# Patient Record
Sex: FEMALE | Race: WHITE | NOT HISPANIC OR LATINO | Employment: OTHER | URBAN - METROPOLITAN AREA
[De-identification: names, ages, dates, MRNs, and addresses within clinical notes are randomized per-mention and may not be internally consistent; named-entity substitution may affect disease eponyms.]

---

## 2017-01-31 DIAGNOSIS — R52 PAIN: ICD-10-CM

## 2017-01-31 RX ORDER — ACETAMINOPHEN AND CODEINE PHOSPHATE 300; 30 MG/1; MG/1
TABLET ORAL
Qty: 90 TABLET | Refills: 0 | OUTPATIENT
Start: 2017-01-31 | End: 2017-03-02 | Stop reason: SDUPTHER

## 2017-02-28 DIAGNOSIS — R52 PAIN: ICD-10-CM

## 2017-02-28 RX ORDER — ACETAMINOPHEN AND CODEINE PHOSPHATE 300; 30 MG/1; MG/1
TABLET ORAL
Qty: 90 TABLET | Refills: 0 | Status: CANCELLED | OUTPATIENT
Start: 2017-02-28

## 2017-03-02 DIAGNOSIS — R52 PAIN: ICD-10-CM

## 2017-03-02 RX ORDER — ACETAMINOPHEN AND CODEINE PHOSPHATE 300; 30 MG/1; MG/1
1 TABLET ORAL EVERY 6 HOURS PRN
Qty: 90 TABLET | Refills: 0 | OUTPATIENT
Start: 2017-03-02 | End: 2017-03-07 | Stop reason: SDUPTHER

## 2017-03-07 ENCOUNTER — OFFICE VISIT (OUTPATIENT)
Dept: INTERNAL MEDICINE | Facility: CLINIC | Age: 77
End: 2017-03-07

## 2017-03-07 VITALS
DIASTOLIC BLOOD PRESSURE: 94 MMHG | HEART RATE: 69 BPM | SYSTOLIC BLOOD PRESSURE: 160 MMHG | BODY MASS INDEX: 37.9 KG/M2 | HEIGHT: 59 IN | OXYGEN SATURATION: 93 % | WEIGHT: 188 LBS

## 2017-03-07 DIAGNOSIS — I10 ESSENTIAL HYPERTENSION: Primary | ICD-10-CM

## 2017-03-07 DIAGNOSIS — R52 PAIN: ICD-10-CM

## 2017-03-07 DIAGNOSIS — E78.2 MIXED HYPERLIPIDEMIA: ICD-10-CM

## 2017-03-07 DIAGNOSIS — K52.9 CHRONIC NONSPECIFIC COLITIS: ICD-10-CM

## 2017-03-07 DIAGNOSIS — E03.9 ACQUIRED HYPOTHYROIDISM: ICD-10-CM

## 2017-03-07 LAB
ALBUMIN SERPL-MCNC: 3.57 G/DL (ref 3.4–4.6)
ALBUMIN/GLOB SERPL: 1 G/DL
ALP SERPL-CCNC: 77 U/L (ref 46–116)
ALT SERPL W P-5'-P-CCNC: 16 U/L (ref 14–59)
ANION GAP SERPL CALCULATED.3IONS-SCNC: 9 MMOL/L
AST SERPL-CCNC: 18 U/L (ref 7–37)
BASOPHILS # BLD AUTO: 0.05 10*3/MM3 (ref 0–0.2)
BASOPHILS NFR BLD AUTO: 0.7 % (ref 0–2)
BILIRUB SERPL-MCNC: 0.6 MG/DL (ref 0.2–1)
BUN BLD-MCNC: 7 MG/DL (ref 6–22)
BUN/CREAT SERPL: 9.1 (ref 7–25)
CALCIUM SPEC-SCNC: 9.2 MG/DL (ref 8.6–10.5)
CHLORIDE SERPL-SCNC: 98 MMOL/L (ref 95–107)
CHOLEST SERPL-MCNC: 181 MG/DL (ref 0–200)
CO2 SERPL-SCNC: 32 MMOL/L (ref 23–32)
CREAT BLD-MCNC: 0.77 MG/DL (ref 0.55–1.02)
DEPRECATED RDW RBC AUTO: 47.4 FL (ref 37–54)
EOSINOPHIL # BLD AUTO: 0.18 10*3/MM3 (ref 0–0.7)
EOSINOPHIL NFR BLD AUTO: 2.3 % (ref 0–5)
ERYTHROCYTE [DISTWIDTH] IN BLOOD BY AUTOMATED COUNT: 15 % (ref 11.5–15)
GFR SERPL CREATININE-BSD FRML MDRD: 72 ML/MIN/1.73
GLOBULIN UR ELPH-MCNC: 3.6 GM/DL
GLUCOSE BLD-MCNC: 87 MG/DL (ref 70–100)
HCT VFR BLD AUTO: 35.2 % (ref 34.1–44.9)
HDLC SERPL-MCNC: 96 MG/DL (ref 40–81)
HGB BLD-MCNC: 11.6 G/DL (ref 11.2–15.7)
LDLC SERPL CALC-MCNC: 65 MG/DL (ref 0–100)
LDLC/HDLC SERPL: 0.68 {RATIO}
LYMPHOCYTES # BLD AUTO: 1.47 10*3/MM3 (ref 0.8–7)
LYMPHOCYTES NFR BLD AUTO: 19.2 % (ref 10–60)
MCH RBC QN AUTO: 28.8 PG (ref 26–34)
MCHC RBC AUTO-ENTMCNC: 33 G/DL (ref 31–37)
MCV RBC AUTO: 87.3 FL (ref 80–100)
MONOCYTES # BLD AUTO: 0.7 10*3/MM3 (ref 0–1)
MONOCYTES NFR BLD AUTO: 9.1 % (ref 0–13)
NEUTROPHILS # BLD AUTO: 5.27 10*3/MM3 (ref 1–11)
NEUTROPHILS NFR BLD AUTO: 68.7 % (ref 30–85)
PLATELET # BLD AUTO: 305 10*3/MM3 (ref 150–450)
PMV BLD AUTO: 10.2 FL (ref 6–12)
POTASSIUM BLD-SCNC: 3.4 MMOL/L (ref 3.3–5.3)
PROT SERPL-MCNC: 7.2 G/DL (ref 6.3–8.4)
RBC # BLD AUTO: 4.03 10*6/MM3 (ref 3.93–5.22)
SODIUM BLD-SCNC: 139 MMOL/L (ref 136–145)
TRIGL SERPL-MCNC: 98 MG/DL (ref 0–150)
TSH SERPL DL<=0.05 MIU/L-ACNC: 2.33 MIU/ML (ref 0.4–4.2)
VLDLC SERPL-MCNC: 19.6 MG/DL
WBC NRBC COR # BLD: 7.67 10*3/MM3 (ref 5–10)

## 2017-03-07 PROCEDURE — 99214 OFFICE O/P EST MOD 30 MIN: CPT | Performed by: INTERNAL MEDICINE

## 2017-03-07 PROCEDURE — 80053 COMPREHEN METABOLIC PANEL: CPT | Performed by: INTERNAL MEDICINE

## 2017-03-07 PROCEDURE — 84443 ASSAY THYROID STIM HORMONE: CPT | Performed by: INTERNAL MEDICINE

## 2017-03-07 PROCEDURE — 80061 LIPID PANEL: CPT | Performed by: INTERNAL MEDICINE

## 2017-03-07 PROCEDURE — 85025 COMPLETE CBC W/AUTO DIFF WBC: CPT | Performed by: INTERNAL MEDICINE

## 2017-03-07 RX ORDER — DIPHENOXYLATE HYDROCHLORIDE AND ATROPINE SULFATE 2.5; .025 MG/1; MG/1
1 TABLET ORAL 4 TIMES DAILY PRN
Qty: 100 TABLET | Refills: 0 | Status: SHIPPED | OUTPATIENT
Start: 2017-03-07 | End: 2019-04-24 | Stop reason: SDUPTHER

## 2017-03-07 RX ORDER — ACETAMINOPHEN AND CODEINE PHOSPHATE 300; 30 MG/1; MG/1
1 TABLET ORAL EVERY 6 HOURS PRN
Qty: 90 TABLET | Refills: 0 | OUTPATIENT
Start: 2017-03-07 | End: 2017-03-30 | Stop reason: SDUPTHER

## 2017-03-07 NOTE — PROGRESS NOTES
Subjective   Kendy Worrell is a 77 y.o. female.     Hypertension   This is a chronic problem. The current episode started more than 1 year ago. Pertinent negatives include no chest pain or palpitations.   Arthritis   Presents for follow-up visit. Associated symptoms include diarrhea (Neds refill on codeine & Lomotil).        The following portions of the patient's history were reviewed and updated as appropriate: allergies, current medications, past family history, past medical history, past social history, past surgical history and problem list.    Review of Systems   Constitutional:        Generally doing about the same   HENT: Positive for sinus pressure (Has been having more pain in Rt maxillary sinu region Using Flonase which helps Some drianing  Doesn't need anything @ this time).    Eyes: Negative.    Respiratory: Negative.    Cardiovascular: Negative for chest pain and palpitations.   Gastrointestinal: Positive for diarrhea (Neds refill on codeine & Lomotil).   Genitourinary: Negative.    Musculoskeletal: Positive for arthralgias (Usual aches & pains) and arthritis.   Neurological: Negative.        Objective   Physical Exam   Constitutional: She is oriented to person, place, and time. She appears well-developed.   HENT:   Head: Normocephalic.   Eyes: EOM are normal.   Cardiovascular: Normal rate, regular rhythm and normal heart sounds.    Repeat 150/100   Pulmonary/Chest: Effort normal and breath sounds normal.   Musculoskeletal: Normal range of motion.   Neurological: She is alert and oriented to person, place, and time.   Vitals reviewed.      Assessment/Plan   Kendy was seen today for hypertension and arthritis.    Diagnoses and all orders for this visit:    Essential hypertension  -     CBC Auto Differential; Future  -     Comprehensive Metabolic Panel; Future    Mixed hyperlipidemia  -     Lipid Panel; Future    Acquired hypothyroidism  -     TSH; Future  -     T4, Free; Future    Chronic nonspecific  colitis  -     diphenoxylate-atropine (LOMOTIL) 2.5-0.025 MG per tablet; Take 1 tablet by mouth 4 (Four) Times a Day As Needed for diarrhea.    Pain  -     acetaminophen-codeine (TYLENOL #3) 300-30 MG per tablet; Take 1 tablet by mouth Every 6 (Six) Hours As Needed for moderate pain (4-6).

## 2017-03-08 LAB — T4 FREE SERPL-MCNC: 1.71 NG/DL (ref 0.82–1.77)

## 2017-03-30 DIAGNOSIS — R52 PAIN: ICD-10-CM

## 2017-03-31 RX ORDER — ACETAMINOPHEN AND CODEINE PHOSPHATE 300; 30 MG/1; MG/1
TABLET ORAL
Qty: 90 TABLET | Refills: 0 | Status: SHIPPED | OUTPATIENT
Start: 2017-03-31 | End: 2017-04-28 | Stop reason: SDUPTHER

## 2017-04-03 ENCOUNTER — OFFICE VISIT (OUTPATIENT)
Dept: INTERNAL MEDICINE | Facility: CLINIC | Age: 77
End: 2017-04-03

## 2017-04-03 VITALS
SYSTOLIC BLOOD PRESSURE: 152 MMHG | HEIGHT: 59 IN | OXYGEN SATURATION: 94 % | WEIGHT: 183 LBS | BODY MASS INDEX: 36.89 KG/M2 | HEART RATE: 77 BPM | DIASTOLIC BLOOD PRESSURE: 90 MMHG

## 2017-04-03 DIAGNOSIS — J20.9 ACUTE BRONCHITIS, UNSPECIFIED ORGANISM: Primary | ICD-10-CM

## 2017-04-03 PROCEDURE — 99214 OFFICE O/P EST MOD 30 MIN: CPT | Performed by: INTERNAL MEDICINE

## 2017-04-03 RX ORDER — AZITHROMYCIN 250 MG/1
TABLET, FILM COATED ORAL
Qty: 6 TABLET | Refills: 0 | Status: SHIPPED | OUTPATIENT
Start: 2017-04-03 | End: 2017-06-09

## 2017-04-03 RX ORDER — METHYLPREDNISOLONE 4 MG/1
TABLET ORAL
Qty: 21 TABLET | Refills: 0 | Status: SHIPPED | OUTPATIENT
Start: 2017-04-03 | End: 2017-06-09

## 2017-04-03 NOTE — PROGRESS NOTES
Subjective   Kendy Worrell is a 77 y.o. female.     Cough   This is a new problem. Associated symptoms include postnasal drip. Pertinent negatives include no chest pain.        The following portions of the patient's history were reviewed and updated as appropriate: allergies, current medications, past family history, past medical history, past social history, past surgical history and problem list.    Review of Systems   HENT: Positive for nosebleeds, postnasal drip and sinus pressure.    Respiratory: Positive for cough ( Not very dark Slight yellow tinge).    Cardiovascular: Negative for chest pain and palpitations.   Gastrointestinal: Positive for diarrhea ( Chronic).   Genitourinary: Negative.    Musculoskeletal: Negative.        Objective   Physical Exam   Constitutional: She appears well-developed.   HENT:   Head: Normocephalic.   Eyes: EOM are normal.   Neck: Neck supple.   Cardiovascular: Normal rate, regular rhythm and normal heart sounds.    Pulmonary/Chest: She has wheezes (Scattered).   Bilat rhonchi   Musculoskeletal: Normal range of motion.   Neurological: She is alert.       Assessment/Plan    Kendy was seen today for cough.    Diagnoses and all orders for this visit:    Acute bronchitis, unspecified organism  -     azithromycin (ZITHROMAX) 250 MG tablet; Take 2 tablets the first day, then 1 tablet daily for 4 days.  -     MethylPREDNISolone (MEDROL) 4 MG tablet; follow package directions

## 2017-04-28 DIAGNOSIS — R52 PAIN: ICD-10-CM

## 2017-04-28 RX ORDER — ACETAMINOPHEN AND CODEINE PHOSPHATE 300; 30 MG/1; MG/1
TABLET ORAL
Qty: 90 TABLET | Refills: 1 | OUTPATIENT
Start: 2017-04-28 | End: 2017-06-26 | Stop reason: SDUPTHER

## 2017-05-01 RX ORDER — LEVOTHYROXINE SODIUM 0.07 MG/1
TABLET ORAL
Qty: 90 TABLET | Refills: 3 | Status: SHIPPED | OUTPATIENT
Start: 2017-05-01 | End: 2018-01-29 | Stop reason: SDUPTHER

## 2017-06-09 ENCOUNTER — OFFICE VISIT (OUTPATIENT)
Dept: INTERNAL MEDICINE | Facility: CLINIC | Age: 77
End: 2017-06-09

## 2017-06-09 VITALS
WEIGHT: 189 LBS | BODY MASS INDEX: 38.1 KG/M2 | DIASTOLIC BLOOD PRESSURE: 80 MMHG | OXYGEN SATURATION: 93 % | SYSTOLIC BLOOD PRESSURE: 150 MMHG | HEART RATE: 64 BPM | HEIGHT: 59 IN

## 2017-06-09 DIAGNOSIS — N64.9 LESION OF BREAST: ICD-10-CM

## 2017-06-09 DIAGNOSIS — I10 ESSENTIAL HYPERTENSION: Primary | ICD-10-CM

## 2017-06-09 DIAGNOSIS — M15.9 PRIMARY OSTEOARTHRITIS INVOLVING MULTIPLE JOINTS: ICD-10-CM

## 2017-06-09 DIAGNOSIS — I25.10 CORONARY ARTERY DISEASE INVOLVING NATIVE CORONARY ARTERY OF NATIVE HEART WITHOUT ANGINA PECTORIS: ICD-10-CM

## 2017-06-09 PROCEDURE — 99214 OFFICE O/P EST MOD 30 MIN: CPT | Performed by: INTERNAL MEDICINE

## 2017-06-26 DIAGNOSIS — R52 PAIN: ICD-10-CM

## 2017-06-29 RX ORDER — ACETAMINOPHEN AND CODEINE PHOSPHATE 300; 30 MG/1; MG/1
TABLET ORAL
Qty: 90 TABLET | Refills: 1 | OUTPATIENT
Start: 2017-06-29 | End: 2017-08-18 | Stop reason: SDUPTHER

## 2017-07-12 ENCOUNTER — APPOINTMENT (OUTPATIENT)
Dept: WOMENS IMAGING | Facility: HOSPITAL | Age: 77
End: 2017-07-12

## 2017-07-12 PROCEDURE — 19083 BX BREAST 1ST LESION US IMAG: CPT | Performed by: RADIOLOGY

## 2017-08-18 DIAGNOSIS — R52 PAIN: ICD-10-CM

## 2017-08-21 RX ORDER — ACETAMINOPHEN AND CODEINE PHOSPHATE 300; 30 MG/1; MG/1
TABLET ORAL
Qty: 90 TABLET | Refills: 1 | OUTPATIENT
Start: 2017-08-21 | End: 2017-10-18 | Stop reason: SDUPTHER

## 2017-09-14 ENCOUNTER — OFFICE VISIT (OUTPATIENT)
Dept: INTERNAL MEDICINE | Facility: CLINIC | Age: 77
End: 2017-09-14

## 2017-09-14 VITALS
DIASTOLIC BLOOD PRESSURE: 98 MMHG | OXYGEN SATURATION: 96 % | WEIGHT: 185 LBS | BODY MASS INDEX: 37.29 KG/M2 | HEIGHT: 59 IN | SYSTOLIC BLOOD PRESSURE: 148 MMHG | HEART RATE: 60 BPM

## 2017-09-14 DIAGNOSIS — I10 ESSENTIAL HYPERTENSION: Primary | ICD-10-CM

## 2017-09-14 DIAGNOSIS — E78.2 MIXED HYPERLIPIDEMIA: ICD-10-CM

## 2017-09-14 DIAGNOSIS — E03.9 ACQUIRED HYPOTHYROIDISM: ICD-10-CM

## 2017-09-14 DIAGNOSIS — I25.10 CORONARY ARTERY DISEASE INVOLVING NATIVE CORONARY ARTERY OF NATIVE HEART WITHOUT ANGINA PECTORIS: ICD-10-CM

## 2017-09-14 DIAGNOSIS — N39.3 URINARY, INCONTINENCE, STRESS FEMALE: ICD-10-CM

## 2017-09-14 LAB
ALBUMIN SERPL-MCNC: 4.3 G/DL (ref 3.5–5.2)
ALBUMIN/GLOB SERPL: 1.4 G/DL
ALP SERPL-CCNC: 65 U/L (ref 39–117)
ALT SERPL W P-5'-P-CCNC: 11 U/L (ref 1–33)
ANION GAP SERPL CALCULATED.3IONS-SCNC: 13.8 MMOL/L
AST SERPL-CCNC: 17 U/L (ref 1–32)
BASOPHILS # BLD AUTO: 0.04 10*3/MM3 (ref 0–0.2)
BASOPHILS NFR BLD AUTO: 0.6 % (ref 0–2)
BILIRUB SERPL-MCNC: 1.1 MG/DL (ref 0.1–1.2)
BUN BLD-MCNC: 8 MG/DL (ref 8–23)
BUN/CREAT SERPL: 11 (ref 7–25)
CALCIUM SPEC-SCNC: 10.1 MG/DL (ref 8.6–10.5)
CHLORIDE SERPL-SCNC: 94 MMOL/L (ref 98–107)
CHOLEST SERPL-MCNC: 193 MG/DL (ref 0–200)
CO2 SERPL-SCNC: 29.2 MMOL/L (ref 22–29)
CREAT BLD-MCNC: 0.73 MG/DL (ref 0.57–1)
DEPRECATED RDW RBC AUTO: 47.1 FL (ref 37–54)
EOSINOPHIL # BLD AUTO: 0.19 10*3/MM3 (ref 0–0.7)
EOSINOPHIL NFR BLD AUTO: 2.9 % (ref 0–5)
ERYTHROCYTE [DISTWIDTH] IN BLOOD BY AUTOMATED COUNT: 14.7 % (ref 11.5–15)
GFR SERPL CREATININE-BSD FRML MDRD: 77 ML/MIN/1.73
GLOBULIN UR ELPH-MCNC: 3.1 GM/DL
GLUCOSE BLD-MCNC: 85 MG/DL (ref 65–99)
HCT VFR BLD AUTO: 34.4 % (ref 34.1–44.9)
HDLC SERPL-MCNC: 111 MG/DL (ref 40–60)
HGB BLD-MCNC: 11.5 G/DL (ref 11.2–15.7)
LDLC SERPL CALC-MCNC: 61 MG/DL (ref 0–100)
LDLC/HDLC SERPL: 0.55 {RATIO}
LYMPHOCYTES # BLD AUTO: 1.65 10*3/MM3 (ref 0.8–7)
LYMPHOCYTES NFR BLD AUTO: 25.3 % (ref 10–60)
MCH RBC QN AUTO: 30.7 PG (ref 26–34)
MCHC RBC AUTO-ENTMCNC: 33.4 G/DL (ref 31–37)
MCV RBC AUTO: 91.7 FL (ref 80–100)
MONOCYTES # BLD AUTO: 0.72 10*3/MM3 (ref 0–1)
MONOCYTES NFR BLD AUTO: 11.1 % (ref 0–13)
NEUTROPHILS # BLD AUTO: 3.91 10*3/MM3 (ref 1–11)
NEUTROPHILS NFR BLD AUTO: 60.1 % (ref 30–85)
PLATELET # BLD AUTO: 265 10*3/MM3 (ref 150–450)
PMV BLD AUTO: 10.7 FL (ref 6–12)
POTASSIUM BLD-SCNC: 3.8 MMOL/L (ref 3.5–5.2)
PROT SERPL-MCNC: 7.4 G/DL (ref 6–8.5)
RBC # BLD AUTO: 3.75 10*6/MM3 (ref 3.93–5.22)
SODIUM BLD-SCNC: 137 MMOL/L (ref 136–145)
T4 FREE SERPL-MCNC: 1.61 NG/DL (ref 0.93–1.7)
TRIGL SERPL-MCNC: 107 MG/DL (ref 0–150)
TSH SERPL DL<=0.05 MIU/L-ACNC: 5.3 MIU/ML (ref 0.27–4.2)
VLDLC SERPL-MCNC: 21.4 MG/DL (ref 5–40)
WBC NRBC COR # BLD: 6.51 10*3/MM3 (ref 5–10)

## 2017-09-14 PROCEDURE — 84443 ASSAY THYROID STIM HORMONE: CPT | Performed by: INTERNAL MEDICINE

## 2017-09-14 PROCEDURE — 84439 ASSAY OF FREE THYROXINE: CPT | Performed by: INTERNAL MEDICINE

## 2017-09-14 PROCEDURE — 85025 COMPLETE CBC W/AUTO DIFF WBC: CPT | Performed by: INTERNAL MEDICINE

## 2017-09-14 PROCEDURE — 80053 COMPREHEN METABOLIC PANEL: CPT | Performed by: INTERNAL MEDICINE

## 2017-09-14 PROCEDURE — 99214 OFFICE O/P EST MOD 30 MIN: CPT | Performed by: INTERNAL MEDICINE

## 2017-09-14 PROCEDURE — 36415 COLL VENOUS BLD VENIPUNCTURE: CPT | Performed by: INTERNAL MEDICINE

## 2017-09-14 PROCEDURE — 80061 LIPID PANEL: CPT | Performed by: INTERNAL MEDICINE

## 2017-09-14 NOTE — PROGRESS NOTES
Subjective   Kendy Worrell is a 77 y.o. female.     Hypertension   This is a chronic problem. The current episode started more than 1 year ago. Pertinent negatives include no chest pain, palpitations or shortness of breath.   Coronary Artery Disease   Presents for follow-up visit. Pertinent negatives include no chest pain, palpitations or shortness of breath.        The following portions of the patient's history were reviewed and updated as appropriate: allergies, current medications, past family history, past medical history, past social history, past surgical history and problem list.    Review of Systems   Constitutional:        Has been doing about the same   HENT: Negative.    Eyes: Negative.    Respiratory: Negative for cough and shortness of breath.         Had L breast biopsy which was NEG Did have hemnatoma post procedure which took a long time to resolve Still has some tenderness   Cardiovascular: Negative for chest pain and palpitations.        BP staying up when she checks it   Gastrointestinal: Negative.    Genitourinary: Positive for frequency.   Musculoskeletal: Positive for arthralgias (Usual aches & pains).       Objective   Physical Exam   Constitutional: She is oriented to person, place, and time. She appears well-developed.   HENT:   Head: Normocephalic.   Eyes: EOM are normal.   Neck: Neck supple.   Cardiovascular: Normal rate, regular rhythm and normal heart sounds.    Repeat 140/90   Pulmonary/Chest: Effort normal and breath sounds normal.   Musculoskeletal: Normal range of motion.   Neurological: She is alert and oriented to person, place, and time.   Vitals reviewed.      Assessment/Plan   Kendy was seen today for hypertension and coronary artery disease.    Diagnoses and all orders for this visit:    Essential hypertension  -     CBC Auto Differential; Future  -     Comprehensive Metabolic Panel; Future    Mixed hyperlipidemia  -     Lipid Panel; Future    Coronary artery disease involving  native coronary artery of native heart without angina pectoris    Acquired hypothyroidism  -     TSH; Future  -     T4, Free; Future    Urinary, incontinence, stress female  -     Ambulatory Referral to Urology

## 2017-10-18 DIAGNOSIS — R52 PAIN: ICD-10-CM

## 2017-10-19 RX ORDER — ACETAMINOPHEN AND CODEINE PHOSPHATE 300; 30 MG/1; MG/1
1 TABLET ORAL EVERY 6 HOURS PRN
Qty: 90 TABLET | Refills: 1 | OUTPATIENT
Start: 2017-10-19 | End: 2017-12-19 | Stop reason: SDUPTHER

## 2017-12-19 ENCOUNTER — OFFICE VISIT (OUTPATIENT)
Dept: INTERNAL MEDICINE | Facility: CLINIC | Age: 77
End: 2017-12-19

## 2017-12-19 VITALS
DIASTOLIC BLOOD PRESSURE: 90 MMHG | OXYGEN SATURATION: 96 % | HEIGHT: 59 IN | BODY MASS INDEX: 37.5 KG/M2 | HEART RATE: 67 BPM | WEIGHT: 186 LBS | SYSTOLIC BLOOD PRESSURE: 144 MMHG

## 2017-12-19 DIAGNOSIS — E78.2 MIXED HYPERLIPIDEMIA: ICD-10-CM

## 2017-12-19 DIAGNOSIS — R52 PAIN: ICD-10-CM

## 2017-12-19 DIAGNOSIS — I25.10 CORONARY ARTERY DISEASE INVOLVING NATIVE CORONARY ARTERY OF NATIVE HEART WITHOUT ANGINA PECTORIS: Primary | ICD-10-CM

## 2017-12-19 DIAGNOSIS — E03.9 ACQUIRED HYPOTHYROIDISM: ICD-10-CM

## 2017-12-19 DIAGNOSIS — I10 ESSENTIAL HYPERTENSION: ICD-10-CM

## 2017-12-19 LAB
ALBUMIN SERPL-MCNC: 4.4 G/DL (ref 3.5–5.2)
ALBUMIN/GLOB SERPL: 1.4 G/DL
ALP SERPL-CCNC: 67 U/L (ref 39–117)
ALT SERPL W P-5'-P-CCNC: 12 U/L (ref 1–33)
ANION GAP SERPL CALCULATED.3IONS-SCNC: 13.2 MMOL/L
AST SERPL-CCNC: 20 U/L (ref 1–32)
BASOPHILS # BLD AUTO: 0.07 10*3/MM3 (ref 0–0.2)
BASOPHILS NFR BLD AUTO: 1.1 % (ref 0–2)
BILIRUB SERPL-MCNC: 0.9 MG/DL (ref 0.1–1.2)
BUN BLD-MCNC: 12 MG/DL (ref 8–23)
BUN/CREAT SERPL: 16 (ref 7–25)
CALCIUM SPEC-SCNC: 9.9 MG/DL (ref 8.6–10.5)
CHLORIDE SERPL-SCNC: 95 MMOL/L (ref 98–107)
CHOLEST SERPL-MCNC: 200 MG/DL (ref 0–200)
CO2 SERPL-SCNC: 28.8 MMOL/L (ref 22–29)
CREAT BLD-MCNC: 0.75 MG/DL (ref 0.57–1)
DEPRECATED RDW RBC AUTO: 47.4 FL (ref 37–54)
EOSINOPHIL # BLD AUTO: 0.26 10*3/MM3 (ref 0–0.7)
EOSINOPHIL NFR BLD AUTO: 4.1 % (ref 0–5)
ERYTHROCYTE [DISTWIDTH] IN BLOOD BY AUTOMATED COUNT: 14.8 % (ref 11.5–15)
GFR SERPL CREATININE-BSD FRML MDRD: 75 ML/MIN/1.73
GLOBULIN UR ELPH-MCNC: 3.2 GM/DL
GLUCOSE BLD-MCNC: 92 MG/DL (ref 65–99)
HCT VFR BLD AUTO: 34.6 % (ref 34.1–44.9)
HDLC SERPL-MCNC: 118 MG/DL (ref 40–60)
HGB BLD-MCNC: 11.8 G/DL (ref 11.2–15.7)
LDLC SERPL CALC-MCNC: 63 MG/DL (ref 0–100)
LDLC/HDLC SERPL: 0.53 {RATIO}
LYMPHOCYTES # BLD AUTO: 1.51 10*3/MM3 (ref 0.8–7)
LYMPHOCYTES NFR BLD AUTO: 23.6 % (ref 10–60)
MCH RBC QN AUTO: 30.6 PG (ref 26–34)
MCHC RBC AUTO-ENTMCNC: 34.1 G/DL (ref 31–37)
MCV RBC AUTO: 89.6 FL (ref 80–100)
MONOCYTES # BLD AUTO: 0.76 10*3/MM3 (ref 0–1)
MONOCYTES NFR BLD AUTO: 11.9 % (ref 0–13)
NEUTROPHILS # BLD AUTO: 3.79 10*3/MM3 (ref 1–11)
NEUTROPHILS NFR BLD AUTO: 59.3 % (ref 30–85)
PLATELET # BLD AUTO: 311 10*3/MM3 (ref 150–450)
PMV BLD AUTO: 10.2 FL (ref 6–12)
POTASSIUM BLD-SCNC: 3.6 MMOL/L (ref 3.5–5.2)
PROT SERPL-MCNC: 7.6 G/DL (ref 6–8.5)
RBC # BLD AUTO: 3.86 10*6/MM3 (ref 3.93–5.22)
SODIUM BLD-SCNC: 137 MMOL/L (ref 136–145)
T4 FREE SERPL-MCNC: 1.69 NG/DL (ref 0.93–1.7)
TRIGL SERPL-MCNC: 97 MG/DL (ref 0–150)
TSH SERPL DL<=0.05 MIU/L-ACNC: 5.91 MIU/ML (ref 0.27–4.2)
VLDLC SERPL-MCNC: 19.4 MG/DL (ref 5–40)
WBC NRBC COR # BLD: 6.39 10*3/MM3 (ref 5–10)

## 2017-12-19 PROCEDURE — 99214 OFFICE O/P EST MOD 30 MIN: CPT | Performed by: INTERNAL MEDICINE

## 2017-12-19 PROCEDURE — 85025 COMPLETE CBC W/AUTO DIFF WBC: CPT | Performed by: INTERNAL MEDICINE

## 2017-12-19 PROCEDURE — 80053 COMPREHEN METABOLIC PANEL: CPT | Performed by: INTERNAL MEDICINE

## 2017-12-19 PROCEDURE — 84443 ASSAY THYROID STIM HORMONE: CPT | Performed by: INTERNAL MEDICINE

## 2017-12-19 PROCEDURE — 80061 LIPID PANEL: CPT | Performed by: INTERNAL MEDICINE

## 2017-12-19 PROCEDURE — 36415 COLL VENOUS BLD VENIPUNCTURE: CPT | Performed by: INTERNAL MEDICINE

## 2017-12-19 PROCEDURE — 84439 ASSAY OF FREE THYROXINE: CPT | Performed by: INTERNAL MEDICINE

## 2017-12-19 RX ORDER — ACETAMINOPHEN AND CODEINE PHOSPHATE 300; 30 MG/1; MG/1
1 TABLET ORAL EVERY 6 HOURS PRN
Qty: 90 TABLET | Refills: 0 | OUTPATIENT
Start: 2017-12-19 | End: 2017-12-19 | Stop reason: SDUPTHER

## 2017-12-19 RX ORDER — ACETAMINOPHEN AND CODEINE PHOSPHATE 300; 30 MG/1; MG/1
1 TABLET ORAL EVERY 6 HOURS PRN
Qty: 90 TABLET | Refills: 0 | Status: SHIPPED | OUTPATIENT
Start: 2017-12-19 | End: 2018-01-21 | Stop reason: SDUPTHER

## 2017-12-19 NOTE — PROGRESS NOTES
Subjective   Kendy Worrell is a 77 y.o. female.     Hypothyroidism   This is a chronic problem. The current episode started more than 1 year ago. Associated symptoms include arthralgias (usual aches & pains). Pertinent negatives include no chest pain.   Hypertension   This is a chronic problem. The current episode started more than 1 year ago. Pertinent negatives include no chest pain or palpitations.        The following portions of the patient's history were reviewed and updated as appropriate: allergies, current medications, past family history, past medical history, past social history, past surgical history and problem list.    Review of Systems   Constitutional:        Here for F/U Doing about the same   HENT: Negative.    Eyes: Positive for visual disturbance (Weras glasses ).   Respiratory: Negative.    Cardiovascular: Negative for chest pain and palpitations.   Gastrointestinal: Positive for diarrhea (Needs Tylenokl #3 for diarrhea).   Genitourinary:        Saw Dr Tsai & will have urodynamic study 1/5/18   Musculoskeletal: Positive for arthralgias (usual aches & pains).   Neurological: Negative.        Objective   Physical Exam   Constitutional: She is oriented to person, place, and time. She appears well-developed and well-nourished.   HENT:   Head: Normocephalic.   Eyes: EOM are normal.   Neck: Neck supple.   Cardiovascular: Normal rate, regular rhythm and normal heart sounds.    Repeat 130/80   Pulmonary/Chest: Effort normal and breath sounds normal.   Musculoskeletal: Normal range of motion.   Neurological: She is alert and oriented to person, place, and time.   Skin: Skin is warm and dry.   Psychiatric: She has a normal mood and affect. Her behavior is normal.   Vitals reviewed.      Assessment/Plan   Kendy was seen today for hypothyroidism and hypertension.    Diagnoses and all orders for this visit:    Coronary artery disease involving native coronary artery of native heart without angina  pectoris    Essential hypertension  -     CBC Auto Differential; Future  -     Comprehensive Metabolic Panel; Future    Acquired hypothyroidism  -     TSH; Future  -     T4, Free; Future    Mixed hyperlipidemia  -     Lipid Panel; Future    Pain  -     acetaminophen-codeine (TYLENOL #3) 300-30 MG per tablet; Take 1 tablet by mouth Every 6 (Six) Hours As Needed for Moderate Pain .

## 2017-12-26 RX ORDER — POTASSIUM CHLORIDE 20 MEQ/1
TABLET, EXTENDED RELEASE ORAL
Qty: 90 TABLET | Refills: 3 | Status: SHIPPED | OUTPATIENT
Start: 2017-12-26 | End: 2019-07-09 | Stop reason: SDUPTHER

## 2017-12-28 DIAGNOSIS — I10 ESSENTIAL HYPERTENSION: ICD-10-CM

## 2017-12-28 RX ORDER — METOPROLOL SUCCINATE 50 MG/1
50 TABLET, EXTENDED RELEASE ORAL DAILY
Qty: 90 TABLET | Refills: 3 | Status: SHIPPED | OUTPATIENT
Start: 2017-12-28 | End: 2019-01-04 | Stop reason: SDUPTHER

## 2018-01-21 DIAGNOSIS — R52 PAIN: ICD-10-CM

## 2018-01-22 RX ORDER — ACETAMINOPHEN AND CODEINE PHOSPHATE 300; 30 MG/1; MG/1
TABLET ORAL
Qty: 90 TABLET | Refills: 0 | OUTPATIENT
Start: 2018-01-22 | End: 2018-02-19 | Stop reason: SDUPTHER

## 2018-01-23 ENCOUNTER — TELEPHONE (OUTPATIENT)
Dept: INTERNAL MEDICINE | Facility: CLINIC | Age: 78
End: 2018-01-23

## 2018-01-23 NOTE — TELEPHONE ENCOUNTER
----- Message from Lurdes Duron sent at 1/23/2018  2:20 PM EST -----  Contact: Patient   Patient called regarding her medication dose that Dr. Bond said needed to be increased.      TSH is still elevated which means we need to increase the dose of your levothyroxin.  Please call to discuss.    levothyroxine (SYNTHROID, LEVOTHROID) 75 MCG tablet    Patient:  [phone]

## 2018-01-29 RX ORDER — LEVOTHYROXINE SODIUM 0.1 MG/1
100 TABLET ORAL DAILY
Qty: 30 TABLET | Refills: 3 | Status: SHIPPED | OUTPATIENT
Start: 2018-01-29 | End: 2018-05-09 | Stop reason: SDUPTHER

## 2018-02-19 DIAGNOSIS — R52 PAIN: ICD-10-CM

## 2018-02-20 RX ORDER — TRIAMTERENE AND HYDROCHLOROTHIAZIDE 37.5; 25 MG/1; MG/1
TABLET ORAL
Qty: 90 TABLET | Refills: 3 | Status: SHIPPED | OUTPATIENT
Start: 2018-02-20 | End: 2019-06-21 | Stop reason: SDUPTHER

## 2018-02-20 RX ORDER — ACETAMINOPHEN AND CODEINE PHOSPHATE 300; 30 MG/1; MG/1
TABLET ORAL
Qty: 90 TABLET | Refills: 0 | OUTPATIENT
Start: 2018-02-20 | End: 2018-03-20 | Stop reason: SDUPTHER

## 2018-03-20 ENCOUNTER — OFFICE VISIT (OUTPATIENT)
Dept: INTERNAL MEDICINE | Facility: CLINIC | Age: 78
End: 2018-03-20

## 2018-03-20 VITALS
HEIGHT: 59 IN | BODY MASS INDEX: 37.7 KG/M2 | DIASTOLIC BLOOD PRESSURE: 98 MMHG | SYSTOLIC BLOOD PRESSURE: 166 MMHG | WEIGHT: 187 LBS

## 2018-03-20 DIAGNOSIS — E78.2 MIXED HYPERLIPIDEMIA: ICD-10-CM

## 2018-03-20 DIAGNOSIS — E05.90 HYPERTHYROIDISM: ICD-10-CM

## 2018-03-20 DIAGNOSIS — N32.81 OAB (OVERACTIVE BLADDER): ICD-10-CM

## 2018-03-20 DIAGNOSIS — R52 PAIN: ICD-10-CM

## 2018-03-20 DIAGNOSIS — I10 ESSENTIAL HYPERTENSION: Primary | ICD-10-CM

## 2018-03-20 DIAGNOSIS — M19.90 ARTHRITIS: ICD-10-CM

## 2018-03-20 LAB
ALBUMIN SERPL-MCNC: 4.3 G/DL (ref 3.5–5.2)
ALBUMIN/GLOB SERPL: 1.3 G/DL
ALP SERPL-CCNC: 71 U/L (ref 39–117)
ALT SERPL W P-5'-P-CCNC: 10 U/L (ref 1–33)
ANION GAP SERPL CALCULATED.3IONS-SCNC: 10.7 MMOL/L
AST SERPL-CCNC: 14 U/L (ref 1–32)
BASOPHILS # BLD AUTO: 0.05 10*3/MM3 (ref 0–0.2)
BASOPHILS NFR BLD AUTO: 0.7 % (ref 0–2)
BILIRUB SERPL-MCNC: 0.7 MG/DL (ref 0.1–1.2)
BUN BLD-MCNC: 10 MG/DL (ref 8–23)
BUN/CREAT SERPL: 12.7 (ref 7–25)
CALCIUM SPEC-SCNC: 10.5 MG/DL (ref 8.6–10.5)
CHLORIDE SERPL-SCNC: 93 MMOL/L (ref 98–107)
CHOLEST SERPL-MCNC: 199 MG/DL (ref 0–200)
CO2 SERPL-SCNC: 32.3 MMOL/L (ref 22–29)
CREAT BLD-MCNC: 0.79 MG/DL (ref 0.57–1)
DEPRECATED RDW RBC AUTO: 48.2 FL (ref 37–54)
EOSINOPHIL # BLD AUTO: 0.44 10*3/MM3 (ref 0–0.7)
EOSINOPHIL NFR BLD AUTO: 6 % (ref 0–5)
ERYTHROCYTE [DISTWIDTH] IN BLOOD BY AUTOMATED COUNT: 14.8 % (ref 11.5–15)
GFR SERPL CREATININE-BSD FRML MDRD: 70 ML/MIN/1.73
GLOBULIN UR ELPH-MCNC: 3.3 GM/DL
GLUCOSE BLD-MCNC: 82 MG/DL (ref 65–99)
HCT VFR BLD AUTO: 34.8 % (ref 34.1–44.9)
HDLC SERPL-MCNC: 111 MG/DL (ref 40–60)
HGB BLD-MCNC: 11.9 G/DL (ref 11.2–15.7)
LDLC SERPL CALC-MCNC: 66 MG/DL (ref 0–100)
LDLC/HDLC SERPL: 0.59 {RATIO}
LYMPHOCYTES # BLD AUTO: 1.24 10*3/MM3 (ref 0.8–7)
LYMPHOCYTES NFR BLD AUTO: 16.9 % (ref 10–60)
MCH RBC QN AUTO: 30.7 PG (ref 26–34)
MCHC RBC AUTO-ENTMCNC: 34.2 G/DL (ref 31–37)
MCV RBC AUTO: 89.9 FL (ref 80–100)
MONOCYTES # BLD AUTO: 0.76 10*3/MM3 (ref 0–1)
MONOCYTES NFR BLD AUTO: 10.3 % (ref 0–13)
NEUTROPHILS # BLD AUTO: 4.86 10*3/MM3 (ref 1–11)
NEUTROPHILS NFR BLD AUTO: 66.1 % (ref 30–85)
PLATELET # BLD AUTO: 277 10*3/MM3 (ref 150–450)
PMV BLD AUTO: 9.5 FL (ref 6–12)
POTASSIUM BLD-SCNC: 3.4 MMOL/L (ref 3.5–5.2)
PROT SERPL-MCNC: 7.6 G/DL (ref 6–8.5)
RBC # BLD AUTO: 3.87 10*6/MM3 (ref 3.93–5.22)
SODIUM BLD-SCNC: 136 MMOL/L (ref 136–145)
T4 FREE SERPL-MCNC: 1.8 NG/DL (ref 0.93–1.7)
TRIGL SERPL-MCNC: 110 MG/DL (ref 0–150)
TSH SERPL-ACNC: 2.05 MIU/ML (ref 0.27–4.2)
URATE SERPL-MCNC: 6.4 MG/DL (ref 2.4–5.7)
VLDLC SERPL-MCNC: 22 MG/DL (ref 5–40)
WBC NRBC COR # BLD: 7.35 10*3/MM3 (ref 5–10)

## 2018-03-20 PROCEDURE — 84550 ASSAY OF BLOOD/URIC ACID: CPT | Performed by: INTERNAL MEDICINE

## 2018-03-20 PROCEDURE — 85025 COMPLETE CBC W/AUTO DIFF WBC: CPT | Performed by: INTERNAL MEDICINE

## 2018-03-20 PROCEDURE — 80061 LIPID PANEL: CPT | Performed by: INTERNAL MEDICINE

## 2018-03-20 PROCEDURE — 99214 OFFICE O/P EST MOD 30 MIN: CPT | Performed by: INTERNAL MEDICINE

## 2018-03-20 PROCEDURE — 80053 COMPREHEN METABOLIC PANEL: CPT | Performed by: INTERNAL MEDICINE

## 2018-03-20 RX ORDER — FESOTERODINE FUMARATE 4 MG/1
4 TABLET, EXTENDED RELEASE ORAL
COMMUNITY
End: 2018-06-20

## 2018-03-20 RX ORDER — ACETAMINOPHEN AND CODEINE PHOSPHATE 300; 30 MG/1; MG/1
1 TABLET ORAL EVERY 6 HOURS PRN
Qty: 90 TABLET | Refills: 0 | OUTPATIENT
Start: 2018-03-20 | End: 2018-03-22 | Stop reason: SDUPTHER

## 2018-03-20 NOTE — PROGRESS NOTES
Subjective   Kendy Worrell is a 78 y.o. female.     Hypertension   This is a chronic problem. The current episode started more than 1 year ago. Pertinent negatives include no chest pain or palpitations.   Hyperlipidemia   Pertinent negatives include no chest pain.   Foot Pain   Pertinent negatives include no chest pain.        The following portions of the patient's history were reviewed and updated as appropriate: allergies, current medications, past family history, past medical history, past social history, past surgical history and problem list.    Review of Systems   Constitutional:        Here for F/U BP hasb een consistently high when she checks @ home   HENT: Negative.    Eyes: Positive for visual disturbance ( Waiting on new RX Dr Summers Has macular degeneration OD).   Respiratory: Negative.    Cardiovascular: Negative for chest pain and palpitations.        BP remains elevated   Gastrointestinal: Positive for diarrhea (Has chronic diarrhea).   Genitourinary:        Seeing Dr Tsai for OAB Has her ontoviaz & myrbetrig Has helped some Not as much nocturia   Musculoskeletal:        Fell injuring Rt foot 3/1/2018 @ home injuring Rt foot Is still red & swollen   Neurological: Negative.        Objective   Physical Exam   Constitutional: She is oriented to person, place, and time. She appears well-developed.   HENT:   Head: Normocephalic and atraumatic.   Eyes: EOM are normal.   Neck: Neck supple.   Cardiovascular: Normal rate, regular rhythm and normal heart sounds.    Repeat 140/90   Pulmonary/Chest: Effort normal and breath sounds normal.   Musculoskeletal:   Rt foot slightly swollen & warm to touch Pulses 2+   Neurological: She is alert and oriented to person, place, and time.   Vitals reviewed.        Assessment/Plan   Kendy was seen today for hypertension, hyperlipidemia and foot pain.    Diagnoses and all orders for this visit:    Essential hypertension  -     CBC Auto Differential; Future  -      Comprehensive Metabolic Panel; Future  -     CBC Auto Differential  -     Comprehensive Metabolic Panel    Mixed hyperlipidemia  -     Lipid Panel; Future  -     Lipid Panel    OAB (overactive bladder)    Hyperthyroidism  -     TSH; Future  -     T4, Free; Future  -     TSH  -     T4, Free    Arthritis  -     Uric acid; Future  -     Uric acid    Pain  -     acetaminophen-codeine (TYLENOL #3) 300-30 MG per tablet; Take 1 tablet by mouth Every 6 (Six) Hours As Needed for Moderate Pain .

## 2018-03-21 DIAGNOSIS — M10.00 IDIOPATHIC GOUT, UNSPECIFIED CHRONICITY, UNSPECIFIED SITE: Primary | ICD-10-CM

## 2018-03-21 RX ORDER — METHYLPREDNISOLONE 4 MG/1
TABLET ORAL
Qty: 21 TABLET | Refills: 0 | Status: SHIPPED | OUTPATIENT
Start: 2018-03-21 | End: 2018-06-20

## 2018-03-22 DIAGNOSIS — R52 PAIN: ICD-10-CM

## 2018-03-23 RX ORDER — ACETAMINOPHEN AND CODEINE PHOSPHATE 300; 30 MG/1; MG/1
1 TABLET ORAL EVERY 6 HOURS PRN
Qty: 90 TABLET | Refills: 0 | OUTPATIENT
Start: 2018-03-23 | End: 2018-04-19 | Stop reason: SDUPTHER

## 2018-04-19 DIAGNOSIS — R52 PAIN: ICD-10-CM

## 2018-04-20 RX ORDER — ACETAMINOPHEN AND CODEINE PHOSPHATE 300; 30 MG/1; MG/1
TABLET ORAL
Qty: 90 TABLET | Refills: 1 | OUTPATIENT
Start: 2018-04-20 | End: 2018-06-20 | Stop reason: SDUPTHER

## 2018-05-09 ENCOUNTER — TELEPHONE (OUTPATIENT)
Dept: INTERNAL MEDICINE | Facility: CLINIC | Age: 78
End: 2018-05-09

## 2018-05-09 DIAGNOSIS — E03.9 HYPOTHYROIDISM, UNSPECIFIED TYPE: Primary | ICD-10-CM

## 2018-05-09 RX ORDER — LEVOTHYROXINE SODIUM 0.1 MG/1
100 TABLET ORAL DAILY
Qty: 90 TABLET | Refills: 3 | Status: SHIPPED | OUTPATIENT
Start: 2018-05-09

## 2018-05-09 NOTE — TELEPHONE ENCOUNTER
----- Message from Monica Kendall sent at 5/9/2018  2:52 PM EDT -----  Contact: Pt  Pt is calling for a refill     FOR  levothyroxine (SYNTHROID, LEVOTHROID) 100 MCG tablet   90 days      Patient requests RX SENT TO   [place] [id number] [facility] [phone] [facility] [phone] FX      Caller# [phone]     Please and thank you.

## 2018-06-20 ENCOUNTER — OFFICE VISIT (OUTPATIENT)
Dept: INTERNAL MEDICINE | Facility: CLINIC | Age: 78
End: 2018-06-20

## 2018-06-20 VITALS
OXYGEN SATURATION: 98 % | DIASTOLIC BLOOD PRESSURE: 80 MMHG | HEART RATE: 61 BPM | BODY MASS INDEX: 38.3 KG/M2 | HEIGHT: 59 IN | WEIGHT: 190 LBS | SYSTOLIC BLOOD PRESSURE: 120 MMHG

## 2018-06-20 DIAGNOSIS — E03.9 ACQUIRED HYPOTHYROIDISM: ICD-10-CM

## 2018-06-20 DIAGNOSIS — M81.8 OTHER OSTEOPOROSIS WITHOUT CURRENT PATHOLOGICAL FRACTURE: ICD-10-CM

## 2018-06-20 DIAGNOSIS — E78.2 MIXED HYPERLIPIDEMIA: ICD-10-CM

## 2018-06-20 DIAGNOSIS — I25.10 CORONARY ARTERY DISEASE INVOLVING NATIVE CORONARY ARTERY OF NATIVE HEART WITHOUT ANGINA PECTORIS: ICD-10-CM

## 2018-06-20 DIAGNOSIS — R52 PAIN: ICD-10-CM

## 2018-06-20 DIAGNOSIS — M19.90 ARTHRITIS: ICD-10-CM

## 2018-06-20 DIAGNOSIS — I10 ESSENTIAL HYPERTENSION: Primary | ICD-10-CM

## 2018-06-20 LAB
25(OH)D3 SERPL-MCNC: 32.7 NG/ML (ref 30–100)
ALBUMIN SERPL-MCNC: 4.4 G/DL (ref 3.5–5.2)
ALBUMIN/GLOB SERPL: 1.4 G/DL
ALP SERPL-CCNC: 70 U/L (ref 39–117)
ALT SERPL W P-5'-P-CCNC: 11 U/L (ref 1–33)
ANION GAP SERPL CALCULATED.3IONS-SCNC: 13 MMOL/L
AST SERPL-CCNC: 18 U/L (ref 1–32)
BASOPHILS # BLD AUTO: 0.08 10*3/MM3 (ref 0–0.2)
BASOPHILS NFR BLD AUTO: 1 % (ref 0–2)
BILIRUB SERPL-MCNC: 0.8 MG/DL (ref 0.1–1.2)
BUN BLD-MCNC: 7 MG/DL (ref 8–23)
BUN/CREAT SERPL: 8.5 (ref 7–25)
CALCIUM SPEC-SCNC: 9.9 MG/DL (ref 8.6–10.5)
CHLORIDE SERPL-SCNC: 87 MMOL/L (ref 98–107)
CHOLEST SERPL-MCNC: 199 MG/DL (ref 0–200)
CO2 SERPL-SCNC: 29 MMOL/L (ref 22–29)
CREAT BLD-MCNC: 0.82 MG/DL (ref 0.57–1)
DEPRECATED RDW RBC AUTO: 42.5 FL (ref 37–54)
EOSINOPHIL # BLD AUTO: 0.22 10*3/MM3 (ref 0–0.7)
EOSINOPHIL NFR BLD AUTO: 2.6 % (ref 0–5)
ERYTHROCYTE [DISTWIDTH] IN BLOOD BY AUTOMATED COUNT: 13.6 % (ref 11.5–15)
GFR SERPL CREATININE-BSD FRML MDRD: 67 ML/MIN/1.73
GLOBULIN UR ELPH-MCNC: 3.2 GM/DL
GLUCOSE BLD-MCNC: 101 MG/DL (ref 65–99)
HCT VFR BLD AUTO: 35.7 % (ref 34.1–44.9)
HDLC SERPL-MCNC: 126 MG/DL (ref 40–60)
HGB BLD-MCNC: 12.6 G/DL (ref 11.2–15.7)
LDLC SERPL CALC-MCNC: 54 MG/DL (ref 0–100)
LDLC/HDLC SERPL: 0.43 {RATIO}
LYMPHOCYTES # BLD AUTO: 1.56 10*3/MM3 (ref 0.8–7)
LYMPHOCYTES NFR BLD AUTO: 18.6 % (ref 10–60)
MCH RBC QN AUTO: 31 PG (ref 26–34)
MCHC RBC AUTO-ENTMCNC: 35.3 G/DL (ref 31–37)
MCV RBC AUTO: 87.9 FL (ref 80–100)
MONOCYTES # BLD AUTO: 0.84 10*3/MM3 (ref 0–1)
MONOCYTES NFR BLD AUTO: 10 % (ref 0–13)
NEUTROPHILS # BLD AUTO: 5.68 10*3/MM3 (ref 1–11)
NEUTROPHILS NFR BLD AUTO: 67.8 % (ref 30–85)
PLATELET # BLD AUTO: 277 10*3/MM3 (ref 150–450)
PMV BLD AUTO: 9.7 FL (ref 6–12)
POTASSIUM BLD-SCNC: 3.5 MMOL/L (ref 3.5–5.2)
PROT SERPL-MCNC: 7.6 G/DL (ref 6–8.5)
RBC # BLD AUTO: 4.06 10*6/MM3 (ref 3.93–5.22)
SODIUM BLD-SCNC: 129 MMOL/L (ref 136–145)
T3FREE SERPL-MCNC: 2.42 PG/ML (ref 2–4.4)
T4 FREE SERPL-MCNC: 1.72 NG/DL (ref 0.93–1.7)
TRIGL SERPL-MCNC: 97 MG/DL (ref 0–150)
TSH SERPL DL<=0.05 MIU/L-ACNC: 5.21 MIU/ML (ref 0.27–4.2)
VLDLC SERPL-MCNC: 19.4 MG/DL (ref 5–40)
WBC NRBC COR # BLD: 8.38 10*3/MM3 (ref 5–10)

## 2018-06-20 PROCEDURE — 77080 DXA BONE DENSITY AXIAL: CPT | Performed by: INTERNAL MEDICINE

## 2018-06-20 PROCEDURE — 84443 ASSAY THYROID STIM HORMONE: CPT | Performed by: INTERNAL MEDICINE

## 2018-06-20 PROCEDURE — 80061 LIPID PANEL: CPT | Performed by: INTERNAL MEDICINE

## 2018-06-20 PROCEDURE — 80053 COMPREHEN METABOLIC PANEL: CPT | Performed by: INTERNAL MEDICINE

## 2018-06-20 PROCEDURE — 99214 OFFICE O/P EST MOD 30 MIN: CPT | Performed by: INTERNAL MEDICINE

## 2018-06-20 PROCEDURE — 36415 COLL VENOUS BLD VENIPUNCTURE: CPT | Performed by: INTERNAL MEDICINE

## 2018-06-20 PROCEDURE — 82306 VITAMIN D 25 HYDROXY: CPT | Performed by: INTERNAL MEDICINE

## 2018-06-20 PROCEDURE — 84481 FREE ASSAY (FT-3): CPT | Performed by: INTERNAL MEDICINE

## 2018-06-20 PROCEDURE — 85025 COMPLETE CBC W/AUTO DIFF WBC: CPT | Performed by: INTERNAL MEDICINE

## 2018-06-20 PROCEDURE — 84439 ASSAY OF FREE THYROXINE: CPT | Performed by: INTERNAL MEDICINE

## 2018-06-20 RX ORDER — ACETAMINOPHEN AND CODEINE PHOSPHATE 300; 30 MG/1; MG/1
1 TABLET ORAL EVERY 6 HOURS PRN
Qty: 90 TABLET | Refills: 1 | OUTPATIENT
Start: 2018-06-20 | End: 2018-08-18 | Stop reason: SDUPTHER

## 2018-06-20 RX ORDER — ACETAMINOPHEN AND CODEINE PHOSPHATE 300; 30 MG/1; MG/1
1 TABLET ORAL EVERY 6 HOURS PRN
Qty: 90 TABLET | Refills: 1 | Status: CANCELLED | OUTPATIENT
Start: 2018-06-20

## 2018-06-20 NOTE — PROGRESS NOTES
Subjective   Kendy Worrell is a 78 y.o. female.     Hypertension   This is a chronic problem. The current episode started more than 1 year ago. Pertinent negatives include no chest pain or palpitations.        The following portions of the patient's history were reviewed and updated as appropriate: allergies, current medications, past family history, past medical history, past social history, past surgical history and problem list.    Review of Systems   Constitutional:        Has been doing well No new problems   HENT: Negative.    Eyes: Negative.    Respiratory: Negative.    Cardiovascular: Negative.  Negative for chest pain and palpitations.   Gastrointestinal: Positive for diarrhea (About the same).   Genitourinary: Positive for frequency (Sees Dr Tsai).   Musculoskeletal: Positive for arthralgias (Usual aches & pains).   Neurological: Negative.        Objective   Physical Exam   Constitutional: She is oriented to person, place, and time. She appears well-developed and well-nourished.   HENT:   Head: Normocephalic.   Eyes: EOM are normal.   Neck: Neck supple.   Cardiovascular: Normal rate, regular rhythm and normal heart sounds.    Repeat 126/80 Occasional prematures   Pulmonary/Chest: Effort normal.   Musculoskeletal: Normal range of motion.   Neurological: She is alert and oriented to person, place, and time.   Skin: Skin is warm and dry.       Assessment/Plan   Kendy was seen today for hypertension and hypothyroidism.    Diagnoses and all orders for this visit:    Essential hypertension  -     CBC Auto Differential; Future  -     Comprehensive Metabolic Panel; Future    Mixed hyperlipidemia  -     Lipid Panel; Future    Coronary artery disease involving native coronary artery of native heart without angina pectoris    Acquired hypothyroidism  -     T3, Free; Future  -     T4, Free; Future  -     TSH; Future    Arthritis    Other osteoporosis without current pathological fracture  -     Vitamin D 25 Hydroxy;  Future  -     DEXA Bone Density Axial

## 2018-07-12 DIAGNOSIS — R92.8 ABNORMAL MAMMOGRAM: Primary | ICD-10-CM

## 2018-07-16 ENCOUNTER — APPOINTMENT (OUTPATIENT)
Dept: WOMENS IMAGING | Facility: HOSPITAL | Age: 78
End: 2018-07-16

## 2018-07-16 PROCEDURE — 76641 ULTRASOUND BREAST COMPLETE: CPT | Performed by: RADIOLOGY

## 2018-07-24 DIAGNOSIS — R92.8 ABNORMAL MAMMOGRAM: ICD-10-CM

## 2018-08-18 DIAGNOSIS — R52 PAIN: ICD-10-CM

## 2018-08-20 RX ORDER — ACETAMINOPHEN AND CODEINE PHOSPHATE 300; 30 MG/1; MG/1
TABLET ORAL
Qty: 90 TABLET | Refills: 0 | OUTPATIENT
Start: 2018-08-20 | End: 2018-09-12 | Stop reason: SDUPTHER

## 2018-09-12 DIAGNOSIS — R52 PAIN: ICD-10-CM

## 2018-09-12 RX ORDER — ACETAMINOPHEN AND CODEINE PHOSPHATE 300; 30 MG/1; MG/1
1 TABLET ORAL EVERY 6 HOURS PRN
Qty: 90 TABLET | Refills: 0 | OUTPATIENT
Start: 2018-09-12 | End: 2018-10-10 | Stop reason: SDUPTHER

## 2018-09-12 NOTE — TELEPHONE ENCOUNTER
----- Message from Araseli Johnson sent at 9/12/2018 10:24 AM EDT -----  Contact: Patient  Patient requesting refill on    acetaminophen-codeine (TYLENOL #3) 300-30 MG per tablet    Patient:[phone]    Pharmacy:[facility] [id number] [address] AT [facility] - [phone] [facility] [phone]

## 2018-09-26 ENCOUNTER — OFFICE VISIT (OUTPATIENT)
Dept: INTERNAL MEDICINE | Facility: CLINIC | Age: 78
End: 2018-09-26

## 2018-09-26 VITALS
BODY MASS INDEX: 38.3 KG/M2 | DIASTOLIC BLOOD PRESSURE: 92 MMHG | SYSTOLIC BLOOD PRESSURE: 144 MMHG | WEIGHT: 190 LBS | OXYGEN SATURATION: 96 % | HEART RATE: 62 BPM | HEIGHT: 59 IN

## 2018-09-26 DIAGNOSIS — E78.2 MIXED HYPERLIPIDEMIA: ICD-10-CM

## 2018-09-26 DIAGNOSIS — I10 ESSENTIAL HYPERTENSION: Primary | ICD-10-CM

## 2018-09-26 DIAGNOSIS — E03.9 ACQUIRED HYPOTHYROIDISM: ICD-10-CM

## 2018-09-26 DIAGNOSIS — Z23 NEED FOR INFLUENZA VACCINATION: ICD-10-CM

## 2018-09-26 PROBLEM — I20.8 ANGINA EFFORT: Status: ACTIVE | Noted: 2018-09-26

## 2018-09-26 PROBLEM — I47.10 SUPRAVENTRICULAR TACHYCARDIA: Status: ACTIVE | Noted: 2018-09-26

## 2018-09-26 PROBLEM — I47.1 SUPRAVENTRICULAR TACHYCARDIA (HCC): Status: ACTIVE | Noted: 2018-09-26

## 2018-09-26 PROBLEM — I20.89 ANGINA EFFORT: Status: ACTIVE | Noted: 2018-09-26

## 2018-09-26 PROBLEM — I21.4 NSTEMI (NON-ST ELEVATED MYOCARDIAL INFARCTION) (HCC): Status: ACTIVE | Noted: 2018-09-26

## 2018-09-26 PROBLEM — IMO0001 NORMAL CARDIAC STRESS TEST: Status: ACTIVE | Noted: 2018-09-26

## 2018-09-26 PROBLEM — K92.2 GASTROINTESTINAL BLEED: Status: ACTIVE | Noted: 2018-09-26

## 2018-09-26 LAB
ALBUMIN SERPL-MCNC: 4.2 G/DL (ref 3.5–5.2)
ALBUMIN/GLOB SERPL: 1.4 G/DL
ALP SERPL-CCNC: 71 U/L (ref 39–117)
ALT SERPL W P-5'-P-CCNC: 12 U/L (ref 1–33)
ANION GAP SERPL CALCULATED.3IONS-SCNC: 10.6 MMOL/L
AST SERPL-CCNC: 18 U/L (ref 1–32)
BILIRUB SERPL-MCNC: 0.6 MG/DL (ref 0.1–1.2)
BUN BLD-MCNC: 7 MG/DL (ref 8–23)
BUN/CREAT SERPL: 10.9 (ref 7–25)
CALCIUM SPEC-SCNC: 9.7 MG/DL (ref 8.6–10.5)
CHLORIDE SERPL-SCNC: 93 MMOL/L (ref 98–107)
CHOLEST SERPL-MCNC: 162 MG/DL (ref 0–200)
CO2 SERPL-SCNC: 30.4 MMOL/L (ref 22–29)
CREAT BLD-MCNC: 0.64 MG/DL (ref 0.57–1)
DEPRECATED RDW RBC AUTO: 43.2 FL (ref 37–54)
ERYTHROCYTE [DISTWIDTH] IN BLOOD BY AUTOMATED COUNT: 13.7 % (ref 11.5–15)
GFR SERPL CREATININE-BSD FRML MDRD: 90 ML/MIN/1.73
GLOBULIN UR ELPH-MCNC: 2.9 GM/DL
GLUCOSE BLD-MCNC: 92 MG/DL (ref 65–99)
HCT VFR BLD AUTO: 32.1 % (ref 34.1–44.9)
HDLC SERPL-MCNC: 94 MG/DL (ref 40–60)
HGB BLD-MCNC: 11 G/DL (ref 11.2–15.7)
LDLC SERPL CALC-MCNC: 51 MG/DL (ref 0–100)
LDLC/HDLC SERPL: 0.54 {RATIO}
MCH RBC QN AUTO: 30.6 PG (ref 26–34)
MCHC RBC AUTO-ENTMCNC: 34.3 G/DL (ref 31–37)
MCV RBC AUTO: 89.2 FL (ref 80–100)
PLATELET # BLD AUTO: 285 10*3/MM3 (ref 150–450)
PMV BLD AUTO: 10.1 FL (ref 6–12)
POTASSIUM BLD-SCNC: 3.8 MMOL/L (ref 3.5–5.2)
PROT SERPL-MCNC: 7.1 G/DL (ref 6–8.5)
RBC # BLD AUTO: 3.6 10*6/MM3 (ref 3.93–5.22)
SODIUM BLD-SCNC: 134 MMOL/L (ref 136–145)
TRIGL SERPL-MCNC: 87 MG/DL (ref 0–150)
TSH SERPL-ACNC: 2.65 MIU/ML (ref 0.27–4.2)
VLDLC SERPL-MCNC: 17.4 MG/DL (ref 5–40)
WBC NRBC COR # BLD: 6.16 10*3/MM3 (ref 5–10)

## 2018-09-26 PROCEDURE — 90662 IIV NO PRSV INCREASED AG IM: CPT | Performed by: NURSE PRACTITIONER

## 2018-09-26 PROCEDURE — 99214 OFFICE O/P EST MOD 30 MIN: CPT | Performed by: NURSE PRACTITIONER

## 2018-09-26 PROCEDURE — G0008 ADMIN INFLUENZA VIRUS VAC: HCPCS | Performed by: NURSE PRACTITIONER

## 2018-09-26 PROCEDURE — 80061 LIPID PANEL: CPT | Performed by: NURSE PRACTITIONER

## 2018-09-26 PROCEDURE — 80053 COMPREHEN METABOLIC PANEL: CPT | Performed by: NURSE PRACTITIONER

## 2018-09-26 PROCEDURE — 85027 COMPLETE CBC AUTOMATED: CPT | Performed by: NURSE PRACTITIONER

## 2018-09-26 NOTE — PROGRESS NOTES
Subjective   Kendy Worrell is a 78 y.o. female.     She checks her blood pressure sporadically. She is up to date with vision check.       Hypertension   This is a chronic problem. The current episode started more than 1 year ago. The problem is controlled. Associated symptoms include palpitations (chronic, no changes ) and peripheral edema (chronic, sometimes). Pertinent negatives include no anxiety, blurred vision, chest pain, headaches, neck pain, orthopnea, PND or shortness of breath. Risk factors for coronary artery disease include post-menopausal state and dyslipidemia. Current antihypertension treatment includes beta blockers and diuretics. The current treatment provides significant improvement.   Hyperlipidemia   This is a chronic problem. The current episode started more than 1 year ago. The problem is controlled. Recent lipid tests were reviewed and are normal. Exacerbating diseases include hypothyroidism. Pertinent negatives include no chest pain or shortness of breath. Current antihyperlipidemic treatment includes diet change.   Hypothyroidism   This is a chronic problem. The current episode started more than 1 year ago. Pertinent negatives include no chest pain, coughing, fatigue, fever, headaches or neck pain. Treatments tried: levothyroxine         The following portions of the patient's history were reviewed and updated as appropriate: allergies, current medications, past family history, past medical history, past social history, past surgical history and problem list.    Review of Systems   Constitutional: Negative for activity change, appetite change, fatigue and fever.   Eyes: Negative for blurred vision.   Respiratory: Negative for cough, shortness of breath and wheezing.    Cardiovascular: Positive for palpitations (chronic, no changes ). Negative for chest pain, orthopnea, leg swelling and PND.   Musculoskeletal: Negative for neck pain.   Allergic/Immunologic: Positive for environmental  allergies.   Neurological: Negative for headaches.       Objective   Physical Exam   Constitutional: She is oriented to person, place, and time. She appears well-developed and well-nourished.   HENT:   Head: Normocephalic.   Nose: Nose normal.   Cardiovascular: Regular rhythm and normal heart sounds.  Exam reveals no S3 and no S4.    No murmur heard.  Repeat bp left arm 132/86  No pedal edema    Pulmonary/Chest: Effort normal and breath sounds normal. She has no decreased breath sounds. She has no wheezes. She has no rhonchi. She has no rales.   Musculoskeletal: She exhibits no edema.   Neurological: She is alert and oriented to person, place, and time. Gait normal.   Skin: Skin is warm and dry.   Psychiatric: She has a normal mood and affect.       Assessment/Plan   Kendy was seen today for hypertension, hyperlipidemia and hypothyroidism.    Diagnoses and all orders for this visit:    Essential hypertension  Comments:  goal of bp less than 140/90; low salt diet   Orders:  -     Comprehensive Metabolic Panel  -     CBC (No Diff)    Mixed hyperlipidemia  -     Lipid Panel    Acquired hypothyroidism  -     TSH Rfx On Abnormal To Free T4    Need for influenza vaccination  -     Flu Vaccine High Dose PF 65YR+ ([id number])      She was given information regarding shingrix.

## 2018-10-10 DIAGNOSIS — R52 PAIN: ICD-10-CM

## 2018-10-11 RX ORDER — ACETAMINOPHEN AND CODEINE PHOSPHATE 300; 30 MG/1; MG/1
TABLET ORAL
Qty: 90 TABLET | Refills: 0 | Status: SHIPPED | OUTPATIENT
Start: 2018-10-11 | End: 2018-11-08 | Stop reason: SDUPTHER

## 2018-10-11 NOTE — TELEPHONE ENCOUNTER
OV 9/26/18-saw Scarlett in Dr. Bond's absence.  Next is 12/28/18 with Dr. Bond.  Harmeet done 9/12/18.

## 2018-11-01 ENCOUNTER — TELEPHONE (OUTPATIENT)
Dept: INTERNAL MEDICINE | Facility: CLINIC | Age: 78
End: 2018-11-01

## 2018-11-01 DIAGNOSIS — H90.3 SENSORY HEARING LOSS, BILATERAL: Primary | ICD-10-CM

## 2018-11-01 NOTE — TELEPHONE ENCOUNTER
----- Message from Libia Lacey sent at 11/1/2018  9:55 AM EDT -----  Pt is scheduled to be seen today at [facility] for hearing loss/dizziness. They faxed our office requesting a written order for the appointment because of the patient's insurance. They used the diagnosis code H90.3 on the fax. Would you write the order? Please advise. Thank you.

## 2018-11-08 DIAGNOSIS — R52 PAIN: ICD-10-CM

## 2018-11-09 RX ORDER — ACETAMINOPHEN AND CODEINE PHOSPHATE 300; 30 MG/1; MG/1
TABLET ORAL
Qty: 90 TABLET | Refills: 0 | OUTPATIENT
Start: 2018-11-09 | End: 2018-12-10 | Stop reason: SDUPTHER

## 2018-12-10 DIAGNOSIS — R52 PAIN: ICD-10-CM

## 2018-12-10 RX ORDER — ACETAMINOPHEN AND CODEINE PHOSPHATE 300; 30 MG/1; MG/1
TABLET ORAL
Qty: 90 TABLET | Refills: 0 | OUTPATIENT
Start: 2018-12-10 | End: 2019-01-10 | Stop reason: SDUPTHER

## 2019-01-04 ENCOUNTER — OFFICE VISIT (OUTPATIENT)
Dept: INTERNAL MEDICINE | Facility: CLINIC | Age: 79
End: 2019-01-04

## 2019-01-04 VITALS
BODY MASS INDEX: 38.3 KG/M2 | HEIGHT: 59 IN | DIASTOLIC BLOOD PRESSURE: 82 MMHG | SYSTOLIC BLOOD PRESSURE: 154 MMHG | WEIGHT: 190 LBS | HEART RATE: 68 BPM | OXYGEN SATURATION: 97 %

## 2019-01-04 DIAGNOSIS — I25.10 CORONARY ARTERY DISEASE INVOLVING NATIVE CORONARY ARTERY OF NATIVE HEART WITHOUT ANGINA PECTORIS: ICD-10-CM

## 2019-01-04 DIAGNOSIS — J06.9 ACUTE URI: ICD-10-CM

## 2019-01-04 DIAGNOSIS — I20.8 ANGINA EFFORT: ICD-10-CM

## 2019-01-04 DIAGNOSIS — M19.90 ARTHRITIS: ICD-10-CM

## 2019-01-04 DIAGNOSIS — I10 ESSENTIAL HYPERTENSION: Primary | ICD-10-CM

## 2019-01-04 DIAGNOSIS — D64.9 ANEMIA, UNSPECIFIED TYPE: ICD-10-CM

## 2019-01-04 DIAGNOSIS — E03.9 ACQUIRED HYPOTHYROIDISM: ICD-10-CM

## 2019-01-04 DIAGNOSIS — H90.3 SENSORY HEARING LOSS, BILATERAL: ICD-10-CM

## 2019-01-04 DIAGNOSIS — E66.01 MORBIDLY OBESE (HCC): ICD-10-CM

## 2019-01-04 DIAGNOSIS — E78.2 MIXED HYPERLIPIDEMIA: ICD-10-CM

## 2019-01-04 LAB
BASOPHILS # BLD AUTO: 0.03 10*3/MM3 (ref 0–0.2)
BASOPHILS NFR BLD AUTO: 0.4 % (ref 0–1.5)
BUN SERPL-MCNC: 7 MG/DL (ref 8–23)
BUN/CREAT SERPL: 10.9 (ref 7–25)
CALCIUM SERPL-MCNC: 9.6 MG/DL (ref 8.6–10.5)
CHLORIDE SERPL-SCNC: 91 MMOL/L (ref 98–107)
CO2 SERPL-SCNC: 28.9 MMOL/L (ref 22–29)
CREAT SERPL-MCNC: 0.64 MG/DL (ref 0.57–1)
EOSINOPHIL # BLD AUTO: 0.25 10*3/MM3 (ref 0–0.7)
EOSINOPHIL # BLD AUTO: 3.3 % (ref 0.3–6.2)
ERYTHROCYTE [DISTWIDTH] IN BLOOD BY AUTOMATED COUNT: 14.8 % (ref 11.7–13)
FERRITIN SERPL-MCNC: 39.54 NG/ML (ref 13–150)
FOLATE SERPL-MCNC: 10.49 NG/ML (ref 4.78–24.2)
GLUCOSE SERPL-MCNC: 93 MG/DL (ref 65–99)
HCT VFR BLD AUTO: 34.4 % (ref 35.6–45.5)
HGB BLD-MCNC: 11.9 G/DL (ref 11.9–15.5)
IMM GRANULOCYTES # BLD: 0.02 10*3/MM3 (ref 0–0.03)
IMM GRANULOCYTES NFR BLD: 0.3 % (ref 0–0.5)
IRON SATN MFR SERPL: 8 % (ref 20–50)
IRON SERPL-MCNC: 44 MCG/DL (ref 37–145)
LOPINAVIR ISLT PHENOTYP: 490 MCG/DL
LYMPHOCYTES # BLD AUTO: 1.51 10*3/MM3 (ref 0.9–4.8)
LYMPHOCYTES NFR BLD AUTO: 20 % (ref 19.6–45.3)
MCH RBC QN AUTO: 30.7 PG (ref 26.9–32)
MCHC RBC AUTO-ENTMCNC: 34.6 G/DL (ref 32.4–36.3)
MCV RBC AUTO: 88.7 FL (ref 80.5–98.2)
MONOCYTES # BLD AUTO: 0.78 10*3/MM3 (ref 0.2–1.2)
MONOCYTES NFR BLD AUTO: 10.3 % (ref 5–12)
NEUTROPHILS # BLD AUTO: 4.97 10*3/MM3 (ref 1.9–8.1)
NEUTROPHILS NFR BLD AUTO: 66 % (ref 42.7–76)
PLATELET # BLD AUTO: 326 10*3/MM3 (ref 140–500)
POTASSIUM SERPL-SCNC: 3.6 MMOL/L (ref 3.5–5.2)
RBC # BLD AUTO: 3.88 10*6/MM3 (ref 3.9–5.2)
SODIUM SERPL-SCNC: 135 MMOL/L (ref 136–145)
TIBC SERPL-MCNC: 534 MCG/DL
VIT B12 SERPL-MCNC: 757 PG/ML (ref 211–946)
WBC # BLD AUTO: 7.54 10*3/MM3 (ref 4.5–10.7)

## 2019-01-04 PROCEDURE — G0439 PPPS, SUBSEQ VISIT: HCPCS | Performed by: NURSE PRACTITIONER

## 2019-01-04 PROCEDURE — 99213 OFFICE O/P EST LOW 20 MIN: CPT | Performed by: NURSE PRACTITIONER

## 2019-01-04 RX ORDER — GUAIFENESIN 600 MG/1
600 TABLET, EXTENDED RELEASE ORAL EVERY 12 HOURS SCHEDULED
Qty: 14 TABLET
Start: 2019-01-04 | End: 2019-01-10

## 2019-01-04 RX ORDER — AZITHROMYCIN 250 MG/1
TABLET, FILM COATED ORAL
Qty: 6 TABLET | Refills: 0 | Status: SHIPPED | OUTPATIENT
Start: 2019-01-04 | End: 2019-01-10

## 2019-01-04 RX ORDER — METOPROLOL SUCCINATE 50 MG/1
50 TABLET, EXTENDED RELEASE ORAL DAILY
Qty: 90 TABLET | Refills: 3 | Status: SHIPPED | OUTPATIENT
Start: 2019-01-04 | End: 2019-09-09 | Stop reason: HOSPADM

## 2019-01-04 NOTE — PATIENT INSTRUCTIONS
Medicare Wellness  Personal Prevention Plan of Service     Date of Office Visit:  2019  Encounter Provider:  MENDOZA Figueroa  Place of Service:  [facility]  Patient Name: Kendy Worrell  :  1940    As part of the Medicare Wellness portion of your visit today, we are providing you with this personalized preventive plan of services (PPPS). This plan is based upon recommendations of the [place] Preventive Services Task Force (USPSTF) and the Advisory Committee on Immunization Practices (ACIP).    This lists the preventive care services that should be considered, and provides dates of when you are due. Items listed as completed are up-to-date and do not require any further intervention.    Health Maintenance   Topic Date Due   • MEDICARE ANNUAL WELLNESS  2016   • HEPATITIS A VACCINE ADULT (2 of 2) 2019 (Originally 2018)   • ZOSTER VACCINE (1 of 2) 2020 (Originally 1989)   • LIPID PANEL  2019   • MAMMOGRAM  2020   • DXA SCAN  2020   • TDAP/TD VACCINES (2 - Td) 2025   • INFLUENZA VACCINE  Completed   • PNEUMOCOCCAL VACCINES (65+ LOW/MEDIUM RISK)  Completed       Orders Placed This Encounter   Procedures   • CBC Auto Differential     Standing Status:   Future     Number of Occurrences:   1     Standing Expiration Date:   2020   • Ferritin     Standing Status:   Future     Number of Occurrences:   1     Standing Expiration Date:   2020   • Iron Profile     Standing Status:   Future     Number of Occurrences:   1     Standing Expiration Date:   2020   • Vitamin B12 & Folate     Standing Status:   Future     Number of Occurrences:   1     Standing Expiration Date:   2020   • Basic Metabolic Panel     Standing Status:   Future     Number of Occurrences:   1     Standing Expiration Date:   2020       Return in about 3 months (around 2019).

## 2019-01-04 NOTE — PROGRESS NOTES
QUICK REFERENCE INFORMATION:  The ABCs of the Annual Wellness Visit    Subsequent Medicare Wellness Visit    HEALTH RISK ASSESSMENT    1940    Recent Hospitalizations:  No hospitalization(s) within the last year..      Current Medical Providers:  Patient Care Team:  Mac Bond MD as PCP - General  Mac Bond MD as PCP - Family Medicine  Tone Tubbs MD as Consulting Physician (Cardiology)        Smoking Status:  Social History     Tobacco Use   Smoking Status Former Smoker   Smokeless Tobacco Never Used       Alcohol Consumption:  Social History     Substance and Sexual Activity   Alcohol Use Defer    Comment: OCC       Depression Screen:   PHQ-2/PHQ-9 Depression Screening 1/4/2019   Little interest or pleasure in doing things 0   Feeling down, depressed, or hopeless 1   Trouble falling or staying asleep, or sleeping too much 1   Feeling tired or having little energy 1   Poor appetite or overeating 0   Feeling bad about yourself - or that you are a failure or have let yourself or your family down 0   Trouble concentrating on things, such as reading the newspaper or watching television 0   Moving or speaking so slowly that other people could have noticed. Or the opposite - being so fidgety or restless that you have been moving around a lot more than usual 1   Thoughts that you would be better off dead, or of hurting yourself in some way 0   Total Score 4   If you checked off any problems, how difficult have these problems made it for you to do your work, take care of things at home, or get along with other people? Not difficult at all       Health Habits and Functional and Cognitive Screening:  Functional & Cognitive Status 1/4/2019   Do you have difficulty preparing food and eating? Yes   Do you have difficulty bathing yourself, getting dressed or grooming yourself? No   Do you have difficulty using the toilet? No   Do you have difficulty moving around from place to place? No   Do you have  trouble with steps or getting out of a bed or a chair? No   In the past year have you fallen or experienced a near fall? Yes   Current Diet Well Balanced Diet   Dental Exam Up to date   Eye Exam Up to date   Exercise (times per week) 0 times per week   Current Exercise Activities Include None   Do you need help using the phone?  No   Are you deaf or do you have serious difficulty hearing?  No   Do you need help with transportation? No   Do you need help shopping? No   Do you need help preparing meals?  No   Do you need help with housework?  No   Do you need help with laundry? No   Do you need help taking your medications? No   Do you need help managing money? No   Do you ever drive or ride in a car without wearing a seat belt? No   Have you felt unusual stress, anger or loneliness in the last month? No   Who do you live with? (No Data)   If you need help, do you have trouble finding someone available to you? No   Have you been bothered in the last four weeks by sexual problems? No   Do you have difficulty concentrating, remembering or making decisions? No           Does the patient have evidence of cognitive impairment? No    Aspirin use counseling: Does not need ASA (and currently is not on it)      Recent Lab Results:  CMP:  Lab Results   Component Value Date    GLU 95 10/04/2016    BUN 7 (L) 09/26/2018    CREATININE 0.64 09/26/2018    EGFRIFNONA 90 09/26/2018    EGFRIFAFRI 77 10/04/2016    BCR 10.9 09/26/2018     (L) 09/26/2018    K 3.8 09/26/2018    CO2 30.4 (H) 09/26/2018    CALCIUM 9.7 09/26/2018    PROTENTOTREF 7.3 10/04/2016    ALBUMIN 4.20 09/26/2018    LABGLOBREF 2.7 10/04/2016    LABIL2 1.7 10/04/2016    BILITOT 0.6 09/26/2018    ALKPHOS 71 09/26/2018    AST 18 09/26/2018    ALT 12 09/26/2018     Lipid Panel:  Lab Results   Component Value Date    CHOL 162 09/26/2018    TRIG 87 09/26/2018    HDL 94 (H) 09/26/2018    VLDL 17.4 09/26/2018    LDLHDL 0.54 09/26/2018     HbA1c:       Visual Acuity:  No  exam data present    Age-appropriate Screening Schedule:  Refer to the list below for future screening recommendations based on patient's age, sex and/or medical conditions. Orders for these recommended tests are listed in the plan section. The patient has been provided with a written plan.    Health Maintenance   Topic Date Due   • ZOSTER VACCINE (1 of 2) 02/21/1989   • MAMMOGRAM  11/23/2018   • LIPID PANEL  09/26/2019   • DXA SCAN  07/23/2020   • TDAP/TD VACCINES (2 - Td) 12/17/2025   • INFLUENZA VACCINE  Completed   • PNEUMOCOCCAL VACCINES (65+ LOW/MEDIUM RISK)  Completed        Subjective   History of Present Illness    Kendy Worrell is a 78 y.o. female who presents for an Subsequent Wellness Visit.    The following portions of the patient's history were reviewed and updated as appropriate: allergies, current medications, past family history, past medical history, past social history, past surgical history and problem list.    Outpatient Medications Prior to Visit   Medication Sig Dispense Refill   • acetaminophen-codeine (TYLENOL #3) 300-30 MG per tablet TAKE 1 TABLET BY MOUTH EVERY 6 HOURS AS NEEDED FOR PAIN 90 tablet 0   • Alpha-Lipoic Acid 200 MG capsule Take  by mouth Daily.     • B Complex Vitamins (VITAMIN B COMPLEX) capsule capsule Take  by mouth Daily.     • Biotin (BIOTIN MAXIMUM STRENGTH) 10 MG tablet Take  by mouth Daily.     • Cholecalciferol (VITAMIN D3) 5000 UNITS capsule capsule Take 5,000 Units by mouth 2 (Two) Times a Day.     • coenzyme Q10 100 MG capsule Take 100 mg by mouth Daily.     • Cyanocobalamin (B-12) 5000 MCG capsule Take  by mouth.     • diphenoxylate-atropine (LOMOTIL) 2.5-0.025 MG per tablet Take 1 tablet by mouth 4 (Four) Times a Day As Needed for diarrhea. 100 tablet 0   • isosorbide dinitrate (ISORDIL) 30 MG tablet Take 30 mg by mouth As Needed.     • levothyroxine (SYNTHROID, LEVOTHROID) 100 MCG tablet Take 1 tablet by mouth Daily. 90 tablet 3   • Magnesium 250 MG tablet Take 1  tablet by mouth Daily.     • metoprolol succinate XL (TOPROL-XL) 50 MG 24 hr tablet Take 1 tablet by mouth Daily. 90 tablet 3   • Multiple Vitamin (MULTI VITAMIN DAILY PO) Take  by mouth.     • nystatin-triamcinolone (MYCOLOG II) 742306-2.1 UNIT/GM-% cream apply to affected area twice a day if needed 45 g 3   • potassium chloride (K-DUR,KLOR-CON) 20 MEQ CR tablet take 1 tablet by mouth once daily 90 tablet 3   • triamterene-hydrochlorothiazide (MAXZIDE-25) 37.5-25 MG per tablet take 1 tablet by mouth once daily 90 tablet 3     No facility-administered medications prior to visit.        Patient Active Problem List   Diagnosis   • Essential hypertension   • Acquired hypothyroidism   • Coronary artery disease involving native coronary artery of native heart without angina pectoris   • Hyperlipidemia   • Hypertension   • Arthritis   • Skin lesion of chest wall   • NSTEMI (non-ST elevated myocardial infarction) (CMS/HCC)   • Supraventricular tachycardia (CMS/HCC)   • Normal cardiac stress test   • Gastrointestinal bleed   • Angina effort (CMS/HCC)       Advance Care Planning:  has an advance directive - a copy HAS NOT been provided    Identification of Risk Factors:  Risk factors include: weight , unhealthy diet, cardiovascular risk, increased fall risk, chronic pain and hearing limitations.    Review of Systems    Compared to one year ago, the patient feels her physical health is the same.  Compared to one year ago, the patient feels her mental health is the same.    Objective     Physical Exam   Constitutional: She appears well-developed and well-nourished. She is cooperative. She does not have a sickly appearance. She does not appear ill.   HENT:   Head: Normocephalic.   Right Ear: Tympanic membrane and external ear normal.   Left Ear: Tympanic membrane and external ear normal.   Nose: Nose normal. No mucosal edema, rhinorrhea, sinus tenderness or nasal deformity. Right sinus exhibits no maxillary sinus tenderness and  "no frontal sinus tenderness. Left sinus exhibits no maxillary sinus tenderness and no frontal sinus tenderness.   Mouth/Throat: Oropharynx is clear and moist and mucous membranes are normal. Normal dentition.   Bilateral hearing aides   Eyes: Conjunctivae and lids are normal. Pupils are equal, round, and reactive to light. Right eye exhibits no discharge and no exudate. Left eye exhibits no discharge and no exudate.   Neck: Trachea normal and normal range of motion. Carotid bruit is not present. No edema present. No thyroid mass and no thyromegaly present.   Cardiovascular: Regular rhythm, normal heart sounds and normal pulses.   No murmur heard.  Pulmonary/Chest: Breath sounds normal. No respiratory distress. She has no decreased breath sounds. She has no wheezes. She has no rhonchi. She has no rales.   Lymphadenopathy:        Head (right side): No submental, no submandibular, no tonsillar, no preauricular, no posterior auricular and no occipital adenopathy present.        Head (left side): No submental, no submandibular, no tonsillar, no preauricular, no posterior auricular and no occipital adenopathy present.   Neurological: She is alert.   Skin: Skin is warm, dry and intact. No cyanosis. Nails show no clubbing.       Vitals:    01/04/19 1326   BP: 154/82   BP Location: Left arm   Patient Position: Sitting   Cuff Size: Adult   Pulse: 68   SpO2: 97%   Weight: 86.2 kg (190 lb)   Height: 149.9 cm (59\")   PainSc:   8   PainLoc: Leg       Patient's Body mass index is 38.38 kg/m². BMI is above normal parameters. Recommendations include: nutrition counseling.      Assessment/Plan   Patient Self-Management and Personalized Health Advice  The patient has been provided with information about: diet, weight management, prevention of cardiac or vascular disease, the relationship between weight and GERD and fall prevention and preventive services including:   · Advance directive, Fall Risk assessment done, Hepatitis A vaccine " (2nd vaccine).    Visit Diagnoses:  No diagnosis found.    No orders of the defined types were placed in this encounter.      Outpatient Encounter Medications as of 1/4/2019   Medication Sig Dispense Refill   • acetaminophen-codeine (TYLENOL #3) 300-30 MG per tablet TAKE 1 TABLET BY MOUTH EVERY 6 HOURS AS NEEDED FOR PAIN 90 tablet 0   • Alpha-Lipoic Acid 200 MG capsule Take  by mouth Daily.     • B Complex Vitamins (VITAMIN B COMPLEX) capsule capsule Take  by mouth Daily.     • Biotin (BIOTIN MAXIMUM STRENGTH) 10 MG tablet Take  by mouth Daily.     • Cholecalciferol (VITAMIN D3) 5000 UNITS capsule capsule Take 5,000 Units by mouth 2 (Two) Times a Day.     • coenzyme Q10 100 MG capsule Take 100 mg by mouth Daily.     • Cyanocobalamin (B-12) 5000 MCG capsule Take  by mouth.     • diphenoxylate-atropine (LOMOTIL) 2.5-0.025 MG per tablet Take 1 tablet by mouth 4 (Four) Times a Day As Needed for diarrhea. 100 tablet 0   • isosorbide dinitrate (ISORDIL) 30 MG tablet Take 30 mg by mouth As Needed.     • levothyroxine (SYNTHROID, LEVOTHROID) 100 MCG tablet Take 1 tablet by mouth Daily. 90 tablet 3   • Magnesium 250 MG tablet Take 1 tablet by mouth Daily.     • metoprolol succinate XL (TOPROL-XL) 50 MG 24 hr tablet Take 1 tablet by mouth Daily. 90 tablet 3   • Multiple Vitamin (MULTI VITAMIN DAILY PO) Take  by mouth.     • nystatin-triamcinolone (MYCOLOG II) 856798-8.1 UNIT/GM-% cream apply to affected area twice a day if needed 45 g 3   • potassium chloride (K-DUR,KLOR-CON) 20 MEQ CR tablet take 1 tablet by mouth once daily 90 tablet 3   • triamterene-hydrochlorothiazide (MAXZIDE-25) 37.5-25 MG per tablet take 1 tablet by mouth once daily 90 tablet 3     No facility-administered encounter medications on file as of 1/4/2019.        Reviewed use of high risk medication in the elderly: yes  Reviewed for potential of harmful drug interactions in the elderly: yes    Follow Up:  No Follow-up on file.     An After Visit Summary  and PPPS with all of these plans were given to the patient.

## 2019-01-06 PROBLEM — E66.01 MORBIDLY OBESE (HCC): Status: ACTIVE | Noted: 2019-01-06

## 2019-01-06 NOTE — PROGRESS NOTES
QUICK REFERENCE INFORMATION:  The ABCs of the Annual Wellness Visit    Initial Medicare Wellness Visit    HEALTH RISK ASSESSMENT    1940    Recent Hospitalizations:  No hospitalization(s) within the last year..        Current Medical Providers:  Patient Care Team:  Mac Bond MD as PCP - General  Mac Bond MD as PCP - Family Medicine  Tone Tubbs MD as Consulting Physician (Cardiology)        Smoking Status:  Social History     Tobacco Use   Smoking Status Former Smoker   Smokeless Tobacco Never Used       Alcohol Consumption:  Social History     Substance and Sexual Activity   Alcohol Use Defer    Comment: OCC       Depression Screen:   PHQ-2/PHQ-9 Depression Screening 1/4/2019   Little interest or pleasure in doing things 0   Feeling down, depressed, or hopeless 1   Trouble falling or staying asleep, or sleeping too much 1   Feeling tired or having little energy 1   Poor appetite or overeating 0   Feeling bad about yourself - or that you are a failure or have let yourself or your family down 0   Trouble concentrating on things, such as reading the newspaper or watching television 0   Moving or speaking so slowly that other people could have noticed. Or the opposite - being so fidgety or restless that you have been moving around a lot more than usual 1   Thoughts that you would be better off dead, or of hurting yourself in some way 0   Total Score 4   If you checked off any problems, how difficult have these problems made it for you to do your work, take care of things at home, or get along with other people? Not difficult at all       Health Habits and Functional and Cognitive Screening:  Functional & Cognitive Status 1/4/2019   Do you have difficulty preparing food and eating? Yes   Do you have difficulty bathing yourself, getting dressed or grooming yourself? No   Do you have difficulty using the toilet? No   Do you have difficulty moving around from place to place? No   Do you have  trouble with steps or getting out of a bed or a chair? No   In the past year have you fallen or experienced a near fall? Yes   Current Diet Well Balanced Diet   Dental Exam Up to date   Eye Exam Up to date   Exercise (times per week) 0 times per week   Current Exercise Activities Include None   Do you need help using the phone?  No   Are you deaf or do you have serious difficulty hearing?  No   Do you need help with transportation? No   Do you need help shopping? No   Do you need help preparing meals?  No   Do you need help with housework?  No   Do you need help with laundry? No   Do you need help taking your medications? No   Do you need help managing money? No   Do you ever drive or ride in a car without wearing a seat belt? No   Have you felt unusual stress, anger or loneliness in the last month? No   Who do you live with? (No Data)   If you need help, do you have trouble finding someone available to you? No   Have you been bothered in the last four weeks by sexual problems? No   Do you have difficulty concentrating, remembering or making decisions? No           Does the patient have evidence of cognitive impairment? No    Asiprin use counseling: Does not need ASA (and currently is not on it)      Recent Lab Results:    Visual Acuity:  No exam data present    Age-appropriate Screening Schedule:  Refer to the list below for future screening recommendations based on patient's age, sex and/or medical conditions. Orders for these recommended tests are listed in the plan section. The patient has been provided with a written plan.    Health Maintenance   Topic Date Due   • ZOSTER VACCINE (1 of 2) 01/13/2020 (Originally 2/21/1990)   • LIPID PANEL  09/26/2019   • MAMMOGRAM  07/16/2020   • DXA SCAN  07/23/2020   • TDAP/TD VACCINES (2 - Td) 12/17/2025   • INFLUENZA VACCINE  Completed   • PNEUMOCOCCAL VACCINES (65+ LOW/MEDIUM RISK)  Completed        Subjective   History of Present Illness    Kendy Worrell is a 78 y.o. female  who presents for an Annual Wellness Visit.    The following portions of the patient's history were reviewed and updated as appropriate: allergies, current medications, past family history, past medical history, past social history, past surgical history and problem list.    Outpatient Medications Prior to Visit   Medication Sig Dispense Refill   • acetaminophen-codeine (TYLENOL #3) 300-30 MG per tablet TAKE 1 TABLET BY MOUTH EVERY 6 HOURS AS NEEDED FOR PAIN 90 tablet 0   • Alpha-Lipoic Acid 200 MG capsule Take  by mouth Daily.     • B Complex Vitamins (VITAMIN B COMPLEX) capsule capsule Take  by mouth Daily.     • Biotin (BIOTIN MAXIMUM STRENGTH) 10 MG tablet Take  by mouth Daily.     • Cholecalciferol (VITAMIN D3) 5000 UNITS capsule capsule Take 5,000 Units by mouth 2 (Two) Times a Day.     • coenzyme Q10 100 MG capsule Take 100 mg by mouth Daily.     • Cyanocobalamin (B-12) 5000 MCG capsule Take  by mouth.     • diphenoxylate-atropine (LOMOTIL) 2.5-0.025 MG per tablet Take 1 tablet by mouth 4 (Four) Times a Day As Needed for diarrhea. 100 tablet 0   • isosorbide dinitrate (ISORDIL) 30 MG tablet Take 30 mg by mouth As Needed.     • levothyroxine (SYNTHROID, LEVOTHROID) 100 MCG tablet Take 1 tablet by mouth Daily. 90 tablet 3   • Magnesium 250 MG tablet Take 1 tablet by mouth Daily.     • Multiple Vitamin (MULTI VITAMIN DAILY PO) Take  by mouth.     • nystatin-triamcinolone (MYCOLOG II) 950472-0.1 UNIT/GM-% cream apply to affected area twice a day if needed 45 g 3   • potassium chloride (K-DUR,KLOR-CON) 20 MEQ CR tablet take 1 tablet by mouth once daily 90 tablet 3   • triamterene-hydrochlorothiazide (MAXZIDE-25) 37.5-25 MG per tablet take 1 tablet by mouth once daily 90 tablet 3   • metoprolol succinate XL (TOPROL-XL) 50 MG 24 hr tablet Take 1 tablet by mouth Daily. 90 tablet 3     No facility-administered medications prior to visit.        Patient Active Problem List   Diagnosis   • Essential hypertension   •  "Acquired hypothyroidism   • Coronary artery disease involving native coronary artery of native heart without angina pectoris   • Hyperlipidemia   • Hypertension   • Arthritis   • Skin lesion of chest wall   • NSTEMI (non-ST elevated myocardial infarction) (CMS/HCC)   • Supraventricular tachycardia (CMS/[facility])   • Normal cardiac stress test   • Gastrointestinal bleed   • Angina effort (CMS/[facility])   • Morbidly obese (CMS/[facility])       Advance Care Planning:  has an advance directive - a copy HAS NOT been provided    Identification of Risk Factors:  Risk factors include: weight , unhealthy diet, cardiovascular risk, inactivity, increased fall risk, chronic pain and hearing limitations.    Review of Systems    Compared to one year ago, the patient feels her physical health is the same.  Compared to one year ago, the patient feels her mental health is the same.    Objective     Physical Exam    Vitals:    01/04/19 1326   BP: 154/82   BP Location: Left arm   Patient Position: Sitting   Cuff Size: Adult   Pulse: 68   SpO2: 97%   Weight: 86.2 kg (190 lb)   Height: 149.9 cm (59\")   PainSc:   8   PainLoc: Leg       Patient's Body mass index is 38.38 kg/m². BMI is above normal parameters. Recommendations include: nutrition counseling.      Assessment/Plan   Patient Self-Management and Personalized Health Advice  The patient has been provided with information about: diet, weight management, prevention of cardiac or vascular disease, the relationship between weight and GERD and fall prevention and preventive services including:   · Nutrition counseling provided, Zostavax vaccine (Herpes Zoster), 2nd Hepatitis A vaccine.    Visit Diagnoses:    ICD-10-CM ICD-9-CM   1. Essential hypertension I10 401.9   2. Acquired hypothyroidism E03.9 244.9   3. Mixed hyperlipidemia E78.2 272.2   4. Coronary artery disease involving native coronary artery of native heart without angina pectoris I25.10 414.01   5. Anemia, unspecified type D64.9 285.9   6. " Acute URI J06.9 465.9   7. Angina effort (CMS/[facility]) I20.8 413.9   8. Sensory hearing loss, bilateral H90.3 389.11   9. Morbidly obese (CMS/[facility]) E66.01 278.01   10. Arthritis M19.90 716.90       Orders Placed This Encounter   Procedures   • CBC Auto Differential     Standing Status:   Future     Number of Occurrences:   1     Standing Expiration Date:   1/4/2020   • Ferritin     Standing Status:   Future     Number of Occurrences:   1     Standing Expiration Date:   1/4/2020   • Iron Profile     Standing Status:   Future     Number of Occurrences:   1     Standing Expiration Date:   1/4/2020   • Vitamin B12 & Folate     Standing Status:   Future     Number of Occurrences:   1     Standing Expiration Date:   1/4/2020   • Basic Metabolic Panel     Standing Status:   Future     Number of Occurrences:   1     Standing Expiration Date:   1/4/2020       Outpatient Encounter Medications as of 1/4/2019   Medication Sig Dispense Refill   • acetaminophen-codeine (TYLENOL #3) 300-30 MG per tablet TAKE 1 TABLET BY MOUTH EVERY 6 HOURS AS NEEDED FOR PAIN 90 tablet 0   • Alpha-Lipoic Acid 200 MG capsule Take  by mouth Daily.     • B Complex Vitamins (VITAMIN B COMPLEX) capsule capsule Take  by mouth Daily.     • Biotin (BIOTIN MAXIMUM STRENGTH) 10 MG tablet Take  by mouth Daily.     • Cholecalciferol (VITAMIN D3) 5000 UNITS capsule capsule Take 5,000 Units by mouth 2 (Two) Times a Day.     • coenzyme Q10 100 MG capsule Take 100 mg by mouth Daily.     • Cyanocobalamin (B-12) 5000 MCG capsule Take  by mouth.     • diphenoxylate-atropine (LOMOTIL) 2.5-0.025 MG per tablet Take 1 tablet by mouth 4 (Four) Times a Day As Needed for diarrhea. 100 tablet 0   • isosorbide dinitrate (ISORDIL) 30 MG tablet Take 30 mg by mouth As Needed.     • levothyroxine (SYNTHROID, LEVOTHROID) 100 MCG tablet Take 1 tablet by mouth Daily. 90 tablet 3   • Magnesium 250 MG tablet Take 1 tablet by mouth Daily.     • metoprolol succinate XL (TOPROL-XL) 50 MG 24  hr tablet Take 1 tablet by mouth Daily. 90 tablet 3   • Multiple Vitamin (MULTI VITAMIN DAILY PO) Take  by mouth.     • nystatin-triamcinolone (MYCOLOG II) 929290-0.1 UNIT/GM-% cream apply to affected area twice a day if needed 45 g 3   • potassium chloride (K-DUR,KLOR-CON) 20 MEQ CR tablet take 1 tablet by mouth once daily 90 tablet 3   • triamterene-hydrochlorothiazide (MAXZIDE-25) 37.5-25 MG per tablet take 1 tablet by mouth once daily 90 tablet 3   • [DISCONTINUED] metoprolol succinate XL (TOPROL-XL) 50 MG 24 hr tablet Take 1 tablet by mouth Daily. 90 tablet 3   • azithromycin (ZITHROMAX Z-FERNANDO) 250 MG tablet Take 2 tablets the first day, then 1 tablet daily for 4 days. 6 tablet 0   • guaiFENesin (MUCINEX) 600 MG 12 hr tablet Take 1 tablet by mouth Every 12 (Twelve) Hours for 7 days. 14 tablet      No facility-administered encounter medications on file as of 1/4/2019.        Reviewed use of high risk medication in the elderly: yes  Reviewed for potential of harmful drug interactions in the elderly: yes    Follow Up:  Return in about 3 months (around 4/4/2019).     An After Visit Summary and PPPS with all of these plans were given to the patient.

## 2019-01-06 NOTE — PROGRESS NOTES
Subjective   Kendy Worrell is a 78 y.o. female who presents for f/u regarding HTN, hypothyroidism and due to respiratory symptoms.     She was seen in Urgent Care 12/18 after a fall, dx with a left wrist contusion (nl abnl on xray) which is slowly improving.  She continues to c/o left knee pain which has been chronic, followed by ortho ([facility]) where she has received Cortisone inj with some improvement.  She has a hx of CAD, followed by Dr. Tubbs. Denies dyspnea, no palpitations. She has a hx of swelling of lower extremities (echo 6/2018) which is controlled with elevation, Triam/HCTZ.        URI    This is a new problem. The current episode started in the past 7 days. The problem has been gradually worsening. There has been no fever. Associated symptoms include congestion, coughing, ear pain, rhinorrhea and a sore throat. Pertinent negatives include no abdominal pain, chest pain, diarrhea, headaches, nausea, neck pain, sneezing, vomiting or wheezing. She has tried antihistamine for the symptoms. The treatment provided mild relief.        The following portions of the patient's history were reviewed and updated as appropriate: allergies, current medications, past social history and problem list.    Past Medical History:   Diagnosis Date   • Arthritis    • Breast cancer (CMS/HCC)    • Hyperlipidemia    • Hypertension    • Hyperthyroidism     patient has hypothyroidism   • Hypothyroidism          Current Outpatient Medications:   •  acetaminophen-codeine (TYLENOL #3) 300-30 MG per tablet, TAKE 1 TABLET BY MOUTH EVERY 6 HOURS AS NEEDED FOR PAIN, Disp: 90 tablet, Rfl: 0  •  Alpha-Lipoic Acid 200 MG capsule, Take  by mouth Daily., Disp: , Rfl:   •  B Complex Vitamins (VITAMIN B COMPLEX) capsule capsule, Take  by mouth Daily., Disp: , Rfl:   •  Biotin (BIOTIN MAXIMUM STRENGTH) 10 MG tablet, Take  by mouth Daily., Disp: , Rfl:   •  Cholecalciferol (VITAMIN D3) 5000 UNITS capsule capsule, Take 5,000 Units by mouth 2  (Two) Times a Day., Disp: , Rfl:   •  coenzyme Q10 100 MG capsule, Take 100 mg by mouth Daily., Disp: , Rfl:   •  Cyanocobalamin (B-12) 5000 MCG capsule, Take  by mouth., Disp: , Rfl:   •  diphenoxylate-atropine (LOMOTIL) 2.5-0.025 MG per tablet, Take 1 tablet by mouth 4 (Four) Times a Day As Needed for diarrhea., Disp: 100 tablet, Rfl: 0  •  isosorbide dinitrate (ISORDIL) 30 MG tablet, Take 30 mg by mouth As Needed., Disp: , Rfl:   •  levothyroxine (SYNTHROID, LEVOTHROID) 100 MCG tablet, Take 1 tablet by mouth Daily., Disp: 90 tablet, Rfl: 3  •  Magnesium 250 MG tablet, Take 1 tablet by mouth Daily., Disp: , Rfl:   •  metoprolol succinate XL (TOPROL-XL) 50 MG 24 hr tablet, Take 1 tablet by mouth Daily., Disp: 90 tablet, Rfl: 3  •  Multiple Vitamin (MULTI VITAMIN DAILY PO), Take  by mouth., Disp: , Rfl:   •  nystatin-triamcinolone (MYCOLOG II) 995754-1.1 UNIT/GM-% cream, apply to affected area twice a day if needed, Disp: 45 g, Rfl: 3  •  potassium chloride (K-DUR,KLOR-CON) 20 MEQ CR tablet, take 1 tablet by mouth once daily, Disp: 90 tablet, Rfl: 3  •  triamterene-hydrochlorothiazide (MAXZIDE-25) 37.5-25 MG per tablet, take 1 tablet by mouth once daily, Disp: 90 tablet, Rfl: 3  •  azithromycin (ZITHROMAX Z-FERNANDO) 250 MG tablet, Take 2 tablets the first day, then 1 tablet daily for 4 days., Disp: 6 tablet, Rfl: 0  •  guaiFENesin (MUCINEX) 600 MG 12 hr tablet, Take 1 tablet by mouth Every 12 (Twelve) Hours for 7 days., Disp: 14 tablet, Rfl:     Allergies   Allergen Reactions   • Aspirin      bleeding   • Nsaids      bleeding       Review of Systems   Constitutional: Negative for appetite change, chills, fatigue and fever.   HENT: Positive for congestion, ear pain, postnasal drip, rhinorrhea, sinus pressure and sore throat. Negative for ear discharge, facial swelling, sneezing and tinnitus.    Respiratory: Positive for cough. Negative for chest tightness, shortness of breath and wheezing.    Cardiovascular: Negative for  "chest pain, palpitations and leg swelling.   Gastrointestinal: Negative for abdominal pain, diarrhea, nausea and vomiting.   Musculoskeletal: Positive for arthralgias. Negative for neck pain and neck stiffness.   Neurological: Negative for headaches.   Hematological: Negative for adenopathy.       Objective   Vitals:    01/04/19 1326   BP: 154/82   BP Location: Left arm   Patient Position: Sitting   Cuff Size: Adult   Pulse: 68   SpO2: 97%   Weight: 86.2 kg (190 lb)   Height: 149.9 cm (59\")     Physical Exam   Constitutional: She appears well-developed and well-nourished. She is cooperative. She does not have a sickly appearance. She does not appear ill.   HENT:   Head: Normocephalic.   Right Ear: External ear normal. No drainage, swelling or tenderness. Tympanic membrane is bulging. Tympanic membrane is not erythematous. No middle ear effusion. No decreased hearing is noted.   Left Ear: External ear normal. No drainage, swelling or tenderness. Tympanic membrane is bulging. Tympanic membrane is not erythematous.  No middle ear effusion. No decreased hearing is noted.   Nose: Nose normal. No mucosal edema, rhinorrhea, sinus tenderness or nasal deformity. Right sinus exhibits no maxillary sinus tenderness and no frontal sinus tenderness. Left sinus exhibits no maxillary sinus tenderness and no frontal sinus tenderness.   Mouth/Throat: Mucous membranes are normal. Posterior oropharyngeal erythema present.   Bilateral hearing aides   Eyes: Conjunctivae and lids are normal.   Neck: Trachea normal.   Cardiovascular: Regular rhythm, normal heart sounds and normal pulses.   No murmur heard.  Pulmonary/Chest: Breath sounds normal. No respiratory distress. She has no decreased breath sounds. She has no wheezes. She has no rhonchi. She has no rales.   Musculoskeletal:   Trace edema (non-pitting) bilateral lower extremities   Lymphadenopathy:     She has no cervical adenopathy.   Neurological: She is alert.   Skin: Skin is " warm, dry and intact.   Nursing note and vitals reviewed.      Assessment/Plan   Kendy was seen today for medicare wellness-subsequent.    Diagnoses and all orders for this visit:    Essential hypertension  Comments:  stable on current meds  Orders:  -     CBC Auto Differential; Future  -     Basic Metabolic Panel; Future  -     metoprolol succinate XL (TOPROL-XL) 50 MG 24 hr tablet; Take 1 tablet by mouth Daily.  -     CBC Auto Differential  -     Basic Metabolic Panel    Acquired hypothyroidism  Comments:  recheck TSH    Mixed hyperlipidemia    Coronary artery disease involving native coronary artery of native heart without angina pectoris  Comments:  followed by Dr. Tubbs    Anemia, unspecified type  Comments:  noted on recent labs (hx GI bleed), recheck levels today  Orders:  -     Ferritin; Future  -     Iron Profile; Future  -     Vitamin B12 & Folate; Future  -     Ferritin  -     Iron Profile  -     Vitamin B12 & Folate    Acute URI  Comments:  add Mucinex for increased congestion and drainage  Orders:  -     azithromycin (ZITHROMAX Z-FERNANDO) 250 MG tablet; Take 2 tablets the first day, then 1 tablet daily for 4 days.  -     guaiFENesin (MUCINEX) 600 MG 12 hr tablet; Take 1 tablet by mouth Every 12 (Twelve) Hours for 7 days.    Angina effort (CMS/HCC)  Comments:  denies symptoms, takes Isosorbide daily    Sensory hearing loss, bilateral  Comments:  bilateral hearing aides    Morbidly obese (CMS/HCC)    Arthritis  Comments:  left knee, takes Tylenol #3 as needed (followed by ortho)    Discussed Shingrix vaccine, she will check with pharmacy regarding coverage and availability.  She is due for her 2nd Hep A vaccine which she will receive at the pharmacy.

## 2019-01-10 ENCOUNTER — OFFICE VISIT (OUTPATIENT)
Dept: INTERNAL MEDICINE | Facility: CLINIC | Age: 79
End: 2019-01-10

## 2019-01-10 ENCOUNTER — APPOINTMENT (OUTPATIENT)
Dept: WOMENS IMAGING | Facility: HOSPITAL | Age: 79
End: 2019-01-10

## 2019-01-10 VITALS
BODY MASS INDEX: 37.9 KG/M2 | HEIGHT: 59 IN | RESPIRATION RATE: 18 BRPM | OXYGEN SATURATION: 98 % | SYSTOLIC BLOOD PRESSURE: 104 MMHG | DIASTOLIC BLOOD PRESSURE: 82 MMHG | WEIGHT: 188 LBS | HEART RATE: 72 BPM | TEMPERATURE: 97.7 F

## 2019-01-10 DIAGNOSIS — R52 PAIN: ICD-10-CM

## 2019-01-10 DIAGNOSIS — J40 BRONCHITIS: ICD-10-CM

## 2019-01-10 DIAGNOSIS — R05.9 COUGH: Primary | ICD-10-CM

## 2019-01-10 DIAGNOSIS — J06.9 ACUTE URI: ICD-10-CM

## 2019-01-10 PROCEDURE — 71046 X-RAY EXAM CHEST 2 VIEWS: CPT | Performed by: NURSE PRACTITIONER

## 2019-01-10 PROCEDURE — 99213 OFFICE O/P EST LOW 20 MIN: CPT | Performed by: NURSE PRACTITIONER

## 2019-01-10 PROCEDURE — 71046 X-RAY EXAM CHEST 2 VIEWS: CPT | Performed by: RADIOLOGY

## 2019-01-10 RX ORDER — DOXYCYCLINE HYCLATE 100 MG/1
100 CAPSULE ORAL 2 TIMES DAILY
Qty: 14 CAPSULE | Refills: 0 | Status: SHIPPED | OUTPATIENT
Start: 2019-01-10 | End: 2019-01-17

## 2019-01-10 RX ORDER — METHYLPREDNISOLONE 4 MG/1
TABLET ORAL
Qty: 21 TABLET | Refills: 0 | Status: SHIPPED | OUTPATIENT
Start: 2019-01-10 | End: 2019-04-08

## 2019-01-10 RX ORDER — BENZONATATE 200 MG/1
200 CAPSULE ORAL 3 TIMES DAILY PRN
Qty: 30 CAPSULE | Refills: 0 | Status: SHIPPED | OUTPATIENT
Start: 2019-01-10 | End: 2019-07-10

## 2019-01-10 RX ORDER — ACETAMINOPHEN AND CODEINE PHOSPHATE 300; 30 MG/1; MG/1
1 TABLET ORAL EVERY 6 HOURS PRN
Qty: 90 TABLET | Refills: 0 | Status: SHIPPED | OUTPATIENT
Start: 2019-01-10 | End: 2019-02-09 | Stop reason: SDUPTHER

## 2019-01-10 NOTE — PROGRESS NOTES
Subjective   Kendy Worrell is a 78 y.o. female.     No recent air travel.  Her granddaughter was sick. She did receive her flu shot this year.       Cough   This is a new problem. The current episode started 1 to 4 weeks ago. The problem has been unchanged. The cough is productive of sputum. Associated symptoms include chills, postnasal drip, rhinorrhea (normal ) and wheezing. Pertinent negatives include no chest pain, ear congestion, ear pain, fever, headaches, hemoptysis, sore throat or shortness of breath. Nothing aggravates the symptoms. Treatments tried: z-kailash, coricidion hbp. The treatment provided moderate relief. Her past medical history is significant for environmental allergies. There is no history of asthma or COPD.        The following portions of the patient's history were reviewed and updated as appropriate: allergies, current medications, past social history and problem list.    Review of Systems   Constitutional: Positive for chills. Negative for appetite change, fatigue and fever.   HENT: Positive for postnasal drip and rhinorrhea (normal ). Negative for congestion, ear discharge, ear pain, facial swelling, hearing loss, sinus pressure, sneezing, sore throat and tinnitus.    Respiratory: Positive for cough and wheezing. Negative for hemoptysis, chest tightness and shortness of breath.    Cardiovascular: Negative for chest pain, palpitations and leg swelling.   Gastrointestinal: Negative for abdominal pain, diarrhea, nausea and vomiting.   Musculoskeletal: Negative for neck pain and neck stiffness.   Allergic/Immunologic: Positive for environmental allergies.   Neurological: Negative for headaches.   Hematological: Negative for adenopathy.       Objective   Physical Exam   Constitutional: She appears well-developed and well-nourished. She is cooperative. She does not have a sickly appearance. She does not appear ill.   HENT:   Head: Normocephalic.   Right Ear: Tympanic membrane and external ear  normal. No drainage, swelling or tenderness. No mastoid tenderness. Tympanic membrane is not injected, not scarred, not erythematous and not bulging. Tympanic membrane mobility is normal. No middle ear effusion. Decreased hearing (bilateral hearing aids ) is noted.   Left Ear: Tympanic membrane and external ear normal. No drainage, swelling or tenderness. No mastoid tenderness. Tympanic membrane is not injected, not scarred, not erythematous and not bulging. Tympanic membrane mobility is normal.  No middle ear effusion. Decreased hearing is noted.   Nose: No mucosal edema, rhinorrhea, sinus tenderness or nasal deformity. Right sinus exhibits maxillary sinus tenderness. Right sinus exhibits no frontal sinus tenderness. Left sinus exhibits maxillary sinus tenderness. Left sinus exhibits no frontal sinus tenderness.   Mouth/Throat: Mucous membranes are normal. Normal dentition. Posterior oropharyngeal erythema present. Posterior oropharyngeal edema: mild with drainage noted    Eyes: Conjunctivae and lids are normal. Pupils are equal, round, and reactive to light. Right eye exhibits no discharge and no exudate. Left eye exhibits no discharge and no exudate.   Neck: Trachea normal and normal range of motion. No edema present. No thyroid mass and no thyromegaly present.   Cardiovascular: Regular rhythm, normal heart sounds and normal pulses.   No murmur heard.  Pulmonary/Chest: Breath sounds normal. No respiratory distress. She has no decreased breath sounds. She has no wheezes. She has no rhonchi. She has no rales.   Moist cough    Lymphadenopathy:        Head (right side): No submental, no submandibular, no tonsillar, no preauricular, no posterior auricular and no occipital adenopathy present.        Head (left side): No submental, no submandibular, no tonsillar, no preauricular, no posterior auricular and no occipital adenopathy present.     She has no cervical adenopathy.   Neurological: She is alert.   Skin: Skin is  warm, dry and intact. No cyanosis. Nails show no clubbing.       Assessment/Plan   Kendy was seen today for cough.    Diagnoses and all orders for this visit:    Cough  -     XR Chest 2 View    Acute URI    Bronchitis  Comments:  add mucinex, push fluids  Orders:  -     MethylPREDNISolone (MEDROL) 4 MG tablet; follow package directions  -     doxycycline (VIBRAMYCIN) 100 MG capsule; Take 1 capsule by mouth 2 (Two) Times a Day for 7 days.  -     benzonatate (TESSALON) 200 MG capsule; Take 1 capsule by mouth 3 (Three) Times a Day As Needed for Cough.      CXR with no acute findings. Sent for final review. She has been advised to return if any worsening of symptoms.

## 2019-01-10 NOTE — TELEPHONE ENCOUNTER
Last OV   01/04 with NP    Next OV 04/08    MIRA done 12/10    please review med refill and advise

## 2019-01-10 NOTE — PATIENT INSTRUCTIONS
Acute Bronchitis, Adult  Acute bronchitis is when air tubes (bronchi) in the lungs suddenly get swollen. The condition can make it hard to breathe. It can also cause these symptoms:  · A cough.  · Coughing up clear, yellow, or green mucus.  · Wheezing.  · Chest congestion.  · Shortness of breath.  · A fever.  · Body aches.  · Chills.  · A sore throat.    Follow these instructions at home:  Medicines  · Take over-the-counter and prescription medicines only as told by your doctor.  · If you were prescribed an antibiotic medicine, take it as told by your doctor. Do not stop taking the antibiotic even if you start to feel better.  General instructions  · Rest.  · Drink enough fluids to keep your pee (urine) clear or pale yellow.  · Avoid smoking and secondhand smoke. If you smoke and you need help quitting, ask your doctor. Quitting will help your lungs heal faster.  · Use an inhaler, cool mist vaporizer, or humidifier as told by your doctor.  · Keep all follow-up visits as told by your doctor. This is important.  How is this prevented?  To lower your risk of getting this condition again:  · Wash your hands often with soap and water. If you cannot use soap and water, use hand .  · Avoid contact with people who have cold symptoms.  · Try not to touch your hands to your mouth, nose, or eyes.  · Make sure to get the flu shot every year.    Contact a doctor if:  · Your symptoms do not get better in 2 weeks.  Get help right away if:  · You cough up blood.  · You have chest pain.  · You have very bad shortness of breath.  · You become dehydrated.  · You faint (pass out) or keep feeling like you are going to pass out.  · You keep throwing up (vomiting).  · You have a very bad headache.  · Your fever or chills gets worse.  This information is not intended to replace advice given to you by your health care provider. Make sure you discuss any questions you have with your health care provider.  Document Released:  "06/05/2009 Document Revised: 07/26/2017 Document Reviewed: 06/07/2017  [employer] Interactive Patient Education © 2018 [employer]  Upper Respiratory Infection, Adult  Most upper respiratory infections (URIs) are caused by a virus. A URI affects the nose, throat, and upper air passages. The most common type of URI is often called \"the common cold.\"  Follow these instructions at home:  · Take medicines only as told by your doctor.  · Gargle warm saltwater or take cough drops to comfort your throat as told by your doctor.  · Use a warm mist humidifier or inhale steam from a shower to increase air moisture. This may make it easier to breathe.  · Drink enough fluid to keep your pee (urine) clear or pale yellow.  · Eat soups and other clear broths.  · Have a healthy diet.  · Rest as needed.  · Go back to work when your fever is gone or your doctor says it is okay.  ? You may need to stay home longer to avoid giving your URI to others.  ? You can also wear a face mask and wash your hands often to prevent spread of the virus.  · Use your inhaler more if you have asthma.  · Do not use any tobacco products, including cigarettes, chewing tobacco, or electronic cigarettes. If you need help quitting, ask your doctor.  Contact a doctor if:  · You are getting worse, not better.  · Your symptoms are not helped by medicine.  · You have chills.  · You are getting more short of breath.  · You have brown or red mucus.  · You have yellow or brown discharge from your nose.  · You have pain in your face, especially when you bend forward.  · You have a fever.  · You have puffy (swollen) neck glands.  · You have pain while swallowing.  · You have white areas in the back of your throat.  Get help right away if:  · You have very bad or constant:  ? Headache.  ? Ear pain.  ? Pain in your forehead, behind your eyes, and over your cheekbones (sinus pain).  ? Chest pain.  · You have long-lasting (chronic) lung disease and any of the " following:  ? Wheezing.  ? Long-lasting cough.  ? Coughing up blood.  ? A change in your usual mucus.  · You have a stiff neck.  · You have changes in your:  ? Vision.  ? Hearing.  ? Thinking.  ? Mood.  This information is not intended to replace advice given to you by your health care provider. Make sure you discuss any questions you have with your health care provider.  Document Released: 06/05/2009 Document Revised: 08/20/2017 Document Reviewed: 03/25/2015  [employer] Interactive Patient Education © 2018 [employer]

## 2019-02-09 DIAGNOSIS — R52 PAIN: ICD-10-CM

## 2019-02-12 RX ORDER — ACETAMINOPHEN AND CODEINE PHOSPHATE 300; 30 MG/1; MG/1
TABLET ORAL
Qty: 90 TABLET | Refills: 0 | Status: SHIPPED | OUTPATIENT
Start: 2019-02-12 | End: 2019-03-12 | Stop reason: SDUPTHER

## 2019-03-12 DIAGNOSIS — R52 PAIN: ICD-10-CM

## 2019-03-12 RX ORDER — ACETAMINOPHEN AND CODEINE PHOSPHATE 300; 30 MG/1; MG/1
TABLET ORAL
Qty: 90 TABLET | Refills: 0 | Status: SHIPPED | OUTPATIENT
Start: 2019-03-12 | End: 2019-04-08 | Stop reason: SDUPTHER

## 2019-04-08 ENCOUNTER — OFFICE VISIT (OUTPATIENT)
Dept: INTERNAL MEDICINE | Facility: CLINIC | Age: 79
End: 2019-04-08

## 2019-04-08 VITALS
WEIGHT: 193 LBS | HEIGHT: 59 IN | BODY MASS INDEX: 38.91 KG/M2 | HEART RATE: 82 BPM | SYSTOLIC BLOOD PRESSURE: 140 MMHG | DIASTOLIC BLOOD PRESSURE: 90 MMHG | OXYGEN SATURATION: 98 %

## 2019-04-08 DIAGNOSIS — R52 PAIN: ICD-10-CM

## 2019-04-08 DIAGNOSIS — R51.9 TEMPORAL PAIN: ICD-10-CM

## 2019-04-08 DIAGNOSIS — E03.9 ACQUIRED HYPOTHYROIDISM: ICD-10-CM

## 2019-04-08 DIAGNOSIS — I10 ESSENTIAL HYPERTENSION: Primary | ICD-10-CM

## 2019-04-08 DIAGNOSIS — M15.9 PRIMARY OSTEOARTHRITIS INVOLVING MULTIPLE JOINTS: ICD-10-CM

## 2019-04-08 DIAGNOSIS — E78.2 MIXED HYPERLIPIDEMIA: ICD-10-CM

## 2019-04-08 LAB
ANION GAP SERPL CALCULATED.3IONS-SCNC: 18.2 MMOL/L
BASOPHILS # BLD AUTO: 0.04 10*3/MM3 (ref 0–0.2)
BASOPHILS NFR BLD AUTO: 0.5 % (ref 0–1.5)
BUN BLD-MCNC: 5 MG/DL (ref 8–23)
BUN/CREAT SERPL: 6.3 (ref 7–25)
CALCIUM SPEC-SCNC: 9.6 MG/DL (ref 8.6–10.5)
CHLORIDE SERPL-SCNC: 90 MMOL/L (ref 98–107)
CHOLEST SERPL-MCNC: 170 MG/DL (ref 0–200)
CO2 SERPL-SCNC: 24.8 MMOL/L (ref 22–29)
CREAT BLD-MCNC: 0.8 MG/DL (ref 0.57–1)
DEPRECATED RDW RBC AUTO: 45.1 FL (ref 37–54)
EOSINOPHIL # BLD AUTO: 0.29 10*3/MM3 (ref 0–0.4)
EOSINOPHIL NFR BLD AUTO: 3.6 % (ref 0.3–6.2)
ERYTHROCYTE [DISTWIDTH] IN BLOOD BY AUTOMATED COUNT: 14.4 % (ref 12.3–15.4)
ERYTHROCYTE [SEDIMENTATION RATE] IN BLOOD: 10 MM/HR (ref 0–20)
GFR SERPL CREATININE-BSD FRML MDRD: 69 ML/MIN/1.73
GLUCOSE BLD-MCNC: 98 MG/DL (ref 65–99)
HCT VFR BLD AUTO: 34.2 % (ref 34–46.6)
HDLC SERPL-MCNC: 108 MG/DL (ref 40–60)
HGB BLD-MCNC: 11.7 G/DL (ref 12–15.9)
LDLC SERPL CALC-MCNC: 38 MG/DL (ref 0–100)
LDLC/HDLC SERPL: 0.35 {RATIO}
LYMPHOCYTES # BLD AUTO: 1.6 10*3/MM3 (ref 0.7–3.1)
LYMPHOCYTES NFR BLD AUTO: 20 % (ref 19.6–45.3)
MCH RBC QN AUTO: 30.2 PG (ref 26.6–33)
MCHC RBC AUTO-ENTMCNC: 34.2 G/DL (ref 31.5–35.7)
MCV RBC AUTO: 88.1 FL (ref 79–97)
MONOCYTES # BLD AUTO: 0.74 10*3/MM3 (ref 0.1–0.9)
MONOCYTES NFR BLD AUTO: 9.2 % (ref 5–12)
NEUTROPHILS # BLD AUTO: 5.35 10*3/MM3 (ref 1.4–7)
NEUTROPHILS NFR BLD AUTO: 66.7 % (ref 42.7–76)
PLATELET # BLD AUTO: 290 10*3/MM3 (ref 140–450)
PMV BLD AUTO: 9.6 FL (ref 6–12)
POTASSIUM BLD-SCNC: 3.3 MMOL/L (ref 3.5–5.2)
RBC # BLD AUTO: 3.88 10*6/MM3 (ref 3.77–5.28)
SODIUM BLD-SCNC: 133 MMOL/L (ref 136–145)
TRIGL SERPL-MCNC: 122 MG/DL (ref 0–150)
VLDLC SERPL-MCNC: 24.4 MG/DL (ref 5–40)
WBC NRBC COR # BLD: 8.02 10*3/MM3 (ref 3.4–10.8)

## 2019-04-08 PROCEDURE — 85025 COMPLETE CBC W/AUTO DIFF WBC: CPT | Performed by: NURSE PRACTITIONER

## 2019-04-08 PROCEDURE — 99214 OFFICE O/P EST MOD 30 MIN: CPT | Performed by: NURSE PRACTITIONER

## 2019-04-08 PROCEDURE — 85651 RBC SED RATE NONAUTOMATED: CPT | Performed by: NURSE PRACTITIONER

## 2019-04-08 PROCEDURE — 80048 BASIC METABOLIC PNL TOTAL CA: CPT | Performed by: NURSE PRACTITIONER

## 2019-04-08 PROCEDURE — 80061 LIPID PANEL: CPT | Performed by: NURSE PRACTITIONER

## 2019-04-08 NOTE — PROGRESS NOTES
Subjective   Kendy Worrell is a 79 y.o. female who presents for f/u regarding HTN, hypothyroidism and chronic pain.    She c/o intermittent burning sensation in right her temporal with a dull headache, states sx began the last few days       Hypertension   This is a chronic problem. The current episode started more than 1 year ago. The problem is unchanged. The problem is controlled. Pertinent negatives include no chest pain, headaches or palpitations. Current antihypertension treatment includes diuretics and beta blockers. The current treatment provides significant improvement. There are no compliance problems.    Hypothyroidism   This is a chronic problem. The current episode started more than 1 year ago. The problem occurs daily. The problem has been unchanged. Associated symptoms include arthralgias (chronic joint stiffness, bilateral knee pain). Pertinent negatives include no abdominal pain, chest pain, chills, congestion, coughing, fatigue, fever, headaches, joint swelling, nausea, sore throat, vomiting or weakness.        The following portions of the patient's history were reviewed and updated as appropriate: allergies, current medications, past social history and problem list.    Past Medical History:   Diagnosis Date   • Arthritis    • Breast cancer (CMS/HCC)    • Hyperlipidemia    • Hypertension    • Hyperthyroidism     patient has hypothyroidism   • Hypothyroidism          Current Outpatient Medications:   •  acetaminophen-codeine (TYLENOL #3) 300-30 MG per tablet, TAKE 1 TABLET BY MOUTH EVERY 6 HOURS AS NEEDED FOR MILD PAIN OR PAIN, Disp: 90 tablet, Rfl: 0  •  Alpha-Lipoic Acid 200 MG capsule, Take  by mouth Daily., Disp: , Rfl:   •  B Complex Vitamins (VITAMIN B COMPLEX) capsule capsule, Take  by mouth Daily., Disp: , Rfl:   •  benzonatate (TESSALON) 200 MG capsule, Take 1 capsule by mouth 3 (Three) Times a Day As Needed for Cough., Disp: 30 capsule, Rfl: 0  •  Biotin (BIOTIN MAXIMUM STRENGTH) 10 MG  tablet, Take  by mouth Daily., Disp: , Rfl:   •  Chlorphen-Pseudoephed-APAP (CORICIDIN D PO), Take  by mouth., Disp: , Rfl:   •  Cholecalciferol (VITAMIN D3) 5000 UNITS capsule capsule, Take 5,000 Units by mouth 2 (Two) Times a Day., Disp: , Rfl:   •  coenzyme Q10 100 MG capsule, Take 100 mg by mouth Daily., Disp: , Rfl:   •  Cyanocobalamin (B-12) 5000 MCG capsule, Take  by mouth., Disp: , Rfl:   •  diphenoxylate-atropine (LOMOTIL) 2.5-0.025 MG per tablet, Take 1 tablet by mouth 4 (Four) Times a Day As Needed for diarrhea., Disp: 100 tablet, Rfl: 0  •  isosorbide dinitrate (ISORDIL) 30 MG tablet, Take 30 mg by mouth As Needed., Disp: , Rfl:   •  levothyroxine (SYNTHROID, LEVOTHROID) 100 MCG tablet, Take 1 tablet by mouth Daily., Disp: 90 tablet, Rfl: 3  •  Magnesium 250 MG tablet, Take 1 tablet by mouth Daily., Disp: , Rfl:   •  metoprolol succinate XL (TOPROL-XL) 50 MG 24 hr tablet, Take 1 tablet by mouth Daily., Disp: 90 tablet, Rfl: 3  •  Multiple Vitamin (MULTI VITAMIN DAILY PO), Take  by mouth., Disp: , Rfl:   •  nystatin-triamcinolone (MYCOLOG II) 016200-1.1 UNIT/GM-% cream, apply to affected area twice a day if needed, Disp: 45 g, Rfl: 3  •  potassium chloride (K-DUR,KLOR-CON) 20 MEQ CR tablet, take 1 tablet by mouth once daily, Disp: 90 tablet, Rfl: 3  •  triamterene-hydrochlorothiazide (MAXZIDE-25) 37.5-25 MG per tablet, take 1 tablet by mouth once daily, Disp: 90 tablet, Rfl: 3    Allergies   Allergen Reactions   • Aspirin      bleeding   • Nsaids      bleeding       Review of Systems   Constitutional: Negative for chills, fatigue, fever and unexpected weight change.   HENT: Negative for congestion, ear pain, postnasal drip, sinus pressure, sore throat and trouble swallowing.    Eyes: Negative for visual disturbance.   Respiratory: Negative for cough, chest tightness and wheezing.    Cardiovascular: Negative for chest pain, palpitations and leg swelling.   Gastrointestinal: Negative for abdominal pain,  "blood in stool, nausea and vomiting.   Genitourinary: Negative for dysuria, frequency and urgency.   Musculoskeletal: Positive for arthralgias (chronic joint stiffness, bilateral knee pain) and back pain. Negative for joint swelling.   Skin: Negative for color change.   Neurological: Negative for syncope, weakness and headaches.   Hematological: Does not bruise/bleed easily.       Objective   Vitals:    04/08/19 1056   BP: 140/90   BP Location: Left arm   Patient Position: Sitting   Cuff Size: Adult   Pulse: 82   SpO2: 98%   Weight: 87.5 kg (193 lb)   Height: 149.9 cm (59\")     Physical Exam   Constitutional: She appears well-developed and well-nourished. She is cooperative. She does not have a sickly appearance. She does not appear ill.   HENT:   Head: Normocephalic.   Right Ear: Hearing, tympanic membrane and external ear normal.   Left Ear: Hearing, tympanic membrane and external ear normal.   Nose: Nose normal. No mucosal edema, rhinorrhea, sinus tenderness or nasal deformity. Right sinus exhibits no maxillary sinus tenderness and no frontal sinus tenderness. Left sinus exhibits no maxillary sinus tenderness and no frontal sinus tenderness.   Mouth/Throat: Oropharynx is clear and moist and mucous membranes are normal. Normal dentition.   No temporal tenderness   Eyes: Conjunctivae and lids are normal. Right eye exhibits no discharge and no exudate. Left eye exhibits no discharge and no exudate.   Neck: Trachea normal. Carotid bruit is not present. No edema present. No thyroid mass present.   Cardiovascular: Regular rhythm, normal heart sounds and normal pulses.   No murmur heard.  Pulmonary/Chest: Breath sounds normal. No respiratory distress. She has no decreased breath sounds. She has no wheezes. She has no rhonchi. She has no rales.   Lymphadenopathy:        Head (right side): No submental, no submandibular, no tonsillar, no preauricular, no posterior auricular and no occipital adenopathy present.        Head " (left side): No submental, no submandibular, no tonsillar, no preauricular, no posterior auricular and no occipital adenopathy present.   Neurological: She is alert.   Skin: Skin is warm, dry and intact. No cyanosis. Nails show no clubbing.       Assessment/Plan   Kendy was seen today for hyperlipidemia, hypertension and hypothyroidism.    Diagnoses and all orders for this visit:    Essential hypertension  Comments:  stable on current meds    Acquired hypothyroidism  Comments:  managed on Synthroid    Temporal pain  Comments:  no palpable tenderness, check ESR to further evaluate  Orders:  -     Sedimentation Rate; Future  -     Basic Metabolic Panel; Future  -     CBC Auto Differential; Future  -     Sedimentation Rate  -     Basic Metabolic Panel  -     CBC Auto Differential    Mixed hyperlipidemia  Comments:  check fasting lipid panel today  Orders:  -     Lipid Panel; Future  -     Lipid Panel    Primary osteoarthritis involving multiple joints  Comments:  takes Tylenol #3 as needed for pain

## 2019-04-12 RX ORDER — ACETAMINOPHEN AND CODEINE PHOSPHATE 300; 30 MG/1; MG/1
TABLET ORAL
Qty: 90 TABLET | Refills: 0 | OUTPATIENT
Start: 2019-04-12 | End: 2019-07-09 | Stop reason: SDUPTHER

## 2019-04-15 DIAGNOSIS — E87.6 HYPOKALEMIA: Primary | ICD-10-CM

## 2019-04-24 DIAGNOSIS — K52.9 CHRONIC NONSPECIFIC COLITIS: ICD-10-CM

## 2019-04-24 RX ORDER — DIPHENOXYLATE HYDROCHLORIDE AND ATROPINE SULFATE 2.5; .025 MG/1; MG/1
1 TABLET ORAL 4 TIMES DAILY PRN
Qty: 100 TABLET | Refills: 0 | OUTPATIENT
Start: 2019-04-24 | End: 2019-06-30 | Stop reason: SDUPTHER

## 2019-04-24 NOTE — TELEPHONE ENCOUNTER
Contact: pt  Pt is calling for a refill     FOR  diphenoxylate-atropine (LOMOTIL) 2.5-0.025 MG per tablet    Patient requests RX SENT TO   [facility] [address] [place] [address] RD AT [facility] [phone] [facility] [phone] FX    Caller# [phone]

## 2019-05-01 ENCOUNTER — LAB (OUTPATIENT)
Dept: INTERNAL MEDICINE | Facility: CLINIC | Age: 79
End: 2019-05-01

## 2019-05-01 DIAGNOSIS — E87.6 HYPOKALEMIA: ICD-10-CM

## 2019-05-01 LAB
BUN SERPL-MCNC: 17 MG/DL (ref 8–23)
BUN/CREAT SERPL: 16 (ref 7–25)
CALCIUM SERPL-MCNC: 10 MG/DL (ref 8.6–10.5)
CHLORIDE SERPL-SCNC: 95 MMOL/L (ref 98–107)
CO2 SERPL-SCNC: 26.2 MMOL/L (ref 22–29)
CREAT SERPL-MCNC: 1.06 MG/DL (ref 0.57–1)
GLUCOSE SERPL-MCNC: 83 MG/DL (ref 65–99)
POTASSIUM SERPL-SCNC: 4 MMOL/L (ref 3.5–5.2)
SODIUM SERPL-SCNC: 135 MMOL/L (ref 136–145)

## 2019-06-21 RX ORDER — TRIAMTERENE AND HYDROCHLOROTHIAZIDE 37.5; 25 MG/1; MG/1
1 TABLET ORAL DAILY
Qty: 90 TABLET | Refills: 3 | Status: SHIPPED | OUTPATIENT
Start: 2019-06-21 | End: 2019-09-09 | Stop reason: HOSPADM

## 2019-06-30 DIAGNOSIS — K52.9 CHRONIC NONSPECIFIC COLITIS: ICD-10-CM

## 2019-07-01 RX ORDER — DIPHENOXYLATE HYDROCHLORIDE AND ATROPINE SULFATE 2.5; .025 MG/1; MG/1
TABLET ORAL
Qty: 100 TABLET | Refills: 0 | OUTPATIENT
Start: 2019-07-01 | End: 2019-09-09 | Stop reason: HOSPADM

## 2019-07-09 ENCOUNTER — APPOINTMENT (OUTPATIENT)
Dept: WOMENS IMAGING | Facility: HOSPITAL | Age: 79
End: 2019-07-09

## 2019-07-09 ENCOUNTER — OFFICE VISIT (OUTPATIENT)
Dept: INTERNAL MEDICINE | Facility: CLINIC | Age: 79
End: 2019-07-09

## 2019-07-09 VITALS
SYSTOLIC BLOOD PRESSURE: 138 MMHG | WEIGHT: 189 LBS | HEIGHT: 59 IN | HEART RATE: 75 BPM | OXYGEN SATURATION: 96 % | DIASTOLIC BLOOD PRESSURE: 82 MMHG | BODY MASS INDEX: 38.1 KG/M2

## 2019-07-09 DIAGNOSIS — R05.9 COUGH: ICD-10-CM

## 2019-07-09 DIAGNOSIS — I10 ESSENTIAL HYPERTENSION: Primary | ICD-10-CM

## 2019-07-09 DIAGNOSIS — M17.0 PRIMARY OSTEOARTHRITIS OF BOTH KNEES: ICD-10-CM

## 2019-07-09 DIAGNOSIS — E03.9 ACQUIRED HYPOTHYROIDISM: ICD-10-CM

## 2019-07-09 DIAGNOSIS — E87.6 HYPOKALEMIA: ICD-10-CM

## 2019-07-09 DIAGNOSIS — K52.9 CHRONIC NONSPECIFIC COLITIS: ICD-10-CM

## 2019-07-09 PROCEDURE — 71046 X-RAY EXAM CHEST 2 VIEWS: CPT | Performed by: NURSE PRACTITIONER

## 2019-07-09 PROCEDURE — 71046 X-RAY EXAM CHEST 2 VIEWS: CPT | Performed by: RADIOLOGY

## 2019-07-09 PROCEDURE — 99214 OFFICE O/P EST MOD 30 MIN: CPT | Performed by: NURSE PRACTITIONER

## 2019-07-09 RX ORDER — ACETAMINOPHEN AND CODEINE PHOSPHATE 300; 30 MG/1; MG/1
1 TABLET ORAL EVERY 6 HOURS PRN
Qty: 90 TABLET | Refills: 0 | Status: SHIPPED | OUTPATIENT
Start: 2019-07-09 | End: 2019-09-09 | Stop reason: HOSPADM

## 2019-07-09 RX ORDER — POTASSIUM CHLORIDE 20 MEQ/1
20 TABLET, EXTENDED RELEASE ORAL DAILY
Qty: 90 TABLET | Refills: 3 | Status: ON HOLD | OUTPATIENT
Start: 2019-07-09 | End: 2019-09-09 | Stop reason: SDUPTHER

## 2019-07-10 NOTE — PROGRESS NOTES
Subjective   Kendy Worrell is a 79 y.o. female who presents for f/u regarding CAD, HTN and hyperlipidemia.    She has a history of prior non-ST elevation myocardial infarction in 2006 which has been attributed to  demand ischemia phenomenon. She does have nonobstructive coronary disease diagnosed on catheterization at that time and has been on medical therapy since then, followed annually by Dr. Tubbs.  She has a hx of GI bleed and is therefore not managed on anticoagulants. She has a hx of recurrent loose, watery stools for which she takes Lomotil as needed (denies rectal bleeding).  She also has a hx of bilateral knee pain and stiffness with personal hx of OA, takes Tylenol #3 intermittent for breakthrough pain.  She is the process of moving to an assisted living in [place], has had difficulty with the maintenance of her house which she is selling.      Hypertension   Pertinent negatives include no chest pain, headaches or palpitations.   Hyperlipidemia   Exacerbating diseases include hypothyroidism. Pertinent negatives include no chest pain.   Hypothyroidism   Associated symptoms include arthralgias, congestion and coughing. Pertinent negatives include no abdominal pain, chest pain, chills, fatigue, fever, headaches, joint swelling, nausea, sore throat, vomiting or weakness.        The following portions of the patient's history were reviewed and updated as appropriate: allergies, current medications, past social history and problem list.    Past Medical History:   Diagnosis Date   • Arthritis    • Breast cancer (CMS/HCC)    • Hyperlipidemia    • Hypertension    • Hyperthyroidism     patient has hypothyroidism   • Hypothyroidism          Current Outpatient Medications:   •  acetaminophen-codeine (TYLENOL #3) 300-30 MG per tablet, Take 1 tablet by mouth Every 6 (Six) Hours As Needed for Moderate Pain ., Disp: 90 tablet, Rfl: 0  •  Alpha-Lipoic Acid 200 MG capsule, Take  by mouth Daily., Disp: , Rfl:   •  B Complex  Vitamins (VITAMIN B COMPLEX) capsule capsule, Take  by mouth Daily., Disp: , Rfl:   •  Biotin (BIOTIN MAXIMUM STRENGTH) 10 MG tablet, Take  by mouth Daily., Disp: , Rfl:   •  Chlorphen-Pseudoephed-APAP (CORICIDIN D PO), Take  by mouth., Disp: , Rfl:   •  Cholecalciferol (VITAMIN D3) 5000 UNITS capsule capsule, Take 5,000 Units by mouth 2 (Two) Times a Day., Disp: , Rfl:   •  coenzyme Q10 100 MG capsule, Take 100 mg by mouth Daily., Disp: , Rfl:   •  Cyanocobalamin (B-12) 5000 MCG capsule, Take  by mouth., Disp: , Rfl:   •  diphenoxylate-atropine (LOMOTIL) 2.5-0.025 MG per tablet, TAKE 1 TABLET BY MOUTH FOUR TIMES DAILY AS NEEDED, Disp: 100 tablet, Rfl: 0  •  isosorbide dinitrate (ISORDIL) 30 MG tablet, Take 30 mg by mouth As Needed., Disp: , Rfl:   •  levothyroxine (SYNTHROID, LEVOTHROID) 100 MCG tablet, Take 1 tablet by mouth Daily., Disp: 90 tablet, Rfl: 3  •  Magnesium 250 MG tablet, Take 1 tablet by mouth Daily., Disp: , Rfl:   •  metoprolol succinate XL (TOPROL-XL) 50 MG 24 hr tablet, Take 1 tablet by mouth Daily., Disp: 90 tablet, Rfl: 3  •  Multiple Vitamin (MULTI VITAMIN DAILY PO), Take  by mouth., Disp: , Rfl:   •  nystatin-triamcinolone (MYCOLOG II) 600122-4.1 UNIT/GM-% cream, apply to affected area twice a day if needed, Disp: 45 g, Rfl: 3  •  potassium chloride (K-DUR,KLOR-CON) 20 MEQ CR tablet, Take 1 tablet by mouth Daily., Disp: 90 tablet, Rfl: 3  •  triamterene-hydrochlorothiazide (MAXZIDE-25) 37.5-25 MG per tablet, Take 1 tablet by mouth Daily., Disp: 90 tablet, Rfl: 3    Allergies   Allergen Reactions   • Aspirin      bleeding   • Nsaids      bleeding       Review of Systems   Constitutional: Negative for chills, fatigue, fever and unexpected weight change.   HENT: Positive for congestion and postnasal drip. Negative for ear pain, sinus pressure, sore throat and trouble swallowing.    Eyes: Negative for visual disturbance.   Respiratory: Positive for cough. Negative for chest tightness and  "wheezing.    Cardiovascular: Negative for chest pain, palpitations and leg swelling.   Gastrointestinal: Positive for diarrhea. Negative for abdominal pain, blood in stool, nausea and vomiting.   Genitourinary: Negative for dysuria, frequency and urgency.   Musculoskeletal: Positive for arthralgias. Negative for joint swelling.   Skin: Negative for color change.   Neurological: Negative for syncope, weakness and headaches.   Hematological: Does not bruise/bleed easily.       Objective   Vitals:    07/09/19 1146   BP: 138/82   BP Location: Left arm   Patient Position: Sitting   Cuff Size: Adult   Pulse: 75   SpO2: 96%   Weight: 85.7 kg (189 lb)   Height: 149.9 cm (59\")     Physical Exam   Constitutional: She appears well-developed and well-nourished. She is cooperative. She does not have a sickly appearance. She does not appear ill.   HENT:   Head: Normocephalic.   Right Ear: Hearing, tympanic membrane and external ear normal.   Left Ear: Hearing, tympanic membrane and external ear normal.   Nose: Nose normal. No mucosal edema, rhinorrhea, sinus tenderness or nasal deformity. Right sinus exhibits no maxillary sinus tenderness and no frontal sinus tenderness. Left sinus exhibits no maxillary sinus tenderness and no frontal sinus tenderness.   Mouth/Throat: Oropharynx is clear and moist and mucous membranes are normal. Normal dentition.   Eyes: Conjunctivae and lids are normal. Pupils are equal, round, and reactive to light. Right eye exhibits no discharge and no exudate. Left eye exhibits no discharge and no exudate.   Neck: Trachea normal and normal range of motion. Carotid bruit is not present. No edema present. No thyroid mass and no thyromegaly present.   Cardiovascular: Regular rhythm, normal heart sounds and normal pulses.   No murmur heard.  Pulmonary/Chest: Breath sounds normal. No respiratory distress. She has no decreased breath sounds. She has no wheezes. She has no rhonchi. She has no rales.   Abdominal: " Soft. There is no tenderness.   Musculoskeletal:   Ambulating with a cane   Lymphadenopathy:        Head (right side): No submental, no submandibular, no tonsillar, no preauricular, no posterior auricular and no occipital adenopathy present.        Head (left side): No submental, no submandibular, no tonsillar, no preauricular, no posterior auricular and no occipital adenopathy present.   Neurological: She is alert.   Skin: Skin is warm, dry and intact. No cyanosis. Nails show no clubbing.       Assessment/Plan   Kendy was seen today for hypertension, hyperlipidemia and hypothyroidism.    Diagnoses and all orders for this visit:    Essential hypertension  Comments:  stable on current meds    Hypokalemia  Comments:  resolved, continue daily supplement  Orders:  -     potassium chloride (K-DUR,KLOR-CON) 20 MEQ CR tablet; Take 1 tablet by mouth Daily.    Acquired hypothyroidism  Comments:  TSH within therapeutic range with 9/2018 labs    Chronic nonspecific colitis  Comments:  +hx GI evaluation, takes Lomotil as needed    Cough  Comments:  may take Claritin as needed and continue to monitor  Orders:  -     XR Chest 2 View    Primary osteoarthritis of both knees  -     acetaminophen-codeine (TYLENOL #3) 300-30 MG per tablet; Take 1 tablet by mouth Every 6 (Six) Hours As Needed for Moderate Pain .    Her K+ was decreased with previous labs so she was started on daily K+ suppl, repeat labs were normal. She c/o intermittent swelling of legs and does well with Dyazide.   No acute abnl noted on CXR-radiology for review.      [facility] query complete. Treatment plan to include limited course of prescribed  controlled substance. Risks including addiction, benefits, and alternatives presented to patient.

## 2019-07-11 ENCOUNTER — TRANSCRIBE ORDERS (OUTPATIENT)
Dept: ADMINISTRATIVE | Facility: HOSPITAL | Age: 79
End: 2019-07-11

## 2019-07-11 DIAGNOSIS — Z12.31 SCREENING MAMMOGRAM, ENCOUNTER FOR: Primary | ICD-10-CM

## 2019-07-30 ENCOUNTER — HOSPITAL ENCOUNTER (OUTPATIENT)
Dept: MAMMOGRAPHY | Facility: HOSPITAL | Age: 79
Discharge: HOME | End: 2019-07-30
Admitting: NURSE PRACTITIONER

## 2019-07-30 DIAGNOSIS — Z12.31 SCREENING MAMMOGRAM, ENCOUNTER FOR: ICD-10-CM

## 2019-07-30 PROCEDURE — 77067 SCR MAMMO BI INCL CAD: CPT

## 2019-08-01 DIAGNOSIS — M15.9 PRIMARY OSTEOARTHRITIS INVOLVING MULTIPLE JOINTS: ICD-10-CM

## 2019-08-01 DIAGNOSIS — R29.898 WEAKNESS OF BOTH LOWER EXTREMITIES: ICD-10-CM

## 2019-08-01 DIAGNOSIS — M15.9 PRIMARY OSTEOARTHRITIS INVOLVING MULTIPLE JOINTS: Primary | ICD-10-CM

## 2019-08-09 ENCOUNTER — TRANSCRIBE ORDERS (OUTPATIENT)
Dept: ADMINISTRATIVE | Facility: HOSPITAL | Age: 79
End: 2019-08-09

## 2019-08-09 DIAGNOSIS — R06.02 SOB (SHORTNESS OF BREATH): Primary | ICD-10-CM

## 2019-08-15 ENCOUNTER — APPOINTMENT (OUTPATIENT)
Dept: CARDIOLOGY | Facility: HOSPITAL | Age: 79
End: 2019-08-15

## 2019-09-04 ENCOUNTER — APPOINTMENT (OUTPATIENT)
Dept: CT IMAGING | Facility: HOSPITAL | Age: 79
End: 2019-09-04

## 2019-09-04 ENCOUNTER — HOSPITAL ENCOUNTER (INPATIENT)
Facility: HOSPITAL | Age: 79
LOS: 5 days | Discharge: NURSING, REHAB OR LONG-TERM CARE | End: 2019-09-09
Attending: EMERGENCY MEDICINE | Admitting: HOSPITALIST

## 2019-09-04 ENCOUNTER — APPOINTMENT (OUTPATIENT)
Dept: GENERAL RADIOLOGY | Facility: HOSPITAL | Age: 79
End: 2019-09-04

## 2019-09-04 DIAGNOSIS — S22.000A CLOSED COMPRESSION FRACTURE OF THORACIC VERTEBRA, INITIAL ENCOUNTER (HCC): ICD-10-CM

## 2019-09-04 DIAGNOSIS — E83.42 HYPOMAGNESEMIA: ICD-10-CM

## 2019-09-04 DIAGNOSIS — I48.91 NEW ONSET ATRIAL FIBRILLATION (HCC): ICD-10-CM

## 2019-09-04 DIAGNOSIS — R94.31 ABNORMAL EKG: ICD-10-CM

## 2019-09-04 DIAGNOSIS — N39.0 ACUTE UTI: ICD-10-CM

## 2019-09-04 DIAGNOSIS — I10 UNCONTROLLED HYPERTENSION: ICD-10-CM

## 2019-09-04 DIAGNOSIS — I26.99 BILATERAL PULMONARY EMBOLISM (HCC): Primary | ICD-10-CM

## 2019-09-04 DIAGNOSIS — E87.6 HYPOKALEMIA: ICD-10-CM

## 2019-09-04 PROBLEM — I50.32 CHRONIC DIASTOLIC CHF (CONGESTIVE HEART FAILURE) (HCC): Status: ACTIVE | Noted: 2019-09-04

## 2019-09-04 PROBLEM — R47.89 WORD FINDING DIFFICULTY: Status: ACTIVE | Noted: 2019-09-04

## 2019-09-04 PROBLEM — G93.41 METABOLIC ENCEPHALOPATHY: Status: ACTIVE | Noted: 2019-09-04

## 2019-09-04 PROBLEM — R47.81 SLURRED SPEECH: Status: ACTIVE | Noted: 2019-09-04

## 2019-09-04 LAB
ALBUMIN SERPL-MCNC: 4.7 G/DL (ref 3.5–5.2)
ALBUMIN/GLOB SERPL: 1.4 G/DL
ALP SERPL-CCNC: 80 U/L (ref 39–117)
ALT SERPL W P-5'-P-CCNC: 19 U/L (ref 1–33)
ANION GAP SERPL CALCULATED.3IONS-SCNC: 15.3 MMOL/L (ref 5–15)
APTT PPP: 33.5 SECONDS (ref 22.7–35.4)
AST SERPL-CCNC: 27 U/L (ref 1–32)
BACTERIA UR QL AUTO: ABNORMAL /HPF
BASOPHILS # BLD AUTO: 0.05 10*3/MM3 (ref 0–0.2)
BASOPHILS # BLD AUTO: 0.06 10*3/MM3 (ref 0–0.2)
BASOPHILS NFR BLD AUTO: 0.5 % (ref 0–1.5)
BASOPHILS NFR BLD AUTO: 0.6 % (ref 0–1.5)
BILIRUB SERPL-MCNC: 1.3 MG/DL (ref 0.2–1.2)
BILIRUB UR QL STRIP: NEGATIVE
BUN BLD-MCNC: 11 MG/DL (ref 8–23)
BUN/CREAT SERPL: 10.4 (ref 7–25)
CALCIUM SPEC-SCNC: 10.2 MG/DL (ref 8.6–10.5)
CHLORIDE SERPL-SCNC: 89 MMOL/L (ref 98–107)
CLARITY UR: ABNORMAL
CO2 SERPL-SCNC: 30.7 MMOL/L (ref 22–29)
COD CRY URNS QL: ABNORMAL /HPF
COLOR UR: ABNORMAL
CREAT BLD-MCNC: 1.06 MG/DL (ref 0.57–1)
D DIMER PPP FEU-MCNC: 2.82 MCGFEU/ML (ref 0–0.49)
D-LACTATE SERPL-SCNC: 2 MMOL/L (ref 0.5–2)
DEPRECATED RDW RBC AUTO: 49.1 FL (ref 37–54)
DEPRECATED RDW RBC AUTO: 49.5 FL (ref 37–54)
EOSINOPHIL # BLD AUTO: 0.02 10*3/MM3 (ref 0–0.4)
EOSINOPHIL # BLD AUTO: 0.07 10*3/MM3 (ref 0–0.4)
EOSINOPHIL NFR BLD AUTO: 0.2 % (ref 0.3–6.2)
EOSINOPHIL NFR BLD AUTO: 0.8 % (ref 0.3–6.2)
ERYTHROCYTE [DISTWIDTH] IN BLOOD BY AUTOMATED COUNT: 14.6 % (ref 12.3–15.4)
ERYTHROCYTE [DISTWIDTH] IN BLOOD BY AUTOMATED COUNT: 14.6 % (ref 12.3–15.4)
GFR SERPL CREATININE-BSD FRML MDRD: 50 ML/MIN/1.73
GLOBULIN UR ELPH-MCNC: 3.3 GM/DL
GLUCOSE BLD-MCNC: 112 MG/DL (ref 65–99)
GLUCOSE UR STRIP-MCNC: NEGATIVE MG/DL
HCT VFR BLD AUTO: 39.1 % (ref 34–46.6)
HCT VFR BLD AUTO: 43.1 % (ref 34–46.6)
HGB BLD-MCNC: 12.7 G/DL (ref 12–15.9)
HGB BLD-MCNC: 14.1 G/DL (ref 12–15.9)
HGB UR QL STRIP.AUTO: ABNORMAL
HYALINE CASTS UR QL AUTO: ABNORMAL /LPF
IMM GRANULOCYTES # BLD AUTO: 0.03 10*3/MM3 (ref 0–0.05)
IMM GRANULOCYTES # BLD AUTO: 0.05 10*3/MM3 (ref 0–0.05)
IMM GRANULOCYTES NFR BLD AUTO: 0.3 % (ref 0–0.5)
IMM GRANULOCYTES NFR BLD AUTO: 0.5 % (ref 0–0.5)
INR PPP: 1.1 (ref 0.9–1.1)
INR PPP: 1.22 (ref 0.9–1.1)
KETONES UR QL STRIP: ABNORMAL
LEUKOCYTE ESTERASE UR QL STRIP.AUTO: ABNORMAL
LYMPHOCYTES # BLD AUTO: 1.04 10*3/MM3 (ref 0.7–3.1)
LYMPHOCYTES # BLD AUTO: 1.65 10*3/MM3 (ref 0.7–3.1)
LYMPHOCYTES NFR BLD AUTO: 18.8 % (ref 19.6–45.3)
LYMPHOCYTES NFR BLD AUTO: 9.4 % (ref 19.6–45.3)
MAGNESIUM SERPL-MCNC: 1.5 MG/DL (ref 1.6–2.4)
MCH RBC QN AUTO: 29.7 PG (ref 26.6–33)
MCH RBC QN AUTO: 30.2 PG (ref 26.6–33)
MCHC RBC AUTO-ENTMCNC: 32.5 G/DL (ref 31.5–35.7)
MCHC RBC AUTO-ENTMCNC: 32.7 G/DL (ref 31.5–35.7)
MCV RBC AUTO: 91.4 FL (ref 79–97)
MCV RBC AUTO: 92.3 FL (ref 79–97)
MONOCYTES # BLD AUTO: 0.98 10*3/MM3 (ref 0.1–0.9)
MONOCYTES # BLD AUTO: 1.09 10*3/MM3 (ref 0.1–0.9)
MONOCYTES NFR BLD AUTO: 12.4 % (ref 5–12)
MONOCYTES NFR BLD AUTO: 8.9 % (ref 5–12)
NEUTROPHILS # BLD AUTO: 5.88 10*3/MM3 (ref 1.7–7)
NEUTROPHILS # BLD AUTO: 8.9 10*3/MM3 (ref 1.7–7)
NEUTROPHILS NFR BLD AUTO: 67.1 % (ref 42.7–76)
NEUTROPHILS NFR BLD AUTO: 80.5 % (ref 42.7–76)
NITRITE UR QL STRIP: POSITIVE
NRBC BLD AUTO-RTO: 0 /100 WBC (ref 0–0.2)
NRBC BLD AUTO-RTO: 0 /100 WBC (ref 0–0.2)
NT-PROBNP SERPL-MCNC: 3664 PG/ML (ref 5–1800)
PH UR STRIP.AUTO: <=5 [PH] (ref 5–8)
PLATELET # BLD AUTO: 247 10*3/MM3 (ref 140–450)
PLATELET # BLD AUTO: 305 10*3/MM3 (ref 140–450)
PMV BLD AUTO: 10 FL (ref 6–12)
PMV BLD AUTO: 10.2 FL (ref 6–12)
POTASSIUM BLD-SCNC: 2.8 MMOL/L (ref 3.5–5.2)
PROCALCITONIN SERPL-MCNC: 0.1 NG/ML (ref 0.1–0.25)
PROT SERPL-MCNC: 8 G/DL (ref 6–8.5)
PROT UR QL STRIP: ABNORMAL
PROTHROMBIN TIME: 13.9 SECONDS (ref 11.7–14.2)
PROTHROMBIN TIME: 15.1 SECONDS (ref 11.7–14.2)
RBC # BLD AUTO: 4.28 10*6/MM3 (ref 3.77–5.28)
RBC # BLD AUTO: 4.67 10*6/MM3 (ref 3.77–5.28)
RBC # UR: ABNORMAL /HPF
REF LAB TEST METHOD: ABNORMAL
SODIUM BLD-SCNC: 135 MMOL/L (ref 136–145)
SP GR UR STRIP: 1.02 (ref 1–1.03)
SQUAMOUS #/AREA URNS HPF: ABNORMAL /HPF
TROPONIN T SERPL-MCNC: <0.01 NG/ML (ref 0–0.03)
UROBILINOGEN UR QL STRIP: ABNORMAL
WBC NRBC COR # BLD: 11.05 10*3/MM3 (ref 3.4–10.8)
WBC NRBC COR # BLD: 8.77 10*3/MM3 (ref 3.4–10.8)
WBC UR QL AUTO: ABNORMAL /HPF

## 2019-09-04 PROCEDURE — 87088 URINE BACTERIA CULTURE: CPT | Performed by: EMERGENCY MEDICINE

## 2019-09-04 PROCEDURE — 93010 ELECTROCARDIOGRAM REPORT: CPT | Performed by: INTERNAL MEDICINE

## 2019-09-04 PROCEDURE — 87186 SC STD MICRODIL/AGAR DIL: CPT | Performed by: EMERGENCY MEDICINE

## 2019-09-04 PROCEDURE — 25010000002 CEFTRIAXONE PER 250 MG: Performed by: EMERGENCY MEDICINE

## 2019-09-04 PROCEDURE — 71275 CT ANGIOGRAPHY CHEST: CPT

## 2019-09-04 PROCEDURE — 70450 CT HEAD/BRAIN W/O DYE: CPT

## 2019-09-04 PROCEDURE — 85610 PROTHROMBIN TIME: CPT | Performed by: EMERGENCY MEDICINE

## 2019-09-04 PROCEDURE — 85025 COMPLETE CBC W/AUTO DIFF WBC: CPT | Performed by: EMERGENCY MEDICINE

## 2019-09-04 PROCEDURE — 71046 X-RAY EXAM CHEST 2 VIEWS: CPT

## 2019-09-04 PROCEDURE — 93005 ELECTROCARDIOGRAM TRACING: CPT | Performed by: EMERGENCY MEDICINE

## 2019-09-04 PROCEDURE — 87086 URINE CULTURE/COLONY COUNT: CPT | Performed by: EMERGENCY MEDICINE

## 2019-09-04 PROCEDURE — 84484 ASSAY OF TROPONIN QUANT: CPT | Performed by: EMERGENCY MEDICINE

## 2019-09-04 PROCEDURE — 83880 ASSAY OF NATRIURETIC PEPTIDE: CPT | Performed by: EMERGENCY MEDICINE

## 2019-09-04 PROCEDURE — 83605 ASSAY OF LACTIC ACID: CPT | Performed by: EMERGENCY MEDICINE

## 2019-09-04 PROCEDURE — 85730 THROMBOPLASTIN TIME PARTIAL: CPT | Performed by: INTERNAL MEDICINE

## 2019-09-04 PROCEDURE — 36415 COLL VENOUS BLD VENIPUNCTURE: CPT | Performed by: INTERNAL MEDICINE

## 2019-09-04 PROCEDURE — 85379 FIBRIN DEGRADATION QUANT: CPT | Performed by: EMERGENCY MEDICINE

## 2019-09-04 PROCEDURE — 84145 PROCALCITONIN (PCT): CPT | Performed by: EMERGENCY MEDICINE

## 2019-09-04 PROCEDURE — 80053 COMPREHEN METABOLIC PANEL: CPT | Performed by: EMERGENCY MEDICINE

## 2019-09-04 PROCEDURE — 99285 EMERGENCY DEPT VISIT HI MDM: CPT

## 2019-09-04 PROCEDURE — 72072 X-RAY EXAM THORAC SPINE 3VWS: CPT

## 2019-09-04 PROCEDURE — 81001 URINALYSIS AUTO W/SCOPE: CPT | Performed by: EMERGENCY MEDICINE

## 2019-09-04 PROCEDURE — 0 IOPAMIDOL PER 1 ML: Performed by: EMERGENCY MEDICINE

## 2019-09-04 PROCEDURE — 85610 PROTHROMBIN TIME: CPT | Performed by: INTERNAL MEDICINE

## 2019-09-04 PROCEDURE — 85025 COMPLETE CBC W/AUTO DIFF WBC: CPT | Performed by: INTERNAL MEDICINE

## 2019-09-04 PROCEDURE — 83735 ASSAY OF MAGNESIUM: CPT | Performed by: EMERGENCY MEDICINE

## 2019-09-04 PROCEDURE — 25010000002 MAGNESIUM SULFATE IN D5W 1G/100ML (PREMIX) 1-5 GM/100ML-% SOLUTION: Performed by: EMERGENCY MEDICINE

## 2019-09-04 RX ORDER — ISOSORBIDE MONONITRATE 30 MG/1
30 TABLET, EXTENDED RELEASE ORAL DAILY
Status: DISCONTINUED | OUTPATIENT
Start: 2019-09-05 | End: 2019-09-09 | Stop reason: HOSPADM

## 2019-09-04 RX ORDER — ISOSORBIDE MONONITRATE 30 MG/1
30 TABLET, EXTENDED RELEASE ORAL DAILY
COMMUNITY

## 2019-09-04 RX ORDER — LABETALOL HYDROCHLORIDE 5 MG/ML
20 INJECTION, SOLUTION INTRAVENOUS
Status: DISCONTINUED | OUTPATIENT
Start: 2019-09-04 | End: 2019-09-09 | Stop reason: HOSPADM

## 2019-09-04 RX ORDER — SODIUM CHLORIDE 0.9 % (FLUSH) 0.9 %
10 SYRINGE (ML) INJECTION EVERY 12 HOURS SCHEDULED
Status: DISCONTINUED | OUTPATIENT
Start: 2019-09-05 | End: 2019-09-09 | Stop reason: HOSPADM

## 2019-09-04 RX ORDER — POTASSIUM CHLORIDE 750 MG/1
40 CAPSULE, EXTENDED RELEASE ORAL AS NEEDED
Status: DISCONTINUED | OUTPATIENT
Start: 2019-09-04 | End: 2019-09-09 | Stop reason: HOSPADM

## 2019-09-04 RX ORDER — SODIUM CHLORIDE 0.9 % (FLUSH) 0.9 %
10 SYRINGE (ML) INJECTION AS NEEDED
Status: DISCONTINUED | OUTPATIENT
Start: 2019-09-04 | End: 2019-09-09 | Stop reason: HOSPADM

## 2019-09-04 RX ORDER — HEPARIN SODIUM 5000 [USP'U]/ML
40-80 INJECTION, SOLUTION INTRAVENOUS; SUBCUTANEOUS EVERY 6 HOURS PRN
Status: DISCONTINUED | OUTPATIENT
Start: 2019-09-04 | End: 2019-09-08

## 2019-09-04 RX ORDER — POTASSIUM CHLORIDE 750 MG/1
20 CAPSULE, EXTENDED RELEASE ORAL DAILY
Status: DISCONTINUED | OUTPATIENT
Start: 2019-09-05 | End: 2019-09-09 | Stop reason: HOSPADM

## 2019-09-04 RX ORDER — POTASSIUM CHLORIDE 750 MG/1
40 CAPSULE, EXTENDED RELEASE ORAL ONCE
Status: COMPLETED | OUTPATIENT
Start: 2019-09-04 | End: 2019-09-04

## 2019-09-04 RX ORDER — LABETALOL HYDROCHLORIDE 5 MG/ML
20 INJECTION, SOLUTION INTRAVENOUS ONCE
Status: COMPLETED | OUTPATIENT
Start: 2019-09-04 | End: 2019-09-04

## 2019-09-04 RX ORDER — MAGNESIUM SULFATE HEPTAHYDRATE 40 MG/ML
2 INJECTION, SOLUTION INTRAVENOUS ONCE
Status: DISCONTINUED | OUTPATIENT
Start: 2019-09-04 | End: 2019-09-04

## 2019-09-04 RX ORDER — METOPROLOL SUCCINATE 50 MG/1
50 TABLET, EXTENDED RELEASE ORAL DAILY
Status: DISCONTINUED | OUTPATIENT
Start: 2019-09-05 | End: 2019-09-06

## 2019-09-04 RX ORDER — MAGNESIUM SULFATE 1 G/100ML
1 INJECTION INTRAVENOUS ONCE
Status: COMPLETED | OUTPATIENT
Start: 2019-09-04 | End: 2019-09-04

## 2019-09-04 RX ORDER — POTASSIUM CHLORIDE 1.5 G/1.77G
40 POWDER, FOR SOLUTION ORAL AS NEEDED
Status: DISCONTINUED | OUTPATIENT
Start: 2019-09-04 | End: 2019-09-09 | Stop reason: HOSPADM

## 2019-09-04 RX ORDER — HEPARIN SODIUM 10000 [USP'U]/100ML
18 INJECTION, SOLUTION INTRAVENOUS
Status: DISCONTINUED | OUTPATIENT
Start: 2019-09-04 | End: 2019-09-08

## 2019-09-04 RX ORDER — CEFTRIAXONE SODIUM 1 G/50ML
1 INJECTION, SOLUTION INTRAVENOUS ONCE
Status: COMPLETED | OUTPATIENT
Start: 2019-09-04 | End: 2019-09-04

## 2019-09-04 RX ORDER — ONDANSETRON 2 MG/ML
4 INJECTION INTRAMUSCULAR; INTRAVENOUS EVERY 6 HOURS PRN
Status: DISCONTINUED | OUTPATIENT
Start: 2019-09-04 | End: 2019-09-09 | Stop reason: HOSPADM

## 2019-09-04 RX ORDER — LEVOTHYROXINE SODIUM 0.1 MG/1
100 TABLET ORAL DAILY
Status: DISCONTINUED | OUTPATIENT
Start: 2019-09-05 | End: 2019-09-09 | Stop reason: HOSPADM

## 2019-09-04 RX ORDER — ATORVASTATIN CALCIUM 80 MG/1
80 TABLET, FILM COATED ORAL NIGHTLY
Status: DISCONTINUED | OUTPATIENT
Start: 2019-09-05 | End: 2019-09-06

## 2019-09-04 RX ORDER — ACETAMINOPHEN 325 MG/1
650 TABLET ORAL EVERY 6 HOURS PRN
Status: DISCONTINUED | OUTPATIENT
Start: 2019-09-04 | End: 2019-09-09 | Stop reason: HOSPADM

## 2019-09-04 RX ADMIN — LABETALOL 20 MG/4 ML (5 MG/ML) INTRAVENOUS SYRINGE 20 MG: at 19:57

## 2019-09-04 RX ADMIN — CEFTRIAXONE SODIUM 1 G: 1 INJECTION, SOLUTION INTRAVENOUS at 20:27

## 2019-09-04 RX ADMIN — IOPAMIDOL 75 ML: 755 INJECTION, SOLUTION INTRAVENOUS at 19:33

## 2019-09-04 RX ADMIN — MAGNESIUM SULFATE 1 G: 1 INJECTION INTRAVENOUS at 19:42

## 2019-09-04 RX ADMIN — POTASSIUM CHLORIDE 40 MEQ: 750 CAPSULE, EXTENDED RELEASE ORAL at 19:39

## 2019-09-04 NOTE — ED PROVIDER NOTES
" EMERGENCY DEPARTMENT ENCOUNTER    CHIEF COMPLAINT  Chief Complaint: AMS  History given by: Patient   History limited by: Nothing   Room Number: 18/18  PMD: Mae Lawrence APRN      HPI:  Pt is a 79 y.o. female who presents via EMS from NH complaining of intermittent confusion that began a few days ago, per NH report. Pt is also c/o light headedness, slurred speech this morning and back pain \"down the middle of her back\" that began yesterday and is worse with deep breathing. Pt denies fever, chills, cough, nausea, vomiting, dysuria, and difficulty urinating. Pt denies recent known trauma to back and reports the back pain is still present when she takes deep breaths. Pt reports she has an appointment with cardiology in 2 days for follow up.     Duration:  Few days  Onset: Gradual  Timing: Intermittent   Location: N/a  Radiation: N/a  Quality: Confusion   Intensity/Severity: Moderate  Progression: Unchanged  Associated Symptoms: Light headedness, slurred speech this morning and back pain \"down the middle of her back\" that began yesterday and is worse with deep breathing  Aggravating Factors: Deep breathing aggravates back pain   Alleviating Factors: None  Previous Episodes: None  Treatment before arrival: None    PAST MEDICAL HISTORY  Active Ambulatory Problems     Diagnosis Date Noted   • Essential hypertension 03/31/2016   • Acquired hypothyroidism 03/31/2016   • Coronary artery disease involving native coronary artery of native heart without angina pectoris 03/31/2016   • Hyperlipidemia    • Arthritis    • Skin lesion of chest wall 11/03/2016   • NSTEMI (non-ST elevated myocardial infarction) (CMS/[facility]) 09/26/2018   • Supraventricular tachycardia (CMS/[facility]) 09/26/2018   • Normal cardiac stress test 09/26/2018   • Gastrointestinal bleed 09/26/2018   • Angina effort (CMS/[facility]) 09/26/2018   • Morbidly obese (CMS/[facility]) 01/06/2019     Resolved Ambulatory Problems     Diagnosis Date Noted   • Coronary artery disease " involving native coronary artery 03/31/2016   • Hyperthyroidism      Past Medical History:   Diagnosis Date   • Arthritis    • Breast cancer (CMS/HCC)    • Hyperlipidemia    • Hypertension    • Hyperthyroidism    • Hypothyroidism        PAST SURGICAL HISTORY  Past Surgical History:   Procedure Laterality Date   • BREAST BIOPSY     • BREAST SURGERY Right    • CHOLECYSTECTOMY     • COLON SURGERY      Removed   • COLONOSCOPY     • HYSTERECTOMY     • MAMMO BILATERAL  2015   • MASTECTOMY     • TONSILLECTOMY         FAMILY HISTORY  Family History   Problem Relation Age of Onset   • Stroke Mother    • Hypertension Mother    • Heart disease Father    • Cancer Father        SOCIAL HISTORY  Social History     Socioeconomic History   • Marital status: Single     Spouse name: Not on file   • Number of children: Not on file   • Years of education: Not on file   • Highest education level: Not on file   Tobacco Use   • Smoking status: Former Smoker   • Smokeless tobacco: Never Used   Substance and Sexual Activity   • Alcohol use: Yes     Comment: OCC   • Drug use: Yes     Types: Hydrocodone       ALLERGIES  Aspirin and Nsaids    REVIEW OF SYSTEMS  Review of Systems   Constitutional: Negative for chills and fever.   HENT: Negative for sore throat.    Eyes: Negative.    Respiratory: Negative for cough and shortness of breath.    Cardiovascular: Negative for chest pain.   Gastrointestinal: Negative for abdominal pain, diarrhea, nausea and vomiting.   Genitourinary: Negative for difficulty urinating and dysuria.   Musculoskeletal: Positive for back pain. Negative for neck pain.   Skin: Negative for rash.   Allergic/Immunologic: Negative.    Neurological: Positive for speech difficulty and light-headedness. Negative for weakness, numbness and headaches.   Hematological: Negative.    Psychiatric/Behavioral: Positive for confusion.   All other systems reviewed and are negative.      PHYSICAL EXAM  ED Triage Vitals [09/04/19 1457]   Temp  Heart Rate Resp BP SpO2   98.7 °F (37.1 °C) 70 18 138/76 94 %      Temp src Heart Rate Source Patient Position BP Location FiO2 (%)   -- Monitor -- -- --       Physical Exam   Constitutional: She is oriented to person, place, and time. No distress.   HENT:   Head: Normocephalic and atraumatic.   Eyes: EOM are normal. Pupils are equal, round, and reactive to light.   Neck: Normal range of motion. Neck supple.   Cardiovascular: Normal rate, regular rhythm and normal heart sounds.   Pulmonary/Chest: Effort normal. No respiratory distress. She has decreased breath sounds (at bases). She has no wheezes. She has no rhonchi. She has rales (faint).   Abdominal: Soft. There is no tenderness. There is no rebound and no guarding.   Musculoskeletal: Normal range of motion. She exhibits no edema.        Thoracic back: She exhibits tenderness (midline, no step off).   No pedal edema, normal dp and pt pulses   Neurological: She is alert and oriented to person, place, and time. She has normal sensation and normal strength. No cranial nerve deficit. Coordination normal.   Follows all commands, alert and oriented x 3   Skin: Skin is warm and dry. No rash noted.   Psychiatric: Mood and affect normal.   Nursing note and vitals reviewed.      LAB RESULTS  Lab Results (last 24 hours)     Procedure Component Value Units Date/Time    CBC & Differential [[id number]] Collected:  09/04/19 1731    Specimen:  Blood Updated:  09/04/19 1759    Narrative:       The following orders were created for panel order CBC & Differential.  Procedure                               Abnormality         Status                     ---------                               -----------         ------                     CBC Auto Differential[[id number]]        Abnormal            Final result                 Please view results for these tests on the individual orders.    Comprehensive Metabolic Panel [[id number]]  (Abnormal) Collected:  09/04/19 1731    Specimen:   Blood Updated:  09/04/19 1819     Glucose 112 mg/dL      BUN 11 mg/dL      Creatinine 1.06 mg/dL      Sodium 135 mmol/L      Potassium 2.8 mmol/L      Chloride 89 mmol/L      CO2 30.7 mmol/L      Calcium 10.2 mg/dL      Total Protein 8.0 g/dL      Albumin 4.70 g/dL      ALT (SGPT) 19 U/L      AST (SGOT) 27 U/L      Alkaline Phosphatase 80 U/L      Total Bilirubin 1.3 mg/dL      eGFR Non African Amer 50 mL/min/1.73      Globulin 3.3 gm/dL      A/G Ratio 1.4 g/dL      BUN/Creatinine Ratio 10.4     Anion Gap 15.3 mmol/L     Narrative:       GFR Normal >60  Chronic Kidney Disease <60  Kidney Failure <15    Protime-INR [[id number]]  (Abnormal) Collected:  09/04/19 1731    Specimen:  Blood Updated:  09/04/19 1812     Protime 15.1 Seconds      INR 1.22    BNP [[id number]]  (Abnormal) Collected:  09/04/19 1731    Specimen:  Blood Updated:  09/04/19 1816     proBNP 3,664.0 pg/mL     Narrative:       Among patients with dyspnea, NT-proBNP is highly sensitive for the detection of acute congestive heart failure. In addition NT-proBNP of <300 pg/ml effectively rules out acute congestive heart failure with 99% negative predictive value.    Troponin [[id number]]  (Normal) Collected:  09/04/19 1731    Specimen:  Blood Updated:  09/04/19 1817     Troponin T <0.010 ng/mL     Narrative:       Troponin T Reference Range:  <= 0.03 ng/mL-   Negative for AMI  >0.03 ng/mL-     Abnormal for myocardial necrosis.  Clinicians would have to utilize clinical acumen, EKG, Troponin and serial changes to determine if it is an Acute Myocardial Infarction or myocardial injury due to an underlying chronic condition.     Lactic Acid, Plasma [[id number]]  (Normal) Collected:  09/04/19 1731    Specimen:  Blood Updated:  09/04/19 1807     Lactate 2.0 mmol/L     Procalcitonin [[id number]]  (Normal) Collected:  09/04/19 1731    Specimen:  Blood Updated:  09/04/19 1825     Procalcitonin 0.10 ng/mL     Narrative:       As a Marker for Sepsis (Non-Neonates):   1.  "<0.5 ng/mL represents a low risk of severe sepsis and/or septic shock.  1. >2 ng/mL represents a high risk of severe sepsis and/or septic shock.    As a Marker for Lower Respiratory Tract Infections that require antibiotic therapy:  PCT on Admission     Antibiotic Therapy             6-12 Hrs later  > 0.5                Strongly Recommended            >0.25 - <0.5         Recommended  0.1 - 0.25           Discouraged                   Remeasure/reassess PCT  <0.1                 Strongly Discouraged          Remeasure/reassess PCT      As 28 day mortality risk marker: \"Change in Procalcitonin Result\" (> 80 % or <=80 %) if Day 0 (or Day 1) and Day 4 values are available. Refer to http://www.GoPlanitpct-calculator.com/   Change in PCT <=80 %   A decrease of PCT levels below or equal to 80 % defines a positive change in PCT test result representing a higher risk for 28-day all-cause mortality of patients diagnosed with severe sepsis or septic shock.  Change in PCT > 80 %   A decrease of PCT levels of more than 80 % defines a negative change in PCT result representing a lower risk for 28-day all-cause mortality of patients diagnosed with severe sepsis or septic shock.                D-dimer, Quantitative [[id number]]  (Abnormal) Collected:  09/04/19 1731    Specimen:  Blood Updated:  09/04/19 1812     D-Dimer, Quantitative 2.82 MCGFEU/mL     Narrative:       The Stago D-Dimer test used in conjunction with a clinical pretest probability (PTP) assessment model, has been approved by the FDA to rule out the presence of venous thromboembolism (VTE) in outpatients suspected of deep venous thrombosis (DVT) or pulmonary embolism (PE). The cut-off for negative predictive value is <0.50 MCGFEU/mL.    CBC Auto Differential [[id number]]  (Abnormal) Collected:  09/04/19 1731    Specimen:  Blood Updated:  09/04/19 1759     WBC 11.05 10*3/mm3      RBC 4.67 10*6/mm3      Hemoglobin 14.1 g/dL      Hematocrit 43.1 %      MCV 92.3 fL      " MCH 30.2 pg      MCHC 32.7 g/dL      RDW 14.6 %      RDW-SD 49.5 fl      MPV 10.2 fL      Platelets 305 10*3/mm3      Neutrophil % 80.5 %      Lymphocyte % 9.4 %      Monocyte % 8.9 %      Eosinophil % 0.2 %      Basophil % 0.5 %      Immature Grans % 0.5 %      Neutrophils, Absolute 8.90 10*3/mm3      Lymphocytes, Absolute 1.04 10*3/mm3      Monocytes, Absolute 0.98 10*3/mm3      Eosinophils, Absolute 0.02 10*3/mm3      Basophils, Absolute 0.06 10*3/mm3      Immature Grans, Absolute 0.05 10*3/mm3      nRBC 0.0 /100 WBC     Magnesium [[id number]]  (Abnormal) Collected:  09/04/19 1731    Specimen:  Blood Updated:  09/04/19 1819     Magnesium 1.5 mg/dL     Urinalysis With Microscopic If Indicated (No Culture) - Urine, Clean Catch [[id number]]  (Abnormal) Collected:  09/04/19 1755    Specimen:  Urine, Clean Catch Updated:  09/04/19 1908     Color, UA Dark Yellow     Appearance, UA Turbid     pH, UA <=5.0     Specific Gravity, UA 1.023     Glucose, UA Negative     Ketones, UA Trace     Bilirubin, UA Negative     Blood, UA Trace     Protein, UA 30 mg/dL (1+)     Leuk Esterase, UA Moderate (2+)     Nitrite, UA Positive     Urobilinogen, UA 1.0 E.U./dL    Urinalysis, Microscopic Only - Urine, Clean Catch [[id number]]  (Abnormal) Collected:  09/04/19 1755    Specimen:  Urine, Clean Catch Updated:  09/04/19 1956     RBC, UA       Unable to determine due to loaded field     /HPF     WBC, UA Too Numerous to Count /HPF      Bacteria, UA 4+ /HPF      Squamous Epithelial Cells, UA 13-20 /HPF      Hyaline Casts, UA None Seen /LPF      Calcium Oxalate Crystals, UA Moderate/2+ /HPF      Methodology Manual Light Microscopy          I ordered the above labs and reviewed the results    RADIOLOGY  XR Spine Thoracic 3 View   Final Result      THORACIC SPINE: AP, lateral and swimmer's views of the thoracic spine  were obtained. The study is hampered by patient motion. There is mild to  moderate loss of disc height at the upper and  midthoracic region. A  moderate compression deformity is noted involving the upper thoracic  spine new versus 2010. It is age indeterminate. Further evaluation could  be performed with a MRI examination of the thoracic spine as indicated.   XR Chest 2 View   Final Result      CHEST: PA and lateral views of the chest demonstrates surgical clips in  right axilla. The heart is within normal limits in size. There is no  evidence of infiltrate, effusion or of congestive failure.   CT Head Without Contrast   Final Result   No evidence of acute infarction or hemorrhage. Further   evaluation could be performed with a MRI examination of the brain as   indicated.               Radiation dose reduction techniques were utilized, including automated   exposure control and exposure modulation based on body size.       This report was finalized on 9/4/2019 5:01 PM by Dr. Bk Arcso M.D.                  I ordered the above noted radiological studies. Interpreted by radiologist.Reviewed by me in PACS.       PROCEDURES  Critical Care  Performed by: Aidan Duarte MD  Authorized by: Aidan Duarte MD     Critical care provider statement:     Critical care time (minutes):  40    Critical care time was exclusive of:  Separately billable procedures and treating other patients    Critical care was necessary to treat or prevent imminent or life-threatening deterioration of the following conditions:  Respiratory failure and metabolic crisis (Pulmonary Embolism)    Critical care was time spent personally by me on the following activities:  Development of treatment plan with patient or surrogate, discussions with primary provider, examination of patient, interpretation of cardiac output measurements, obtaining history from patient or surrogate, ordering and performing treatments and interventions, ordering and review of laboratory studies, ordering and review of radiographic studies, pulse oximetry and review of old charts          EKG           EKG time: 1527  Rhythm/Rate: Afib rate 100  QRS, axis: L axis deviation, LVH  ST and T waves: Anterior lateral ST depression with fliped t waves      Interpreted Contemporaneously by me, independently viewed  Changed compared to prior 5/10/11      PROGRESS AND CONSULTS  ED Course as of Sep 04 2012   Wed Sep 04, 2019   1828 Discussed test results with patient and daughter. CT shows no evidence of CVA. Potassium and magnesium low but patient has a history of this and is on supplements at the nursing home. Will obtain CTA chest due to pain and elevated D Dimer.   [JG]   1952 BP now elevated to 186/145. No complaints of worsening pain or SOA. Will treat with labetolol.   [JG]   1958 Daughter states she has had an irregular heart beat in the past but has never been told it is A-fib.   [JG]   2001 Rocephin ordered for UTI.  [JG]   2008 Waiting for CTA results. Appears to have PE by my reading. Care turned over to Dr. Paredes for CTA results and admission.  [JG]      ED Course User Index  [JG] Aidan Duarte MD         Final Diagnosis: as of Sep 04 2012   Bilateral pulmonary embolism (CMS/HCC)   Acute UTI   Hypokalemia   Hypomagnesemia   New onset atrial fibrillation (CMS/HCC)   Abnormal EKG   Uncontrolled hypertension   Closed compression fracture of thoracic vertebra, initial encounter (CMS/HCC)- undetermined age     Difficulty obtaining IV access and labs when patient first arrived.         MEDICAL DECISION MAKING  Results were reviewed/discussed with the patient and they were also made aware of online access. Pt also made aware that some labs, such as cultures, will not be resulted during ER visit and follow up with PMD is necessary.     MDM  Number of Diagnoses or Management Options     Amount and/or Complexity of Data Reviewed  Clinical lab tests: ordered  Tests in the radiology section of CPT®: ordered  Independent visualization of images, tracings, or specimens: yes    Patient Progress  Patient progress:  stable         DIAGNOSIS  Final diagnoses:   Bilateral pulmonary embolism (CMS/HCC)   Acute UTI   Hypokalemia   Hypomagnesemia   New onset atrial fibrillation (CMS/HCC)   Abnormal EKG   Uncontrolled hypertension   Closed compression fracture of thoracic vertebra, initial encounter (CMS/[facility])- undetermined age       DISPOSITION  admission    Latest Documented Vital Signs:  As of 8:12 PM  BP- (!) 186/145 HR- 100 Temp- 98.7 °F (37.1 °C) O2 sat- 92%    --  Documentation assistance provided by tawnya Yuen for .  Information recorded by the tawnya was done at my direction and has been verified and validated by me.         Tiera Yuen  09/04/19 1800       Aidan Duarte MD  09/05/19 1324

## 2019-09-05 ENCOUNTER — APPOINTMENT (OUTPATIENT)
Dept: CARDIOLOGY | Facility: HOSPITAL | Age: 79
End: 2019-09-05

## 2019-09-05 ENCOUNTER — APPOINTMENT (OUTPATIENT)
Dept: MRI IMAGING | Facility: HOSPITAL | Age: 79
End: 2019-09-05

## 2019-09-05 PROBLEM — E83.42 HYPOMAGNESEMIA: Status: RESOLVED | Noted: 2019-09-04 | Resolved: 2019-09-05

## 2019-09-05 LAB
ANION GAP SERPL CALCULATED.3IONS-SCNC: 13.6 MMOL/L (ref 5–15)
APTT PPP: 108.3 SECONDS (ref 22.7–35.4)
APTT PPP: 148.9 SECONDS (ref 22.7–35.4)
APTT PPP: 79.3 SECONDS (ref 22.7–35.4)
BASOPHILS # BLD AUTO: 0.08 10*3/MM3 (ref 0–0.2)
BASOPHILS NFR BLD AUTO: 0.9 % (ref 0–1.5)
BH CV LOWER VASCULAR LEFT COMMON FEMORAL AUGMENT: NORMAL
BH CV LOWER VASCULAR LEFT COMMON FEMORAL COMPETENT: NORMAL
BH CV LOWER VASCULAR LEFT COMMON FEMORAL COMPRESS: NORMAL
BH CV LOWER VASCULAR LEFT COMMON FEMORAL PHASIC: NORMAL
BH CV LOWER VASCULAR LEFT COMMON FEMORAL SPONT: NORMAL
BH CV LOWER VASCULAR LEFT DISTAL FEMORAL COMPRESS: NORMAL
BH CV LOWER VASCULAR LEFT GASTRONEMIUS COMPRESS: NORMAL
BH CV LOWER VASCULAR LEFT GREATER SAPH AK COMPRESS: NORMAL
BH CV LOWER VASCULAR LEFT GREATER SAPH BK COMPRESS: NORMAL
BH CV LOWER VASCULAR LEFT MID FEMORAL AUGMENT: NORMAL
BH CV LOWER VASCULAR LEFT MID FEMORAL COMPETENT: NORMAL
BH CV LOWER VASCULAR LEFT MID FEMORAL COMPRESS: NORMAL
BH CV LOWER VASCULAR LEFT MID FEMORAL PHASIC: NORMAL
BH CV LOWER VASCULAR LEFT MID FEMORAL SPONT: NORMAL
BH CV LOWER VASCULAR LEFT PERONEAL COMPRESS: NORMAL
BH CV LOWER VASCULAR LEFT POPLITEAL AUGMENT: NORMAL
BH CV LOWER VASCULAR LEFT POPLITEAL COMPETENT: NORMAL
BH CV LOWER VASCULAR LEFT POPLITEAL COMPRESS: NORMAL
BH CV LOWER VASCULAR LEFT POPLITEAL PHASIC: NORMAL
BH CV LOWER VASCULAR LEFT POPLITEAL SPONT: NORMAL
BH CV LOWER VASCULAR LEFT POSTERIOR TIBIAL COMPRESS: NORMAL
BH CV LOWER VASCULAR LEFT PROXIMAL FEMORAL COMPRESS: NORMAL
BH CV LOWER VASCULAR LEFT SAPHENOFEMORAL JUNCTION COMPRESS: NORMAL
BH CV LOWER VASCULAR RIGHT COMMON FEMORAL AUGMENT: NORMAL
BH CV LOWER VASCULAR RIGHT COMMON FEMORAL COMPETENT: NORMAL
BH CV LOWER VASCULAR RIGHT COMMON FEMORAL COMPRESS: NORMAL
BH CV LOWER VASCULAR RIGHT COMMON FEMORAL PHASIC: NORMAL
BH CV LOWER VASCULAR RIGHT COMMON FEMORAL SPONT: NORMAL
BH CV LOWER VASCULAR RIGHT DISTAL FEMORAL COMPRESS: NORMAL
BH CV LOWER VASCULAR RIGHT GASTRONEMIUS COMPRESS: NORMAL
BH CV LOWER VASCULAR RIGHT GREATER SAPH AK COMPRESS: NORMAL
BH CV LOWER VASCULAR RIGHT GREATER SAPH BK COMPRESS: NORMAL
BH CV LOWER VASCULAR RIGHT MID FEMORAL AUGMENT: NORMAL
BH CV LOWER VASCULAR RIGHT MID FEMORAL COMPETENT: NORMAL
BH CV LOWER VASCULAR RIGHT MID FEMORAL COMPRESS: NORMAL
BH CV LOWER VASCULAR RIGHT MID FEMORAL PHASIC: NORMAL
BH CV LOWER VASCULAR RIGHT MID FEMORAL SPONT: NORMAL
BH CV LOWER VASCULAR RIGHT PERONEAL COMPRESS: NORMAL
BH CV LOWER VASCULAR RIGHT POPLITEAL AUGMENT: NORMAL
BH CV LOWER VASCULAR RIGHT POPLITEAL COMPETENT: NORMAL
BH CV LOWER VASCULAR RIGHT POPLITEAL COMPRESS: NORMAL
BH CV LOWER VASCULAR RIGHT POPLITEAL PHASIC: NORMAL
BH CV LOWER VASCULAR RIGHT POPLITEAL SPONT: NORMAL
BH CV LOWER VASCULAR RIGHT POSTERIOR TIBIAL COMPRESS: NORMAL
BH CV LOWER VASCULAR RIGHT PROXIMAL FEMORAL COMPRESS: NORMAL
BH CV LOWER VASCULAR RIGHT SAPHENOFEMORAL JUNCTION COMPRESS: NORMAL
BUN BLD-MCNC: 11 MG/DL (ref 8–23)
BUN/CREAT SERPL: 14.3 (ref 7–25)
CALCIUM SPEC-SCNC: 9.5 MG/DL (ref 8.6–10.5)
CHLORIDE SERPL-SCNC: 93 MMOL/L (ref 98–107)
CHOLEST SERPL-MCNC: 130 MG/DL (ref 0–200)
CO2 SERPL-SCNC: 29.4 MMOL/L (ref 22–29)
CREAT BLD-MCNC: 0.77 MG/DL (ref 0.57–1)
DEPRECATED RDW RBC AUTO: 48.6 FL (ref 37–54)
EOSINOPHIL # BLD AUTO: 0.22 10*3/MM3 (ref 0–0.4)
EOSINOPHIL NFR BLD AUTO: 2.4 % (ref 0.3–6.2)
ERYTHROCYTE [DISTWIDTH] IN BLOOD BY AUTOMATED COUNT: 14.6 % (ref 12.3–15.4)
FOLATE SERPL-MCNC: >20 NG/ML (ref 4.78–24.2)
GFR SERPL CREATININE-BSD FRML MDRD: 72 ML/MIN/1.73
GLUCOSE BLD-MCNC: 120 MG/DL (ref 65–99)
GLUCOSE BLDC GLUCOMTR-MCNC: 110 MG/DL (ref 70–130)
GLUCOSE BLDC GLUCOMTR-MCNC: 125 MG/DL (ref 70–130)
GLUCOSE BLDC GLUCOMTR-MCNC: 133 MG/DL (ref 70–130)
GLUCOSE BLDC GLUCOMTR-MCNC: 144 MG/DL (ref 70–130)
HBA1C MFR BLD: 4.8 % (ref 4.8–5.6)
HCT VFR BLD AUTO: 36.7 % (ref 34–46.6)
HDLC SERPL-MCNC: 79 MG/DL (ref 40–60)
HGB BLD-MCNC: 12.1 G/DL (ref 12–15.9)
IMM GRANULOCYTES # BLD AUTO: 0.04 10*3/MM3 (ref 0–0.05)
IMM GRANULOCYTES NFR BLD AUTO: 0.4 % (ref 0–0.5)
LDLC SERPL CALC-MCNC: 37 MG/DL (ref 0–100)
LDLC/HDLC SERPL: 0.47 {RATIO}
LYMPHOCYTES # BLD AUTO: 1.8 10*3/MM3 (ref 0.7–3.1)
LYMPHOCYTES NFR BLD AUTO: 19.6 % (ref 19.6–45.3)
MAGNESIUM SERPL-MCNC: 2.1 MG/DL (ref 1.6–2.4)
MCH RBC QN AUTO: 30.2 PG (ref 26.6–33)
MCHC RBC AUTO-ENTMCNC: 33 G/DL (ref 31.5–35.7)
MCV RBC AUTO: 91.5 FL (ref 79–97)
MONOCYTES # BLD AUTO: 1.18 10*3/MM3 (ref 0.1–0.9)
MONOCYTES NFR BLD AUTO: 12.8 % (ref 5–12)
NEUTROPHILS # BLD AUTO: 5.87 10*3/MM3 (ref 1.7–7)
NEUTROPHILS NFR BLD AUTO: 63.9 % (ref 42.7–76)
NRBC BLD AUTO-RTO: 0 /100 WBC (ref 0–0.2)
PLATELET # BLD AUTO: 238 10*3/MM3 (ref 140–450)
PMV BLD AUTO: 10.2 FL (ref 6–12)
POTASSIUM BLD-SCNC: 3.1 MMOL/L (ref 3.5–5.2)
RBC # BLD AUTO: 4.01 10*6/MM3 (ref 3.77–5.28)
SODIUM BLD-SCNC: 136 MMOL/L (ref 136–145)
TRIGL SERPL-MCNC: 70 MG/DL (ref 0–150)
TSH SERPL DL<=0.05 MIU/L-ACNC: 2.09 UIU/ML (ref 0.27–4.2)
VIT B12 BLD-MCNC: >2000 PG/ML (ref 211–946)
VLDLC SERPL-MCNC: 14 MG/DL (ref 5–40)
WBC NRBC COR # BLD: 9.19 10*3/MM3 (ref 3.4–10.8)

## 2019-09-05 PROCEDURE — 83036 HEMOGLOBIN GLYCOSYLATED A1C: CPT | Performed by: INTERNAL MEDICINE

## 2019-09-05 PROCEDURE — 82607 VITAMIN B-12: CPT | Performed by: INTERNAL MEDICINE

## 2019-09-05 PROCEDURE — 84443 ASSAY THYROID STIM HORMONE: CPT | Performed by: INTERNAL MEDICINE

## 2019-09-05 PROCEDURE — 82746 ASSAY OF FOLIC ACID SERUM: CPT | Performed by: INTERNAL MEDICINE

## 2019-09-05 PROCEDURE — 93970 EXTREMITY STUDY: CPT

## 2019-09-05 PROCEDURE — 99222 1ST HOSP IP/OBS MODERATE 55: CPT | Performed by: INTERNAL MEDICINE

## 2019-09-05 PROCEDURE — 99223 1ST HOSP IP/OBS HIGH 75: CPT | Performed by: PSYCHIATRY & NEUROLOGY

## 2019-09-05 PROCEDURE — 80061 LIPID PANEL: CPT | Performed by: INTERNAL MEDICINE

## 2019-09-05 PROCEDURE — 85025 COMPLETE CBC W/AUTO DIFF WBC: CPT | Performed by: INTERNAL MEDICINE

## 2019-09-05 PROCEDURE — 85730 THROMBOPLASTIN TIME PARTIAL: CPT | Performed by: INTERNAL MEDICINE

## 2019-09-05 PROCEDURE — 92610 EVALUATE SWALLOWING FUNCTION: CPT

## 2019-09-05 PROCEDURE — 82962 GLUCOSE BLOOD TEST: CPT

## 2019-09-05 PROCEDURE — 25010000002 HEPARIN (PORCINE) PER 1000 UNITS: Performed by: INTERNAL MEDICINE

## 2019-09-05 PROCEDURE — 83735 ASSAY OF MAGNESIUM: CPT | Performed by: INTERNAL MEDICINE

## 2019-09-05 PROCEDURE — 85730 THROMBOPLASTIN TIME PARTIAL: CPT | Performed by: HOSPITALIST

## 2019-09-05 PROCEDURE — 80048 BASIC METABOLIC PNL TOTAL CA: CPT | Performed by: INTERNAL MEDICINE

## 2019-09-05 PROCEDURE — 97535 SELF CARE MNGMENT TRAINING: CPT

## 2019-09-05 PROCEDURE — 97165 OT EVAL LOW COMPLEX 30 MIN: CPT

## 2019-09-05 RX ADMIN — POTASSIUM CHLORIDE 40 MEQ: 750 CAPSULE, EXTENDED RELEASE ORAL at 12:06

## 2019-09-05 RX ADMIN — SODIUM CHLORIDE, PRESERVATIVE FREE 10 ML: 5 INJECTION INTRAVENOUS at 08:13

## 2019-09-05 RX ADMIN — POTASSIUM CHLORIDE 40 MEQ: 750 CAPSULE, EXTENDED RELEASE ORAL at 06:19

## 2019-09-05 RX ADMIN — SODIUM CHLORIDE, PRESERVATIVE FREE 10 ML: 5 INJECTION INTRAVENOUS at 01:22

## 2019-09-05 RX ADMIN — POTASSIUM CHLORIDE 40 MEQ: 750 CAPSULE, EXTENDED RELEASE ORAL at 21:26

## 2019-09-05 RX ADMIN — METOPROLOL SUCCINATE 50 MG: 50 TABLET, FILM COATED, EXTENDED RELEASE ORAL at 08:34

## 2019-09-05 RX ADMIN — LEVOTHYROXINE SODIUM 100 MCG: 100 TABLET ORAL at 08:35

## 2019-09-05 RX ADMIN — HEPARIN SODIUM 18 UNITS/KG/HR: 10000 INJECTION, SOLUTION INTRAVENOUS at 01:12

## 2019-09-05 RX ADMIN — POTASSIUM CHLORIDE 20 MEQ: 750 CAPSULE, EXTENDED RELEASE ORAL at 08:34

## 2019-09-05 RX ADMIN — SODIUM CHLORIDE, PRESERVATIVE FREE 10 ML: 5 INJECTION INTRAVENOUS at 21:26

## 2019-09-05 RX ADMIN — ATORVASTATIN CALCIUM 80 MG: 80 TABLET, FILM COATED ORAL at 01:18

## 2019-09-05 RX ADMIN — ATORVASTATIN CALCIUM 80 MG: 80 TABLET, FILM COATED ORAL at 21:26

## 2019-09-05 RX ADMIN — LABETALOL 20 MG/4 ML (5 MG/ML) INTRAVENOUS SYRINGE 20 MG: at 23:42

## 2019-09-05 RX ADMIN — ISOSORBIDE MONONITRATE 30 MG: 30 TABLET ORAL at 08:35

## 2019-09-05 NOTE — PLAN OF CARE
Problem: Patient Care Overview  Goal: Plan of Care Review  Outcome: Ongoing (interventions implemented as appropriate)   09/05/19 5173   Coping/Psychosocial   Plan of Care Reviewed With patient;daughter   OTHER   Outcome Summary Pt presents from assisted living with slurred speech, pain down back and confusion. Pt currently is SBA/CGA to xfer to [facility], ambulate to bathroom. Pt may benefit from skilled OT to increase safety and independence.

## 2019-09-05 NOTE — PROGRESS NOTES
"    DAILY PROGRESS NOTE  [facility]    Patient Identification:  Name: Kendy Worrell  Age: 79 y.o.  Sex: female  :  1940  MRN: [id number]         Primary Care Physician: Marisela Adams APRN    Subjective:  Interval History: Clinically much better today.  Patient is not voicing any further complaints of confusion though daughter at bedside still notes intermittently.  Patient was able to answer all orientations correctly.  He denies any current chest pain has had issues in the past.  Has had issues with shortness of breath as well though currently not short of breath and no issues with nausea vomiting or diarrhea    Objective: Daughter at bedside.  Case discussed in multidisciplinary rounds    Scheduled Meds:  atorvastatin 80 mg Oral Nightly   isosorbide mononitrate 30 mg Oral Daily   levothyroxine 100 mcg Oral Daily   metoprolol succinate XL 50 mg Oral Daily   potassium chloride 20 mEq Oral Daily   sodium chloride 10 mL Intravenous Q12H     Continuous Infusions:  heparin (porcine) 18 Units/kg/hr Last Rate: 16 Units/kg/hr (19 1343)       Vital signs in last 24 hours:  Temp:  [97.9 °F (36.6 °C)-98.1 °F (36.7 °C)] 98 °F (36.7 °C)  Heart Rate:  [] 88  Resp:  [16-18] 18  BP: (151-203)/() 158/114    Intake/Output:    Intake/Output Summary (Last 24 hours) at 2019 1458  Last data filed at 2019 1345  Gross per 24 hour   Intake 360 ml   Output 300 ml   Net 60 ml       Exam:  BP (!) 158/114 (BP Location: Left arm, Patient Position: Sitting)   Pulse 88   Temp 98 °F (36.7 °C) (Oral)   Resp 18   Ht 152.4 cm (60\")   Wt 81.5 kg (179 lb 9.6 oz)   SpO2 95%   BMI 35.08 kg/m²     General Appearance:    Alert, cooperative, no distress, AAOx3, nontoxic-appearing, fluent speech                          Head:    Normocephalic, without obvious abnormality, atraumatic                           Eyes:    PERRL, conjunctiva/corneas clear, EOM's intact, both eyes                       "   Throat:   Oral mucosa pink and moist                           Neck:   Supple, symmetrical, trachea midline, no JVD                         Lungs:    Clear to auscultation bilaterally, respirations unlabored                 Chest Wall:    No tenderness or deformity                          Heart:  Irregularly irregular rate and rhythm, S1 and S2 normal                  Abdomen:     Soft, non-tender, bowel sounds active                 Extremities: Mostly moving all, no cyanosis with chronic lymphedema style changes                  Neurologic:   CNII-XII intact, no focal deficits noted     Data Review:  Labs in chart were reviewed.    Assessment:  Active Hospital Problems    Diagnosis  POA   • **Bilateral pulmonary embolism (CMS/HCC) [I26.99]  Yes   • New onset a-fib (CMS/HCC) [I48.91]  Unknown   • UTI (urinary tract infection) [N39.0]  Unknown   • Hypokalemia [E87.6]  Unknown   • Hypomagnesemia [E83.42]  Unknown   • Slurred speech [R47.81]  Unknown   • Word finding difficulty [R47.89]  Unknown   • Metabolic encephalopathy [G93.41]  Unknown   • Chronic diastolic CHF (congestive heart failure) (CMS/HCC) [I50.32]  Unknown   • Essential hypertension [I10]  Yes   • Acquired hypothyroidism [E03.9]  Yes   • Coronary artery disease involving native coronary artery of native heart without angina pectoris [I25.10]  Yes      Resolved Hospital Problems   No resolved problems to display.       Plan:    UTI -E. coli with C/S pending    Metabolic encephalopathy resolved -daughter still notes intermittent confusion at times    Pulmonary emboli -heparin drip with lower extremity Dopplers pending    TIA versus CVA --MRA pending with neuro following a consultation   -Heparin drip/statin    New onset A. Fib -heparin drip with rate control currently -cards seeing in consultation   -Reviewed outpatient echo was just an impression.  Defer to cardiology to repeat with saline study versus MARY   -Titrating metoprolol    Replace K -mag is  2.1    Past history of GI bleed with partial colectomy -watch bowel movements and hemoglobin trends closely - Hgb 12.1 today    Hypothyroidism past history with therapeutic TSH      Appreciate input and assistance from all    Celestine Xie MD  9/5/2019  2:58 PM

## 2019-09-05 NOTE — CONSULTS
[facility] Consult Note    Patient Name: Kendy Worrell  :1940  79 y.o.    Date of Admission: 2019  Date of Consultation:  19  Encounter Provider: Effie Little MD  Place of Service: [facility] CARDIOLOGY  Referring Provider: Gurvinder Carlin*  Patient Care Team:  Mae Lawrence APRN as PCP - General (Internal Medicine)  Mac Bond MD as PCP - Claims Attributed  Tone Tubbs MD as Consulting Physician (Cardiology)      Chief complaint: Altered Mental Status    Reason for Consult: New Atrial Fibrillation    History of Present Illness: Kendy Worrell is an 79-year-old female patient of Dr. Tubbs of [facility], with a history of NSTEMI in  due to demand ischemia, small vessel coronary artery disease, recurrent GI bleed status post partial colectomy, hypertension, hyperlipidemia, paroxsymal SVT, hypothyroidism who is admitted with new onset atrial fibrillation and pulmonary embolism.      History primarily taken from chart with some additional information from the patient.  The patient presented to the emergency room from her assisted living facility with slurred speech, word finding difficulty, confusion, and left facial droop.  She also reported 2 days of dyspnea and a month of lower extremity swelling in the left leg over the right.  It appears she was evaluated at her assisted living facility and was diagnosed with diastolic CHF following an echocardiogram and blood work.  She was subsequently started on furosemide.  Recent echocardiogram results not available for review.      In the emergency room her work up showed an abnormal d-dimer followed by a CT angiogram of the chest that showed small bilateral, distal pulmonary embolism.  She was also noted to be in atrial fibrillation on admission.  Lab work showed evidence of significant electrolyte abnormalities.      She denies any chest discomfort to me.  No dyspnea  presently at rest.  Denies any recent palpitations.       Previous Testing:  Echo 6/28/2018 ([place])  Summary  The ejection fraction biplane was calculated at 65 %   Global left ventricular wall motion and contractility are within normal   limits. EF   Mild concentric left ventricular hypertrophy.   Mild bi-atrial enlargement   There is fibrocalcific sclerosis of the aortic valve without evidence of   aortic stenosis.   Mild tricuspid regurgitation.   Right ventricular systolic pressure consistent with mild pulmonary   hypertension.    Holter 4/11/2016  FINDING(S):   1. A total of 182,003 heartbeats were analyzed in 48 hours of Holter   monitoring. Minimum heart rate is 47 beats, average heart rate is 63 beats   per minute and maximum heart rate 120 beats per minute.   2. 797 ventricular ectopic beats were seen which are isolated PVCs. One   couplet was seen.   3. 725 supraventricular ectopic beats were noted which were mostly   isolated APCs with 29 couplets.   4. There were one 18 beat run of PSVT at 1:27 P.M. with a heart rate of   138 beats per minute. The remaining runs were only 3 to 4 beats and there   was 15 such runs.   5. No evidence of high degree AV block, pauses or arrests were seen.   6. Predominant rhythm was normal sinus rhythm, average heart rate of 63   beats per minute with rare PVCs and APCs. One 18 beat run of PSVT was   noted as above.             Past Medical History:   Diagnosis Date   • Arthritis    • Breast cancer (CMS/HCC)    • Hyperlipidemia    • Hypertension    • Hyperthyroidism     patient has hypothyroidism   • Hypothyroidism        Past Surgical History:   Procedure Laterality Date   • BREAST BIOPSY     • BREAST SURGERY Right    • CHOLECYSTECTOMY     • COLON SURGERY      Removed   • COLONOSCOPY     • HYSTERECTOMY     • MAMMO BILATERAL  2015   • MASTECTOMY     • TONSILLECTOMY           Prior to Admission medications    Medication Sig Start Date End Date Taking? Authorizing Provider    acetaminophen-codeine (TYLENOL #3) 300-30 MG per tablet Take 1 tablet by mouth Every 6 (Six) Hours As Needed for Moderate Pain . 7/9/19  Yes Mae Lawrence APRN   Alpha-Lipoic Acid 200 MG capsule Take  by mouth Daily.   Yes ProviderKatelin MD   B Complex Vitamins (VITAMIN B COMPLEX) capsule capsule Take  by mouth Daily.   Yes ProviderKatelin MD   Biotin (BIOTIN MAXIMUM STRENGTH) 10 MG tablet Take  by mouth Daily.   Yes ProviderKatelin MD   Chlorphen-Pseudoephed-APAP (CORICIDIN D PO) Take  by mouth.   Yes Provider, MD Katelin   Cholecalciferol (VITAMIN D3) 5000 UNITS capsule capsule Take 10,000 Units by mouth 2 (Two) Times a Day.   Yes ProviderKatelin MD   coenzyme Q10 100 MG capsule Take 100 mg by mouth Daily.   Yes Provider, MD Katelin   Cyanocobalamin (B-12) 5000 MCG capsule Take  by mouth.   Yes Provider, MD Katelin   diphenoxylate-atropine (LOMOTIL) 2.5-0.025 MG per tablet TAKE 1 TABLET BY MOUTH FOUR TIMES DAILY AS NEEDED 7/1/19  Yes Romi Li MD   isosorbide mononitrate (IMDUR) 30 MG 24 hr tablet Take 30 mg by mouth Daily.   Yes ProviderKatelin MD   levothyroxine (SYNTHROID, LEVOTHROID) 100 MCG tablet Take 1 tablet by mouth Daily. 5/9/18  Yes Mac Bond MD   Magnesium 250 MG tablet Take 1 tablet by mouth Daily.   Yes ProviderKatelin MD   metoprolol succinate XL (TOPROL-XL) 50 MG 24 hr tablet Take 1 tablet by mouth Daily. 1/4/19  Yes Mae Lawrence APRN   Misc. Devices (ROLLER WALKER) misc Use daily 8/1/19  Yes Mae Lawrence APRN   Multiple Vitamin (MULTI VITAMIN DAILY PO) Take  by mouth.   Yes ProviderKatelin MD   nystatin-triamcinolone (MYCOLOG II) 103287-9.1 UNIT/GM-% cream apply to affected area twice a day if needed 3/23/17  Yes Mae Lawrence APRN   potassium chloride (K-DUR,KLOR-CON) 20 MEQ CR tablet Take 1 tablet by mouth Daily. 7/9/19  Yes Mae Lawrence APRN   triamterene-hydrochlorothiazide (MAXZIDE-25) 37.5-25 MG  per tablet Take 1 tablet by mouth Daily. 6/21/19  Yes Keenan Haile MD       Allergies   Allergen Reactions   • Aspirin      bleeding   • Nsaids      bleeding       Social History     Socioeconomic History   • Marital status: Single     Spouse name: Not on file   • Number of children: Not on file   • Years of education: Not on file   • Highest education level: Not on file   Tobacco Use   • Smoking status: Former Smoker   • Smokeless tobacco: Never Used   • Tobacco comment: quit in 1988   Substance and Sexual Activity   • Alcohol use: Yes     Alcohol/week: 1.8 oz     Types: 3 Shots of liquor per week     Comment: OCC   • Drug use: Yes     Types: Hydrocodone   • Sexual activity: No       Family History   Problem Relation Age of Onset   • Stroke Mother    • Hypertension Mother    • Heart disease Father    • Cancer Father        REVIEW OF SYSTEMS:   All systems reviewed.  Pertinent positives identified in HPI.  All other systems are negative.      Objective:     Vitals:    09/04/19 2300 09/05/19 0420 09/05/19 0500 09/05/19 0710   BP: (!) 190/143 151/94  (!) 177/125   BP Location: Left arm Left arm  Left arm   Patient Position: Lying Lying  Lying   Pulse: 97 94  104   Resp: 18 18  18   Temp: 98.1 °F (36.7 °C) 98.1 °F (36.7 °C)  97.9 °F (36.6 °C)   TempSrc: Oral Oral  Oral   SpO2: 94% 96%  99%   Weight:   81.5 kg (179 lb 9.6 oz)    Height:         Body mass index is 35.08 kg/m².    General Appearance:    Alert, cooperative, in no acute distress   Head:    Normocephalic, without obvious abnormality, atraumatic   Eyes:            Lids and lashes normal, conjunctivae and sclerae normal, no   icterus, no pallor, corneas clear, PERRLA   Ears:    Ears appear intact with no abnormalities noted   Neck:   No adenopathy, supple, trachea midline, no thyromegaly, no   carotid bruit, no JVD   Lungs:     Clear to auscultation,respirations regular, even and unlabored    Heart:    Irregularly, irregular, rhythm and normal rate,  normal S1 and S2, no murmur, no gallop, no rub, no click   Chest Wall:    No abnormalities observed   Abdomen:     Normal bowel sounds, no masses, no organomegaly, soft        non-tender, non-distended, no guarding, no rebound  tenderness   Extremities:   Moves all extremities well, no edema, no cyanosis, no redness   Pulses:   Pulses palpable and equal bilaterally. Normal radial, carotid, femoral, dorsalis pedis and posterior tibial pulses bilaterally. Normal abdominal aorta   Skin:  Psychiatric:   No bleeding, bruising or rash    Alert and oriented x 3, normal mood and affect   Lab Review:     Results from last 7 days   Lab Units 09/05/19  0459 09/04/19  1731   SODIUM mmol/L 136 135*   POTASSIUM mmol/L 3.1* 2.8*   CHLORIDE mmol/L 93* 89*   CO2 mmol/L 29.4* 30.7*   BUN mg/dL 11 11   CREATININE mg/dL 0.77 1.06*   CALCIUM mg/dL 9.5 10.2   BILIRUBIN mg/dL  --  1.3*   ALK PHOS U/L  --  80   ALT (SGPT) U/L  --  19   AST (SGOT) U/L  --  27   GLUCOSE mg/dL 120* 112*     Results from last 7 days   Lab Units 09/04/19  1731   TROPONIN T ng/mL <0.010     Results from last 7 days   Lab Units 09/05/19  0459   WBC 10*3/mm3 9.19   HEMOGLOBIN g/dL 12.1   HEMATOCRIT % 36.7   PLATELETS 10*3/mm3 238     Results from last 7 days   Lab Units 09/05/19  0459 09/04/19  2245 09/04/19  1731   INR   --  1.10 1.22*   APTT seconds 79.3* 33.5  --      Results from last 7 days   Lab Units 09/05/19  0459   MAGNESIUM mg/dL 2.1     Results from last 7 days   Lab Units 09/05/19  0459   CHOLESTEROL mg/dL 130   TRIGLYCERIDES mg/dL 70   HDL CHOL mg/dL 79*   LDL CHOL mg/dL 37               EKG          I personally viewed and interpreted the patient's EKG/Telemetry data.        Assessment and Plan:       1. Atrial fibrillation. New onset.  Fair rate control on metoprolol succinate.  On heparin gtt.   2. Bilateral pulmonary embolism.  Reviewed CT images myself and it shows small thrombus burden that is primarily distal.  Mild RV enlargement noted on CT  scan.  Normal troponin.    3. Dysarthria/confusion.  Appears resolved.  MRI and neurology consult pending.   4. Hypokalemia. Improving although still low.   5. Coronary artery disease. Primarily non-obstructive and small vessel including occluded small diagonal and distal apical LAD with collaterals.  Noted on cath in 2006 at the time of NSTEMI- felt to be demand ischemia.  6. Hypertension. Elevated.    7. History of paroxsymal SVT  8. Hyperlipidemia  9. History of recurrent GI bleed.  Aspirin stopped in 2011.  Status post partial colectomy.    10. Hypothyroidism    -  Agree with heparin gtt for now.  Eventually needs oral anticoagulation for pulmonary embolism and atrial fibrillation.  She has a history of GI bleed but has since had partial colectomy for recurrent bleeding.    -  Titrate metoprolol depending on BP and heart rate following dose this am.   -   Will review recent echo report when available.  If unable to obtain will need to repeat here.    -  Agree with lower extremity doppler ultrasounds.  Will discontinue SCDs.     Effie Little MD  09/05/19  8:12 AM

## 2019-09-05 NOTE — THERAPY EVALUATION
Acute Care - Occupational Therapy Initial Evaluation  [facility]     Patient Name: Kendy Worrell  : 1940  MRN: [id number]  Today's Date: 2019             Admit Date: 2019       ICD-10-CM ICD-9-CM   1. Bilateral pulmonary embolism (CMS/HCC) I26.99 415.19   2. Acute UTI N39.0 599.0   3. Hypokalemia E87.6 276.8   4. Hypomagnesemia E83.42 275.2   5. New onset atrial fibrillation (CMS/HCC) I48.91 427.31   6. Abnormal EKG R94.31 794.31   7. Uncontrolled hypertension I10 401.9   8. Closed compression fracture of thoracic vertebra, initial encounter (CMS/[facility])- undetermined age S22.000A 805.2     Patient Active Problem List   Diagnosis   • Essential hypertension   • Acquired hypothyroidism   • Coronary artery disease involving native coronary artery of native heart without angina pectoris   • Hyperlipidemia   • Arthritis   • Skin lesion of chest wall   • NSTEMI (non-ST elevated myocardial infarction) (CMS/[facility])   • Supraventricular tachycardia (CMS/[facility])   • Normal cardiac stress test   • Gastrointestinal bleed   • Angina effort (CMS/HCC)   • Morbidly obese (CMS/HCC)   • Bilateral pulmonary embolism (CMS/HCC)   • New onset a-fib (CMS/[facility])   • UTI (urinary tract infection)   • Hypokalemia   • Slurred speech   • Word finding difficulty   • Metabolic encephalopathy   • Chronic diastolic CHF (congestive heart failure) (CMS/[facility])     Past Medical History:   Diagnosis Date   • Arthritis    • Breast cancer (CMS/[facility])    • Hyperlipidemia    • Hypertension    • Hyperthyroidism     patient has hypothyroidism   • Hypothyroidism      Past Surgical History:   Procedure Laterality Date   • BREAST BIOPSY     • BREAST SURGERY Right    • CHOLECYSTECTOMY     • COLON SURGERY      Removed   • COLONOSCOPY     • HYSTERECTOMY     • MAMMO BILATERAL     • MASTECTOMY     • TONSILLECTOMY            OT ASSESSMENT FLOWSHEET (last 12 hours)      Occupational Therapy Evaluation     Row Name 19 1300                   OT Evaluation  Time/Intention    Subjective Information  complains of;weakness  -LE        Mode of Treatment  individual therapy;occupational therapy  -LE        Patient Effort  good  -LE        Symptoms Noted During/After Treatment  -- ed energy conservation tech with showering, use of robe   -LE        Comment  family asking for pt to use bathroom.  RN states OK for mobilty.  Pt has PE and is Pending doppler but on heparin and therapuetic.    -LE           General Information    Patient Profile Reviewed?  yes  -LE        Patient/Family Observations  sitting on BSC.  dght assisted pt to BSC.   -LE        Prior Level of Function  independent:;gait;transfer;ADL's;driving  -LE        Equipment Currently Used at Home  rollator;grab bar;raised toilet;shower chair  -LE        Existing Precautions/Restrictions  fall  -LE        Equipment Issued to Patient  gait belt  -LE           Relationship/Environment    Lives With  facility resident  -LE           Cognitive Assessment/Intervention- PT/OT    Orientation Status (Cognition)  oriented x 4  -LE        Follows Commands (Cognition)  follows one step commands;75-90% accuracy  -LE        Safety Deficit (Cognitive)  mild deficit;judgment;problem solving  -LE           Bed Mobility Assessment/Treatment    Comment (Bed Mobility)  up in chair  -LE           Functional Mobility    Functional Mobility- Ind. Level  contact guard assist close SBA/CGA  -LE        Functional Mobility- Device  rolling walker  -LE        Functional Mobility-Distance (Feet)  -- 20 feet to/from sink  -LE           Transfer Assessment/Treatment    Transfer Assessment/Treatment  toilet transfer;stand-sit transfer;sit-stand transfer  -LE           Sit-Stand Transfer    Sit-Stand [place] (Transfers)  supervision  -LE        Assistive Device (Sit-Stand Transfers)  walker, front-wheeled  -LE           Stand-Sit Transfer    Stand-Sit [place] (Transfers)  supervision  -LE        Assistive Device (Stand-Sit Transfers)   walker, front-wheeled  -LE           Toilet Transfer    Type (Toilet Transfer)  stand pivot/stand step  -LE        [place] Level (Toilet Transfer)  contact guard  -LE        Assistive Device (Toilet Transfer)  commode dght had xferred pt to BSC before OT enters room  -LE           ADL Assessment/Intervention    BADL Assessment/Intervention  bathing;upper body dressing;lower body dressing;grooming;toileting;feeding  -LE           Lower Body Dressing Assessment/Training    Lower Body Dressing [place] Level  don;shoes/slippers;set up;supervision  -LE        Lower Body Dressing Position  supported sitting  -LE           Grooming Assessment/Training    [place] Level (Grooming)  set up;supervision;wash face, hands  -LE        Grooming Position  sink side;supported standing  -LE           Toileting Assessment/Training    [place] Level (Toileting)  adjust/manage clothing;perform perineal hygiene;minimum assist (75% patient effort)  -LE        Assistive Devices (Toileting)  commode, bedside without drop arms  -LE        Toileting Position  supported sitting;supported standing  -LE        Comment (Toileting)  pt completes hygiene.  h/o loose stools from colon surgery.  assist retab brief for fit increase time and repetition with toileting tasks  -LE           General ROM    GENERAL ROM COMMENTS  -- B UE functional for ADL tasks observed  -LE           Positioning and Restraints    Pre-Treatment Position  bedside commode  -LE        Post Treatment Position  chair  -LE        In Chair  sitting;encouraged to call for assist;exit alarm on;with family/caregiver;with nsg  -LE           Pain Assessment    Additional Documentation  Pain Scale: Numbers Pre/Post-Treatment (Group)  -LE           Pain Scale: Numbers Pre/Post-Treatment    Pain Scale: Numbers, Pretreatment  5/10  -LE        Pain Scale: Numbers, Post-Treatment  5/10  -LE        Pain Location  back  -LE        Pain Intervention(s)  Repositioned chronic.  ed scoot hips back in chair to reduce strain  -LE           Coping    Observed Emotional State  accepting  -LE           Plan of Care Review    Plan of Care Reviewed With  patient;daughter  -LE           Clinical Impression (OT)    OT Diagnosis  need for assist with personal care  -LE        Patient/Family Goals Statement (OT Eval)  return to assisted living  -LE        Criteria for Skilled Therapeutic Interventions Met (OT Eval)  treatment indicated;yes  -LE        Rehab Potential (OT Eval)  good, to achieve stated therapy goals  -LE        Therapy Frequency (OT Eval)  5 times/wk  -LE        Care Plan Review (OT)  evaluation/treatment results reviewed;care plan/treatment goals reviewed  -LE        Anticipated Equipment Needs at Discharge (OT)  -- recommend SBA for showering at d/c   -LE        Anticipated Discharge Disposition (OT)  -- pt/dght feel pt near baseline and plan return Assisted livin  -LE           Vital Signs    O2 Delivery Pre Treatment  room air  -LE        O2 Delivery Intra Treatment  room air  -LE        O2 Delivery Post Treatment  room air  -LE        Pre Patient Position  Sitting  -LE        Intra Patient Position  Standing  -LE        Post Patient Position  Sitting  -LE        Activity Duration  -- requires breaks at home and SOA with toileting during OT  -LE           Planned OT Interventions    Planned Therapy Interventions (OT Eval)  activity tolerance training;adaptive equipment training;patient/caregiver education/training;transfer/mobility retraining;functional balance retraining  -LE           OT Goals    Bathing Goal Selection (OT)  bathing, OT goal 1  -LE        Dressing Goal Selection (OT)  dressing, OT goal 1  -LE        Functional Mobility Goal Selection (OT)  functional mobility, OT goal 1  -LE        Additional Documentation  Functional Mobility Selection (OT) (Row)  -LE           Bathing Goal 1 (OT)    Activity/Assistive Device (Bathing Goal 1, OT)  upper body bathing;lower body  bathing  -LE        [place] Level/Cues Needed (Bathing Goal 1, OT)  supervision required  -LE        Time Frame (Bathing Goal 1, OT)  1 week  -LE        Progress/Outcomes (Bathing Goal 1, OT)  goal ongoing  -LE           Dressing Goal 1 (OT)    Activity/Assistive Device (Dressing Goal 1, OT)  lower body dressing  -LE        [place]/Cues Needed (Dressing Goal 1, OT)  set-up required  -LE        Time Frame (Dressing Goal 1, OT)  1 week  -LE        Progress/Outcome (Dressing Goal 1, OT)  goal ongoing  -LE           Functional Mobility Goal 1 (OT)    Activity/Assistive Device (Functional Mobility Goal 1, OT)  walker, rolling  -LE        [place] Level/Cues Needed (Functional Mobility Goal 1, OT)  supervision required  -LE        Distance Goal 1 (Functional Mobility, OT)  -- to/from bathroom  -LE        Time Frame (Functional Mobility Goal 1, OT)  1 week  -LE        Progress/Outcome (Functional Mobility Goal 1, OT)  goal ongoing  -LE          User Key  (r) = Recorded By, (t) = Taken By, (c) = Cosigned By    Initials Name Effective Dates    Lillie Talamantes, OTR 06/08/18 -          Occupational Therapy Education     Title: PT OT SLP Therapies (Done)     Topic: Occupational Therapy (Done)     Point: ADL training (Done)     Description: Instruct learner(s) on proper safety adaptation and remediation techniques during self care or transfers.   Instruct in proper use of assistive devices.    Learning Progress Summary           Patient Acceptance, E, VU by TONY at 9/5/2019  3:26 PM    Comment:  Ed role of OT in acute care. Ed need for assist to get up and use of call light and exit alarm.   Family Acceptance, E, VU by TONY at 9/5/2019  3:26 PM    Comment:  Ed role of OT in acute care. Ed need for assist to get up and use of call light and exit alarm.                   Point: Precautions (Done)     Description: Instruct learner(s) on prescribed precautions during self-care and functional transfers.    Learning Progress  Summary           Patient Acceptance, E, VU by TONY at 9/5/2019  3:26 PM    Comment:  Ed role of OT in acute care. Ed need for assist to get up and use of call light and exit alarm.   Family Acceptance, E, VU by TONY at 9/5/2019  3:26 PM    Comment:  Ed role of OT in acute care. Ed need for assist to get up and use of call light and exit alarm.                   Point: Body mechanics (Done)     Description: Instruct learner(s) on proper positioning and spine alignment during self-care, functional mobility activities and/or exercises.    Learning Progress Summary           Patient Acceptance, E, VU by TONY at 9/5/2019  3:26 PM    Comment:  Ed role of OT in acute care. Ed need for assist to get up and use of call light and exit alarm.   Family Acceptance, E, VU by TONY at 9/5/2019  3:26 PM    Comment:  Ed role of OT in acute care. Ed need for assist to get up and use of call light and exit alarm.                               User Key     Initials Effective Dates Name Provider Type Discipline    TONY 06/08/18 -  Lillie Fajardo OTR Occupational Therapist OT                  OT Recommendation and Plan  Outcome Summary/Treatment Plan (OT)  Anticipated Equipment Needs at Discharge (OT): (recommend SBA for showering at d/c )  Anticipated Discharge Disposition (OT): (pt/dght feel pt near baseline and plan return Assisted livin)  Planned Therapy Interventions (OT Eval): activity tolerance training, adaptive equipment training, patient/caregiver education/training, transfer/mobility retraining, functional balance retraining  Therapy Frequency (OT Eval): 5 times/wk  Plan of Care Review  Plan of Care Reviewed With: patient, daughter  Plan of Care Reviewed With: patient, daughter  Outcome Summary: Pt presents from assisted living with slurred speech, pain down back and confusion. Pt currently is SBA/CGA to xfer to BSC, ambulate to bathroom.  Pt may benefit from skilled OT to increase safety and independence.     Outcome Measures     Row  Name 09/05/19 1507 09/05/19 1500          How much help from another is currently needed...    Putting on and taking off regular lower body clothing?  3  -LE  --     Bathing (including washing, rinsing, and drying)  3  -LE  --     Toileting (which includes using toilet bed pan or urinal)  3  -LE  --     Putting on and taking off regular upper body clothing  3  -LE  --     Taking care of personal grooming (such as brushing teeth)  3  -LE  --     Eating meals  3  -LE  --     AM-PAC 6 Clicks Score (OT)  18  -LE  --        Modified Otero Scale    Modified Otero Scale  3 - Moderate disability.  Requiring some help, but able to walk without assistance.  -LE  --        Functional Assessment    Outcome Measure Options  --  AM-PAC 6 Clicks Daily Activity (OT);Modified Otero  -LE       User Key  (r) = Recorded By, (t) = Taken By, (c) = Cosigned By    Initials Name Provider Type    Lillie Talamantes OTR Occupational Therapist          Time Calculation:   Time Calculation- OT     Row Name 09/05/19 1535             Time Calculation- OT    OT Start Time  1314  -LE      OT Stop Time  1348  -LE      OT Time Calculation (min)  34 min  -LE      Total Timed Code Minutes- OT  25 minute(s)  -LE      OT Received On  09/05/19  -LE      OT Goal Re-Cert Due Date  09/12/19  -LE        User Key  (r) = Recorded By, (t) = Taken By, (c) = Cosigned By    Initials Name Provider Type    Lillie Talamantes OTR Occupational Therapist        Therapy Charges for Today     Code Description Service Date Service Provider Modifiers Qty    46147074623  OT EVAL LOW COMPLEXITY 2 9/5/2019 Lillie Fajardo OTR GO 1    [id number]  OT SELF CARE/MGMT/TRAIN EA 15 MIN 9/5/2019 Lillie Fajardo OTR GO 2               DAIJA Koo  9/5/2019

## 2019-09-05 NOTE — SIGNIFICANT NOTE
09/05/19 0850   Rehab Time/Intention   Evaluation Not Performed other (see comments)  (attempted PT eval this am. Pt with high BP and also BLE dopplers ordered. HOwever, spoke with RN and pt is on heparin drip and is therapeutic at this time. However, upon attempt to eval pt , pt then declines working with therapy. WIll check pm as able.)   Rehab Treatment   Discipline physical therapist   Recommendation   PT - Next Appointment 09/05/19

## 2019-09-05 NOTE — NURSING NOTE
Left ac iv stopped working earlier in shift, started new one in left wrist. It quick working now, stopped iv heparin, iv team paged.

## 2019-09-05 NOTE — ED PROVIDER NOTES
2010- Discussed radiology results with Dr. Steele (radiology) who states the pt has multiple bilateral P.E.'s with an RV to LV of 1.3. The plan will be to consult [facility] for pt admission per Dr. Duarte's request.    2016- Call placed to [facility].    2024- Discussed pt's case with Dr. Levin ([facility]) who agrees with plan to admit the pt for further care and request the pt be given Lovenox.    2026- Lovenox ordered for the pt.      Final diagnoses:   Bilateral pulmonary embolism (CMS/HCC)   Acute UTI   Hypokalemia   Hypomagnesemia   New onset atrial fibrillation (CMS/HCC)   Abnormal EKG   Uncontrolled hypertension   Closed compression fracture of thoracic vertebra, initial encounter (CMS/[facility])- undetermined age         Disposition:    ADMIT      --  Documentation assistance provided by tawnya Hinds for Dr. Paredes.  Information recorded by the tawnya was done at my direction and has been verified and validated by me.       Valentina Hinds  09/04/19 2027       Tucker Paredes MD  09/04/19 2043

## 2019-09-05 NOTE — PLAN OF CARE
Problem: Patient Care Overview  Goal: Plan of Care Review  Outcome: Ongoing (interventions implemented as appropriate)   09/05/19 1430   Coping/Psychosocial   Plan of Care Reviewed With patient;daughter   OTHER   Outcome Summary Bedside swallow eval completed. Recommend continue with regular textures and thin liquids, meds whole with thins. Recommend upright and slow rate of intake. ST to follow for speech-language eval.

## 2019-09-05 NOTE — NURSING NOTE
Called pharmacy to check on heparin for drip to start, pharmacy request already sent . No answer. Rodger states bag is next to him and he is tubing now.

## 2019-09-05 NOTE — H&P
[facility] Admission H&P    Patient Care Team:  Mae Lawrence APRN as PCP - General (Internal Medicine)  Mac Bond MD as PCP - Claims Attributed  Tone Tubbs MD as Consulting Physician (Cardiology)    Chief complaint: Slurred speech, confusion, word finding difficulty for the past 2 days and per her daughter at the bedside she had a mild left facial droop this morning    History of Present Illness    This is an 79-year-old female with a history of hypothyroidism, hypertension, breast cancer who presented to the emergency room from her nursing home for the above-stated complaints.  Her symptoms initially started 2 days ago and have been waxing and waning over that timeframe.  The patient is a poor historian.  Her daughter who is a retired nurse is at the bedside and provides excellent history.  Evidently the patient had just recently been diagnosed with diastolic heart failure and placed on Lasix a couple of weeks ago for worsening lower extremity edema.  At the present time the patient is back to her cognitive baseline with no speech abnormalities and mentation appears clear.  In the emergency room she had a broad work-up which included a UA that was suspicious for a urinary tract infection however the patient denies any new symptoms of dysuria or increased urinary frequency beyond what she has been having recently after being started on Lasix.  The patient also had a d-dimer obtained which was elevated and subsequently had a CT angiogram of the chest which showed bilateral small pulmonary emboli.  EKG showed atrial fibrillation which her daughter states would be a new diagnosis.  At present the patient's only complaint is that of some mild back pain.  She denies chest pain.  She has some chronic issues with shortness of breath and as mentioned above leg swelling.  She also has chronic issues with loose stools after she had two thirds of her colon removed for bleeding ulcerations.  There been no reports of  fever or chills.  She also had several significant electrolyte abnormalities upon presentation today.    Past Medical History:   Diagnosis Date   • Arthritis    • Breast cancer (CMS/HCC)    • Hyperlipidemia    • Hypertension    • Hyperthyroidism     patient has hypothyroidism   • Hypothyroidism      Past Surgical History:   Procedure Laterality Date   • BREAST BIOPSY     • BREAST SURGERY Right    • CHOLECYSTECTOMY     • COLON SURGERY      Removed   • COLONOSCOPY     • HYSTERECTOMY     • MAMMO BILATERAL  2015   • MASTECTOMY     • TONSILLECTOMY       Family History   Problem Relation Age of Onset   • Stroke Mother    • Hypertension Mother    • Heart disease Father    • Cancer Father      Social History     Tobacco Use   • Smoking status: Former Smoker   • Smokeless tobacco: Never Used   Substance Use Topics   • Alcohol use: Yes     Comment: OCC   • Drug use: Yes     Types: Hydrocodone     Medications reviewed    Allergies:  Aspirin and Nsaids    Review of Systems   Constitutional: Negative for chills and fever.   HENT: Negative for congestion and sore throat.    Eyes: Negative for visual disturbance.   Respiratory: Positive for shortness of breath. Negative for cough, chest tightness and wheezing.    Cardiovascular: Positive for leg swelling. Negative for chest pain and palpitations.   Gastrointestinal: Positive for diarrhea. Negative for abdominal distention, abdominal pain, nausea and vomiting.        Chronic diarrhea   Endocrine: Negative for polydipsia and polyuria.   Genitourinary: Positive for frequency. Negative for difficulty urinating, dysuria, flank pain and urgency.        Increased urinary frequency after placed on Lasix a couple of weeks ago   Musculoskeletal: Negative for arthralgias and myalgias.   Skin: Negative for color change and rash.   Neurological: Positive for facial asymmetry and speech difficulty. Negative for dizziness, tremors, seizures, syncope, weakness, light-headedness, numbness and  headaches.   Psychiatric/Behavioral: Positive for confusion. Negative for hallucinations.        PHYSICAL EXAM    Vital Signs  tMax 24 hrs:  Temp (24hrs), Av.7 °F (37.1 °C), Min:98.7 °F (37.1 °C), Max:98.7 °F (37.1 °C)    Vitals Ranges:  Temp:  [98.7 °F (37.1 °C)] 98.7 °F (37.1 °C)  Heart Rate:  [] 106  Resp:  [16-18] 16  BP: (138-203)/() 172/136    Physical Exam    Results Review:  Results from last 7 days   Lab Units 19  1731   WBC 10*3/mm3 11.05*   HEMOGLOBIN g/dL 14.1   HEMATOCRIT % 43.1   PLATELETS 10*3/mm3 305     Results from last 7 days   Lab Units 19  1731   SODIUM mmol/L 135*   POTASSIUM mmol/L 2.8*   CHLORIDE mmol/L 89*   CO2 mmol/L 30.7*   BUN mg/dL 11   CREATININE mg/dL 1.06*   CALCIUM mg/dL 10.2   BILIRUBIN mg/dL 1.3*   ALK PHOS U/L 80   ALT (SGPT) U/L 19   AST (SGOT) U/L 27   GLUCOSE mg/dL 112*     CT angiogram of the chest:  Multiple small bilateral pulmonary thromboemboli as  described. There is also evidence of mild pulmonary arterial  hypertension, as well.    Thoracic spine x-ray and chest x-ray:  AP, lateral and swimmer's views of the thoracic spine  were obtained. The study is hampered by patient motion. There is mild to  moderate loss of disc height at the upper and midthoracic region. A  moderate compression deformity is noted involving the upper thoracic  spine new versus . It is age indeterminate. Further evaluation could  be performed with a MRI examination of the thoracic spine as indicated.    PA and lateral views of the chest demonstrates surgical clips in  right axilla. The heart is within normal limits in size. There is no  evidence of infiltrate, effusion or of congestive failure.    Head CT:  No evidence of acute infarction or hemorrhage. Further  evaluation could be performed with a MRI examination of the brain as  indicated.    EKG shows atrial fibrillation     I reviewed the patient's new clinical results.  I reviewed the patient's new imaging  results and agree with the interpretation.  I personally viewed and interpreted the patient's EKG/Telemetry data        Active Hospital Problems    Diagnosis  POA   • **Bilateral pulmonary embolism (CMS/HCC) [I26.99]  Yes   • New onset a-fib (CMS/HCC) [I48.91]  Unknown   • UTI (urinary tract infection) [N39.0]  Unknown   • Hypokalemia [E87.6]  Unknown   • Hypomagnesemia [E83.42]  Unknown   • Slurred speech [R47.81]  Unknown   • Word finding difficulty [R47.89]  Unknown   • Metabolic encephalopathy [G93.41]  Unknown   • Chronic diastolic CHF (congestive heart failure) (CMS/[facility]) [I50.32]  Unknown   • Essential hypertension [I10]  Yes   • Acquired hypothyroidism [E03.9]  Yes   • Coronary artery disease involving native coronary artery of native heart without angina pectoris [I25.10]  Yes      Resolved Hospital Problems   No resolved problems to display.       Assessment & Plan    The patient will be admitted.  She has several potential issues ongoing.  I am worried about the slurred speech, word finding difficulty, and left facial droop this morning as reported by her daughter who is a retired nurse and excellent historian particularly in the context of what appears to be a new finding of atrial fibrillation.  Symptoms have resolved at this time but will conduct stroke/TIA work-up and ask neurology to evaluate her.  She may simply have a metabolic encephalopathy/delirium from urinary tract infection and pulmonary embolism.  However, she is not having any complaints of new urinary symptoms.  We will keep her on Rocephin and await urine culture results.  Will also place her on a heparin drip for now to treat the pulmonary emboli and check a lower extremity Doppler.  We will consult cardiology for the new onset atrial fibrillation.  Rate is controlled at this time.  Blood pressure is elevated and will temporize this as needed per stroke protocol.  Her daughter states she had an echocardiogram 2 weeks ago and will have her  nursing facility fax the results to the patient's medical unit when she gets out of the emergency room.  We will correct her electrolytes and I will check a TSH, B12, and folate.  Head CT was unremarkable.  Additional plans based on her clinical course.    I discussed the patients findings and my recommendations with patient and family    Gurvinder Levin MD  09/04/19  9:09 PM

## 2019-09-05 NOTE — PROGRESS NOTES
Discharge Planning Assessment  [facility]     Patient Name: Kendy Worrell  MRN: [id number]  Today's Date: 9/5/2019    Admit Date: 9/4/2019    Discharge Needs Assessment     Row Name 09/05/19 0946       Living Environment    Lives With  facility resident    Current Living Arrangements  independent/assisted living facility Pt lives @ [facility] assisted living    Primary Care Provided by  self    Quality of Family Relationships  supportive    Able to Return to Prior Arrangements  yes       Transition Planning    Patient/Family Anticipates Transition to  long term care facility    Patient/Family Anticipated Services at Transition  home health care       Discharge Needs Assessment    Concerns to be Addressed  adjustment to diagnosis/illness;basic needs    Equipment Currently Used at Home  rollator    Discharge Facility/Level of Care Needs  assisted living facility        Discharge Plan     Row Name 09/05/19 0957       Plan    Patient/Family in Agreement with Plan  yes    Plan Comments  CCP spoke with pt @ bedside, confirmed face sheet and primary pharmacy as [facility] in [place].  Pt states she uses a rollator to amb, receives meals at the facility, and they give her meds.  Pt says she can do her own bathing and dressing.  Pt states she is current with [facility] under medicaid wavier.  Plans are to return. Pt states she has been to [facility] in the past, but does not feel she will need skilled after this adm.  Instructed that CCP would cont to follow and offer assist as needed. Ramandeep Ashley RN    Row Name 09/05/19 0949       Plan    Plan  Plans are to return to assisted living @ [facility].  Pt is current with [facility].     Patient/Family in Agreement with Plan  yes        Destination      No service coordination in this encounter.      Durable Medical Equipment      No service coordination in this encounter.      Dialysis/Infusion      No service  coordination in this encounter.      Home Medical Care      No service coordination in this encounter.      Therapy      No service coordination in this encounter.      Community Resources      No service coordination in this encounter.          Demographic Summary    No documentation.       Functional Status    No documentation.       Psychosocial    No documentation.       Abuse/Neglect    No documentation.       Legal     Row Name 09/05/19 0943       Financial/Legal    Who Manages Finances if Patient Unable  pt has living will and her HCS's are her son Caleb or dtr Ramandeep        Substance Abuse    No documentation.       Patient Forms     Row Name 09/05/19 0957       Patient Forms    Important Message from Medicare (IMM)  -- IMM on 9/4 signed by pt/ dated and timed.             Ramandeep Ashley RN

## 2019-09-05 NOTE — PLAN OF CARE
Problem: Patient Care Overview  Goal: Plan of Care Review  Outcome: Ongoing (interventions implemented as appropriate)   09/05/19 9907   Coping/Psychosocial   Plan of Care Reviewed With patient   Plan of Care Review   Progress improving   OTHER   Outcome Summary vss, no falls, no pain, mri pending, continue heparin gtt, ble doppler pending, up in chair x1, continue to monitor       Problem: Stroke (Ischemic) (Adult)  Goal: Signs and Symptoms of Listed Potential Problems Will be Absent, Minimized or Managed (Stroke)  Outcome: Ongoing (interventions implemented as appropriate)      Problem: Arrhythmia/Dysrhythmia (Symptomatic) (Adult)  Goal: Signs and Symptoms of Listed Potential Problems Will be Absent, Minimized or Managed (Arrhythmia/Dysrhythmia)  Outcome: Ongoing (interventions implemented as appropriate)      Problem: Fall Risk (Adult)  Goal: Absence of Fall  Outcome: Ongoing (interventions implemented as appropriate)

## 2019-09-05 NOTE — CONSULTS
Neurology Consult Note    Consult Date: 9/5/2019    Referring MD: Gurvinder Levin*    Reason for Consult I have been asked to see the patient in neurological consultation to render advice and opinion regarding transient confusion, facial droop, possible stroke    Kendy Worrell is a 79 y.o. female with past medical history of hypertension, hyperlipidemia, prior partial colectomy for bleeding ulcers.  She presented to the emergency department initially with confusion.  Her daughter noticed intermittent facial droop. The patient complains of intermittent word finding difficulty starting several days ago. She had recently been started on Lasix for heart failure.  She had new onset atrial fibrillation in the emergency department.  CTA of the chest showed small bilateral pulmonary emboli.  Neurologic deficits have resolved since admission. She denies any episodes of word finding difficulty today.    Past Medical/Surgical Hx:  Past Medical History:   Diagnosis Date   • Arthritis    • Breast cancer (CMS/HCC)    • Hyperlipidemia    • Hypertension    • Hyperthyroidism     patient has hypothyroidism   • Hypothyroidism      Past Surgical History:   Procedure Laterality Date   • BREAST BIOPSY     • BREAST SURGERY Right    • CHOLECYSTECTOMY     • COLON SURGERY      Removed   • COLONOSCOPY     • HYSTERECTOMY     • MAMMO BILATERAL  2015   • MASTECTOMY     • TONSILLECTOMY         Medications On Admission  Medications Prior to Admission   Medication Sig Dispense Refill Last Dose   • acetaminophen-codeine (TYLENOL #3) 300-30 MG per tablet Take 1 tablet by mouth Every 6 (Six) Hours As Needed for Moderate Pain . 90 tablet 0 9/4/2019 at Unknown time   • Alpha-Lipoic Acid 200 MG capsule Take  by mouth Daily.   9/4/2019 at Unknown time   • B Complex Vitamins (VITAMIN B COMPLEX) capsule capsule Take  by mouth Daily.   9/4/2019 at Unknown time   • Biotin (BIOTIN MAXIMUM STRENGTH) 10 MG tablet Take  by mouth Daily.   9/4/2019 at  Unknown time   • Chlorphen-Pseudoephed-APAP (CORICIDIN D PO) Take  by mouth.   9/4/2019 at Unknown time   • Cholecalciferol (VITAMIN D3) 5000 UNITS capsule capsule Take 10,000 Units by mouth 2 (Two) Times a Day.   9/4/2019 at Unknown time   • coenzyme Q10 100 MG capsule Take 100 mg by mouth Daily.   9/4/2019 at Unknown time   • Cyanocobalamin (B-12) 5000 MCG capsule Take  by mouth.   9/4/2019 at Unknown time   • diphenoxylate-atropine (LOMOTIL) 2.5-0.025 MG per tablet TAKE 1 TABLET BY MOUTH FOUR TIMES DAILY AS NEEDED 100 tablet 0 9/4/2019 at Unknown time   • isosorbide mononitrate (IMDUR) 30 MG 24 hr tablet Take 30 mg by mouth Daily.   9/4/2019 at Unknown time   • levothyroxine (SYNTHROID, LEVOTHROID) 100 MCG tablet Take 1 tablet by mouth Daily. 90 tablet 3 9/4/2019 at Unknown time   • Magnesium 250 MG tablet Take 1 tablet by mouth Daily.   9/4/2019 at Unknown time   • metoprolol succinate XL (TOPROL-XL) 50 MG 24 hr tablet Take 1 tablet by mouth Daily. 90 tablet 3 9/4/2019 at Unknown time   • Misc. Devices (ROLLER WALKER) misc Use daily 1 each 0 9/4/2019 at Unknown time   • Multiple Vitamin (MULTI VITAMIN DAILY PO) Take  by mouth.   9/4/2019 at Unknown time   • nystatin-triamcinolone (MYCOLOG II) 465111-6.1 UNIT/GM-% cream apply to affected area twice a day if needed 45 g 3 9/4/2019 at Unknown time   • potassium chloride (K-DUR,KLOR-CON) 20 MEQ CR tablet Take 1 tablet by mouth Daily. 90 tablet 3 9/4/2019 at Unknown time   • triamterene-hydrochlorothiazide (MAXZIDE-25) 37.5-25 MG per tablet Take 1 tablet by mouth Daily. 90 tablet 3 9/4/2019 at Unknown time       Allergies:  Allergies   Allergen Reactions   • Aspirin      bleeding   • Nsaids      bleeding       Social Hx:  Social History     Socioeconomic History   • Marital status: Single     Spouse name: Not on file   • Number of children: Not on file   • Years of education: Not on file   • Highest education level: Not on file   Tobacco Use   • Smoking status:  "Former Smoker   • Smokeless tobacco: Never Used   • Tobacco comment: quit in 1988   Substance and Sexual Activity   • Alcohol use: Yes     Alcohol/week: 1.8 oz     Types: 3 Shots of liquor per week     Comment: OCC   • Drug use: Yes     Types: Hydrocodone   • Sexual activity: No       Family Hx:  Family History   Problem Relation Age of Onset   • Stroke Mother    • Hypertension Mother    • Heart disease Father    • Cancer Father        Constitutional: [No fevers, chills, + fatigue  Eye: [No recent visual problems, eye discharge]  HEENT: [No ear pain, nasal congestion]  Respiratory: [+ shortness of breath, cough]  Cardiovascular: [No Chest pain, + palpitations]  Gastrointestinal: [No nausea, vomiting]  Genitourinary: [No hematuria, incontinence]  Hema/Lymph: [no nosebleeds, history of anticoagulation]  Endocrine: [Negative for excessive urination, heat or cold intolerance]  Musculoskeletal: [+ back pain, no neck pain]  Integumentary: [No rash, pruritus]  Neurologic: [+ weakness, no numbness]  Psychiatric: [No anxiety, depression]    Exam    /100   Pulse 99   Temp 97.9 °F (36.6 °C) (Oral)   Resp 18   Ht 152.4 cm (60\")   Wt 81.5 kg (179 lb 9.6 oz)   SpO2 99%   BMI 35.08 kg/m²   gen: NAD, vitals reviewed  Eyes: fundus sharp with no papilledema or retinal hemorrhages  HEENT: no nuchal rigidity  CVS: irregular, S1, S2  MS: oriented x3, remote memory intact, recent memory limited, normal attention/concentration, language intact, no neglect, finger agnosia, normal fund of knowledge  CN: visual acuity grossly normal, visual fields full, PERRL, EOMI, facial sensation equal, no facial droop, hearing symmetric, palate elevates symmetrically, shoulder shrug equal, tongue midline  Motor: 5/5 throughout upper and lower extremities, normal tone  Sensation: Moderately diminished vibration distal lower extremities  Reflexes: 2+ throughout upper extremities, absent bilateral lower extremities, downgoing " plantars  Coordination: no dysmetria with finger to nose bilaterally  Gait: Deferred at patient request due to right knee pain    DATA:    Lab Results   Component Value Date    GLUCOSE 120 (H) 09/05/2019    CALCIUM 9.5 09/05/2019     09/05/2019    K 3.1 (L) 09/05/2019    CO2 29.4 (H) 09/05/2019    CL 93 (L) 09/05/2019    BUN 11 09/05/2019    CREATININE 0.77 09/05/2019    EGFRIFAFRI 60 (L) 05/01/2019    EGFRIFNONA 72 09/05/2019    BCR 14.3 09/05/2019    ANIONGAP 13.6 09/05/2019     Lab Results   Component Value Date    WBC 9.19 09/05/2019    HGB 12.1 09/05/2019    HCT 36.7 09/05/2019    MCV 91.5 09/05/2019     09/05/2019     Lab Results   Component Value Date    LDL 37 09/05/2019    LDL 38 04/08/2019    LDL 51 09/26/2018     Lab Results   Component Value Date    HGBA1C 4.80 09/05/2019     Lab Results   Component Value Date    INR 1.10 09/04/2019    INR 1.22 (H) 09/04/2019    PROTIME 13.9 09/04/2019    PROTIME 15.1 (H) 09/04/2019       Lab review: GFR 60, CBC within normal limits, LDL 37, hemoglobin A1c 4.8    Imaging review: MRI, MRA pending.  CT chest showed bilateral Pes.  I personally reviewed her CT head performed on admission which shows a chronic appearing left subcortical stroke.  Radiology report reviewed    Diagnoses:  Facial droop  Word finding difficulty  New onset atrial fibrillation  Bilateral pulmonary emboli  Diastolic heart failure    Comment: 79-year-old female with hypertension, hyperlipidemia, heart failure, presenting with intermittent confusion and facial droop in the setting of new onset atrial fibrillation.  Agree with stroke evaluation.  She has already been started on heparin drip.  We will wait on MRI/MRI    PLAN:  1.  On heparin for new onset A. Fib.  2.  MRI/MRA  3.  2D echo

## 2019-09-05 NOTE — PLAN OF CARE
Problem: Patient Care Overview  Goal: Plan of Care Review  Outcome: Ongoing (interventions implemented as appropriate)  Pt denies pain, discomfort, or shorntess of breath at this time. NO acute distress, will continue to monitor

## 2019-09-05 NOTE — THERAPY EVALUATION
Acute Care - Speech Language Pathology   Swallow Initial Evaluation [facility]     Patient Name: Kendy Worrell  : 1940  MRN: [id number]  Today's Date: 2019               Admit Date: 2019    Visit Dx:     ICD-10-CM ICD-9-CM   1. Bilateral pulmonary embolism (CMS/HCC) I26.99 415.19   2. Acute UTI N39.0 599.0   3. Hypokalemia E87.6 276.8   4. Hypomagnesemia E83.42 275.2   5. New onset atrial fibrillation (CMS/HCC) I48.91 427.31   6. Abnormal EKG R94.31 794.31   7. Uncontrolled hypertension I10 401.9   8. Closed compression fracture of thoracic vertebra, initial encounter (CMS/[facility])- undetermined age S22.000A 805.2     Patient Active Problem List   Diagnosis   • Essential hypertension   • Acquired hypothyroidism   • Coronary artery disease involving native coronary artery of native heart without angina pectoris   • Hyperlipidemia   • Arthritis   • Skin lesion of chest wall   • NSTEMI (non-ST elevated myocardial infarction) (CMS/HCC)   • Supraventricular tachycardia (CMS/HCC)   • Normal cardiac stress test   • Gastrointestinal bleed   • Angina effort (CMS/HCC)   • Morbidly obese (CMS/HCC)   • Bilateral pulmonary embolism (CMS/HCC)   • New onset a-fib (CMS/HCC)   • UTI (urinary tract infection)   • Hypokalemia   • Slurred speech   • Word finding difficulty   • Metabolic encephalopathy   • Chronic diastolic CHF (congestive heart failure) (CMS/HCC)     Past Medical History:   Diagnosis Date   • Arthritis    • Breast cancer (CMS/HCC)    • Hyperlipidemia    • Hypertension    • Hyperthyroidism     patient has hypothyroidism   • Hypothyroidism      Past Surgical History:   Procedure Laterality Date   • BREAST BIOPSY     • BREAST SURGERY Right    • CHOLECYSTECTOMY     • COLON SURGERY      Removed   • COLONOSCOPY     • HYSTERECTOMY     • MAMMO BILATERAL     • MASTECTOMY     • TONSILLECTOMY          SWALLOW EVALUATION (last 72 hours)      SLP Adult Swallow Evaluation     Row Name 19 3379           Document Type  evaluation  -OC    Subjective Information  no complaints  -OC    Patient Observations  alert;cooperative;agree to therapy  -OC    Patient Effort  good  -OC    Symptoms Noted During/After Treatment  none  -OC          Patient Profile Reviewed  yes  -OC    Pertinent History Of Current Problem  Pt admitted for bilateral pulmonary embolism, CVA wrok-up. Hx HTN, HLD..   -OC    Current Method of Nutrition  regular textures;thin liquids  -OC    Precautions/Limitations, Vision  WFL with corrective lenses  -OC    Precautions/Limitations, Hearing  hearing impairment, bilaterally;hearing aid, left;hearing aid, right  -OC    Prior Level of Function-Communication  other (see comments) unknown  -OC    Prior Level of Function-Swallowing  no diet consistency restrictions  -OC    Plans/Goals Discussed with  patient and family;agreed upon  -OC    Barriers to Rehab  none identified  -OC    Patient's Goals for Discharge  patient did not state  -OC    Family Goals for Discharge  family did not state  -OC          Additional Documentation  Pain Scale: Numbers Pre/Post-Treatment (Group)  -OC          Pain Scale: Numbers, Pretreatment  0/10 - no pain  -OC    Pain Scale: Numbers, Post-Treatment  0/10 - no pain  -OC          Dentition Assessment  upper dentures/partial in place;lower dentures/partial in place  -OC    Secretion Management  WNL/WFL  -OC    Mucosal Quality  moist, healthy  -OC    Volitional Swallow  WFL  -OC    Volitional Cough  WFL  -OC          Oral Motor General Assessment  WFL  -OC          Oral Prep Phase  WFL  -OC    Oral Transit  WFL  -OC    Oral Residue  WFL  -OC    Pharyngeal Phase  WFL  -OC    Clinical Swallow Evaluation Summary  No overt s/s aspiration with thin, puree, mech soft, and regular textures.   -OC          SLP Swallowing Diagnosis  functional oral phase;functional pharyngeal phase  -OC    Functional Impact  no impact on function  -OC    Rehab Potential/Prognosis, Swallowing  good, to achieve  stated therapy goals  -OC    Swallow Criteria for Skilled Therapeutic Interventions Met  baseline status  -OC          Therapy Frequency (Swallow)  evaluation only  -OC    Predicted Duration Therapy Intervention (Days)  until discharge  -OC    SLP Diet Recommendation  regular textures;thin liquids  -OC    Recommended Diagnostics  other (see comments) speech/language eval  -OC    Recommended Precautions and Strategies  upright posture during/after eating;small bites of food and sips of liquid  -OC    SLP Rec. for Method of Medication Administration  meds whole;with thin liquids;with pudding or applesauce  -OC    Monitor for Signs of Aspiration  yes;notify SLP if any concerns  -OC    Anticipated Dischage Disposition  unknown  -OC      User Key  (r) = Recorded By, (t) = Taken By, (c) = Cosigned By    Initials Name Effective Dates    OC Rayna, ROBI Francois,CCC-SLP 06/08/18 -           EDUCATION  The patient has been educated in the following areas:   Dysphagia (Swallowing Impairment).    SLP Recommendation and Plan  SLP Swallowing Diagnosis: functional oral phase, functional pharyngeal phase  SLP Diet Recommendation: regular textures, thin liquids  Recommended Precautions and Strategies: upright posture during/after eating, small bites of food and sips of liquid  SLP Rec. for Method of Medication Administration: meds whole, with thin liquids, with pudding or applesauce     Monitor for Signs of Aspiration: yes, notify SLP if any concerns  Recommended Diagnostics: other (see comments)(speech/language eval)  Swallow Criteria for Skilled Therapeutic Interventions Met: baseline status  Anticipated Dischage Disposition: unknown  Rehab Potential/Prognosis, Swallowing: good, to achieve stated therapy goals  Therapy Frequency (Swallow): evaluation only  Predicted Duration Therapy Intervention (Days): until discharge       Plan of Care Reviewed With: patient, daughter  Outcome Summary: Bedside swallow eval completed. Recommend  continue with regular textures and thin liquids, meds whole with thins. Recommend upright and slow rate of intake. ST to follow for speech-language eval. Pt and pt's daughter report changes in speech and difficulty with verbal expression.          SLP Outcome Measures (last 72 hours)      SLP Outcome Measures     Row Name 09/05/19 1430             SLP Outcome Measures    Outcome Measure Used?  Adult NOMS  -OC         Adult FCM Scores    FCM Chosen  Swallowing  -OC      Swallowing FCM Score  7  -OC        User Key  (r) = Recorded By, (t) = Taken By, (c) = Cosigned By    Initials Name Effective Dates    Priscila Powell MA,CCC-SLP 06/08/18 -            Time Calculation:   Time Calculation- SLP     Row Name 09/05/19 1601             Time Calculation- SLP    SLP Start Time  1345  -OC      SLP Received On  09/05/19  -OC        User Key  (r) = Recorded By, (t) = Taken By, (c) = Cosigned By    Initials Name Provider Type    Priscila Powell MA,CCC-SLP Speech and Language Pathologist          Therapy Charges for Today     Code Description Service Date Service Provider Modifiers Qty    [id number]  ST EVAL ORAL PHARYNG SWALLOW 4 9/5/2019 Priscila Way MA,CCC-SLP GN 1               Priscila Way MA,JILLIAN-SLP  9/5/2019

## 2019-09-06 ENCOUNTER — APPOINTMENT (OUTPATIENT)
Dept: CARDIOLOGY | Facility: HOSPITAL | Age: 79
End: 2019-09-06

## 2019-09-06 LAB
APTT PPP: 114.3 SECONDS (ref 22.7–35.4)
APTT PPP: 173.3 SECONDS (ref 22.7–35.4)
APTT PPP: 65.5 SECONDS (ref 22.7–35.4)
BACTERIA SPEC AEROBE CULT: ABNORMAL
BASOPHILS # BLD AUTO: 0.06 10*3/MM3 (ref 0–0.2)
BASOPHILS NFR BLD AUTO: 0.7 % (ref 0–1.5)
BH CV ECHO MEAS - ACS: 1.2 CM
BH CV ECHO MEAS - AO MEAN PG (FULL): 3 MMHG
BH CV ECHO MEAS - AO MEAN PG: 4 MMHG
BH CV ECHO MEAS - AO ROOT AREA (BSA CORRECTED): 1.6
BH CV ECHO MEAS - AO ROOT AREA: 6.6 CM^2
BH CV ECHO MEAS - AO ROOT DIAM: 2.9 CM
BH CV ECHO MEAS - AO V2 MAX: 133 CM/SEC
BH CV ECHO MEAS - AO V2 MEAN: 98.9 CM/SEC
BH CV ECHO MEAS - AO V2 VTI: 22.6 CM
BH CV ECHO MEAS - ASC AORTA: 2.9 CM
BH CV ECHO MEAS - AVA(I,A): 1.7 CM^2
BH CV ECHO MEAS - AVA(I,D): 1.7 CM^2
BH CV ECHO MEAS - BSA(HAYCOCK): 1.9 M^2
BH CV ECHO MEAS - BSA: 1.8 M^2
BH CV ECHO MEAS - BZI_BMI: 35 KILOGRAMS/M^2
BH CV ECHO MEAS - BZI_METRIC_HEIGHT: 152.4 CM
BH CV ECHO MEAS - BZI_METRIC_WEIGHT: 81.2 KG
BH CV ECHO MEAS - EDV(CUBED): 42.9 ML
BH CV ECHO MEAS - EDV(MOD-SP2): 56 ML
BH CV ECHO MEAS - EDV(MOD-SP4): 78 ML
BH CV ECHO MEAS - EDV(TEICH): 50.9 ML
BH CV ECHO MEAS - EF(CUBED): 37 %
BH CV ECHO MEAS - EF(MOD-BP): 56 %
BH CV ECHO MEAS - EF(MOD-SP2): 60.7 %
BH CV ECHO MEAS - EF(MOD-SP4): 50 %
BH CV ECHO MEAS - EF(TEICH): 31.2 %
BH CV ECHO MEAS - ESV(CUBED): 27 ML
BH CV ECHO MEAS - ESV(MOD-SP2): 22 ML
BH CV ECHO MEAS - ESV(MOD-SP4): 39 ML
BH CV ECHO MEAS - ESV(TEICH): 35 ML
BH CV ECHO MEAS - FS: 14.3 %
BH CV ECHO MEAS - IVS/LVPW: 1.2
BH CV ECHO MEAS - IVSD: 1.2 CM
BH CV ECHO MEAS - LV DIASTOLIC VOL/BSA (35-75): 43.8 ML/M^2
BH CV ECHO MEAS - LV MASS(C)D: 119 GRAMS
BH CV ECHO MEAS - LV MASS(C)DI: 66.8 GRAMS/M^2
BH CV ECHO MEAS - LV MEAN PG: 1 MMHG
BH CV ECHO MEAS - LV SYSTOLIC VOL/BSA (12-30): 21.9 ML/M^2
BH CV ECHO MEAS - LV V1 MAX: 73 CM/SEC
BH CV ECHO MEAS - LV V1 MEAN: 42 CM/SEC
BH CV ECHO MEAS - LV V1 VTI: 12.6 CM
BH CV ECHO MEAS - LVIDD: 3.5 CM
BH CV ECHO MEAS - LVIDS: 3 CM
BH CV ECHO MEAS - LVLD AP2: 6.5 CM
BH CV ECHO MEAS - LVLD AP4: 6.5 CM
BH CV ECHO MEAS - LVLS AP2: 5.5 CM
BH CV ECHO MEAS - LVLS AP4: 5.6 CM
BH CV ECHO MEAS - LVOT AREA (M): 3.1 CM^2
BH CV ECHO MEAS - LVOT AREA: 3.1 CM^2
BH CV ECHO MEAS - LVOT DIAM: 2 CM
BH CV ECHO MEAS - LVPWD: 1 CM
BH CV ECHO MEAS - MR MAX PG: 193.2 MMHG
BH CV ECHO MEAS - MR MAX VEL: 695 CM/SEC
BH CV ECHO MEAS - MV DEC SLOPE: 486 CM/SEC^2
BH CV ECHO MEAS - MV DEC TIME: 150 SEC
BH CV ECHO MEAS - MV E MAX VEL: 97.2 CM/SEC
BH CV ECHO MEAS - MV MEAN PG: 2 MMHG
BH CV ECHO MEAS - MV P1/2T MAX VEL: 113 CM/SEC
BH CV ECHO MEAS - MV P1/2T: 68.1 MSEC
BH CV ECHO MEAS - MV V2 MEAN: 66 CM/SEC
BH CV ECHO MEAS - MV V2 VTI: 19.1 CM
BH CV ECHO MEAS - MVA P1/2T LCG: 1.9 CM^2
BH CV ECHO MEAS - MVA(P1/2T): 3.2 CM^2
BH CV ECHO MEAS - MVA(VTI): 2.1 CM^2
BH CV ECHO MEAS - PA ACC SLOPE: 26.7 CM/SEC^2
BH CV ECHO MEAS - PA ACC TIME: 0.09 SEC
BH CV ECHO MEAS - PA MAX PG: 1.4 MMHG
BH CV ECHO MEAS - PA PR(ACCEL): 39.9 MMHG
BH CV ECHO MEAS - PA V2 MAX: 59.7 CM/SEC
BH CV ECHO MEAS - QP/QS: 1.3
BH CV ECHO MEAS - RAP SYSTOLE: 15 MMHG
BH CV ECHO MEAS - RV MEAN PG: 0 MMHG
BH CV ECHO MEAS - RV V1 MEAN: 27.7 CM/SEC
BH CV ECHO MEAS - RV V1 VTI: 8.6 CM
BH CV ECHO MEAS - RVOT AREA: 5.7 CM^2
BH CV ECHO MEAS - RVOT DIAM: 2.7 CM
BH CV ECHO MEAS - RVSP: 61.8 MMHG
BH CV ECHO MEAS - SI(AO): 83.8 ML/M^2
BH CV ECHO MEAS - SI(CUBED): 8.9 ML/M^2
BH CV ECHO MEAS - SI(LVOT): 22.1 ML/M^2
BH CV ECHO MEAS - SI(MOD-SP2): 19.1 ML/M^2
BH CV ECHO MEAS - SI(MOD-SP4): 21.9 ML/M^2
BH CV ECHO MEAS - SI(TEICH): 8.9 ML/M^2
BH CV ECHO MEAS - SV(AO): 149.3 ML
BH CV ECHO MEAS - SV(CUBED): 15.9 ML
BH CV ECHO MEAS - SV(LVOT): 39.4 ML
BH CV ECHO MEAS - SV(MOD-SP2): 34 ML
BH CV ECHO MEAS - SV(MOD-SP4): 39 ML
BH CV ECHO MEAS - SV(RVOT): 49.5 ML
BH CV ECHO MEAS - SV(TEICH): 15.9 ML
BH CV ECHO MEAS - TAPSE (>1.6): 1.6 CM2
BH CV ECHO MEAS - TR MAX VEL: 342 CM/SEC
BH CV VAS BP RIGHT ARM: NORMAL MMHG
BH CV XLRA - RV BASE: 4.2 CM
DEPRECATED RDW RBC AUTO: 49.5 FL (ref 37–54)
EOSINOPHIL # BLD AUTO: 0.23 10*3/MM3 (ref 0–0.4)
EOSINOPHIL NFR BLD AUTO: 2.8 % (ref 0.3–6.2)
ERYTHROCYTE [DISTWIDTH] IN BLOOD BY AUTOMATED COUNT: 14.7 % (ref 12.3–15.4)
GLUCOSE BLDC GLUCOMTR-MCNC: 115 MG/DL (ref 70–130)
GLUCOSE BLDC GLUCOMTR-MCNC: 117 MG/DL (ref 70–130)
GLUCOSE BLDC GLUCOMTR-MCNC: 121 MG/DL (ref 70–130)
GLUCOSE BLDC GLUCOMTR-MCNC: 124 MG/DL (ref 70–130)
HCT VFR BLD AUTO: 38.3 % (ref 34–46.6)
HEMOCCULT STL QL: NEGATIVE
HGB BLD-MCNC: 12.5 G/DL (ref 12–15.9)
IMM GRANULOCYTES # BLD AUTO: 0.04 10*3/MM3 (ref 0–0.05)
IMM GRANULOCYTES NFR BLD AUTO: 0.5 % (ref 0–0.5)
LEFT ATRIUM VOLUME INDEX: 42 ML/M2
LYMPHOCYTES # BLD AUTO: 1.6 10*3/MM3 (ref 0.7–3.1)
LYMPHOCYTES NFR BLD AUTO: 19.6 % (ref 19.6–45.3)
MAXIMAL PREDICTED HEART RATE: 140 BPM
MCH RBC QN AUTO: 29.9 PG (ref 26.6–33)
MCHC RBC AUTO-ENTMCNC: 32.6 G/DL (ref 31.5–35.7)
MCV RBC AUTO: 91.6 FL (ref 79–97)
MONOCYTES # BLD AUTO: 1.07 10*3/MM3 (ref 0.1–0.9)
MONOCYTES NFR BLD AUTO: 13.1 % (ref 5–12)
NEUTROPHILS # BLD AUTO: 5.15 10*3/MM3 (ref 1.7–7)
NEUTROPHILS NFR BLD AUTO: 63.3 % (ref 42.7–76)
NRBC BLD AUTO-RTO: 0 /100 WBC (ref 0–0.2)
PLATELET # BLD AUTO: 260 10*3/MM3 (ref 140–450)
PMV BLD AUTO: 10.7 FL (ref 6–12)
RBC # BLD AUTO: 4.18 10*6/MM3 (ref 3.77–5.28)
STRESS TARGET HR: 119 BPM
WBC NRBC COR # BLD: 8.15 10*3/MM3 (ref 3.4–10.8)

## 2019-09-06 PROCEDURE — 82962 GLUCOSE BLOOD TEST: CPT

## 2019-09-06 PROCEDURE — 99232 SBSQ HOSP IP/OBS MODERATE 35: CPT | Performed by: INTERNAL MEDICINE

## 2019-09-06 PROCEDURE — 85730 THROMBOPLASTIN TIME PARTIAL: CPT | Performed by: PSYCHIATRY & NEUROLOGY

## 2019-09-06 PROCEDURE — 85730 THROMBOPLASTIN TIME PARTIAL: CPT | Performed by: INTERNAL MEDICINE

## 2019-09-06 PROCEDURE — 85025 COMPLETE CBC W/AUTO DIFF WBC: CPT | Performed by: INTERNAL MEDICINE

## 2019-09-06 PROCEDURE — 97162 PT EVAL MOD COMPLEX 30 MIN: CPT | Performed by: PHYSICAL THERAPIST

## 2019-09-06 PROCEDURE — 93306 TTE W/DOPPLER COMPLETE: CPT

## 2019-09-06 PROCEDURE — 36415 COLL VENOUS BLD VENIPUNCTURE: CPT | Performed by: INTERNAL MEDICINE

## 2019-09-06 PROCEDURE — 85730 THROMBOPLASTIN TIME PARTIAL: CPT | Performed by: HOSPITALIST

## 2019-09-06 PROCEDURE — 25010000002 CEFTRIAXONE PER 250 MG: Performed by: HOSPITALIST

## 2019-09-06 PROCEDURE — 97110 THERAPEUTIC EXERCISES: CPT | Performed by: PHYSICAL THERAPIST

## 2019-09-06 PROCEDURE — 25010000002 HEPARIN (PORCINE) PER 1000 UNITS: Performed by: INTERNAL MEDICINE

## 2019-09-06 PROCEDURE — 99233 SBSQ HOSP IP/OBS HIGH 50: CPT | Performed by: NURSE PRACTITIONER

## 2019-09-06 PROCEDURE — 82272 OCCULT BLD FECES 1-3 TESTS: CPT | Performed by: HOSPITALIST

## 2019-09-06 PROCEDURE — 93306 TTE W/DOPPLER COMPLETE: CPT | Performed by: INTERNAL MEDICINE

## 2019-09-06 RX ORDER — ATORVASTATIN CALCIUM 20 MG/1
40 TABLET, FILM COATED ORAL NIGHTLY
Status: DISCONTINUED | OUTPATIENT
Start: 2019-09-06 | End: 2019-09-09 | Stop reason: HOSPADM

## 2019-09-06 RX ORDER — METOPROLOL SUCCINATE 100 MG/1
100 TABLET, EXTENDED RELEASE ORAL DAILY
Status: DISCONTINUED | OUTPATIENT
Start: 2019-09-07 | End: 2019-09-08

## 2019-09-06 RX ORDER — METOPROLOL SUCCINATE 50 MG/1
50 TABLET, EXTENDED RELEASE ORAL ONCE
Status: COMPLETED | OUTPATIENT
Start: 2019-09-06 | End: 2019-09-06

## 2019-09-06 RX ORDER — CEFTRIAXONE SODIUM 1 G/50ML
1 INJECTION, SOLUTION INTRAVENOUS EVERY 24 HOURS
Status: COMPLETED | OUTPATIENT
Start: 2019-09-06 | End: 2019-09-08

## 2019-09-06 RX ADMIN — LABETALOL 20 MG/4 ML (5 MG/ML) INTRAVENOUS SYRINGE 20 MG: at 02:16

## 2019-09-06 RX ADMIN — HEPARIN SODIUM 13 UNITS/KG/HR: 10000 INJECTION, SOLUTION INTRAVENOUS at 02:09

## 2019-09-06 RX ADMIN — SODIUM CHLORIDE, PRESERVATIVE FREE 10 ML: 5 INJECTION INTRAVENOUS at 08:01

## 2019-09-06 RX ADMIN — METOPROLOL SUCCINATE 50 MG: 50 TABLET, FILM COATED, EXTENDED RELEASE ORAL at 08:01

## 2019-09-06 RX ADMIN — ISOSORBIDE MONONITRATE 30 MG: 30 TABLET ORAL at 08:01

## 2019-09-06 RX ADMIN — HEPARIN SODIUM 5000 UNITS: 5000 INJECTION INTRAVENOUS; SUBCUTANEOUS at 16:17

## 2019-09-06 RX ADMIN — CEFTRIAXONE SODIUM 1 G: 1 INJECTION, SOLUTION INTRAVENOUS at 16:19

## 2019-09-06 RX ADMIN — ATORVASTATIN CALCIUM 40 MG: 20 TABLET, FILM COATED ORAL at 20:36

## 2019-09-06 RX ADMIN — METOPROLOL SUCCINATE 50 MG: 50 TABLET, FILM COATED, EXTENDED RELEASE ORAL at 10:34

## 2019-09-06 RX ADMIN — LEVOTHYROXINE SODIUM 100 MCG: 100 TABLET ORAL at 08:01

## 2019-09-06 RX ADMIN — SODIUM CHLORIDE, PRESERVATIVE FREE 10 ML: 5 INJECTION INTRAVENOUS at 20:36

## 2019-09-06 RX ADMIN — POTASSIUM CHLORIDE 20 MEQ: 750 CAPSULE, EXTENDED RELEASE ORAL at 08:01

## 2019-09-06 NOTE — PLAN OF CARE
Problem: Patient Care Overview  Goal: Plan of Care Review  Outcome: Ongoing (interventions implemented as appropriate)   09/06/19 1808   Coping/Psychosocial   Plan of Care Reviewed With patient   Plan of Care Review   Progress improving   OTHER   Outcome Summary BP meds adjusted per MD. BP remains 180's/110's. Heparin gtt adjusted. No c/o pain. Pt to [facility] with 1 ast. NIH-0. Will continue to monitor

## 2019-09-06 NOTE — THERAPY EVALUATION
Patient Name: Kendy Worrell  : 1940    MRN: [id number]                              Today's Date: 2019       Admit Date: 2019    Visit Dx:     ICD-10-CM ICD-9-CM   1. Bilateral pulmonary embolism (CMS/HCC) I26.99 415.19   2. Acute UTI N39.0 599.0   3. Hypokalemia E87.6 276.8   4. Hypomagnesemia E83.42 275.2   5. New onset atrial fibrillation (CMS/[facility]) I48.91 427.31   6. Abnormal EKG R94.31 794.31   7. Uncontrolled hypertension I10 401.9   8. Closed compression fracture of thoracic vertebra, initial encounter (CMS/[facility])- undetermined age S22.000A 805.2     Patient Active Problem List   Diagnosis   • Essential hypertension   • Acquired hypothyroidism   • Coronary artery disease involving native coronary artery of native heart without angina pectoris   • Hyperlipidemia   • Arthritis   • Skin lesion of chest wall   • NSTEMI (non-ST elevated myocardial infarction) (CMS/[facility])   • Supraventricular tachycardia (CMS/[facility])   • Normal cardiac stress test   • Gastrointestinal bleed   • Angina effort (CMS/[facility])   • Morbidly obese (CMS/[facility])   • Bilateral pulmonary embolism (CMS/[facility])   • New onset a-fib (CMS/[facility])   • UTI (urinary tract infection)   • Hypokalemia   • Slurred speech   • Word finding difficulty   • Metabolic encephalopathy   • Chronic diastolic CHF (congestive heart failure) (CMS/[facility])     Past Medical History:   Diagnosis Date   • Arthritis    • Breast cancer (CMS/[facility])    • Hyperlipidemia    • Hypertension    • Hyperthyroidism     patient has hypothyroidism   • Hypothyroidism      Past Surgical History:   Procedure Laterality Date   • BREAST BIOPSY     • BREAST SURGERY Right    • CHOLECYSTECTOMY     • COLON SURGERY      Removed   • COLONOSCOPY     • HYSTERECTOMY     • MAMMO BILATERAL     • MASTECTOMY     • TONSILLECTOMY       General Information     Row Name 19 0858          PT Evaluation Time/Intention    Document Type  evaluation  -KH     Mode of Treatment  individual therapy;physical therapy   -     Row Name 09/06/19 0858          General Information    Prior Level of Function  independent:  -     Existing Precautions/Restrictions  fall  -     Row Name 09/06/19 0858          Relationship/Environment    Lives With  facility resident  -     Row Name 09/06/19 0858          Resource/Environmental Concerns    Current Living Arrangements  independent/assisted living facility  -     Row Name 09/06/19 0858          Stairs Within Home, Primary    Number of Stairs, Within Home, Primary  none  -     Row Name 09/06/19 0858          Cognitive Assessment/Intervention- PT/OT    Orientation Status (Cognition)  oriented x 4  -       User Key  (r) = Recorded By, (t) = Taken By, (c) = Cosigned By    Initials Name Provider Type    Teetee Taylor, PT Physical Therapist        Mobility     Row Name 09/06/19 0859          Bed Mobility Assessment/Treatment    Bed Mobility Assessment/Treatment  bed mobility (all) activities  -     [place] Level (Bed Mobility)  conditional independence  -     Assistive Device (Bed Mobility)  bed rails;head of bed elevated  -     Row Name 09/06/19 0859          Bed-Chair Transfer    Bed-Chair [place] (Transfers)  contact guard  -     Assistive Device (Bed-Chair Transfers)  walker, front-wheeled  -     Row Name 09/06/19 0859          Sit-Stand Transfer    Sit-Stand [place] (Transfers)  contact guard  -     Assistive Device (Sit-Stand Transfers)  walker, front-wheeled  -     Row Name 09/06/19 0859          Gait/Stairs Assessment/Training    [place] Level (Gait)  contact guard;minimum assist (75% patient effort)  -     Assistive Device (Gait)  walker, front-wheeled  -     Distance in Feet (Gait)  90  -     Comment (Gait/Stairs)  cues to stay inside the walker, min A secondary to LOB when turning  -       User Key  (r) = Recorded By, (t) = Taken By, (c) = Cosigned By    Initials Name Provider Type    Teetee Taylor, PT  Physical Therapist        Obj/Interventions     Row Name 09/06/19 0900          General ROM    GENERAL ROM COMMENTS  BLE WFL  -     Row Name 09/06/19 0900          MMT (Manual Muscle Testing)    General MMT Comments  BLE  -     Row Name 09/06/19 0923          Therapeutic Exercise    Lower Extremity (Therapeutic Exercise)  LAQ (long arc quad), bilateral;marching while seated  -     Lower Extremity Range of Motion (Therapeutic Exercise)  ankle dorsiflexion/plantar flexion, bilateral  -     Exercise Type (Therapeutic Exercise)  AROM (active range of motion)  -     Position (Therapeutic Exercise)  seated  -     Sets/Reps (Therapeutic Exercise)  1/10  -     Row Name 09/06/19 0900          Static Sitting Balance    Level of [place] (Unsupported Sitting, Static Balance)  independent  -     Sitting Position (Unsupported Sitting, Static Balance)  sitting on edge of bed  -     Time Able to Maintain Position (Unsupported Sitting, Static Balance)  45 to 60 seconds  -[facility] Name 09/06/19 0900          Dynamic Sitting Balance    Level of [place], Reaches Outside Midline (Sitting, Dynamic Balance)  supervision  -     Sitting Position, Reaches Outside Midline (Sitting, Dynamic Balance)  sitting on edge of bed  -       User Key  (r) = Recorded By, (t) = Taken By, (c) = Cosigned By    Initials Name Provider Type    Teetee Taylor, PT Physical Therapist        Goals/Plan     [facility] Name 09/06/19 0903          Bed Mobility Goal 1 (PT)    Activity/Assistive Device (Bed Mobility Goal 1, PT)  bed mobility activities, all  -KH     [place] Level/Cues Needed (Bed Mobility Goal 1, PT)  independent  -     Time Frame (Bed Mobility Goal 1, PT)  1 week  -     Row Name 09/06/19 0903          Transfer Goal 1 (PT)    Activity/Assistive Device (Transfer Goal 1, PT)  transfers, all  -KH     [place] Level/Cues Needed (Transfer Goal 1, PT)  independent  -     Time Frame (Transfer Goal 1, PT)   1 week  -[facility] Name 09/06/19 0903          Gait Training Goal 1 (PT)    Activity/Assistive Device (Gait Training Goal 1, PT)  gait (walking locomotion);walker, rolling  -     [place] Level (Gait Training Goal 1, PT)  independent  -KH     Distance (Gait Goal 1, PT)  150  -KH     Time Frame (Gait Training Goal 1, PT)  1 week  -[facility] Name 09/06/19 0903          Patient Education Goal (PT)    Activity (Patient Education Goal, PT)  HEP  -KH     [place]/Cues/Accuracy (Memory Goal 2, PT)  demonstrates adequately  -KH     Time Frame (Patient Education Goal, PT)  1 week  -       User Key  (r) = Recorded By, (t) = Taken By, (c) = Cosigned By    Initials Name Provider Type    Teetee Taylor, PT Physical Therapist        Clinical Impression     [facility] Name 09/06/19 0901          Pain Assessment    Additional Documentation  Pain Scale: Numbers Pre/Post-Treatment (Group)  -KH     Row Name 09/06/19 0901          Pain Scale: Numbers Pre/Post-Treatment    Pain Scale: Numbers, Pretreatment  0/10 - no pain  -     Pain Scale: Numbers, Post-Treatment  0/10 - no pain  -[facility] Name 09/06/19 0901          Plan of Care Review    Plan of Care Reviewed With  patient  -KH     Row Name 09/06/19 0901          Physical Therapy Clinical Impression    Patient/Family Goals Statement (PT Clinical Impression)  return to PLOF  -     Criteria for Skilled Interventions Met (PT Clinical Impression)  yes  -     Rehab Potential (PT Clinical Summary)  good, to achieve stated therapy goals  -KH     Row Name 09/06/19 0901          Positioning and Restraints    Pre-Treatment Position  in bed  -     Post Treatment Position  bs  -     In Chair  sitting;call light within reach;encouraged to call for assist;exit alarm on;notified [facility]  AKILAH     On BS commode  -- nursing assistant in the room  -       User Key  (r) = Recorded By, (t) = Taken By, (c) = Cosigned By    Initials Name Provider Type    Teetee Taylor  Amita, PT Physical Therapist        Outcome Measures     Row Name 09/06/19 0906          How much help from another person do you currently need...    Turning from your back to your side while in flat bed without using bedrails?  4  -KH     Moving from lying on back to sitting on the side of a flat bed without bedrails?  4  -KH     Moving to and from a bed to a chair (including a wheelchair)?  3  -KH     Standing up from a chair using your arms (e.g., wheelchair, bedside chair)?  3  -KH     Climbing 3-5 steps with a railing?  2  -KH     To walk in hospital room?  3  -KH     AM-PAC 6 Clicks Score (PT)  19  -KH     Row Name 09/06/19 0906          Functional Assessment    Outcome Measure Options  AM-PAC 6 Clicks Basic Mobility (PT)  -       User Key  (r) = Recorded By, (t) = Taken By, (c) = Cosigned By    Initials Name Provider Type    Teetee Taylor, PT Physical Therapist          PT Recommendation and Plan  Planned Therapy Interventions (PT Eval): balance training, bed mobility training, gait training, home exercise program, patient/family education, ROM (range of motion), transfer training, strengthening  Outcome Summary/Treatment Plan (PT)  Anticipated Discharge Disposition (PT): assisted living facility (residential), skilled nursing facility  Plan of Care Reviewed With: patient  Outcome Summary: Pt admitted from [facility] living with PE. Pt is likely near baseline and is used to walking with a rollator. With non- swivel rolling walker she had difficulty on the turns and required min A and verbal cues to stay inside the walker. Pt would benefit from PT to address balance and gait training. Pt plans to d/c back to assisted living.      Time Calculation:   PT Charges     Row Name 09/06/19 0927 09/06/19 0858          Time Calculation    Start Time  0905  -KH  0850  -KH     Stop Time  0920 -KH 0859  -KH     Time Calculation (min)  15 min  -KH  9 min  -KH        Time Calculation- PT    Total Timed Code Minutes- PT   10 minute(s)  -DANIELLA  --       User Key  (r) = Recorded By, (t) = Taken By, (c) = Cosigned By    Initials Name Provider Type    Teetee Taylor, PT Physical Therapist        Therapy Charges for Today     Code Description Service Date Service Provider Modifiers Qty    39255178132 HC PT EVAL MOD COMPLEXITY 2 9/6/2019 Teetee Reyes, PT GP 1    87591393257 HC PT THER PROC EA 15 MIN 9/6/2019 Teetee Reyes, PT GP 1          PT G-Codes  Outcome Measure Options: AM-PAC 6 Clicks Basic Mobility (PT)  AM-PAC 6 Clicks Score (PT): 19  AM-PAC 6 Clicks Score (OT): 18  Modified Karissa Scale: 3 - Moderate disability.  Requiring some help, but able to walk without assistance.    Teetee Reyes, PT  9/6/2019

## 2019-09-06 NOTE — PROGRESS NOTES
"DOS: 2019  NAME: Kendy Worrell   : 1940  PCP: Marisela Adams APRN  Chief Complaint   Patient presents with   • Altered Mental Status     CC: Stroke    Subjective: Daughter at bedside states patient with confusion, auditory/visual hallucinations yesterday. Improved toay. Patient recently transitioned to Assisted living facility a month ago. She continues to drive which and the daughter is requesting that we tell patient she is no longer able to drive. Patient is OOB in chair. She admits to previous hallucinations, denies any currently. She denies headache or any new stroke/TIA symptoms.     Interval History  History taken from: patient chart family RN    Objective:  Vital signs: BP (!) 182/104   Pulse 101   Temp 99.4 °F (37.4 °C) (Oral)   Resp 18   Ht 152.4 cm (60\")   Wt 81.4 kg (179 lb 6.4 oz)   SpO2 95%   BMI 35.04 kg/m²       Physical Exam:  GENERAL: NAD  HEENT: Normocephalic, atraumatic   COR: Irregularly irregular.   Resp: Even and unlabored  Extremities: BLE edema.     Neurological:   MS: AO. Language normal. No neglect. Follows all commands.   CN: II-XII normal  Motor: Normal strength and tone in BUE, reduced strength in BLE (chronic), L worse than R  Sensory: Intact to light touch in all extremities.   Station and Gait: Deferred secondary to safety  Coordination: Normal finger to nose b/l    Current Medications:  Scheduled Medications:    atorvastatin 40 mg Oral Nightly   isosorbide mononitrate 30 mg Oral Daily   levothyroxine 100 mcg Oral Daily   [START ON 2019] metoprolol succinate  mg Oral Daily   potassium chloride 20 mEq Oral Daily   sodium chloride 10 mL Intravenous Q12H     Infusions:     heparin (porcine) 18 Units/kg/hr Last Rate: 11 Units/kg/hr (19 0718)     PRN Medications:  •  acetaminophen  •  heparin (porcine)  •  labetalol  •  ondansetron  •  potassium chloride  •  potassium chloride  •  [COMPLETED] Insert peripheral IV **AND** sodium chloride  •  sodium " chloride    Medications Reviewed:    Laboratory results:  Lab Results   Component Value Date    GLUCOSE 120 (H) 09/05/2019    CALCIUM 9.5 09/05/2019     09/05/2019    K 3.1 (L) 09/05/2019    CO2 29.4 (H) 09/05/2019    CL 93 (L) 09/05/2019    BUN 11 09/05/2019    CREATININE 0.77 09/05/2019    EGFRIFAFRI 60 (L) 05/01/2019    EGFRIFNONA 72 09/05/2019    BCR 14.3 09/05/2019    ANIONGAP 13.6 09/05/2019     Lab Results   Component Value Date    WBC 8.15 09/06/2019    HGB 12.5 09/06/2019    HCT 38.3 09/06/2019    MCV 91.6 09/06/2019     09/06/2019      Results from last 7 days   Lab Units 09/05/19  0459   CHOLESTEROL mg/dL 130     Lab Results   Component Value Date    INR 1.10 09/04/2019    INR 1.22 (H) 09/04/2019    PROTIME 13.9 09/04/2019    PROTIME 15.1 (H) 09/04/2019     Lab Results   Component Value Date    TSH 2.090 09/05/2019     Lab Results   Component Value Date    HGBA1C 4.80 09/05/2019     Lab Results   Component Value Date    CHOL 130 09/05/2019    CHLPL 192 10/04/2016    TRIG 70 09/05/2019    HDL 79 (H) 09/05/2019    LDL 37 09/05/2019      Lab Results   Component Value Date    FCNBCNID14 >2,000 (H) 09/05/2019     Lab Results   Component Value Date    .3 (H) 09/06/2019     Results Review:     I reviewed the patient's new clinical results.  I reviewed the patient's new imaging results and agree with the interpretation.  CT head 9/4/19: No evidence of acute infarction or hemorrhage.  CTA chest 9/4/19: IMPRESSION:  Multiple small bilateral pulmonary thromboemboli as  described. There is also evidence of mild pulmonary arterial  hypertension, as well    Impression: Ms. Worrell is an 80 yo female with HTN, HLD, CHF, h/o colectomy for bleeding ulcer and breast CA who presented to ED with confusion and transient facial droop in the setting of new onset afib and CTA showing small b/l pulmonary emboli, placed on heparin gtt. MRI/MRA is pending.     Clinically patient's mental status has improved, no  focal findings on exam. She continues on heparin gtt, currently therapeutic. Continue neurochecks. Further recommendations pending results of imaging, TTE.    Plan:  MRI brain/MRA h/n  TTE pending  Heparin gtt   Lipitor 40 mg  Hydrate  Neurochecks  Non-pharmacological DVT prophylaxis  EKG Tele  PT/OT/ST  Stroke Education    I have discussed the above with Dr. Lam, RN, patient and family.  Lillie Hickman, APRN  09/06/19  11:31 AM        Bilateral pulmonary embolism (CMS/HCC)    Essential hypertension    Acquired hypothyroidism    Coronary artery disease involving native coronary artery of native heart without angina pectoris    New onset a-fib (CMS/HCC)    UTI (urinary tract infection)    Hypokalemia    Slurred speech    Word finding difficulty    Metabolic encephalopathy    Chronic diastolic CHF (congestive heart failure) (CMS/HCC)

## 2019-09-06 NOTE — PLAN OF CARE
Problem: Patient Care Overview  Goal: Plan of Care Review  Outcome: Ongoing (interventions implemented as appropriate)   09/05/19 1524 09/06/19 0541   Coping/Psychosocial   Plan of Care Reviewed With --  patient   Plan of Care Review   Progress --  improving   OTHER   Outcome Summary Pt presents from assisted living with slurred speech, pain down back and confusion. Pt currently is SBA/CGA to x[place] [facility] [place] ambulate to bathroom. Pt may benefit from skilled OT to increase safety and independence.  --        Problem: Stroke (Ischemic) (Adult)  Goal: Signs and Symptoms of Listed Potential Problems Will be Absent, Minimized or Managed (Stroke)  Outcome: Ongoing (interventions implemented as appropriate)      Problem: Arrhythmia/Dysrhythmia (Symptomatic) (Adult)  Goal: Signs and Symptoms of Listed Potential Problems Will be Absent, Minimized or Managed (Arrhythmia/Dysrhythmia)  Outcome: Ongoing (interventions implemented as appropriate)      Problem: Fall Risk (Adult)  Goal: Identify Related Risk Factors and Signs and Symptoms  Outcome: Outcome(s) achieved Date Met: 09/06/19    Goal: Absence of Fall  Outcome: Ongoing (interventions implemented as appropriate)

## 2019-09-06 NOTE — ACP (ADVANCE CARE PLANNING)
Pt requested consult regarding AD.  met with pt and confirmed that AD is already on file and correct.

## 2019-09-06 NOTE — PROGRESS NOTES
Continued Stay Note  [facility]     Patient Name: Kendy Worrell  MRN: [id number]  Today's Date: 9/6/2019    Admit Date: 9/4/2019    Discharge Plan     Row Name 09/06/19 1445       Plan    Plan  Plan is to return to assisted living at [facility], and continue with [facility] .    Plan Comments  CCP spoke with Anthony/  at [facility] ([phone]) who states pt can return upon DC.  PT states pt will be safe to return to assisted living upon DC.  Pt is current with [facility] .  VM to Neda/ [facility] to confirm they are following.         Discharge Codes    No documentation.             Nafisa Arvizu RN

## 2019-09-06 NOTE — PLAN OF CARE
Problem: Patient Care Overview  Goal: Plan of Care Review   09/06/19 5663   OTHER   Outcome Summary Pt admitted from [facility] living with PE. Pt is likely near baseline and is used to walking with a rollator. With non- swivel rolling walker she had difficulty on the turns and required min A and verbal cues to stay inside the walker. Pt would benefit from PT to address balance and gait training. Pt plans to d/c back to assisted living.

## 2019-09-06 NOTE — PROGRESS NOTES
[facility] Follow Up    Chief Complaint: Follow up atrial fibrillation, pulmonary embolism    Interval History:  No chest pain or palpitations.  She does not feel like breathing has improved.      Objective:     Objective:  Temp:  [98 °F (36.7 °C)-99.4 °F (37.4 °C)] 99.4 °F (37.4 °C)  Heart Rate:  [] 114  Resp:  [18] 18  BP: (158-210)/(100-145) 187/135     Intake/Output Summary (Last 24 hours) at 9/6/2019 0744  Last data filed at 9/5/2019 1345  Gross per 24 hour   Intake 360 ml   Output 300 ml   Net 60 ml     Body mass index is 35.04 kg/m².      09/04/19  2142 09/05/19  0500 09/06/19  0521   Weight: 79.5 kg (175 lb 3.2 oz) 81.5 kg (179 lb 9.6 oz) 81.4 kg (179 lb 6.4 oz)     Weight change: 4.853 kg (10 lb 11.2 oz)      Physical Exam:   General : Alert, cooperative, in no acute distress.  Neuro: Alert,cooperative and oriented.  Lungs: CTAB. Normal respiratory effort and rate.  CV: irregularly, irregular, rate and rhythm, normal S1 and S2, no murmurs, gallops or rubs.  ABD: Soft, nontender, nondistended. Positive bowel sounds.  Extr: No edema or cyanosis, moves all extremities.    Lab Review:   Results from last 7 days   Lab Units 09/05/19  0459 09/04/19  1731   SODIUM mmol/L 136 135*   POTASSIUM mmol/L 3.1* 2.8*   CHLORIDE mmol/L 93* 89*   CO2 mmol/L 29.4* 30.7*   BUN mg/dL 11 11   CREATININE mg/dL 0.77 1.06*   GLUCOSE mg/dL 120* 112*   CALCIUM mg/dL 9.5 10.2   AST (SGOT) U/L  --  27   ALT (SGPT) U/L  --  19     Results from last 7 days   Lab Units 09/04/19  1731   TROPONIN T ng/mL <0.010     Results from last 7 days   Lab Units 09/06/19  0448 09/05/19  0459   WBC 10*3/mm3 8.15 9.19   HEMOGLOBIN g/dL 12.5 12.1   HEMATOCRIT % 38.3 36.7   PLATELETS 10*3/mm3 260 238     Results from last 7 days   Lab Units 09/06/19  0448 09/05/19  2134  09/04/19  2245 09/04/19  5471   INR   --   --   --  1.10 1.22*   APTT seconds 114.3* 148.9*   < > 33.5  --     < > = values in this interval not displayed.      Results from last 7 days   Lab Units 09/05/19  0459 09/04/19  1731   MAGNESIUM mg/dL 2.1 1.5*     Results from last 7 days   Lab Units 09/05/19  0459   CHOLESTEROL mg/dL 130   TRIGLYCERIDES mg/dL 70   HDL CHOL mg/dL 79*     Results from last 7 days   Lab Units 09/04/19  1731   PROBNP pg/mL 3,664.0*     Results from last 7 days   Lab Units 09/05/19  0459   TSH uIU/mL 2.090     I reviewed the patient's new clinical results.  I personally viewed and interpreted the patient's EKG  Current Medications:   Scheduled Meds:  atorvastatin 80 mg Oral Nightly   isosorbide mononitrate 30 mg Oral Daily   levothyroxine 100 mcg Oral Daily   metoprolol succinate XL 50 mg Oral Daily   potassium chloride 20 mEq Oral Daily   sodium chloride 10 mL Intravenous Q12H     Continuous Infusions:  heparin (porcine) 18 Units/kg/hr Last Rate: 11 Units/kg/hr (09/06/19 0718)       Allergies:  Allergies   Allergen Reactions   • Aspirin      bleeding   • Nsaids      bleeding       Assessment/Plan:     1. Atrial fibrillation. New onset.  Fair rate control on metoprolol succinate.  On heparin gtt.   2. Bilateral pulmonary embolism.  Reviewed CT images myself and it shows small thrombus burden that is primarily distal.  Mild RV enlargement noted on CT scan.  Normal troponin.  On room air.   3. Dysarthria/confusion.  Appears resolved.  MRI pending.  Neurology following.    4. Hypokalemia. Replaced yesterday.  Repeat pending.   5. Coronary artery disease. Primarily non-obstructive and small vessel including occluded small diagonal and distal apical LAD with collaterals.  Noted on cath in 2006 at the time of NSTEMI- felt to be demand ischemia.  6. Hypertension. Elevated still.   7. History of paroxsymal SVT  8. Hyperlipidemia  9. History of recurrent GI bleed.  Aspirin stopped in 2011.  Status post partial colectomy.    10. Hypothyroidism    -  Reviewed recent outpatient echocardiogram which reported a limited study with poor windows, possible left  "ventricular systolic dysfunction with an EF of 40-45%, grade 2 diastolic dysfunction, moderate TR with RVSP of 51 mmHg, and \"right heart enlargement\".    Will go ahead and repeat echo here with agitated saline contrast in light of atrial fibrillation, pulmonary embolism and no evidence of DVT.   -  Increase metoprolol dosage to 100 mg for BP and heart rate control.     Effie Little MD  09/06/19  7:44 AM  "

## 2019-09-06 NOTE — PROGRESS NOTES
"    DAILY PROGRESS NOTE  [facility]    Patient Identification:  Name: Kendy Worrell  Age: 79 y.o.  Sex: female  :  1940  MRN: [id number]         Primary Care Physician: Marisela Adams APRN    Subjective:  Interval History: Not voicing any new complaints today.  She denies any chest pain and states she is breathing about the same.  Denies any nausea or vomiting and admits to chronic diarrhea.  She had some intermittent abdominal discomfort.  Denies any grossly black or bloody stool.    Objective: No family at bedside.  Case discussed in multidisciplinary rounds    Scheduled Meds:  atorvastatin 40 mg Oral Nightly   ceftriaxone 1 g Intravenous Q24H   isosorbide mononitrate 30 mg Oral Daily   levothyroxine 100 mcg Oral Daily   [START ON 2019] metoprolol succinate  mg Oral Daily   potassium chloride 20 mEq Oral Daily   sodium chloride 10 mL Intravenous Q12H     Continuous Infusions:  heparin (porcine) 18 Units/kg/hr Last Rate: 11 Units/kg/hr (19 0718)       Vital signs in last 24 hours:  Temp:  [98 °F (36.7 °C)-99.4 °F (37.4 °C)] 99.4 °F (37.4 °C)  Heart Rate:  [] 101  Resp:  [18] 18  BP: (158-210)/(104-145) 182/104    Intake/Output:    Intake/Output Summary (Last 24 hours) at 2019 1220  Last data filed at 2019 1345  Gross per 24 hour   Intake 120 ml   Output 300 ml   Net -180 ml       Exam:  BP (!) 182/104   Pulse 101   Temp 99.4 °F (37.4 °C) (Oral)   Resp 18   Ht 152.4 cm (60\")   Wt 81.4 kg (179 lb 6.4 oz)   SpO2 95%   BMI 35.04 kg/m²     General Appearance:    Alert, cooperative, no distress, AAOx3, clinically improved/stable                          Head:    Normocephalic, without obvious abnormality, atraumatic                         Throat:   Oral mucosa pink and moist                           Neck:   No JVD                         Lungs:    Clear to auscultation bilaterally, respirations unlabored                 Chest Wall:    No tenderness or " deformity                          Heart:  Irregularly irregular rate and rhythm, S1 and S2 normal                  Abdomen:     Soft, non-tender, bowel sounds active                 Neurologic:   CNII-XII intact, no focal deficits noted     Data Review:  Labs in chart were reviewed.    Assessment:  Active Hospital Problems    Diagnosis  POA   • **Bilateral pulmonary embolism (CMS/HCC) [I26.99]  Yes   • New onset a-fib (CMS/HCC) [I48.91]  Unknown   • UTI (urinary tract infection) [N39.0]  Unknown   • Hypokalemia [E87.6]  Unknown   • Slurred speech [R47.81]  Unknown   • Word finding difficulty [R47.89]  Unknown   • Metabolic encephalopathy [G93.41]  Unknown   • Chronic diastolic CHF (congestive heart failure) (CMS/HCC) [I50.32]  Unknown   • Essential hypertension [I10]  Yes   • Acquired hypothyroidism [E03.9]  Yes   • Coronary artery disease involving native coronary artery of native heart without angina pectoris [I25.10]  Yes      Resolved Hospital Problems    Diagnosis Date Resolved POA   • Hypomagnesemia [E83.42] 09/05/2019 Unknown       Plan:    Pansensitive E. coli UTI -Rocephin day 2 of 3    Metabolic encephalopathy remains resolved    Small multiple bilateral pulmonary emboli -heparin drip with negative Dopplers   -Repeat echo pending to ensure no cardiac etiology    TIA versus CVA   -MR pending/neuro consulted-heparin drip    New onset A. Fib -rate controlled   -On heparin drip   -Etiology managing   -Long-term AC may be an issue given past history of GI bleed with partial colectomy and patient states she lives with chronic diarrhea and will go ahead and check a stool for heme to ensure none is present otherwise H&H is stable    Celestine Xie MD  9/6/2019  12:20 PM

## 2019-09-07 ENCOUNTER — APPOINTMENT (OUTPATIENT)
Dept: MRI IMAGING | Facility: HOSPITAL | Age: 79
End: 2019-09-07

## 2019-09-07 PROBLEM — E87.6 HYPOKALEMIA: Status: RESOLVED | Noted: 2019-09-04 | Resolved: 2019-09-07

## 2019-09-07 PROBLEM — G93.41 METABOLIC ENCEPHALOPATHY: Status: RESOLVED | Noted: 2019-09-04 | Resolved: 2019-09-07

## 2019-09-07 PROBLEM — R47.89 WORD FINDING DIFFICULTY: Status: RESOLVED | Noted: 2019-09-04 | Resolved: 2019-09-07

## 2019-09-07 PROBLEM — R47.81 SLURRED SPEECH: Status: RESOLVED | Noted: 2019-09-04 | Resolved: 2019-09-07

## 2019-09-07 LAB
APTT PPP: 136.1 SECONDS (ref 22.7–35.4)
APTT PPP: 58 SECONDS (ref 22.7–35.4)
APTT PPP: 81.8 SECONDS (ref 22.7–35.4)
BASOPHILS # BLD AUTO: 0.08 10*3/MM3 (ref 0–0.2)
BASOPHILS NFR BLD AUTO: 1 % (ref 0–1.5)
DEPRECATED RDW RBC AUTO: 48.7 FL (ref 37–54)
EOSINOPHIL # BLD AUTO: 0.24 10*3/MM3 (ref 0–0.4)
EOSINOPHIL NFR BLD AUTO: 2.9 % (ref 0.3–6.2)
ERYTHROCYTE [DISTWIDTH] IN BLOOD BY AUTOMATED COUNT: 14.5 % (ref 12.3–15.4)
GLUCOSE BLDC GLUCOMTR-MCNC: 118 MG/DL (ref 70–130)
GLUCOSE BLDC GLUCOMTR-MCNC: 119 MG/DL (ref 70–130)
GLUCOSE BLDC GLUCOMTR-MCNC: 93 MG/DL (ref 70–130)
GLUCOSE BLDC GLUCOMTR-MCNC: 99 MG/DL (ref 70–130)
HCT VFR BLD AUTO: 39.4 % (ref 34–46.6)
HGB BLD-MCNC: 12.9 G/DL (ref 12–15.9)
IMM GRANULOCYTES # BLD AUTO: 0.03 10*3/MM3 (ref 0–0.05)
IMM GRANULOCYTES NFR BLD AUTO: 0.4 % (ref 0–0.5)
LYMPHOCYTES # BLD AUTO: 1.7 10*3/MM3 (ref 0.7–3.1)
LYMPHOCYTES NFR BLD AUTO: 20.7 % (ref 19.6–45.3)
MCH RBC QN AUTO: 29.9 PG (ref 26.6–33)
MCHC RBC AUTO-ENTMCNC: 32.7 G/DL (ref 31.5–35.7)
MCV RBC AUTO: 91.2 FL (ref 79–97)
MONOCYTES # BLD AUTO: 1.01 10*3/MM3 (ref 0.1–0.9)
MONOCYTES NFR BLD AUTO: 12.3 % (ref 5–12)
NEUTROPHILS # BLD AUTO: 5.17 10*3/MM3 (ref 1.7–7)
NEUTROPHILS NFR BLD AUTO: 62.7 % (ref 42.7–76)
NRBC BLD AUTO-RTO: 0 /100 WBC (ref 0–0.2)
PLATELET # BLD AUTO: 267 10*3/MM3 (ref 140–450)
PMV BLD AUTO: 10.7 FL (ref 6–12)
POTASSIUM BLD-SCNC: 3.3 MMOL/L (ref 3.5–5.2)
POTASSIUM BLD-SCNC: 4.6 MMOL/L (ref 3.5–5.2)
RBC # BLD AUTO: 4.32 10*6/MM3 (ref 3.77–5.28)
WBC NRBC COR # BLD: 8.23 10*3/MM3 (ref 3.4–10.8)

## 2019-09-07 PROCEDURE — 99232 SBSQ HOSP IP/OBS MODERATE 35: CPT | Performed by: NURSE PRACTITIONER

## 2019-09-07 PROCEDURE — 0 GADOBENATE DIMEGLUMINE 529 MG/ML SOLUTION: Performed by: HOSPITALIST

## 2019-09-07 PROCEDURE — 99232 SBSQ HOSP IP/OBS MODERATE 35: CPT | Performed by: INTERNAL MEDICINE

## 2019-09-07 PROCEDURE — 85730 THROMBOPLASTIN TIME PARTIAL: CPT | Performed by: INTERNAL MEDICINE

## 2019-09-07 PROCEDURE — A9577 INJ MULTIHANCE: HCPCS | Performed by: HOSPITALIST

## 2019-09-07 PROCEDURE — 70553 MRI BRAIN STEM W/O & W/DYE: CPT

## 2019-09-07 PROCEDURE — 85730 THROMBOPLASTIN TIME PARTIAL: CPT | Performed by: HOSPITALIST

## 2019-09-07 PROCEDURE — 84132 ASSAY OF SERUM POTASSIUM: CPT | Performed by: HOSPITALIST

## 2019-09-07 PROCEDURE — 70544 MR ANGIOGRAPHY HEAD W/O DYE: CPT

## 2019-09-07 PROCEDURE — 25010000002 CEFTRIAXONE PER 250 MG: Performed by: HOSPITALIST

## 2019-09-07 PROCEDURE — 25010000002 HEPARIN (PORCINE) PER 1000 UNITS: Performed by: INTERNAL MEDICINE

## 2019-09-07 PROCEDURE — 36415 COLL VENOUS BLD VENIPUNCTURE: CPT | Performed by: INTERNAL MEDICINE

## 2019-09-07 PROCEDURE — 82962 GLUCOSE BLOOD TEST: CPT

## 2019-09-07 PROCEDURE — 85025 COMPLETE CBC W/AUTO DIFF WBC: CPT | Performed by: INTERNAL MEDICINE

## 2019-09-07 PROCEDURE — 70549 MR ANGIOGRAPH NECK W/O&W/DYE: CPT

## 2019-09-07 RX ORDER — DIPHENOXYLATE HYDROCHLORIDE AND ATROPINE SULFATE 2.5; .025 MG/1; MG/1
1 TABLET ORAL 4 TIMES DAILY PRN
Status: DISCONTINUED | OUTPATIENT
Start: 2019-09-07 | End: 2019-09-09 | Stop reason: HOSPADM

## 2019-09-07 RX ORDER — AMLODIPINE BESYLATE 2.5 MG/1
2.5 TABLET ORAL
Status: DISCONTINUED | OUTPATIENT
Start: 2019-09-07 | End: 2019-09-08

## 2019-09-07 RX ADMIN — SODIUM CHLORIDE, PRESERVATIVE FREE 10 ML: 5 INJECTION INTRAVENOUS at 21:22

## 2019-09-07 RX ADMIN — HEPARIN SODIUM 6100 UNITS: 5000 INJECTION INTRAVENOUS; SUBCUTANEOUS at 08:33

## 2019-09-07 RX ADMIN — POTASSIUM CHLORIDE 20 MEQ: 750 CAPSULE, EXTENDED RELEASE ORAL at 08:25

## 2019-09-07 RX ADMIN — METOPROLOL SUCCINATE 100 MG: 100 TABLET, FILM COATED, EXTENDED RELEASE ORAL at 08:25

## 2019-09-07 RX ADMIN — LABETALOL 20 MG/4 ML (5 MG/ML) INTRAVENOUS SYRINGE 20 MG: at 02:14

## 2019-09-07 RX ADMIN — LEVOTHYROXINE SODIUM 100 MCG: 100 TABLET ORAL at 08:25

## 2019-09-07 RX ADMIN — POTASSIUM CHLORIDE 40 MEQ: 750 CAPSULE, EXTENDED RELEASE ORAL at 10:06

## 2019-09-07 RX ADMIN — CEFTRIAXONE SODIUM 1 G: 1 INJECTION, SOLUTION INTRAVENOUS at 12:04

## 2019-09-07 RX ADMIN — ATORVASTATIN CALCIUM 40 MG: 20 TABLET, FILM COATED ORAL at 21:22

## 2019-09-07 RX ADMIN — LABETALOL 20 MG/4 ML (5 MG/ML) INTRAVENOUS SYRINGE 20 MG: at 05:38

## 2019-09-07 RX ADMIN — SODIUM CHLORIDE, PRESERVATIVE FREE 10 ML: 5 INJECTION INTRAVENOUS at 08:25

## 2019-09-07 RX ADMIN — AMLODIPINE BESYLATE 2.5 MG: 2.5 TABLET ORAL at 17:34

## 2019-09-07 RX ADMIN — GADOBENATE DIMEGLUMINE 15 ML: 529 INJECTION, SOLUTION INTRAVENOUS at 13:58

## 2019-09-07 RX ADMIN — DIPHENOXYLATE HYDROCHLORIDE AND ATROPINE SULFATE 1 TABLET: 2.5; .025 TABLET ORAL at 12:01

## 2019-09-07 RX ADMIN — POTASSIUM CHLORIDE 40 MEQ: 750 CAPSULE, EXTENDED RELEASE ORAL at 14:40

## 2019-09-07 RX ADMIN — ISOSORBIDE MONONITRATE 30 MG: 30 TABLET ORAL at 08:25

## 2019-09-07 NOTE — PLAN OF CARE
Problem: Patient Care Overview  Goal: Plan of Care Review  Outcome: Ongoing (interventions implemented as appropriate)   09/07/19 0515 09/07/19 1402 09/07/19 1712   Coping/Psychosocial   Plan of Care Reviewed With --  patient --    Plan of Care Review   Progress no change --  --    OTHER   Outcome Summary --  --  Pt. completed MRI . Awaiting results. No falls. A/Ox4 though random comments at times. CTM. Increase betablocker and added norvasc . CTM

## 2019-09-07 NOTE — PLAN OF CARE
Problem: Patient Care Overview  Goal: Plan of Care Review  Outcome: Ongoing (interventions implemented as appropriate)   09/07/19 0505   Coping/Psychosocial   Plan of Care Reviewed With patient   Plan of Care Review   Progress no change   OTHER   Outcome Summary Heparin drip continues. Last PTT was therapeutic, next PTT at 0600. OOB to BSC to have a BM but also inc of urine noted. BP elevated 220/110s and Labetalol given. Will follow up BP and endorse to day shift. NIH 0. MRI brain pending, to be done today.

## 2019-09-07 NOTE — PROGRESS NOTES
"DOS: 2019  NAME: Kendy Worrell   : 1940  PCP: Marisela Adams APRN  Chief Complaint   Patient presents with   • Altered Mental Status     CC: Stroke    Subjective: No new events overnight per RN, although she has not yet had MRI,?patient refused yesterday.  She denies this and is agreeable to having MRI done.  She denies any recurrent aphasia, unilateral weakness, headache or any new stroke/TIA symptoms.  No family bedside currently.    Interval History  History taken from: patient chart RN    Objective:  Vital signs: BP (!) 194/137 (BP Location: Left arm, Patient Position: Lying)   Pulse 105   Temp 97.8 °F (36.6 °C) (Oral)   Resp 18   Ht 152.4 cm (60\")   Wt 81.4 kg (179 lb 6.4 oz)   SpO2 95%   BMI 35.04 kg/m²       Physical Exam:  GENERAL: NAD, obese  HEENT: Normocephalic, atraumatic   COR: Irregularly irregular.   Resp: Even and unlabored  Extremities: BLE edema.      Neurological:   MS: AO. Language normal. No neglect. Follows all commands.   CN: II-XII normal except for some facial asymmetry at rest, symmetric with movement  Motor: Normal strength and tone in BUE, reduced strength in BLE (chronic), L worse than R  Sensory: Intact to light touch in all extremities.   Station and Gait: Deferred secondary to safety  Coordination: Normal finger to nose b/l  Patient examined, changes noted above.    Current Medications:  Scheduled Medications:    atorvastatin 40 mg Oral Nightly   ceftriaxone 1 g Intravenous Q24H   isosorbide mononitrate 30 mg Oral Daily   levothyroxine 100 mcg Oral Daily   metoprolol succinate  mg Oral Daily   potassium chloride 20 mEq Oral Daily   sodium chloride 10 mL Intravenous Q12H     Infusions:     heparin (porcine) 18 Units/kg/hr Last Rate: 14 Units/kg/hr (19 0830)     PRN Medications:  •  acetaminophen  •  heparin (porcine)  •  labetalol  •  ondansetron  •  potassium chloride  •  potassium chloride  •  [COMPLETED] Insert peripheral IV **AND** sodium " chloride  •  sodium chloride    Medications Reviewed:    Laboratory results:  Lab Results   Component Value Date    GLUCOSE 120 (H) 09/05/2019    CALCIUM 9.5 09/05/2019     09/05/2019    K 3.3 (L) 09/07/2019    CO2 29.4 (H) 09/05/2019    CL 93 (L) 09/05/2019    BUN 11 09/05/2019    CREATININE 0.77 09/05/2019    EGFRIFAFRI 60 (L) 05/01/2019    EGFRIFNONA 72 09/05/2019    BCR 14.3 09/05/2019    ANIONGAP 13.6 09/05/2019     Lab Results   Component Value Date    WBC 8.23 09/07/2019    HGB 12.9 09/07/2019    HCT 39.4 09/07/2019    MCV 91.2 09/07/2019     09/07/2019      Results from last 7 days   Lab Units 09/05/19  0459   CHOLESTEROL mg/dL 130     Lab Results   Component Value Date    INR 1.10 09/04/2019    INR 1.22 (H) 09/04/2019    PROTIME 13.9 09/04/2019    PROTIME 15.1 (H) 09/04/2019     Lab Results   Component Value Date    TSH 2.090 09/05/2019     Lab Results   Component Value Date    HGBA1C 4.80 09/05/2019     Lab Results   Component Value Date    CHOL 130 09/05/2019    CHLPL 192 10/04/2016    TRIG 70 09/05/2019    HDL 79 (H) 09/05/2019    LDL 37 09/05/2019      Lab Results   Component Value Date    [id number] >2,000 (H) 09/05/2019     Brief Urine Lab Results  (Last result in the past 365 days)      Color   Clarity   Blood   Leuk Est   Nitrite   Protein   CREAT   Urine HCG        09/04/19 1755 Dark Yellow Turbid Trace Moderate (2+) Positive 30 mg/dL (1+)             Urine Culture   Date Value Ref Range Status   09/04/2019 >100,000 CFU/mL Escherichia coli (A)  Final     Lab Results   Component Value Date    PTT 58.0 (H) 09/07/2019     Results Review:     I reviewed the patient's new clinical results.  I reviewed the patient's new imaging results and agree with the interpretation.  TTE 9/6/19: Interpretation Summary   · Calculated EF = 56.0%.  · Left ventricular systolic function is normal.  · Left ventricular wall thickness is consistent with hypertrophy. Sigmoid-shaped ventricular septum is  present.  · Left atrial cavity size is moderately dilated.  · There is moderate calcification of the aortic valve.  · Aortic valve area is 1.7 cm2.  · Aortic valve mean pressure gradient is 4.0 mmHg.  · Mild mitral valve regurgitation is present  · Moderate tricuspid valve regurgitation is present.  · Estimated right ventricular systolic pressure from tricuspid regurgitation is markedly elevated (>55 mmHg).  · Calculated right ventricular systolic pressure from tricuspid regurgitation is 61.8 mmHg.     BLE Doppler 9/5/19: Normal     Impression: Ms. Worrell is an 78 yo female with HTN, HLD, CHF, h/o colectomy for bleeding ulcer and breast CA who presented to ED with confusion and transient facial droop in the setting of UTI, new onset afib and CTA chest showing small b/l pulmonary emboli, placed on heparin gtt. PatiMRI/MRA is still pending.      Tjqf-zm-ld-date:   - CT head: Negative  - CTA chest: Multiple small bilateral pulmonary thromboemboli   - BLE Doppler: No evidence of DVT  - TTE: LVEF 56%, LA moderately dilated, saline tests negative.   - LABS: UA 4+ bacteria, B12 > 2000, LDL 37, A1C 4.8, TSH 2.09, PTT 58    Clinically patient essentially remains at her neurologic baseline, no focal findings on exam. TTE shows dilated LA, no PFO. MRI/MRA still pending. Continue neurochecks. Further recommendations pending results of imaging.    Plan:  MRI brain/MRA h/n - pending  Heparin gtt   Lipitor 40 mg  Hydrate  Neurochecks  Non-pharmacological DVT prophylaxis  EKG Tele  PT/OT/ST  Stroke Education       I have discussed the above with Dr. Lam, RN, patient.  Lillie Hickman, APRN  09/07/19  9:23 AM        Bilateral pulmonary embolism (CMS/HCC)    Essential hypertension    Acquired hypothyroidism    Coronary artery disease involving native coronary artery of native heart without angina pectoris    New onset a-fib (CMS/HCC)    UTI (urinary tract infection)    Hypokalemia    Slurred speech    Word finding  difficulty    Metabolic encephalopathy    Chronic diastolic CHF (congestive heart failure) (CMS/[facility])

## 2019-09-07 NOTE — PROGRESS NOTES
[facility] Follow Up    Chief Complaint: Follow up atrial fibrillation, pulmonary embolism    Interval History:  No chest pain or palpitations.  She does not feel like breathing has improved.      Objective:     Objective:  Temp:  [97.8 °F (36.6 °C)-98.3 °F (36.8 °C)] 97.8 °F (36.6 °C)  Heart Rate:  [] 105  Resp:  [16-18] 18  BP: (194-220)/(119-141) 194/137     Intake/Output Summary (Last 24 hours) at 9/7/2019 1322  Last data filed at 9/7/2019 0631  Gross per 24 hour   Intake 533.13 ml   Output --   Net 533.13 ml     Body mass index is 35.04 kg/m².      09/04/19  2142 09/05/19  0500 09/06/19  0521   Weight: 79.5 kg (175 lb 3.2 oz) 81.5 kg (179 lb 9.6 oz) 81.4 kg (179 lb 6.4 oz)     Weight change:       Physical Exam:   General : Alert, cooperative, in no acute distress.  Neuro: Alert,cooperative and oriented.  Lungs: CTAB. Normal respiratory effort and rate.  CV: irregularly, irregular, rate and rhythm, normal S1 and S2, no murmurs, gallops or rubs.  ABD: Soft, nontender, nondistended. Positive bowel sounds.  Extr: No edema or cyanosis, moves all extremities.    Lab Review:   Results from last 7 days   Lab Units 09/07/19  0810 09/05/19  0459 09/04/19  1731   SODIUM mmol/L  --  136 135*   POTASSIUM mmol/L 3.3* 3.1* 2.8*   CHLORIDE mmol/L  --  93* 89*   CO2 mmol/L  --  29.4* 30.7*   BUN mg/dL  --  11 11   CREATININE mg/dL  --  0.77 1.06*   GLUCOSE mg/dL  --  120* 112*   CALCIUM mg/dL  --  9.5 10.2   AST (SGOT) U/L  --   --  27   ALT (SGPT) U/L  --   --  19     Results from last 7 days   Lab Units 09/04/19  1731   TROPONIN T ng/mL <0.010     Results from last 7 days   Lab Units 09/07/19  0622 09/06/19  0448   WBC 10*3/mm3 8.23 8.15   HEMOGLOBIN g/dL 12.9 12.5   HEMATOCRIT % 39.4 38.3   PLATELETS 10*3/mm3 267 260     Results from last 7 days   Lab Units 09/07/19  0622 09/07/19  0019  09/04/19  2245 09/04/19  1731   INR   --   --   --  1.10 1.22*   APTT seconds 58.0* 81.8*   < > 33.5  --     <  > = values in this interval not displayed.     Results from last 7 days   Lab Units 09/05/19  0459 09/04/19  1731   MAGNESIUM mg/dL 2.1 1.5*     Results from last 7 days   Lab Units 09/05/19  0459   CHOLESTEROL mg/dL 130   TRIGLYCERIDES mg/dL 70   HDL CHOL mg/dL 79*     Results from last 7 days   Lab Units 09/04/19  1731   PROBNP pg/mL 3,664.0*     Results from last 7 days   Lab Units 09/05/19  0459   TSH uIU/mL 2.090     I reviewed the patient's new clinical results.  I personally viewed and interpreted the patient's EKG  Current Medications:   Scheduled Meds:    atorvastatin 40 mg Oral Nightly   ceftriaxone 1 g Intravenous Q24H   gadobenate dimeglumine 15 mL Intravenous Once in imaging   isosorbide mononitrate 30 mg Oral Daily   levothyroxine 100 mcg Oral Daily   metoprolol succinate  mg Oral Daily   potassium chloride 20 mEq Oral Daily   sodium chloride 10 mL Intravenous Q12H     Continuous Infusions:    heparin (porcine) 18 Units/kg/hr Last Rate: 14 Units/kg/hr (09/07/19 0830)       Allergies:  Allergies   Allergen Reactions   • Aspirin      bleeding   • Nsaids      bleeding     Interpretation Summary     · Calculated EF = 56.0%.  · Left ventricular systolic function is normal.  · Left ventricular wall thickness is consistent with hypertrophy. Sigmoid-shaped ventricular septum is present.  · Left atrial cavity size is moderately dilated.  · There is moderate calcification of the aortic valve.  · Aortic valve area is 1.7 cm2.  · Aortic valve mean pressure gradient is 4.0 mmHg.  · Mild mitral valve regurgitation is present  · Moderate tricuspid valve regurgitation is present.  · Estimated right ventricular systolic pressure from tricuspid regurgitation is markedly elevated (>55 mmHg).  · Calculated right ventricular systolic pressure from tricuspid regurgitation is 61.8 mmHg.       Assessment/Plan:     1. Atrial fibrillation. New onset.   Moderately controlled rate  2. Bilateral pulmonary embolism.    Continue anticoagulation on heparin drip   Coronary artery disease no chest pain    Hypertension blood pressure remains high, we will add low-dose Norvasc    Que Cast MD  09/07/19  1:22 PM

## 2019-09-07 NOTE — PROGRESS NOTES
"    DAILY PROGRESS NOTE  [facility]    Patient Identification:  Name: Kendy Worrell  Age: 79 y.o.  Sex: female  :  1940  MRN: [id number]         Primary Care Physician: Marisela Adams APRN    Subjective:  Interval History: No new issues.  Patient asked for resumption of Lomotil use for chronic diarrhea which was granted.  Denies any chest pain shortness of breath nausea vomiting or bleeding issues    Objective: No family at bedside.  Discussed case with RN    Scheduled Meds:    atorvastatin 40 mg Oral Nightly   ceftriaxone 1 g Intravenous Q24H   gadobenate dimeglumine 15 mL Intravenous Once in imaging   isosorbide mononitrate 30 mg Oral Daily   levothyroxine 100 mcg Oral Daily   metoprolol succinate  mg Oral Daily   potassium chloride 20 mEq Oral Daily   sodium chloride 10 mL Intravenous Q12H     Continuous Infusions:    heparin (porcine) 18 Units/kg/hr Last Rate: 14 Units/kg/hr (19 0830)       Vital signs in last 24 hours:  Temp:  [97.8 °F (36.6 °C)-98.3 °F (36.8 °C)] 97.8 °F (36.6 °C)  Heart Rate:  [] 105  Resp:  [16-18] 18  BP: (180-220)/(110-141) 194/137    Intake/Output:    Intake/Output Summary (Last 24 hours) at 2019 1300  Last data filed at 2019 0631  Gross per 24 hour   Intake 533.13 ml   Output --   Net 533.13 ml       Exam:  BP (!) 194/137 (BP Location: Left arm, Patient Position: Lying)   Pulse 105   Temp 97.8 °F (36.6 °C) (Oral)   Resp 18   Ht 152.4 cm (60\")   Wt 81.4 kg (179 lb 6.4 oz)   SpO2 95%   BMI 35.04 kg/m²     General Appearance:    Alert, cooperative, no distress, clinically unchanged                         Throat:   Oral mucosa pink and moist                           Neck:   No JVD                         Lungs:    Clear to auscultation bilaterally, respirations unlabored                          Heart:  Irregularly irregular rate and rhythm, S1 and S2 normal                  Abdomen:     Soft, non-tender, bowel sounds active    "              Neurologic:   CNII-XII intact    Data Review:  Labs in chart were reviewed.    Assessment:  Active Hospital Problems    Diagnosis  POA   • **Bilateral pulmonary embolism (CMS/HCC) [I26.99]  Yes   • New onset a-fib (CMS/HCC) [I48.91]  Unknown   • UTI (urinary tract infection) [N39.0]  Unknown   • Chronic diastolic CHF (congestive heart failure) (CMS/HCC) [I50.32]  Unknown   • Essential hypertension [I10]  Yes   • Acquired hypothyroidism [E03.9]  Yes   • Coronary artery disease involving native coronary artery of native heart without angina pectoris [I25.10]  Yes      Resolved Hospital Problems    Diagnosis Date Resolved POA   • Hypokalemia [E87.6] 09/07/2019 Unknown   • Hypomagnesemia [E83.42] 09/05/2019 Unknown   • Slurred speech [R47.81] 09/07/2019 Unknown   • Word finding difficulty [R47.89] 09/07/2019 Unknown   • Metabolic encephalopathy [G93.41] 09/07/2019 Unknown       Plan:    TIA versus CVA    -MRI/MRA still pending?  Discussed with Dr. Lam    Pansensitive E. Coli -treated after today with Rocephin x3 days    Small multiple bilateral pulmonary emboli on heparin drip with negative Dopplers   -Repeat echocardiogram not definitively demonstrating source and may need to consider MARY    New onset A. Fib   -Heparin drip   -Concerns for long-term anticoagulation given past history of GI bleed with partial colectomy and patient does live with chronic diarrhea though stool was negative for heme and hemoglobin holding steady.  In light of all the above acute changes we may have to consider transition back to anticoagulation -perhaps going the old school way of Coumadin may be in this patient's best interest but ultimately will await further recommendations from cardiology      Celestine Xie MD  9/7/2019  1:00 PM

## 2019-09-08 PROBLEM — I63.9 ACUTE ISCHEMIC STROKE (HCC): Status: ACTIVE | Noted: 2019-09-08

## 2019-09-08 LAB
APTT PPP: 176.3 SECONDS (ref 22.7–35.4)
APTT PPP: 44.9 SECONDS (ref 22.7–35.4)
BASOPHILS # BLD AUTO: 0.07 10*3/MM3 (ref 0–0.2)
BASOPHILS NFR BLD AUTO: 0.9 % (ref 0–1.5)
DEPRECATED RDW RBC AUTO: 52.7 FL (ref 37–54)
EOSINOPHIL # BLD AUTO: 0.22 10*3/MM3 (ref 0–0.4)
EOSINOPHIL NFR BLD AUTO: 2.7 % (ref 0.3–6.2)
ERYTHROCYTE [DISTWIDTH] IN BLOOD BY AUTOMATED COUNT: 14.6 % (ref 12.3–15.4)
HCT VFR BLD AUTO: 41.1 % (ref 34–46.6)
HGB BLD-MCNC: 12.6 G/DL (ref 12–15.9)
IMM GRANULOCYTES # BLD AUTO: 0.04 10*3/MM3 (ref 0–0.05)
IMM GRANULOCYTES NFR BLD AUTO: 0.5 % (ref 0–0.5)
LYMPHOCYTES # BLD AUTO: 1.72 10*3/MM3 (ref 0.7–3.1)
LYMPHOCYTES NFR BLD AUTO: 21 % (ref 19.6–45.3)
MCH RBC QN AUTO: 30.1 PG (ref 26.6–33)
MCHC RBC AUTO-ENTMCNC: 30.7 G/DL (ref 31.5–35.7)
MCV RBC AUTO: 98.3 FL (ref 79–97)
MONOCYTES # BLD AUTO: 0.95 10*3/MM3 (ref 0.1–0.9)
MONOCYTES NFR BLD AUTO: 11.6 % (ref 5–12)
NEUTROPHILS # BLD AUTO: 5.19 10*3/MM3 (ref 1.7–7)
NEUTROPHILS NFR BLD AUTO: 63.3 % (ref 42.7–76)
NRBC BLD AUTO-RTO: 0 /100 WBC (ref 0–0.2)
PLATELET # BLD AUTO: 211 10*3/MM3 (ref 140–450)
PMV BLD AUTO: 10.7 FL (ref 6–12)
RBC # BLD AUTO: 4.18 10*6/MM3 (ref 3.77–5.28)
WBC NRBC COR # BLD: 8.19 10*3/MM3 (ref 3.4–10.8)

## 2019-09-08 PROCEDURE — 25010000002 HEPARIN (PORCINE) PER 1000 UNITS: Performed by: INTERNAL MEDICINE

## 2019-09-08 PROCEDURE — 85025 COMPLETE CBC W/AUTO DIFF WBC: CPT | Performed by: INTERNAL MEDICINE

## 2019-09-08 PROCEDURE — 97110 THERAPEUTIC EXERCISES: CPT

## 2019-09-08 PROCEDURE — 99232 SBSQ HOSP IP/OBS MODERATE 35: CPT | Performed by: NURSE PRACTITIONER

## 2019-09-08 PROCEDURE — 85730 THROMBOPLASTIN TIME PARTIAL: CPT | Performed by: INTERNAL MEDICINE

## 2019-09-08 PROCEDURE — 25010000002 CEFTRIAXONE PER 250 MG: Performed by: HOSPITALIST

## 2019-09-08 RX ORDER — AMLODIPINE BESYLATE 5 MG/1
5 TABLET ORAL
Status: DISCONTINUED | OUTPATIENT
Start: 2019-09-09 | End: 2019-09-09

## 2019-09-08 RX ORDER — FUROSEMIDE 20 MG/1
20 TABLET ORAL DAILY
Status: DISCONTINUED | OUTPATIENT
Start: 2019-09-08 | End: 2019-09-09 | Stop reason: HOSPADM

## 2019-09-08 RX ORDER — METOPROLOL SUCCINATE 100 MG/1
100 TABLET, EXTENDED RELEASE ORAL EVERY 12 HOURS SCHEDULED
Status: DISCONTINUED | OUTPATIENT
Start: 2019-09-08 | End: 2019-09-09 | Stop reason: HOSPADM

## 2019-09-08 RX ORDER — HYDRALAZINE HYDROCHLORIDE 25 MG/1
25 TABLET, FILM COATED ORAL EVERY 6 HOURS PRN
Status: DISCONTINUED | OUTPATIENT
Start: 2019-09-08 | End: 2019-09-09 | Stop reason: HOSPADM

## 2019-09-08 RX ADMIN — POTASSIUM CHLORIDE 20 MEQ: 750 CAPSULE, EXTENDED RELEASE ORAL at 09:42

## 2019-09-08 RX ADMIN — CEFTRIAXONE SODIUM 1 G: 1 INJECTION, SOLUTION INTRAVENOUS at 13:15

## 2019-09-08 RX ADMIN — ISOSORBIDE MONONITRATE 30 MG: 30 TABLET ORAL at 09:42

## 2019-09-08 RX ADMIN — HEPARIN SODIUM 6100 UNITS: 5000 INJECTION INTRAVENOUS; SUBCUTANEOUS at 04:28

## 2019-09-08 RX ADMIN — DIPHENOXYLATE HYDROCHLORIDE AND ATROPINE SULFATE 1 TABLET: 2.5; .025 TABLET ORAL at 09:48

## 2019-09-08 RX ADMIN — METOPROLOL SUCCINATE 100 MG: 100 TABLET, FILM COATED, EXTENDED RELEASE ORAL at 21:11

## 2019-09-08 RX ADMIN — LEVOTHYROXINE SODIUM 100 MCG: 100 TABLET ORAL at 09:42

## 2019-09-08 RX ADMIN — APIXABAN 10 MG: 5 TABLET, FILM COATED ORAL at 18:34

## 2019-09-08 RX ADMIN — SODIUM CHLORIDE, PRESERVATIVE FREE 10 ML: 5 INJECTION INTRAVENOUS at 09:42

## 2019-09-08 RX ADMIN — AMLODIPINE BESYLATE 2.5 MG: 2.5 TABLET ORAL at 09:42

## 2019-09-08 RX ADMIN — METOPROLOL SUCCINATE 100 MG: 100 TABLET, FILM COATED, EXTENDED RELEASE ORAL at 09:42

## 2019-09-08 RX ADMIN — FUROSEMIDE 20 MG: 20 TABLET ORAL at 18:34

## 2019-09-08 RX ADMIN — SODIUM CHLORIDE, PRESERVATIVE FREE 10 ML: 5 INJECTION INTRAVENOUS at 21:11

## 2019-09-08 RX ADMIN — ATORVASTATIN CALCIUM 40 MG: 20 TABLET, FILM COATED ORAL at 21:11

## 2019-09-08 NOTE — THERAPY TREATMENT NOTE
Patient Name: Kendy Worrell  : 1940    MRN: [id number]                              Today's Date: 2019       Admit Date: 2019    Visit Dx:     ICD-10-CM ICD-9-CM   1. Bilateral pulmonary embolism (CMS/HCC) I26.99 415.19   2. Acute UTI N39.0 599.0   3. Hypokalemia E87.6 276.8   4. Hypomagnesemia E83.42 275.2   5. New onset atrial fibrillation (CMS/[facility]) I48.91 427.31   6. Abnormal EKG R94.31 794.31   7. Uncontrolled hypertension I10 401.9   8. Closed compression fracture of thoracic vertebra, initial encounter (CMS/[facility])- undetermined age S22.000A 805.2     Patient Active Problem List   Diagnosis   • Essential hypertension   • Acquired hypothyroidism   • Coronary artery disease involving native coronary artery of native heart without angina pectoris   • Hyperlipidemia   • Arthritis   • Skin lesion of chest wall   • NSTEMI (non-ST elevated myocardial infarction) (CMS/[facility])   • Supraventricular tachycardia (CMS/[facility])   • Normal cardiac stress test   • Gastrointestinal bleed   • Angina effort (CMS/[facility])   • Morbidly obese (CMS/[facility])   • Bilateral pulmonary embolism (CMS/HCC)   • New onset a-fib (CMS/[facility])   • UTI (urinary tract infection)   • Chronic diastolic CHF (congestive heart failure) (CMS/[facility])   • Acute ischemic stroke (CMS/[facility])     Past Medical History:   Diagnosis Date   • Arthritis    • Breast cancer (CMS/[facility])    • Hyperlipidemia    • Hypertension    • Hyperthyroidism     patient has hypothyroidism   • Hypothyroidism      Past Surgical History:   Procedure Laterality Date   • BREAST BIOPSY     • BREAST SURGERY Right    • CHOLECYSTECTOMY     • COLON SURGERY      Removed   • COLONOSCOPY     • HYSTERECTOMY     • MAMMO BILATERAL  2015   • MASTECTOMY     • TONSILLECTOMY       General Information     Row Name 19 170          PT Evaluation Time/Intention    Document Type  therapy note (daily note)  -CS     Mode of Treatment  physical therapy  -CS     Row Name 19          General Information     Existing Precautions/Restrictions  fall  -CS     Row Name 09/08/19 1702          Cognitive Assessment/Intervention- PT/OT    Orientation Status (Cognition)  oriented x 4  -CS       User Key  (r) = Recorded By, (t) = Taken By, (c) = Cosigned By    Initials Name Provider Type    Esau Pérez, PT Physical Therapist        Mobility     Row Name 09/08/19 1703          Sit-Stand Transfer    Sit-Stand [place] (Transfers)  contact guard  -CS     Assistive Device (Sit-Stand Transfers)  walker, front-wheeled  -CS     Row Name 09/08/19 1703          Gait/Stairs Assessment/Training    [place] Level (Gait)  minimum assist (75% patient effort);contact guard  -CS     Assistive Device (Gait)  walker, front-wheeled  -CS     Distance in Feet (Gait)  20  -CS     Comment (Gait/Stairs)  Pt walked to BR- urge to have BM  -CS       User Key  (r) = Recorded By, (t) = Taken By, (c) = Cosigned By    Initials Name Provider Type    Esau Pérez, PT Physical Therapist        Obj/Interventions    No documentation.       Goals/Plan    No documentation.       Clinical Impression     Row Name 09/08/19 1704          Pain Scale: Numbers Pre/Post-Treatment    Pain Scale: Numbers, Pretreatment  0/10 - no pain  -CS     Pain Scale: Numbers, Post-Treatment  0/10 - no pain  -CS     Row Name 09/08/19 1704          Plan of Care Review    Plan of Care Reviewed With  patient  -CS     Row Name 09/08/19 1704          Positioning and Restraints    Pre-Treatment Position  sitting in chair/recliner  -CS     Post Treatment Position  bathroom  -CS     Bathroom  notified nsg;sitting;call light within reach;encouraged to call for assist  -CS       User Key  (r) = Recorded By, (t) = Taken By, (c) = Cosigned By    Initials Name Provider Type    Esau Pérez, PT Physical Therapist        Outcome Measures     Row Name 09/08/19 1705          How much help from another person do you currently need...    Turning from your back to your side  while in flat bed without using bedrails?  4  -CS     Moving from lying on back to sitting on the side of a flat bed without bedrails?  4  -CS     Moving to and from a bed to a chair (including a wheelchair)?  3  -CS     Standing up from a chair using your arms (e.g., wheelchair, bedside chair)?  3  -CS     Climbing 3-5 steps with a railing?  2  -CS     To walk in hospital room?  3  -CS     AM-PAC 6 Clicks Score (PT)  19  -CS       User Key  (r) = Recorded By, (t) = Taken By, (c) = Cosigned By    Initials Name Provider Type    CS Esau Chang, PT Physical Therapist        Physical Therapy Education     Title: PT OT SLP Therapies (Done)     Topic: Physical Therapy (Done)     Point: Mobility training (Done)     Learning Progress Summary           Patient Acceptance, E,TB, VU,NR by  at 9/8/2019  5:04 PM                   Point: Home exercise program (Done)     Learning Progress Summary           Patient Acceptance, E,TB, VU,NR by  at 9/8/2019  5:04 PM                   Point: Body mechanics (Done)     Learning Progress Summary           Patient Acceptance, E,TB, VU,NR by CS at 9/8/2019  5:04 PM                   Point: Precautions (Done)     Learning Progress Summary           Patient Acceptance, E,TB, VU,NR by  at 9/8/2019  5:04 PM                               User Key     Initials Effective Dates Name Provider Type Discipline     05/14/18 -  Esau Chang, PT Physical Therapist PT              PT Recommendation and Plan     Outcome Summary/Treatment Plan (PT)  Anticipated Discharge Disposition (PT): assisted living facility ([facility]), skilled nursing facility  Plan of Care Reviewed With: patient  Outcome Summary: Pt walked to BR and performed toilet transfer, tolerated without complaints.      Time Calculation:   PT Charges     Row Name 09/08/19 1705             Time Calculation    Start Time  1640  -CS      Stop Time  1651  -      Time Calculation (min)  11 min  -CS      PT Received On  09/08/19  -       PT - Next Appointment  09/09/19  -TOLU        User Key  (r) = Recorded By, (t) = Taken By, (c) = Cosigned By    Initials Name Provider Type    Esau Pérez, PT Physical Therapist        Therapy Charges for Today     Code Description Service Date Service Provider Modifiers Qty    [id number]  PT THER PROC EA 15 MIN 9/8/2019 Esau Chang, PT GP 1          PT G-Codes  Outcome Measure Options: AM-PAC 6 Clicks Basic Mobility (PT)  AM-PAC 6 Clicks Score (PT): 19  AM-PAC 6 Clicks Score (OT): 18  Modified Karissa Scale: 3 - Moderate disability.  Requiring some help, but able to walk without assistance.    Esau Chang PT  9/8/2019

## 2019-09-08 NOTE — NURSING NOTE
Request for stroke screening received 9/4/19 per Epic order set and have followed peripherally.  Noted plan to return to [facility].  Will sign off re: inpatient acute rehab.  Thank you--Mary Gabriel,  Rehab Adm Nurse

## 2019-09-08 NOTE — PLAN OF CARE
Problem: Patient Care Overview  Goal: Plan of Care Review  Outcome: Ongoing (interventions implemented as appropriate)   09/08/19 0865   Coping/Psychosocial   Plan of Care Reviewed With patient   OTHER   Outcome Summary Pt walked to BR and performed toilet transfer, tolerated without complaints.

## 2019-09-08 NOTE — PLAN OF CARE
Problem: Patient Care Overview  Goal: Plan of Care Review  Outcome: Ongoing (interventions implemented as appropriate)   09/08/19 0592   Coping/Psychosocial   Plan of Care Reviewed With patient   Plan of Care Review   Progress improving   OTHER   Outcome Summary MRI did not indicate stroke, PT A&O x4, meds for diarrhea issue resolved, BP elevated, no c/o of pain or nausea, bed bath given, will CTM       Problem: Stroke (Ischemic) (Adult)  Goal: Signs and Symptoms of Listed Potential Problems Will be Absent, Minimized or Managed (Stroke)  Outcome: Ongoing (interventions implemented as appropriate)      Problem: Arrhythmia/Dysrhythmia (Symptomatic) (Adult)  Goal: Signs and Symptoms of Listed Potential Problems Will be Absent, Minimized or Managed (Arrhythmia/Dysrhythmia)  Outcome: Ongoing (interventions implemented as appropriate)      Problem: Fall Risk (Adult)  Goal: Absence of Fall  Outcome: Ongoing (interventions implemented as appropriate)

## 2019-09-08 NOTE — PROGRESS NOTES
Name: Kendy Worrell ADMIT: 2019   : 1940  PCP: Marisela Adams APRN    MRN: [id number] LOS: 4 days   AGE/SEX: 79 y.o. female  ROOM: [facility]   Subjective   Chief Complaint   Patient presents with   • Altered Mental Status     AMS better  Had MRI  HR high at times  BP high  On heparin gtt    ROS  No f/c  No n/v  No cp/palp  No soa/cough    Objective   Vital Signs  Temp:  [98 °F (36.7 °C)-98.5 °F (36.9 °C)] 98.2 °F (36.8 °C)  Heart Rate:  [] 118  Resp:  [16-18] 16  BP: (147-197)/(126-154) 147/130  SpO2:  [95 %] 95 %  on   ;   Device (Oxygen Therapy): room air  Body mass index is 35.04 kg/m².    Physical Exam   Constitutional: She is oriented to person, place, and time. She appears well-developed and well-nourished.   HENT:   Head: Normocephalic and atraumatic.   Eyes: Conjunctivae are normal. No scleral icterus.   Neck: Neck supple. No tracheal deviation present.   Cardiovascular:   Irregular, tachy   Pulmonary/Chest: Effort normal and breath sounds normal.   Abdominal: Soft. There is no tenderness. There is no guarding.   Neurological: She is alert and oriented to person, place, and time. She exhibits normal muscle tone.   Skin: Skin is warm and dry.   Psychiatric: She has a normal mood and affect. Her behavior is normal.       Results Review:       I reviewed the patient's new clinical results.  Results from last 7 days   Lab Units 19  0143 19  0622 19  0448 19  0459   WBC 10*3/mm3 8.19 8.23 8.15 9.19   HEMOGLOBIN g/dL 12.6 12.9 12.5 12.1   PLATELETS 10*3/mm3 211 267 260 238     Results from last 7 days   Lab Units 19  19519  0810 19  0459 19  1731   SODIUM mmol/L  --   --  136 135*   POTASSIUM mmol/L 4.6 3.3* 3.1* 2.8*   CHLORIDE mmol/L  --   --  93* 89*   CO2 mmol/L  --   --  29.4* 30.7*   BUN mg/dL  --   --  11 11   CREATININE mg/dL  --   --  0.77 1.06*   GLUCOSE mg/dL  --   --  120* 112*   Estimated Creatinine Clearance: 53 mL/min (by  C-G formula based on SCr of 0.77 mg/dL).  Results from last 7 days   Lab Units 09/04/19  1731   ALBUMIN g/dL 4.70   BILIRUBIN mg/dL 1.3*   ALK PHOS U/L 80   AST (SGOT) U/L 27   ALT (SGPT) U/L 19     Results from last 7 days   Lab Units 09/05/19  0459 09/04/19  1731   CALCIUM mg/dL 9.5 10.2   ALBUMIN g/dL  --  4.70   MAGNESIUM mg/dL 2.1 1.5*     Results from last 7 days   Lab Units 09/04/19  1731   PROCALCITONIN ng/mL 0.10   LACTATE mmol/L 2.0       Coag   Results from last 7 days   Lab Units 09/08/19  1200 09/08/19  0143 09/07/19  1653 09/07/19  0622 09/07/19  0019 09/06/19  2213 09/06/19  1322  09/04/19  2245 09/04/19  1731   INR   --   --   --   --   --   --   --   --  1.10 1.22*   APTT seconds 176.3* 44.9* 136.1* 58.0* 81.8* 173.3* 65.5*   < > 33.5  --     < > = values in this interval not displayed.     HbA1C   Lab Results   Component Value Date    HGBA1C 4.80 09/05/2019     Infection   Results from last 7 days   Lab Units 09/04/19  1755 09/04/19  1731   URINECX  >100,000 CFU/mL Escherichia coli*  --    PROCALCITONIN ng/mL  --  0.10     Radiology(recent) Mri Angiogram Head Without Contrast    Result Date: 9/7/2019  1. Negative MRI of the brain. 2. Negative MRA of the neck. 3. MRA of the head is compromised by motion which obscures detail around the level of the [place]. The visualized vasculature appears widely patent.  This report was finalized on 9/7/2019 5:58 PM by Dr. Raudel Wiseman M.D.      Mri Angiogram Neck With & Without Contrast    Result Date: 9/7/2019  1. Negative MRI of the brain. 2. Negative MRA of the neck. 3. MRA of the head is compromised by motion which obscures detail around the level of the [place]. The visualized vasculature appears widely patent.  This report was finalized on 9/7/2019 5:58 PM by Dr. Raudel Wiseman M.D.      Mri Brain With & Without Contrast    Result Date: 9/7/2019  1. Negative MRI of the brain. 2. Negative MRA of the neck. 3. MRA of the head is  compromised by motion which obscures detail around the level of the [place]. The visualized vasculature appears widely patent.  This report was finalized on 9/7/2019 5:58 PM by Dr. Raudel Wiseman M.D.      No results found for: TROPONINT, TROPONINI, BNP  No components found for: TSH;2      [START ON 9/9/2019] amLODIPine 5 mg Oral Q24H   apixaban 10 mg Oral Q12H   Followed by      [START ON 9/15/2019] apixaban 5 mg Oral Q12H   atorvastatin 40 mg Oral Nightly   furosemide 20 mg Oral Daily   isosorbide mononitrate 30 mg Oral Daily   levothyroxine 100 mcg Oral Daily   metoprolol succinate  mg Oral Q12H   potassium chloride 20 mEq Oral Daily   sodium chloride 10 mL Intravenous Q12H      Diet Regular      Assessment/Plan      Active Hospital Problems    Diagnosis  POA   • **Bilateral pulmonary embolism (CMS/HCC) [I26.99]  Yes   • Acute ischemic stroke (CMS/HCC) [I63.9]  Unknown   • New onset a-fib (CMS/HCC) [I48.91]  Unknown   • UTI (urinary tract infection) [N39.0]  Unknown   • Chronic diastolic CHF (congestive heart failure) (CMS/HCC) [I50.32]  Unknown   • Essential hypertension [I10]  Yes   • Acquired hypothyroidism [E03.9]  Yes   • Coronary artery disease involving native coronary artery of native heart without angina pectoris [I25.10]  Yes      Resolved Hospital Problems    Diagnosis Date Resolved POA   • Hypokalemia [E87.6] 09/07/2019 Unknown   • Hypomagnesemia [E83.42] 09/05/2019 Unknown   • Slurred speech [R47.81] 09/07/2019 Unknown   • Word finding difficulty [R47.89] 09/07/2019 Unknown   • Metabolic encephalopathy [G93.41] 09/07/2019 Unknown       CVA               -Radiology read negative but Neurology read positive for CVA. Neurology recommended NOAC will change     Pansensitive E. Coli -treated after today with Rocephin x3 days     Small multiple bilateral pulmonary emboli on heparin drip with negative Dopplers              -Transition from heparin gtt to eliquis     New onset A. Fib               -Heparin drip to eliquis              -History of GIB in the past but risk/benfit points to a/c     HTN, Afib, CHF  -BP uncontrolled  -increase BB and add christine NEGRON RN  Reviewed previous records    Vladimir Barrera MD  [facility]  09/08/19  4:30 PM

## 2019-09-08 NOTE — PROGRESS NOTES
"DOS: 2019  NAME: Kendy Worrell   : 1940  PCP: Marisela Adams APRN  Chief Complaint   Patient presents with   • Altered Mental Status     CC: Stroke    Subjective: No new events overnight per RN. Patient on BSC, able to assist back to bed, She denies headache or any new stroke/TIA symptoms.  No family at bedside currently.    Interval History  History taken from: patient chart RN    Objective:  Vital signs: BP (!) 197/154 (BP Location: Left arm, Patient Position: Lying)   Pulse 117   Temp 98.5 °F (36.9 °C) (Oral)   Resp 16   Ht 152.4 cm (60\")   Wt 81.4 kg (179 lb 6.4 oz)   SpO2 95%   BMI 35.04 kg/m²       Physical Exam:  GENERAL: NAD, obese  HEENT: Normocephalic, atraumatic   COR: Irregularly irregular.   Resp: Even and unlabored  Extremities: BLE edema.      Neurological:   MS: AO. Language normal. No neglect. Follows all commands.   CN: II-XII normal except for some facial asymmetry at rest, symmetric with movement  Motor: Normal strength and tone in BUE, reduced strength in BLE (chronic), L worse than R  Sensory: Intact to light touch in all extremities.   Station and Gait: Stand and pivot, unsteady,   Coordination: Normal finger to nose b/l  Patient examined, changes noted above.    Current Medications:  Scheduled Medications:  amLODIPine 2.5 mg Oral Q24H   atorvastatin 40 mg Oral Nightly   ceftriaxone 1 g Intravenous Q24H   isosorbide mononitrate 30 mg Oral Daily   levothyroxine 100 mcg Oral Daily   metoprolol succinate  mg Oral Daily   potassium chloride 20 mEq Oral Daily   sodium chloride 10 mL Intravenous Q12H     Infusions:   heparin (porcine) 18 Units/kg/hr Last Rate: 14 Units/kg/hr (19 5017)     PRN Medications:  •  acetaminophen  •  diphenoxylate-atropine  •  heparin (porcine)  •  labetalol  •  ondansetron  •  potassium chloride  •  potassium chloride  •  [COMPLETED] Insert peripheral IV **AND** sodium chloride  •  sodium chloride    Medications " Reviewed:    Laboratory results:  Lab Results   Component Value Date    GLUCOSE 120 (H) 09/05/2019    CALCIUM 9.5 09/05/2019     09/05/2019    K 4.6 09/07/2019    CO2 29.4 (H) 09/05/2019    CL 93 (L) 09/05/2019    BUN 11 09/05/2019    CREATININE 0.77 09/05/2019    EGFRIFAFRI 60 (L) 05/01/2019    EGFRIFNONA 72 09/05/2019    BCR 14.3 09/05/2019    ANIONGAP 13.6 09/05/2019     Lab Results   Component Value Date    WBC 8.19 09/08/2019    HGB 12.6 09/08/2019    HCT 41.1 09/08/2019    MCV 98.3 (H) 09/08/2019     09/08/2019      Results from last 7 days   Lab Units 09/05/19  0459   CHOLESTEROL mg/dL 130     Lab Results   Component Value Date    INR 1.10 09/04/2019    INR 1.22 (H) 09/04/2019    PROTIME 13.9 09/04/2019    PROTIME 15.1 (H) 09/04/2019     Lab Results   Component Value Date    TSH 2.090 09/05/2019     Lab Results   Component Value Date    HGBA1C 4.80 09/05/2019     Lab Results   Component Value Date    CHOL 130 09/05/2019    CHLPL 192 10/04/2016    TRIG 70 09/05/2019    HDL 79 (H) 09/05/2019    LDL 37 09/05/2019      Lab Results   Component Value Date    [id number] >2,000 (H) 09/05/2019       Results Review:     I reviewed the patient's new clinical results.  I reviewed the patient's new imaging results and agree with the interpretation.  MRI brain 9/7/19: FINDINGS BRAIN MRI: The ventricles are normal in caliber. There is  patchy T2 signal in the white matter. There is no restricted diffusion.  The extra-axial spaces appear normal. There is no abnormal contrast  enhancement. Expected intracranial flow voids are observed. Orbital  structures and paranasal sinuses appear normal. There is partial  opacification of a few of the left mastoid air cells. The optic chiasm  and pituitary appear normal on sagittal images.  MRA head 9/7/19: FINDINGS MRA HEAD: There is some motion artifact which compromises fine  detail at several levels. The distal vertebral arteries and the basilar  artery are patent. Distal  internal carotid arteries are widely patent.  There is a fetal origin of the right posterior cerebral artery which  demonstrates normal flow. Neither posterior cerebral artery is well  demonstrated otherwise. Segments of those vessels are obscured as are  the A1 and proximal A2 segments and portions of the M2 segments  bilaterally. The M1 segments are widely patent. In the more peripheral  portions of the M2 and M3 segments, normal flow is observed. There is  also some flow observed along the mid to distal portions of the  posterior cerebral arteries.  MRA neck 9/7/19: FINDINGS MRA NECK: The carotid arteries are quite tortuous. The  visualized part of the aortic arch is normal in caliber. The  brachiocephalic artery and the subclavian arteries are patent. The  common carotid arteries are tortuous. The internal carotid arteries are  widely patent. There is no stenosis using NASCET criteria. The external  carotid arteries are patent. The vertebral arteries are patent  bilaterally as well.  EKG 9/4/19: Afib    Impression: Ms. Worrell is an 78 yo female with HTN, HLD, CHF, h/o colectomy for bleeding ulcer and breast CA who presented to ED with confusion and transient facial droop in the setting of UTI, new onset afib and CTA chest showing small b/l pulmonary emboli, placed on heparin gtt.      Lbgz-jo-rx-date: Dx: Subacute right frontal stroke, P/E, new onset afib  - CT head: Negative  - CTA chest: Multiple small bilateral pulmonary thromboemboli   - EKG: Afib  - BLE Doppler: No evidence of DVT  - TTE: LVEF 56%, LA moderately dilated, saline tests negative.   - LABS: UA 4+ bacteria, B12 > 2000, LDL 37, A1C 4.8, TSH 2.09, PTT 58  - MRI brain: Read as negative, d/w Dr. Lam, faint DWI lesion in right frontal lobe, no ADC correlate, consistent with subacute infarct.  - MRA h/n: Tortuous carotid arteries, no stenosis.      Clinically patient essentially remains at her neurologic baseline, no focal findings on exam. TTE  showed dilated LA, no PFO. MRI shows right frontal lobe subacute stroke. Recommend continuing anticoagulation for stroke prevention, okay to transition to NOAC. No further neurologic w/u. Continue statin, neurochecks, PT/OT/ST. Will see again upon request, please call with questions or concerns.     Plan:  Heparin gtt -- okay to start NOAC  Lipitor 40 mg  Hydrate  Neurochecks  Non-pharmacological DVT prophylaxis  EKG Tele  PT/OT/ST  Stroke Education      I have discussed the above with Dr. Lam, RN, patient.  Lillie Hickman, APRN  09/08/19  10:19 AM        Bilateral pulmonary embolism (CMS/HCC)    Essential hypertension    Acquired hypothyroidism    Coronary artery disease involving native coronary artery of native heart without angina pectoris    New onset a-fib (CMS/HCC)    UTI (urinary tract infection)    Chronic diastolic CHF (congestive heart failure) (CMS/HCC)

## 2019-09-08 NOTE — PLAN OF CARE
Problem: Patient Care Overview  Goal: Plan of Care Review  Outcome: Ongoing (interventions implemented as appropriate)   09/08/19 1400 09/08/19 3877   Coping/Psychosocial   Plan of Care Reviewed With patient --    OTHER   Outcome Summary --  MRI complete. Patient wokring with PT. no falls. Added norvasc to med list. Rm air. Multiple BM is this patients norm. CTM

## 2019-09-09 VITALS
WEIGHT: 174.2 LBS | RESPIRATION RATE: 18 BRPM | DIASTOLIC BLOOD PRESSURE: 96 MMHG | BODY MASS INDEX: 34.2 KG/M2 | HEIGHT: 60 IN | HEART RATE: 100 BPM | SYSTOLIC BLOOD PRESSURE: 148 MMHG | TEMPERATURE: 97.8 F | OXYGEN SATURATION: 95 %

## 2019-09-09 PROCEDURE — 97110 THERAPEUTIC EXERCISES: CPT

## 2019-09-09 PROCEDURE — 99232 SBSQ HOSP IP/OBS MODERATE 35: CPT | Performed by: INTERNAL MEDICINE

## 2019-09-09 RX ORDER — AMLODIPINE BESYLATE 10 MG/1
10 TABLET ORAL
Status: DISCONTINUED | OUTPATIENT
Start: 2019-09-10 | End: 2019-09-09 | Stop reason: HOSPADM

## 2019-09-09 RX ORDER — AMLODIPINE BESYLATE 10 MG/1
10 TABLET ORAL
Qty: 30 TABLET | Refills: 0 | Status: ON HOLD | OUTPATIENT
Start: 2019-09-10 | End: 2021-11-18

## 2019-09-09 RX ORDER — FUROSEMIDE 40 MG/1
40 TABLET ORAL DAILY
Qty: 30 TABLET | Refills: 0 | Status: SHIPPED | OUTPATIENT
Start: 2019-09-10 | End: 2022-11-07 | Stop reason: HOSPADM

## 2019-09-09 RX ORDER — HYDRALAZINE HYDROCHLORIDE 25 MG/1
25 TABLET, FILM COATED ORAL EVERY 6 HOURS PRN
Status: ON HOLD
Start: 2019-09-09 | End: 2021-11-18

## 2019-09-09 RX ORDER — METOPROLOL SUCCINATE 100 MG/1
100 TABLET, EXTENDED RELEASE ORAL EVERY 12 HOURS SCHEDULED
Qty: 60 TABLET | Refills: 0 | Status: SHIPPED | OUTPATIENT
Start: 2019-09-09

## 2019-09-09 RX ORDER — ACETAMINOPHEN 325 MG/1
650 TABLET ORAL EVERY 6 HOURS PRN
Start: 2019-09-09

## 2019-09-09 RX ORDER — POTASSIUM CHLORIDE 20 MEQ/1
20 TABLET, EXTENDED RELEASE ORAL 2 TIMES DAILY
Qty: 90 TABLET | Refills: 3
Start: 2019-09-09 | End: 2022-11-01

## 2019-09-09 RX ORDER — ATORVASTATIN CALCIUM 40 MG/1
40 TABLET, FILM COATED ORAL NIGHTLY
Qty: 30 TABLET | Refills: 0 | Status: SHIPPED | OUTPATIENT
Start: 2019-09-09 | End: 2022-11-07 | Stop reason: HOSPADM

## 2019-09-09 RX ADMIN — APIXABAN 10 MG: 5 TABLET, FILM COATED ORAL at 09:00

## 2019-09-09 RX ADMIN — SODIUM CHLORIDE, PRESERVATIVE FREE 10 ML: 5 INJECTION INTRAVENOUS at 09:00

## 2019-09-09 RX ADMIN — ISOSORBIDE MONONITRATE 30 MG: 30 TABLET ORAL at 09:00

## 2019-09-09 RX ADMIN — FUROSEMIDE 20 MG: 20 TABLET ORAL at 09:00

## 2019-09-09 RX ADMIN — AMLODIPINE BESYLATE 5 MG: 5 TABLET ORAL at 09:00

## 2019-09-09 RX ADMIN — LEVOTHYROXINE SODIUM 100 MCG: 100 TABLET ORAL at 09:00

## 2019-09-09 RX ADMIN — METOPROLOL SUCCINATE 100 MG: 100 TABLET, FILM COATED, EXTENDED RELEASE ORAL at 09:00

## 2019-09-09 RX ADMIN — POTASSIUM CHLORIDE 20 MEQ: 750 CAPSULE, EXTENDED RELEASE ORAL at 08:59

## 2019-09-09 NOTE — DISCHARGE PLACEMENT REQUEST
"Kendy Worrell (79 y.o. Female)     Date of Birth Social Security Number Address Home Phone MRN    1940  [address]    [place] [address] [phone] [id number]    Christian Marital Status          None Single       Admission Date Admission Type Admitting Provider Attending Provider Department, Room/Bed    9/4/19 Emergency Gurvinder Levin MD Jackson, Alan David, MD [address], S509/1    Discharge Date Discharge Disposition Discharge Destination         Skilled Nursing Facility (DC - External)              Attending Provider:  Vladimir Barrera MD    Allergies:  Aspirin, Nsaids    Isolation:  None   Infection:  None   Code Status:  CPR    Ht:  152.4 cm (60\")   Wt:  79 kg (174 lb 3.2 oz)    Admission Cmt:  None   Principal Problem:  Bilateral pulmonary embolism (CMS/HCC) [I26.99]                 Active Insurance as of 9/4/2019     Primary Coverage     Payor Plan Insurance Group Employer/Plan Group    MEDICARE MEDICARE A & B      Payor Plan Address Payor Plan Phone Number Payor Plan Fax Number Effective Dates    [address] [phone]  2/1/2004 - None Entered    [facility] [address]       Subscriber Name Subscriber Birth Date Member ID       KENDY WORRELL 1940 [id number]           Secondary Coverage     Payor Plan Insurance Group Employer/Plan Group    [place] [id number]     Payor Plan Address Payor Plan Phone Number Payor Plan Fax Number Effective Dates    [address]   1/9/1993 - None Entered    [facility] [address]       Subscriber Name Subscriber Birth Date Member ID       KENDY WORRELL 1940 [id number]                 Emergency Contacts      (Rel.) Home Phone Work Phone Mobile Phone    Ramandeep Mejias (Daughter) [phone] -- [phone]    Akiko Worrell (Other) -- -- [phone]                 Discharge Summary      Vladimir Barrera MD at 9/9/2019 12:11 PM                 NAME: Kendy PALAK Worrell " ADMIT: 2019   : 1940  PCP: Marisela Adams APRN    MRN: [id number] LOS: 5 days   AGE/SEX: 79 y.o. female  ROOM: S509/     Date of Admission:  2019  Date of Discharge:  2019    PCP: Marisela Adams APRN    CHIEF COMPLAINT  Altered Mental Status      DISCHARGE DIAGNOSIS  Active Hospital Problems    Diagnosis  POA   • **Bilateral pulmonary embolism (CMS/HCC) [I26.99]  Yes   • Acute ischemic stroke (CMS/HCC) [I63.9]  Yes   • New onset a-fib (CMS/[facility]) [I48.91]  Yes   • UTI (urinary tract infection) [N39.0]  Yes   • Chronic diastolic CHF (congestive heart failure) (CMS/[facility]) [I50.32]  Yes   • Essential hypertension [I10]  Yes   • Acquired hypothyroidism [E03.9]  Yes   • Coronary artery disease involving native coronary artery of native heart without angina pectoris [I25.10]  Yes      Resolved Hospital Problems    Diagnosis Date Resolved POA   • Hypokalemia [E87.6] 2019 Unknown   • Hypomagnesemia [E83.42] 2019 Unknown   • Slurred speech [R47.81] 2019 Unknown   • Word finding difficulty [R47.89] 2019 Unknown   • Metabolic encephalopathy [G93.41] 2019 Unknown       SECONDARY DIAGNOSES  Past Medical History:   Diagnosis Date   • Arthritis    • Breast cancer (CMS/[facility])    • Hyperlipidemia    • Hypertension    • Hyperthyroidism     patient has hypothyroidism   • Hypothyroidism        CONSULTS   Cardiology  Neurology    HOSPITAL COURSE  This is an 79-year-old female with a history of hypothyroidism, hypertension, breast cancer, colectomy for bleeding ulcer who presented to the emergency room with confusion, facial droop as well as new onset A. fib, UTI and acute pulmonary embolism.  She was admitted and seen by cardiology as well as neurology.  She was started on heparin drip for the PE as well as new onset A. fib.  She also was found to have acute stroke in the right frontal lobe-the MRI read was negative from the radiologist but positive per neurology read.  MRI of  the head and neck did not have any stenosis.  The patient did well and had her medications for hypertension and A. fib titrated.  Can potentially still titrate up her Imdur if needed for blood pressure but do have PRN hydralazine ordered while she is at rehab facility.  She will be going to rehab to get stronger but will have outpatient follow-up with her primary care physician as well as cardiology.  She is being discharged on Eliquis for the PE as well as A. fib.  She still has 6 additional days of 10 mg p.o. twice daily, after that the dose will be 5 mg p.o. twice daily.           DIAGNOSTICS    09/08/2019 0143  09/08/2019 0207  CBC Auto Differential [[id number]]   (Abnormal)   Blood     Final result  WBC 8.19 10*3/mm3   RBC 4.18 10*6/mm3   Hemoglobin 12.6 g/dL   Hematocrit 41.1 %   MCV 98.3 Abnormally high  fL   MCH 30.1 pg   MCHC 30.7 Abnormally low  g/dL   RDW 14.6 %   RDW-SD 52.7 fl   MPV 10.7 fL   Platelets 211 10*3/mm3   Neutrophil Rel % 63.3 %   Lymphocyte Rel % 21.0 %   Monocyte Rel % 11.6 %   Eosinophil Rel % 2.7 %   Basophil Rel % 0.9 %   Immature Granulocyte Rel % 0.5 %   Neutrophils Absolute 5.19 10*3/mm3   Lymphocytes Absolute 1.72 10*3/mm3   Monocytes Absolute 0.95 Abnormally high  10*3/mm3   Eosinophils Absolute 0.22 10*3/mm3   Basophils Absolute 0.07 10*3/mm3   Immature Grans, Absolute 0.04 10*3/mm3   nRBC 0.0 /100 WBC          09/07/2019 1955  09/07/2019 2134  Potassium [[id number]]   Blood     Final result  Potassium 4.6 mmol/L        MRI Brain With & Without Contrast [[id number]] Varghese as Reviewed   Order Status: Completed Collected: 09/07/19 1652    Updated: 09/07/19 1801   Narrative:     MRI BRAIN WITH AND WITHOUT CONTRAST, MRA HEAD WITHOUT CONTRAST AND MRA  NECK WITH AND WITHOUT CONTRAST     HISTORY: Episode of confusion with transient facial droop. Hypertension.  Previous breast cancer.     TECHNIQUE: MRI brain with and without contrast is provided. MRA head was  performed without contrast and  MRA neck performed with and without  contrast.     FINDINGS BRAIN MRI: The ventricles are normal in caliber. There is  patchy T2 signal in the white matter. There is no restricted diffusion.  The extra-axial spaces appear normal. There is no abnormal contrast  enhancement. Expected intracranial flow voids are observed. Orbital  structures and paranasal sinuses appear normal. There is partial  opacification of a few of the left mastoid air cells. The optic chiasm  and pituitary appear normal on sagittal images.     FINDINGS MRA NECK: The carotid arteries are quite tortuous. The  visualized part of the aortic arch is normal in caliber. The  brachiocephalic artery and the subclavian arteries are patent. The  common carotid arteries are tortuous. The internal carotid arteries are  widely patent. There is no stenosis using NASCET criteria. The external  carotid arteries are patent. The vertebral arteries are patent  bilaterally as well.     FINDINGS MRA HEAD: There is some motion artifact which compromises fine  detail at several levels. The distal vertebral arteries and the basilar  artery are patent. Distal internal carotid arteries are widely patent.  There is a fetal origin of the right posterior cerebral artery which  demonstrates normal flow. Neither posterior cerebral artery is well  demonstrated otherwise. Segments of those vessels are obscured as are  the A1 and proximal A2 segments and portions of the M2 segments  bilaterally. The M1 segments are widely patent. In the more peripheral  portions of the M2 and M3 segments, normal flow is observed. There is  also some flow observed along the mid to distal portions of the  posterior cerebral arteries.      Impression:     1. Negative MRI of the brain.  2. Negative MRA of the neck.  3. MRA of the head is compromised by motion which obscures detail around  the level of the [place]. The visualized vasculature appears  widely patent.     MRI Angiogram Head  Without Contrast [[id number]] Varghese as Reviewed   Order Status: Completed Collected: 09/07/19 1652    Updated: 09/07/19 1801   Narrative:     MRI BRAIN WITH AND WITHOUT CONTRAST, MRA HEAD WITHOUT CONTRAST AND MRA  NECK WITH AND WITHOUT CONTRAST     HISTORY: Episode of confusion with transient facial droop. Hypertension.  Previous breast cancer.     TECHNIQUE: MRI brain with and without contrast is provided. MRA head was  performed without contrast and MRA neck performed with and without  contrast.     FINDINGS BRAIN MRI: The ventricles are normal in caliber. There is  patchy T2 signal in the white matter. There is no restricted diffusion.  The extra-axial spaces appear normal. There is no abnormal contrast  enhancement. Expected intracranial flow voids are observed. Orbital  structures and paranasal sinuses appear normal. There is partial  opacification of a few of the left mastoid air cells. The optic chiasm  and pituitary appear normal on sagittal images.     FINDINGS MRA NECK: The carotid arteries are quite tortuous. The  visualized part of the aortic arch is normal in caliber. The  brachiocephalic artery and the subclavian arteries are patent. The  common carotid arteries are tortuous. The internal carotid arteries are  widely patent. There is no stenosis using NASCET criteria. The external  carotid arteries are patent. The vertebral arteries are patent  bilaterally as well.     FINDINGS MRA HEAD: There is some motion artifact which compromises fine  detail at several levels. The distal vertebral arteries and the basilar  artery are patent. Distal internal carotid arteries are widely patent.  There is a fetal origin of the right posterior cerebral artery which  demonstrates normal flow. Neither posterior cerebral artery is well  demonstrated otherwise. Segments of those vessels are obscured as are  the A1 and proximal A2 segments and portions of the M2 segments  bilaterally. The M1 segments are widely patent. In  the more peripheral  portions of the M2 and M3 segments, normal flow is observed. There is  also some flow observed along the mid to distal portions of the  posterior cerebral arteries.      Impression:     1. Negative MRI of the brain.  2. Negative MRA of the neck.  3. MRA of the head is compromised by motion which obscures detail around  the level of the [place]. The visualized vasculature appears  widely patent.     Duplex Venous Lower Extremity - Bilateral CAR [[id number]] Varghese as Reviewed   Order Status: Completed Collected: 09/05/19 1547    Updated: 09/05/19 1630    Right Common Femoral Spont Y    Right Common Femoral Phasic Y    Right Common Femoral Augment Y    Right Common Femoral Competent Y    Right Common Femoral Compress C    Right Saphenofemoral Junction Compress C    Right Proximal Femoral Compress C    Right Mid Femoral Spont Y    Right Mid Femoral Phasic Y    Right Mid Femoral Augment Y    Right Mid Femoral Competent Y    Right Mid Femoral Compress C    Right Distal Femoral Compress C    Right Popliteal Spont Y    Right Popliteal Phasic Y    Right Popliteal Augment Y    Right Popliteal Competent Y    Right Popliteal Compress C    Right Posterior Tibial Compress C    Right Peroneal Compress C    Right GastronemiusSoleal Compress C    Right Greater Saph AK Compress C    Right Greater Saph BK Compress C    Left Common Femoral Spont Y    Left Common Femoral Phasic Y    Left Common Femoral Augment Y    Left Common Femoral Competent Y    Left Common Femoral Compress C    Left Saphenofemoral Junction Compress C    Left Proximal Femoral Compress C    Left Mid Femoral Spont Y    Left Mid Femoral Phasic Y    Left Mid Femoral Augment Y    Left Mid Femoral Competent Y    Left Mid Femoral Compress C    Left Distal Femoral Compress C    Left Popliteal Spont Y    Left Popliteal Phasic Y    Left Popliteal Augment Y    Left Popliteal Competent Y    Left Popliteal Compress C    Left Posterior Tibial Compress  C    Left Peroneal Compress C    Left GastronemiusSoleal Compress C    Left Greater Saph AK Compress C    Left Greater Saph BK Compress C   Narrative:     · Normal bilateral lower extremity venous duplex scan.      CT Angiogram Chest With Contrast [[id number]] Varghese as Reviewed   Order Status: Completed Collected: 09/04/19 2009    Updated: 09/04/19 2021   Narrative:     CT ANGIOGRAM CHEST W CONTRAST-     CLINICAL HISTORY: Neck pain. Positive d-dimer.     TECHNIQUE: Spiral CT images were obtained through the chest during rapid  IV injection of contrast and were reconstructed in 2 mm thick axial  slices. Multiple coronal and sagittal and 3-D reconstructions were  obtained.     Radiation dose reduction techniques were utilized, including automated  exposure control and exposure modulation based on body size.     COMPARISON: None     FINDINGS: There are multiple small bilateral pulmonary thromboemboli  including segmental and subsegmental branches of the right upper lobe  pulmonary artery and subsegmental branches of the left upper lobe  pulmonary artery and left lower lobe pulmonary artery. MR also present  within segmental branches of the right middle lobe pulmonary artery.  There is evidence of mild pulmonary arterial hypertension. The right  atrium is dilated, and the RV LV ratio is 1.3. There is no mediastinal  or hilar lymphadenopathy. Lung window images demonstrate no parenchymal  areas. There are no pleural effusions. The patient is status post right  mastectomy without breast reconstruction.      Impression:     Multiple small bilateral pulmonary thromboemboli as  described. There is also evidence of mild pulmonary arterial  hypertension, as well.           PHYSICAL EXAM  Objective    Alert  nad  No resp distress  Lungs fairly clear  Irregular  Some chronic lymphedema/venous stasis    CONDITION ON DISCHARGE  Stable.      DISCHARGE DISPOSITION   Skilled Nursing Facility (DC - External)      DISCHARGE  MEDICATIONS       Your medication list      START taking these medications      Instructions Last Dose Given Next Dose Due   acetaminophen 325 MG tablet  Commonly known as:  TYLENOL      Take 2 tablets by mouth Every 6 (Six) Hours As Needed for Mild Pain .       amLODIPine 10 MG tablet  Commonly known as:  NORVASC  Start taking on:  9/10/2019      Take 1 tablet by mouth Daily.       apixaban 5 MG tablet tablet  Commonly known as:  ELIQUIS      Take 2 tablets by mouth Every 12 (Twelve) Hours for 12 doses.       apixaban 5 MG tablet tablet  Commonly known as:  ELIQUIS  Start taking on:  9/15/2019      Take 1 tablet by mouth Every 12 (Twelve) Hours.       atorvastatin 40 MG tablet  Commonly known as:  LIPITOR      Take 1 tablet by mouth Every Night.       furosemide 40 MG tablet  Commonly known as:  LASIX  Start taking on:  9/10/2019      Take 1 tablet by mouth Daily.       hydrALAZINE 25 MG tablet  Commonly known as:  APRESOLINE      Take 1 tablet by mouth Every 6 (Six) Hours As Needed (SBP over 180 or DBP over 100).          CHANGE how you take these medications      Instructions Last Dose Given Next Dose Due   metoprolol succinate  MG 24 hr tablet  Commonly known as:  TOPROL-XL  What changed:    · medication strength  · how much to take  · when to take this      Take 1 tablet by mouth Every 12 (Twelve) Hours.       potassium chloride 20 MEQ CR tablet  Commonly known as:  K-DUR,KLOR-CON  What changed:  when to take this      Take 1 tablet by mouth 2 (Two) Times a Day.          CONTINUE taking these medications      Instructions Last Dose Given Next Dose Due   Alpha-Lipoic Acid 200 MG capsule      Take  by mouth Daily.       BIOTIN MAXIMUM STRENGTH 10 MG tablet  Generic drug:  Biotin      Take  by mouth Daily.       coenzyme Q10 100 MG capsule      Take 100 mg by mouth Daily.       isosorbide mononitrate 30 MG 24 hr tablet  Commonly known as:  IMDUR      Take 30 mg by mouth Daily.       levothyroxine 100 MCG  tablet  Commonly known as:  SYNTHROID, LEVOTHROID      Take 1 tablet by mouth Daily.       Magnesium 250 MG tablet      Take 1 tablet by mouth Daily.       MULTI VITAMIN DAILY PO      Take  by mouth.       nystatin-triamcinolone 985071-6.1 UNIT/GM-% cream  Commonly known as:  MYCOLOG II      apply to affected area twice a day if needed       Roller Walker misc      Use daily       vitamin b complex capsule capsule      Take  by mouth Daily.       vitamin D3 5000 units capsule capsule      Take 10,000 Units by mouth 2 (Two) Times a Day.          STOP taking these medications    acetaminophen-codeine 300-30 MG per tablet  Commonly known as:  TYLENOL #3        B-12 5000 MCG capsule        CORICIDIN D PO        diphenoxylate-atropine 2.5-0.025 MG per tablet  Commonly known as:  LOMOTIL        triamterene-hydrochlorothiazide 37.5-25 MG per tablet  Commonly known as:  MAXZIDE-25              Where to Get Your Medications      These medications were sent to [id number] [address] - [phone] [facility] [phone] [address] [place] [address]    Phone:  [phone]   · amLODIPine 10 MG tablet  · atorvastatin 40 MG tablet  · furosemide 40 MG tablet  · metoprolol succinate  MG 24 hr tablet     Information about where to get these medications is not yet available    Ask your nurse or doctor about these medications  · acetaminophen 325 MG tablet  · apixaban 5 MG tablet tablet  · apixaban 5 MG tablet tablet  · hydrALAZINE 25 MG tablet  · potassium chloride 20 MEQ CR tablet        No future appointments.  Additional Instructions for the Follow-ups that You Need to Schedule     Discharge Follow-up with PCP   As directed       Currently Documented PCP:    Marisela Adams APRN    PCP Phone Number:    [phone]     Follow Up Details:  after dc from rehab         Discharge Follow-up with Specialty: [facility] 2  weeks for APRN   As directed      Specialty:  [facility] 2 weeks for APRN           Follow-up Information     Marisela Adams APRN .    Specialty:  Family Medicine  Why:  after dc from rehab  Contact information:  [address] [facility] [place] [address] [phone]                   TEST  RESULTS PENDING AT DISCHARGE         Vladimir Barrera MD  [facility]  09/09/19  12:11 PM      Time: greater than 32 minutes on discharge  It was a pleasure taking care of this patient while in the hospital.       Electronically signed by Vladimir Barrera MD at 9/9/2019 12:18 PM       Discharge Order (From admission, onward)    Start     Ordered    09/09/19 1201  Discharge patient  Once     Expected Discharge Date:  09/09/19    Discharge Disposition:  Skilled Nursing Facility (DC - External)    Physician of Record for Attribution - Please select from Treatment Team:  BEN WILL [[id number]]    Review needed by CMO to determine Physician of Record:  No       Question Answer Comment   Physician of Record for Attribution - Please select from Treatment Team BEN WILL    Review needed by CMO to determine Physician of Record No        09/09/19 1210

## 2019-09-09 NOTE — DISCHARGE PLACEMENT REQUEST
"Kendy Worrell (79 y.o. Female)     Date of Birth Social Security Number Address Home Phone MRN    1940  [address]    [place] [address] [phone] [id number]    Muslim Marital Status          None Single       Admission Date Admission Type Admitting Provider Attending Provider Department, Room/Bed    9/4/19 Emergency Gurvinder Levin MD Jackson, Alan David, MD [address], S509/1    Discharge Date Discharge Disposition Discharge Destination                       Attending Provider:  Vladimir Barrera MD    Allergies:  Aspirin, Nsaids    Isolation:  None   Infection:  None   Code Status:  CPR    Ht:  152.4 cm (60\")   Wt:  79 kg (174 lb 3.2 oz)    Admission Cmt:  None   Principal Problem:  Bilateral pulmonary embolism (CMS/HCC) [I26.99]                 Active Insurance as of 9/4/2019     Primary Coverage     Payor Plan Insurance Group Employer/Plan Group    MEDICARE MEDICARE A & B      Payor Plan Address Payor Plan Phone Number Payor Plan Fax Number Effective Dates    [address] [phone]  2/1/2004 - None Entered    [facility] [address]       Subscriber Name Subscriber Birth Date Member ID       KENDY WORRELL 1940 [id number]           Secondary Coverage     Payor Plan Insurance Group Employer/Plan Group    [place] [id number]     Payor Plan Address Payor Plan Phone Number Payor Plan Fax Number Effective Dates    [address]   1/9/1993 - None Entered    [facility] [address]       Subscriber Name Subscriber Birth Date Member ID       KENDY WORRELL 1940 [id number]                 Emergency Contacts      (Rel.) Home Phone Work Phone Mobile Phone    Ramandeep Mejias (Daughter) [phone] -- [phone]    Akiko Worrell (Other) -- -- [phone]              "

## 2019-09-09 NOTE — PROGRESS NOTES
[facility] Follow Up    Chief Complaint: Follow up atrial fibrillation    Interval History:  No complaints of chest pain.  Reports breathing is better.     Objective:     Objective:  Temp:  [97.4 °F (36.3 °C)-98.5 °F (36.9 °C)] 98 °F (36.7 °C)  Heart Rate:  [] 100  Resp:  [16-18] 18  BP: (135-197)/() 164/98     Intake/Output Summary (Last 24 hours) at 9/9/2019 0742  Last data filed at 9/8/2019 0900  Gross per 24 hour   Intake 360 ml   Output --   Net 360 ml     Body mass index is 34.02 kg/m².      09/05/19  0500 09/06/19  0521 09/09/19  0324   Weight: 81.5 kg (179 lb 9.6 oz) 81.4 kg (179 lb 6.4 oz) 79 kg (174 lb 3.2 oz)     Weight change:       Physical Exam:   General : Alert, cooperative, in no acute distress.  Neuro: Alert,cooperative and oriented.  Lungs: CTAB. Normal respiratory effort and rate.  CV: irregularly, irregular, rate and rhythm, normal S1 and S2, no murmurs, gallops or rubs.  ABD: Soft, nontender, nondistended. Positive bowel sounds.  Extr: No edema or cyanosis, moves all extremities.    Lab Review:   Results from last 7 days   Lab Units 09/07/19 1955 09/07/19  0810 09/05/19  0459 09/04/19  1731   SODIUM mmol/L  --   --  136 135*   POTASSIUM mmol/L 4.6 3.3* 3.1* 2.8*   CHLORIDE mmol/L  --   --  93* 89*   CO2 mmol/L  --   --  29.4* 30.7*   BUN mg/dL  --   --  11 11   CREATININE mg/dL  --   --  0.77 1.06*   GLUCOSE mg/dL  --   --  120* 112*   CALCIUM mg/dL  --   --  9.5 10.2   AST (SGOT) U/L  --   --   --  27   ALT (SGPT) U/L  --   --   --  19     Results from last 7 days   Lab Units 09/04/19  1731   TROPONIN T ng/mL <0.010     Results from last 7 days   Lab Units 09/08/19  0143 09/07/19  0622   WBC 10*3/mm3 8.19 8.23   HEMOGLOBIN g/dL 12.6 12.9   HEMATOCRIT % 41.1 39.4   PLATELETS 10*3/mm3 211 267     Results from last 7 days   Lab Units 09/08/19  1200 09/08/19  0143  09/04/19  2245 09/04/19  1731   INR   --   --   --  1.10 1.22*   APTT seconds 176.3* 44.9*   < >  33.5  --     < > = values in this interval not displayed.     Results from last 7 days   Lab Units 09/05/19  0459 09/04/19  1731   MAGNESIUM mg/dL 2.1 1.5*     Results from last 7 days   Lab Units 09/05/19  0459   CHOLESTEROL mg/dL 130   TRIGLYCERIDES mg/dL 70   HDL CHOL mg/dL 79*     Results from last 7 days   Lab Units 09/04/19  1731   PROBNP pg/mL 3,664.0*     Results from last 7 days   Lab Units 09/05/19  0459   TSH uIU/mL 2.090     I reviewed the patient's new clinical results.  I personally viewed and interpreted the patient's EKG  Current Medications:   Scheduled Meds:  amLODIPine 5 mg Oral Q24H   apixaban 10 mg Oral Q12H   Followed by      [START ON 9/15/2019] apixaban 5 mg Oral Q12H   atorvastatin 40 mg Oral Nightly   furosemide 20 mg Oral Daily   isosorbide mononitrate 30 mg Oral Daily   levothyroxine 100 mcg Oral Daily   metoprolol succinate  mg Oral Q12H   potassium chloride 20 mEq Oral Daily   sodium chloride 10 mL Intravenous Q12H     Continuous Infusions:     Allergies:  Allergies   Allergen Reactions   • Aspirin      bleeding   • Nsaids      bleeding       Assessment/Plan:     1. Atrial fibrillation. New onset.  Fair rate control on metoprolol succinate.  Transitioned from heparin to apixaban.  2. Bilateral pulmonary embolism.  Reviewed CT images myself and it shows small thrombus burden that is primarily distal.  Mild RV enlargement noted on CT scan but normal RV size on echo.  Normal troponin.  On room air.   3. Subacute right frontal stroke.    4. Hypokalemia. Resolved.   5. Coronary artery disease. Primarily non-obstructive and small vessel including occluded small diagonal and distal apical LAD with collaterals.  Noted on cath in 2006 at the time of NSTEMI- felt to be demand ischemia.  6. Hypertension. Some improvement although still elevated.   7. History of paroxsymal SVT  8. Hyperlipidemia  9. History of recurrent GI bleed.  Aspirin stopped in 2011.  Status post partial colectomy.     10. Hypothyroidism    -  Will see if any improvement with increase in amlodipine dose today.  If not have room to titrate this and the isosorbide further.       Effie Little MD  09/09/19  7:42 AM

## 2019-09-09 NOTE — PROGRESS NOTES
Continued Stay Note  [facility]     Patient Name: Kendy Worrell  MRN: [id number]  Today's Date: 9/9/2019    Admit Date: 9/4/2019    Discharge Plan     Row Name 09/09/19 1257       Plan    Plan Comments  Plans are to skilled care @ [facility] today for skilled care.  Dtr will transport.  Explained that pt would only need to transfer from wheelchair to car, then car to wheelchair at facility.  Ramandeep Ashley RN    Row Name 09/09/19 1254       Plan    Plan  Pt became weaker over weekend working with PT.  Plans are now skilled.  spoke with pt and dtr Raamndeep @ bedside, [facility] and [facility] for rehab.  Both had bed aval, and [facility].     Patient/Family in Agreement with Plan  yes        Discharge Codes    No documentation.       Expected Discharge Date and Time     Expected Discharge Date Expected Discharge Time    Sep 9, 2019             Ramandeep Ashley RN

## 2019-09-09 NOTE — NURSING NOTE
Pt more frequently getting out of bed and due to location of room moved her to room closer to nurses station, bed alarm on and audible.

## 2019-09-09 NOTE — NURSING NOTE
Report called to Elizabeth at [facility]. AVS and D/C sent with patient. Daughter to transport via car.     Ellen Cash RN 9/9/2019 2:08 PM

## 2019-09-09 NOTE — THERAPY TREATMENT NOTE
Patient Name: Kendy Worrell  : 1940    MRN: [id number]                              Today's Date: 2019       Admit Date: 2019    Visit Dx:     ICD-10-CM ICD-9-CM   1. Bilateral pulmonary embolism (CMS/HCC) I26.99 415.19   2. Acute UTI N39.0 599.0   3. Hypokalemia E87.6 276.8   4. Hypomagnesemia E83.42 275.2   5. New onset atrial fibrillation (CMS/[facility]) I48.91 427.31   6. Abnormal EKG R94.31 794.31   7. Uncontrolled hypertension I10 401.9   8. Closed compression fracture of thoracic vertebra, initial encounter (CMS/[facility])- undetermined age S22.000A 805.2     Patient Active Problem List   Diagnosis   • Essential hypertension   • Acquired hypothyroidism   • Coronary artery disease involving native coronary artery of native heart without angina pectoris   • Hyperlipidemia   • Arthritis   • Skin lesion of chest wall   • NSTEMI (non-ST elevated myocardial infarction) (CMS/[facility])   • Supraventricular tachycardia (CMS/[facility])   • Normal cardiac stress test   • Gastrointestinal bleed   • Angina effort (CMS/[facility])   • Morbidly obese (CMS/[facility])   • Bilateral pulmonary embolism (CMS/[facility])   • New onset a-fib (CMS/[facility])   • UTI (urinary tract infection)   • Chronic diastolic CHF (congestive heart failure) (CMS/[facility])   • Acute ischemic stroke (CMS/[facility])     Past Medical History:   Diagnosis Date   • Arthritis    • Breast cancer (CMS/[facility])    • Hyperlipidemia    • Hypertension    • Hyperthyroidism     patient has hypothyroidism   • Hypothyroidism      Past Surgical History:   Procedure Laterality Date   • BREAST BIOPSY     • BREAST SURGERY Right    • CHOLECYSTECTOMY     • COLON SURGERY      Removed   • COLONOSCOPY     • HYSTERECTOMY     • MAMMO BILATERAL  2015   • MASTECTOMY     • TONSILLECTOMY       General Information     Row Name 19 1002          PT Evaluation Time/Intention    Document Type  therapy note (daily note)  -     Mode of Treatment  physical therapy  -     Row Name 19 1002          General Information     Existing Precautions/Restrictions  fall  -     Row Name 09/09/19 1002          Cognitive Assessment/Intervention- PT/OT    Orientation Status (Cognition)  oriented x 3  -SM     Cognitive Assessment/Intervention Comment  answers questions appropriately, though at times has difficulty finding what she wants to say when she initiates  -       User Key  (r) = Recorded By, (t) = Taken By, (c) = Cosigned By    Initials Name Provider Type     Donya Gabriel PTA Physical Therapy Assistant        Mobility     Row Name 09/09/19 1003          Bed Mobility Assessment/Treatment    Bed Mobility Assessment/Treatment  supine-sit;sit-supine  -SM     Supine-Sit [place] (Bed Mobility)  minimum assist (75% patient effort)  -     Sit-Supine [place] (Bed Mobility)  minimum assist (75% patient effort)  -     Assistive Device (Bed Mobility)  bed rails;head of bed elevated  -     Row Name 09/09/19 1003          Sit-Stand Transfer    Sit-Stand [place] (Transfers)  contact guard  -     Assistive Device (Sit-Stand Transfers)  walker, front-wheeled  -     Row Name 09/09/19 1003          Gait/Stairs Assessment/Training    [place] Level (Gait)  contact guard  -     Assistive Device (Gait)  walker, front-wheeled  -     Distance in Feet (Gait)  30  -SM     Pattern (Gait)  step-through;step-to  -SM     Deviations/Abnormal Patterns (Gait)  ankit decreased;stride length decreased  -     Bilateral Gait Deviations  forward flexed posture;heel strike decreased  -       User Key  (r) = Recorded By, (t) = Taken By, (c) = Cosigned By    Initials Name Provider Type     Donya Gabriel PTA Physical Therapy Assistant        Obj/Interventions     Row Name 09/09/19 1004          Therapeutic Exercise    Lower Extremity (Therapeutic Exercise)  LAQ (long arc quad), bilateral  -SM     Lower Extremity Range of Motion (Therapeutic Exercise)  ankle dorsiflexion/plantar flexion, bilateral  -SM     Exercise  Type (Therapeutic Exercise)  AROM (active range of motion)  -     Position (Therapeutic Exercise)  seated  -     Sets/Reps (Therapeutic Exercise)  10 reps  -       User Key  (r) = Recorded By, (t) = Taken By, (c) = Cosigned By    Initials Name Provider Type    Donya Colon PTA Physical Therapy Assistant        Goals/Plan    No documentation.       Clinical Impression     Row Name 09/09/19 1005          Pain Assessment    Additional Documentation  Pain Scale: Numbers Pre/Post-Treatment (Group)  -[facility] Name 09/09/19 1005          Pain Scale: Numbers Pre/Post-Treatment    Pain Scale: Numbers, Pretreatment  3/10  -SM     Pain Scale: Numbers, Post-Treatment  3/10  -SM     Pain Location  back  -     Pain Intervention(s)  Repositioned;Ambulation/increased activity;Rest  -[facility] Name 09/09/19 1005          Positioning and Restraints    Pre-Treatment Position  in bed  -     Post Treatment Position  bed  -SM     In Bed  supine;call light within reach;encouraged to call for assist;exit alarm on;with family/caregiver  -       User Key  (r) = Recorded By, (t) = Taken By, (c) = Cosigned By    Initials Name Provider Type    Donya Colon PTA Physical Therapy Assistant        Outcome Measures     Row Name 09/09/19 1014          How much help from another person do you currently need...    Turning from your back to your side while in flat bed without using bedrails?  3  -SM     Moving from lying on back to sitting on the side of a flat bed without bedrails?  3  -SM     Moving to and from a bed to a chair (including a wheelchair)?  3  -SM     Standing up from a chair using your arms (e.g., wheelchair, bedside chair)?  3  -SM     Climbing 3-5 steps with a railing?  1  -SM     To walk in hospital room?  3  -SM     AM-PAC 6 Clicks Score (PT)  16  -[facility] Name 09/09/19 1014          Functional Assessment    Outcome Measure Options  AM-PAC 6 Clicks Basic Mobility (PT)  -       User Key  (r)  = Recorded By, (t) = Taken By, (c) = Cosigned By    Initials Name Provider Type     Donya Gabriel PTA Physical Therapy Assistant        Physical Therapy Education     Title: PT OT SLP Therapies (Done)     Topic: Physical Therapy (Done)     Point: Mobility training (Done)     Learning Progress Summary           Patient Acceptance, E,D, VU,NR by  at 9/9/2019 10:05 AM    Acceptance, E,TB, VU,NR by  at 9/8/2019  5:04 PM                   Point: Home exercise program (Done)     Learning Progress Summary           Patient Acceptance, E,D, VU,NR by  at 9/9/2019 10:05 AM    Acceptance, E,TB, VU,NR by  at 9/8/2019  5:04 PM                   Point: Body mechanics (Done)     Learning Progress Summary           Patient Acceptance, E,D, VU,NR by  at 9/9/2019 10:05 AM    Acceptance, E,TB, VU,NR by  at 9/8/2019  5:04 PM                   Point: Precautions (Done)     Learning Progress Summary           Patient Acceptance, E,D, VU,NR by  at 9/9/2019 10:05 AM    Acceptance, E,TB, VU,NR by  at 9/8/2019  5:04 PM                               User Key     Initials Effective Dates Name Provider Type Discipline     03/07/18 -  Donya Gabriel PTA Physical Therapy Assistant PT     05/14/18 -  Esau Chang PT Physical Therapist PT              PT Recommendation and Plan     Outcome Summary/Treatment Plan (PT)  Anticipated Discharge Disposition (PT): skilled nursing facility  Plan of Care Reviewed With: patient  Progress: improving  Outcome Summary: Pt tolerated treatment well this date. Increased gait distance to 30ft w/ Rw and CGA. Required min A for bed mobility. Encouraged pt to ambulate in room w/ nsg throughout the day. PT will continue to address functional mobility deficits as tolerated.     Time Calculation:   PT Charges     Row Name 09/09/19 1015             Time Calculation    Start Time  0942  -      Stop Time  1008  -      Time Calculation (min)  26 min  -      PT Received On   09/09/19  -KENNY      PT - Next Appointment  09/10/19  -KENNY        User Key  (r) = Recorded By, (t) = Taken By, (c) = Cosigned By    Initials Name Provider Type    Donya Colon PTA Physical Therapy Assistant        Therapy Charges for Today     Code Description Service Date Service Provider Modifiers Qty    92123221852 HC PT THER PROC EA 15 MIN 9/9/2019 Donya Gabriel PTA GP 2          PT G-Codes  Outcome Measure Options: AM-PAC 6 Clicks Basic Mobility (PT)  AM-PAC 6 Clicks Score (PT): 16  AM-PAC 6 Clicks Score (OT): 18  Modified Snyder Scale: 3 - Moderate disability.  Requiring some help, but able to walk without assistance.    Donya Gabriel PTA  9/9/2019

## 2019-09-09 NOTE — PLAN OF CARE
Problem: Patient Care Overview  Goal: Plan of Care Review  Outcome: Ongoing (interventions implemented as appropriate)  Pt denies pain or discomfort. Pt does have moments of disorientation, but does reorient and redirect easily. No acute distress, will continue to monitor.

## 2019-09-09 NOTE — DISCHARGE SUMMARY
NAME: Kendy Worrell ADMIT: 2019   : 1940  PCP: Marisela Adams APRN    MRN: [id number] LOS: 5 days   AGE/SEX: 79 y.o. female  ROOM: [facility]     Date of Admission:  2019  Date of Discharge:  2019    PCP: Marisela Adams APRN    CHIEF COMPLAINT  Altered Mental Status      DISCHARGE DIAGNOSIS  Active Hospital Problems    Diagnosis  POA   • **Bilateral pulmonary embolism (CMS/HCC) [I26.99]  Yes   • Acute ischemic stroke (CMS/[facility]) [I63.9]  Yes   • New onset a-fib (CMS/[facility]) [I48.91]  Yes   • UTI (urinary tract infection) [N39.0]  Yes   • Chronic diastolic CHF (congestive heart failure) (CMS/[facility]) [I50.32]  Yes   • Essential hypertension [I10]  Yes   • Acquired hypothyroidism [E03.9]  Yes   • Coronary artery disease involving native coronary artery of native heart without angina pectoris [I25.10]  Yes      Resolved Hospital Problems    Diagnosis Date Resolved POA   • Hypokalemia [E87.6] 2019 Unknown   • Hypomagnesemia [E83.42] 2019 Unknown   • Slurred speech [R47.81] 2019 Unknown   • Word finding difficulty [R47.89] 2019 Unknown   • Metabolic encephalopathy [G93.41] 2019 Unknown       SECONDARY DIAGNOSES  Past Medical History:   Diagnosis Date   • Arthritis    • Breast cancer (CMS/[facility])    • Hyperlipidemia    • Hypertension    • Hyperthyroidism     patient has hypothyroidism   • Hypothyroidism        CONSULTS   Cardiology  Neurology    HOSPITAL COURSE  This is an 79-year-old female with a history of hypothyroidism, hypertension, breast cancer, colectomy for bleeding ulcer who presented to the emergency room with confusion, facial droop as well as new onset A. fib, UTI and acute pulmonary embolism.  She was admitted and seen by cardiology as well as neurology.  She was started on heparin drip for the PE as well as new onset A. fib.  She also was found to have acute stroke in the right frontal lobe-the MRI read was negative from the radiologist but positive  per neurology read.  MRI of the head and neck did not have any stenosis.  The patient did well and had her medications for hypertension and A. fib titrated.  Can potentially still titrate up her Imdur if needed for blood pressure but do have PRN hydralazine ordered while she is at rehab facility.  She will be going to rehab to get stronger but will have outpatient follow-up with her primary care physician as well as cardiology.  She is being discharged on Eliquis for the PE as well as A. fib.  She still has 6 additional days of 10 mg p.o. twice daily, after that the dose will be 5 mg p.o. twice daily.           DIAGNOSTICS    09/08/2019 0143  09/08/2019 0207  CBC Auto Differential [[id number]]   (Abnormal)   Blood     Final result  WBC 8.19 10*3/mm3   RBC 4.18 10*6/mm3   Hemoglobin 12.6 g/dL   Hematocrit 41.1 %   MCV 98.3 Abnormally high  fL   MCH 30.1 pg   MCHC 30.7 Abnormally low  g/dL   RDW 14.6 %   RDW-SD 52.7 fl   MPV 10.7 fL   Platelets 211 10*3/mm3   Neutrophil Rel % 63.3 %   Lymphocyte Rel % 21.0 %   Monocyte Rel % 11.6 %   Eosinophil Rel % 2.7 %   Basophil Rel % 0.9 %   Immature Granulocyte Rel % 0.5 %   Neutrophils Absolute 5.19 10*3/mm3   Lymphocytes Absolute 1.72 10*3/mm3   Monocytes Absolute 0.95 Abnormally high  10*3/mm3   Eosinophils Absolute 0.22 10*3/mm3   Basophils Absolute 0.07 10*3/mm3   Immature Grans, Absolute 0.04 10*3/mm3   nRBC 0.0 /100 WBC          09/07/2019 1955  09/07/2019 2134  Potassium [[id number]]   Blood     Final result  Potassium 4.6 mmol/L        MRI Brain With & Without Contrast [[id number]] V[place]hese as Reviewed   Order Status: Completed Collected: 09/07/19 1652    Updated: 09/07/19 1801   Narrative:     MRI BRAIN WITH AND WITHOUT CONTRAST, MRA HEAD WITHOUT CONTRAST AND MRA  NECK WITH AND WITHOUT CONTRAST     HISTORY: Episode of confusion with transient facial droop. Hypertension.  Previous breast cancer.     TECHNIQUE: MRI brain with and without contrast is provided. MRA head  was  performed without contrast and MRA neck performed with and without  contrast.     FINDINGS BRAIN MRI: The ventricles are normal in caliber. There is  patchy T2 signal in the white matter. There is no restricted diffusion.  The extra-axial spaces appear normal. There is no abnormal contrast  enhancement. Expected intracranial flow voids are observed. Orbital  structures and paranasal sinuses appear normal. There is partial  opacification of a few of the left mastoid air cells. The optic chiasm  and pituitary appear normal on sagittal images.     FINDINGS MRA NECK: The carotid arteries are quite tortuous. The  visualized part of the aortic arch is normal in caliber. The  brachiocephalic artery and the subclavian arteries are patent. The  common carotid arteries are tortuous. The internal carotid arteries are  widely patent. There is no stenosis using NASCET criteria. The external  carotid arteries are patent. The vertebral arteries are patent  bilaterally as well.     FINDINGS MRA HEAD: There is some motion artifact which compromises fine  detail at several levels. The distal vertebral arteries and the basilar  artery are patent. Distal internal carotid arteries are widely patent.  There is a fetal origin of the right posterior cerebral artery which  demonstrates normal flow. Neither posterior cerebral artery is well  demonstrated otherwise. Segments of those vessels are obscured as are  the A1 and proximal A2 segments and portions of the M2 segments  bilaterally. The M1 segments are widely patent. In the more peripheral  portions of the M2 and M3 segments, normal flow is observed. There is  also some flow observed along the mid to distal portions of the  posterior cerebral arteries.      Impression:     1. Negative MRI of the brain.  2. Negative MRA of the neck.  3. MRA of the head is compromised by motion which obscures detail around  the level of the [place]. The visualized vasculature appears  widely  patent.     MRI Angiogram Head Without Contrast [[id number]] Varghese as Reviewed   Order Status: Completed Collected: 09/07/19 1652    Updated: 09/07/19 1801   Narrative:     MRI BRAIN WITH AND WITHOUT CONTRAST, MRA HEAD WITHOUT CONTRAST AND MRA  NECK WITH AND WITHOUT CONTRAST     HISTORY: Episode of confusion with transient facial droop. Hypertension.  Previous breast cancer.     TECHNIQUE: MRI brain with and without contrast is provided. MRA head was  performed without contrast and MRA neck performed with and without  contrast.     FINDINGS BRAIN MRI: The ventricles are normal in caliber. There is  patchy T2 signal in the white matter. There is no restricted diffusion.  The extra-axial spaces appear normal. There is no abnormal contrast  enhancement. Expected intracranial flow voids are observed. Orbital  structures and paranasal sinuses appear normal. There is partial  opacification of a few of the left mastoid air cells. The optic chiasm  and pituitary appear normal on sagittal images.     FINDINGS MRA NECK: The carotid arteries are quite tortuous. The  visualized part of the aortic arch is normal in caliber. The  brachiocephalic artery and the subclavian arteries are patent. The  common carotid arteries are tortuous. The internal carotid arteries are  widely patent. There is no stenosis using NASCET criteria. The external  carotid arteries are patent. The vertebral arteries are patent  bilaterally as well.     FINDINGS MRA HEAD: There is some motion artifact which compromises fine  detail at several levels. The distal vertebral arteries and the basilar  artery are patent. Distal internal carotid arteries are widely patent.  There is a fetal origin of the right posterior cerebral artery which  demonstrates normal flow. Neither posterior cerebral artery is well  demonstrated otherwise. Segments of those vessels are obscured as are  the A1 and proximal A2 segments and portions of the M2 segments  bilaterally. The M1  segments are widely patent. In the more peripheral  portions of the M2 and M3 segments, normal flow is observed. There is  also some flow observed along the mid to distal portions of the  posterior cerebral arteries.      Impression:     1. Negative MRI of the brain.  2. Negative MRA of the neck.  3. MRA of the head is compromised by motion which obscures detail around  the level of the [place]. The visualized vasculature appears  widely patent.     Duplex Venous Lower Extremity - Bilateral CAR [[id number]] Varghese as Reviewed   Order Status: Completed Collected: 09/05/19 1547    Updated: 09/05/19 1630    Right Common Femoral Spont Y    Right Common Femoral Phasic Y    Right Common Femoral Augment Y    Right Common Femoral Competent Y    Right Common Femoral Compress C    Right Saphenofemoral Junction Compress C    Right Proximal Femoral Compress C    Right Mid Femoral Spont Y    Right Mid Femoral Phasic Y    Right Mid Femoral Augment Y    Right Mid Femoral Competent Y    Right Mid Femoral Compress C    Right Distal Femoral Compress C    Right Popliteal Spont Y    Right Popliteal Phasic Y    Right Popliteal Augment Y    Right Popliteal Competent Y    Right Popliteal Compress C    Right Posterior Tibial Compress C    Right Peroneal Compress C    Right GastronemiusSoleal Compress C    Right Greater Saph AK Compress C    Right Greater Saph BK Compress C    Left Common Femoral Spont Y    Left Common Femoral Phasic Y    Left Common Femoral Augment Y    Left Common Femoral Competent Y    Left Common Femoral Compress C    Left Saphenofemoral Junction Compress C    Left Proximal Femoral Compress C    Left Mid Femoral Spont Y    Left Mid Femoral Phasic Y    Left Mid Femoral Augment Y    Left Mid Femoral Competent Y    Left Mid Femoral Compress C    Left Distal Femoral Compress C    Left Popliteal Spont Y    Left Popliteal Phasic Y    Left Popliteal Augment Y    Left Popliteal Competent Y    Left Popliteal Compress C     Left Posterior Tibial Compress C    Left Peroneal Compress C    Left GastronemiusSoleal Compress C    Left Greater Saph AK Compress C    Left Greater Saph BK Compress C   Narrative:     · Normal bilateral lower extremity venous duplex scan.      CT Angiogram Chest With Contrast [[id number]] Varghese as Reviewed   Order Status: Completed Collected: 09/04/19 2009    Updated: 09/04/19 2021   Narrative:     CT ANGIOGRAM CHEST W CONTRAST-     CLINICAL HISTORY: Neck pain. Positive d-dimer.     TECHNIQUE: Spiral CT images were obtained through the chest during rapid  IV injection of contrast and were reconstructed in 2 mm thick axial  slices. Multiple coronal and sagittal and 3-D reconstructions were  obtained.     Radiation dose reduction techniques were utilized, including automated  exposure control and exposure modulation based on body size.     COMPARISON: None     FINDINGS: There are multiple small bilateral pulmonary thromboemboli  including segmental and subsegmental branches of the right upper lobe  pulmonary artery and subsegmental branches of the left upper lobe  pulmonary artery and left lower lobe pulmonary artery. MR also present  within segmental branches of the right middle lobe pulmonary artery.  There is evidence of mild pulmonary arterial hypertension. The right  atrium is dilated, and the RV LV ratio is 1.3. There is no mediastinal  or hilar lymphadenopathy. Lung window images demonstrate no parenchymal  areas. There are no pleural effusions. The patient is status post right  mastectomy without breast reconstruction.      Impression:     Multiple small bilateral pulmonary thromboemboli as  described. There is also evidence of mild pulmonary arterial  hypertension, as well.           PHYSICAL EXAM  Objective    Alert  nad  No resp distress  Lungs fairly clear  Irregular  Some chronic lymphedema/venous stasis    CONDITION ON DISCHARGE  Stable.      DISCHARGE DISPOSITION   Skilled Nursing Facility (DC -  External)      DISCHARGE MEDICATIONS       Your medication list      START taking these medications      Instructions Last Dose Given Next Dose Due   acetaminophen 325 MG tablet  Commonly known as:  TYLENOL      Take 2 tablets by mouth Every 6 (Six) Hours As Needed for Mild Pain .       amLODIPine 10 MG tablet  Commonly known as:  NORVASC  Start taking on:  9/10/2019      Take 1 tablet by mouth Daily.       apixaban 5 MG tablet tablet  Commonly known as:  ELIQUIS      Take 2 tablets by mouth Every 12 (Twelve) Hours for 12 doses.       apixaban 5 MG tablet tablet  Commonly known as:  ELIQUIS  Start taking on:  9/15/2019      Take 1 tablet by mouth Every 12 (Twelve) Hours.       atorvastatin 40 MG tablet  Commonly known as:  LIPITOR      Take 1 tablet by mouth Every Night.       furosemide 40 MG tablet  Commonly known as:  LASIX  Start taking on:  9/10/2019      Take 1 tablet by mouth Daily.       hydrALAZINE 25 MG tablet  Commonly known as:  APRESOLINE      Take 1 tablet by mouth Every 6 (Six) Hours As Needed (SBP over 180 or DBP over 100).          CHANGE how you take these medications      Instructions Last Dose Given Next Dose Due   metoprolol succinate  MG 24 hr tablet  Commonly known as:  TOPROL-XL  What changed:    · medication strength  · how much to take  · when to take this      Take 1 tablet by mouth Every 12 (Twelve) Hours.       potassium chloride 20 MEQ CR tablet  Commonly known as:  K-DUR,KLOR-CON  What changed:  when to take this      Take 1 tablet by mouth 2 (Two) Times a Day.          CONTINUE taking these medications      Instructions Last Dose Given Next Dose Due   Alpha-Lipoic Acid 200 MG capsule      Take  by mouth Daily.       BIOTIN MAXIMUM STRENGTH 10 MG tablet  Generic drug:  Biotin      Take  by mouth Daily.       coenzyme Q10 100 MG capsule      Take 100 mg by mouth Daily.       isosorbide mononitrate 30 MG 24 hr tablet  Commonly known as:  IMDUR      Take 30 mg by mouth Daily.        levothyroxine 100 MCG tablet  Commonly known as:  SYNTHROID, LEVOTHROID      Take 1 tablet by mouth Daily.       Magnesium 250 MG tablet      Take 1 tablet by mouth Daily.       MULTI VITAMIN DAILY PO      Take  by mouth.       nystatin-triamcinolone 336673-9.1 UNIT/GM-% cream  Commonly known as:  MYCOLOG II      apply to affected area twice a day if needed       Roller Walker misc      Use daily       vitamin b complex capsule capsule      Take  by mouth Daily.       vitamin D3 5000 units capsule capsule      Take 10,000 Units by mouth 2 (Two) Times a Day.          STOP taking these medications    acetaminophen-codeine 300-30 MG per tablet  Commonly known as:  TYLENOL #3        B-12 5000 MCG capsule        CORICIDIN D PO        diphenoxylate-atropine 2.5-0.025 MG per tablet  Commonly known as:  LOMOTIL        triamterene-hydrochlorothiazide 37.5-25 MG per tablet  Commonly known as:  MAXZIDE-25              Where to Get Your Medications      These medications were sent to [facility][id number] [address] - [phone]  - [phone] [address] [place] [address]    Phone:  [phone]   · amLODIPine 10 MG tablet  · atorvastatin 40 MG tablet  · furosemide 40 MG tablet  · metoprolol succinate  MG 24 hr tablet     Information about where to get these medications is not yet available    Ask your nurse or doctor about these medications  · acetaminophen 325 MG tablet  · apixaban 5 MG tablet tablet  · apixaban 5 MG tablet tablet  · hydrALAZINE 25 MG tablet  · potassium chloride 20 MEQ CR tablet        No future appointments.  Additional Instructions for the Follow-ups that You Need to Schedule     Discharge Follow-up with PCP   As directed       Currently Documented PCP:    Marisela Adams APRN    PCP Phone Number:    [phone]     Follow Up Details:  after dc from rehab         Discharge Follow-up with Specialty:  [facility] 2 weeks for APRN   As directed      Specialty:  [facility] 2 weeks for APRN           Follow-up Information     Marisela Adams APRN .    Specialty:  Family Medicine  Why:  after dc from rehab  Contact information:  [facility] [facility] [place] [address] [phone]                   TEST  RESULTS PENDING AT DISCHARGE         Vladimir Barrera MD  [facility]  09/09/19  12:11 PM      Time: greater than 32 minutes on discharge  It was a pleasure taking care of this patient while in the hospital.

## 2019-09-09 NOTE — PLAN OF CARE
Problem: Patient Care Overview  Goal: Plan of Care Review  Outcome: Ongoing (interventions implemented as appropriate)   09/09/19 1011   Coping/Psychosocial   Plan of Care Reviewed With patient   Plan of Care Review   Progress improving   OTHER   Outcome Summary Pt tolerated treatment well this date. Increased gait distance to 30ft w/ Rw and CGA. Required min A for bed mobility. Encouraged pt to ambulate in room w/ nsg throughout the day. PT will continue to address functional mobility deficits as tolerated.

## 2019-09-10 NOTE — PROGRESS NOTES
Case Management Discharge Note    Final Note: DC'd to skilled bed at [facility]    Destination - Selection Complete      Service Provider Request Status Selected Services Address Phone Number Fax Number    [facility] Selected Skilled Nursing [address] [facility] [address] [phone]      Durable Medical Equipment      No service has been selected for the patient.      Dialysis/Infusion      No service has been selected for the patient.      Home Medical Care      No service has been selected for the patient.      Therapy      No service has been selected for the patient.      Community Resources      No service has been selected for the patient.             Final Discharge Disposition Code: 03 - skilled nursing facility (SNF)([facility])

## 2020-02-05 ENCOUNTER — TRANSCRIBE ORDERS (OUTPATIENT)
Dept: ADMINISTRATIVE | Facility: HOSPITAL | Age: 80
End: 2020-02-05

## 2020-02-05 DIAGNOSIS — M25.511 RIGHT SHOULDER PAIN, UNSPECIFIED CHRONICITY: Primary | ICD-10-CM

## 2020-02-18 ENCOUNTER — HOSPITAL ENCOUNTER (OUTPATIENT)
Dept: MRI IMAGING | Facility: HOSPITAL | Age: 80
Discharge: HOME | End: 2020-02-18
Admitting: NURSE PRACTITIONER

## 2020-02-18 DIAGNOSIS — M25.511 RIGHT SHOULDER PAIN, UNSPECIFIED CHRONICITY: ICD-10-CM

## 2020-02-18 LAB — CREAT BLDA-MCNC: 0.9 MG/DL (ref 0.6–1.3)

## 2020-02-18 PROCEDURE — 0 GADOBENATE DIMEGLUMINE 529 MG/ML SOLUTION: Performed by: NURSE PRACTITIONER

## 2020-02-18 PROCEDURE — 73223 MRI JOINT UPR EXTR W/O&W/DYE: CPT

## 2020-02-18 PROCEDURE — A9577 INJ MULTIHANCE: HCPCS | Performed by: NURSE PRACTITIONER

## 2020-02-18 PROCEDURE — 82565 ASSAY OF CREATININE: CPT

## 2020-02-18 RX ADMIN — GADOBENATE DIMEGLUMINE 16 ML: 529 INJECTION, SOLUTION INTRAVENOUS at 12:11

## 2021-11-18 ENCOUNTER — HOSPITAL ENCOUNTER (OUTPATIENT)
Facility: HOSPITAL | Age: 81
Setting detail: OBSERVATION
Discharge: HOME | End: 2021-11-19
Attending: EMERGENCY MEDICINE | Admitting: EMERGENCY MEDICINE

## 2021-11-18 ENCOUNTER — APPOINTMENT (OUTPATIENT)
Dept: CT IMAGING | Facility: HOSPITAL | Age: 81
End: 2021-11-18

## 2021-11-18 ENCOUNTER — APPOINTMENT (OUTPATIENT)
Dept: GENERAL RADIOLOGY | Facility: HOSPITAL | Age: 81
End: 2021-11-18

## 2021-11-18 DIAGNOSIS — I48.20 CHRONIC ATRIAL FIBRILLATION (HCC): ICD-10-CM

## 2021-11-18 DIAGNOSIS — R07.9 CHEST PAIN, UNSPECIFIED TYPE: Primary | ICD-10-CM

## 2021-11-18 DIAGNOSIS — R07.89 OTHER CHEST PAIN: ICD-10-CM

## 2021-11-18 LAB
ALBUMIN SERPL-MCNC: 3.2 G/DL (ref 3.5–5.2)
ALBUMIN/GLOB SERPL: 1 G/DL
ALP SERPL-CCNC: 156 U/L (ref 39–117)
ALT SERPL W P-5'-P-CCNC: 47 U/L (ref 1–33)
ANION GAP SERPL CALCULATED.3IONS-SCNC: 12 MMOL/L (ref 5–15)
AST SERPL-CCNC: 71 U/L (ref 1–32)
BASOPHILS # BLD AUTO: 0.1 10*3/MM3 (ref 0–0.2)
BASOPHILS NFR BLD AUTO: 0.6 % (ref 0–1.5)
BILIRUB SERPL-MCNC: 0.6 MG/DL (ref 0–1.2)
BUN SERPL-MCNC: 6 MG/DL (ref 8–23)
BUN/CREAT SERPL: 7.5 (ref 7–25)
CALCIUM SPEC-SCNC: 8.7 MG/DL (ref 8.6–10.5)
CHLORIDE SERPL-SCNC: 101 MMOL/L (ref 98–107)
CHOLEST SERPL-MCNC: 100 MG/DL (ref 0–200)
CO2 SERPL-SCNC: 25 MMOL/L (ref 22–29)
CREAT SERPL-MCNC: 0.8 MG/DL (ref 0.57–1)
D DIMER PPP FEU-MCNC: 0.67 MG/L (FEU) (ref 0–0.59)
DEPRECATED RDW RBC AUTO: 48.6 FL (ref 37–54)
EOSINOPHIL # BLD AUTO: 0.1 10*3/MM3 (ref 0–0.4)
EOSINOPHIL NFR BLD AUTO: 1.1 % (ref 0.3–6.2)
ERYTHROCYTE [DISTWIDTH] IN BLOOD BY AUTOMATED COUNT: 15.2 % (ref 12.3–15.4)
GFR SERPL CREATININE-BSD FRML MDRD: 69 ML/MIN/1.73
GLOBULIN UR ELPH-MCNC: 3.1 GM/DL
GLUCOSE SERPL-MCNC: 98 MG/DL (ref 65–99)
HCT VFR BLD AUTO: 37 % (ref 34–46.6)
HDLC SERPL-MCNC: 58 MG/DL (ref 40–60)
HGB BLD-MCNC: 13.2 G/DL (ref 12–15.9)
HOLD SPECIMEN: NORMAL
HOLD SPECIMEN: NORMAL
LDLC SERPL CALC-MCNC: 27 MG/DL (ref 0–100)
LDLC/HDLC SERPL: 0.47 {RATIO}
LYMPHOCYTES # BLD AUTO: 1.3 10*3/MM3 (ref 0.7–3.1)
LYMPHOCYTES NFR BLD AUTO: 13.5 % (ref 19.6–45.3)
MAGNESIUM SERPL-MCNC: 1.3 MG/DL (ref 1.6–2.4)
MCH RBC QN AUTO: 32.8 PG (ref 26.6–33)
MCHC RBC AUTO-ENTMCNC: 35.6 G/DL (ref 31.5–35.7)
MCV RBC AUTO: 92.2 FL (ref 79–97)
MONOCYTES # BLD AUTO: 1.2 10*3/MM3 (ref 0.1–0.9)
MONOCYTES NFR BLD AUTO: 13 % (ref 5–12)
NEUTROPHILS NFR BLD AUTO: 6.8 10*3/MM3 (ref 1.7–7)
NEUTROPHILS NFR BLD AUTO: 71.8 % (ref 42.7–76)
NRBC BLD AUTO-RTO: 0 /100 WBC (ref 0–0.2)
PLATELET # BLD AUTO: 293 10*3/MM3 (ref 140–450)
PMV BLD AUTO: 8.1 FL (ref 6–12)
POTASSIUM SERPL-SCNC: 3.8 MMOL/L (ref 3.5–5.2)
PROT SERPL-MCNC: 6.3 G/DL (ref 6–8.5)
QT INTERVAL: 388 MS
RBC # BLD AUTO: 4.02 10*6/MM3 (ref 3.77–5.28)
SARS-COV-2 RNA PNL SPEC NAA+PROBE: NOT DETECTED
SODIUM SERPL-SCNC: 138 MMOL/L (ref 136–145)
TRIGL SERPL-MCNC: 73 MG/DL (ref 0–150)
TROPONIN T SERPL-MCNC: <0.01 NG/ML (ref 0–0.03)
TROPONIN T SERPL-MCNC: <0.01 NG/ML (ref 0–0.03)
VLDLC SERPL-MCNC: 15 MG/DL (ref 5–40)
WBC NRBC COR # BLD: 9.5 10*3/MM3 (ref 3.4–10.8)
WHOLE BLOOD HOLD SPECIMEN: NORMAL

## 2021-11-18 PROCEDURE — 84484 ASSAY OF TROPONIN QUANT: CPT | Performed by: PHYSICIAN ASSISTANT

## 2021-11-18 PROCEDURE — G0378 HOSPITAL OBSERVATION PER HR: HCPCS

## 2021-11-18 PROCEDURE — 83735 ASSAY OF MAGNESIUM: CPT | Performed by: PHYSICIAN ASSISTANT

## 2021-11-18 PROCEDURE — 71275 CT ANGIOGRAPHY CHEST: CPT

## 2021-11-18 PROCEDURE — 85379 FIBRIN DEGRADATION QUANT: CPT | Performed by: EMERGENCY MEDICINE

## 2021-11-18 PROCEDURE — U0003 INFECTIOUS AGENT DETECTION BY NUCLEIC ACID (DNA OR RNA); SEVERE ACUTE RESPIRATORY SYNDROME CORONAVIRUS 2 (SARS-COV-2) (CORONAVIRUS DISEASE [COVID-19]), AMPLIFIED PROBE TECHNIQUE, MAKING USE OF HIGH THROUGHPUT TECHNOLOGIES AS DESCRIBED BY CMS-2020-01-R: HCPCS | Performed by: PHYSICIAN ASSISTANT

## 2021-11-18 PROCEDURE — 93005 ELECTROCARDIOGRAM TRACING: CPT | Performed by: EMERGENCY MEDICINE

## 2021-11-18 PROCEDURE — 71045 X-RAY EXAM CHEST 1 VIEW: CPT

## 2021-11-18 PROCEDURE — U0005 INFEC AGEN DETEC AMPLI PROBE: HCPCS | Performed by: PHYSICIAN ASSISTANT

## 2021-11-18 PROCEDURE — 99285 EMERGENCY DEPT VISIT HI MDM: CPT

## 2021-11-18 PROCEDURE — 80061 LIPID PANEL: CPT | Performed by: PHYSICIAN ASSISTANT

## 2021-11-18 PROCEDURE — 0 IOPAMIDOL PER 1 ML: Performed by: EMERGENCY MEDICINE

## 2021-11-18 PROCEDURE — 93005 ELECTROCARDIOGRAM TRACING: CPT

## 2021-11-18 PROCEDURE — 85025 COMPLETE CBC W/AUTO DIFF WBC: CPT | Performed by: EMERGENCY MEDICINE

## 2021-11-18 PROCEDURE — 84484 ASSAY OF TROPONIN QUANT: CPT | Performed by: EMERGENCY MEDICINE

## 2021-11-18 PROCEDURE — C9803 HOPD COVID-19 SPEC COLLECT: HCPCS

## 2021-11-18 PROCEDURE — 80053 COMPREHEN METABOLIC PANEL: CPT | Performed by: EMERGENCY MEDICINE

## 2021-11-18 RX ORDER — ACETAMINOPHEN AND CODEINE PHOSPHATE 300; 30 MG/1; MG/1
1 TABLET ORAL EVERY 6 HOURS PRN
COMMUNITY
End: 2021-11-19 | Stop reason: HOSPADM

## 2021-11-18 RX ORDER — NITROGLYCERIN 0.4 MG/1
0.4 TABLET SUBLINGUAL
Status: DISCONTINUED | OUTPATIENT
Start: 2021-11-18 | End: 2021-11-19 | Stop reason: HOSPADM

## 2021-11-18 RX ORDER — CHOLECALCIFEROL (VITAMIN D3) 125 MCG
5 CAPSULE ORAL NIGHTLY PRN
Status: DISCONTINUED | OUTPATIENT
Start: 2021-11-18 | End: 2021-11-19 | Stop reason: HOSPADM

## 2021-11-18 RX ORDER — ONDANSETRON 4 MG/1
4 TABLET, FILM COATED ORAL EVERY 6 HOURS PRN
Status: DISCONTINUED | OUTPATIENT
Start: 2021-11-18 | End: 2021-11-19 | Stop reason: HOSPADM

## 2021-11-18 RX ORDER — POTASSIUM CHLORIDE 20 MEQ/1
20 TABLET, EXTENDED RELEASE ORAL 2 TIMES DAILY WITH MEALS
Status: DISCONTINUED | OUTPATIENT
Start: 2021-11-19 | End: 2021-11-19 | Stop reason: HOSPADM

## 2021-11-18 RX ORDER — ACETAMINOPHEN 325 MG/1
650 TABLET ORAL EVERY 4 HOURS PRN
Status: DISCONTINUED | OUTPATIENT
Start: 2021-11-18 | End: 2021-11-19 | Stop reason: HOSPADM

## 2021-11-18 RX ORDER — ACETAMINOPHEN AND CODEINE PHOSPHATE 300; 30 MG/1; MG/1
1 TABLET ORAL EVERY 6 HOURS PRN
Status: DISCONTINUED | OUTPATIENT
Start: 2021-11-19 | End: 2021-11-19 | Stop reason: HOSPADM

## 2021-11-18 RX ORDER — NITROGLYCERIN 0.4 MG/1
0.4 TABLET SUBLINGUAL
Status: DISCONTINUED | OUTPATIENT
Start: 2021-11-18 | End: 2021-11-18

## 2021-11-18 RX ORDER — ONDANSETRON 2 MG/ML
4 INJECTION INTRAMUSCULAR; INTRAVENOUS EVERY 6 HOURS PRN
Status: DISCONTINUED | OUTPATIENT
Start: 2021-11-18 | End: 2021-11-19 | Stop reason: HOSPADM

## 2021-11-18 RX ORDER — ACETAMINOPHEN 650 MG/1
650 SUPPOSITORY RECTAL EVERY 4 HOURS PRN
Status: DISCONTINUED | OUTPATIENT
Start: 2021-11-18 | End: 2021-11-19 | Stop reason: HOSPADM

## 2021-11-18 RX ORDER — MAGNESIUM SULFATE HEPTAHYDRATE 40 MG/ML
2 INJECTION, SOLUTION INTRAVENOUS ONCE
Status: COMPLETED | OUTPATIENT
Start: 2021-11-19 | End: 2021-11-19

## 2021-11-18 RX ORDER — SODIUM CHLORIDE 0.9 % (FLUSH) 0.9 %
10 SYRINGE (ML) INJECTION AS NEEDED
Status: DISCONTINUED | OUTPATIENT
Start: 2021-11-18 | End: 2021-11-19 | Stop reason: HOSPADM

## 2021-11-18 RX ORDER — ACETAMINOPHEN 160 MG/5ML
650 SOLUTION ORAL EVERY 4 HOURS PRN
Status: DISCONTINUED | OUTPATIENT
Start: 2021-11-18 | End: 2021-11-19 | Stop reason: HOSPADM

## 2021-11-18 RX ORDER — SODIUM CHLORIDE 0.9 % (FLUSH) 0.9 %
10 SYRINGE (ML) INJECTION EVERY 12 HOURS SCHEDULED
Status: DISCONTINUED | OUTPATIENT
Start: 2021-11-18 | End: 2021-11-19 | Stop reason: HOSPADM

## 2021-11-18 RX ADMIN — NITROGLYCERIN 0.4 MG: 0.4 TABLET SUBLINGUAL at 12:57

## 2021-11-18 RX ADMIN — SODIUM CHLORIDE, PRESERVATIVE FREE 10 ML: 5 INJECTION INTRAVENOUS at 20:17

## 2021-11-18 RX ADMIN — IOPAMIDOL 100 ML: 755 INJECTION, SOLUTION INTRAVENOUS at 15:07

## 2021-11-18 RX ADMIN — ACETAMINOPHEN 650 MG: 325 TABLET, FILM COATED ORAL at 23:32

## 2021-11-18 NOTE — NURSING NOTE
New admit from ED. Rates pain a 6/10 radiating to back. Patient alert, room air with no other complaints. Falls risk initated and educated. Will continue to monitor.

## 2021-11-18 NOTE — ED PROVIDER NOTES
Subjective   History of Present Illness  Chest pain  81-year-old female describes moderate intensity aching pain in the center of her chest that started at 10 AM this morning while she was laying down watching TV.  States she had felt well prior to that.  She reports no diaphoresis or palpitation.  States she does have some radiation of discomfort to her bilateral shoulders and back.  She reports no new cough.  States she did have a low-grade fever over the last few days.  She reports no known ill contacts.  Review of Systems   Constitutional: Positive for fever.   HENT: Negative.    Eyes: Negative.    Respiratory: Negative.    Cardiovascular: Positive for chest pain.   Gastrointestinal: Negative.    Genitourinary: Negative.    Musculoskeletal: Negative.    Skin: Negative.    Neurological: Negative.    Psychiatric/Behavioral: Negative.        Past Medical History:   Diagnosis Date   • Arthritis    • Breast cancer (CMS/HCC)    • Hyperlipidemia    • Hypertension    • Hyperthyroidism     patient has hypothyroidism   • Hypothyroidism    Stroke 2 years ago  Myocardial infarction 10 years ago  Pulmonary embolus  Atrial fibrillation    Allergies   Allergen Reactions   • Aspirin      bleeding   • Nsaids      bleeding       Past Surgical History:   Procedure Laterality Date   • BREAST BIOPSY     • BREAST SURGERY Right    • CHOLECYSTECTOMY     • COLON SURGERY      Removed   • COLONOSCOPY     • HYSTERECTOMY     • MAMMO BILATERAL  2015   • MASTECTOMY     • TONSILLECTOMY         Family History   Problem Relation Age of Onset   • Stroke Mother    • Hypertension Mother    • Heart disease Father    • Cancer Father        Social History     Socioeconomic History   • Marital status: Single   Tobacco Use   • Smoking status: Former Smoker   • Smokeless tobacco: Never Used   • Tobacco comment: quit in 1988   Substance and Sexual Activity   • Alcohol use: Yes     Alcohol/week: 3.0 standard drinks     Types: 3 Shots of liquor per week      Comment: OCC   • Drug use: Yes     Types: Hydrocodone   • Sexual activity: Never       Prior to Admission medications    Medication Sig Start Date End Date Taking? Authorizing Provider   acetaminophen (TYLENOL) 325 MG tablet Take 2 tablets by mouth Every 6 (Six) Hours As Needed for Mild Pain . 9/9/19   Vladimir Barrera MD   Alpha-Lipoic Acid 200 MG capsule Take  by mouth Daily.    Katelin St MD   amLODIPine (NORVASC) 10 MG tablet Take 1 tablet by mouth Daily. 9/10/19   Vladimir Barrera MD   apixaban (ELIQUIS) 5 MG tablet tablet Take 1 tablet by mouth Every 12 (Twelve) Hours. 9/15/19   Vladimir Barrera MD   atorvastatin (LIPITOR) 40 MG tablet Take 1 tablet by mouth Every Night. 9/9/19   Vladimir Barrera MD   B Complex Vitamins (VITAMIN B COMPLEX) capsule capsule Take  by mouth Daily.    Katelin St MD   Biotin (BIOTIN MAXIMUM STRENGTH) 10 MG tablet Take  by mouth Daily.    Katelin St MD   Cholecalciferol (VITAMIN D3) 5000 UNITS capsule capsule Take 10,000 Units by mouth 2 (Two) Times a Day.    Katelin St MD   coenzyme Q10 100 MG capsule Take 100 mg by mouth Daily.    Katelin St MD   furosemide (LASIX) 40 MG tablet Take 1 tablet by mouth Daily. 9/10/19   Vladimir Barrera MD   hydrALAZINE (APRESOLINE) 25 MG tablet Take 1 tablet by mouth Every 6 (Six) Hours As Needed (SBP over 180 or DBP over 100). 9/9/19   Vladimir Barrera MD   isosorbide mononitrate (IMDUR) 30 MG 24 hr tablet Take 30 mg by mouth Daily.    Katelin St MD   levothyroxine (SYNTHROID, LEVOTHROID) 100 MCG tablet Take 1 tablet by mouth Daily. 5/9/18   Mac Bond MD   Magnesium 250 MG tablet Take 1 tablet by mouth Daily.    Katelin St MD   metoprolol succinate XL (TOPROL-XL) 100 MG 24 hr tablet Take 1 tablet by mouth Every 12 (Twelve) Hours. 9/9/19   Vladimir Barrera MD   Misc. Devices (ROLLER WALKER) misc Use daily 8/1/19   Mae Lawrence APRN  "  Multiple Vitamin (MULTI VITAMIN DAILY PO) Take  by mouth.    Provider, MD Katelin   nystatin-triamcinolone (MYCOLOG II) 359250-7.1 UNIT/GM-% cream apply to affected area twice a day if needed 3/23/17   Mae Lawrence APRN   potassium chloride (K-DUR,KLOR-CON) 20 MEQ CR tablet Take 1 tablet by mouth 2 (Two) Times a Day. 9/9/19   Vladimir Barrera MD     /94 (BP Location: Left arm, Patient Position: Lying)   Pulse 90   Temp 98.9 °F (37.2 °C) (Oral)   Resp 17   Ht 149.9 cm (59\")   Wt 81.6 kg (180 lb)   SpO2 95%   BMI 36.36 kg/m²   I examined the patient using the appropriate personal protective equipment.        Objective   Physical Exam  General: Well-developed well-appearing, no acute distress, alert and appropriate  Eyes: Pupils round and equal, sclera nonicteric  HEENT: Mucous membranes moist, no mucosal swelling  Neck: Supple, no nuchal rigidity, no soft tissue swelling  Respirations: Respirations nonlabored, equal breath sounds bilaterally, clear lungs  Heart regular rate and rhythm, no murmurs rubs or gallops,   Abdomen soft nontender nondistended, no hepatosplenomegaly, no hernia, no mass, normal bowel sounds, no CVA tenderness  Extremities no clubbing cyanosis or edema, calves are symmetric and nontender  Neuro cranial nerves grossly intact, no focal limb deficits  Psych oriented, pleasant affect  Skin no rash, brisk cap refill  Procedures           ED Course      My EKG interpretation atrial fibrillation rate of 82           Results for orders placed or performed during the hospital encounter of 11/18/21   Comprehensive Metabolic Panel    Specimen: Arm, Left; Blood   Result Value Ref Range    Glucose 98 65 - 99 mg/dL    BUN 6 (L) 8 - 23 mg/dL    Creatinine 0.80 0.57 - 1.00 mg/dL    Sodium 138 136 - 145 mmol/L    Potassium 3.8 3.5 - 5.2 mmol/L    Chloride 101 98 - 107 mmol/L    CO2 25.0 22.0 - 29.0 mmol/L    Calcium 8.7 8.6 - 10.5 mg/dL    Total Protein 6.3 6.0 - 8.5 g/dL    Albumin " 3.20 (L) 3.50 - 5.20 g/dL    ALT (SGPT) 47 (H) 1 - 33 U/L    AST (SGOT) 71 (H) 1 - 32 U/L    Alkaline Phosphatase 156 (H) 39 - 117 U/L    Total Bilirubin 0.6 0.0 - 1.2 mg/dL    eGFR Non African Amer 69 >60 mL/min/1.73    Globulin 3.1 gm/dL    A/G Ratio 1.0 g/dL    BUN/Creatinine Ratio 7.5 7.0 - 25.0    Anion Gap 12.0 5.0 - 15.0 mmol/L   Troponin    Specimen: Arm, Left; Blood   Result Value Ref Range    Troponin T <0.010 0.000 - 0.030 ng/mL   CBC Auto Differential    Specimen: Blood   Result Value Ref Range    WBC 9.50 3.40 - 10.80 10*3/mm3    RBC 4.02 3.77 - 5.28 10*6/mm3    Hemoglobin 13.2 12.0 - 15.9 g/dL    Hematocrit 37.0 34.0 - 46.6 %    MCV 92.2 79.0 - 97.0 fL    MCH 32.8 26.6 - 33.0 pg    MCHC 35.6 31.5 - 35.7 g/dL    RDW 15.2 12.3 - 15.4 %    RDW-SD 48.6 37.0 - 54.0 fl    MPV 8.1 6.0 - 12.0 fL    Platelets 293 140 - 450 10*3/mm3    Neutrophil % 71.8 42.7 - 76.0 %    Lymphocyte % 13.5 (L) 19.6 - 45.3 %    Monocyte % 13.0 (H) 5.0 - 12.0 %    Eosinophil % 1.1 0.3 - 6.2 %    Basophil % 0.6 0.0 - 1.5 %    Neutrophils, Absolute 6.80 1.70 - 7.00 10*3/mm3    Lymphocytes, Absolute 1.30 0.70 - 3.10 10*3/mm3    Monocytes, Absolute 1.20 (H) 0.10 - 0.90 10*3/mm3    Eosinophils, Absolute 0.10 0.00 - 0.40 10*3/mm3    Basophils, Absolute 0.10 0.00 - 0.20 10*3/mm3    nRBC 0.0 0.0 - 0.2 /100 WBC   D-dimer, Quantitative    Specimen: Blood   Result Value Ref Range    D-Dimer, Quantitative 0.67 (H) 0.00 - 0.59 mg/L (FEU)   ECG 12 Lead   Result Value Ref Range    QT Interval 388 ms   Green Top (Gel)   Result Value Ref Range    Extra Tube     Lavender Top   Result Value Ref Range    Extra Tube hold for add-on    Gold Top - SST   Result Value Ref Range    Extra Tube Hold for add-ons.      XR Chest 1 View    Result Date: 11/18/2021  No acute cardiopulmonary process identified.  Electronically Signed By-Caleb Melara MD On:11/18/2021 1:10 PM This report was finalized on [id number] by  Caleb Melara MD.    CT Chest Pulmonary  Embolism    Result Date: 11/18/2021  1.Negative for pulmonary embolus. 2.Lungs are clear. 3.Cardiomegaly. 4.Small hiatal hernia.  Electronically Signed By-Caleb Melara MD On:11/18/2021 3:12 PM This report was finalized on [id number] by  Caleb Melara MD.                                 MDM  Patient presents with some chest pain.  EKG shows no acute ischemic change, initial troponin is normal.  She does have chronic atrial fibrillation.  D-dimer screen was marginally elevated, CT PE protocol was ordered and negative for pulmonary embolus.  She did have some elevated blood pressure on arrival that improved with nitroglycerin.  Notably she has allergy to aspirin.  She is chronically anticoagulated on Eliquis.  Hepatic enzymes borderline elevated uncertain significance as she has no abdominal tenderness.  Patient does have moderate risk heart score and will be placed hospitalization for further chest pain evaluation.  Final diagnoses:   Chest pain, unspecified type   Chronic atrial fibrillation (HCC)       ED Disposition  ED Disposition     ED Disposition Condition Comment    Decision to Admit            No follow-up provider specified.       Medication List      No changes were made to your prescriptions during this visit.          Jose Chapman MD  11/18/21 1531

## 2021-11-19 ENCOUNTER — APPOINTMENT (OUTPATIENT)
Dept: NUCLEAR MEDICINE | Facility: HOSPITAL | Age: 81
End: 2021-11-19

## 2021-11-19 VITALS
HEIGHT: 59 IN | HEART RATE: 72 BPM | RESPIRATION RATE: 16 BRPM | BODY MASS INDEX: 37.69 KG/M2 | SYSTOLIC BLOOD PRESSURE: 106 MMHG | TEMPERATURE: 97.5 F | OXYGEN SATURATION: 97 % | DIASTOLIC BLOOD PRESSURE: 73 MMHG | WEIGHT: 186.95 LBS

## 2021-11-19 LAB
ANION GAP SERPL CALCULATED.3IONS-SCNC: 12 MMOL/L (ref 5–15)
BASOPHILS # BLD AUTO: 0.1 10*3/MM3 (ref 0–0.2)
BASOPHILS NFR BLD AUTO: 0.7 % (ref 0–1.5)
BH CV REST NUCLEAR ISOTOPE DOSE: 7.4 MCI
BH CV STRESS BP STAGE 1: NORMAL
BH CV STRESS BP STAGE 2: NORMAL
BH CV STRESS BP STAGE 3: NORMAL
BH CV STRESS COMMENTS STAGE 1: NORMAL
BH CV STRESS DOSE REGADENOSON STAGE 1: 0.4
BH CV STRESS DURATION MIN STAGE 1: 0
BH CV STRESS DURATION MIN STAGE 2: 1
BH CV STRESS DURATION SEC STAGE 1: 10
BH CV STRESS DURATION SEC STAGE 2: 0
BH CV STRESS DURATION SEC STAGE 3: 1
BH CV STRESS HR STAGE 1: 87
BH CV STRESS HR STAGE 2: 78
BH CV STRESS HR STAGE 3: 70
BH CV STRESS NUCLEAR ISOTOPE DOSE: 21.9 MCI
BH CV STRESS PROTOCOL 1: NORMAL
BH CV STRESS RECOVERY BP: NORMAL MMHG
BH CV STRESS RECOVERY HR: 72 BPM
BH CV STRESS STAGE 1: 1
BH CV STRESS STAGE 2: 2
BH CV STRESS STAGE 3: 3
BUN SERPL-MCNC: 8 MG/DL (ref 8–23)
BUN/CREAT SERPL: 10.4 (ref 7–25)
CALCIUM SPEC-SCNC: 9.2 MG/DL (ref 8.6–10.5)
CHLORIDE SERPL-SCNC: 101 MMOL/L (ref 98–107)
CO2 SERPL-SCNC: 26 MMOL/L (ref 22–29)
CREAT SERPL-MCNC: 0.77 MG/DL (ref 0.57–1)
DEPRECATED RDW RBC AUTO: 44.6 FL (ref 37–54)
EOSINOPHIL # BLD AUTO: 0.1 10*3/MM3 (ref 0–0.4)
EOSINOPHIL NFR BLD AUTO: 0.8 % (ref 0.3–6.2)
ERYTHROCYTE [DISTWIDTH] IN BLOOD BY AUTOMATED COUNT: 14.3 % (ref 12.3–15.4)
GFR SERPL CREATININE-BSD FRML MDRD: 72 ML/MIN/1.73
GLUCOSE SERPL-MCNC: 89 MG/DL (ref 65–99)
HCT VFR BLD AUTO: 32.9 % (ref 34–46.6)
HCT VFR BLD AUTO: 34 % (ref 34–46.6)
HGB BLD-MCNC: 11.5 G/DL (ref 12–15.9)
HGB BLD-MCNC: 11.7 G/DL (ref 12–15.9)
LV EF NUC BP: 75 %
LYMPHOCYTES # BLD AUTO: 1.8 10*3/MM3 (ref 0.7–3.1)
LYMPHOCYTES NFR BLD AUTO: 24.1 % (ref 19.6–45.3)
MAGNESIUM SERPL-MCNC: 1.9 MG/DL (ref 1.6–2.4)
MAXIMAL PREDICTED HEART RATE: 138 BPM
MCH RBC QN AUTO: 31.6 PG (ref 26.6–33)
MCHC RBC AUTO-ENTMCNC: 34.4 G/DL (ref 31.5–35.7)
MCV RBC AUTO: 91.9 FL (ref 79–97)
MONOCYTES # BLD AUTO: 1.3 10*3/MM3 (ref 0.1–0.9)
MONOCYTES NFR BLD AUTO: 16.6 % (ref 5–12)
NEUTROPHILS NFR BLD AUTO: 4.4 10*3/MM3 (ref 1.7–7)
NEUTROPHILS NFR BLD AUTO: 57.8 % (ref 42.7–76)
NRBC BLD AUTO-RTO: 0 /100 WBC (ref 0–0.2)
PERCENT MAX PREDICTED HR: 78.26 %
PHOSPHATE SERPL-MCNC: 3 MG/DL (ref 2.5–4.5)
PLATELET # BLD AUTO: 267 10*3/MM3 (ref 140–450)
PMV BLD AUTO: 7.8 FL (ref 6–12)
POTASSIUM SERPL-SCNC: 3.4 MMOL/L (ref 3.5–5.2)
POTASSIUM SERPL-SCNC: 3.8 MMOL/L (ref 3.5–5.2)
RBC # BLD AUTO: 3.7 10*6/MM3 (ref 3.77–5.28)
SODIUM SERPL-SCNC: 139 MMOL/L (ref 136–145)
STRESS BASELINE BP: NORMAL MMHG
STRESS BASELINE HR: 83 BPM
STRESS PERCENT HR: 92 %
STRESS POST PEAK BP: NORMAL MMHG
STRESS POST PEAK HR: 108 BPM
STRESS TARGET HR: 117 BPM
TROPONIN T SERPL-MCNC: <0.01 NG/ML (ref 0–0.03)
TSH SERPL DL<=0.05 MIU/L-ACNC: 0.37 UIU/ML (ref 0.27–4.2)
WBC NRBC COR # BLD: 7.6 10*3/MM3 (ref 3.4–10.8)

## 2021-11-19 PROCEDURE — 25010000002 REGADENOSON 0.4 MG/5ML SOLUTION: Performed by: EMERGENCY MEDICINE

## 2021-11-19 PROCEDURE — 0 TECHNETIUM SESTAMIBI: Performed by: EMERGENCY MEDICINE

## 2021-11-19 PROCEDURE — 80048 BASIC METABOLIC PNL TOTAL CA: CPT | Performed by: PHYSICIAN ASSISTANT

## 2021-11-19 PROCEDURE — 78452 HT MUSCLE IMAGE SPECT MULT: CPT | Performed by: INTERNAL MEDICINE

## 2021-11-19 PROCEDURE — 85014 HEMATOCRIT: CPT | Performed by: PHYSICIAN ASSISTANT

## 2021-11-19 PROCEDURE — 93018 CV STRESS TEST I&R ONLY: CPT | Performed by: INTERNAL MEDICINE

## 2021-11-19 PROCEDURE — 85025 COMPLETE CBC W/AUTO DIFF WBC: CPT | Performed by: PHYSICIAN ASSISTANT

## 2021-11-19 PROCEDURE — 96365 THER/PROPH/DIAG IV INF INIT: CPT

## 2021-11-19 PROCEDURE — G0378 HOSPITAL OBSERVATION PER HR: HCPCS

## 2021-11-19 PROCEDURE — 93017 CV STRESS TEST TRACING ONLY: CPT

## 2021-11-19 PROCEDURE — A9500 TC99M SESTAMIBI: HCPCS | Performed by: EMERGENCY MEDICINE

## 2021-11-19 PROCEDURE — 96366 THER/PROPH/DIAG IV INF ADDON: CPT

## 2021-11-19 PROCEDURE — 85018 HEMOGLOBIN: CPT | Performed by: PHYSICIAN ASSISTANT

## 2021-11-19 PROCEDURE — 84484 ASSAY OF TROPONIN QUANT: CPT | Performed by: EMERGENCY MEDICINE

## 2021-11-19 PROCEDURE — 78452 HT MUSCLE IMAGE SPECT MULT: CPT

## 2021-11-19 PROCEDURE — 84132 ASSAY OF SERUM POTASSIUM: CPT | Performed by: EMERGENCY MEDICINE

## 2021-11-19 PROCEDURE — 93016 CV STRESS TEST SUPVJ ONLY: CPT | Performed by: NURSE PRACTITIONER

## 2021-11-19 PROCEDURE — 83735 ASSAY OF MAGNESIUM: CPT | Performed by: EMERGENCY MEDICINE

## 2021-11-19 PROCEDURE — 25010000002 MAGNESIUM SULFATE 2 GM/50ML SOLUTION: Performed by: EMERGENCY MEDICINE

## 2021-11-19 PROCEDURE — 84100 ASSAY OF PHOSPHORUS: CPT | Performed by: PHYSICIAN ASSISTANT

## 2021-11-19 PROCEDURE — 84443 ASSAY THYROID STIM HORMONE: CPT | Performed by: PHYSICIAN ASSISTANT

## 2021-11-19 RX ORDER — TRAMADOL HYDROCHLORIDE 50 MG/1
50 TABLET ORAL EVERY 6 HOURS PRN
Qty: 12 TABLET | Refills: 0 | Status: SHIPPED | OUTPATIENT
Start: 2021-11-19 | End: 2021-11-19 | Stop reason: SDUPTHER

## 2021-11-19 RX ORDER — LEVOTHYROXINE SODIUM 0.1 MG/1
100 TABLET ORAL
Status: DISCONTINUED | OUTPATIENT
Start: 2021-11-19 | End: 2021-11-19 | Stop reason: HOSPADM

## 2021-11-19 RX ORDER — SIMETHICONE 80 MG
80 TABLET,CHEWABLE ORAL EVERY 6 HOURS PRN
Qty: 28 TABLET | Refills: 0 | Status: SHIPPED | OUTPATIENT
Start: 2021-11-19 | End: 2021-11-19 | Stop reason: SDUPTHER

## 2021-11-19 RX ORDER — ATORVASTATIN CALCIUM 40 MG/1
40 TABLET, FILM COATED ORAL NIGHTLY
Status: DISCONTINUED | OUTPATIENT
Start: 2021-11-19 | End: 2021-11-19 | Stop reason: HOSPADM

## 2021-11-19 RX ORDER — FUROSEMIDE 40 MG/1
40 TABLET ORAL
Status: DISCONTINUED | OUTPATIENT
Start: 2021-11-19 | End: 2021-11-19 | Stop reason: HOSPADM

## 2021-11-19 RX ORDER — POTASSIUM CHLORIDE 20 MEQ/1
20 TABLET, EXTENDED RELEASE ORAL 2 TIMES DAILY
Status: DISCONTINUED | OUTPATIENT
Start: 2021-11-19 | End: 2021-11-19 | Stop reason: SDUPTHER

## 2021-11-19 RX ORDER — SIMETHICONE 80 MG
80 TABLET,CHEWABLE ORAL EVERY 6 HOURS PRN
Qty: 28 TABLET | Refills: 0 | Status: SHIPPED | OUTPATIENT
Start: 2021-11-19 | End: 2021-11-26

## 2021-11-19 RX ORDER — ISOSORBIDE MONONITRATE 30 MG/1
30 TABLET, EXTENDED RELEASE ORAL DAILY
Status: DISCONTINUED | OUTPATIENT
Start: 2021-11-19 | End: 2021-11-19 | Stop reason: HOSPADM

## 2021-11-19 RX ORDER — METOPROLOL SUCCINATE 50 MG/1
100 TABLET, EXTENDED RELEASE ORAL EVERY 12 HOURS SCHEDULED
Status: DISCONTINUED | OUTPATIENT
Start: 2021-11-19 | End: 2021-11-19 | Stop reason: HOSPADM

## 2021-11-19 RX ORDER — TRAMADOL HYDROCHLORIDE 50 MG/1
50 TABLET ORAL EVERY 6 HOURS PRN
Qty: 12 TABLET | Refills: 0 | Status: SHIPPED | OUTPATIENT
Start: 2021-11-19 | End: 2021-11-22

## 2021-11-19 RX ORDER — ACETAMINOPHEN AND CODEINE PHOSPHATE 300; 30 MG/1; MG/1
1 TABLET ORAL EVERY 6 HOURS PRN
Status: DISCONTINUED | OUTPATIENT
Start: 2021-11-19 | End: 2021-11-19 | Stop reason: SDUPTHER

## 2021-11-19 RX ORDER — ACETAMINOPHEN 325 MG/1
650 TABLET ORAL EVERY 6 HOURS PRN
Status: DISCONTINUED | OUTPATIENT
Start: 2021-11-19 | End: 2021-11-19 | Stop reason: SDUPTHER

## 2021-11-19 RX ADMIN — POTASSIUM CHLORIDE 20 MEQ: 20 TABLET, EXTENDED RELEASE ORAL at 09:44

## 2021-11-19 RX ADMIN — FUROSEMIDE 40 MG: 40 TABLET ORAL at 09:43

## 2021-11-19 RX ADMIN — ISOSORBIDE MONONITRATE 30 MG: 30 TABLET, EXTENDED RELEASE ORAL at 09:43

## 2021-11-19 RX ADMIN — APIXABAN 5 MG: 5 TABLET, FILM COATED ORAL at 09:44

## 2021-11-19 RX ADMIN — MAGNESIUM SULFATE HEPTAHYDRATE 2 G: 40 INJECTION, SOLUTION INTRAVENOUS at 00:14

## 2021-11-19 RX ADMIN — METOPROLOL SUCCINATE 100 MG: 50 TABLET, EXTENDED RELEASE ORAL at 09:44

## 2021-11-19 RX ADMIN — TECHNETIUM TC 99M SESTAMIBI 1 DOSE: 1 INJECTION INTRAVENOUS at 08:05

## 2021-11-19 RX ADMIN — ACETAMINOPHEN AND CODEINE PHOSPHATE 1 TABLET: 300; 30 TABLET ORAL at 07:10

## 2021-11-19 RX ADMIN — POTASSIUM CHLORIDE 20 MEQ: 20 TABLET, EXTENDED RELEASE ORAL at 00:13

## 2021-11-19 RX ADMIN — LEVOTHYROXINE SODIUM 100 MCG: 0.1 TABLET ORAL at 09:44

## 2021-11-19 RX ADMIN — TECHNETIUM TC 99M SESTAMIBI 1 DOSE: 1 INJECTION INTRAVENOUS at 06:30

## 2021-11-19 RX ADMIN — REGADENOSON 0.4 MG: 0.08 INJECTION, SOLUTION INTRAVENOUS at 08:05

## 2021-11-19 RX ADMIN — SODIUM CHLORIDE, PRESERVATIVE FREE 10 ML: 5 INJECTION INTRAVENOUS at 10:04

## 2021-11-19 NOTE — PLAN OF CARE
Goal Outcome Evaluation:  Plan of Care Reviewed With: patient           Outcome Summary: Myoview results negatvie, Magnesium level normal and K level WNL after am dose. Plan is for pt to be discharged this afternoon

## 2021-11-19 NOTE — PLAN OF CARE
Problem: Adult Inpatient Plan of Care  Goal: Plan of Care Review  Outcome: Ongoing, Progressing  Flowsheets (Taken 11/19/2021 0202)  Progress: no change  Plan of Care Reviewed With: patient  Outcome Summary: low mag-being replaced, up with assist to bsc, npo for myoview.  Goal: Patient-Specific Goal (Individualized)  Outcome: Ongoing, Progressing  Goal: Absence of Hospital-Acquired Illness or Injury  Outcome: Ongoing, Progressing  Intervention: Identify and Manage Fall Risk  Recent Flowsheet Documentation  Taken 11/18/2021 2332 by Sandra Luke RN  Safety Promotion/Fall Prevention:   safety round/check completed   fall prevention program maintained   lighting adjusted   nonskid shoes/slippers when out of bed  Taken 11/18/2021 1930 by Sandra Luke RN  Safety Promotion/Fall Prevention:   safety round/check completed   room organization consistent   nonskid shoes/slippers when out of bed   lighting adjusted  Intervention: Prevent Skin Injury  Recent Flowsheet Documentation  Taken 11/18/2021 2300 by Sandra Luke RN  Body Position: supine  Goal: Optimal Comfort and Wellbeing  Outcome: Ongoing, Progressing  Goal: Readiness for Transition of Care  Outcome: Ongoing, Progressing     Problem: Fall Injury Risk  Goal: Absence of Fall and Fall-Related Injury  Outcome: Ongoing, Progressing  Intervention: Identify and Manage Contributors to Fall Injury Risk  Recent Flowsheet Documentation  Taken 11/18/2021 2300 by Sandra Luke RN  Medication Review/Management: medications reviewed  Taken 11/18/2021 1930 by Sandra Luke RN  Medication Review/Management: medications reviewed  Intervention: Promote Injury-Free Environment  Recent Flowsheet Documentation  Taken 11/18/2021 2332 by Sandra Luke RN  Safety Promotion/Fall Prevention:   safety round/check completed   fall prevention program maintained   lighting adjusted   nonskid shoes/slippers when out of bed  Taken 11/18/2021 1930 by  Marly, Sandra, RN  Safety Promotion/Fall Prevention:   safety round/check completed   room organization consistent   nonskid shoes/slippers when out of bed   lighting adjusted     Problem: Asthma Comorbidity  Goal: Maintenance of Asthma Control  Outcome: Ongoing, Progressing  Intervention: Maintain Asthma Symptom Control  Recent Flowsheet Documentation  Taken 11/18/2021 2300 by Sandra Luke RN  Medication Review/Management: medications reviewed  Taken 11/18/2021 1930 by Sandra Luke RN  Medication Review/Management: medications reviewed     Problem: COPD Comorbidity  Goal: Maintenance of COPD Symptom Control  Outcome: Ongoing, Progressing  Intervention: Maintain COPD-Symptom Control  Recent Flowsheet Documentation  Taken 11/18/2021 2300 by Sandra Luke RN  Medication Review/Management: medications reviewed  Taken 11/18/2021 1930 by Sandra Luke RN  Medication Review/Management: medications reviewed     Problem: Diabetes Comorbidity  Goal: Blood Glucose Level Within Desired Range  Outcome: Ongoing, Progressing     Problem: Heart Failure Comorbidity  Goal: Maintenance of Heart Failure Symptom Control  Outcome: Ongoing, Progressing  Intervention: Maintain Heart Failure-Management Strategies  Recent Flowsheet Documentation  Taken 11/18/2021 2300 by Sandra Luke RN  Medication Review/Management: medications reviewed  Taken 11/18/2021 1930 by Sandra Luke RN  Medication Review/Management: medications reviewed     Problem: Hypertension Comorbidity  Goal: Blood Pressure in Desired Range  Outcome: Ongoing, Progressing  Intervention: Maintain Hypertension-Management Strategies  Recent Flowsheet Documentation  Taken 11/18/2021 2300 by Sandra Luke RN  Medication Review/Management: medications reviewed  Taken 11/18/2021 1930 by Sandra Luke RN  Medication Review/Management: medications reviewed     Problem: Obstructive Sleep Apnea Risk or Actual (Comorbidity  Management)  Goal: Unobstructed Breathing During Sleep  Outcome: Ongoing, Progressing     Problem: Pain Chronic (Persistent) (Comorbidity Management)  Goal: Acceptable Pain Control and Functional Ability  Outcome: Ongoing, Progressing  Intervention: Manage Persistent Pain  Recent Flowsheet Documentation  Taken 11/18/2021 2300 by Sandra Luke, RN  Bowel Elimination Promotion: commode/bedpan at bedside  Medication Review/Management: medications reviewed  Taken 11/18/2021 1930 by Sandra Luke, RN  Bowel Elimination Promotion: commode/bedpan at bedside  Medication Review/Management: medications reviewed     Problem: Seizure Disorder Comorbidity  Goal: Maintenance of Seizure Control  Outcome: Ongoing, Progressing     Problem: Skin Injury Risk Increased  Goal: Skin Health and Integrity  Outcome: Ongoing, Progressing   Goal Outcome Evaluation:  Plan of Care Reviewed With: patient        Progress: no change  Outcome Summary: low mag-being replaced, up with assist to bsc, npo for myoview.

## 2021-11-19 NOTE — DISCHARGE SUMMARY
"[facility]    Marisela Adams, MENDOZA    CHIEF COMPLAINT:     Chest pain    HISTORY OF PRESENT ILLNESS:    Obtained from ED provider HPI on 11/18/121:  81-year-old female describes moderate intensity aching pain in the center of her chest that started at 10 AM this morning while she was laying down watching TV.  States she had felt well prior to that.  She reports no diaphoresis or palpitation.  States she does have some radiation of discomfort to her bilateral shoulders and back.  She reports no new cough.  States she did have a low-grade fever over the last few days.  She reports no known ill contacts.    11/19/2021: Patient Everette HPI noted above including an aching chest pain which began at approximately 10 AM on 11/18/2021 located across her anterior chest and radiating towards her back.  She notes this pain was worse with deep breathing and coughing (which she has been doing over the past several days with some clear sputum production).  In addition to this she reports occasional palpitations which are chronic and at baseline.  No nausea, vomiting, peripheral edema, syncope or near syncope is reported.  She did not experience any diaphoresis and does not smoke but reports that she has 1 alcoholic beverage nightly.  In addition to this patient notes that she had recently received her COVID-19 booster.  Since her admission she has done generally well and notes that the pain is significantly improved though not completely relieved and continues to be aggravated with deep breathing.      Also of note patient reports some recent abdominal cramping described as \"gas pain\" as well as some bleeding with her bowel movements which she relates to her chronic hemorrhoids.        Past Medical History:   Diagnosis Date   • Arthritis    • Breast cancer (CMS/HCC)    • Hyperlipidemia    • Hypertension    • Hyperthyroidism     patient has hypothyroidism   • Hypothyroidism      Past Surgical History: "   Procedure Laterality Date   • BREAST BIOPSY     • BREAST SURGERY Right    • CHOLECYSTECTOMY     • COLON SURGERY      Removed   • COLONOSCOPY     • HYSTERECTOMY     • MAMMO BILATERAL  2015   • MASTECTOMY     • TONSILLECTOMY       Family History   Problem Relation Age of Onset   • Stroke Mother    • Hypertension Mother    • Heart disease Father    • Cancer Father      Social History     Tobacco Use   • Smoking status: Former Smoker   • Smokeless tobacco: Never Used   • Tobacco comment: quit in 1988   Substance Use Topics   • Alcohol use: Yes     Alcohol/week: 3.0 standard drinks     Types: 3 Shots of liquor per week     Comment: OCC   • Drug use: Yes     Types: Hydrocodone     Medications Prior to Admission   Medication Sig Dispense Refill Last Dose   • acetaminophen (TYLENOL) 325 MG tablet Take 2 tablets by mouth Every 6 (Six) Hours As Needed for Mild Pain .   Past Week at Unknown time   • acetaminophen-codeine (TYLENOL #3) 300-30 MG per tablet Take 1 tablet by mouth Every 6 (Six) Hours As Needed for Moderate Pain .      • apixaban (ELIQUIS) 5 MG tablet tablet Take 1 tablet by mouth Every 12 (Twelve) Hours. 60 tablet  11/18/2021 at Unknown time   • atorvastatin (LIPITOR) 40 MG tablet Take 1 tablet by mouth Every Night. 30 tablet 0 11/17/2021 at Unknown time   • furosemide (LASIX) 40 MG tablet Take 1 tablet by mouth Daily. (Patient taking differently: Take 40 mg by mouth 2 (Two) Times a Day.) 30 tablet 0 11/18/2021 at Unknown time   • isosorbide mononitrate (IMDUR) 30 MG 24 hr tablet Take 30 mg by mouth Daily.   11/18/2021 at Unknown time   • levothyroxine (SYNTHROID, LEVOTHROID) 100 MCG tablet Take 1 tablet by mouth Daily. 90 tablet 3 11/18/2021 at Unknown time   • Magnesium 250 MG tablet Take 1 tablet by mouth Daily.   11/18/2021 at Unknown time   • metoprolol succinate XL (TOPROL-XL) 100 MG 24 hr tablet Take 1 tablet by mouth Every 12 (Twelve) Hours. 60 tablet 0 11/18/2021 at Unknown time   •  nystatin-triamcinolone (MYCOLOG II) 322349-7.1 UNIT/GM-% cream apply to affected area twice a day if needed 45 g 3 Past Week at Unknown time   • potassium chloride (K-DUR,KLOR-CON) 20 MEQ CR tablet Take 1 tablet by mouth 2 (Two) Times a Day. (Patient taking differently: Take 20 mEq by mouth 3 (Three) Times a Day.) 90 tablet 3 11/18/2021 at Unknown time   • Misc. Devices (ROLLER WALKER) misc Use daily 1 each 0    • Multiple Vitamin (MULTI VITAMIN DAILY PO) Take  by mouth.        Allergies:  Aspirin and Nsaids    Immunization History   Administered Date(s) Administered   • Fluzone High Dose =>65 Years ([employer] ONLY) 11/02/2017, 09/26/2018   • Hep A, 2 Dose 05/07/2018   • Hepatitis A 05/07/2018, 11/01/2018   • Pneumococcal Conjugate 13-Valent (PCV13) 02/06/2015   • Pneumococcal Polysaccharide (PPSV23) 10/15/2004, 11/02/2017   • Tdap 12/17/2015           REVIEW OF SYSTEMS:    Review of Systems   Constitutional: Negative.   HENT: Negative.    Cardiovascular: Positive for chest pain and palpitations. Negative for dyspnea on exertion, irregular heartbeat, leg swelling, near-syncope, orthopnea and syncope.   Respiratory: Positive for cough and sputum production. Negative for shortness of breath.    Endocrine: Negative.    Skin: Negative.    Musculoskeletal: Negative.    Gastrointestinal: Positive for flatus and hemorrhoids. Negative for abdominal pain, nausea and vomiting.   Genitourinary: Negative.    Neurological: Negative.    Psychiatric/Behavioral: Negative.           Vital Signs  Temp:  [97.5 °F (36.4 °C)-98.3 °F (36.8 °C)] 97.5 °F (36.4 °C)  Heart Rate:  [] 72  Resp:  [15-18] 16  BP: (106-162)/(73-97) 106/73          Physical Exam:  Physical Exam  Vitals reviewed.   Constitutional:       General: She is not in acute distress.     Appearance: Normal appearance. She is obese. She is not ill-appearing, toxic-appearing or diaphoretic.   HENT:      Head: Normocephalic and atraumatic.      Right Ear: External ear  normal.      Left Ear: External ear normal.      Nose: Nose normal.      Mouth/Throat:      Mouth: Mucous membranes are moist.   Eyes:      Extraocular Movements: Extraocular movements intact.   Cardiovascular:      Rate and Rhythm: Normal rate and regular rhythm.      Pulses: Normal pulses.      Heart sounds: Normal heart sounds.   Pulmonary:      Effort: Pulmonary effort is normal.      Breath sounds: Normal breath sounds.   Abdominal:      General: Bowel sounds are normal. There is no distension.      Palpations: Abdomen is soft.      Tenderness: There is no abdominal tenderness.   Musculoskeletal:         General: Normal range of motion.      Cervical back: Normal range of motion.   Skin:     General: Skin is warm and dry.      Capillary Refill: Capillary refill takes less than 2 seconds.   Neurological:      General: No focal deficit present.      Mental Status: She is alert and oriented to person, place, and time.   Psychiatric:         Mood and Affect: Mood normal.         Behavior: Behavior normal.         Thought Content: Thought content normal.         Judgment: Judgment normal.            Emotional Behavior:   Normal   Debilities:  None  Results Review:    I reviewed the patient's new clinical results.  Lab Results (most recent)     Procedure Component Value Units Date/Time    Magnesium [[id number]] Collected: 11/19/21 0620    Specimen: Blood Updated: 11/19/21 1019    Basic Metabolic Panel [[id number]]  (Abnormal) Collected: 11/19/21 0620    Specimen: Blood Updated: 11/19/21 0746     Glucose 89 mg/dL      BUN 8 mg/dL      Creatinine 0.77 mg/dL      Sodium 139 mmol/L      Potassium 3.4 mmol/L      Chloride 101 mmol/L      CO2 26.0 mmol/L      Calcium 9.2 mg/dL      eGFR Non African Amer 72 mL/min/1.73      BUN/Creatinine Ratio 10.4     Anion Gap 12.0 mmol/L     Narrative:      GFR Normal >60  Chronic Kidney Disease <60  Kidney Failure <15      Troponin [[id number]]  (Normal) Collected: 11/19/21 0620     Specimen: Blood Updated: 11/19/21 0746     Troponin T <0.010 ng/mL     Narrative:      Troponin T Reference Range:  <= 0.03 ng/mL-   Negative for AMI  >0.03 ng/mL-     Abnormal for myocardial necrosis.  Clinicians would have to utilize clinical acumen, EKG, Troponin and serial changes to determine if it is an Acute Myocardial Infarction or myocardial injury due to an underlying chronic condition.       Results may be falsely decreased if patient taking Biotin.      Phosphorus [[id number]]  (Normal) Collected: 11/19/21 0620    Specimen: Blood Updated: 11/19/21 0746     Phosphorus 3.0 mg/dL     CBC & Differential [[id number]]  (Abnormal) Collected: 11/19/21 0620    Specimen: Blood Updated: 11/19/21 0652    Narrative:      The following orders were created for panel order CBC & Differential.  Procedure                               Abnormality         Status                     ---------                               -----------         ------                     CBC Auto Differential[[id number]]        Abnormal            Final result                 Please view results for these tests on the individual orders.    CBC Auto Differential [[id number]]  (Abnormal) Collected: 11/19/21 0620    Specimen: Blood Updated: 11/19/21 0652     WBC 7.60 10*3/mm3      RBC 3.70 10*6/mm3      Hemoglobin 11.7 g/dL      Hematocrit 34.0 %      MCV 91.9 fL      MCH 31.6 pg      MCHC 34.4 g/dL      RDW 14.3 %      RDW-SD 44.6 fl      MPV 7.8 fL      Platelets 267 10*3/mm3      Neutrophil % 57.8 %      Lymphocyte % 24.1 %      Monocyte % 16.6 %      Eosinophil % 0.8 %      Basophil % 0.7 %      Neutrophils, Absolute 4.40 10*3/mm3      Lymphocytes, Absolute 1.80 10*3/mm3      Monocytes, Absolute 1.30 10*3/mm3      Eosinophils, Absolute 0.10 10*3/mm3      Basophils, Absolute 0.10 10*3/mm3      nRBC 0.0 /100 WBC     [place] Draw [[id number]] Collected: 11/18/21 1301    Specimen: Blood Updated: 11/18/21 2562    Narrative:      The following orders  were created for panel order [place] Draw.  Procedure                               Abnormality         Status                     ---------                               -----------         ------                     Green Top (Gel)[[id number]]                                  Final result               Lavender Top[[id number]]                                     Final result               Gold Top - SST[[id number]]                                   Final result               Light Blue Top[[id number]]                                                                Please view results for these tests on the individual orders.    Magnesium [[id number]]  (Abnormal) Collected: 11/18/21 1919    Specimen: Blood Updated: 11/18/21 2243     Magnesium 1.3 mg/dL     Troponin [[id number]]  (Normal) Collected: 11/18/21 1919    Specimen: Blood Updated: 11/18/21 2001     Troponin T <0.010 ng/mL     Narrative:      Troponin T Reference Range:  <= 0.03 ng/mL-   Negative for AMI  >0.03 ng/mL-     Abnormal for myocardial necrosis.  Clinicians would have to utilize clinical acumen, EKG, Troponin and serial changes to determine if it is an Acute Myocardial Infarction or myocardial injury due to an underlying chronic condition.       Results may be falsely decreased if patient taking Biotin.      Lipid Panel [[id number]] Collected: 11/18/21 1353    Specimen: Blood from Arm, Left Updated: 11/18/21 1626     Total Cholesterol 100 mg/dL      Triglycerides 73 mg/dL      HDL Cholesterol 58 mg/dL      LDL Cholesterol  27 mg/dL      VLDL Cholesterol 15 mg/dL      LDL/HDL Ratio 0.47    Narrative:      Cholesterol Reference Ranges  (U.S. Department of Health and Human Services ATP III Classifications)    Desirable          <200 mg/dL  Borderline High    200-239 mg/dL  High Risk          >240 mg/dL      Triglyceride Reference Ranges  (U.S. Department of Health and Human Services ATP III Classifications)    Normal           <150 mg/dL  Borderline High   150-199 mg/dL  High             200-499 mg/dL  Very High        >500 mg/dL    HDL Reference Ranges  (U.S. Department of Health and Human Services ATP III Classifcations)    Low     <40 mg/dl (major risk factor for CHD)  High    >60 mg/dl ('negative' risk factor for CHD)        LDL Reference Ranges  (U.S. Department of Health and Human Services ATP III Classifcations)    Optimal          <100 mg/dL  Near Optimal     100-129 mg/dL  Borderline High  130-159 mg/dL  High             160-189 mg/dL  Very High        >189 mg/dL    COVID-19,CEPHEID/RHONDA/BDMAX,COR/CHRIST/PAD/SONNY IN-HOUSE(OR EMERGENT/ADD-ON),NP SWAB IN TRANSPORT MEDIA 3-4 HR TAT, RT-PCR - Swab, Nasopharynx [[id number]]  (Normal) Collected: 11/18/21 1252    Specimen: Swab from Nasopharynx Updated: 11/18/21 1609     COVID19 Not Detected    Narrative:      Fact sheet for providers: https://www.fda.gov/media/175963/download     Fact sheet for patients: https://www.fda.gov/media/966655/download  Fact sheet for providers: https://www.fda.gov/media/353109/download     Fact sheet for patients: https://www.fda.gov/media/800899/download    Green Top (Gel) [[id number]] Collected: 11/18/21 1353    Specimen: Blood from Arm, Left Updated: 11/18/21 1457     Extra Tube --    Comprehensive Metabolic Panel [[id number]]  (Abnormal) Collected: 11/18/21 1353    Specimen: Blood from Arm, Left Updated: 11/18/21 1421     Glucose 98 mg/dL      BUN 6 mg/dL      Creatinine 0.80 mg/dL      Sodium 138 mmol/L      Potassium 3.8 mmol/L      Comment: Slight hemolysis detected by analyzer. Results may be affected.        Chloride 101 mmol/L      CO2 25.0 mmol/L      Calcium 8.7 mg/dL      Total Protein 6.3 g/dL      Albumin 3.20 g/dL      ALT (SGPT) 47 U/L      AST (SGOT) 71 U/L      Comment: Slight hemolysis detected by analyzer. Results may be affected.        Alkaline Phosphatase 156 U/L      Total Bilirubin 0.6 mg/dL      eGFR Non African Amer 69 mL/min/1.73      Globulin 3.1 gm/dL      A/G  Ratio 1.0 g/dL      BUN/Creatinine Ratio 7.5     Anion Gap 12.0 mmol/L     Narrative:      GFR Normal >60  Chronic Kidney Disease <60  Kidney Failure <15      Lavender Top [[id number]] Collected: 11/18/21 1302    Specimen: Blood Updated: 11/18/21 1415     Extra Tube hold for add-on     Comment: Auto resulted       Gold Top - SST [[id number]] Collected: 11/18/21 1301    Specimen: Blood Updated: 11/18/21 1402     Extra Tube Hold for add-ons.     Comment: Auto resulted.       D-dimer, Quantitative [[id number]]  (Abnormal) Collected: 11/18/21 1301    Specimen: Blood Updated: 11/18/21 1321     D-Dimer, Quantitative 0.67 mg/L (FEU)     Narrative:      Reference Range  --------------------------------------------------------------------     < 0.50   Negative Predictive Value  0.50-0.59   Indeterminate    >= 0.60   Probable VTE             A very low percentage of patients with DVT may yield D-Dimer results   below the cut-off of 0.50 mg/L FEU.  This is known to be more   prevalent in patients with distal DVT.             Results of this test should always be interpreted in conjunction with   the patient's medical history, clinical presentation and other   findings.  Clinical diagnosis should not be based on the result of   [place] D-Dimer alone.    CBC & Differential [[id number]]  (Abnormal) Collected: 11/18/21 1302    Specimen: Blood Updated: 11/18/21 1321    Narrative:      The following orders were created for panel order CBC & Differential.  Procedure                               Abnormality         Status                     ---------                               -----------         ------                     CBC Auto Differential[[id number]]        Abnormal            Final result                 Please view results for these tests on the individual orders.    CBC Auto Differential [[id number]]  (Abnormal) Collected: 11/18/21 1302    Specimen: Blood Updated: 11/18/21 1321     WBC 9.50 10*3/mm3      RBC 4.02 10*6/mm3       Hemoglobin 13.2 g/dL      Hematocrit 37.0 %      MCV 92.2 fL      MCH 32.8 pg      MCHC 35.6 g/dL      RDW 15.2 %      RDW-SD 48.6 fl      MPV 8.1 fL      Platelets 293 10*3/mm3      Neutrophil % 71.8 %      Lymphocyte % 13.5 %      Monocyte % 13.0 %      Eosinophil % 1.1 %      Basophil % 0.6 %      Neutrophils, Absolute 6.80 10*3/mm3      Lymphocytes, Absolute 1.30 10*3/mm3      Monocytes, Absolute 1.20 10*3/mm3      Eosinophils, Absolute 0.10 10*3/mm3      Basophils, Absolute 0.10 10*3/mm3      nRBC 0.0 /100 WBC           Imaging Results (Most Recent)     Procedure Component Value Units Date/Time    CT Chest Pulmonary Embolism [[id number]] Collected: 11/18/21 1509     Updated: 11/18/21 1514    Narrative:         DATE OF EXAM:  11/18/2021 3:03 PM     PROCEDURE:  CT CHEST PULMONARY EMBOLISM-     INDICATIONS:   Chest pain     COMPARISON:   Chest radiograph from earlier today     TECHNIQUE:  Routine transaxial slices were obtained through chest after  administration of intravenous 100 ml of Isovue 370. Reconstructed  coronal and sagittal images were also obtained. Automated exposure  control and iterative reconstruction methods were used.      FINDINGS:  The central tracheobronchial tree is clear. The lungs are clear. There  is no pleural effusion.     The heart is enlarged, with partially calcified atherosclerotic disease  in the coronary arteries. The great vessels are normal in caliber. There  is no evidence of pulmonary embolus. No abnormally enlarged lymph nodes  are identified.     Partial evaluation of the upper abdomen demonstrates a small hiatal  hernia.     No aggressive osseous lesions are identified.        Impression:      1.Negative for pulmonary embolus.  2.Lungs are clear.  3.Cardiomegaly.  4.Small hiatal hernia.     Electronically Signed By-Caleb Melara MD On:11/18/2021 3:12 PM  This report was finalized on [id number] by  Caleb Melara MD.    XR Chest 1 View [[id number]] Collected:  11/18/21 1309     Updated: 11/18/21 1313    Narrative:      XR CHEST 1 VW-     Date of Exam: 11/18/2021 12:50 PM     Indication: Chest pain protocol.     Comparison Exams: None available.     Technique: Single AP chest radiograph     FINDINGS:  The lungs are clear. The heart and mediastinal contours appear normal.  The pulmonary vasculature appears normal. The osseous structures appear  intact. There are surgical clips along the right axilla.       Impression:      No acute cardiopulmonary process identified.     Electronically Signed By-Caleb Melara MD On:11/18/2021 1:10 PM  This report was finalized on [id number] by  Caleb Melara MD.        reviewed    ECG/EMG Results (most recent)     Procedure Component Value Units Date/Time    ECG 12 Lead [[id number]] Collected: 11/18/21 1237     Updated: 11/18/21 1239     QT Interval 388 ms     Narrative:      HEART RATE= 82  bpm  RR Interval= 729  ms  SD Interval=   ms  P Horizontal Axis=   deg  P Front Axis=   deg  QRSD Interval= 96  ms  QT Interval= 388  ms  QRS Axis= 5  deg  T Wave Axis= -33  deg  - ABNORMAL ECG -  Atrial fibrillation  Electronically Signed By:   Date and Time of Study: 2021-11-18 12:37:09        reviewed    Results for orders placed during the hospital encounter of 09/04/19    Duplex Venous Lower Extremity - Bilateral CAR    Interpretation Summary  · Normal bilateral lower extremity venous duplex scan.      Results for orders placed during the hospital encounter of 09/04/19    Adult Transthoracic Echo Complete W/ Cont if Necessary Per Protocol    Interpretation Summary  · Calculated EF = 56.0%.  · Left ventricular systolic function is normal.  · Left ventricular wall thickness is consistent with hypertrophy. Sigmoid-shaped ventricular septum is present.  · Left atrial cavity size is moderately dilated.  · There is moderate calcification of the aortic valve.  · Aortic valve area is 1.7 cm2.  · Aortic valve mean pressure gradient is 4.0 mmHg.  ·  Mild mitral valve regurgitation is present  · Moderate tricuspid valve regurgitation is present.  · Estimated right ventricular systolic pressure from tricuspid regurgitation is markedly elevated (>55 mmHg).  · Calculated right ventricular systolic pressure from tricuspid regurgitation is 61.8 mmHg.      Microbiology Results (last 10 days)     Procedure Component Value - Date/Time    COVID-19,CEPHEID/RHONDA/BDMAX,COR/CHRIST/PAD/SONNY IN-HOUSE(OR EMERGENT/ADD-ON),NP SWAB IN TRANSPORT MEDIA 3-4 HR TAT, RT-PCR - Swab, Nasopharynx [[id number]]  (Normal) Collected: 11/18/21 1252    Lab Status: Final result Specimen: Swab from Nasopharynx Updated: 11/18/21 1609     COVID19 Not Detected    Narrative:      Fact sheet for providers: https://www.fda.gov/media/662784/download     Fact sheet for patients: https://www.fda.gov/media/013677/download  Fact sheet for providers: https://www.fda.gov/media/391760/download     Fact sheet for patients: https://www.fda.gov/media/582473/download          Assessment/Plan     Chest pain       Chest pain  -Serial troponin less than 0.010  -Chest x-ray showed no acute cardiopulmonary process  -D-dimer: 0.67  -CT PE protocol showed no pulmonary embolism with clear lungs, cardiomegaly and a small hiatal hernia  -EKG showed A. fib at 82 without obvious acute ST changes or ectopy with a QTC of 454 ms  -Stress test reported with normal myocardial perfusion study with no evidence of ischemia with a hyperdynamic EF of greater than 70% consistent with a normal ECG stress test and a low risk study  -Continue Eliquis, statin, Imdur and beta-blocker  -Follow-up with PCP and cardiology    Anemia    I discussed the patients findings and my recommendations with patient and nursing staff.     Discharge Diagnosis:      Chest pain      Hospital Course  Patient is a 81 y.o. female presented with chest pain and HPI noted above.  Serial troponins remain less than 0.010 and D-dimer was noted is elevated at 0.67 in the ED.  " Chest x-ray was obtained which showed no acute cardiopulmonary process and CT PE protocol showed no pulmonary embolism with clear lungs, cardiomegaly and a small hiatal hernia.  EKG was reviewed which showed A. fib at 82 without obvious acute ST changes or ectopy with a QTC of 454 ms.  She was maintained on telemetry throughout her admission without significant events noted.  Stress testing was performed on 11/19/2021 which was noted with normal myocardial perfusion study no evidence of ischemia with a calculated EF of greater than 70% noted in a normal ECG stress test consistent with a low risk study.  Mild decrease in potassium as well as magnesium were also noted and replaced during admission.  Additionally patient reported some recent abdominal cramping described as \"gas pain\" as well as recurrence of bleeding from her chronic hemorrhoids.  Hemoglobin did trend from 13.2-11.7 following her admission and was rechecked at 6 hours and remained stable.  At this time patient felt to be in good condition for discharge with close follow-up with her PCP and primary cardiologist on an outpatient basis.  She will be prescribed a short course of Ultram is being told to hold her Tylenol 3 while taking this medication for her chest pain.  She is also being given a prescription for simethicone as well as a GI referral.  Her testing/results and plan were discussed with patient long with concerning/alarm symptoms for which to call 911/return to the ED.  All questions were answered and she verbalizes her understanding and agreement.    Past Medical History:     Past Medical History:   Diagnosis Date   • Arthritis    • Breast cancer (CMS/HCC)    • Hyperlipidemia    • Hypertension    • Hyperthyroidism     patient has hypothyroidism   • Hypothyroidism        Past Surgical History:     Past Surgical History:   Procedure Laterality Date   • BREAST BIOPSY     • BREAST SURGERY Right    • CHOLECYSTECTOMY     • COLON SURGERY      " Removed   • COLONOSCOPY     • HYSTERECTOMY     • MAMMO BILATERAL  2015   • MASTECTOMY     • TONSILLECTOMY         Social History:   Social History     Socioeconomic History   • Marital status: Single   Tobacco Use   • Smoking status: Former Smoker   • Smokeless tobacco: Never Used   • Tobacco comment: quit in 1988   Substance and Sexual Activity   • Alcohol use: Yes     Alcohol/week: 3.0 standard drinks     Types: 3 Shots of liquor per week     Comment: OCC   • Drug use: Yes     Types: Hydrocodone   • Sexual activity: Never       Procedures Performed         Consults:   Consults     No orders found for last 30 day(s).          Condition on Discharge:     Stable    Discharge Disposition  Home or Self Care    Discharge Medications     Discharge Medications      New Medications      Instructions Start Date   simethicone 80 MG chewable tablet  Commonly known as: Gas-X   80 mg, Oral, Every 6 Hours PRN      traMADol 50 MG tablet  Commonly known as: Ultram   50 mg, Oral, Every 6 Hours PRN, Stop taking Tylenol 3 while using this medication         Changes to Medications      Instructions Start Date   furosemide 40 MG tablet  Commonly known as: LASIX  What changed: when to take this   40 mg, Oral, Daily      potassium chloride 20 MEQ CR tablet  Commonly known as: K-DUR,KLOR-CON  What changed: when to take this   20 mEq, Oral, 2 Times Daily         Continue These Medications      Instructions Start Date   acetaminophen 325 MG tablet  Commonly known as: TYLENOL   650 mg, Oral, Every 6 Hours PRN      apixaban 5 MG tablet tablet  Commonly known as: ELIQUIS   5 mg, Oral, Every 12 Hours Scheduled      atorvastatin 40 MG tablet  Commonly known as: LIPITOR   40 mg, Oral, Nightly      isosorbide mononitrate 30 MG 24 hr tablet  Commonly known as: IMDUR   30 mg, Oral, Daily      levothyroxine 100 MCG tablet  Commonly known as: SYNTHROID, LEVOTHROID   100 mcg, Oral, Daily      Magnesium 250 MG tablet   1 tablet, Oral, Daily       metoprolol succinate  MG 24 hr tablet  Commonly known as: TOPROL-XL   100 mg, Oral, Every 12 Hours Scheduled      multivitamin tablet tablet  Commonly known as: THERAGRAN   Oral      nystatin-triamcinolone 776843-1.1 UNIT/GM-% cream  Commonly known as: MYCOLOG II   apply to affected area twice a day if needed      Roller Walker misc   Use daily         Stop These Medications    acetaminophen-codeine 300-30 MG per tablet  Commonly known as: TYLENOL #3            Discharge Diet:     Activity at Discharge:     Follow-up Appointments  No future appointments.  Additional Instructions for the Follow-ups that You Need to Schedule     Discharge Follow-up with PCP   As directed       Currently Documented PCP:    Marisela Adams APRN    PCP Phone Number:    [phone]     Follow Up Details: 5 to 7 days         Discharge Follow-up with Specified Provider: Cardiology; 2 Weeks   As directed      To: Cardiology    Follow Up: 2 Weeks         Discharge Follow-up with Specified Provider: GI; 2 Weeks   As directed      To: GI    Follow Up: 2 Weeks               Test Results Pending at Discharge  Pending Labs     Order Current Status    Potassium In process           Risk for Readmission (LACE) Score: 5 (11/19/2021  6:01 AM)          Delgado Gabriel PA-C  11/19/21  15:02 EST

## 2021-11-19 NOTE — CASE MANAGEMENT/SOCIAL WORK
Discharge Planning Assessment   David     Patient Name: Kendy Worrell  MRN: [id number]  Today's Date: 11/19/2021    Admit Date: 11/18/2021     Discharge Needs Assessment     Row Name 11/19/21 1016       Living Environment    Lives With alone    Current Living Arrangements independent/assisted living facility  [facility]    Primary Care Provided by other (see comments)  Assisted Living    Provides Primary Care For no one, unable/limited ability to care for self    Family Caregiver if Needed child(tatyana), adult    Able to Return to Prior Arrangements yes    Living Arrangement Comments Plans to return to [facility]       Resource/Environmental Concerns    Transportation Concerns car, none       Transition Planning    Transportation Anticipated family or friend will provide  daughter       Discharge Needs Assessment    Readmission Within the Last 30 Days no previous admission in last 30 days    Equipment Currently Used at Home cane, quad; rollator    Concerns to be Addressed no discharge needs identified    Provided Post Acute Provider List? N/A    Provided Post Acute Provider Quality & Resource List? N/A               Discharge Plan     Row Name 11/19/21 1019       Plan    Plan DC Plan: Return to [facility]    Plan Comments Spoke with patient at bedside, plans to return to Assited living. Establishe PCP and Pharmacy. Denies d/c needs.             Expected Discharge Date and Time     Expected Discharge Date Expected Discharge Time    Nov 20, 2021          Demographic Summary     Row Name 11/19/21 1014       General Information    Admission Type observation    Arrived From emergency department    Referral Source emergency department    Reason for Consult discharge planning    Preferred Language English               Functional Status     Row Name 11/19/21 1014       Functional Status    Usual Activity Tolerance moderate    Current Activity Tolerance moderate       Functional  Status, IADL    Medications assistive equipment and person    Meal Preparation assistive equipment and person       Mental Status    General Appearance WDL WDL             Allie Zhu RN

## 2021-11-19 NOTE — NURSING NOTE
Attempt to place IV due to previous site not patent. Pt has restrictions to RUE. Attempt at top of left hand with 22g, flash of blood received, unable to flush or advance catheter. Attempt at Lateral LFA with 22g, flash of blood received, unable to advance or flush. Informed pt nurse of attempts. Pt tolerated procedure well, thanked this nurse for attempting. Pressure dressings applied to attempted sites. Bed rails raised, bed lowered and bed alarm activated. Call light left within reach of pt.

## 2021-12-02 ENCOUNTER — OFFICE (OUTPATIENT)
Dept: URBAN - METROPOLITAN AREA CLINIC 64 | Facility: CLINIC | Age: 81
End: 2021-12-02

## 2021-12-02 VITALS
DIASTOLIC BLOOD PRESSURE: 88 MMHG | SYSTOLIC BLOOD PRESSURE: 134 MMHG | HEIGHT: 59 IN | WEIGHT: 185 LBS | HEART RATE: 76 BPM

## 2021-12-02 DIAGNOSIS — R19.7 DIARRHEA, UNSPECIFIED: ICD-10-CM

## 2021-12-02 PROCEDURE — 99204 OFFICE O/P NEW MOD 45 MIN: CPT | Performed by: INTERNAL MEDICINE

## 2021-12-02 RX ORDER — LOPERAMIDE HCL 2 MG
4 TABLET ORAL
Qty: 60 | Refills: 6 | Status: ACTIVE
Start: 2021-12-02

## 2021-12-02 RX ORDER — METRONIDAZOLE 250 MG/1
750 TABLET, FILM COATED ORAL
Qty: 21 | Refills: 0 | Status: ACTIVE
Start: 2021-12-02

## 2021-12-02 RX ORDER — COLESTIPOL HYDROCHLORIDE 1 G/1
2 TABLET, FILM COATED ORAL
Qty: 60 | Refills: 3 | Status: COMPLETED
Start: 2021-12-02 | End: 2022-03-10

## 2022-03-10 ENCOUNTER — OFFICE (OUTPATIENT)
Dept: URBAN - METROPOLITAN AREA CLINIC 64 | Facility: CLINIC | Age: 82
End: 2022-03-10

## 2022-03-10 VITALS
HEART RATE: 65 BPM | DIASTOLIC BLOOD PRESSURE: 90 MMHG | HEIGHT: 59 IN | WEIGHT: 186 LBS | SYSTOLIC BLOOD PRESSURE: 128 MMHG

## 2022-03-10 DIAGNOSIS — R14.3 FLATULENCE: ICD-10-CM

## 2022-03-10 DIAGNOSIS — R19.7 DIARRHEA, UNSPECIFIED: ICD-10-CM

## 2022-03-10 PROCEDURE — 99213 OFFICE O/P EST LOW 20 MIN: CPT | Performed by: INTERNAL MEDICINE

## 2022-03-10 RX ORDER — COLESTIPOL HYDROCHLORIDE 1 G/1
2 TABLET, FILM COATED ORAL
Qty: 60 | Refills: 3 | Status: COMPLETED
Start: 2021-12-02 | End: 2022-03-10

## 2022-03-10 RX ORDER — COLESEVELAM HYDROCHLORIDE 625 MG/1
1250 TABLET, COATED ORAL
Qty: 60 | Refills: 3 | Status: ACTIVE
Start: 2022-03-10

## 2022-11-01 ENCOUNTER — HOSPITAL ENCOUNTER (INPATIENT)
Facility: HOSPITAL | Age: 82
LOS: 5 days | Discharge: NURSING, REHAB OR LONG-TERM CARE | End: 2022-11-07
Attending: EMERGENCY MEDICINE | Admitting: HOSPITALIST

## 2022-11-01 ENCOUNTER — APPOINTMENT (OUTPATIENT)
Dept: CT IMAGING | Facility: HOSPITAL | Age: 82
End: 2022-11-01

## 2022-11-01 DIAGNOSIS — K62.5 BRIGHT RED RECTAL BLEEDING: Primary | ICD-10-CM

## 2022-11-01 DIAGNOSIS — M19.90 ARTHRITIS: ICD-10-CM

## 2022-11-01 DIAGNOSIS — K62.5 GASTROINTESTINAL HEMORRHAGE ASSOCIATED WITH ANORECTAL SOURCE: ICD-10-CM

## 2022-11-01 DIAGNOSIS — E66.01 MORBIDLY OBESE: ICD-10-CM

## 2022-11-01 DIAGNOSIS — I47.1 SUPRAVENTRICULAR TACHYCARDIA: ICD-10-CM

## 2022-11-01 LAB
ALBUMIN SERPL-MCNC: 3.7 G/DL (ref 3.5–5.2)
ALBUMIN/GLOB SERPL: 1.4 G/DL
ALP SERPL-CCNC: 136 U/L (ref 39–117)
ALT SERPL W P-5'-P-CCNC: 33 U/L (ref 1–33)
ANION GAP SERPL CALCULATED.3IONS-SCNC: 6 MMOL/L (ref 5–15)
AST SERPL-CCNC: 53 U/L (ref 1–32)
BASOPHILS # BLD AUTO: 0.05 10*3/MM3 (ref 0–0.2)
BASOPHILS NFR BLD AUTO: 0.7 % (ref 0–1.5)
BILIRUB SERPL-MCNC: 1.1 MG/DL (ref 0–1.2)
BUN SERPL-MCNC: 10 MG/DL (ref 8–23)
BUN/CREAT SERPL: 10.3 (ref 7–25)
CALCIUM SPEC-SCNC: 9.5 MG/DL (ref 8.6–10.5)
CHLORIDE SERPL-SCNC: 107 MMOL/L (ref 98–107)
CO2 SERPL-SCNC: 26 MMOL/L (ref 22–29)
CREAT SERPL-MCNC: 0.97 MG/DL (ref 0.57–1)
D-LACTATE SERPL-SCNC: 1.5 MMOL/L (ref 0.5–2)
DEPRECATED RDW RBC AUTO: 43.5 FL (ref 37–54)
EGFRCR SERPLBLD CKD-EPI 2021: 58.1 ML/MIN/1.73
EOSINOPHIL # BLD AUTO: 0.09 10*3/MM3 (ref 0–0.4)
EOSINOPHIL NFR BLD AUTO: 1.3 % (ref 0.3–6.2)
ERYTHROCYTE [DISTWIDTH] IN BLOOD BY AUTOMATED COUNT: 12.8 % (ref 12.3–15.4)
GLOBULIN UR ELPH-MCNC: 2.7 GM/DL
GLUCOSE SERPL-MCNC: 97 MG/DL (ref 65–99)
HCT VFR BLD AUTO: 30.8 % (ref 34–46.6)
HGB BLD-MCNC: 10.2 G/DL (ref 12–15.9)
HOLD SPECIMEN: NORMAL
HOLD SPECIMEN: NORMAL
IMM GRANULOCYTES # BLD AUTO: 0.02 10*3/MM3 (ref 0–0.05)
IMM GRANULOCYTES NFR BLD AUTO: 0.3 % (ref 0–0.5)
LIPASE SERPL-CCNC: 32 U/L (ref 13–60)
LYMPHOCYTES # BLD AUTO: 1.11 10*3/MM3 (ref 0.7–3.1)
LYMPHOCYTES NFR BLD AUTO: 15.4 % (ref 19.6–45.3)
MCH RBC QN AUTO: 31.4 PG (ref 26.6–33)
MCHC RBC AUTO-ENTMCNC: 33.1 G/DL (ref 31.5–35.7)
MCV RBC AUTO: 94.8 FL (ref 79–97)
MONOCYTES # BLD AUTO: 0.71 10*3/MM3 (ref 0.1–0.9)
MONOCYTES NFR BLD AUTO: 9.9 % (ref 5–12)
NEUTROPHILS NFR BLD AUTO: 5.21 10*3/MM3 (ref 1.7–7)
NEUTROPHILS NFR BLD AUTO: 72.4 % (ref 42.7–76)
NRBC BLD AUTO-RTO: 0 /100 WBC (ref 0–0.2)
PLATELET # BLD AUTO: 199 10*3/MM3 (ref 140–450)
PMV BLD AUTO: 10.5 FL (ref 6–12)
POTASSIUM SERPL-SCNC: 4.3 MMOL/L (ref 3.5–5.2)
PROT SERPL-MCNC: 6.4 G/DL (ref 6–8.5)
RBC # BLD AUTO: 3.25 10*6/MM3 (ref 3.77–5.28)
SODIUM SERPL-SCNC: 139 MMOL/L (ref 136–145)
WBC NRBC COR # BLD: 7.19 10*3/MM3 (ref 3.4–10.8)
WHOLE BLOOD HOLD COAG: NORMAL
WHOLE BLOOD HOLD SPECIMEN: NORMAL

## 2022-11-01 PROCEDURE — 83690 ASSAY OF LIPASE: CPT | Performed by: EMERGENCY MEDICINE

## 2022-11-01 PROCEDURE — G0378 HOSPITAL OBSERVATION PER HR: HCPCS

## 2022-11-01 PROCEDURE — 80053 COMPREHEN METABOLIC PANEL: CPT | Performed by: EMERGENCY MEDICINE

## 2022-11-01 PROCEDURE — 25010000002 IOPAMIDOL 61 % SOLUTION: Performed by: HOSPITALIST

## 2022-11-01 PROCEDURE — 99285 EMERGENCY DEPT VISIT HI MDM: CPT

## 2022-11-01 PROCEDURE — 85025 COMPLETE CBC W/AUTO DIFF WBC: CPT | Performed by: EMERGENCY MEDICINE

## 2022-11-01 PROCEDURE — 83605 ASSAY OF LACTIC ACID: CPT | Performed by: EMERGENCY MEDICINE

## 2022-11-01 PROCEDURE — 74177 CT ABD & PELVIS W/CONTRAST: CPT

## 2022-11-01 RX ORDER — ACETAMINOPHEN AND CODEINE PHOSPHATE 300; 30 MG/1; MG/1
1 TABLET ORAL EVERY 6 HOURS PRN
COMMUNITY
End: 2022-11-07 | Stop reason: HOSPADM

## 2022-11-01 RX ORDER — CLONIDINE HYDROCHLORIDE 0.1 MG/1
0.1 TABLET ORAL EVERY 6 HOURS PRN
COMMUNITY
End: 2022-11-07 | Stop reason: HOSPADM

## 2022-11-01 RX ORDER — ALBUTEROL SULFATE 90 UG/1
2 AEROSOL, METERED RESPIRATORY (INHALATION) EVERY 4 HOURS PRN
COMMUNITY

## 2022-11-01 RX ORDER — MIDODRINE HYDROCHLORIDE 5 MG/1
5 TABLET ORAL DAILY PRN
COMMUNITY
End: 2022-11-07 | Stop reason: HOSPADM

## 2022-11-01 RX ORDER — NYSTATIN 100000 [USP'U]/G
1 POWDER TOPICAL 3 TIMES DAILY PRN
COMMUNITY
End: 2022-11-07 | Stop reason: HOSPADM

## 2022-11-01 RX ORDER — AMLODIPINE BESYLATE 2.5 MG/1
2.5 TABLET ORAL DAILY
COMMUNITY
End: 2022-11-07 | Stop reason: HOSPADM

## 2022-11-01 RX ORDER — SODIUM CHLORIDE 9 MG/ML
75 INJECTION, SOLUTION INTRAVENOUS CONTINUOUS
Status: DISCONTINUED | OUTPATIENT
Start: 2022-11-01 | End: 2022-11-03

## 2022-11-01 RX ORDER — SODIUM CHLORIDE 0.9 % (FLUSH) 0.9 %
10 SYRINGE (ML) INJECTION AS NEEDED
Status: DISCONTINUED | OUTPATIENT
Start: 2022-11-01 | End: 2022-11-07

## 2022-11-01 RX ORDER — POTASSIUM CHLORIDE 750 MG/1
20 CAPSULE, EXTENDED RELEASE ORAL 3 TIMES DAILY
COMMUNITY
End: 2022-11-07 | Stop reason: HOSPADM

## 2022-11-01 RX ORDER — PANTOPRAZOLE SODIUM 40 MG/10ML
40 INJECTION, POWDER, LYOPHILIZED, FOR SOLUTION INTRAVENOUS EVERY 12 HOURS SCHEDULED
Status: DISCONTINUED | OUTPATIENT
Start: 2022-11-01 | End: 2022-11-03

## 2022-11-01 RX ORDER — LOPERAMIDE HYDROCHLORIDE 2 MG/1
2 CAPSULE ORAL 2 TIMES DAILY PRN
COMMUNITY
End: 2022-11-07 | Stop reason: HOSPADM

## 2022-11-01 RX ADMIN — SODIUM CHLORIDE 125 ML/HR: 9 INJECTION, SOLUTION INTRAVENOUS at 23:43

## 2022-11-01 RX ADMIN — SODIUM CHLORIDE 500 ML: 9 INJECTION, SOLUTION INTRAVENOUS at 22:51

## 2022-11-01 RX ADMIN — IOPAMIDOL 100 ML: 612 INJECTION, SOLUTION INTRAVENOUS at 23:11

## 2022-11-01 RX ADMIN — PANTOPRAZOLE SODIUM 40 MG: 40 INJECTION, POWDER, FOR SOLUTION INTRAVENOUS at 22:50

## 2022-11-01 NOTE — ED PROVIDER NOTES
" EMERGENCY DEPARTMENT ENCOUNTER    Room Number:  39/39  Date of encounter:  11/1/2022  PCP: Marisela Adams APRN  Historian: Patient      HPI:  Chief Complaint: Rectal bleeding  A complete HPI/ROS/PMH/PSH/SH/FH are unobtainable due to: Nothing    Context: Kendy Worrell is a 82 y.o. female who presents to the ED c/o moderate severity bright red rectal bleeding today.  It is painless, it is happened twice when she went to the commode and filled up the bowl with blood.  Patient is on Eliquis for A. fib and history of PE.  She has not had fever, no nausea or vomiting, says her stools are inconsistent and usually loose because she had \"part of my colon removed\" in 2006.  She says this was because of ulcerations.    Patient says she has a history of internal hemorrhoids, and she frequently has some bright red blood on the toilet paper, but this is different and much more significant.    Nothing makes it better or worse, she has no specific complaints at this time other than the rectal bleeding.      PAST MEDICAL HISTORY  Active Ambulatory Problems     Diagnosis Date Noted   • Essential hypertension 03/31/2016   • Acquired hypothyroidism 03/31/2016   • Coronary artery disease involving native coronary artery of native heart without angina pectoris 03/31/2016   • Hyperlipidemia    • Arthritis    • Skin lesion of chest wall 11/03/2016   • NSTEMI (non-ST elevated myocardial infarction) ([facility]) 09/26/2018   • Supraventricular tachycardia ([facility]) 09/26/2018   • Normal cardiac stress test 09/26/2018   • Gastrointestinal bleed 09/26/2018   • Angina effort 09/26/2018   • Morbidly obese ([facility]) 01/06/2019   • Bilateral pulmonary embolism ([facility]) 09/04/2019   • New onset a-fib ([facility]) 09/04/2019   • UTI (urinary tract infection) 09/04/2019   • Chronic diastolic CHF (congestive heart failure) ([facility]) 09/04/2019   • Acute ischemic stroke ([facility]) 09/08/2019   • Chest pain 11/18/2021     Resolved Ambulatory Problems     Diagnosis Date Noted   • " Coronary artery disease involving native coronary artery 03/31/2016   • Hyperthyroidism    • Hypokalemia 09/04/2019   • Hypomagnesemia 09/04/2019   • Slurred speech 09/04/2019   • Word finding difficulty 09/04/2019   • Metabolic encephalopathy 09/04/2019     Past Medical History:   Diagnosis Date   • Breast cancer (CMS/HCC)    • Hypertension    • Hypothyroidism          PAST SURGICAL HISTORY  Past Surgical History:   Procedure Laterality Date   • BREAST BIOPSY     • BREAST SURGERY Right    • CHOLECYSTECTOMY     • COLON SURGERY      Removed   • COLONOSCOPY     • HYSTERECTOMY     • MAMMO BILATERAL  2015   • MASTECTOMY     • TONSILLECTOMY           FAMILY HISTORY  Family History   Problem Relation Age of Onset   • Stroke Mother    • Hypertension Mother    • Heart disease Father    • Cancer Father          SOCIAL HISTORY  Social History     Socioeconomic History   • Marital status: Single   Tobacco Use   • Smoking status: Former   • Smokeless tobacco: Never   • Tobacco comments:     quit in 1988   Substance and Sexual Activity   • Alcohol use: Yes     Alcohol/week: 3.0 standard drinks     Types: 3 Shots of liquor per week     Comment: OCC   • Drug use: Yes     Types: Hydrocodone   • Sexual activity: Never         ALLERGIES  Aspirin and Nsaids        REVIEW OF SYSTEMS  Review of Systems     All systems reviewed and negative except for those discussed in HPI.       PHYSICAL EXAM    I have reviewed the triage vital signs and nursing notes.    ED Triage Vitals [11/01/22 1717]   Temp Heart Rate Resp BP SpO2   98.7 °F (37.1 °C) 60 18 122/57 98 %      Temp src Heart Rate Source Patient Position BP Location FiO2 (%)   -- -- -- -- --       Physical Exam  GENERAL: Awake and alert, not acutely toxic  HENT: nares patent  EYES: no scleral icterus  CV: regular rhythm, regular rate  RESPIRATORY: normal effort  ABDOMEN: soft, nondistended nontender with normal bowel  MUSCULOSKELETAL: no deformity  NEURO: alert, moves all extremities,  follows commands  SKIN: warm, dry        LAB RESULTS  Recent Results (from the past 24 hour(s))   Comprehensive Metabolic Panel    Collection Time: 11/01/22  6:17 PM    Specimen: Blood   Result Value Ref Range    Glucose 97 65 - 99 mg/dL    BUN 10 8 - 23 mg/dL    Creatinine 0.97 0.57 - 1.00 mg/dL    Sodium 139 136 - 145 mmol/L    Potassium 4.3 3.5 - 5.2 mmol/L    Chloride 107 98 - 107 mmol/L    CO2 26.0 22.0 - 29.0 mmol/L    Calcium 9.5 8.6 - 10.5 mg/dL    Total Protein 6.4 6.0 - 8.5 g/dL    Albumin 3.70 3.50 - 5.20 g/dL    ALT (SGPT) 33 1 - 33 U/L    AST (SGOT) 53 (H) 1 - 32 U/L    Alkaline Phosphatase 136 (H) 39 - 117 U/L    Total Bilirubin 1.1 0.0 - 1.2 mg/dL    Globulin 2.7 gm/dL    A/G Ratio 1.4 g/dL    BUN/Creatinine Ratio 10.3 7.0 - 25.0    Anion Gap 6.0 5.0 - 15.0 mmol/L    eGFR 58.1 (L) >60.0 mL/min/1.73   Lipase    Collection Time: 11/01/22  6:17 PM    Specimen: Blood   Result Value Ref Range    Lipase 32 13 - 60 U/L   Lactic Acid, Plasma    Collection Time: 11/01/22  6:17 PM    Specimen: Blood   Result Value Ref Range    Lactate 1.5 0.5 - 2.0 mmol/L   Green Top (Gel)    Collection Time: 11/01/22  6:17 PM   Result Value Ref Range    Extra Tube Hold for add-ons.    Lavender Top    Collection Time: 11/01/22  6:17 PM   Result Value Ref Range    Extra Tube hold for add-on    Gold Top - SST    Collection Time: 11/01/22  6:17 PM   Result Value Ref Range    Extra Tube Hold for add-ons.    Light Blue Top    Collection Time: 11/01/22  6:17 PM   Result Value Ref Range    Extra Tube Hold for add-ons.    CBC Auto Differential    Collection Time: 11/01/22  6:17 PM    Specimen: Blood   Result Value Ref Range    WBC 7.19 3.40 - 10.80 10*3/mm3    RBC 3.25 (L) 3.77 - 5.28 10*6/mm3    Hemoglobin 10.2 (L) 12.0 - 15.9 g/dL    Hematocrit 30.8 (L) 34.0 - 46.6 %    MCV 94.8 79.0 - 97.0 fL    MCH 31.4 26.6 - 33.0 pg    MCHC 33.1 31.5 - 35.7 g/dL    RDW 12.8 12.3 - 15.4 %    RDW-SD 43.5 37.0 - 54.0 fl    MPV 10.5 6.0 - 12.0 fL     Platelets 199 140 - 450 10*3/mm3    Neutrophil % 72.4 42.7 - 76.0 %    Lymphocyte % 15.4 (L) 19.6 - 45.3 %    Monocyte % 9.9 5.0 - 12.0 %    Eosinophil % 1.3 0.3 - 6.2 %    Basophil % 0.7 0.0 - 1.5 %    Immature Grans % 0.3 0.0 - 0.5 %    Neutrophils, Absolute 5.21 1.70 - 7.00 10*3/mm3    Lymphocytes, Absolute 1.11 0.70 - 3.10 10*3/mm3    Monocytes, Absolute 0.71 0.10 - 0.90 10*3/mm3    Eosinophils, Absolute 0.09 0.00 - 0.40 10*3/mm3    Basophils, Absolute 0.05 0.00 - 0.20 10*3/mm3    Immature Grans, Absolute 0.02 0.00 - 0.05 10*3/mm3    nRBC 0.0 0.0 - 0.2 /100 WBC       Ordered the above labs and independently reviewed the results.        RADIOLOGY  No Radiology Exams Resulted Within Past 24 Hours    I ordered the above noted radiological studies. Reviewed by me and discussed with radiologist.  See dictation for official radiology interpretation.      PROCEDURES    Procedures      MEDICATIONS GIVEN IN ER    Medications   sodium chloride 0.9 % flush 10 mL (has no administration in time range)   sodium chloride 0.9 % infusion (has no administration in time range)   pantoprazole (PROTONIX) injection 40 mg (40 mg Intravenous Given 11/1/22 2250)   sodium chloride 0.9 % bolus 500 mL (500 mL Intravenous New Bag 11/1/22 2251)   iopamidol (ISOVUE-300) 61 % injection 100 mL (100 mL Intravenous Given 11/1/22 2311)         PROGRESS, DATA ANALYSIS, CONSULTS, AND MEDICAL DECISION MAKING    All labs have been independently reviewed by me.  All radiology studies have been reviewed by me and discussed with radiologist dictating the report.   EKG's independently viewed and interpreted by me.  Discussion below represents my analysis of pertinent findings related to patient's condition, differential diagnosis, treatment plan and final disposition.        ED Course as of 11/01/22 2328   Tue Nov 01, 2022 2326 CBC shows a hemoglobin of 10.2 which is down 1 g compared to a year ago in epic [DP]   2326 Chemistries unremarkable, lactate  normal [DP]   2326 Patient did have 1 more bloody bowel movement in the ED, but remains hemodynamically stable with no dizziness or syncopal type activity. [DP]   2327 I spoke with Dr. Candelaria who agrees to admit the patient to telemetry bed for further.  I do not believe that emergent reversal of anticoagulation is indicated at this point but we will trend her H&H overnight and consult GI [DP]   2327 Her anatomy is essentially unknown given that her surgery was many years ago, so I will get a CT scan to help understand that.  Likelihood is that this is either diverticular or potentially internal hemorrhoid bleed, but could also be upper GI source although that seems less like [DP]   2327 She has received IV fluids, and no packed red blood cell transfusion is indicated at this time [DP]      ED Course User Index  [DP] Shahriar Nance MD           PPE: The patient wore a surgical mask throughout the entire patient encounter. I wore an N95.    AS OF 23:28 EDT VITALS:    BP - 109/54  HR - 75  TEMP - 98.7 °F (37.1 °C)  O2 SATS - 94%        DIAGNOSIS  Final diagnoses:   Bright red rectal bleeding         DISPOSITION  Admit           Shahriar Nance MD  11/01/22 1792

## 2022-11-01 NOTE — PROGRESS NOTES
Clinical Pharmacy Services: Medication History    Kendy Worrell is a 82 y.o. female presenting to [facility] for   Chief Complaint   Patient presents with   • Rectal Bleeding       She  has a past medical history of Arthritis, Breast cancer (CMS/HCC), Hyperlipidemia, Hypertension, Hyperthyroidism, and Hypothyroidism.    Allergies as of 11/01/2022 - Reviewed 11/01/2022   Allergen Reaction Noted   • Aspirin  02/27/2013   • Nsaids  02/27/2013       Medication information was obtained from: care home Paperwork  Pharmacy and Phone Number:     Prior to Admission Medications     Prescriptions Last Dose Informant Patient Reported? Taking?    acetaminophen (TYLENOL) 325 MG tablet  Nursing Home No Yes    Take 2 tablets by mouth Every 6 (Six) Hours As Needed for Mild Pain .    acetaminophen-codeine (TYLENOL #3) 300-30 MG per tablet  Nursing Home Yes Yes    Take 1 tablet by mouth Every 6 (Six) Hours As Needed for Moderate Pain.    albuterol sulfate  (90 Base) MCG/ACT inhaler  Nursing Home Yes Yes    Inhale 2 puffs Every 4 (Four) Hours As Needed for Wheezing.    amLODIPine (NORVASC) 2.5 MG tablet  Nursing Home Yes Yes    Take 1 tablet by mouth Daily.    apixaban (ELIQUIS) 5 MG tablet tablet  Nursing Home No Yes    Take 1 tablet by mouth Every 12 (Twelve) Hours.    atorvastatin (LIPITOR) 40 MG tablet  Nursing Home No Yes    Take 1 tablet by mouth Every Night.    cloNIDine (CATAPRES) 0.1 MG tablet  Nursing Home Yes Yes    Take 1 tablet by mouth Every 6 (Six) Hours As Needed for High Blood Pressure.    furosemide (LASIX) 40 MG tablet  Nursing Home No Yes    Take 1 tablet by mouth Daily.    Patient taking differently:  Take 1 tablet by mouth 2 (Two) Times a Day.    isosorbide mononitrate (IMDUR) 30 MG 24 hr tablet  Nursing Home Yes Yes    Take 30 mg by mouth Daily.    levothyroxine (SYNTHROID, LEVOTHROID) 100 MCG tablet  Nursing Home No Yes    Take 1 tablet by mouth Daily.    loperamide (IMODIUM) 2 MG capsule   Nursing Home Yes Yes    Take 1 capsule by mouth 2 (Two) Times a Day As Needed for Diarrhea.    Magnesium 250 MG tablet  Nursing Home Yes Yes    Take 1 tablet by mouth 2 (Two) Times a Day.    metoprolol succinate XL (TOPROL-XL) 100 MG 24 hr tablet  Nursing Home No Yes    Take 1 tablet by mouth Every 12 (Twelve) Hours.    midodrine (PROAMATINE) 5 MG tablet  Nursing Home Yes Yes    Take 1 tablet by mouth Daily As Needed.    Multiple Vitamin (MULTI VITAMIN DAILY PO)  Nursing Home Yes Yes    Take 1 tablet by mouth Daily.    nystatin 008771 UNIT/GM powder  Nursing Home Yes Yes    Apply 1 application topically to the appropriate area as directed 3 (Three) Times a Day As Needed. Apply under left breast and abd folds    nystatin-triamcinolone (MYCOLOG II) 592610-9.1 UNIT/GM-% cream  Nursing Home No Yes    apply to affected area twice a day if needed    Patient taking differently:  Apply 1 application topically to the appropriate area as directed 2 (Two) Times a Day As Needed.    potassium chloride (MICRO-K) 10 MEQ CR capsule  Nursing Home Yes Yes    Take 2 capsules by mouth 3 (Three) Times a Day.            Medication notes:     This medication list is complete to the best of my knowledge as of 11/1/2022    Please call if questions.    Fiordaliza Pollock  Medication History Technician   [phone]    11/1/2022 17:57 EDT

## 2022-11-01 NOTE — ED NOTES
Pt via [facility] from [facility] with c/o abd cramping, bright red rectal bleeding intermittently  x1-2 months but continuous since last night.   Pt is on eliquis     All triage performed with this RN wearing appropriate PPE.  Pt placed in mask upon arrival to ED.

## 2022-11-02 LAB
ABO GROUP BLD: NORMAL
ANION GAP SERPL CALCULATED.3IONS-SCNC: 8 MMOL/L (ref 5–15)
BLD GP AB SCN SERPL QL: NEGATIVE
BUN SERPL-MCNC: 12 MG/DL (ref 8–23)
BUN/CREAT SERPL: 14.8 (ref 7–25)
CALCIUM SPEC-SCNC: 8.6 MG/DL (ref 8.6–10.5)
CHLORIDE SERPL-SCNC: 109 MMOL/L (ref 98–107)
CO2 SERPL-SCNC: 22 MMOL/L (ref 22–29)
CREAT SERPL-MCNC: 0.81 MG/DL (ref 0.57–1)
EGFRCR SERPLBLD CKD-EPI 2021: 72.1 ML/MIN/1.73
GLUCOSE SERPL-MCNC: 104 MG/DL (ref 65–99)
HCT VFR BLD AUTO: 19.2 % (ref 34–46.6)
HCT VFR BLD AUTO: 19.9 % (ref 34–46.6)
HGB BLD-MCNC: 6.3 G/DL (ref 12–15.9)
HGB BLD-MCNC: 6.6 G/DL (ref 12–15.9)
NT-PROBNP SERPL-MCNC: 1090 PG/ML (ref 0–1800)
POTASSIUM SERPL-SCNC: 4.4 MMOL/L (ref 3.5–5.2)
RH BLD: NEGATIVE
SODIUM SERPL-SCNC: 139 MMOL/L (ref 136–145)
T&S EXPIRATION DATE: NORMAL

## 2022-11-02 PROCEDURE — 85014 HEMATOCRIT: CPT | Performed by: HOSPITALIST

## 2022-11-02 PROCEDURE — 80048 BASIC METABOLIC PNL TOTAL CA: CPT | Performed by: HOSPITALIST

## 2022-11-02 PROCEDURE — 86900 BLOOD TYPING SEROLOGIC ABO: CPT

## 2022-11-02 PROCEDURE — P9016 RBC LEUKOCYTES REDUCED: HCPCS

## 2022-11-02 PROCEDURE — 83880 ASSAY OF NATRIURETIC PEPTIDE: CPT | Performed by: HOSPITALIST

## 2022-11-02 PROCEDURE — 36415 COLL VENOUS BLD VENIPUNCTURE: CPT | Performed by: HOSPITALIST

## 2022-11-02 PROCEDURE — 99204 OFFICE O/P NEW MOD 45 MIN: CPT | Performed by: INTERNAL MEDICINE

## 2022-11-02 PROCEDURE — 86901 BLOOD TYPING SEROLOGIC RH(D): CPT | Performed by: HOSPITALIST

## 2022-11-02 PROCEDURE — 36430 TRANSFUSION BLD/BLD COMPNT: CPT

## 2022-11-02 PROCEDURE — 85018 HEMOGLOBIN: CPT | Performed by: HOSPITALIST

## 2022-11-02 PROCEDURE — 86901 BLOOD TYPING SEROLOGIC RH(D): CPT

## 2022-11-02 PROCEDURE — 86900 BLOOD TYPING SEROLOGIC ABO: CPT | Performed by: HOSPITALIST

## 2022-11-02 PROCEDURE — 86850 RBC ANTIBODY SCREEN: CPT | Performed by: HOSPITALIST

## 2022-11-02 PROCEDURE — 86923 COMPATIBILITY TEST ELECTRIC: CPT

## 2022-11-02 RX ORDER — LEVOTHYROXINE SODIUM 0.1 MG/1
100 TABLET ORAL DAILY
Status: DISCONTINUED | OUTPATIENT
Start: 2022-11-02 | End: 2022-11-07 | Stop reason: HOSPADM

## 2022-11-02 RX ORDER — ALBUTEROL SULFATE 90 UG/1
2 AEROSOL, METERED RESPIRATORY (INHALATION) EVERY 4 HOURS PRN
Status: DISCONTINUED | OUTPATIENT
Start: 2022-11-02 | End: 2022-11-02 | Stop reason: CLARIF

## 2022-11-02 RX ORDER — ISOSORBIDE MONONITRATE 30 MG/1
30 TABLET, EXTENDED RELEASE ORAL DAILY
Status: DISCONTINUED | OUTPATIENT
Start: 2022-11-02 | End: 2022-11-02

## 2022-11-02 RX ORDER — AMLODIPINE BESYLATE 5 MG/1
2.5 TABLET ORAL DAILY
Status: DISCONTINUED | OUTPATIENT
Start: 2022-11-02 | End: 2022-11-02

## 2022-11-02 RX ORDER — METOPROLOL SUCCINATE 100 MG/1
100 TABLET, EXTENDED RELEASE ORAL EVERY 12 HOURS SCHEDULED
Status: DISCONTINUED | OUTPATIENT
Start: 2022-11-02 | End: 2022-11-07 | Stop reason: HOSPADM

## 2022-11-02 RX ORDER — ATORVASTATIN CALCIUM 20 MG/1
10 TABLET, FILM COATED ORAL NIGHTLY
Status: DISCONTINUED | OUTPATIENT
Start: 2022-11-02 | End: 2022-11-07 | Stop reason: HOSPADM

## 2022-11-02 RX ORDER — ATORVASTATIN CALCIUM 20 MG/1
40 TABLET, FILM COATED ORAL NIGHTLY
Status: DISCONTINUED | OUTPATIENT
Start: 2022-11-02 | End: 2022-11-02

## 2022-11-02 RX ORDER — ALBUTEROL SULFATE 2.5 MG/3ML
2.5 SOLUTION RESPIRATORY (INHALATION) EVERY 4 HOURS PRN
Status: DISCONTINUED | OUTPATIENT
Start: 2022-11-02 | End: 2022-11-07

## 2022-11-02 RX ADMIN — Medication 10 ML: at 09:29

## 2022-11-02 RX ADMIN — Medication 10 ML: at 20:44

## 2022-11-02 RX ADMIN — LEVOTHYROXINE SODIUM 100 MCG: 0.1 TABLET ORAL at 12:22

## 2022-11-02 RX ADMIN — PANTOPRAZOLE SODIUM 40 MG: 40 INJECTION, POWDER, FOR SOLUTION INTRAVENOUS at 20:44

## 2022-11-02 RX ADMIN — METOPROLOL SUCCINATE 100 MG: 100 TABLET, EXTENDED RELEASE ORAL at 12:22

## 2022-11-02 RX ADMIN — SODIUM CHLORIDE 125 ML/HR: 9 INJECTION, SOLUTION INTRAVENOUS at 07:25

## 2022-11-02 RX ADMIN — METOPROLOL SUCCINATE 100 MG: 100 TABLET, EXTENDED RELEASE ORAL at 20:37

## 2022-11-02 RX ADMIN — PANTOPRAZOLE SODIUM 40 MG: 40 INJECTION, POWDER, FOR SOLUTION INTRAVENOUS at 09:28

## 2022-11-02 RX ADMIN — ATORVASTATIN CALCIUM 10 MG: 20 TABLET, FILM COATED ORAL at 20:39

## 2022-11-02 NOTE — CASE MANAGEMENT/SOCIAL WORK
Discharge Planning Assessment  [facility]     Patient Name: Kendy Worrell  MRN: [id number]  Today's Date: 11/2/2022    Admit Date: 11/1/2022    Plan: Return to [facility] - PT eval pending   Discharge Needs Assessment     Row Name 11/02/22 1455       Living Environment    People in Home facility resident    Current Living Arrangements assisted living facility    Primary Care Provided by self    Provides Primary Care For no one    Family Caregiver if Needed child(tatyana), adult    Able to Return to Prior Arrangements yes       Resource/Environmental Concerns    Resource/Environmental Concerns none       Transition Planning    Patient/Family Anticipates Transition to home;home with help/services    Patient/Family Anticipated Services at Transition none;home health care    Transportation Anticipated family or friend will provide       Discharge Needs Assessment    Current Outpatient/Agency/Support Group assisted living facility    Equipment Currently Used at Home rollator;shower chair;grab bar    Concerns to be Addressed discharge planning    Discharge Facility/Level of Care Needs assisted living facility;home with home health               Discharge Plan     Row Name 11/02/22 1457       Plan    Plan Return to [facility] - PT eval pending    Patient/Family in Agreement with Plan yes    Plan Comments CCP spoke with patient and daughter Ramandeep at bedside; explained role, verified facesheet, and discussed dc plan. Patient uses a rollator, shower chair, and grab bar at home. She lives at [facility]. Patient reports sometimes she gets caretenders  for PT. She has been to [facility] for short term rehab in the past. She reports her family will be able to transport her back to the facility. CCP spoke with aNbor Ramos who is requesting a PT eval prior to patient returning. She states we can fax over the PT eval and they will evaluate to make sure she is able to return (fax#  [phone]). Venice Ramos is requesting we call report when she discharges to [phone]. PT jem is pending - [facility] to follow to fax over and make sure she is able to return to assisted living. LINK, TOLUW              Continued Care and Services - Admitted Since 11/1/2022    Coordination has not been started for this encounter.          Demographic Summary     Row Name 11/02/22 1455       General Information    Admission Type observation    Arrived From home    Referral Source admission list    Reason for Consult discharge planning    Preferred Language English       Contact Information    Permission Granted to Share Info With family/designee               Functional Status     Row Name 11/02/22 1455       Functional Status    Usual Activity Tolerance moderate    Current Activity Tolerance moderate       Functional Status, IADL    Medications assistive equipment and person    Meal Preparation assistive equipment and person    Housekeeping assistive equipment and person    Laundry assistive equipment and person    Shopping assistive equipment and person       Mental Status    General Appearance WDL WDL               Psychosocial    No documentation.                Abuse/Neglect    No documentation.                Legal    No documentation.                Substance Abuse    No documentation.                Patient Forms    No documentation.                   LUBNA Ahn     Home

## 2022-11-02 NOTE — PLAN OF CARE
Goal Outcome Evaluation:  Plan of Care Reviewed With: patient        Progress: no change  Outcome Evaluation: Patient admitted last night with rectal bleeding. Was reported that patient had a couple BM's in ED that were bright red and moderate after which the patient was dizzy and lightheaded. Bolus completed once arrived to unit. IVF started at 125 mL/hr. Severe edema noted in BLE, monitoring closely for fluid overload given history of HF. Purewick placed at this time but patient was able to ambulate with assist of 2 to standing scale. Clear liquid diet ordered. GI consulted. No signs of bleeding at this time. VSS. Daily weight. Will continue to monitor.

## 2022-11-02 NOTE — H&P
History and physical    Primary care physician      Chief complaint  Bright red blood per rectum    History of present illness  82 white female with history of atrial fibrillation on Eliquis coronary artery disease hypertension hyperlipidemia hypothyroidism who is a resident at assisted living and also have hemorrhoids with bloody stools develop significant bloody stools yesterday and presented to [facility] emergency room.  Patient has lower abdominal discomfort but no nausea vomiting.  Patient also denies any chest pain shortness of breath palpitation fever chills.  Patient evaluated in ER and her work-up initially negative hemoglobin stable but she continued to have bright red blood per rectum admit for management.    PAST MEDICAL HISTORY  • Coronary artery disease     • Hypertension     • Hypothyroidism  Hyperlipidemia  Atrial fibrillation   Gastroesophageal reflux disease        PAST SURGICAL HISTORY              Procedure Laterality Date   • BREAST BIOPSY       • BREAST SURGERY Right     • CHOLECYSTECTOMY       • COLON SURGERY         Removed   • COLONOSCOPY       • HYSTERECTOMY       • MAMMO BILATERAL   2015   • MASTECTOMY       • TONSILLECTOMY             FAMILY HISTORY           Problem Relation Age of Onset   • Stroke Mother     • Hypertension Mother     • Heart disease Father     • Cancer Father        SOCIAL HISTORY                 Socioeconomic History   • Marital status: Single   Tobacco Use   • Smoking status: Former   • Smokeless tobacco: Never   • Tobacco comments:       quit in 1988   Substance and Sexual Activity   • Alcohol use: Yes       Alcohol/week: 3.0 standard drinks       Types: 3 Shots of liquor per week       Comment: OCC   • Drug use: Yes       Types: Hydrocodone   • Sexual activity: Never         ALLERGIES  Aspirin and Nsaids  Home medications reviewed     REVIEW OF SYSTEMS  All systems reviewed and negative except for those discussed in HPI.      PHYSICAL  "EXAM   Blood pressure 118/59, pulse 87, temperature 98.1 °F (36.7 °C), temperature source Oral, resp. rate 18, height 149.9 cm (59\"), weight 89 kg (196 lb 3.2 oz), SpO2 96 %.    GENERAL: Awake and alert, in no distress  HEENT:  Unremarkable  NECK:  Supple  CV: regular rhythm, regular rate  RESPIRATORY: normal effort moving air bilaterally  ABDOMEN: soft, nondistended nontender with normal bowel sounds  MUSCULOSKELETAL: no deformity  NEURO: alert, moves all extremities, follows commands  SKIN: warm, dry     LAB RESULTS  Lab Results (last 24 hours)     Procedure Component Value Units Date/Time    CBC (No Diff) [[id number]] Updated: 11/02/22 1103    Specimen: Blood     BNP [[id number]]  (Normal) Collected: 11/02/22 0656    Specimen: Blood Updated: 11/02/22 1000     proBNP 1,090.0 pg/mL     Narrative:      Among patients with dyspnea, NT-proBNP is highly sensitive for the detection of acute congestive heart failure. In addition NT-proBNP of <300 pg/ml effectively rules out acute congestive heart failure with 99% negative predictive value.    Results may be falsely decreased if patient taking Biotin.      Basic Metabolic Panel [[id number]]  (Abnormal) Collected: 11/02/22 0656    Specimen: Blood Updated: 11/02/22 0822     Glucose 104 mg/dL      BUN 12 mg/dL      Creatinine 0.81 mg/dL      Sodium 139 mmol/L      Potassium 4.4 mmol/L      Chloride 109 mmol/L      CO2 22.0 mmol/L      Calcium 8.6 mg/dL      BUN/Creatinine Ratio 14.8     Anion Gap 8.0 mmol/L      eGFR 72.1 mL/min/1.73      Comment: National Kidney Foundation and American Society of Nephrology (ASN) Task Force recommended calculation based on the Chronic Kidney Disease Epidemiology Collaboration (CKD-EPI) equation refit without adjustment for race.       Narrative:      GFR Normal >60  Chronic Kidney Disease <60  Kidney Failure <15    The GFR formula is only valid for adults with stable renal function between ages 18 and 70.    [place] Draw [[id number]] Collected: " 11/01/22 1817    Specimen: Blood Updated: 11/01/22 1932    Narrative:      The following orders were created for panel order [place] Draw.  Procedure                               Abnormality         Status                     ---------                               -----------         ------                     Green Top (Gel)[[id number]]                                  Final result               Lavender Top[[id number]]                                     Final result               Gold Top - SST[[id number]]                                   Final result               Light Blue Top[[id number]]                                   Final result                 Please view results for these tests on the individual orders.    Lavender Top [[id number]] Collected: 11/01/22 1817    Specimen: Blood Updated: 11/01/22 1932     Extra Tube hold for add-on     Comment: Auto resulted       Gold Top - SST [[id number]] Collected: 11/01/22 1817    Specimen: Blood Updated: 11/01/22 1932     Extra Tube Hold for add-ons.     Comment: Auto resulted.       Green Top (Gel) [[id number]] Collected: 11/01/22 1817    Specimen: Blood Updated: 11/01/22 1932     Extra Tube Hold for add-ons.     Comment: Auto resulted.       Light Blue Top [[id number]] Collected: 11/01/22 1817    Specimen: Blood Updated: 11/01/22 1932     Extra Tube Hold for add-ons.     Comment: Auto resulted       Lactic Acid, Plasma [[id number]]  (Normal) Collected: 11/01/22 1817    Specimen: Blood Updated: 11/01/22 1852     Lactate 1.5 mmol/L     Lipase [[id number]]  (Normal) Collected: 11/01/22 1817    Specimen: Blood Updated: 11/01/22 1852     Lipase 32 U/L     Comprehensive Metabolic Panel [[id number]]  (Abnormal) Collected: 11/01/22 1817    Specimen: Blood Updated: 11/01/22 1852     Glucose 97 mg/dL      BUN 10 mg/dL      Creatinine 0.97 mg/dL      Sodium 139 mmol/L      Potassium 4.3 mmol/L      Chloride 107 mmol/L      CO2 26.0 mmol/L      Calcium 9.5 mg/dL      Total Protein 6.4  g/dL      Albumin 3.70 g/dL      ALT (SGPT) 33 U/L      AST (SGOT) 53 U/L      Alkaline Phosphatase 136 U/L      Total Bilirubin 1.1 mg/dL      Globulin 2.7 gm/dL      A/G Ratio 1.4 g/dL      BUN/Creatinine Ratio 10.3     Anion Gap 6.0 mmol/L      eGFR 58.1 mL/min/1.73      Comment: National Kidney Foundation and American Society of Nephrology (ASN) Task Force recommended calculation based on the Chronic Kidney Disease Epidemiology Collaboration (CKD-EPI) equation refit without adjustment for race.       Narrative:      GFR Normal >60  Chronic Kidney Disease <60  Kidney Failure <15    The GFR formula is only valid for adults with stable renal function between ages 18 and 70.    CBC & Differential [[id number]]  (Abnormal) Collected: 11/01/22 1817    Specimen: Blood Updated: 11/01/22 1843    Narrative:      The following orders were created for panel order CBC & Differential.  Procedure                               Abnormality         Status                     ---------                               -----------         ------                     CBC Auto Differential[[id number]]        Abnormal            Final result                 Please view results for these tests on the individual orders.    CBC Auto Differential [[id number]]  (Abnormal) Collected: 11/01/22 1817    Specimen: Blood Updated: 11/01/22 1843     WBC 7.19 10*3/mm3      RBC 3.25 10*6/mm3      Hemoglobin 10.2 g/dL      Hematocrit 30.8 %      MCV 94.8 fL      MCH 31.4 pg      MCHC 33.1 g/dL      RDW 12.8 %      RDW-SD 43.5 fl      MPV 10.5 fL      Platelets 199 10*3/mm3      Neutrophil % 72.4 %      Lymphocyte % 15.4 %      Monocyte % 9.9 %      Eosinophil % 1.3 %      Basophil % 0.7 %      Immature Grans % 0.3 %      Neutrophils, Absolute 5.21 10*3/mm3      Lymphocytes, Absolute 1.11 10*3/mm3      Monocytes, Absolute 0.71 10*3/mm3      Eosinophils, Absolute 0.09 10*3/mm3      Basophils, Absolute 0.05 10*3/mm3      Immature Grans, Absolute 0.02 10*3/mm3       nRBC 0.0 /100 WBC         Imaging Results (Last 24 Hours)     Procedure Component Value Units Date/Time    CT Abdomen Pelvis With Contrast [[id number]] Collected: 11/01/22 2341     Updated: 11/01/22 2355    Narrative:      CT OF THE ABDOMEN AND PELVIS WITH CONTRAST     HISTORY: Rectal bleeding     COMPARISON: None available.     TECHNIQUE: Axial CT imaging was obtained through the abdomen and pelvis.  IV contrast was administered.     FINDINGS:  Images through the lung bases demonstrate some bibasilar scarring. There  are coronary artery calcifications, as well as calcifications of the  mitral valve annulus. Patient does appear to have some nodularity of the  liver, suggesting cirrhosis. No suspicious hepatic lesions are seen.  There is a small hiatal hernia. There is dilatation of the common bile  duct, measuring up to 1.4 cm. This was also present in 2019, and may be  postcholecystectomy nature. There is a somewhat bulbous appearance to  the tail of the pancreas at the splenic hilum, although this is also  stable when compared to 2019. Duodenum is normal, as are the adrenal  glands. The kidneys enhance symmetrically. There is no hydronephrosis.  Patient has a simple appearing right renal cyst. No additional follow-up  is necessary. Partially calcified lesion is noted within the left  kidney, which appears stable when compared to 2019. There is dense  calcification of the aorta. Urinary bladder is thick walled, and there  is perivesical stranding. Correlation with urinalysis and urine cultures  is recommended. Uterus is surgically absent. The patient has extensive  colonic diverticulosis. There are changes of probable prior right  hemicolectomy. There is no evidence of bowel obstruction. Right adnexal  cyst measures 1.7 cm. It would be better assessed with pelvic  ultrasound. Patient does have a trace amount of free fluid within the  pelvis. There is potentially some wall thickening involving the  rectum.  Appearance may be exaggerated by incomplete distention, although a  component of proctitis is not completely excluded. No acute osseous  abnormalities are seen. Patient does appear osteopenic. There are  bilateral pars defects at L5-S1, with associated mild spondylolisthesis.       Impression:         1. There is some nodularity to the surface of the liver. Cirrhosis is  not excluded.  2. Changes of suspected right hemicolectomy, without evidence of  obstruction.  3. Diverticulosis.  4. Questionable wall thickening involving the rectum. This may be  exaggerated by incomplete distention, although proctitis is not  completely excluded.  5. Thick-walled appearance to the urinary bladder. Correlation with  urinalysis and cultures is recommended.     Radiation dose reduction techniques were utilized, including automated  exposure control and exposure modulation based on body size.     This report was finalized on 11/1/2022 11:52 PM by Dr. Deanna Ramsey M.D.             Current Facility-Administered Medications:   •  albuterol (PROVENTIL) nebulizer solution 0.083% 2.5 mg/3mL, 2.5 mg, Nebulization, Q4H PRN, Skyler Candelaria MD  •  atorvastatin (LIPITOR) tablet 40 mg, 40 mg, Oral, Nightly, Skyler Candelaria MD  •  levothyroxine (SYNTHROID, LEVOTHROID) tablet 100 mcg, 100 mcg, Oral, Daily, Skyler Candelaria MD  •  metoprolol succinate XL (TOPROL-XL) 24 hr tablet 100 mg, 100 mg, Oral, Q12H, Skyler Candelaria MD  •  pantoprazole (PROTONIX) injection 40 mg, 40 mg, Intravenous, Q12H, Skyler Candelaria MD, 40 mg at 11/02/22 0928  •  polyethylene glycol (GoLYTELY) solution 2,000 mL, 2,000 mL, Oral, Q8H, Saroj Oakes MD  •  sodium chloride 0.9 % flush 10 mL, 10 mL, Intravenous, PRN, Shahriar Nance MD, 10 mL at 11/02/22 0929  •  sodium chloride 0.9 % infusion, 75 mL/hr, Intravenous, Continuous, Skyler Candelaria MD, Last Rate: 75 mL/hr at 11/02/22 0929, 75 mL/hr at 11/02/22 0929     ASSESSMENT  Hematochezia  Probably  proctitis  Acute blood loss anemia  History of hemorrhoids  Atrial fibrillation on Eliquis  Hypertension  Hyperlipidemia  Hypothyroidism  Gastroesophageal reflux disease    PLAN  Admit  IV Protonix  Hold Eliquis  Monitor H&H  GI consult  Adjust home medications  Stress ulcer DVT prophylaxis  Supportive care  Patient is full code  Discussed with nursing staff  Follow closely further recommendation current hospital course    JULIO MUNOZ MD

## 2022-11-02 NOTE — CONSULTS
[facility]  Initial Inpatient Consult Note    Referring Provider: Dr Candelaria    Reason for Consultation: Rectal bleeding    Subjective     History of present illness:    82 y.o. female presenting to the emergency room complaining of rectal bleeding.  She has had some small-volume rectal bleeding over the last few weeks but had increased blood noted within the stool and in the toilet yesterday with bowel movement.  She has had some further episodes of clotted blood since admission.  She denies any mukul abdominal pain.  She has a remote history of a right hemicolectomy due to what she describes as colonic ulcers.  Last colonoscopy was many years ago.  Does have a history of A. fib and CVA on Eliquis her last dose was Monday.  She had a recent nuclear stress test in November 2021 which was consistent with a low risk study.  Hemoglobin on admission was 10.2 we are awaiting her repeat a.m. labs.    CT Abdomen Pelvis With Contrast (11/01/2022 23:12) - non specific nodularity of liver; diverticulosis, equivocal rectal thickening    Past Medical History:  Past Medical History:   Diagnosis Date   • A-fib (HCC)    • Arthritis    • Breast cancer (HCC)    • CHF (congestive heart failure) (HCC)    • CVA (cerebral vascular accident) (HCC) 09/2019   • History of pulmonary embolus (PE)    • Hyperlipidemia    • Hypertension    • Hyperthyroidism     patient has hypothyroidism   • Hypothyroidism      Past Surgical History:  Past Surgical History:   Procedure Laterality Date   • BREAST BIOPSY     • BREAST SURGERY Right    • CHOLECYSTECTOMY     • COLON SURGERY      Removed   • COLONOSCOPY     • HYSTERECTOMY     • MAMMO BILATERAL  2015   • MASTECTOMY     • TONSILLECTOMY        Social History:   Social History     Tobacco Use   • Smoking status: Former   • Smokeless tobacco: Never   • Tobacco comments:     quit in 1988   Substance Use Topics   • Alcohol use: Yes     Alcohol/week: 3.0 standard drinks     Types: 3  Shots of liquor per week     Comment: OCC      Family History:  Family History   Problem Relation Age of Onset   • Stroke Mother    • Hypertension Mother    • Heart disease Father    • Cancer Father        Home Meds:  Medications Prior to Admission   Medication Sig Dispense Refill Last Dose   • acetaminophen (TYLENOL) 325 MG tablet Take 2 tablets by mouth Every 6 (Six) Hours As Needed for Mild Pain .      • acetaminophen-codeine (TYLENOL #3) 300-30 MG per tablet Take 1 tablet by mouth Every 6 (Six) Hours As Needed for Moderate Pain.      • albuterol sulfate  (90 Base) MCG/ACT inhaler Inhale 2 puffs Every 4 (Four) Hours As Needed for Wheezing.      • amLODIPine (NORVASC) 2.5 MG tablet Take 1 tablet by mouth Daily.      • apixaban (ELIQUIS) 5 MG tablet tablet Take 1 tablet by mouth Every 12 (Twelve) Hours. 60 tablet     • atorvastatin (LIPITOR) 40 MG tablet Take 1 tablet by mouth Every Night. 30 tablet 0    • cloNIDine (CATAPRES) 0.1 MG tablet Take 1 tablet by mouth Every 6 (Six) Hours As Needed for High Blood Pressure.      • furosemide (LASIX) 40 MG tablet Take 1 tablet by mouth Daily. (Patient taking differently: Take 1 tablet by mouth 2 (Two) Times a Day.) 30 tablet 0    • isosorbide mononitrate (IMDUR) 30 MG 24 hr tablet Take 30 mg by mouth Daily.      • levothyroxine (SYNTHROID, LEVOTHROID) 100 MCG tablet Take 1 tablet by mouth Daily. 90 tablet 3    • loperamide (IMODIUM) 2 MG capsule Take 1 capsule by mouth 2 (Two) Times a Day As Needed for Diarrhea.      • Magnesium 250 MG tablet Take 1 tablet by mouth 2 (Two) Times a Day.      • metoprolol succinate XL (TOPROL-XL) 100 MG 24 hr tablet Take 1 tablet by mouth Every 12 (Twelve) Hours. 60 tablet 0    • midodrine (PROAMATINE) 5 MG tablet Take 1 tablet by mouth Daily As Needed.      • Multiple Vitamin (MULTI VITAMIN DAILY PO) Take 1 tablet by mouth Daily.      • nystatin 043295 UNIT/GM powder Apply 1 application topically to the appropriate area as directed 3  (Three) Times a Day As Needed. Apply under left breast and abd folds      • nystatin-triamcinolone (MYCOLOG II) 376397-4.1 UNIT/GM-% cream apply to affected area twice a day if needed (Patient taking differently: Apply 1 application topically to the appropriate area as directed 2 (Two) Times a Day As Needed.) 45 g 3    • potassium chloride (MICRO-K) 10 MEQ CR capsule Take 2 capsules by mouth 3 (Three) Times a Day.        Current Meds:   atorvastatin, 40 mg, Oral, Nightly  levothyroxine, 100 mcg, Oral, Daily  metoprolol succinate XL, 100 mg, Oral, Q12H  pantoprazole, 40 mg, Intravenous, Q12H      Allergies:  Allergies   Allergen Reactions   • Aspirin      bleeding   • Nsaids      bleeding     Review of Systems  All systems were reviewed and negative except for:  Gastrointestinal: positive for  bright red blood per rectum     Objective     Vital Signs  Temp:  [97.8 °F (36.6 °C)-98.7 °F (37.1 °C)] 98.1 °F (36.7 °C)  Heart Rate:  [60-90] 87  Resp:  [18] 18  BP: (109-153)/(54-93) 118/59  Physical Exam:  General Appearance:     Alert, cooperative, in no acute distress   Head:    Normocephalic, without obvious abnormality, atraumatic   Eyes:            Lids and lashes normal, conjunctivae and sclerae normal, no icterus   Throat:   No oral lesions, no thrush, oral mucosa moist   Neck:   No adenopathy, supple, trachea midline, no thyromegaly, no carotid bruit, no JVD   Lungs:     Clear to auscultation,respirations regular, even and            unlabored    Heart:    Regular rhythm and normal rate, normal S1 and S2, no        murmur, no gallop, no rub, no click   Chest Wall:    No abnormalities observed   Abdomen:     Normal bowel sounds, no masses, no organomegaly, soft     nontender, nondistended, no guarding, no rebound                 tenderness   Rectal:     Deferred   Extremities:   no edema, no cyanosis, no redness   Skin:   No bleeding, bruising or rash   Lymph nodes:   No palpable adenopathy   Psychiatric:  Judgement  and insight: normal   Orientation to person place and time: normal   Mood and affect: normal   Results Review:   I reviewed the patient's new clinical results.  I reviewed the patient's new imaging results and agree with the interpretation.    Results from last 7 days   Lab Units 11/01/22 1817   WBC 10*3/mm3 7.19   HEMOGLOBIN g/dL 10.2*   HEMATOCRIT % 30.8*   PLATELETS 10*3/mm3 199     Results from last 7 days   Lab Units 11/02/22  0656 11/01/22 1817   SODIUM mmol/L 139 139   POTASSIUM mmol/L 4.4 4.3   CHLORIDE mmol/L 109* 107   CO2 mmol/L 22.0 26.0   BUN mg/dL 12 10   CREATININE mg/dL 0.81 0.97   CALCIUM mg/dL 8.6 9.5   BILIRUBIN mg/dL  --  1.1   ALK PHOS U/L  --  136*   ALT (SGPT) U/L  --  33   AST (SGOT) U/L  --  53*   GLUCOSE mg/dL 104* 97         Lab Results   Lab Value Date/Time    LIPASE 32 11/01/2022 1817       Radiology:  CT Abdomen Pelvis With Contrast   Final Result       1. There is some nodularity to the surface of the liver. Cirrhosis is   not excluded.   2. Changes of suspected right hemicolectomy, without evidence of   obstruction.   3. Diverticulosis.   4. Questionable wall thickening involving the rectum. This may be   exaggerated by incomplete distention, although proctitis is not   completely excluded.   5. Thick-walled appearance to the urinary bladder. Correlation with   urinalysis and cultures is recommended.       Radiation dose reduction techniques were utilized, including automated   exposure control and exposure modulation based on body size.       This report was finalized on 11/1/2022 11:52 PM by Dr. Deanna Ramsey M.D.              Assessment & Plan   Assessment:   1.  Rectal bleeding  2.  ? Proctitis by CT scan  3.  Hx of colon resection  4.  CVA on eliquis      Plan:   82-year-old lady with painless rectal bleeding with increasing frequency and volume over the last 24 hours.  She will need colonoscopy for further evaluation.  She has been off of Eliquis now for almost 48 hours.   We will plan to prep her today and perform colonoscopy tomorrow as long as she remains hemodynamically stable. We will trend her H&H and transfuse as necessary.    I discussed the patients findings and my recommendations with patient.         Saroj Oakes M.D.  [facility]  [address] [place] [address]  Office: [phone]

## 2022-11-03 ENCOUNTER — ANESTHESIA EVENT (OUTPATIENT)
Dept: GASTROENTEROLOGY | Facility: HOSPITAL | Age: 82
End: 2022-11-03

## 2022-11-03 ENCOUNTER — ANESTHESIA (OUTPATIENT)
Dept: GASTROENTEROLOGY | Facility: HOSPITAL | Age: 82
End: 2022-11-03

## 2022-11-03 LAB
ALBUMIN SERPL-MCNC: 2.8 G/DL (ref 3.5–5.2)
ALBUMIN/GLOB SERPL: 1 G/DL
ALP SERPL-CCNC: 97 U/L (ref 39–117)
ALT SERPL W P-5'-P-CCNC: 29 U/L (ref 1–33)
ANION GAP SERPL CALCULATED.3IONS-SCNC: 9.9 MMOL/L (ref 5–15)
AST SERPL-CCNC: 48 U/L (ref 1–32)
BASOPHILS # BLD AUTO: 0.08 10*3/MM3 (ref 0–0.2)
BASOPHILS NFR BLD AUTO: 1 % (ref 0–1.5)
BH BB BLOOD EXPIRATION DATE: NORMAL
BH BB BLOOD EXPIRATION DATE: NORMAL
BH BB BLOOD TYPE BARCODE: 1700
BH BB BLOOD TYPE BARCODE: 1700
BH BB DISPENSE STATUS: NORMAL
BH BB DISPENSE STATUS: NORMAL
BH BB PRODUCT CODE: NORMAL
BH BB PRODUCT CODE: NORMAL
BH BB UNIT NUMBER: NORMAL
BH BB UNIT NUMBER: NORMAL
BILIRUB SERPL-MCNC: 2.3 MG/DL (ref 0–1.2)
BUN SERPL-MCNC: 8 MG/DL (ref 8–23)
BUN/CREAT SERPL: 9.6 (ref 7–25)
CALCIUM SPEC-SCNC: 9 MG/DL (ref 8.6–10.5)
CHLORIDE SERPL-SCNC: 108 MMOL/L (ref 98–107)
CHOLEST SERPL-MCNC: 86 MG/DL (ref 0–200)
CO2 SERPL-SCNC: 20.1 MMOL/L (ref 22–29)
CREAT SERPL-MCNC: 0.83 MG/DL (ref 0.57–1)
CROSSMATCH INTERPRETATION: NORMAL
CROSSMATCH INTERPRETATION: NORMAL
DEPRECATED RDW RBC AUTO: 54.2 FL (ref 37–54)
EGFRCR SERPLBLD CKD-EPI 2021: 70 ML/MIN/1.73
EOSINOPHIL # BLD AUTO: 0.18 10*3/MM3 (ref 0–0.4)
EOSINOPHIL NFR BLD AUTO: 2.3 % (ref 0.3–6.2)
ERYTHROCYTE [DISTWIDTH] IN BLOOD BY AUTOMATED COUNT: 16.6 % (ref 12.3–15.4)
GLOBULIN UR ELPH-MCNC: 2.7 GM/DL
GLUCOSE SERPL-MCNC: 98 MG/DL (ref 65–99)
HBA1C MFR BLD: 5.1 % (ref 4.8–5.6)
HCT VFR BLD AUTO: 27.9 % (ref 34–46.6)
HCT VFR BLD AUTO: 31.5 % (ref 34–46.6)
HDLC SERPL-MCNC: 54 MG/DL (ref 40–60)
HGB BLD-MCNC: 10.2 G/DL (ref 12–15.9)
HGB BLD-MCNC: 9.3 G/DL (ref 12–15.9)
LDLC SERPL CALC-MCNC: 16 MG/DL (ref 0–100)
LDLC/HDLC SERPL: 0.31 {RATIO}
LYMPHOCYTES # BLD AUTO: 2.69 10*3/MM3 (ref 0.7–3.1)
LYMPHOCYTES NFR BLD AUTO: 35 % (ref 19.6–45.3)
MCH RBC QN AUTO: 30 PG (ref 26.6–33)
MCHC RBC AUTO-ENTMCNC: 33.3 G/DL (ref 31.5–35.7)
MCV RBC AUTO: 90 FL (ref 79–97)
MONOCYTES # BLD AUTO: 1.09 10*3/MM3 (ref 0.1–0.9)
MONOCYTES NFR BLD AUTO: 14.2 % (ref 5–12)
NEUTROPHILS NFR BLD AUTO: 3.61 10*3/MM3 (ref 1.7–7)
NEUTROPHILS NFR BLD AUTO: 47.1 % (ref 42.7–76)
PLATELET # BLD AUTO: 149 10*3/MM3 (ref 140–450)
PMV BLD AUTO: 10.5 FL (ref 6–12)
POTASSIUM SERPL-SCNC: 4.1 MMOL/L (ref 3.5–5.2)
PROT SERPL-MCNC: 5.5 G/DL (ref 6–8.5)
RBC # BLD AUTO: 3.1 10*6/MM3 (ref 3.77–5.28)
SODIUM SERPL-SCNC: 138 MMOL/L (ref 136–145)
TRIGL SERPL-MCNC: 75 MG/DL (ref 0–150)
TSH SERPL DL<=0.05 MIU/L-ACNC: 2.46 UIU/ML (ref 0.27–4.2)
UNIT  ABO: NORMAL
UNIT  ABO: NORMAL
UNIT  RH: NORMAL
UNIT  RH: NORMAL
VLDLC SERPL-MCNC: 16 MG/DL (ref 5–40)
WBC NRBC COR # BLD: 7.68 10*3/MM3 (ref 3.4–10.8)

## 2022-11-03 PROCEDURE — 83036 HEMOGLOBIN GLYCOSYLATED A1C: CPT | Performed by: HOSPITALIST

## 2022-11-03 PROCEDURE — 25010000002 HYDRALAZINE PER 20 MG: Performed by: HOSPITALIST

## 2022-11-03 PROCEDURE — 0DBM8ZX EXCISION OF DESCENDING COLON, VIA NATURAL OR ARTIFICIAL OPENING ENDOSCOPIC, DIAGNOSTIC: ICD-10-PCS | Performed by: INTERNAL MEDICINE

## 2022-11-03 PROCEDURE — 88305 TISSUE EXAM BY PATHOLOGIST: CPT | Performed by: INTERNAL MEDICINE

## 2022-11-03 PROCEDURE — 80061 LIPID PANEL: CPT | Performed by: HOSPITALIST

## 2022-11-03 PROCEDURE — 0DBE8ZX EXCISION OF LARGE INTESTINE, VIA NATURAL OR ARTIFICIAL OPENING ENDOSCOPIC, DIAGNOSTIC: ICD-10-PCS | Performed by: INTERNAL MEDICINE

## 2022-11-03 PROCEDURE — 85025 COMPLETE CBC W/AUTO DIFF WBC: CPT | Performed by: HOSPITALIST

## 2022-11-03 PROCEDURE — 45385 COLONOSCOPY W/LESION REMOVAL: CPT | Performed by: INTERNAL MEDICINE

## 2022-11-03 PROCEDURE — 85014 HEMATOCRIT: CPT | Performed by: HOSPITALIST

## 2022-11-03 PROCEDURE — 94761 N-INVAS EAR/PLS OXIMETRY MLT: CPT

## 2022-11-03 PROCEDURE — 94799 UNLISTED PULMONARY SVC/PX: CPT

## 2022-11-03 PROCEDURE — 85018 HEMOGLOBIN: CPT | Performed by: HOSPITALIST

## 2022-11-03 PROCEDURE — 45380 COLONOSCOPY AND BIOPSY: CPT | Performed by: INTERNAL MEDICINE

## 2022-11-03 PROCEDURE — 80053 COMPREHEN METABOLIC PANEL: CPT | Performed by: HOSPITALIST

## 2022-11-03 PROCEDURE — 84443 ASSAY THYROID STIM HORMONE: CPT | Performed by: HOSPITALIST

## 2022-11-03 PROCEDURE — 25010000002 PROPOFOL 10 MG/ML EMULSION: Performed by: ANESTHESIOLOGY

## 2022-11-03 RX ORDER — PROPOFOL 10 MG/ML
VIAL (ML) INTRAVENOUS CONTINUOUS PRN
Status: DISCONTINUED | OUTPATIENT
Start: 2022-11-03 | End: 2022-11-03 | Stop reason: SURG

## 2022-11-03 RX ORDER — SODIUM CHLORIDE 0.9 % (FLUSH) 0.9 %
10 SYRINGE (ML) INJECTION AS NEEDED
Status: DISCONTINUED | OUTPATIENT
Start: 2022-11-03 | End: 2022-11-03 | Stop reason: HOSPADM

## 2022-11-03 RX ORDER — HYDRALAZINE HYDROCHLORIDE 20 MG/ML
10 INJECTION INTRAMUSCULAR; INTRAVENOUS EVERY 4 HOURS PRN
Status: DISCONTINUED | OUTPATIENT
Start: 2022-11-03 | End: 2022-11-03

## 2022-11-03 RX ORDER — SODIUM CHLORIDE 9 MG/ML
1000 INJECTION, SOLUTION INTRAVENOUS CONTINUOUS
Status: DISCONTINUED | OUTPATIENT
Start: 2022-11-03 | End: 2022-11-03

## 2022-11-03 RX ORDER — PANTOPRAZOLE SODIUM 40 MG/1
40 TABLET, DELAYED RELEASE ORAL
Status: DISCONTINUED | OUTPATIENT
Start: 2022-11-04 | End: 2022-11-07 | Stop reason: HOSPADM

## 2022-11-03 RX ORDER — PROPOFOL 10 MG/ML
VIAL (ML) INTRAVENOUS AS NEEDED
Status: DISCONTINUED | OUTPATIENT
Start: 2022-11-03 | End: 2022-11-03 | Stop reason: SURG

## 2022-11-03 RX ADMIN — Medication 100 MG: at 10:33

## 2022-11-03 RX ADMIN — METOPROLOL SUCCINATE 100 MG: 100 TABLET, EXTENDED RELEASE ORAL at 20:05

## 2022-11-03 RX ADMIN — POLYETHYLENE GLYCOL 3350, SODIUM SULFATE ANHYDROUS, SODIUM BICARBONATE, SODIUM CHLORIDE, POTASSIUM CHLORIDE 2000 ML: 236; 22.74; 6.74; 5.86; 2.97 POWDER, FOR SOLUTION ORAL at 00:47

## 2022-11-03 RX ADMIN — HYDRALAZINE HYDROCHLORIDE 10 MG: 20 INJECTION INTRAMUSCULAR; INTRAVENOUS at 09:19

## 2022-11-03 RX ADMIN — PROPOFOL 160 MCG/KG/MIN: 10 INJECTION, EMULSION INTRAVENOUS at 10:34

## 2022-11-03 RX ADMIN — PANTOPRAZOLE SODIUM 40 MG: 40 INJECTION, POWDER, FOR SOLUTION INTRAVENOUS at 08:24

## 2022-11-03 RX ADMIN — ATORVASTATIN CALCIUM 10 MG: 20 TABLET, FILM COATED ORAL at 20:04

## 2022-11-03 RX ADMIN — LEVOTHYROXINE SODIUM 100 MCG: 0.1 TABLET ORAL at 18:46

## 2022-11-03 RX ADMIN — SODIUM CHLORIDE 1000 ML: 9 INJECTION, SOLUTION INTRAVENOUS at 10:17

## 2022-11-03 RX ADMIN — POLYETHYLENE GLYCOL 3350, SODIUM SULFATE ANHYDROUS, SODIUM BICARBONATE, SODIUM CHLORIDE, POTASSIUM CHLORIDE 2000 ML: 236; 22.74; 6.74; 5.86; 2.97 POWDER, FOR SOLUTION ORAL at 06:10

## 2022-11-03 RX ADMIN — SODIUM CHLORIDE 75 ML/HR: 9 INJECTION, SOLUTION INTRAVENOUS at 06:10

## 2022-11-03 NOTE — ANESTHESIA POSTPROCEDURE EVALUATION
"Patient: Kendy Worrell    Procedure Summary     Date: 11/03/22 Room / Location: [facility] ENDOSCOPY 10 /  [facility]    Anesthesia Start: 1028 Anesthesia Stop: 1057    Procedure: COLONOSCOPY to anastamosis with biopsies and cold snare polypectomy Diagnosis:       Gastrointestinal hemorrhage associated with anorectal source      (Gastrointestinal hemorrhage associated with anorectal source [K62.5])    Surgeons: Saroj Oakes MD Provider: Chucho Vo MD    Anesthesia Type: MAC ASA Status: 4          Anesthesia Type: MAC    Vitals  Vitals Value Taken Time   /83 11/03/22 1107   Temp     Pulse 65 11/03/22 1107   Resp 16 11/03/22 1107   SpO2 93 % 11/03/22 1107           Post Anesthesia Care and Evaluation    Patient location during evaluation: bedside  Patient participation: complete - patient participated  Level of consciousness: awake and alert  Pain management: adequate    Airway patency: patent  Anesthetic complications: No anesthetic complications    Cardiovascular status: acceptable  Respiratory status: acceptable  Hydration status: acceptable    Comments: /83 (BP Location: Left arm, Patient Position: Lying)   Pulse 65   Temp 36.8 °C (98.3 °F) (Oral)   Resp 16   Ht 149.9 cm (59\")   Wt 86.8 kg (191 lb 5.8 oz)   SpO2 93%   BMI 38.65 kg/m²     Patient is stable postoperatively and has adequately recovered from anesthesia as described above unless otherwise noted      "

## 2022-11-03 NOTE — PLAN OF CARE
Goal Outcome Evaluation:  Plan of Care Reviewed With: patient        Progress: no change  Outcome Evaluation: Patient had no c/o pain through the night. Completed 2 units PRBC's with no issues. Bowel prep started late due to med unavailable at scheduled time, but has been on-going and patient is having BM's that are now light brown/green no signs of bleeding so far. VSS. Plan for colonoscopy this morning. Daily weight. Will continue to monitor closely.

## 2022-11-03 NOTE — ANESTHESIA PREPROCEDURE EVALUATION
Anesthesia Evaluation                  Airway   Mallampati: II  TM distance: >3 FB  Neck ROM: full  no difficulty expected  Dental    (+) poor dentition    Pulmonary    (+) pulmonary embolism, decreased breath sounds,   (-) wheezes  Cardiovascular     Rhythm: irregular  Rate: normal    (+) hypertension, past MI , CAD, dysrhythmias Atrial Fib, Tachycardia, angina with exertion, CHF , murmur, hyperlipidemia,       Neuro/Psych  (+) CVA,    GI/Hepatic/Renal/Endo    (+) morbid obesity, GI bleeding lower active bleeding, thyroid problem hypothyroidism    Musculoskeletal     Abdominal  - normal exam   Substance History      OB/GYN          Other   arthritis,    history of cancer                    Anesthesia Plan    ASA 4     MAC     (D/W pt. MAC and possible awareness intra op.  Pt understands MAC and GA are not the same and the possibility of GA being required for failed MAC)  intravenous induction     Anesthetic plan, risks, benefits, and alternatives have been provided, discussed and informed consent has been obtained with: patient.        CODE STATUS:    Level Of Support Discussed With: Patient  Code Status (Patient has no pulse and is not breathing): CPR (Attempt to Resuscitate)  Medical Interventions (Patient has pulse or is breathing): Full Support

## 2022-11-03 NOTE — PROGRESS NOTES
"Daily progress note    Primary care physician      Chief complaint   Awake and alert and feeling same still with bloody stools but no abdominal pain nausea vomiting and going for endoscopy    History of present illness  82 white female with history of atrial fibrillation on Eliquis coronary artery disease hypertension hyperlipidemia hypothyroidism who is a resident at assisted living and also have hemorrhoids with bloody stools develop significant bloody stools yesterday and presented to [facility] emergency room.  Patient has lower abdominal discomfort but no nausea vomiting.  Patient also denies any chest pain shortness of breath palpitation fever chills.  Patient evaluated in ER and her work-up initially negative hemoglobin stable but she continued to have bright red blood per rectum admit for management.     REVIEW OF SYSTEMS  All systems reviewed and negative except for those discussed in HPI.      PHYSICAL EXAM   Blood pressure 127/83, pulse 65, temperature 98.3 °F (36.8 °C), temperature source Oral, resp. rate 16, height 149.9 cm (59\"), weight 86.8 kg (191 lb 5.8 oz), SpO2 93 %.    GENERAL: Awake and alert, in no distress  HEENT:  Unremarkable  NECK:  Supple  CV: regular rhythm, regular rate  RESPIRATORY: normal effort moving air bilaterally  ABDOMEN: soft, nondistended nontender with normal bowel sounds  MUSCULOSKELETAL: no deformity  NEURO: alert, moves all extremities, follows commands  SKIN: warm, dry     LAB RESULTS  Lab Results (last 24 hours)     Procedure Component Value Units Date/Time    Hemoglobin & Hematocrit, Blood [[id number]]  (Abnormal) Collected: 11/03/22 1213    Specimen: Blood Updated: 11/03/22 1232     Hemoglobin 10.2 g/dL      Hematocrit 31.5 %     Tissue Pathology Exam [[id number]] Collected: 11/03/22 1040    Specimen: Tissue from Large Intestine, Tissue from Large Intestine, Tissue from Large Intestine Updated: 11/03/22 1143    Hemoglobin A1c [[id number]]  (Normal) " Collected: 11/03/22 0557    Specimen: Blood Updated: 11/03/22 1047     Hemoglobin A1C 5.10 %     Narrative:      Hemoglobin A1C Ranges:    Increased Risk for Diabetes  5.7% to 6.4%  Diabetes                     >= 6.5%  Diabetic Goal                < 7.0%    TSH [[id number]]  (Normal) Collected: 11/03/22 0557    Specimen: Blood Updated: 11/03/22 0721     TSH 2.460 uIU/mL     Comprehensive Metabolic Panel [[id number]]  (Abnormal) Collected: 11/03/22 0557    Specimen: Blood Updated: 11/03/22 0717     Glucose 98 mg/dL      BUN 8 mg/dL      Creatinine 0.83 mg/dL      Sodium 138 mmol/L      Potassium 4.1 mmol/L      Comment: Slight hemolysis detected by analyzer. Results may be affected.        Chloride 108 mmol/L      CO2 20.1 mmol/L      Calcium 9.0 mg/dL      Total Protein 5.5 g/dL      Albumin 2.80 g/dL      ALT (SGPT) 29 U/L      AST (SGOT) 48 U/L      Comment: Slight hemolysis detected by analyzer. Results may be affected.        Alkaline Phosphatase 97 U/L      Total Bilirubin 2.3 mg/dL      Globulin 2.7 gm/dL      A/G Ratio 1.0 g/dL      BUN/Creatinine Ratio 9.6     Anion Gap 9.9 mmol/L      eGFR 70.0 mL/min/1.73      Comment: National Kidney Foundation and American Society of Nephrology (ASN) Task Force recommended calculation based on the Chronic Kidney Disease Epidemiology Collaboration (CKD-EPI) equation refit without adjustment for race.       Narrative:      GFR Normal >60  Chronic Kidney Disease <60  Kidney Failure <15    The GFR formula is only valid for adults with stable renal function between ages 18 and 70.    Lipid Panel [[id number]] Collected: 11/03/22 0557    Specimen: Blood Updated: 11/03/22 0715     Total Cholesterol 86 mg/dL      Triglycerides 75 mg/dL      HDL Cholesterol 54 mg/dL      LDL Cholesterol  16 mg/dL      VLDL Cholesterol 16 mg/dL      LDL/HDL Ratio 0.31    Narrative:      Cholesterol Reference Ranges  (U.S. Department of Health and Human Services ATP III Classifications)    Desirable           <200 mg/dL  Borderline High    200-239 mg/dL  High Risk          >240 mg/dL      Triglyceride Reference Ranges  (U.S. Department of Health and Human Services ATP III Classifications)    Normal           <150 mg/dL  Borderline High  150-199 mg/dL  High             200-499 mg/dL  Very High        >500 mg/dL    HDL Reference Ranges  (U.S. Department of Health and Human Services ATP III Classifications)    Low     <40 mg/dl (major risk factor for CHD)  High    >60 mg/dl ('negative' risk factor for CHD)        LDL Reference Ranges  (U.S. Department of Health and Human Services ATP III Classifications)    Optimal          <100 mg/dL  Near Optimal     100-129 mg/dL  Borderline High  130-159 mg/dL  High             160-189 mg/dL  Very High        >189 mg/dL    CBC & Differential [[id number]]  (Abnormal) Collected: 11/03/22 0557    Specimen: Blood Updated: 11/03/22 0653    Narrative:      The following orders were created for panel order CBC & Differential.  Procedure                               Abnormality         Status                     ---------                               -----------         ------                     CBC Auto Differential[[id number]]        Abnormal            Final result                 Please view results for these tests on the individual orders.    CBC Auto Differential [[id number]]  (Abnormal) Collected: 11/03/22 0557    Specimen: Blood Updated: 11/03/22 0653     WBC 7.68 10*3/mm3      RBC 3.10 10*6/mm3      Hemoglobin 9.3 g/dL      Hematocrit 27.9 %      MCV 90.0 fL      MCH 30.0 pg      MCHC 33.3 g/dL      RDW 16.6 %      RDW-SD 54.2 fl      MPV 10.5 fL      Platelets 149 10*3/mm3      Neutrophil % 47.1 %      Lymphocyte % 35.0 %      Monocyte % 14.2 %      Eosinophil % 2.3 %      Basophil % 1.0 %      Neutrophils, Absolute 3.61 10*3/mm3      Lymphocytes, Absolute 2.69 10*3/mm3      Monocytes, Absolute 1.09 10*3/mm3      Eosinophils, Absolute 0.18 10*3/mm3      Basophils, Absolute 0.08  10*3/mm3     Hemoglobin & Hematocrit, Blood [[id number]]  (Abnormal) Collected: 11/02/22 1808    Specimen: Blood Updated: 11/02/22 1911     Hemoglobin 6.3 g/dL      Hematocrit 19.2 %         Imaging Results (Last 24 Hours)     ** No results found for the last 24 hours. **          Current Facility-Administered Medications:   •  albuterol (PROVENTIL) nebulizer solution 0.083% 2.5 mg/3mL, 2.5 mg, Nebulization, Q4H PRN, Saroj Oakes MD  •  atorvastatin (LIPITOR) tablet 10 mg, 10 mg, Oral, Nightly, Saroj Oakes MD, 10 mg at 11/02/22 2039  •  hydrALAZINE (APRESOLINE) injection 10 mg, 10 mg, Intravenous, Q4H PRN, Saroj Oakes MD, 10 mg at 11/03/22 0919  •  levothyroxine (SYNTHROID, LEVOTHROID) tablet 100 mcg, 100 mcg, Oral, Daily, Saroj Oakes MD, 100 mcg at 11/02/22 1222  •  metoprolol succinate XL (TOPROL-XL) 24 hr tablet 100 mg, 100 mg, Oral, Q12H, Saroj Oakes MD, 100 mg at 11/02/22 2037  •  pantoprazole (PROTONIX) injection 40 mg, 40 mg, Intravenous, Q12H, Saroj Oakes MD, 40 mg at 11/03/22 0824  •  sodium chloride 0.9 % flush 10 mL, 10 mL, Intravenous, PRN, Saroj Oakes MD, 10 mL at 11/02/22 2044  •  sodium chloride 0.9 % infusion 1,000 mL, 1,000 mL, Intravenous, Continuous, Saroj Oakes MD, Stopped at 11/03/22 1109  •  sodium chloride 0.9 % infusion, 75 mL/hr, Intravenous, Continuous, Saroj Oakes MD, Last Rate: 75 mL/hr at 11/03/22 0610, 75 mL/hr at 11/03/22 0610     ASSESSMENT  Hematochezia  Probably proctitis  Acute blood loss anemia  History of hemorrhoids  Atrial fibrillation on Eliquis  Hypertension  Hyperlipidemia  Hypothyroidism  Gastroesophageal reflux disease    PLAN  CPM  Continue Protonix  Continue to hold Eliquis  Transfuse as needed  Monitor H&H  Colonoscopy today  GI consult appreciated  Adjust home medications  Stress ulcer DVT prophylaxis  Supportive care  Discussed with nursing staff  Follow closely further  recommendation current hospital course    JULIO MUNOZ MD

## 2022-11-03 NOTE — CASE MANAGEMENT/SOCIAL WORK
Continued Stay Note  [facility]     Patient Name: Kendy Worrell  MRN: [id number]  Today's Date: 11/3/2022    Admit Date: 11/1/2022    Plan: Return to [facility] [place] - PT eval pending   Discharge Plan     Row Name 11/03/22 1355       Plan    Plan Comments C-scope completed.  Waiting on PT to eval.  Trasnfer packet in CCP office. Yanelis SHOOK RN               Discharge Codes    No documentation.               Expected Discharge Date and Time     Expected Discharge Date Expected Discharge Time    Nov 5, 2022             Yanelis Willett RN

## 2022-11-04 LAB
ANION GAP SERPL CALCULATED.3IONS-SCNC: 6.7 MMOL/L (ref 5–15)
BASOPHILS # BLD AUTO: 0.05 10*3/MM3 (ref 0–0.2)
BASOPHILS NFR BLD AUTO: 0.7 % (ref 0–1.5)
BUN SERPL-MCNC: 4 MG/DL (ref 8–23)
BUN/CREAT SERPL: 4.9 (ref 7–25)
CALCIUM SPEC-SCNC: 9 MG/DL (ref 8.6–10.5)
CHLORIDE SERPL-SCNC: 108 MMOL/L (ref 98–107)
CO2 SERPL-SCNC: 24.3 MMOL/L (ref 22–29)
CREAT SERPL-MCNC: 0.81 MG/DL (ref 0.57–1)
DEPRECATED RDW RBC AUTO: 53.6 FL (ref 37–54)
EGFRCR SERPLBLD CKD-EPI 2021: 72.1 ML/MIN/1.73
EOSINOPHIL # BLD AUTO: 0.18 10*3/MM3 (ref 0–0.4)
EOSINOPHIL NFR BLD AUTO: 2.4 % (ref 0.3–6.2)
ERYTHROCYTE [DISTWIDTH] IN BLOOD BY AUTOMATED COUNT: 16 % (ref 12.3–15.4)
GLUCOSE SERPL-MCNC: 102 MG/DL (ref 65–99)
HCT VFR BLD AUTO: 29.4 % (ref 34–46.6)
HGB BLD-MCNC: 9.7 G/DL (ref 12–15.9)
IMM GRANULOCYTES # BLD AUTO: 0.03 10*3/MM3 (ref 0–0.05)
IMM GRANULOCYTES NFR BLD AUTO: 0.4 % (ref 0–0.5)
LAB AP CASE REPORT: NORMAL
LYMPHOCYTES # BLD AUTO: 2.26 10*3/MM3 (ref 0.7–3.1)
LYMPHOCYTES NFR BLD AUTO: 29.5 % (ref 19.6–45.3)
MCH RBC QN AUTO: 29.8 PG (ref 26.6–33)
MCHC RBC AUTO-ENTMCNC: 33 G/DL (ref 31.5–35.7)
MCV RBC AUTO: 90.2 FL (ref 79–97)
MONOCYTES # BLD AUTO: 0.9 10*3/MM3 (ref 0.1–0.9)
MONOCYTES NFR BLD AUTO: 11.8 % (ref 5–12)
NEUTROPHILS NFR BLD AUTO: 4.23 10*3/MM3 (ref 1.7–7)
NEUTROPHILS NFR BLD AUTO: 55.2 % (ref 42.7–76)
NRBC BLD AUTO-RTO: 0.1 /100 WBC (ref 0–0.2)
PATH REPORT.FINAL DX SPEC: NORMAL
PATH REPORT.GROSS SPEC: NORMAL
PLATELET # BLD AUTO: 181 10*3/MM3 (ref 140–450)
PMV BLD AUTO: 10.3 FL (ref 6–12)
POTASSIUM SERPL-SCNC: 3.5 MMOL/L (ref 3.5–5.2)
RBC # BLD AUTO: 3.26 10*6/MM3 (ref 3.77–5.28)
SODIUM SERPL-SCNC: 139 MMOL/L (ref 136–145)
WBC NRBC COR # BLD: 7.65 10*3/MM3 (ref 3.4–10.8)

## 2022-11-04 PROCEDURE — 99232 SBSQ HOSP IP/OBS MODERATE 35: CPT | Performed by: INTERNAL MEDICINE

## 2022-11-04 PROCEDURE — 80048 BASIC METABOLIC PNL TOTAL CA: CPT | Performed by: HOSPITALIST

## 2022-11-04 PROCEDURE — 85025 COMPLETE CBC W/AUTO DIFF WBC: CPT | Performed by: HOSPITALIST

## 2022-11-04 PROCEDURE — 97166 OT EVAL MOD COMPLEX 45 MIN: CPT | Performed by: OCCUPATIONAL THERAPIST

## 2022-11-04 PROCEDURE — 97162 PT EVAL MOD COMPLEX 30 MIN: CPT

## 2022-11-04 PROCEDURE — 94664 DEMO&/EVAL PT USE INHALER: CPT

## 2022-11-04 PROCEDURE — 94761 N-INVAS EAR/PLS OXIMETRY MLT: CPT

## 2022-11-04 PROCEDURE — 94799 UNLISTED PULMONARY SVC/PX: CPT

## 2022-11-04 PROCEDURE — 94640 AIRWAY INHALATION TREATMENT: CPT

## 2022-11-04 PROCEDURE — 94760 N-INVAS EAR/PLS OXIMETRY 1: CPT

## 2022-11-04 PROCEDURE — 97530 THERAPEUTIC ACTIVITIES: CPT

## 2022-11-04 PROCEDURE — 97535 SELF CARE MNGMENT TRAINING: CPT | Performed by: OCCUPATIONAL THERAPIST

## 2022-11-04 RX ORDER — ACETAMINOPHEN 325 MG/1
650 TABLET ORAL EVERY 4 HOURS PRN
Status: DISCONTINUED | OUTPATIENT
Start: 2022-11-04 | End: 2022-11-07

## 2022-11-04 RX ORDER — ISOSORBIDE MONONITRATE 30 MG/1
30 TABLET, EXTENDED RELEASE ORAL DAILY
Status: DISCONTINUED | OUTPATIENT
Start: 2022-11-04 | End: 2022-11-07 | Stop reason: HOSPADM

## 2022-11-04 RX ORDER — AMLODIPINE BESYLATE 5 MG/1
2.5 TABLET ORAL DAILY
Status: DISCONTINUED | OUTPATIENT
Start: 2022-11-04 | End: 2022-11-04

## 2022-11-04 RX ORDER — AMLODIPINE BESYLATE 5 MG/1
5 TABLET ORAL DAILY
Status: DISCONTINUED | OUTPATIENT
Start: 2022-11-04 | End: 2022-11-07 | Stop reason: HOSPADM

## 2022-11-04 RX ADMIN — PANTOPRAZOLE SODIUM 40 MG: 40 TABLET, DELAYED RELEASE ORAL at 07:03

## 2022-11-04 RX ADMIN — ALBUTEROL SULFATE 2.5 MG: 2.5 SOLUTION RESPIRATORY (INHALATION) at 10:26

## 2022-11-04 RX ADMIN — AMLODIPINE BESYLATE 5 MG: 5 TABLET ORAL at 16:34

## 2022-11-04 RX ADMIN — ATORVASTATIN CALCIUM 10 MG: 20 TABLET, FILM COATED ORAL at 21:27

## 2022-11-04 RX ADMIN — LEVOTHYROXINE SODIUM 100 MCG: 0.1 TABLET ORAL at 08:35

## 2022-11-04 RX ADMIN — METOPROLOL SUCCINATE 100 MG: 100 TABLET, EXTENDED RELEASE ORAL at 08:35

## 2022-11-04 RX ADMIN — ISOSORBIDE MONONITRATE 30 MG: 30 TABLET, EXTENDED RELEASE ORAL at 16:34

## 2022-11-04 RX ADMIN — METOPROLOL SUCCINATE 100 MG: 100 TABLET, EXTENDED RELEASE ORAL at 21:30

## 2022-11-04 RX ADMIN — ACETAMINOPHEN 650 MG: 325 TABLET, FILM COATED ORAL at 23:10

## 2022-11-04 RX ADMIN — Medication 10 ML: at 08:35

## 2022-11-04 NOTE — THERAPY EVALUATION
Patient Name: Kendy Worrell  : 1940    MRN: [id number]                              Today's Date: 2022       Admit Date: 2022    Visit Dx:     ICD-10-CM ICD-9-CM   1. Bright red rectal bleeding  K62.5 569.3   2. Gastrointestinal hemorrhage associated with anorectal source  K62.5 569.3     Patient Active Problem List   Diagnosis   • Essential hypertension   • Acquired hypothyroidism   • Coronary artery disease involving native coronary artery of native heart without angina pectoris   • Hyperlipidemia   • Arthritis   • Skin lesion of chest wall   • NSTEMI (non-ST elevated myocardial infarction) (HCC)   • Supraventricular tachycardia (HCC)   • Normal cardiac stress test   • Gastrointestinal bleed   • Angina effort   • Morbidly obese (HCC)   • Bilateral pulmonary embolism (HCC)   • New onset a-fib (HCC)   • UTI (urinary tract infection)   • Chronic diastolic CHF (congestive heart failure) (HCC)   • Acute ischemic stroke (HCC)   • Chest pain   • Bright red rectal bleeding     Past Medical History:   Diagnosis Date   • A-fib (HCC)    • Arthritis    • Breast cancer (HCC)    • CHF (congestive heart failure) (HCC)    • CVA (cerebral vascular accident) (HCC) 2019   • History of pulmonary embolus (PE)    • Hyperlipidemia    • Hypertension    • Hyperthyroidism     patient has hypothyroidism   • Hypothyroidism      Past Surgical History:   Procedure Laterality Date   • BREAST BIOPSY     • BREAST SURGERY Right    • CHOLECYSTECTOMY     • COLON SURGERY      Removed   • COLONOSCOPY     • COLONOSCOPY N/A 11/3/2022    Procedure: COLONOSCOPY to anastamosis with biopsies and cold snare polypectomy;  Surgeon: Saroj Oakes MD;  Location: [facility] ENDOSCOPY;  Service: Gastroenterology;  Laterality: N/A;  PRe: rectal bleeding  Post: hemorrhoids, diverticulosis, anastamotic ulcer, polyp, hemostasis clip free-floating   • HYSTERECTOMY     • MAMMO BILATERAL     • MASTECTOMY     • TONSILLECTOMY        General  Information     Row Name 11/04/22 0958          Physical Therapy Time and Intention    Document Type evaluation  Pt. admitted with Rectal bleeding  -MS     Row Name 11/04/22 0958          General Information    Patient Profile Reviewed yes  -MS     Prior Level of Function independent:  Use of Rollator for ambulation  -MS     Existing Precautions/Restrictions fall  Exit alarm  -MS     Barriers to Rehab none identified  -MS     Row Name 11/04/22 0958          Cognition    Orientation Status (Cognition) oriented x 3  -MS     Row Name 11/04/22 0958          Safety Issues, Functional Mobility    Comment, Safety Issues/Impairments (Mobility) Gait belt used for safety.  -MS           User Key  (r) = Recorded By, (t) = Taken By, (c) = Cosigned By    Initials Name Provider Type    Justin Linares, PT Physical Therapist               Mobility     Row Name 11/04/22 0959          Bed Mobility    Bed Mobility supine-sit;sit-supine  -MS     Supine-Sit [place] (Bed Mobility) contact guard  -MS     Assistive Device (Bed Mobility) bed rails  -MS     Row Name 11/04/22 0959          Sit-Stand Transfer    Sit-Stand [place] (Transfers) contact guard  -MS     Assistive Device (Sit-Stand Transfers) walker, front-wheeled  -MS     Row Name 11/04/22 0959          Gait/Stairs (Locomotion)    [place] Level (Gait) contact guard  -MS     Assistive Device (Gait) walker, front-wheeled  -MS     Distance in Feet (Gait) 12 feet  -MS     Deviations/Abnormal Patterns (Gait) ankit decreased  -MS     Bilateral Gait Deviations forward flexed posture  -MS     Comment, (Gait/Stairs) Verbal/tactile cues given for posture correction and Rwx guidance.  Pt.'s elbow/wrist/hand painful and makes it difficult for her to  the Rwx.  -MS           User Key  (r) = Recorded By, (t) = Taken By, (c) = Cosigned By    Initials Name Provider Type    Justin Linares, PT Physical Therapist               Obj/Interventions     Row Name 11/04/22  0959          Range of Motion Comprehensive    Comment, General Range of Motion BLE (WFL's)  -MS     Row Name 11/04/22 0959          Strength Comprehensive (MMT)    Comment, General Manual Muscle Testing (MMT) Assessment BLE (3/5)  -MS     Row Name 11/04/22 0959          Motor Skills    Therapeutic Exercise --  BLE ther. ex. program x 5 reps completed (Ankle pumps, Hip Flexion, LAQ's)  -MS           User Key  (r) = Recorded By, (t) = Taken By, (c) = Cosigned By    Initials Name Provider Type    Justin Linares, PT Physical Therapist               Goals/Plan     Row Name 11/04/22 1001          Bed Mobility Goal 1 (PT)    Activity/Assistive Device (Bed Mobility Goal 1, PT) bed mobility activities, all  -MS     [place] Level/Cues Needed (Bed Mobility Goal 1, PT) independent  -MS     Time Frame (Bed Mobility Goal 1, PT) long term goal (LTG);1 week  -MS     Row Name 11/04/22 1001          Transfer Goal 1 (PT)    Activity/Assistive Device (Transfer Goal 1, PT) transfers, all;walker, rolling  -MS     [place] Level/Cues Needed (Transfer Goal 1, PT) independent  -MS     Time Frame (Transfer Goal 1, PT) long term goal (LTG);1 week  -MS     Row Name 11/04/22 1001          Gait Training Goal 1 (PT)    Activity/Assistive Device (Gait Training Goal 1, PT) gait (walking locomotion);walker, rolling  -MS     [place] Level (Gait Training Goal 1, PT) independent  -MS     Distance (Gait Training Goal 1, PT) 100 feet  -MS     Time Frame (Gait Training Goal 1, PT) long term goal (LTG);1 week  -MS     Row Name 11/04/22 1001          Therapy Assessment/Plan (PT)    Planned Therapy Interventions (PT) balance training;bed mobility training;gait training;home exercise program;patient/family education;postural re-education;transfer training;strengthening  -MS           User Key  (r) = Recorded By, (t) = Taken By, (c) = Cosigned By    Initials Name Provider Type    Justin Linares, PT Physical Therapist                Clinical Impression     Row Name 11/04/22 1000          Pain    Pretreatment Pain Rating 3/10  -MS     Posttreatment Pain Rating 3/10  -MS     Pain Location - Side/Orientation Left  -MS     Pain Location - elbow;hand;wrist  -MS     Row Name 11/04/22 1000          Plan of Care Review    Plan of Care Reviewed With patient  -MS     Row Name 11/04/22 1000          Therapy Assessment/Plan (PT)    Rehab Potential (PT) good, to achieve stated therapy goals  -MS     Criteria for Skilled Interventions Met (PT) skilled treatment is necessary  -MS     Therapy Frequency (PT) 6 times/wk  -MS     Row Name 11/04/22 1000          Positioning and Restraints    Pre-Treatment Position in bed  -MS     Post Treatment Position chair  -MS     In Chair notified nsg;reclined;sitting;call light within reach;encouraged to call for assist;exit alarm on  All lines intact.  -MS           User Key  (r) = Recorded By, (t) = Taken By, (c) = Cosigned By    Initials Name Provider Type    Justin Linares, PT Physical Therapist               Outcome Measures     Row Name 11/04/22 1001          How much help from another person do you currently need...    Turning from your back to your side while in flat bed without using bedrails? 3  -MS     Moving from lying on back to sitting on the side of a flat bed without bedrails? 3  -MS     Moving to and from a bed to a chair (including a wheelchair)? 3  -MS     Standing up from a chair using your arms (e.g., wheelchair, bedside chair)? 3  -MS     Climbing 3-5 steps with a railing? 2  -MS     To walk in hospital room? 3  -MS     AM-PAC 6 Clicks Score (PT) 17  -MS     Highest level of mobility 5 --> Static standing  -MS     Row Name 11/04/22 1001          Functional Assessment    Outcome Measure Options AM-PAC 6 Clicks Basic Mobility (PT)  -MS           User Key  (r) = Recorded By, (t) = Taken By, (c) = Cosigned By    Initials Name Provider Type    Justin Linares, PT Physical Therapist                              Physical Therapy Education     Title: PT OT SLP Therapies (Done)     Topic: Physical Therapy (Done)     Point: Mobility training (Done)     Learning Progress Summary           Patient Acceptance, E,D, VU,NR by MS at 11/4/2022 1002                   Point: Home exercise program (Done)     Learning Progress Summary           Patient Acceptance, E,D, VU,NR by MS at 11/4/2022 1002                   Point: Body mechanics (Done)     Learning Progress Summary           Patient Acceptance, E,D, VU,NR by MS at 11/4/2022 1002                   Point: Precautions (Done)     Learning Progress Summary           Patient Acceptance, E,D, VU,NR by MS at 11/4/2022 1002                               User Key     Initials Effective Dates Name Provider Type Discipline    MS 06/16/21 -  Justin Santa, PT Physical Therapist PT              PT Recommendation and Plan  Planned Therapy Interventions (PT): balance training, bed mobility training, gait training, home exercise program, patient/family education, postural re-education, transfer training, strengthening  Plan of Care Reviewed With: patient  Outcome Evaluation: Pt. is an 82 year old Female admitted to the hospital with rectal bleeding.  Pt. reports that prior to admission she was using a Rollator for ambulation.  Pt. currently presents with decreased strength, decreased balance, and decreased tolerance to functional activity.  This AM, pt. able to ambulate 12 feet, CGA x 1, with use of Rwx.  Pt. requires CGA x 1 for bed mobility and CGA x 1 for sit <-> stand transfers.  BLE ther. ex. program x 5 reps completed for general strengthening.  Verbal/tactile cues given for posture correction and Rwx guidance.  Pt. will benefit from skilled inpt. P.T. to address her functional deficits and to assist pt. in regaining her maximum level of independence with functional mobility.     Time Calculation:    PT Charges     Row Name 11/04/22 100             Time Calculation     Start Time 0938  -MS      Stop Time 0953  -MS      Time Calculation (min) 15 min  -MS      PT Received On 11/04/22  -MS      PT - Next Appointment 11/05/22  -MS      PT Goal Re-Cert Due Date 11/11/22  -MS         Time Calculation- PT    Total Timed Code Minutes- PT 14 minute(s)  -MS            User Key  (r) = Recorded By, (t) = Taken By, (c) = Cosigned By    Initials Name Provider Type    Justin Linares, PT Physical Therapist              Therapy Charges for Today     Code Description Service Date Service Provider Modifiers Qty    36746481380 HC PT EVAL MOD COMPLEXITY 2 11/4/2022 Justin Santa, PT GP 1    50149487585 HC PT THERAPEUTIC ACT EA 15 MIN 11/4/2022 Justin Santa, PT GP 1          PT G-Codes  Outcome Measure Options: AM-PAC 6 Clicks Basic Mobility (PT)  AM-PAC 6 Clicks Score (PT): 17    Justin Santa, PT  11/4/2022

## 2022-11-04 NOTE — PLAN OF CARE
Goal Outcome Evaluation:  Plan of Care Reviewed With: patient        Progress: no change  Outcome Evaluation: pt evaluated for OT this date, seen as a co treat w PT. pt was admitted w rectal bleeding and was mostly independent PTA and lives in [place]. she reports using a rollator and shower chair PTA. pt this date had swelling and pain in L UE and pt reports IV blew out yesterday, woke up w pain and swelling from it. pt this date was min A for sup to sit, sitting balance was SBA. pt stood w minx2 and walked to chair w walker. A w moving walker due to incr pain inL UE. pt had difficulty holding onto walker w L hand due to swelling/pain. pt was dep w socks this date and SBA w grooming skills.pt cont to benefit from OT to incr ADL, strength, balance, and tsf. recommend SNU at [place] vs [facility] pending progress in therapy.  OT wore all PPE, washed hands before/after.

## 2022-11-04 NOTE — THERAPY EVALUATION
Patient Name: Kendy Worrell  : 1940    MRN: [id number]                              Today's Date: 2022       Admit Date: 2022    Visit Dx:     ICD-10-CM ICD-9-CM   1. Bright red rectal bleeding  K62.5 569.3   2. Gastrointestinal hemorrhage associated with anorectal source  K62.5 569.3     Patient Active Problem List   Diagnosis   • Essential hypertension   • Acquired hypothyroidism   • Coronary artery disease involving native coronary artery of native heart without angina pectoris   • Hyperlipidemia   • Arthritis   • Skin lesion of chest wall   • NSTEMI (non-ST elevated myocardial infarction) (HCC)   • Supraventricular tachycardia (HCC)   • Normal cardiac stress test   • Gastrointestinal bleed   • Angina effort   • Morbidly obese (HCC)   • Bilateral pulmonary embolism (HCC)   • New onset a-fib (HCC)   • UTI (urinary tract infection)   • Chronic diastolic CHF (congestive heart failure) (HCC)   • Acute ischemic stroke (HCC)   • Chest pain   • Bright red rectal bleeding     Past Medical History:   Diagnosis Date   • A-fib (HCC)    • Arthritis    • Breast cancer (HCC)    • CHF (congestive heart failure) (HCC)    • CVA (cerebral vascular accident) (HCC) 2019   • History of pulmonary embolus (PE)    • Hyperlipidemia    • Hypertension    • Hyperthyroidism     patient has hypothyroidism   • Hypothyroidism      Past Surgical History:   Procedure Laterality Date   • BREAST BIOPSY     • BREAST SURGERY Right    • CHOLECYSTECTOMY     • COLON SURGERY      Removed   • COLONOSCOPY     • COLONOSCOPY N/A 11/3/2022    Procedure: COLONOSCOPY to anastamosis with biopsies and cold snare polypectomy;  Surgeon: Saroj Oakes MD;  Location: [facility] ENDOSCOPY;  Service: Gastroenterology;  Laterality: N/A;  PRe: rectal bleeding  Post: hemorrhoids, diverticulosis, anastamotic ulcer, polyp, hemostasis clip free-floating   • HYSTERECTOMY     • MAMMO BILATERAL     • MASTECTOMY     • TONSILLECTOMY        General  Information     Row Name 11/04/22 1255          OT Time and Intention    Document Type evaluation  -     Mode of Treatment co-treatment;occupational therapy;physical therapy  -     Row Name 11/04/22 1255          General Information    Patient Profile Reviewed yes  -     Prior Level of Function independent:;ADL's;bed mobility;gait;transfer;all household mobility  uses a rollator and shower chair at her [facility]     Existing Precautions/Restrictions fall  -     Barriers to Rehab none identified  -     Row Name 11/04/22 1255          Living Environment    People in Home --  [facility]     Row Name 11/04/22 1255          Cognition    Orientation Status (Cognition) oriented x 3  -     Row Name 11/04/22 1255          Safety Issues, Functional Mobility    Impairments Affecting Function (Mobility) balance;endurance/activity tolerance;strength;pain  L UE elbow hurting, noted swelling in L hand, pt report an IV blew out.  -           User Key  (r) = Recorded By, (t) = Taken By, (c) = Cosigned By    Initials Name Provider Type     Anel Tanner, OTR Occupational Therapist                 Mobility/ADL's     Row Name 11/04/22 1256          Bed Mobility    Supine-Sit [place] (Bed Mobility) set up;contact guard  [facility]     Assistive Device (Bed Mobility) bed rails  -     Row Name 11/04/22 1256          Transfers    Transfers sit-stand transfer;stand-sit transfer;bed-chair transfer  -     Row Name 11/04/22 1256          Bed-Chair Transfer    Bed-Chair [place] (Transfers) set up;minimum assist (75% patient effort)  [facility]     Assistive Device (Bed-Chair Transfers) walker, front-wheeled  -     Row Name 11/04/22 1256          Sit-Stand Transfer    Sit-Stand [place] (Transfers) set up;contact guard  [facility]     Assistive Device (Sit-Stand Transfers) walker, front-wheeled  -     Row Name 11/04/22 1256          Stand-Sit Transfer    Stand-Sit [place] (Transfers) set up;contact guard  [facility]      Assistive Device (Stand-Sit Transfers) walker, front-wheeled  -     Row Name 11/04/22 1256          Functional Mobility    Functional Mobility- Ind. Level minimum assist (75% patient effort);1 person + 1 person to manage equipment;2 person assist required  -     Functional Mobility- Comment walks to chair on other side of room. pt req A w walker movement due to L UE hurting and difficulty to hold walker  -     Row Name 11/04/22 1256          Activities of Daily Living    BADL Assessment/Intervention grooming;lower body dressing  -     Row Name 11/04/22 1256          Grooming Assessment/Training    [place] Level (Grooming) grooming skills;wash face, hands;standby assist;set up  -     Position (Grooming) sitting up in bed  -[facility] Name 11/04/22 1256          Lower Body Dressing Assessment/Training    [place] Level (Lower Body Dressing) lower body dressing skills;don;socks;dependent (less than 25% patient effort)  -     Position (Lower Body Dressing) edge of bed sitting  -           User Key  (r) = Recorded By, (t) = Taken By, (c) = Cosigned By    Initials Name Provider Type     Anel Tanner, OTR Occupational Therapist               Obj/Interventions     Row Name 11/04/22 1305          Sensory Assessment (Somatosensory)    Sensory Assessment (Somatosensory) UE sensation intact  -[facility] Name 11/04/22 1305          Vision Assessment/Intervention    Visual Impairment/Limitations WFL  -     Row Name 11/04/22 1305          Range of Motion Comprehensive    Comment, General Range of Motion R UE WLF L UE decr some due to pain in elbow and swelling overnight  -     Row Name 11/04/22 1305          Strength Comprehensive (MMT)    General Manual Muscle Testing (MMT) Assessment upper extremity strength deficits identified  -     Comment, General Manual Muscle Testing (MMT) Assessment R UE 3+/5 L UE NT  -     Row Name 11/04/22 1305          Motor Skills    Motor Skills  coordination;functional endurance  -     Coordination left;upper extremity;gross motor deficit  due to pain in elbow and swelling  -     Row Name 11/04/22 1305          Balance    Balance Assessment sitting static balance;standing static balance  -     Static Sitting Balance set-up;standby assist  -     Position, Sitting Balance sitting edge of bed  -     Static Standing Balance set-up;contact guard;minimal assist  -     Position/Device Used, Standing Balance walker, front-wheeled  -     Balance Interventions sitting;sit to stand;supported;dynamic;static;standing;occupation based/functional task  -           User Key  (r) = Recorded By, (t) = Taken By, (c) = Cosigned By    Initials Name Provider Type     Anel Tanner, OTR Occupational Therapist               Goals/Plan     Row Name 11/04/22 1309          Transfer Goal 1 (OT)    Activity/Assistive Device (Transfer Goal 1, OT) bed-to-chair/chair-to-bed;sit-to-stand/stand-to-sit;toilet  -     [place] Level/Cues Needed (Transfer Goal 1, OT) standby assist  -     Time Frame (Transfer Goal 1, OT) short term goal (STG);2 weeks  -KP     Progress/Outcome (Transfer Goal 1, OT) goal ongoing  -     Row Name 11/04/22 1309          Bathing Goal 1 (OT)    Activity/Device (Bathing Goal 1, OT) bathing skills, all  -KP     [place] Level/Cues Needed (Bathing Goal 1, OT) standby assist  -KP     Time Frame (Bathing Goal 1, OT) short term goal (STG);2 weeks  -KP     Progress/Outcomes (Bathing Goal 1, OT) goal ongoing  -     Row Name 11/04/22 1309          Strength Goal 1 (OT)    Strength Goal 1 (OT) incr UE to 4/5  -KP     Time Frame (Strength Goal 1, OT) short term goal (STG);2 weeks  -     Progress/Outcome (Strength Goal 1, OT) goal ongoing  -     Row Name 11/04/22 1309          Therapy Assessment/Plan (OT)    Planned Therapy Interventions (OT) activity tolerance training;functional balance retraining;occupation/activity based  interventions;ROM/therapeutic exercise;strengthening exercise;transfer/mobility retraining;BADL retraining  -           User Key  (r) = Recorded By, (t) = Taken By, (c) = Cosigned By    Initials Name Provider Type     Anel Tanner, OTR Occupational Therapist               Clinical Impression     Row Name 11/04/22 1306          Pain Assessment    Pretreatment Pain Rating 3/10  -KP     Posttreatment Pain Rating 3/10  -     Pain Location - Side/Orientation Left  -     Pain Location upper  -     Pain Location - elbow;wrist;hand  -     Pain Intervention(s) Repositioned  -     Row Name 11/04/22 1306          Plan of Care Review    Plan of Care Reviewed With patient  -     Progress no change  -     Outcome Evaluation pt evaluated for OT this date, seen as a co treat w PT. pt was admitted w rectal bleeding and was mostly independent PTA and lives in [place]. she reports using a rollator and shower chair PTA. pt this date had swelling and pain in L UE and pt reports IV blew out yesterday, woke up w pain and swelling from it. pt this date was min A for sup to sit, sitting balance was SBA. pt stood w minx2 and walked to chair w walker. A w moving walker due to incr pain inL UE. pt had difficulty holding onto walker w L hand due to swelling/pain. pt was dep w socks this date and SBA w grooming skills.pt cont to benefit from OT to incr ADL, strength, balance, and tsf. recommend SNU at [place] vs [facility] pending progress in therapy.  -     Row Name 11/04/22 1306          Therapy Assessment/Plan (OT)    Rehab Potential (OT) good, to achieve stated therapy goals  -     Criteria for Skilled Therapeutic Interventions Met (OT) yes;meets criteria;skilled treatment is necessary  -     Therapy Frequency (OT) 5 times/wk  -     Row Name 11/04/22 1306          Therapy Plan Review/Discharge Plan (OT)    Anticipated Discharge Disposition (OT) skilled nursing facility;assisted living  -     Row Name 11/04/22 1306           Positioning and Restraints    Pre-Treatment Position in bed  -KP     Post Treatment Position chair  -KP     In Chair notified nsg;reclined;call light within reach;encouraged to call for assist  alarm pad in chair, no box in room, notified RN  -KP           User Key  (r) = Recorded By, (t) = Taken By, (c) = Cosigned By    Initials Name Provider Type    Anel Contreras, OTR Occupational Therapist               Outcome Measures     Row Name 11/04/22 1310          How much help from another is currently needed...    Putting on and taking off regular lower body clothing? 1  -KP     Bathing (including washing, rinsing, and drying) 2  -KP     Toileting (which includes using toilet bed pan or urinal) 2  -KP     Putting on and taking off regular upper body clothing 2  -KP     Taking care of personal grooming (such as brushing teeth) 3  -KP     Eating meals 3  -KP     AM-PAC 6 Clicks Score (OT) 13  -KP     Row Name 11/04/22 1001          How much help from another person do you currently need...    Turning from your back to your side while in flat bed without using bedrails? 3  -MS     Moving from lying on back to sitting on the side of a flat bed without bedrails? 3  -MS     Moving to and from a bed to a chair (including a wheelchair)? 3  -MS     Standing up from a chair using your arms (e.g., wheelchair, bedside chair)? 3  -MS     Climbing 3-5 steps with a railing? 2  -MS     To walk in hospital room? 3  -MS     AM-PAC 6 Clicks Score (PT) 17  -MS     Highest level of mobility 5 --> Static standing  -MS     Row Name 11/04/22 1310 11/04/22 1001       Functional Assessment    Outcome Measure Options AM-PAC 6 Clicks Daily Activity (OT)  -KP AM-PAC 6 Clicks Basic Mobility (PT)  -MS          User Key  (r) = Recorded By, (t) = Taken By, (c) = Cosigned By    Initials Name Provider Type    Anel Contreras, OTR Occupational Therapist    Justin Linares, PT Physical Therapist                Occupational  Therapy Education     Title: PT OT SLP Therapies (Done)     Topic: Occupational Therapy (Done)     Point: ADL training (Done)     Description:   Instruct learner(s) on proper safety adaptation and remediation techniques during self care or transfers.   Instruct in proper use of assistive devices.              Learning Progress Summary           Patient Acceptance, E,TB,D, VU,DU by  at 11/4/2022 1311    Comment: pt ed on role of OT, benefit of therapy, POC w.OT. ed on safety w. ADL and tsf . pt demo ADL w dep LBD and SBA grooming.                   Point: Home exercise program (Done)     Description:   Instruct learner(s) on appropriate technique for monitoring, assisting and/or progressing therapeutic exercises/activities.              Learning Progress Summary           Patient Acceptance, E,TB,D, VU,DU by  at 11/4/2022 1311    Comment: pt ed on role of OT, benefit of therapy, POC w.OT. ed on safety w. ADL and tsf . pt demo ADL w dep LBD and SBA grooming.                   Point: Precautions (Done)     Description:   Instruct learner(s) on prescribed precautions during self-care and functional transfers.              Learning Progress Summary           Patient Acceptance, E,TB,D, VU,DU by  at 11/4/2022 1311    Comment: pt ed on role of OT, benefit of therapy, POC w.OT. ed on safety w. ADL and tsf . pt demo ADL w dep LBD and SBA grooming.                   Point: Body mechanics (Done)     Description:   Instruct learner(s) on proper positioning and spine alignment during self-care, functional mobility activities and/or exercises.              Learning Progress Summary           Patient Acceptance, E,TB,D, VU,DU by  at 11/4/2022 1311    Comment: pt ed on role of OT, benefit of therapy, POC w.OT. ed on safety w. ADL and tsf . pt demo ADL w dep LBD and SBA grooming.                               User Key     Initials Effective Dates Name Provider Type Discipline     06/16/21 -  Anel Tanner OTR  Occupational Therapist OT              OT Recommendation and Plan  Planned Therapy Interventions (OT): activity tolerance training, functional balance retraining, occupation/activity based interventions, ROM/therapeutic exercise, strengthening exercise, transfer/mobility retraining, BADL retraining  Therapy Frequency (OT): 5 times/wk  Plan of Care Review  Plan of Care Reviewed With: patient  Progress: no change  Outcome Evaluation: pt evaluated for OT this date, seen as a co treat w PT. pt was admitted w rectal bleeding and was mostly independent PTA and lives in [facility]. she reports using a rollator and shower chair PTA. pt this date had swelling and pain in L UE and pt reports IV blew out yesterday, woke up w pain and swelling from it. pt this date was min A for sup to sit, sitting balance was SBA. pt stood w minx2 and walked to chair w walker. A w moving walker due to incr pain inL UE. pt had difficulty holding onto walker w L hand due to swelling/pain. pt was dep w socks this date and SBA w grooming skills.pt cont to benefit from OT to incr ADL, strength, balance, and tsf. recommend SNU at [place] vs [facility] pending progress in therapy.     Time Calculation:    Time Calculation- OT     Row Name 11/04/22 1314             Time Calculation- OT    OT Start Time 0937  -      OT Stop Time 0954  -      OT Time Calculation (min) 17 min  -      Total Timed Code Minutes- OT 10 minute(s)  -KP      OT Received On 11/04/22  -      OT - Next Appointment 11/07/22  -      OT Goal Re-Cert Due Date 11/18/22  -         Timed Charges    58355 - OT Self Care/Mgmt Minutes 10  -KP         Untimed Charges    OT Eval/Re-eval Minutes 7  -KP         Total Minutes    Timed Charges Total Minutes 10  -KP      Untimed Charges Total Minutes 7  -KP       Total Minutes 17  -KP            User Key  (r) = Recorded By, (t) = Taken By, (c) = Cosigned By    Initials Name Provider Type    Anel Contreras, OTR Occupational Therapist               Therapy Charges for Today     Code Description Service Date Service Provider Modifiers Qty    06107991338 HC OT SELF CARE/MGMT/TRAIN EA 15 MIN 11/4/2022 Anel Tanner, OTR GO 1    [id number]  OT EVAL MOD COMPLEXITY 2 11/4/2022 Anel Tanner, DAIJA GO 1               Anel Tanner, DAIJA  11/4/2022

## 2022-11-04 NOTE — PROGRESS NOTES
[facility]  Inpatient Progress Note    Reason for Followup:  Rectal bleeding    Subjective     Interval History:   No new issues, perhaps some scant bleeding with BM      Current Facility-Administered Medications:   •  albuterol (PROVENTIL) nebulizer solution 0.083% 2.5 mg/3mL, 2.5 mg, Nebulization, Q4H PRN, Saroj Oakes MD, 2.5 mg at 11/04/22 1026  •  atorvastatin (LIPITOR) tablet 10 mg, 10 mg, Oral, Nightly, Saroj Oakes MD, 10 mg at 11/03/22 2004  •  levothyroxine (SYNTHROID, LEVOTHROID) tablet 100 mcg, 100 mcg, Oral, Daily, Saroj Oakes MD, 100 mcg at 11/04/22 0835  •  metoprolol succinate XL (TOPROL-XL) 24 hr tablet 100 mg, 100 mg, Oral, Q12H, Saroj Oakes MD, 100 mg at 11/04/22 0835  •  pantoprazole (PROTONIX) EC tablet 40 mg, 40 mg, Oral, Q AM, Skyler Candelaria MD, 40 mg at 11/04/22 0703  •  sodium chloride 0.9 % flush 10 mL, 10 mL, Intravenous, PRN, Saroj Oakes MD, 10 mL at 11/04/22 0835  Review of Systems:    The following systems were reviewed and negative;  gastrointestinal    Objective     Vital Signs  Temp:  [98 °F (36.7 °C)-98.5 °F (36.9 °C)] 98.5 °F (36.9 °C)  Heart Rate:  [59-90] 59  Resp:  [14-16] 14  BP: (140-173)/(72-94) 165/92  Body mass index is 38.47 kg/m².    Intake/Output Summary (Last 24 hours) at 11/4/2022 1201  Last data filed at 11/3/2022 2324  Gross per 24 hour   Intake 60 ml   Output 500 ml   Net -440 ml     No intake/output data recorded.     Physical Exam:   General: patient awake, alert and cooperative   Abdomen: soft, nontender, nondistended; normal bowel sounds   Rectal: deferred   Extremities: no rash or edema   Psychiatric: Normal mood and behavior; memory intact     Results Review:     I reviewed the patient's new clinical results.    Results from last 7 days   Lab Units 11/04/22  0808 11/03/22  1213 11/03/22  0557 11/02/22  1211 11/01/22  1817   WBC 10*3/mm3 7.65  --  7.68  --  7.19   HEMOGLOBIN g/dL 9.7*  10.2* 9.3*   < > 10.2*   HEMATOCRIT % 29.4* 31.5* 27.9*   < > 30.8*   PLATELETS 10*3/mm3 181  --  149  --  199    < > = values in this interval not displayed.     Results from last 7 days   Lab Units 11/04/22  0808 11/03/22  0557 11/02/22  0656 11/01/22  1817   SODIUM mmol/L 139 138 139 139   POTASSIUM mmol/L 3.5 4.1 4.4 4.3   CHLORIDE mmol/L 108* 108* 109* 107   CO2 mmol/L 24.3 20.1* 22.0 26.0   BUN mg/dL 4* 8 12 10   CREATININE mg/dL 0.81 0.83 0.81 0.97   CALCIUM mg/dL 9.0 9.0 8.6 9.5   BILIRUBIN mg/dL  --  2.3*  --  1.1   ALK PHOS U/L  --  97  --  136*   ALT (SGPT) U/L  --  29  --  33   AST (SGOT) U/L  --  48*  --  53*   GLUCOSE mg/dL 102* 98 104* 97         Lab Results   Lab Value Date/Time    LIPASE 32 11/01/2022 1817       Radiology:  [unfilled]      Assessment & Plan   Assessment:   1.  Rectal bleeding  2.  Internal hemorrhoids  3.  Colon polyps  4.  Anastomotic ulcer    Plan:   Colonoscopy yesterday without active bleeding.  Await pathology.  Hb stable  Ansul supp for hemorrhoids  Ok to restart NOAC tomorrow if no further bleeding    I discussed the patient's findings and my recommendations with patient.          Saroj Oakes M.D.  [facility]  [address] [place] [address]  Office: [phone]

## 2022-11-04 NOTE — CASE MANAGEMENT/SOCIAL WORK
Continued Stay Note  [facility]     Patient Name: Kendy Worrell  MRN: [id number]  Today's Date: 11/4/2022    Admit Date: 11/1/2022    Plan: return to [facility] - PT eval.   Discharge Plan     Row Name 11/04/22 1431       Plan    Plan return to [facility] - PT eval.    Patient/Family in Agreement with Plan yes    Plan Comments PT eval faxed to [facility] [phone] and patient can return this weekend.  She would prefer that patient have [facility] follow at [place].  Spoke with patient and daughter at bedside and they are agreeable.  Referral sent to [facility] and called to [facility].  Family will transport at [place].  Transfer packet in [facility] office. Yanelis SHOOK RN               Discharge Codes    No documentation.               Expected Discharge Date and Time     Expected Discharge Date Expected Discharge Time    Nov 5, 2022             Yanelis Willett RN

## 2022-11-04 NOTE — PLAN OF CARE
Goal Outcome Evaluation:      Vss overnight on RA, no complaints of pain, no signs of bleeding, voiding via purewick, 1 BM overnight

## 2022-11-04 NOTE — PROGRESS NOTES
"Daily progress note    Primary care physician      Chief complaint   Doing better this morning with no new complaints but denies any more bloody stools but still has some abdominal discomfort and no nausea vomiting.  Patient family at bedside.    History of present illness  82 white female with history of atrial fibrillation on Eliquis coronary artery disease hypertension hyperlipidemia hypothyroidism who is a resident at assisted living and also have hemorrhoids with bloody stools develop significant bloody stools yesterday and presented to [facility] emergency room.  Patient has lower abdominal discomfort but no nausea vomiting.  Patient also denies any chest pain shortness of breath palpitation fever chills.  Patient evaluated in ER and her work-up initially negative hemoglobin stable but she continued to have bright red blood per rectum admit for management.     REVIEW OF SYSTEMS  Unremarkable except for lower abdominal discomfort     PHYSICAL EXAM   Blood pressure 150/88, pulse 68, temperature 97.6 °F (36.4 °C), temperature source Oral, resp. rate 16, height 149.9 cm (59\"), weight 86.4 kg (190 lb 7.6 oz), SpO2 98 %.    GENERAL: Awake and alert, in no distress  HEENT:  Unremarkable  NECK:  Supple  CV: regular rhythm, regular rate  RESPIRATORY: normal effort moving air bilaterally  ABDOMEN: soft, nondistended nontender with normal bowel sounds  MUSCULOSKELETAL: no deformity  NEURO: alert, moves all extremities, follows commands  SKIN: warm, dry     LAB RESULTS  Lab Results (last 24 hours)     Procedure Component Value Units Date/Time    Tissue Pathology Exam [[id number]] Collected: 11/03/22 1040    Specimen: Tissue from Large Intestine, Tissue from Large Intestine, Tissue from Large Intestine Updated: 11/04/22 0934     Case Report --     Surgical Pathology Report                         Case: [id number]                                  Authorizing Provider:  Saroj Oakes MD  " Collected:           11/03/2022 10:40 AM          Ordering Location:     [facility]  Received:            11/03/2022 11:43 AM                                 ENDO SUITES                                                                  Pathologist:           Hyacinth Angeles MD                                                          Specimens:   1) - Large Intestine, anastamosis bxs                                                               2) - Large Intestine, polyp @ 40cm                                                                  3) - Large Intestine, anastamotic polyp                                                     Final Diagnosis --     1. Anastomosis, Biopsy:  Small bowel mucosa with   A. Architectural distortion, mild mixed inflammation and reactive epithelial changes.   B. No dysplasia nor malignancy.   C. No granulomata, viral inclusions nor intestinal parasites identified.    2. Colon, 40 cm, Biopsy:    A. Tubular adenoma.    3. Colon, Anastomotic, Biopsy:   A. Fragments of tubular adenoma with erosion and inflamed granulation tissue.    [facility]/clm       Gross Description --     1. Large Intestine.  The specimen is received in formalin labeled with the patient's name and further designated 'anastamosis biopsy' is a small fragment of gray-tan tissue. The specimen is submitted for embedding as received.    2. Large Intestine.  The specimen is received in formalin labeled with the patient's name and further designated 'large intestine biopsy at 40 cm' is a small fragment of gray-tan tissue. The specimen is submitted for embedding as received.    3. Large Intestine.    The specimen is received in formalin labeled with the patient's name and further designated 'anastamotic polyp biopsy' are multiple small fragments of gray-tan tissue. The specimen is submitted for embedding as received.        CBC & Differential [[id number]]  (Abnormal) Collected: 11/04/22 0808    Specimen: Blood  Updated: 11/04/22 0934    Narrative:      The following orders were created for panel order CBC & Differential.  Procedure                               Abnormality         Status                     ---------                               -----------         ------                     CBC Auto Differential[[id number]]        Abnormal            Final result                 Please view results for these tests on the individual orders.    CBC Auto Differential [[id number]]  (Abnormal) Collected: 11/04/22 0808    Specimen: Blood Updated: 11/04/22 0934     WBC 7.65 10*3/mm3      RBC 3.26 10*6/mm3      Hemoglobin 9.7 g/dL      Hematocrit 29.4 %      MCV 90.2 fL      MCH 29.8 pg      MCHC 33.0 g/dL      RDW 16.0 %      RDW-SD 53.6 fl      MPV 10.3 fL      Platelets 181 10*3/mm3      Neutrophil % 55.2 %      Lymphocyte % 29.5 %      Monocyte % 11.8 %      Eosinophil % 2.4 %      Basophil % 0.7 %      Immature Grans % 0.4 %      Neutrophils, Absolute 4.23 10*3/mm3      Lymphocytes, Absolute 2.26 10*3/mm3      Monocytes, Absolute 0.90 10*3/mm3      Eosinophils, Absolute 0.18 10*3/mm3      Basophils, Absolute 0.05 10*3/mm3      Immature Grans, Absolute 0.03 10*3/mm3      nRBC 0.1 /100 WBC     Basic Metabolic Panel [[id number]]  (Abnormal) Collected: 11/04/22 0808    Specimen: Blood Updated: 11/04/22 0917     Glucose 102 mg/dL      BUN 4 mg/dL      Creatinine 0.81 mg/dL      Sodium 139 mmol/L      Potassium 3.5 mmol/L      Chloride 108 mmol/L      CO2 24.3 mmol/L      Calcium 9.0 mg/dL      BUN/Creatinine Ratio 4.9     Anion Gap 6.7 mmol/L      eGFR 72.1 mL/min/1.73      Comment: National Kidney Foundation and American Society of Nephrology (ASN) Task Force recommended calculation based on the Chronic Kidney Disease Epidemiology Collaboration (CKD-EPI) equation refit without adjustment for race.       Narrative:      GFR Normal >60  Chronic Kidney Disease <60  Kidney Failure <15    The GFR formula is only valid for adults with  stable renal function between ages 18 and 70.        Imaging Results (Last 24 Hours)     ** No results found for the last 24 hours. **        Colonoscopy results noted and discussed with patient and family    Current Facility-Administered Medications:   •  albuterol (PROVENTIL) nebulizer solution 0.083% 2.5 mg/3mL, 2.5 mg, Nebulization, Q4H PRN, Saroj Oakes MD, 2.5 mg at 11/04/22 1026  •  atorvastatin (LIPITOR) tablet 10 mg, 10 mg, Oral, Nightly, Saroj Oakes MD, 10 mg at 11/03/22 2004  •  levothyroxine (SYNTHROID, LEVOTHROID) tablet 100 mcg, 100 mcg, Oral, Daily, Saroj Oakes MD, 100 mcg at 11/04/22 0835  •  metoprolol succinate XL (TOPROL-XL) 24 hr tablet 100 mg, 100 mg, Oral, Q12H, Saroj Oakes MD, 100 mg at 11/04/22 0835  •  pantoprazole (PROTONIX) EC tablet 40 mg, 40 mg, Oral, Q AM, Julio Candelaria MD, 40 mg at 11/04/22 0703  •  sodium chloride 0.9 % flush 10 mL, 10 mL, Intravenous, PRN, Saroj Oakes MD, 10 mL at 11/04/22 0835     ASSESSMENT  Hematochezia resolved  Internal hemorrhoids  Colon polyps  Probably proctitis  Acute blood loss anemia  Atrial fibrillation   Hypertension  Hyperlipidemia  Hypothyroidism  Gastroesophageal reflux disease    PLAN  CPM  Continue Protonix  Resume Eliquis in a.m.  Transfuse as needed  Monitor H&H  GI consult appreciated  Adjust home medications  Stress ulcer DVT prophylaxis  Supportive care  Discussed with nursing staff  Discharge planning    JULIO CANDELARIA MD

## 2022-11-04 NOTE — DISCHARGE PLACEMENT REQUEST
"Anaid Oseas CID (82 y.o. Female)     Date of Birth   1940    Social Security Number       Address   [address]  [place]    Home Phone   [phone]    MRN   [id number]       [employer]   [place]    Marital Status   Single                            Admission Date   11/1/22    Admission Type   Emergency    Admitting Provider   Skyler Candelaria MD    Attending Provider   Skyler Candelaria MD    Department, Room/Bed   [address], [address]/1       Discharge Date       Discharge Disposition       Discharge Destination                               Attending Provider: Skyler Candelaria MD    Allergies: Aspirin, Nsaids    Isolation: None   Infection: None   Code Status: CPR    Ht: 149.9 cm (59\")   Wt: 86.4 kg (190 lb 7.6 oz)    Admission Cmt: None   Principal Problem: Bright red rectal bleeding [K62.5]                 Active Insurance as of 11/1/2022     Primary Coverage     Payor Plan Insurance Group Employer/Plan Group    MEDICARE MEDICARE A & B      Payor Plan Address Payor Plan Phone Number Payor Plan Fax Number Effective Dates    [address] [phone]  2/1/2004 - None Entered    [facility] [id number]       Subscriber Name Subscriber Birth Date Member ID       ANAIDOSEAS J 1940 [id number]           Secondary Coverage     Payor Plan Insurance Group Employer/Plan Group    [id number]     Payor Plan Address Payor Plan Phone Number Payor Plan Fax Number Effective Dates    [address]   1/9/1993 - None Entered    [facility] [address]       Subscriber Name Subscriber Birth Date Member ID       ALEJANDRAOSEAS CID 1940 [id number]           Tertiary Coverage     Payor Plan Insurance Group Employer/Plan Group    [place]      Payor Plan Address Payor Plan Phone Number Payor Plan Fax Number Effective Dates    [address]   11/18/2021 - None Entered    [place] [address]       Subscriber Name Subscriber Birth Date " Member ID       OSEAS ALEJANDRA 1940 [id number]                 Emergency Contacts      (Rel.) Home Phone Work Phone Mobile Phone    Ramandeep Mejias (Daughter) [phone] -- [phone]    Akiko Alejandra (Other) -- -- [phone]    HUGO ALEJANDRA (Son) -- -- [phone]

## 2022-11-04 NOTE — PLAN OF CARE
Goal Outcome Evaluation:  Plan of Care Reviewed With: patient           Outcome Evaluation: Pt. is an 82 year old Female admitted to the hospital with rectal bleeding.  Pt. reports that prior to admission she was using a Rollator for ambulation.  Pt. currently presents with decreased strength, decreased balance, and decreased tolerance to functional activity.  This AM, pt. able to ambulate 12 feet, CGA x 1, with use of Rwx.  Pt. requires CGA x 1 for bed mobility and CGA x 1 for sit <-> stand transfers.  BLE ther. ex. program x 5 reps completed for general strengthening.  Verbal/tactile cues given for posture correction and Rwx guidance.  Pt. will benefit from skilled inpt. P.T. to address her functional deficits and to assist pt. in regaining her maximum level of independence with functional mobility.    Patient was wearing a face mask during this therapy encounter. Therapist used appropriate personal protective equipment including eye protection, mask, and gloves.  Mask used was standard procedure mask. Appropriate PPE was worn during the entire therapy session. Hand hygiene was completed before and after therapy session. Patient is not in enhanced droplet precautions.

## 2022-11-05 LAB
ANION GAP SERPL CALCULATED.3IONS-SCNC: 8.7 MMOL/L (ref 5–15)
BASOPHILS # BLD AUTO: 0.05 10*3/MM3 (ref 0–0.2)
BASOPHILS NFR BLD AUTO: 0.8 % (ref 0–1.5)
BUN SERPL-MCNC: 6 MG/DL (ref 8–23)
BUN/CREAT SERPL: 7.8 (ref 7–25)
CALCIUM SPEC-SCNC: 8.6 MG/DL (ref 8.6–10.5)
CHLORIDE SERPL-SCNC: 108 MMOL/L (ref 98–107)
CO2 SERPL-SCNC: 24.3 MMOL/L (ref 22–29)
CREAT SERPL-MCNC: 0.77 MG/DL (ref 0.57–1)
DEPRECATED RDW RBC AUTO: 53.2 FL (ref 37–54)
EGFRCR SERPLBLD CKD-EPI 2021: 76.6 ML/MIN/1.73
EOSINOPHIL # BLD AUTO: 0.17 10*3/MM3 (ref 0–0.4)
EOSINOPHIL NFR BLD AUTO: 2.7 % (ref 0.3–6.2)
ERYTHROCYTE [DISTWIDTH] IN BLOOD BY AUTOMATED COUNT: 15.9 % (ref 12.3–15.4)
GLUCOSE SERPL-MCNC: 85 MG/DL (ref 65–99)
HCT VFR BLD AUTO: 23.3 % (ref 34–46.6)
HCT VFR BLD AUTO: 23.4 % (ref 34–46.6)
HGB BLD-MCNC: 7.4 G/DL (ref 12–15.9)
HGB BLD-MCNC: 7.6 G/DL (ref 12–15.9)
IMM GRANULOCYTES # BLD AUTO: 0.01 10*3/MM3 (ref 0–0.05)
IMM GRANULOCYTES NFR BLD AUTO: 0.2 % (ref 0–0.5)
LYMPHOCYTES # BLD AUTO: 2.06 10*3/MM3 (ref 0.7–3.1)
LYMPHOCYTES NFR BLD AUTO: 32.5 % (ref 19.6–45.3)
MCH RBC QN AUTO: 29.8 PG (ref 26.6–33)
MCHC RBC AUTO-ENTMCNC: 32.6 G/DL (ref 31.5–35.7)
MCV RBC AUTO: 91.4 FL (ref 79–97)
MONOCYTES # BLD AUTO: 1.06 10*3/MM3 (ref 0.1–0.9)
MONOCYTES NFR BLD AUTO: 16.7 % (ref 5–12)
NEUTROPHILS NFR BLD AUTO: 2.98 10*3/MM3 (ref 1.7–7)
NEUTROPHILS NFR BLD AUTO: 47.1 % (ref 42.7–76)
NRBC BLD AUTO-RTO: 0 /100 WBC (ref 0–0.2)
PLATELET # BLD AUTO: 139 10*3/MM3 (ref 140–450)
PMV BLD AUTO: 10.2 FL (ref 6–12)
POTASSIUM SERPL-SCNC: 3.5 MMOL/L (ref 3.5–5.2)
RBC # BLD AUTO: 2.55 10*6/MM3 (ref 3.77–5.28)
SODIUM SERPL-SCNC: 141 MMOL/L (ref 136–145)
WBC NRBC COR # BLD: 6.33 10*3/MM3 (ref 3.4–10.8)

## 2022-11-05 PROCEDURE — 85014 HEMATOCRIT: CPT | Performed by: INTERNAL MEDICINE

## 2022-11-05 PROCEDURE — 99232 SBSQ HOSP IP/OBS MODERATE 35: CPT | Performed by: INTERNAL MEDICINE

## 2022-11-05 PROCEDURE — 80048 BASIC METABOLIC PNL TOTAL CA: CPT | Performed by: HOSPITALIST

## 2022-11-05 PROCEDURE — 85018 HEMOGLOBIN: CPT | Performed by: INTERNAL MEDICINE

## 2022-11-05 PROCEDURE — 85025 COMPLETE CBC W/AUTO DIFF WBC: CPT | Performed by: HOSPITALIST

## 2022-11-05 RX ORDER — HYDROCORTISONE ACETATE 25 MG/1
25 SUPPOSITORY RECTAL 2 TIMES DAILY
Status: DISCONTINUED | OUTPATIENT
Start: 2022-11-05 | End: 2022-11-07 | Stop reason: HOSPADM

## 2022-11-05 RX ADMIN — ATORVASTATIN CALCIUM 10 MG: 20 TABLET, FILM COATED ORAL at 20:44

## 2022-11-05 RX ADMIN — METOPROLOL SUCCINATE 100 MG: 100 TABLET, EXTENDED RELEASE ORAL at 20:44

## 2022-11-05 RX ADMIN — PANTOPRAZOLE SODIUM 40 MG: 40 TABLET, DELAYED RELEASE ORAL at 06:12

## 2022-11-05 RX ADMIN — LEVOTHYROXINE SODIUM 100 MCG: 0.1 TABLET ORAL at 08:31

## 2022-11-05 RX ADMIN — ACETAMINOPHEN 650 MG: 325 TABLET, FILM COATED ORAL at 06:16

## 2022-11-05 RX ADMIN — APIXABAN 5 MG: 5 TABLET, FILM COATED ORAL at 20:44

## 2022-11-05 RX ADMIN — AMLODIPINE BESYLATE 5 MG: 5 TABLET ORAL at 08:32

## 2022-11-05 RX ADMIN — METOPROLOL SUCCINATE 100 MG: 100 TABLET, EXTENDED RELEASE ORAL at 08:32

## 2022-11-05 RX ADMIN — ISOSORBIDE MONONITRATE 30 MG: 30 TABLET, EXTENDED RELEASE ORAL at 08:31

## 2022-11-05 RX ADMIN — HYDROCORTISONE ACETATE 25 MG: 25 SUPPOSITORY RECTAL at 20:44

## 2022-11-05 RX ADMIN — ACETAMINOPHEN 650 MG: 325 TABLET, FILM COATED ORAL at 20:43

## 2022-11-05 RX ADMIN — Medication 10 ML: at 20:44

## 2022-11-05 NOTE — PROGRESS NOTES
[facility]  Inpatient Progress Note    Reason for Follow Up: Rectal bleeding    Subjective     Interval History:   No further bleeding.  Patient reports some gas pain but no other GI issues.  Tolerating a diet.    Current Facility-Administered Medications:   •  acetaminophen (TYLENOL) tablet 650 mg, 650 mg, Oral, Q4H PRN, Skyler Candelaria MD, 650 mg at 11/05/22 0616  •  albuterol (PROVENTIL) nebulizer solution 0.083% 2.5 mg/3mL, 2.5 mg, Nebulization, Q4H PRN, Saroj Oakes MD, 2.5 mg at 11/04/22 1026  •  amLODIPine (NORVASC) tablet 5 mg, 5 mg, Oral, Daily, Skyler Candelaria MD, 5 mg at 11/05/22 0832  •  atorvastatin (LIPITOR) tablet 10 mg, 10 mg, Oral, Nightly, Saroj Oakes MD, 10 mg at 11/04/22 2127  •  hydrocortisone (ANUSOL-HC) suppository 25 mg, 25 mg, Rectal, BID, Alannah Le MD  •  isosorbide mononitrate (IMDUR) 24 hr tablet 30 mg, 30 mg, Oral, Daily, Skyler Candelaria MD, 30 mg at 11/05/22 0831  •  levothyroxine (SYNTHROID, LEVOTHROID) tablet 100 mcg, 100 mcg, Oral, Daily, Saroj Oakes MD, 100 mcg at 11/05/22 0831  •  metoprolol succinate XL (TOPROL-XL) 24 hr tablet 100 mg, 100 mg, Oral, Q12H, Saroj Oakes MD, 100 mg at 11/05/22 0832  •  pantoprazole (PROTONIX) EC tablet 40 mg, 40 mg, Oral, Q AM, Skyler Candelaria MD, 40 mg at 11/05/22 0612  •  sodium chloride 0.9 % flush 10 mL, 10 mL, Intravenous, PRN, Saroj Oakes MD, 10 mL at 11/04/22 0835  Review of Systems:    Negative for fevers or chills, negative for GI bleeding or abdominal pain    Objective     Vital Signs  Temp:  [97.6 °F (36.4 °C)-98.6 °F (37 °C)] 97.8 °F (36.6 °C)  Heart Rate:  [68-89] 89  Resp:  [16] 16  BP: (103-150)/(64-88) 138/86  Body mass index is 38.29 kg/m².    Intake/Output Summary (Last 24 hours) at 11/5/2022 1109  Last data filed at 11/5/2022 0616  Gross per 24 hour   Intake 120 ml   Output 1050 ml   Net -930 ml     No intake/output data recorded.     Physical  Exam:   General: patient awake, alert and cooperative   Eyes: Normal lids and lashes, no scleral icterus   Neck: supple, normal ROM   Skin: warm and dry, not jaundiced   Pulm:  regular and unlabored   Abdomen: soft, nontender, nondistended    Psychiatric: Normal mood and behavior; memory intact     Results Review:     I reviewed the patient's new clinical results.    Results from last 7 days   Lab Units 11/05/22  0808 11/04/22  0808 11/03/22  1213 11/03/22  0557   WBC 10*3/mm3 6.33 7.65  --  7.68   HEMOGLOBIN g/dL 7.6* 9.7* 10.2* 9.3*   HEMATOCRIT % 23.3* 29.4* 31.5* 27.9*   PLATELETS 10*3/mm3 139* 181  --  149     Results from last 7 days   Lab Units 11/05/22  0808 11/04/22  0808 11/03/22  0557 11/02/22  0656 11/01/22 1817   SODIUM mmol/L 141 139 138   < > 139   POTASSIUM mmol/L 3.5 3.5 4.1   < > 4.3   CHLORIDE mmol/L 108* 108* 108*   < > 107   CO2 mmol/L 24.3 24.3 20.1*   < > 26.0   BUN mg/dL 6* 4* 8   < > 10   CREATININE mg/dL 0.77 0.81 0.83   < > 0.97   CALCIUM mg/dL 8.6 9.0 9.0   < > 9.5   BILIRUBIN mg/dL  --   --  2.3*  --  1.1   ALK PHOS U/L  --   --  97  --  136*   ALT (SGPT) U/L  --   --  29  --  33   AST (SGOT) U/L  --   --  48*  --  53*   GLUCOSE mg/dL 85 102* 98   < > 97    < > = values in this interval not displayed.         Lab Results   Lab Value Date/Time    LIPASE 32 11/01/2022 1817       Radiology:  CT Abdomen Pelvis With Contrast   Final Result       1. There is some nodularity to the surface of the liver. Cirrhosis is   not excluded.   2. Changes of suspected right hemicolectomy, without evidence of   obstruction.   3. Diverticulosis.   4. Questionable wall thickening involving the rectum. This may be   exaggerated by incomplete distention, although proctitis is not   completely excluded.   5. Thick-walled appearance to the urinary bladder. Correlation with   urinalysis and cultures is recommended.       Radiation dose reduction techniques were utilized, including automated   exposure control  and exposure modulation based on body size.       This report was finalized on 11/1/2022 11:52 PM by Dr. Deanna Ramsey M.D.              Assessment & Plan     Patient Active Problem List   Diagnosis   • Essential hypertension   • Acquired hypothyroidism   • Coronary artery disease involving native coronary artery of native heart without angina pectoris   • Hyperlipidemia   • Arthritis   • Skin lesion of chest wall   • NSTEMI (non-ST elevated myocardial infarction) (HCC)   • Supraventricular tachycardia (HCC)   • Normal cardiac stress test   • Gastrointestinal bleed   • Angina effort   • Morbidly obese (HCC)   • Bilateral pulmonary embolism (HCC)   • New onset a-fib (HCC)   • UTI (urinary tract infection)   • Chronic diastolic CHF (congestive heart failure) (HCC)   • Acute ischemic stroke (HCC)   • Chest pain   • Bright red rectal bleeding     All problems are new to me today  Assessment:  1. Rectal bleeding  2.  Internal hemorrhoids  3.  Colon polyps  4.  Anastomotic ulcer-Path benign, status post right hemicolectomy  5.  Family history of colon cancer      Plan:  · Start Anusol suppository for hemorrhoids  · Hemoglobin down this morning but no further bleeding.  Recheck H&H.  If hemoglobin is stable can restart NOAC  · Benign inflammation seen on anastomotic ulcer biopsy.  Adenomatous polyps noted.  Would defer further colonoscopy due to age.  Path discussed with patient    I discussed the patients findings and my recommendations with patient and her nurse.    Alannah Le MD

## 2022-11-05 NOTE — PROGRESS NOTES
"Daily progress note    Primary care physician      Chief complaint   Doing better this morning with no new complaints     History of present illness  82 white female with history of atrial fibrillation on Eliquis coronary artery disease hypertension hyperlipidemia hypothyroidism who is a resident at assisted living and also have hemorrhoids with bloody stools develop significant bloody stools yesterday and presented to [facility] emergency room.  Patient has lower abdominal discomfort but no nausea vomiting.  Patient also denies any chest pain shortness of breath palpitation fever chills.  Patient evaluated in ER and her work-up initially negative hemoglobin stable but she continued to have bright red blood per rectum admit for management.     REVIEW OF SYSTEMS  Unremarkable except for lower abdominal discomfort     PHYSICAL EXAM   Blood pressure 138/86, pulse 89, temperature 97.8 °F (36.6 °C), temperature source Oral, resp. rate 16, height 149.9 cm (59\"), weight 86 kg (189 lb 9.5 oz), SpO2 96 %.    GENERAL: Awake and alert, in no distress  HEENT:  Unremarkable  NECK:  Supple  CV: regular rhythm, regular rate  RESPIRATORY: normal effort moving air bilaterally  ABDOMEN: soft, nondistended nontender with normal bowel sounds  MUSCULOSKELETAL: no deformity  NEURO: alert, moves all extremities, follows commands  SKIN: warm, dry     LAB RESULTS  Lab Results (last 24 hours)     Procedure Component Value Units Date/Time    Basic Metabolic Panel [[id number]]  (Abnormal) Collected: 11/05/22 0808    Specimen: Blood Updated: 11/05/22 0931     Glucose 85 mg/dL      BUN 6 mg/dL      Creatinine 0.77 mg/dL      Sodium 141 mmol/L      Potassium 3.5 mmol/L      Chloride 108 mmol/L      CO2 24.3 mmol/L      Calcium 8.6 mg/dL      BUN/Creatinine Ratio 7.8     Anion Gap 8.7 mmol/L      eGFR 76.6 mL/min/1.73      Comment: National Kidney Foundation and American Society of Nephrology (ASN) Task Force recommended " calculation based on the Chronic Kidney Disease Epidemiology Collaboration (CKD-EPI) equation refit without adjustment for race.       Narrative:      GFR Normal >60  Chronic Kidney Disease <60  Kidney Failure <15    The GFR formula is only valid for adults with stable renal function between ages 18 and 70.    CBC & Differential [[id number]]  (Abnormal) Collected: 11/05/22 0808    Specimen: Blood Updated: 11/05/22 0843    Narrative:      The following orders were created for panel order CBC & Differential.  Procedure                               Abnormality         Status                     ---------                               -----------         ------                     CBC Auto Differential[[id number]]        Abnormal            Final result                 Please view results for these tests on the individual orders.    CBC Auto Differential [[id number]]  (Abnormal) Collected: 11/05/22 0808    Specimen: Blood Updated: 11/05/22 0843     WBC 6.33 10*3/mm3      RBC 2.55 10*6/mm3      Hemoglobin 7.6 g/dL      Hematocrit 23.3 %      MCV 91.4 fL      MCH 29.8 pg      MCHC 32.6 g/dL      RDW 15.9 %      RDW-SD 53.2 fl      MPV 10.2 fL      Platelets 139 10*3/mm3      Neutrophil % 47.1 %      Lymphocyte % 32.5 %      Monocyte % 16.7 %      Eosinophil % 2.7 %      Basophil % 0.8 %      Immature Grans % 0.2 %      Neutrophils, Absolute 2.98 10*3/mm3      Lymphocytes, Absolute 2.06 10*3/mm3      Monocytes, Absolute 1.06 10*3/mm3      Eosinophils, Absolute 0.17 10*3/mm3      Basophils, Absolute 0.05 10*3/mm3      Immature Grans, Absolute 0.01 10*3/mm3      nRBC 0.0 /100 WBC         Imaging Results (Last 24 Hours)     ** No results found for the last 24 hours. **        Colonoscopy results noted and discussed with patient and family    Current Facility-Administered Medications:   •  acetaminophen (TYLENOL) tablet 650 mg, 650 mg, Oral, Q4H PRN, Skyler Candelaria MD, 650 mg at 11/05/22 0616  •  albuterol (PROVENTIL) nebulizer  solution 0.083% 2.5 mg/3mL, 2.5 mg, Nebulization, Q4H PRN, Saroj Oakes MD, 2.5 mg at 11/04/22 1026  •  amLODIPine (NORVASC) tablet 5 mg, 5 mg, Oral, Daily, Julio Candelaria MD, 5 mg at 11/05/22 0832  •  atorvastatin (LIPITOR) tablet 10 mg, 10 mg, Oral, Nightly, Saroj Oakes MD, 10 mg at 11/04/22 2127  •  hydrocortisone (ANUSOL-HC) suppository 25 mg, 25 mg, Rectal, BID, Alannah Le MD  •  isosorbide mononitrate (IMDUR) 24 hr tablet 30 mg, 30 mg, Oral, Daily, Julio Candelaria MD, 30 mg at 11/05/22 0831  •  levothyroxine (SYNTHROID, LEVOTHROID) tablet 100 mcg, 100 mcg, Oral, Daily, Saroj Oakes MD, 100 mcg at 11/05/22 0831  •  metoprolol succinate XL (TOPROL-XL) 24 hr tablet 100 mg, 100 mg, Oral, Q12H, Saroj Oakes MD, 100 mg at 11/05/22 0832  •  pantoprazole (PROTONIX) EC tablet 40 mg, 40 mg, Oral, Q AM, Julio Candelaria MD, 40 mg at 11/05/22 0612  •  sodium chloride 0.9 % flush 10 mL, 10 mL, Intravenous, PRN, Saroj Oakes MD, 10 mL at 11/04/22 0835     ASSESSMENT  Hematochezia resolved  Internal hemorrhoids  Colon polyps  Probably proctitis  Acute blood loss anemia  Atrial fibrillation   Hypertension  Hyperlipidemia  Hypothyroidism  Gastroesophageal reflux disease    PLAN  CPM  Continue Protonix  Resume Eliquis if okay with GI  Transfuse as needed  Monitor H&H  GI consult appreciated  Adjust home medications  Stress ulcer DVT prophylaxis  Supportive care  Discussed with nursing staff  Discharge planning    JULIO CANDELARIA MD

## 2022-11-05 NOTE — PLAN OF CARE
Goal Outcome Evaluation:  patient hgb 7.6. Repeat H&H pending at this time and labs ordered for in the a.m. Large BM this shift with no evidence of bleeding.  Eliquis reordered this shift; however this RN has not administered yet per GI until hgb results and is stable.  Room air. Call light in  reach at this time.

## 2022-11-05 NOTE — PLAN OF CARE
Goal Outcome Evaluation:  Plan of Care Reviewed With: patient        Progress: no change   VSS. Pt c/o L elbow pain. Tylenol order obtained and given with relief from pain. Pt turned q2h. Brief checked freq. Good UOP. Purewick back on. Only 1 small loose stool last night. Plan is to possibly DC today or Sunday back to [facility] with caretenders following.

## 2022-11-06 LAB
ANION GAP SERPL CALCULATED.3IONS-SCNC: 5.9 MMOL/L (ref 5–15)
BASOPHILS # BLD AUTO: 0.05 10*3/MM3 (ref 0–0.2)
BASOPHILS NFR BLD AUTO: 0.8 % (ref 0–1.5)
BUN SERPL-MCNC: 7 MG/DL (ref 8–23)
BUN/CREAT SERPL: 9 (ref 7–25)
CALCIUM SPEC-SCNC: 9.3 MG/DL (ref 8.6–10.5)
CHLORIDE SERPL-SCNC: 107 MMOL/L (ref 98–107)
CO2 SERPL-SCNC: 23.1 MMOL/L (ref 22–29)
CREAT SERPL-MCNC: 0.78 MG/DL (ref 0.57–1)
DEPRECATED RDW RBC AUTO: 54.7 FL (ref 37–54)
EGFRCR SERPLBLD CKD-EPI 2021: 75.5 ML/MIN/1.73
EOSINOPHIL # BLD AUTO: 0.23 10*3/MM3 (ref 0–0.4)
EOSINOPHIL NFR BLD AUTO: 3.5 % (ref 0.3–6.2)
ERYTHROCYTE [DISTWIDTH] IN BLOOD BY AUTOMATED COUNT: 15.9 % (ref 12.3–15.4)
GLUCOSE SERPL-MCNC: 83 MG/DL (ref 65–99)
HCT VFR BLD AUTO: 26.5 % (ref 34–46.6)
HGB BLD-MCNC: 8.5 G/DL (ref 12–15.9)
IMM GRANULOCYTES # BLD AUTO: 0 10*3/MM3 (ref 0–0.05)
IMM GRANULOCYTES NFR BLD AUTO: 0 % (ref 0–0.5)
LYMPHOCYTES # BLD AUTO: 2.51 10*3/MM3 (ref 0.7–3.1)
LYMPHOCYTES NFR BLD AUTO: 38.5 % (ref 19.6–45.3)
MCH RBC QN AUTO: 29.8 PG (ref 26.6–33)
MCHC RBC AUTO-ENTMCNC: 32.1 G/DL (ref 31.5–35.7)
MCV RBC AUTO: 93 FL (ref 79–97)
MONOCYTES # BLD AUTO: 0.9 10*3/MM3 (ref 0.1–0.9)
MONOCYTES NFR BLD AUTO: 13.8 % (ref 5–12)
NEUTROPHILS NFR BLD AUTO: 2.83 10*3/MM3 (ref 1.7–7)
NEUTROPHILS NFR BLD AUTO: 43.4 % (ref 42.7–76)
NRBC BLD AUTO-RTO: 0.2 /100 WBC (ref 0–0.2)
PLATELET # BLD AUTO: 180 10*3/MM3 (ref 140–450)
PMV BLD AUTO: 10.4 FL (ref 6–12)
POTASSIUM SERPL-SCNC: 3.6 MMOL/L (ref 3.5–5.2)
RBC # BLD AUTO: 2.85 10*6/MM3 (ref 3.77–5.28)
SODIUM SERPL-SCNC: 136 MMOL/L (ref 136–145)
WBC NRBC COR # BLD: 6.52 10*3/MM3 (ref 3.4–10.8)

## 2022-11-06 PROCEDURE — 94799 UNLISTED PULMONARY SVC/PX: CPT

## 2022-11-06 PROCEDURE — 99231 SBSQ HOSP IP/OBS SF/LOW 25: CPT | Performed by: INTERNAL MEDICINE

## 2022-11-06 PROCEDURE — 94761 N-INVAS EAR/PLS OXIMETRY MLT: CPT

## 2022-11-06 PROCEDURE — 85025 COMPLETE CBC W/AUTO DIFF WBC: CPT | Performed by: HOSPITALIST

## 2022-11-06 PROCEDURE — 94760 N-INVAS EAR/PLS OXIMETRY 1: CPT

## 2022-11-06 PROCEDURE — 80048 BASIC METABOLIC PNL TOTAL CA: CPT | Performed by: HOSPITALIST

## 2022-11-06 RX ADMIN — APIXABAN 5 MG: 5 TABLET, FILM COATED ORAL at 20:54

## 2022-11-06 RX ADMIN — ACETAMINOPHEN 650 MG: 325 TABLET, FILM COATED ORAL at 04:04

## 2022-11-06 RX ADMIN — HYDROCORTISONE ACETATE 25 MG: 25 SUPPOSITORY RECTAL at 09:08

## 2022-11-06 RX ADMIN — HYDROCORTISONE ACETATE 25 MG: 25 SUPPOSITORY RECTAL at 20:59

## 2022-11-06 RX ADMIN — METOPROLOL SUCCINATE 100 MG: 100 TABLET, EXTENDED RELEASE ORAL at 20:54

## 2022-11-06 RX ADMIN — ACETAMINOPHEN 650 MG: 325 TABLET, FILM COATED ORAL at 20:55

## 2022-11-06 RX ADMIN — PANTOPRAZOLE SODIUM 40 MG: 40 TABLET, DELAYED RELEASE ORAL at 06:13

## 2022-11-06 RX ADMIN — ISOSORBIDE MONONITRATE 30 MG: 30 TABLET, EXTENDED RELEASE ORAL at 09:08

## 2022-11-06 RX ADMIN — APIXABAN 5 MG: 5 TABLET, FILM COATED ORAL at 11:21

## 2022-11-06 RX ADMIN — AMLODIPINE BESYLATE 5 MG: 5 TABLET ORAL at 11:20

## 2022-11-06 RX ADMIN — METOPROLOL SUCCINATE 100 MG: 100 TABLET, EXTENDED RELEASE ORAL at 09:08

## 2022-11-06 RX ADMIN — ACETAMINOPHEN 650 MG: 325 TABLET, FILM COATED ORAL at 09:19

## 2022-11-06 RX ADMIN — ALBUTEROL SULFATE 2.5 MG: 2.5 SOLUTION RESPIRATORY (INHALATION) at 20:06

## 2022-11-06 RX ADMIN — LEVOTHYROXINE SODIUM 100 MCG: 0.1 TABLET ORAL at 09:08

## 2022-11-06 RX ADMIN — ATORVASTATIN CALCIUM 10 MG: 20 TABLET, FILM COATED ORAL at 20:54

## 2022-11-06 RX ADMIN — ACETAMINOPHEN 650 MG: 325 TABLET, FILM COATED ORAL at 13:42

## 2022-11-06 NOTE — PLAN OF CARE
Goal Outcome Evaluation:  Plan of Care Reviewed With: patient        Progress: no change   VSS. Pt received tylenol x 1 for back/neck/L elbow pain. Turned q2h. 1 large Bm last night. No blood noted. Purewick on-good UOP. Hgb trending down, CBC due in AM. Pt possible DC back to [place] if hgb remains stable.

## 2022-11-06 NOTE — PLAN OF CARE
Goal Outcome Evaluation:  Hgb stable and eliquis resumed.  Tylenol given x2 doses for left elbow pain.  BM x 2 this shift. Scant BRB was noticed while PCA was changing; this RN did not witness it; GI notified.  Room air and call light in reach.

## 2022-11-06 NOTE — PROGRESS NOTES
"Daily progress note    Primary care physician      Chief complaint   Doing same with bloody stools this morning but no abdominal pain nausea vomiting and tolerating diet.    History of present illness  82 white female with history of atrial fibrillation on Eliquis coronary artery disease hypertension hyperlipidemia hypothyroidism who is a resident at assisted living and also have hemorrhoids with bloody stools develop significant bloody stools yesterday and presented to [facility] emergency room.  Patient has lower abdominal discomfort but no nausea vomiting.  Patient also denies any chest pain shortness of breath palpitation fever chills.  Patient evaluated in ER and her work-up initially negative hemoglobin stable but she continued to have bright red blood per rectum admit for management.     REVIEW OF SYSTEMS  Unremarkable except bloody stools     PHYSICAL EXAM   Blood pressure 124/62, pulse 74, temperature 97.8 °F (36.6 °C), temperature source Oral, resp. rate 18, height 149.9 cm (59\"), weight 88.3 kg (194 lb 9.6 oz), SpO2 96 %.    GENERAL: Awake and alert, in no distress  HEENT:  Unremarkable  NECK:  Supple  CV: regular rhythm, regular rate  RESPIRATORY: normal effort moving air bilaterally  ABDOMEN: soft, nondistended nontender with normal bowel sounds  MUSCULOSKELETAL: no deformity  NEURO: alert, moves all extremities, follows commands  SKIN: warm, dry     LAB RESULTS  Lab Results (last 24 hours)     Procedure Component Value Units Date/Time    Basic Metabolic Panel [[id number]]  (Abnormal) Collected: 11/06/22 0822    Specimen: Blood Updated: 11/06/22 1009     Glucose 83 mg/dL      BUN 7 mg/dL      Creatinine 0.78 mg/dL      Sodium 136 mmol/L      Potassium 3.6 mmol/L      Chloride 107 mmol/L      CO2 23.1 mmol/L      Calcium 9.3 mg/dL      BUN/Creatinine Ratio 9.0     Anion Gap 5.9 mmol/L      eGFR 75.5 mL/min/1.73      Comment: National Kidney Foundation and American Society of Nephrology " (ASN) Task Force recommended calculation based on the Chronic Kidney Disease Epidemiology Collaboration (CKD-EPI) equation refit without adjustment for race.       Narrative:      GFR Normal >60  Chronic Kidney Disease <60  Kidney Failure <15    The GFR formula is only valid for adults with stable renal function between ages 18 and 70.    CBC & Differential [[id number]]  (Abnormal) Collected: 11/06/22 0912    Specimen: Blood Updated: 11/06/22 0916    Narrative:      The following orders were created for panel order CBC & Differential.  Procedure                               Abnormality         Status                     ---------                               -----------         ------                     CBC Auto Differential[[id number]]        Abnormal            Final result                 Please view results for these tests on the individual orders.    CBC Auto Differential [[id number]]  (Abnormal) Collected: 11/06/22 0912    Specimen: Blood Updated: 11/06/22 0916     WBC 6.52 10*3/mm3      RBC 2.85 10*6/mm3      Hemoglobin 8.5 g/dL      Hematocrit 26.5 %      MCV 93.0 fL      MCH 29.8 pg      MCHC 32.1 g/dL      RDW 15.9 %      RDW-SD 54.7 fl      MPV 10.4 fL      Platelets 180 10*3/mm3      Neutrophil % 43.4 %      Lymphocyte % 38.5 %      Monocyte % 13.8 %      Eosinophil % 3.5 %      Basophil % 0.8 %      Immature Grans % 0.0 %      Neutrophils, Absolute 2.83 10*3/mm3      Lymphocytes, Absolute 2.51 10*3/mm3      Monocytes, Absolute 0.90 10*3/mm3      Eosinophils, Absolute 0.23 10*3/mm3      Basophils, Absolute 0.05 10*3/mm3      Immature Grans, Absolute 0.00 10*3/mm3      nRBC 0.2 /100 WBC     Hemoglobin & Hematocrit, Blood [[id number]]  (Abnormal) Collected: 11/05/22 1500    Specimen: Blood Updated: 11/05/22 1733     Hemoglobin 7.4 g/dL      Hematocrit 23.4 %         Imaging Results (Last 24 Hours)     ** No results found for the last 24 hours. **        Colonoscopy results noted and discussed with patient  and family    Current Facility-Administered Medications:   •  acetaminophen (TYLENOL) tablet 650 mg, 650 mg, Oral, Q4H PRN, Julio Candelaria MD, 650 mg at 11/06/22 1342  •  albuterol (PROVENTIL) nebulizer solution 0.083% 2.5 mg/3mL, 2.5 mg, Nebulization, Q4H PRN, Saroj Oakes MD, 2.5 mg at 11/04/22 1026  •  amLODIPine (NORVASC) tablet 5 mg, 5 mg, Oral, Daily, Julio Candelaria MD, 5 mg at 11/06/22 1120  •  apixaban (ELIQUIS) tablet 5 mg, 5 mg, Oral, Q12H, Julio Candelaria MD, 5 mg at 11/06/22 1121  •  atorvastatin (LIPITOR) tablet 10 mg, 10 mg, Oral, Nightly, Saroj Oakes MD, 10 mg at 11/05/22 2044  •  hydrocortisone (ANUSOL-HC) suppository 25 mg, 25 mg, Rectal, BID, Alannah Le MD, 25 mg at 11/06/22 0908  •  isosorbide mononitrate (IMDUR) 24 hr tablet 30 mg, 30 mg, Oral, Daily, Julio Candelaria MD, 30 mg at 11/06/22 0908  •  levothyroxine (SYNTHROID, LEVOTHROID) tablet 100 mcg, 100 mcg, Oral, Daily, Saroj Oakes MD, 100 mcg at 11/06/22 0908  •  metoprolol succinate XL (TOPROL-XL) 24 hr tablet 100 mg, 100 mg, Oral, Q12H, Saroj Oakes MD, 100 mg at 11/06/22 0908  •  pantoprazole (PROTONIX) EC tablet 40 mg, 40 mg, Oral, Q AM, Julio Candelaria MD, 40 mg at 11/06/22 0613  •  sodium chloride 0.9 % flush 10 mL, 10 mL, Intravenous, PRN, Saroj Oakes MD, 10 mL at 11/05/22 2044     ASSESSMENT  Hematochezia resolved  Internal hemorrhoids  Colon polyps  Probably proctitis  Acute blood loss anemia  Atrial fibrillation   Hypertension  Hyperlipidemia  Hypothyroidism  Gastroesophageal reflux disease    PLAN  CPM  Continue Protonix  Continue Eliquis  Transfuse as needed  Monitor H&H  GI consult appreciated  Adjust home medications  Stress ulcer DVT prophylaxis  Supportive care  Discussed with nursing staff  Observe and hold discharge     JULIO CANDELARIA MD

## 2022-11-06 NOTE — PROGRESS NOTES
[facility]  Inpatient Progress Note    Reason for Follow Up: Rectal bleeding    Subjective     Interval History:   Scant blood noted after bowel movement with changing brief.  She had a large bowel movement overnight with no bleeding.  No other new issues.    Current Facility-Administered Medications:   •  acetaminophen (TYLENOL) tablet 650 mg, 650 mg, Oral, Q4H PRN, Skyler Candelaria MD, 650 mg at 11/06/22 1342  •  albuterol (PROVENTIL) nebulizer solution 0.083% 2.5 mg/3mL, 2.5 mg, Nebulization, Q4H PRN, Saroj Oakes MD, 2.5 mg at 11/04/22 1026  •  amLODIPine (NORVASC) tablet 5 mg, 5 mg, Oral, Daily, Skyler Candelaria MD, 5 mg at 11/06/22 1120  •  apixaban (ELIQUIS) tablet 5 mg, 5 mg, Oral, Q12H, Skyler Candelaria MD, 5 mg at 11/06/22 1121  •  atorvastatin (LIPITOR) tablet 10 mg, 10 mg, Oral, Nightly, Saroj Oakes MD, 10 mg at 11/05/22 2044  •  hydrocortisone (ANUSOL-HC) suppository 25 mg, 25 mg, Rectal, BID, Alannah Le MD, 25 mg at 11/06/22 0908  •  isosorbide mononitrate (IMDUR) 24 hr tablet 30 mg, 30 mg, Oral, Daily, Skyler Candelaria MD, 30 mg at 11/06/22 0908  •  levothyroxine (SYNTHROID, LEVOTHROID) tablet 100 mcg, 100 mcg, Oral, Daily, Saroj Oakes MD, 100 mcg at 11/06/22 0908  •  metoprolol succinate XL (TOPROL-XL) 24 hr tablet 100 mg, 100 mg, Oral, Q12H, Saroj Oakes MD, 100 mg at 11/06/22 0908  •  pantoprazole (PROTONIX) EC tablet 40 mg, 40 mg, Oral, Q AM, Skyler Candelaria MD, 40 mg at 11/06/22 0613  •  sodium chloride 0.9 % flush 10 mL, 10 mL, Intravenous, PRN, BeSaroj stevenson MD, 10 mL at 11/05/22 2044  Review of Systems:    Positive for bright red blood per rectum, negative for abdominal pain, negative for fever or chills    Objective     Vital Signs  Temp:  [97.4 °F (36.3 °C)-98.4 °F (36.9 °C)] 97.8 °F (36.6 °C)  Heart Rate:  [57-75] 74  Resp:  [16-18] 18  BP: (111-147)/(62-96) 124/62  Body mass index is 39.3 kg/m².    Intake/Output Summary  (Last 24 hours) at 11/6/2022 1411  Last data filed at 11/6/2022 1300  Gross per 24 hour   Intake 240 ml   Output 1150 ml   Net -910 ml     I/O this shift:  In: -   Out: 300 [Urine:300]     Physical Exam:   General: patient awake, alert and cooperative   Eyes: Normal lids and lashes, no scleral icterus   Neck: supple, normal ROM   Skin: warm and dry, not jaundiced   Pulm:   regular and unlabored   Abdomen: soft,  nondistended    Extremities: no rash or edema   Psychiatric: Normal mood and behavior; memory intact     Results Review:     I reviewed the patient's new clinical results.    Results from last 7 days   Lab Units 11/06/22  0912 11/05/22  1500 11/05/22  0808 11/04/22  0808   WBC 10*3/mm3 6.52  --  6.33 7.65   HEMOGLOBIN g/dL 8.5* 7.4* 7.6* 9.7*   HEMATOCRIT % 26.5* 23.4* 23.3* 29.4*   PLATELETS 10*3/mm3 180  --  139* 181     Results from last 7 days   Lab Units 11/06/22  0822 11/05/22  0808 11/04/22  0808 11/03/22  0557 11/02/22  0656 11/01/22  1817   SODIUM mmol/L 136 141 139 138   < > 139   POTASSIUM mmol/L 3.6 3.5 3.5 4.1   < > 4.3   CHLORIDE mmol/L 107 108* 108* 108*   < > 107   CO2 mmol/L 23.1 24.3 24.3 20.1*   < > 26.0   BUN mg/dL 7* 6* 4* 8   < > 10   CREATININE mg/dL 0.78 0.77 0.81 0.83   < > 0.97   CALCIUM mg/dL 9.3 8.6 9.0 9.0   < > 9.5   BILIRUBIN mg/dL  --   --   --  2.3*  --  1.1   ALK PHOS U/L  --   --   --  97  --  136*   ALT (SGPT) U/L  --   --   --  29  --  33   AST (SGOT) U/L  --   --   --  48*  --  53*   GLUCOSE mg/dL 83 85 102* 98   < > 97    < > = values in this interval not displayed.         Lab Results   Lab Value Date/Time    LIPASE 32 11/01/2022 3423       Radiology:  CT Abdomen Pelvis With Contrast   Final Result       1. There is some nodularity to the surface of the liver. Cirrhosis is   not excluded.   2. Changes of suspected right hemicolectomy, without evidence of   obstruction.   3. Diverticulosis.   4. Questionable wall thickening involving the rectum. This may be    exaggerated by incomplete distention, although proctitis is not   completely excluded.   5. Thick-walled appearance to the urinary bladder. Correlation with   urinalysis and cultures is recommended.       Radiation dose reduction techniques were utilized, including automated   exposure control and exposure modulation based on body size.       This report was finalized on 11/1/2022 11:52 PM by Dr. Deanna Ramsey M.D.              Assessment & Plan     Patient Active Problem List   Diagnosis   • Essential hypertension   • Acquired hypothyroidism   • Coronary artery disease involving native coronary artery of native heart without angina pectoris   • Hyperlipidemia   • Arthritis   • Skin lesion of chest wall   • NSTEMI (non-ST elevated myocardial infarction) (HCC)   • Supraventricular tachycardia (HCC)   • Normal cardiac stress test   • Gastrointestinal bleed   • Angina effort   • Morbidly obese (HCC)   • Bilateral pulmonary embolism (HCC)   • New onset a-fib (HCC)   • UTI (urinary tract infection)   • Chronic diastolic CHF (congestive heart failure) (HCC)   • Acute ischemic stroke (HCC)   • Chest pain   • Bright red rectal bleeding       Assessment:  1.  Rectal bleeding  2.  Internal hemorrhoids  3.  Colon polyps  4.  Anastomotic ulcer-Path benign, status post right hemicolectomy  5.  Family history of colon cancer      Plan:  · Continue Anusol suppository for hemorrhoids.  Only scant bleeding noted this morning.  · Hemoglobin improved this morning-no significant bleeding has been seen  · Benign inflammation seen on anastomotic ulcer biopsy. Adenomatous polyps noted.  Would defer further colonoscopy due to age.  Path discussed with patient  · Okay to continue Eliquis from GI standpoint  · No further recommendations at this time-we will sign off but are available as needed    I discussed the patients findings and my recommendations with patient and nursing staff.    Alannah Le MD

## 2022-11-07 VITALS
HEIGHT: 59 IN | RESPIRATION RATE: 16 BRPM | WEIGHT: 194.6 LBS | HEART RATE: 77 BPM | SYSTOLIC BLOOD PRESSURE: 135 MMHG | TEMPERATURE: 98.8 F | OXYGEN SATURATION: 96 % | DIASTOLIC BLOOD PRESSURE: 71 MMHG | BODY MASS INDEX: 39.23 KG/M2

## 2022-11-07 LAB
ANION GAP SERPL CALCULATED.3IONS-SCNC: 8.5 MMOL/L (ref 5–15)
BASOPHILS # BLD AUTO: 0.05 10*3/MM3 (ref 0–0.2)
BASOPHILS NFR BLD AUTO: 1 % (ref 0–1.5)
BUN SERPL-MCNC: 7 MG/DL (ref 8–23)
BUN/CREAT SERPL: 8.2 (ref 7–25)
CALCIUM SPEC-SCNC: 9.2 MG/DL (ref 8.6–10.5)
CHLORIDE SERPL-SCNC: 106 MMOL/L (ref 98–107)
CO2 SERPL-SCNC: 22.5 MMOL/L (ref 22–29)
CREAT SERPL-MCNC: 0.85 MG/DL (ref 0.57–1)
DEPRECATED RDW RBC AUTO: 52.5 FL (ref 37–54)
EGFRCR SERPLBLD CKD-EPI 2021: 68.1 ML/MIN/1.73
EOSINOPHIL # BLD AUTO: 0.12 10*3/MM3 (ref 0–0.4)
EOSINOPHIL NFR BLD AUTO: 2.3 % (ref 0.3–6.2)
ERYTHROCYTE [DISTWIDTH] IN BLOOD BY AUTOMATED COUNT: 15.7 % (ref 12.3–15.4)
GLUCOSE BLDC GLUCOMTR-MCNC: 125 MG/DL (ref 70–130)
GLUCOSE SERPL-MCNC: 81 MG/DL (ref 65–99)
HCT VFR BLD AUTO: 24.8 % (ref 34–46.6)
HGB BLD-MCNC: 8.1 G/DL (ref 12–15.9)
IMM GRANULOCYTES # BLD AUTO: 0.02 10*3/MM3 (ref 0–0.05)
IMM GRANULOCYTES NFR BLD AUTO: 0.4 % (ref 0–0.5)
LYMPHOCYTES # BLD AUTO: 1.69 10*3/MM3 (ref 0.7–3.1)
LYMPHOCYTES NFR BLD AUTO: 32.3 % (ref 19.6–45.3)
MCH RBC QN AUTO: 30.2 PG (ref 26.6–33)
MCHC RBC AUTO-ENTMCNC: 32.7 G/DL (ref 31.5–35.7)
MCV RBC AUTO: 92.5 FL (ref 79–97)
MONOCYTES # BLD AUTO: 0.73 10*3/MM3 (ref 0.1–0.9)
MONOCYTES NFR BLD AUTO: 14 % (ref 5–12)
NEUTROPHILS NFR BLD AUTO: 2.62 10*3/MM3 (ref 1.7–7)
NEUTROPHILS NFR BLD AUTO: 50 % (ref 42.7–76)
NRBC BLD AUTO-RTO: 0 /100 WBC (ref 0–0.2)
PLATELET # BLD AUTO: 189 10*3/MM3 (ref 140–450)
PMV BLD AUTO: 10.5 FL (ref 6–12)
POTASSIUM SERPL-SCNC: 3.7 MMOL/L (ref 3.5–5.2)
RBC # BLD AUTO: 2.68 10*6/MM3 (ref 3.77–5.28)
SODIUM SERPL-SCNC: 137 MMOL/L (ref 136–145)
WBC NRBC COR # BLD: 5.23 10*3/MM3 (ref 3.4–10.8)

## 2022-11-07 PROCEDURE — 82962 GLUCOSE BLOOD TEST: CPT

## 2022-11-07 PROCEDURE — 80048 BASIC METABOLIC PNL TOTAL CA: CPT | Performed by: HOSPITALIST

## 2022-11-07 PROCEDURE — 85025 COMPLETE CBC W/AUTO DIFF WBC: CPT | Performed by: HOSPITALIST

## 2022-11-07 RX ORDER — ATORVASTATIN CALCIUM 10 MG/1
10 TABLET, FILM COATED ORAL NIGHTLY
Qty: 30 TABLET | Refills: 0 | Status: SHIPPED | OUTPATIENT
Start: 2022-11-07 | End: 2022-12-07

## 2022-11-07 RX ORDER — HYDROCORTISONE ACETATE 25 MG/1
25 SUPPOSITORY RECTAL 2 TIMES DAILY
Qty: 60 SUPPOSITORY | Refills: 0 | Status: SHIPPED | OUTPATIENT
Start: 2022-11-07 | End: 2022-12-07

## 2022-11-07 RX ORDER — PANTOPRAZOLE SODIUM 40 MG/1
40 TABLET, DELAYED RELEASE ORAL
Qty: 30 TABLET | Refills: 0 | Status: SHIPPED | OUTPATIENT
Start: 2022-11-08 | End: 2022-12-08

## 2022-11-07 RX ORDER — AMLODIPINE BESYLATE 5 MG/1
5 TABLET ORAL DAILY
Qty: 30 TABLET | Refills: 0 | Status: SHIPPED | OUTPATIENT
Start: 2022-11-08 | End: 2022-12-08

## 2022-11-07 RX ADMIN — METOPROLOL SUCCINATE 100 MG: 100 TABLET, EXTENDED RELEASE ORAL at 08:44

## 2022-11-07 RX ADMIN — AMLODIPINE BESYLATE 5 MG: 5 TABLET ORAL at 08:44

## 2022-11-07 RX ADMIN — HYDROCORTISONE ACETATE 25 MG: 25 SUPPOSITORY RECTAL at 08:44

## 2022-11-07 RX ADMIN — LEVOTHYROXINE SODIUM 100 MCG: 0.1 TABLET ORAL at 06:04

## 2022-11-07 RX ADMIN — ISOSORBIDE MONONITRATE 30 MG: 30 TABLET, EXTENDED RELEASE ORAL at 08:44

## 2022-11-07 RX ADMIN — ACETAMINOPHEN 650 MG: 325 TABLET, FILM COATED ORAL at 00:54

## 2022-11-07 RX ADMIN — APIXABAN 5 MG: 5 TABLET, FILM COATED ORAL at 08:44

## 2022-11-07 RX ADMIN — PANTOPRAZOLE SODIUM 40 MG: 40 TABLET, DELAYED RELEASE ORAL at 06:03

## 2022-11-07 NOTE — PROGRESS NOTES
"Daily progress note    Primary care physician      Chief complaint   Doing better with no new complaints and wants to go back to assisted living.  Patient tolerating Eliquis and denies any more bloody stools.    History of present illness  82 white female with history of atrial fibrillation on Eliquis coronary artery disease hypertension hyperlipidemia hypothyroidism who is a resident at assisted living and also have hemorrhoids with bloody stools develop significant bloody stools yesterday and presented to [facility] emergency room.  Patient has lower abdominal discomfort but no nausea vomiting.  Patient also denies any chest pain shortness of breath palpitation fever chills.  Patient evaluated in ER and her work-up initially negative hemoglobin stable but she continued to have bright red blood per rectum admit for management.     REVIEW OF SYSTEMS  Unremarkable      PHYSICAL EXAM   Blood pressure 135/71, pulse 77, temperature 98.8 °F (37.1 °C), temperature source Oral, resp. rate 16, height 149.9 cm (59\"), weight 88.3 kg (194 lb 9.6 oz), SpO2 96 %.    GENERAL: Awake and alert, in no distress  HEENT:  Unremarkable  NECK:  Supple  CV: regular rhythm, regular rate  RESPIRATORY: normal effort moving air bilaterally  ABDOMEN: soft, nondistended nontender with normal bowel sounds  MUSCULOSKELETAL: no deformity  NEURO: alert, moves all extremities, follows commands  SKIN: warm, dry     LAB RESULTS  Lab Results (last 24 hours)     Procedure Component Value Units Date/Time    POC Glucose Once [[id number]]  (Normal) Collected: 11/07/22 1115    Specimen: Blood Updated: 11/07/22 1118     Glucose 125 mg/dL      Comment: Meter: [id number] : [id number] Javier MERRILL       Basic Metabolic Panel [[id number]]  (Abnormal) Collected: 11/07/22 0616    Specimen: Blood Updated: 11/07/22 0725     Glucose 81 mg/dL      BUN 7 mg/dL      Creatinine 0.85 mg/dL      Sodium 137 mmol/L      Potassium 3.7 mmol/L      " Comment: Slight hemolysis detected by analyzer. Results may be affected.        Chloride 106 mmol/L      CO2 22.5 mmol/L      Calcium 9.2 mg/dL      BUN/Creatinine Ratio 8.2     Anion Gap 8.5 mmol/L      eGFR 68.1 mL/min/1.73      Comment: National Kidney Foundation and American Society of Nephrology (ASN) Task Force recommended calculation based on the Chronic Kidney Disease Epidemiology Collaboration (CKD-EPI) equation refit without adjustment for race.       Narrative:      GFR Normal >60  Chronic Kidney Disease <60  Kidney Failure <15    The GFR formula is only valid for adults with stable renal function between ages 18 and 70.    CBC & Differential [[id number]]  (Abnormal) Collected: 11/07/22 0616    Specimen: Blood Updated: 11/07/22 0706    Narrative:      The following orders were created for panel order CBC & Differential.  Procedure                               Abnormality         Status                     ---------                               -----------         ------                     CBC Auto Differential[[id number]]        Abnormal            Final result                 Please view results for these tests on the individual orders.    CBC Auto Differential [[id number]]  (Abnormal) Collected: 11/07/22 0616    Specimen: Blood Updated: 11/07/22 0706     WBC 5.23 10*3/mm3      RBC 2.68 10*6/mm3      Hemoglobin 8.1 g/dL      Hematocrit 24.8 %      MCV 92.5 fL      MCH 30.2 pg      MCHC 32.7 g/dL      RDW 15.7 %      RDW-SD 52.5 fl      MPV 10.5 fL      Platelets 189 10*3/mm3      Neutrophil % 50.0 %      Lymphocyte % 32.3 %      Monocyte % 14.0 %      Eosinophil % 2.3 %      Basophil % 1.0 %      Immature Grans % 0.4 %      Neutrophils, Absolute 2.62 10*3/mm3      Lymphocytes, Absolute 1.69 10*3/mm3      Monocytes, Absolute 0.73 10*3/mm3      Eosinophils, Absolute 0.12 10*3/mm3      Basophils, Absolute 0.05 10*3/mm3      Immature Grans, Absolute 0.02 10*3/mm3      nRBC 0.0 /100 WBC         Imaging  Results (Last 24 Hours)     ** No results found for the last 24 hours. **        Colonoscopy results noted and discussed with patient and family    Current Facility-Administered Medications:   •  acetaminophen (TYLENOL) tablet 650 mg, 650 mg, Oral, Q4H PRN, Julio Candelaria MD, 650 mg at 11/07/22 0054  •  albuterol (PROVENTIL) nebulizer solution 0.083% 2.5 mg/3mL, 2.5 mg, Nebulization, Q4H PRN, Saroj Oakes MD, 2.5 mg at 11/06/22 2006  •  amLODIPine (NORVASC) tablet 5 mg, 5 mg, Oral, Daily, Julio Candelaria MD, 5 mg at 11/07/22 0844  •  apixaban (ELIQUIS) tablet 5 mg, 5 mg, Oral, Q12H, Julio Candelaria MD, 5 mg at 11/07/22 0844  •  atorvastatin (LIPITOR) tablet 10 mg, 10 mg, Oral, Nightly, Saroj Oakes MD, 10 mg at 11/06/22 2054  •  hydrocortisone (ANUSOL-HC) suppository 25 mg, 25 mg, Rectal, BID, Alannah Le MD, 25 mg at 11/07/22 0844  •  isosorbide mononitrate (IMDUR) 24 hr tablet 30 mg, 30 mg, Oral, Daily, Julio Candelaria MD, 30 mg at 11/07/22 0844  •  levothyroxine (SYNTHROID, LEVOTHROID) tablet 100 mcg, 100 mcg, Oral, Daily, Saroj Oakes MD, 100 mcg at 11/07/22 0604  •  metoprolol succinate XL (TOPROL-XL) 24 hr tablet 100 mg, 100 mg, Oral, Q12H, Saroj Oakes MD, 100 mg at 11/07/22 0844  •  pantoprazole (PROTONIX) EC tablet 40 mg, 40 mg, Oral, Q AM, Julio Candelaria MD, 40 mg at 11/07/22 0603  •  sodium chloride 0.9 % flush 10 mL, 10 mL, Intravenous, PRN, Saroj Oakes MD, 10 mL at 11/05/22 2044     ASSESSMENT  Hematochezia resolved  Internal hemorrhoids  Colon polyps  Probably proctitis  Acute blood loss anemia  Atrial fibrillation   Hypertension  Hyperlipidemia  Hypothyroidism  Gastroesophageal reflux disease    PLAN  Discharge back to assisted living  Discharge summary dictated    JULIO CANDELARIA MD

## 2022-11-07 NOTE — SIGNIFICANT NOTE
"   11/07/22 1130   OTHER   Discipline physical therapy assistant   Rehab Time/Intention   Session Not Performed patient/family declined treatment  (Pt refused and stated that she was tired and \"people keep coming into my room\". Pt agreeable to later PT. PT will check back as time allows.)     "

## 2022-11-07 NOTE — DISCHARGE SUMMARY
Discharge summary    Date of admission 11/1/2022  Date of discharge 11/7/2022    Final diagnosis  Hematochezia resolved  Internal hemorrhoids  Colon polyps  Acute blood loss anemia  Atrial fibrillation   Hypertension  Hyperlipidemia  Hypothyroidism  Gastroesophageal reflux disease    Discharge medications    Current Facility-Administered Medications:   •  amLODIPine (NORVASC) tablet 5 mg, 5 mg, Oral, Daily, Skyler Candelaria MD, 5 mg at 11/07/22 0844  •  apixaban (ELIQUIS) tablet 5 mg, 5 mg, Oral, Q12H, Skyler Candelaria MD, 5 mg at 11/07/22 0844  •  atorvastatin (LIPITOR) tablet 10 mg, 10 mg, Oral, Nightly, Saroj Oakes MD, 10 mg at 11/06/22 2054  •  hydrocortisone (ANUSOL-HC) suppository 25 mg, 25 mg, Rectal, BID, Alannah Le MD, 25 mg at 11/07/22 0844  •  isosorbide mononitrate (IMDUR) 24 hr tablet 30 mg, 30 mg, Oral, Daily, Skyler Candelaria MD, 30 mg at 11/07/22 0844  •  levothyroxine (SYNTHROID, LEVOTHROID) tablet 100 mcg, 100 mcg, Oral, Daily, Saroj Oakes MD, 100 mcg at 11/07/22 0604  •  metoprolol succinate XL (TOPROL-XL) 24 hr tablet 100 mg, 100 mg, Oral, Q12H, Saroj Oakes MD, 100 mg at 11/07/22 0844  •  pantoprazole (PROTONIX) EC tablet 40 mg, 40 mg, Oral, Q AM, Skyler Candelaria MD, 40 mg at 11/07/22 0603     Consults obtained  Gastroenterology    Procedures  Colonoscopy which revealed colon polyps and internal hemorrhoids    Hospital course  82 years old white female with history of chronic atrial fibrillation on Eliquis coronary artery disease hypertension hyperlipidemia hypothyroidism who is a resident at [facility] admitted through emergency room with bloody stools.  Patient evaluated in ER and admitted for further work-up as she was on Eliquis.  Patient admitted her Eliquis was held and her H&H monitor and further evaluated by gastroenterology and they recommend colonoscopy which confirmed internal hemorrhoids and colon polyps.  Patient hemoglobin remained stable after  transfusion and her Eliquis restarted.  Patient hemoglobin at the time of discharge is 8.1 and she has no more bleeding and she will continue Anusol at assisted living.    Discharge diet regular    Activity as tolerated    Medication as above    Further care per primary care doctor and follow-up with gastroenterology per their instruction and take medication as directed.    JULIO MUNOZ MD

## 2022-11-07 NOTE — PLAN OF CARE
Goal Outcome Evaluation:  Plan of Care Reviewed With: patient        Progress: improving  Outcome Evaluation: Patient had some c/o pain through the night, treated with PRN Tylenol. Assist with reposition and encourage mobility. Purewick in place for now. Daily weight. Strict I/O's. VSS. Possible discharge today or tomorrow. Will continue to monitor.

## 2022-11-07 NOTE — PLAN OF CARE
Goal Outcome Evaluation:Patient being discharged back to her assisted living facility. PIV removed. Discharge instructions reviewed with patient. Report called to her receiving nurse Jazlyn.

## 2022-11-07 NOTE — CASE MANAGEMENT/SOCIAL WORK
Continued Stay Note  [facility]     Patient Name: Kendy Worrell  MRN: [id number]  Today's Date: 11/7/2022    Admit Date: 11/1/2022    Plan: Return [facility]   Discharge Plan     Row Name 11/07/22 1349       Plan    Plan Return to [facility] with [facility] following    Patient/Family in Agreement with Plan yes    Plan Comments Call to [facility] - they can accept patient back.  Unable to reach daughter.  Spoke with patient at bedside, she has spoken with daughter and daughter will transport her bakc to [facility].  Spoke with Kenyetta/[facility]  and she is aware of DC.  Rubio WERNER                       Expected Discharge Date and Time     Expected Discharge Date Expected Discharge Time    Nov 7, 2022             Becky S. [place], RN

## 2022-11-08 NOTE — CASE MANAGEMENT/SOCIAL WORK
Case Management Discharge Note      Final Note: DC'd to AL at [facility] with [facility] following via private auto         Selected Continued Care - Discharged on 11/7/2022 Admission date: 11/1/2022 - Discharge disposition: Skilled Nursing Facility (DC - External)    Destination Coordination complete.    Service Provider Selected Services Address Phone Fax Patient Preferred    [facility] [address] [place] [id number] [phone] -- --              Home Medical Care     Service Provider Selected Services Address Phone Fax Patient Preferred    [facility] [address], [facility] [address] [phone] --                  Transportation Services  Private: Car    Final Discharge Disposition Code: 06 - home with home health care

## 2022-11-16 NOTE — PROGRESS NOTES
"If you have any questions about this query contact me at: [email or web address]     Enter Query Response Below      Query Response:     Unable to determine Electronically signed by Skyler Candelaria MD, 11/16/22, 12:57 PM EST.           If applicable, please update the problem list.         Dr. Candelaria,    Patient with hx. of right hemicolectomy and on chronic anticoagulation with Eliquis was admitted with bright red blood in her stools. Patient was evaluated by Gastroenterology and underwent a Colonoscopy with biopsies taken of colon polyps and anastomotic ulcer. Pathology was negative for malignancy but revealed a tubular adenoma and \"fragments of tubular adenoma with erosion and inflamed granulation tissue\". 11/6/2 GI PN lists Rectal bleeding, Internal hemorrhoids, Colon polyps, Anastomotic ulcer and states, \"Continue Anusol suppository for hemorrhoids. Benign inflammation seen on anastomotic ulcer biopsy. Adenomatous polyps noted. Would defer further colonoscopy due to age. Okay to continue Eliquis\". po Eliquis was held on admission and restarted prior to discharge. Further treatment included transfusion 2 U PRBCs, IV Protonix and serial labs. After study, can the etiology of the GI bleed be further specified as:    Due to hemorrhoids  Due to colon polyps  Due to use of Eliquis  Other, please specify__________________________  Unable to be clinically determined    By submitting this query, we are merely seeking further clarification of documentation to accurately reflect all conditions that you are monitoring, evaluating, treating or that extend the hospitalization or utilize additional resources of care. Please utilize your independent clinical judgment when addressing the question(s) above.     This query and your response, once completed, will be entered into the legal medical record.    Sincerely,  Blanche Bertrand RN  Clinical Documentation Integrity Program     "

## 2022-12-10 NOTE — PROGRESS NOTES
Subjective   Kendy Worrell is a 77 y.o. female.     Hypertension   This is a chronic problem. The current episode started more than 1 year ago. Pertinent negatives include no palpitations.   Hyperlipidemia   This is a chronic problem. The current episode started more than 1 year ago.   Fatigue   This is a chronic problem. The current episode started more than 1 year ago. Associated symptoms include arthralgias (Arthritis all over Has had mammogram & U/S of breast @ [facility]) and fatigue ( Chronicall6 fatigued Doesn't walk or exercise ).        The following portions of the patient's history were reviewed and updated as appropriate: allergies, current medications, past family history, past medical history, past social history, past surgical history and problem list.    Review of Systems   Constitutional: Positive for fatigue ( Chronicall6 fatigued Doesn't walk or exercise ).   HENT: Positive for hearing loss (Has hearing aids ).    Eyes: Negative.    Respiratory: Negative.    Cardiovascular: Negative for palpitations.        Sees Dr Tubbs   Gastrointestinal: Negative.    Genitourinary: Positive for urgency (Stress incontinence wears a brief).   Musculoskeletal: Positive for arthralgias (Arthritis all over Has had mammogram & U/S of breast @ [facility]).   Skin:        Has what is thoght to be  A lipoma  Of skin medial aspect L breast Would like a BX   Neurological: Negative.        Objective   Physical Exam   Constitutional: She is oriented to person, place, and time. She appears well-developed.   HENT:   Head: Normocephalic.   Eyes: EOM are normal.   Neck: Neck supple.   Cardiovascular: Normal rate, regular rhythm and normal heart sounds.    Repeat 140/86   Pulmonary/Chest: Effort normal and breath sounds normal.   Musculoskeletal: Normal range of motion.   Neurological: She is alert and oriented to person, place, and time.   Vitals reviewed.      Assessment/Plan   Kendy was seen today for hypertension, hyperlipidemia and  fatigue.    Diagnoses and all orders for this visit:    Essential hypertension    Coronary artery disease involving native coronary artery of native heart without angina pectoris    Primary osteoarthritis involving multiple joints    Lesion of breast  -     US guided biopsy; Future                DISCHARGE

## 2022-12-13 ENCOUNTER — TELEPHONE (OUTPATIENT)
Dept: GASTROENTEROLOGY | Facility: CLINIC | Age: 82
End: 2022-12-13

## 2022-12-13 NOTE — TELEPHONE ENCOUNTER
----- Message from Saroj Oakes MD sent at 11/15/2022  4:17 PM EST -----  Colon polyps were non-cancerous.  No further surveillance recommended  Office f/u as needed

## 2023-08-01 ENCOUNTER — APPOINTMENT (OUTPATIENT)
Dept: GENERAL RADIOLOGY | Facility: HOSPITAL | Age: 83
DRG: 563 | End: 2023-08-01
Payer: MEDICARE

## 2023-08-01 ENCOUNTER — HOSPITAL ENCOUNTER (INPATIENT)
Facility: HOSPITAL | Age: 83
LOS: 1 days | Discharge: NURSING, REHAB OR LONG-TERM CARE | DRG: 563 | End: 2023-08-03
Attending: PEDIATRICS | Admitting: INTERNAL MEDICINE
Payer: MEDICARE

## 2023-08-01 ENCOUNTER — APPOINTMENT (OUTPATIENT)
Dept: CT IMAGING | Facility: HOSPITAL | Age: 83
DRG: 563 | End: 2023-08-01
Payer: MEDICARE

## 2023-08-01 DIAGNOSIS — S51.812A SKIN TEAR OF LEFT FOREARM WITHOUT COMPLICATION, INITIAL ENCOUNTER: ICD-10-CM

## 2023-08-01 DIAGNOSIS — S82.832A OTHER CLOSED FRACTURE OF DISTAL END OF LEFT FIBULA, INITIAL ENCOUNTER: ICD-10-CM

## 2023-08-01 DIAGNOSIS — R26.2 INABILITY TO AMBULATE DUE TO ANKLE OR FOOT: ICD-10-CM

## 2023-08-01 DIAGNOSIS — S82.832A CLOSED FRACTURE OF DISTAL END OF LEFT FIBULA, UNSPECIFIED FRACTURE MORPHOLOGY, INITIAL ENCOUNTER: Primary | ICD-10-CM

## 2023-08-01 PROBLEM — S82.409A CLOSED FIBULAR FRACTURE: Status: ACTIVE | Noted: 2023-08-01

## 2023-08-01 LAB
ALBUMIN SERPL-MCNC: 4.5 G/DL (ref 3.5–5.2)
ALBUMIN/GLOB SERPL: 1.3 G/DL
ALP SERPL-CCNC: 135 U/L (ref 39–117)
ALT SERPL W P-5'-P-CCNC: 19 U/L (ref 1–33)
ANION GAP SERPL CALCULATED.3IONS-SCNC: 11.6 MMOL/L (ref 5–15)
AST SERPL-CCNC: 33 U/L (ref 1–32)
BASOPHILS # BLD AUTO: 0.04 10*3/MM3 (ref 0–0.2)
BASOPHILS NFR BLD AUTO: 0.5 % (ref 0–1.5)
BILIRUB SERPL-MCNC: 1.6 MG/DL (ref 0–1.2)
BUN SERPL-MCNC: 18 MG/DL (ref 8–23)
BUN/CREAT SERPL: 14.8 (ref 7–25)
CALCIUM SPEC-SCNC: 10.6 MG/DL (ref 8.6–10.5)
CHLORIDE SERPL-SCNC: 97 MMOL/L (ref 98–107)
CK SERPL-CCNC: 68 U/L (ref 20–180)
CO2 SERPL-SCNC: 26.4 MMOL/L (ref 22–29)
CREAT SERPL-MCNC: 1.22 MG/DL (ref 0.57–1)
DEPRECATED RDW RBC AUTO: 56.2 FL (ref 37–54)
EGFRCR SERPLBLD CKD-EPI 2021: 43.8 ML/MIN/1.73
EOSINOPHIL # BLD AUTO: 0.06 10*3/MM3 (ref 0–0.4)
EOSINOPHIL NFR BLD AUTO: 0.7 % (ref 0.3–6.2)
ERYTHROCYTE [DISTWIDTH] IN BLOOD BY AUTOMATED COUNT: 19.9 % (ref 12.3–15.4)
GLOBULIN UR ELPH-MCNC: 3.5 GM/DL
GLUCOSE SERPL-MCNC: 113 MG/DL (ref 65–99)
HCT VFR BLD AUTO: 29.7 % (ref 34–46.6)
HGB BLD-MCNC: 9 G/DL (ref 12–15.9)
IMM GRANULOCYTES # BLD AUTO: 0.01 10*3/MM3 (ref 0–0.05)
IMM GRANULOCYTES NFR BLD AUTO: 0.1 % (ref 0–0.5)
LYMPHOCYTES # BLD AUTO: 1.38 10*3/MM3 (ref 0.7–3.1)
LYMPHOCYTES NFR BLD AUTO: 16.3 % (ref 19.6–45.3)
MCH RBC QN AUTO: 23.6 PG (ref 26.6–33)
MCHC RBC AUTO-ENTMCNC: 30.3 G/DL (ref 31.5–35.7)
MCV RBC AUTO: 77.7 FL (ref 79–97)
MONOCYTES # BLD AUTO: 1.2 10*3/MM3 (ref 0.1–0.9)
MONOCYTES NFR BLD AUTO: 14.2 % (ref 5–12)
NEUTROPHILS NFR BLD AUTO: 5.79 10*3/MM3 (ref 1.7–7)
NEUTROPHILS NFR BLD AUTO: 68.2 % (ref 42.7–76)
PLATELET # BLD AUTO: 260 10*3/MM3 (ref 140–450)
PMV BLD AUTO: 9.5 FL (ref 6–12)
POTASSIUM SERPL-SCNC: 3.7 MMOL/L (ref 3.5–5.2)
PROT SERPL-MCNC: 8 G/DL (ref 6–8.5)
RBC # BLD AUTO: 3.82 10*6/MM3 (ref 3.77–5.28)
SODIUM SERPL-SCNC: 135 MMOL/L (ref 136–145)
WBC NRBC COR # BLD: 8.48 10*3/MM3 (ref 3.4–10.8)

## 2023-08-01 PROCEDURE — 85025 COMPLETE CBC W/AUTO DIFF WBC: CPT | Performed by: PEDIATRICS

## 2023-08-01 PROCEDURE — 99285 EMERGENCY DEPT VISIT HI MDM: CPT

## 2023-08-01 PROCEDURE — 73610 X-RAY EXAM OF ANKLE: CPT

## 2023-08-01 PROCEDURE — 73523 X-RAY EXAM HIPS BI 5/> VIEWS: CPT

## 2023-08-01 PROCEDURE — G0378 HOSPITAL OBSERVATION PER HR: HCPCS

## 2023-08-01 PROCEDURE — 90715 TDAP VACCINE 7 YRS/> IM: CPT | Performed by: PEDIATRICS

## 2023-08-01 PROCEDURE — 90471 IMMUNIZATION ADMIN: CPT | Performed by: PEDIATRICS

## 2023-08-01 PROCEDURE — 80053 COMPREHEN METABOLIC PANEL: CPT | Performed by: PEDIATRICS

## 2023-08-01 PROCEDURE — 73562 X-RAY EXAM OF KNEE 3: CPT

## 2023-08-01 PROCEDURE — 70450 CT HEAD/BRAIN W/O DYE: CPT

## 2023-08-01 PROCEDURE — 25010000002 TETANUS-DIPHTH-ACELL PERTUSSIS 5-2.5-18.5 LF-MCG/0.5 SUSPENSION PREFILLED SYRINGE: Performed by: PEDIATRICS

## 2023-08-01 PROCEDURE — 82550 ASSAY OF CK (CPK): CPT | Performed by: PEDIATRICS

## 2023-08-01 PROCEDURE — 36415 COLL VENOUS BLD VENIPUNCTURE: CPT

## 2023-08-01 RX ORDER — ACETAMINOPHEN 325 MG/1
650 TABLET ORAL EVERY 6 HOURS PRN
Status: DISCONTINUED | OUTPATIENT
Start: 2023-08-01 | End: 2023-08-03 | Stop reason: HOSPADM

## 2023-08-01 RX ORDER — ONDANSETRON 2 MG/ML
4 INJECTION INTRAMUSCULAR; INTRAVENOUS EVERY 6 HOURS PRN
Status: DISCONTINUED | OUTPATIENT
Start: 2023-08-01 | End: 2023-08-03 | Stop reason: HOSPADM

## 2023-08-01 RX ORDER — BISACODYL 10 MG
10 SUPPOSITORY, RECTAL RECTAL DAILY PRN
Status: DISCONTINUED | OUTPATIENT
Start: 2023-08-01 | End: 2023-08-03 | Stop reason: HOSPADM

## 2023-08-01 RX ORDER — PANTOPRAZOLE SODIUM 40 MG/1
40 TABLET, DELAYED RELEASE ORAL DAILY
COMMUNITY

## 2023-08-01 RX ORDER — METOPROLOL SUCCINATE 50 MG/1
100 TABLET, EXTENDED RELEASE ORAL EVERY 12 HOURS SCHEDULED
Status: DISCONTINUED | OUTPATIENT
Start: 2023-08-02 | End: 2023-08-03 | Stop reason: HOSPADM

## 2023-08-01 RX ORDER — LEVOTHYROXINE SODIUM 0.1 MG/1
100 TABLET ORAL
Status: DISCONTINUED | OUTPATIENT
Start: 2023-08-02 | End: 2023-08-03 | Stop reason: HOSPADM

## 2023-08-01 RX ORDER — SODIUM CHLORIDE 9 MG/ML
40 INJECTION, SOLUTION INTRAVENOUS AS NEEDED
Status: DISCONTINUED | OUTPATIENT
Start: 2023-08-01 | End: 2023-08-03 | Stop reason: HOSPADM

## 2023-08-01 RX ORDER — IPRATROPIUM BROMIDE AND ALBUTEROL SULFATE 2.5; .5 MG/3ML; MG/3ML
3 SOLUTION RESPIRATORY (INHALATION) EVERY 4 HOURS PRN
Status: DISCONTINUED | OUTPATIENT
Start: 2023-08-01 | End: 2023-08-03 | Stop reason: HOSPADM

## 2023-08-01 RX ORDER — BISACODYL 5 MG/1
5 TABLET, DELAYED RELEASE ORAL DAILY PRN
Status: DISCONTINUED | OUTPATIENT
Start: 2023-08-01 | End: 2023-08-03 | Stop reason: HOSPADM

## 2023-08-01 RX ORDER — POLYETHYLENE GLYCOL 3350 17 G/17G
17 POWDER, FOR SOLUTION ORAL DAILY PRN
Status: DISCONTINUED | OUTPATIENT
Start: 2023-08-01 | End: 2023-08-03 | Stop reason: HOSPADM

## 2023-08-01 RX ORDER — FUROSEMIDE 40 MG/1
40 TABLET ORAL 2 TIMES DAILY
COMMUNITY

## 2023-08-01 RX ORDER — OXYCODONE HYDROCHLORIDE 5 MG/1
5 TABLET ORAL EVERY 4 HOURS PRN
Status: DISCONTINUED | OUTPATIENT
Start: 2023-08-01 | End: 2023-08-03 | Stop reason: HOSPADM

## 2023-08-01 RX ORDER — BUDESONIDE AND FORMOTEROL FUMARATE DIHYDRATE 160; 4.5 UG/1; UG/1
2 AEROSOL RESPIRATORY (INHALATION)
Status: DISCONTINUED | OUTPATIENT
Start: 2023-08-02 | End: 2023-08-03 | Stop reason: HOSPADM

## 2023-08-01 RX ORDER — SODIUM CHLORIDE 0.9 % (FLUSH) 0.9 %
10 SYRINGE (ML) INJECTION EVERY 12 HOURS SCHEDULED
Status: DISCONTINUED | OUTPATIENT
Start: 2023-08-02 | End: 2023-08-03 | Stop reason: HOSPADM

## 2023-08-01 RX ORDER — HEPARIN SODIUM 5000 [USP'U]/ML
5000 INJECTION, SOLUTION INTRAVENOUS; SUBCUTANEOUS EVERY 8 HOURS SCHEDULED
Status: DISCONTINUED | OUTPATIENT
Start: 2023-08-02 | End: 2023-08-02

## 2023-08-01 RX ORDER — ONDANSETRON 4 MG/1
4 TABLET, FILM COATED ORAL EVERY 6 HOURS PRN
Status: DISCONTINUED | OUTPATIENT
Start: 2023-08-01 | End: 2023-08-03 | Stop reason: HOSPADM

## 2023-08-01 RX ORDER — AMOXICILLIN 250 MG
2 CAPSULE ORAL 2 TIMES DAILY
Status: DISCONTINUED | OUTPATIENT
Start: 2023-08-02 | End: 2023-08-03 | Stop reason: HOSPADM

## 2023-08-01 RX ORDER — SODIUM CHLORIDE 9 MG/ML
100 INJECTION, SOLUTION INTRAVENOUS CONTINUOUS
Status: DISCONTINUED | OUTPATIENT
Start: 2023-08-02 | End: 2023-08-02

## 2023-08-01 RX ORDER — NITROGLYCERIN 0.4 MG/1
0.4 TABLET SUBLINGUAL
Status: DISCONTINUED | OUTPATIENT
Start: 2023-08-01 | End: 2023-08-03 | Stop reason: HOSPADM

## 2023-08-01 RX ORDER — SODIUM CHLORIDE 0.9 % (FLUSH) 0.9 %
10 SYRINGE (ML) INJECTION AS NEEDED
Status: DISCONTINUED | OUTPATIENT
Start: 2023-08-01 | End: 2023-08-03 | Stop reason: HOSPADM

## 2023-08-01 RX ORDER — HYDROCORTISONE 25 MG/G
1 CREAM TOPICAL 2 TIMES DAILY
COMMUNITY

## 2023-08-01 RX ADMIN — TETANUS TOXOID, REDUCED DIPHTHERIA TOXOID AND ACELLULAR PERTUSSIS VACCINE, ADSORBED 0.5 ML: 5; 2.5; 8; 8; 2.5 SUSPENSION INTRAMUSCULAR at 18:04

## 2023-08-01 NOTE — Clinical Note
Level of Care: Med/Surg [1]   Admitting Physician: TIFFANIE GROSSMAN [[id number]]   Attending Physician: TIFFANIE GROSSMAN [[id number]]

## 2023-08-01 NOTE — ED NOTES
Pt had a fall yesterday around 3pm at her assisted living room after her L knee buckled under her. She reports she did hit her head but feels that is ok. Pt is on blood thinners. Pt only c/o L ankle pain and is unable to bear weight. Also has some small lacs to L forearm with bruising.

## 2023-08-01 NOTE — FSED PROVIDER NOTE
Emergency Medicine Evaluation Note  Subjective   History of Present Illness    HPI: Kendy Worrell is a 83 y.o. female who presents to the ED with with complaints of left ankle pain after mechanical trip and fall that occurred yesterday.  Past medical history as described below including anticoagulation.  Patient lives at assisted living facility, typically ambulates with a rollator, was attempting to walk in between furniture without assistance, states inverted left ankle, striking left knee, states striking head, without LOC.  Patient states she was able to scoot herself along floor until staff able to assist her off ground.  Denies any prolonged ground time, states longstanding history of chronic pain in bilateral lower extremities, states hypersensitivity superficial for palpation, states ankle pain is more severe than typical however remainder of legs at chronic pain level.  No OTC medications prior to arrival, states unable to ambulate due to pain with weightbearing. No other concomitant symptoms. No other known aggravating or alleviating factors.      ROS  Review of Systems   Constitutional: Negative.    HENT: Negative.     Eyes: Negative.    Respiratory: Negative.     Cardiovascular: Negative.    Gastrointestinal: Negative.    Endocrine: Negative.    Genitourinary: Negative.    Musculoskeletal:         As described above   Skin: Negative.    Allergic/Immunologic: Negative.    Neurological: Negative.    Hematological: Negative.    Psychiatric/Behavioral: Negative.       Previous History:  Past Medical History:   Diagnosis Date    A-fib     Arthritis     Breast cancer     CHF (congestive heart failure)     CVA (cerebral vascular accident) 09/2019    History of pulmonary embolus (PE)     Hyperlipidemia     Hypertension     Hyperthyroidism     patient has hypothyroidism    Hypothyroidism      Past Surgical History:   Procedure Laterality Date    BREAST BIOPSY      BREAST SURGERY Right     CHOLECYSTECTOMY       "COLON SURGERY      Removed    COLONOSCOPY      COLONOSCOPY N/A 11/3/2022    Procedure: COLONOSCOPY to anastamosis with biopsies and cold snare polypectomy;  Surgeon: Saroj Oakes MD;  Location: [facility] ENDOSCOPY;  Service: Gastroenterology;  Laterality: N/A;  PRe: rectal bleeding  Post: hemorrhoids, diverticulosis, anastamotic ulcer, polyp, hemostasis clip free-floating    HYSTERECTOMY      MAMMO BILATERAL  2015    MASTECTOMY      TONSILLECTOMY       Social History     Tobacco Use    Smoking status: Former    Smokeless tobacco: Never    Tobacco comments:     quit in 1988   Vaping Use    Vaping Use: Never used   Substance Use Topics    Alcohol use: Yes     Alcohol/week: 3.0 standard drinks     Types: 3 Shots of liquor per week     Comment: OCC    Drug use: Yes     Types: Hydrocodone     Family History   Problem Relation Age of Onset    Stroke Mother     Hypertension Mother     Heart disease Father     Cancer Father     Colon polyps Father      Allergies   Allergen Reactions    Aspirin      bleeding    Nsaids      bleeding     Current Outpatient Medications   Medication Instructions    acetaminophen (TYLENOL) 650 mg, Oral, Every 6 Hours PRN    albuterol sulfate  (90 Base) MCG/ACT inhaler 2 puffs, Inhalation, Every 4 Hours PRN    apixaban (ELIQUIS) 5 mg, Oral, Every 12 Hours Scheduled    isosorbide mononitrate (IMDUR) 30 mg, Oral, Daily    levothyroxine (SYNTHROID, LEVOTHROID) 100 mcg, Oral, Daily    metoprolol succinate XL (TOPROL-XL) 100 mg, Oral, Every 12 Hours Scheduled       Objective   Physical Exam  Patient Vitals for the past 24 hrs:   BP Temp Temp src Pulse Resp SpO2 Height Weight   08/01/23 1529 133/67 98.3 øF (36.8 øC) Tympanic 76 18 95 % 149.9 cm (59\") 81.6 kg (180 lb)     Physical Exam  Vitals and nursing note reviewed.   Constitutional:       General: She is not in acute distress.     Appearance: She is normal weight. She is not toxic-appearing.   HENT:      Head: Normocephalic and " atraumatic.      Comments: No hemotympanum, monge or raccoon sign bilaterally.  No nasal septal hematoma or midface instability.     Nose: Nose normal. No congestion.      Mouth/Throat:      Mouth: Mucous membranes are moist. Mucous membranes are dry.   Eyes:      General:         Right eye: No discharge.         Left eye: No discharge.      Extraocular Movements: Extraocular movements intact.      Conjunctiva/sclera: Conjunctivae normal.      Pupils: Pupils are equal, round, and reactive to light.   Cardiovascular:      Rate and Rhythm: Normal rate and regular rhythm.      Pulses: Normal pulses.      Heart sounds: Normal heart sounds.   Pulmonary:      Effort: Pulmonary effort is normal. No respiratory distress.      Breath sounds: Normal breath sounds. No wheezing, rhonchi or rales.   Chest:      Chest wall: No tenderness.   Abdominal:      General: Abdomen is flat. There is no distension.      Palpations: Abdomen is soft.      Tenderness: There is no abdominal tenderness. There is no right CVA tenderness, left CVA tenderness, guarding or rebound.      Comments: No Baker Lilly or [place]'s.   Musculoskeletal:      Cervical back: Normal range of motion and neck supple. No rigidity or tenderness.      Comments: No cervical, thoracic, lumbar spinous process tenderness step-offs or deformities.  Pelvis stable.  No pain with logroll of lower extremities.  Limited lower extremity exam due to body habitus, pitting edema, and history of hypersensitivity, no skin breakage or bleed.  Distal pulses and sensation intact.    Patient fully ranging upper extremities without difficulty.  Full passive and active range of motion, cardinal motions of hands intact bilaterally, median, radial, ulnar nerve distributions intact.  Compartment soft.   Skin:     Capillary Refill: Capillary refill takes less than 2 seconds.      Comments: Skin tears appreciated distal left upper extremity no active bleed or contamination.   Neurological:       General: No focal deficit present.      Mental Status: She is alert and oriented to person, place, and time.      Cranial Nerves: No cranial nerve deficit.      Sensory: No sensory deficit.      Motor: No weakness.      Coordination: Coordination normal.   Psychiatric:         Mood and Affect: Mood normal.         Behavior: Behavior normal.       Results  Labs Reviewed   COMPREHENSIVE METABOLIC PANEL - Abnormal; Notable for the following components:       Result Value    Glucose 113 (*)     Creatinine 1.22 (*)     Sodium 135 (*)     Chloride 97 (*)     Calcium 10.6 (*)     AST (SGOT) 33 (*)     Alkaline Phosphatase 135 (*)     Total Bilirubin 1.6 (*)     eGFR 43.8 (*)     All other components within normal limits    Narrative:     GFR Normal >60  Chronic Kidney Disease <60  Kidney Failure <15    The GFR formula is only valid for adults with stable renal function between ages 18 and 70.   CBC WITH AUTO DIFFERENTIAL - Abnormal; Notable for the following components:    Hemoglobin 9.0 (*)     Hematocrit 29.7 (*)     MCV 77.7 (*)     MCH 23.6 (*)     MCHC 30.3 (*)     RDW 19.9 (*)     RDW-SD 56.2 (*)     Lymphocyte % 16.3 (*)     Monocyte % 14.2 (*)     Monocytes, Absolute 1.20 (*)     All other components within normal limits   CK   CBC AND DIFFERENTIAL    Narrative:     The following orders were created for panel order CBC & Differential.  Procedure                               Abnormality         Status                     ---------                               -----------         ------                     CBC Auto Differential[[id number]]        Abnormal            Final result                 Please view results for these tests on the individual orders.     XR Hips Bilateral With or Without Pelvis 5 View   Final Result   Impression:   1. Negative for acute fracture.   2. Osteopenia.         Electronically Signed: Sarath Simpson     8/1/2023 5:42 PM EDT     Workstation ID: [id number]      XR Knee 3 View Left   Final  Result   Impression:   1. Negative for fracture.   2. Severe osteoarthritis with bone-on-bone articulation at the lateral compartment.   3. 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   4. Diffuse osteopenia.         Electronically Signed: Sarath Simpson     8/1/2023 5:44 PM EDT     Workstation ID: [id number]      CT Head Without Contrast   Final Result   Impression:   Stable chronic findings without acute process.         Electronically Signed: Sarath Simpson     8/1/2023 5:40 PM EDT     Workstation ID: [id number]      XR Ankle 3+ View Left   Final Result   Impression:   1. Oblique nondisplaced fracture of the fibular metaphysis.   2. Diffuse osteopenia.   3. Additional chronic findings above.         Electronically Signed: Sarath Simpson     8/1/2023 4:27 PM EDT     Workstation ID: [id number]        The laboratory results, imaging results and other diagnostic exam results were reviewed in the EMR.     Procedures  Procedures    Medical Decision Making    Patient presents to the ED as described above.  Vital signs stable and unremarkable.  Physical exam as described above.  Imaging as described above with distal fibular fracture.  Remainder of imaging without acute abnormality.  Patient placed in boot as splint, however nonweightbearing with injury.  Patient with limited mobility with use of rollator at baseline, both patient and family at bedside state she will be unable to care for herself and perform ADLs if unable to bear weight with left lower extremity, patient without the strength to use crutches.  Family called patient's home assisted living facility who state that they do not have the ability to care for patient as nursing home patient or acute rehab.  Patient requesting placement at rehab facility.  Case discussed with Dr. Reese who agrees with admission at [facility]. Patient and family at bedside were informed of results.  They verbalized understanding and agreement with treatment care plan.   All questions answered.    Diagnosis  Final diagnoses:   Closed fracture of distal end of left fibula, unspecified fracture morphology, initial encounter   Skin tear of left forearm without complication, initial encounter   Inability to ambulate due to ankle or foot       Disposition  ED Disposition       ED Disposition   Decision to Admit    Condition   --    Comment   Level of Care: Med/Surg [1]   Admitting Physician: TIFFANIE GROSSMAN [[id number]]   Attending Physician: TIFFANIE GROSSMAN [[id number]]               No follow-up provider specified.

## 2023-08-01 NOTE — H&P
[facility] Medicine Services      Patient Name: Kendy Worrell  : 1940  MRN: [id number]  Primary Care Physician:  Marisela Monte APRN  Date of admission: 2023      Subjective      Chief Complaint: fall    History of Present Illness: Kendy Worrell is a 83 y.o. female with PMHx of HTN, HLD, thyroid dysfunction, h/o Breast cancer, CHF, A. Fib, h/o PE who presented to [facility] on 2023 complaining of fall.  Admits to a fall at [facility] after left knee buckled and she twisted left ankle yesterday.  Since then has had left ankle and left knee pain and unable to bear weight.  She is on blood thinners and did hit head.  Denies chest pain, fever, chills, vomiting, abdominal pain. Presented to  for evaluation    In the ER, vitals 98.3, HR 76, RR 18, /67, 95% RA.  Labs notable for sodium 135, Cl 97, Cr 1.22, glucose 113, AST 33, tbili 1.6, hgb 9.0, MCV 77.7.  CT head without mass, shift, hemorrhage. Left ankle x-ray shows oblique nondisplaced fracture of the fibular metaphysis.  Left knee x-ray without acute fracture but does show severe OA with chondocalcinosis compatible with CPPD arthropathy.  Hips x-ray negative for acute fracture.  She is to be admitted for orho evaluation of fracture and SNF placement.      ROS   12 point ROS reviewed and negative except as mentioned above      Personal History     Past Medical History:   Diagnosis Date    A-fib     Arthritis     Breast cancer     CHF (congestive heart failure)     CVA (cerebral vascular accident) 2019    History of pulmonary embolus (PE)     Hyperlipidemia     Hypertension     Hyperthyroidism     patient has hypothyroidism    Hypothyroidism        Past Surgical History:   Procedure Laterality Date    BREAST BIOPSY      BREAST SURGERY Right     CHOLECYSTECTOMY      COLON SURGERY      Removed    COLONOSCOPY      COLONOSCOPY N/A 11/3/2022    Procedure: COLONOSCOPY to anastamosis with biopsies and cold snare  polypectomy;  Surgeon: Saroj Oakes MD;  Location: [facility] ENDOSCOPY;  Service: Gastroenterology;  Laterality: N/A;  PRe: rectal bleeding  Post: hemorrhoids, diverticulosis, anastamotic ulcer, polyp, hemostasis clip free-floating    HYSTERECTOMY      MAMMO BILATERAL  2015    MASTECTOMY      TONSILLECTOMY         Family History: family history includes Cancer in her father; Colon polyps in her father; Heart disease in her father; Hypertension in her mother; Stroke in her mother. Otherwise pertinent FHx was reviewed and not pertinent to current issue.    Social History:  reports that she has quit smoking. She has never used smokeless tobacco. She reports current alcohol use of about 3.0 standard drinks per week. She reports current drug use. Drug: Hydrocodone.    Home Medications:  Prior to Admission Medications       Prescriptions Last Dose Informant Patient Reported? Taking?    acetaminophen (TYLENOL) 325 MG tablet  Nursing Home No No    Take 2 tablets by mouth Every 6 (Six) Hours As Needed for Mild Pain .    albuterol sulfate  (90 Base) MCG/ACT inhaler  Nursing Home Yes No    Inhale 2 puffs Every 4 (Four) Hours As Needed for Wheezing.    apixaban (ELIQUIS) 5 MG tablet tablet  Nursing Home No No    Take 1 tablet by mouth Every 12 (Twelve) Hours.    isosorbide mononitrate (IMDUR) 30 MG 24 hr tablet  Nursing Home Yes No    Take 30 mg by mouth Daily.    levothyroxine (SYNTHROID, LEVOTHROID) 100 MCG tablet  Nursing Home No No    Take 1 tablet by mouth Daily.    metoprolol succinate XL (TOPROL-XL) 100 MG 24 hr tablet  Nursing Home No No    Take 1 tablet by mouth Every 12 (Twelve) Hours.              Allergies:  Allergies   Allergen Reactions    Aspirin      bleeding    Nsaids      bleeding       Objective      Vitals:   Temp:  [98.3 øF (36.8 øC)] 98.3 øF (36.8 øC)  Heart Rate:  [76] 76  Resp:  [18] 18  BP: (133)/(67) 133/67    Physical Exam  Constitutional:       General: She is not in acute distress.      Appearance: She is obese.   HENT:      Head: Normocephalic and atraumatic.      Nose: No congestion.      Mouth/Throat:      Pharynx: Oropharynx is clear.   Eyes:      General: No scleral icterus.  Cardiovascular:      Rate and Rhythm: Normal rate and regular rhythm.      Heart sounds: No murmur heard.    No friction rub. No gallop.   Pulmonary:      Effort: No respiratory distress.      Breath sounds: No wheezing or rales.   Abdominal:      General: There is no distension.      Tenderness: There is no abdominal tenderness. There is no guarding.   Musculoskeletal:      Cervical back: No rigidity.      Right lower leg: Edema present.      Left lower leg: Edema present.      Comments: Boot on LLE   Skin:     Coloration: Skin is not jaundiced.      Findings: No bruising or lesion.   Neurological:      General: No focal deficit present.      Mental Status: She is alert and oriented to person, place, and time.      Motor: Weakness present.        Result Review    Result Review:  I have personally reviewed the results from the time of this admission to 8/1/2023 19:07 EDT and agree with these findings:  [x]  Laboratory  []  Microbiology  [x]  Radiology  []  EKG/Telemetry   []  Cardiology/Vascular   []  Pathology  []  Old records  []  Other:        Assessment & Plan        Active Hospital Problems:  There are no active hospital problems to display for this patient.    Plan:     #Mechanical fall  #Left fibular fracture  #Left knee OA, severe  #CPPD, Left knee    -Left ankle x-ray shows oblique nondisplaced fracture of the fibular metaphysis.      -Left knee x-ray without acute fracture but does show severe OA with chondocalcinosis compatible with CPPD arthropathy.      -Hips x-ray negative for acute fracture.    - boot on LLE    - ortho to evaluate fibular fracture    - pt/ot/CM    - lives at [facility], unable to bear weight, will need SNF    - CT head without acute mass, shift, hemorrhage    - pain control    - NPO    -  IVF    #A. Fib  #h/o PE    - resume home Toprol    - hold home eliquis for possible surgery    #MYLENE  #Hyponatremia  #Hypercalcemia    - Cr 1.22 on admission, base around 0.8    - sodium 135 with Cl 97 on admission    - suspect volume depletion    - IVF NS @ 100/hr, watch for volume overload    - Ca 10.6, suspect 2/2 volume depletion, if not improve in AM then consider further workup    #COPD    - stable on room air    - Duoneb PRN    - symbicort BID    #h/o breast cancer    - s/p surgery    #Transaminitis    - AST 33 and tbili 1.6 on admission    - appears chronically elevated    #Anemia    - hgb 9.0 on admission    - check iron and ferritin    - appears near base, no overt bleeding, follow labs    #Chronic b/l LE edema    - per pt    #HTN    - resume home Toprol    #HLD    -reconcile home meds    #Hypothyroid    - resume home synthroid    DVT prophylaxis: Heparin, no SCDs due to fracture      CODE STATUS:       Admission Status:  I believe this patient meets observation status.    I discussed the patient's findings and my recommendations with patient.    This patient has been examined wearing appropriate Personal Protective Equipment and discussed with  PAtient . 08/01/23      Signature: Electronically signed by Louise Reese DO, 08/01/23, 11:57 PM EDT.

## 2023-08-02 PROBLEM — D64.9 ANEMIA: Status: ACTIVE | Noted: 2023-08-02

## 2023-08-02 LAB
ANION GAP SERPL CALCULATED.3IONS-SCNC: 12 MMOL/L (ref 5–15)
BUN SERPL-MCNC: 19 MG/DL (ref 8–23)
BUN/CREAT SERPL: 15.6 (ref 7–25)
CALCIUM SPEC-SCNC: 9.6 MG/DL (ref 8.6–10.5)
CHLORIDE SERPL-SCNC: 99 MMOL/L (ref 98–107)
CO2 SERPL-SCNC: 26 MMOL/L (ref 22–29)
CREAT SERPL-MCNC: 1.22 MG/DL (ref 0.57–1)
DEPRECATED RDW RBC AUTO: 56.9 FL (ref 37–54)
EGFRCR SERPLBLD CKD-EPI 2021: 43.8 ML/MIN/1.73
ERYTHROCYTE [DISTWIDTH] IN BLOOD BY AUTOMATED COUNT: 20.2 % (ref 12.3–15.4)
FERRITIN SERPL-MCNC: 26.71 NG/ML (ref 13–150)
GLUCOSE BLDC GLUCOMTR-MCNC: 147 MG/DL (ref 70–105)
GLUCOSE SERPL-MCNC: 103 MG/DL (ref 65–99)
HCT VFR BLD AUTO: 26.3 % (ref 34–46.6)
HGB BLD-MCNC: 8.3 G/DL (ref 12–15.9)
IRON 24H UR-MRATE: 21 MCG/DL (ref 37–145)
IRON SATN MFR SERPL: 4 % (ref 20–50)
MCH RBC QN AUTO: 25 PG (ref 26.6–33)
MCHC RBC AUTO-ENTMCNC: 31.7 G/DL (ref 31.5–35.7)
MCV RBC AUTO: 78.9 FL (ref 79–97)
PLATELET # BLD AUTO: 224 10*3/MM3 (ref 140–450)
PMV BLD AUTO: 8.4 FL (ref 6–12)
POTASSIUM SERPL-SCNC: 3.7 MMOL/L (ref 3.5–5.2)
RBC # BLD AUTO: 3.33 10*6/MM3 (ref 3.77–5.28)
SODIUM SERPL-SCNC: 137 MMOL/L (ref 136–145)
TIBC SERPL-MCNC: 538 MCG/DL (ref 298–536)
TRANSFERRIN SERPL-MCNC: 361 MG/DL (ref 200–360)
WBC NRBC COR # BLD: 7.1 10*3/MM3 (ref 3.4–10.8)

## 2023-08-02 PROCEDURE — 82948 REAGENT STRIP/BLOOD GLUCOSE: CPT

## 2023-08-02 PROCEDURE — 94799 UNLISTED PULMONARY SVC/PX: CPT

## 2023-08-02 PROCEDURE — 97166 OT EVAL MOD COMPLEX 45 MIN: CPT

## 2023-08-02 PROCEDURE — 84466 ASSAY OF TRANSFERRIN: CPT | Performed by: STUDENT IN AN ORGANIZED HEALTH CARE EDUCATION/TRAINING PROGRAM

## 2023-08-02 PROCEDURE — 85027 COMPLETE CBC AUTOMATED: CPT | Performed by: STUDENT IN AN ORGANIZED HEALTH CARE EDUCATION/TRAINING PROGRAM

## 2023-08-02 PROCEDURE — 25010000002 HEPARIN (PORCINE) PER 1000 UNITS: Performed by: STUDENT IN AN ORGANIZED HEALTH CARE EDUCATION/TRAINING PROGRAM

## 2023-08-02 PROCEDURE — 97161 PT EVAL LOW COMPLEX 20 MIN: CPT

## 2023-08-02 PROCEDURE — 83540 ASSAY OF IRON: CPT | Performed by: STUDENT IN AN ORGANIZED HEALTH CARE EDUCATION/TRAINING PROGRAM

## 2023-08-02 PROCEDURE — 94640 AIRWAY INHALATION TREATMENT: CPT

## 2023-08-02 PROCEDURE — 94761 N-INVAS EAR/PLS OXIMETRY MLT: CPT

## 2023-08-02 PROCEDURE — 82728 ASSAY OF FERRITIN: CPT | Performed by: STUDENT IN AN ORGANIZED HEALTH CARE EDUCATION/TRAINING PROGRAM

## 2023-08-02 PROCEDURE — 80048 BASIC METABOLIC PNL TOTAL CA: CPT | Performed by: STUDENT IN AN ORGANIZED HEALTH CARE EDUCATION/TRAINING PROGRAM

## 2023-08-02 RX ORDER — FUROSEMIDE 40 MG/1
40 TABLET ORAL DAILY
Status: DISCONTINUED | OUTPATIENT
Start: 2023-08-03 | End: 2023-08-03 | Stop reason: HOSPADM

## 2023-08-02 RX ORDER — ISOSORBIDE MONONITRATE 30 MG/1
30 TABLET, EXTENDED RELEASE ORAL DAILY
Status: DISCONTINUED | OUTPATIENT
Start: 2023-08-02 | End: 2023-08-03 | Stop reason: HOSPADM

## 2023-08-02 RX ORDER — FUROSEMIDE 40 MG/1
40 TABLET ORAL 2 TIMES DAILY
Status: DISCONTINUED | OUTPATIENT
Start: 2023-08-02 | End: 2023-08-02

## 2023-08-02 RX ORDER — PANTOPRAZOLE SODIUM 40 MG/1
40 TABLET, DELAYED RELEASE ORAL DAILY
Status: DISCONTINUED | OUTPATIENT
Start: 2023-08-02 | End: 2023-08-03 | Stop reason: HOSPADM

## 2023-08-02 RX ORDER — FERROUS SULFATE TAB EC 324 MG (65 MG FE EQUIVALENT) 324 (65 FE) MG
324 TABLET DELAYED RESPONSE ORAL
Status: DISCONTINUED | OUTPATIENT
Start: 2023-08-02 | End: 2023-08-03 | Stop reason: HOSPADM

## 2023-08-02 RX ADMIN — BUDESONIDE AND FORMOTEROL FUMARATE DIHYDRATE 2 PUFF: 160; 4.5 AEROSOL RESPIRATORY (INHALATION) at 20:47

## 2023-08-02 RX ADMIN — HEPARIN SODIUM 5000 UNITS: 5000 INJECTION INTRAVENOUS; SUBCUTANEOUS at 05:47

## 2023-08-02 RX ADMIN — PANTOPRAZOLE SODIUM 40 MG: 40 TABLET, DELAYED RELEASE ORAL at 08:12

## 2023-08-02 RX ADMIN — SENNOSIDES AND DOCUSATE SODIUM 2 TABLET: 50; 8.6 TABLET ORAL at 00:19

## 2023-08-02 RX ADMIN — APIXABAN 5 MG: 5 TABLET, FILM COATED ORAL at 20:52

## 2023-08-02 RX ADMIN — OXYCODONE HYDROCHLORIDE 5 MG: 5 TABLET ORAL at 17:17

## 2023-08-02 RX ADMIN — ISOSORBIDE MONONITRATE 30 MG: 30 TABLET, EXTENDED RELEASE ORAL at 08:12

## 2023-08-02 RX ADMIN — SODIUM CHLORIDE 100 ML/HR: 9 INJECTION, SOLUTION INTRAVENOUS at 00:11

## 2023-08-02 RX ADMIN — METOPROLOL SUCCINATE 100 MG: 50 TABLET, EXTENDED RELEASE ORAL at 00:19

## 2023-08-02 RX ADMIN — METOPROLOL SUCCINATE 100 MG: 50 TABLET, EXTENDED RELEASE ORAL at 08:12

## 2023-08-02 RX ADMIN — BUDESONIDE AND FORMOTEROL FUMARATE DIHYDRATE 2 PUFF: 160; 4.5 AEROSOL RESPIRATORY (INHALATION) at 07:47

## 2023-08-02 RX ADMIN — LEVOTHYROXINE SODIUM 100 MCG: 0.1 TABLET ORAL at 05:47

## 2023-08-02 RX ADMIN — APIXABAN 5 MG: 5 TABLET, FILM COATED ORAL at 13:17

## 2023-08-02 RX ADMIN — OXYCODONE HYDROCHLORIDE 5 MG: 5 TABLET ORAL at 00:19

## 2023-08-02 RX ADMIN — Medication 10 ML: at 20:53

## 2023-08-02 RX ADMIN — FUROSEMIDE 40 MG: 40 TABLET ORAL at 08:12

## 2023-08-02 RX ADMIN — METOPROLOL SUCCINATE 100 MG: 50 TABLET, EXTENDED RELEASE ORAL at 20:52

## 2023-08-02 NOTE — PROGRESS NOTES
"    [facility] Medicine Services   Daily Progress Note    Patient Name: Kendy Worrell  : 1940  MRN: [id number]  Primary Care Physician:  Marisela Monte APRN  Date of admission: 2023  Date and Time of Service: 2023 at 0 9:30 AM      Subjective      Chief Complaint: Status post fall      Brief Patient Summary:  Kendy Worrell is a 83 y.o. female admitted with nondisplaced left ankle fracture due to fall after right knee buckled from chronic osteoarthritis.  She has been evaluated by orthopedic surgery who recommend weightbearing as tolerated with orthopedic boot in place.  Anemic on admission, significant iron deficiency, will check heme stool with her history of GI bleeding and colectomy.  Discontinue IV fluids with her history of diastolic heart failure, repeat BMP in a.m. to follow for renal insufficiency acute versus chronic.    Patient Reports pain well controlled, denies dizziness    Subjective:  Symptoms:  No shortness of breath or chest pain.    Diet:  No nausea.    Activity level: Impaired due to pain.    Pain:  Pain is well controlled (At rest).        Objective      Vitals:   Temp:  [97.6 øF (36.4 øC)-98.3 øF (36.8 øC)] 98.3 øF (36.8 øC)  Heart Rate:  [70-91] 91  Resp:  [14-18] 16  BP: (113-150)/(63-85) 150/85  Objective:  General Appearance:  Comfortable.    Vital signs: (most recent): Blood pressure 150/85, pulse 91, temperature 98.3 øF (36.8 øC), temperature source Oral, resp. rate 16, height 149.9 cm (59\"), weight 84 kg (185 lb 3 oz), SpO2 90 %.    Lungs:  Normal effort and normal respiratory rate.  Breath sounds clear to auscultation.    Heart: Normal rate.  Regular rhythm.  S1 normal and S2 normal.    Abdomen: Abdomen is soft and non-distended.    Extremities: There is dependent edema (Trace pretibial).    Neurological: Patient is alert and oriented to person, place and time.    Skin:  Warm and dry.                    Result Review    Result Review:  I have personally " reviewed the results from the time of this admission to 8/2/2023 11:42 EDT and agree with these findings:  [x]  Laboratory  [x]  Microbiology  [x]  Radiology  []  EKG/Telemetry   [x]  Cardiology/Vascular   []  Pathology  [x]  Old records  []  Other:  Most notable findings include: See summary and plan          Assessment & Plan        budesonide-formoterol, 2 puff, Inhalation, BID - RT  ferrous sulfate, 324 mg, Oral, Daily With Breakfast  furosemide, 40 mg, Oral, BID  heparin (porcine), 5,000 Units, Subcutaneous, Q8H  isosorbide mononitrate, 30 mg, Oral, Daily  levothyroxine, 100 mcg, Oral, Q AM  metoprolol succinate XL, 100 mg, Oral, Q12H  pantoprazole, 40 mg, Oral, Daily  senna-docusate sodium, 2 tablet, Oral, BID  sodium chloride, 10 mL, Intravenous, Q12H             Active Hospital Problems:  Active Hospital Problems    Diagnosis     **Closed fibular fracture      Plan:   #Mechanical fall  #Left fibular ankle fracture  #Left knee OA, severe  #CPPD, Left knee  -CT head without acute mass, shift, hemorrhage  -Left ankle x-ray shows oblique nondisplaced fracture of the fibular metaphysis.    -Left knee x-ray without acute fracture but does show severe OA with chondocalcinosis compatible with CPPD arthropathy.    -Hips x-ray negative for acute fracture  -CK normal  -boot on LLE  -ortho evaluated----recommend weightbearing as tolerated with orthopedic boot, follow-up in their office in 3 to 4 weeks for repeat x-ray  -Continue pt/ot  -lives at [facility], required maximal assistance with PT and OT, anticipate need for SNF unless improvement in mobility       #Permanent A. Fib, SVT  #h/o bilateral PE 12/2022  #CVA 2019  -Continue home Toprol  -follows with cardiologist through [facility]  -Continue telemetry for now  -Resume home Eliquis, discontinue heparin prophylaxis     #MYLENE  #Hyponatremia  #Hypercalcemia  -Cr 1.22 on admission, previous creatinine 0.8 approximate 1 year ago  -sodium 135 --- normalized this  morning  -Ca 10.6 on admission--- normalized after hydration     #COPD  -No exacerbation  -stable on room air  -Continue Duoneb PRN  -Continue symbicort BID     #h/o breast cancer status postsurgery  -Noncontributory     #Transaminitis  -AST 33 and tbili 1.6 on admission  -appears chronically elevated  -Recheck CMP in a.m.     #Anemia, microcytic  #Lower GI bleed 11/2022  #GI bleed, colectomy 2006  -hgb 9.0 on admission---8.3 this morning after fluid hydration  -Low serum iron, normal ferritin level  -Check heme stool  -Start p.o. iron supplement  -Recheck CBC in a.m.     #Chronic diastolic heart failure  #Chronic b/l LE edema  #CAD apical chronic occlusion with collateral circulation as per cardiology note  -DC IV fluids  -Resume home Lasix at once daily dosage    #HTN  -Relatively well controlled  -Continue home Toprol and resume Imdur     #HLD  -No statin on home medication list  -Follow outpatient with primary care     #Hypothyroid  -resume home synthroid    DVT prophylaxis:  Medical DVT prophylaxis orders are present.    CODE STATUS:    Code Status (Patient has no pulse and is not breathing): CPR (Attempt to Resuscitate)  Medical Interventions (Patient has pulse or is breathing): Full Support  Release to patient: Routine Release      Disposition:  I expect patient to be discharged 2 days.    Plan of care discussed with patient    This patient has been examined wearing appropriate Personal Protective Equipment   Electronically signed by MENDOZA Fuchs, 08/02/23, 11:42 EDT.  [facility] Hospitalist Team

## 2023-08-02 NOTE — PLAN OF CARE
Goal Outcome Evaluation:  Plan of Care Reviewed With: patient           Outcome Evaluation: 84 yo female adm 8/1/23 after fall at assisted living when L knee buckled. Has been seen by orthopedist, who recommended wbat w/ cam boot for management of non-displaced distal L fibular fx. Complicated medical hx including cad, chf, PE, breast ca w/ mastectomy on R, cva, among other comorbidities. At baseline, pt uses rollator wx to amb around assisted living. Today, pt is alert and oriented x 4. Needs max assist to come to sit at eob. Needed min to mod assist x 2 to come to stand at rw while wearing cam boot on LLE. Then required min to mod assist of 2 to transfer to chair. Unable to take step w/ RLE due to pain in LLE w/ wb, but was able to pivot on R foot to perform standing transfer to chair. Pt far below her baseline and will require SNF at d/c. Will follow.

## 2023-08-02 NOTE — PLAN OF CARE
Goal Outcome Evaluation:              Outcome Evaluation: Currently abed at this time after sitting up in chair this morning. CAM boot remains in place to left leg and is WBAT although it is quite painful when she bears weight. Requiring assist of 2 for transfers at this time. Has not requested anything for pain as of yet this shift. No surgical intervention planned. Will continue to monitor

## 2023-08-02 NOTE — PLAN OF CARE
Goal Outcome Evaluation:  Plan of Care Reviewed With: patient           Outcome Evaluation: 82 yo female adm 8/1/23 after fall at assisted living when L knee buckled. Has been seen by orthopedist, who recommended wbat w/ cam boot for management of non-displaced distal L fibular fx. Complicated medical hx including cad, chf, PE, breast ca w/ mastectomy on R, cva, among other comorbidities.At baseline, pt lives at an [facility] and uses a rollator for functional mobility. At time of evaluation, pt A&OX4, however pt with repetitive questions about WB following education.Pt required max A for supine to sitting at EOB. Pt required max A for LB dressing and donning/doffing brief. Pt required mod A to come to stand and transfer to chair with limited movement of RLE. Pt far from baseline and would benefit from SNF. OT will follow.      Anticipated Discharge Disposition (OT): skilled nursing facility

## 2023-08-02 NOTE — THERAPY EVALUATION
Patient Name: Kendy Worrell  : 1940    MRN: [id number]                              Today's Date: 2023       Admit Date: 2023    Visit Dx:     ICD-10-CM ICD-9-CM   1. Closed fracture of distal end of left fibula, unspecified fracture morphology, initial encounter  S82.832A 824.8   2. Skin tear of left forearm without complication, initial encounter  S51.812A 881.00   3. Inability to ambulate due to ankle or foot  R26.2 719.7     Patient Active Problem List   Diagnosis    Essential hypertension    Acquired hypothyroidism    Coronary artery disease involving native coronary artery of native heart without angina pectoris    Hyperlipidemia    Arthritis    Skin lesion of chest wall    NSTEMI (non-ST elevated myocardial infarction)    Supraventricular tachycardia    Normal cardiac stress test    Gastrointestinal bleed    Angina effort    Morbidly obese    Bilateral pulmonary embolism    New onset a-fib    UTI (urinary tract infection)    Chronic diastolic CHF (congestive heart failure)    Acute ischemic stroke    Chest pain    Bright red rectal bleeding    Closed fibular fracture     Past Medical History:   Diagnosis Date    A-fib     Arthritis     Breast cancer     CHF (congestive heart failure)     CVA (cerebral vascular accident) 2019    History of pulmonary embolus (PE)     Hyperlipidemia     Hypertension     Hyperthyroidism     patient has hypothyroidism    Hypothyroidism      Past Surgical History:   Procedure Laterality Date    BREAST BIOPSY      BREAST SURGERY Right     CHOLECYSTECTOMY      COLON SURGERY      Removed    COLONOSCOPY      COLONOSCOPY N/A 11/3/2022    Procedure: COLONOSCOPY to anastamosis with biopsies and cold snare polypectomy;  Surgeon: Saroj Oakes MD;  Location: [facility] ENDOSCOPY;  Service: Gastroenterology;  Laterality: N/A;  PRe: rectal bleeding  Post: hemorrhoids, diverticulosis, anastamotic ulcer, polyp, hemostasis clip free-floating    HYSTERECTOMY      MAMMO  BILATERAL  2015    MASTECTOMY      TONSILLECTOMY        General Information       Row Name 08/02/23 0853          Physical Therapy Time and Intention    Document Type evaluation  -CM     Mode of Treatment co-treatment;physical therapy  -CM       Row Name 08/02/23 0853          General Information    Patient Profile Reviewed yes  -CM     Prior Level of Function independent:;community mobility;gait;ADL's  uses rollator wx for mobility; sleeps in regular twin bed  -CM     Existing Precautions/Restrictions fall;left   wbat LLE w/ cam boot  -CM     Barriers to Rehab previous functional deficit;medically complex  -CM       Row Name 08/02/23 0853          Living Environment    People in Home facility resident;other (see comments)  assisitive living  -CM       Row Name 08/02/23 0853          Home Main Entrance    Number of Stairs, Main Entrance none  -CM       Row Name 08/02/23 0853          Stairs Within Home, Primary    Number of Stairs, Within Home, Primary none  -CM       Row Name 08/02/23 0853          Cognition    Orientation Status (Cognition) oriented x 4  -CM       Row Name 08/02/23 0853          Safety Issues, Functional Mobility    Impairments Affecting Function (Mobility) balance;endurance/activity tolerance;strength;pain;range of motion (ROM);other (see comments)  significant LE edema (Chronic)  -CM               User Key  (r) = Recorded By, (t) = Taken By, (c) = Cosigned By      Initials Name Provider Type    CM Marianna Shah, PT Physical Therapist                   Mobility       Row Name 08/02/23 0856          Bed Mobility    Bed Mobility supine-sit  -CM     Supine-Sit [place] (Bed Mobility) maximum assist (25% patient effort);2 person assist  -CM     Assistive Device (Bed Mobility) draw sheet;head of bed elevated;leg   -CM       Row Name 08/02/23 0856          Bed-Chair Transfer    Bed-Chair [place] (Transfers) minimum assist (75% patient effort);moderate assist (50% patient effort);2  person assist  -CM     Assistive Device (Bed-Chair Transfers) walker, front-wheeled;other (see comments)  cam boot on LLE  -CM     Comment, (Bed-Chair Transfer) pivots on RLE; unable to take step w/ RLE 2* pain in LLE  -CM       Row Name 08/02/23 0856          Sit-Stand Transfer    Sit-Stand [place] (Transfers) minimum assist (75% patient effort);moderate assist (50% patient effort);2 person assist  -CM     Assistive Device (Sit-Stand Transfers) walker, front-wheeled  -CM       Row Name 08/02/23 0856          Gait/Stairs (Locomotion)    [place] Level (Gait) not tested  -CM     Distance in Feet (Gait) too painful for ambulation  -CM       Row Name 08/02/23 0856          Mobility    Extremity Weight-bearing Status left lower extremity  -CM     Left Lower Extremity (Weight-bearing Status) weight-bearing as tolerated (WBAT)  -CM               User Key  (r) = Recorded By, (t) = Taken By, (c) = Cosigned By      Initials Name Provider Type    Marianna Connolly, PT Physical Therapist                   Obj/Interventions       Row Name 08/02/23 0913          Range of Motion Comprehensive    General Range of Motion bilateral lower extremity ROM WFL;other (see comments)  -CM     Comment, General Range of Motion severe (4+) edema in BLEs (chronic)  -CM       Row Name 08/02/23 0913          Strength Comprehensive (MMT)    General Manual Muscle Testing (MMT) Assessment lower extremity strength deficits identified  -CM     Comment, General Manual Muscle Testing (MMT) Assessment LLE too painful for mm testing; RLE 3+/5  -CM       Row Name 08/02/23 0913          Balance    Balance Assessment sitting static balance;sitting dynamic balance;standing static balance;standing dynamic balance  -CM     Static Sitting Balance supervision  -CM     Dynamic Sitting Balance supervision  -CM     Position, Sitting Balance unsupported;sitting edge of bed  -CM     Static Standing Balance minimal assist  -CM     Dynamic Standing Balance  minimal assist;moderate assist  -CM     Position/Device Used, Standing Balance supported;walker, front-wheeled  -CM       Row Name 08/02/23 0913          Sensory Assessment (Somatosensory)    Sensory Assessment (Somatosensory) sensation intact  -CM               User Key  (r) = Recorded By, (t) = Taken By, (c) = Cosigned By      Initials Name Provider Type    Marianna Connolly, PT Physical Therapist                   Goals/Plan       Row Name 08/02/23 0931          Bed Mobility Goal 1 (PT)    Activity/Assistive Device (Bed Mobility Goal 1, PT) bed mobility activities, all  -CM     [place] Level/Cues Needed (Bed Mobility Goal 1, PT) minimum assist (75% or more patient effort);contact guard required  -CM     Time Frame (Bed Mobility Goal 1, PT) 2 weeks  -CM       Row Name 08/02/23 0931          Transfer Goal 1 (PT)    Activity/Assistive Device (Transfer Goal 1, PT) transfers, all;walker, rolling;other (see comments)  cam boot on LLE  -CM     [place] Level/Cues Needed (Transfer Goal 1, PT) supervision required  -CM     Time Frame (Transfer Goal 1, PT) 2 weeks  -CM       Row Name 08/02/23 0931          Gait Training Goal 1 (PT)    Activity/Assistive Device (Gait Training Goal 1, PT) gait (walking locomotion)  -CM     [place] Level (Gait Training Goal 1, PT) minimum assist (75% or more patient effort)  -CM     Distance (Gait Training Goal 1, PT) 75 ft  -CM     Time Frame (Gait Training Goal 1, PT) 2 weeks  -CM       Row Name 08/02/23 0931          Therapy Assessment/Plan (PT)    Planned Therapy Interventions (PT) balance training;bed mobility training;gait training;patient/family education;postural re-education;strengthening;ROM (range of motion);transfer training  -CM               User Key  (r) = Recorded By, (t) = Taken By, (c) = Cosigned By      Initials Name Provider Type    Marianna Connolly, PT Physical Therapist                   Clinical Impression       Row Name 08/02/23 0923          Pain     Pretreatment Pain Rating 0/10 - no pain  -CM     Posttreatment Pain Rating 8/10  w/ movement and when WB in standing  -CM     Pain Location - Side/Orientation Left  -CM     Pain Location lower  -CM     Pain Location - extremity  -CM     Pre/Posttreatment Pain Comment distal to knee in LLE  -CM     Pain Intervention(s) Medication (See MAR);Nursing Notified;Repositioned;Elevated;Cold pack;Emotional support;Ambulation/increased activity  -       Row Name 08/02/23 09          Plan of Care Review    Plan of Care Reviewed With patient  -CM     Outcome Evaluation 84 yo female adm 8/1/23 after fall at assisted living when L knee buckled. Has been seen by orthopedist, who recommended wbat w/ cam boot for management of non-displaced distal L fibular fx. Complicated medical hx including cad, chf, PE, breast ca w/ mastectomy on R, cva, among other comorbidities. At baseline, pt uses rollator wx to amb around assisted living. Today, pt is alert and oriented x 4. Needs max assist to come to sit at eob. Needed min to mod assist x 2 to come to stand at rw while wearing cam boot on LLE. Then required min to mod assist of 2 to transfer to chair. Unable to take step w/ RLE due to pain in LLE w/ wb, but was able to pivot on R foot to perform standing transfer to chair. Pt far below her baseline and will require SNF at d/c. Will follow.  -       Row Name 08/02/23 0929          Therapy Assessment/Plan (PT)    Patient/Family Therapy Goals Statement (PT) agreeable to snf at d/c  -     Rehab Potential (PT) good, to achieve stated therapy goals  -CM     Criteria for Skilled Interventions Met (PT) yes;meets criteria;skilled treatment is necessary  -     Therapy Frequency (PT) 5 times/wk  -CM     Predicted Duration of Therapy Intervention (PT) until d/c  -       Row Name 08/02/23 0956          Vital Signs    O2 Delivery Pre Treatment room air  -CM     O2 Delivery Intra Treatment room air  -CM     O2 Delivery Post Treatment room  air  -CM     Recovery Time VSS  -CM       Row Name 08/02/23 0929          Positioning and Restraints    Pre-Treatment Position in bed  -CM     Post Treatment Position chair  -CM     In Chair notified nsg;legs elevated;sitting;call light within reach;encouraged to call for assist;exit alarm on  ice on distal LLE; support to prevent ER of L hip to 90* in sitting  -CM               User Key  (r) = Recorded By, (t) = Taken By, (c) = Cosigned By      Initials Name Provider Type    Marianna Connolly, PT Physical Therapist                   Outcome Measures       Row Name 08/02/23 0932 08/02/23 0800       How much help from another person do you currently need...    Turning from your back to your side while in flat bed without using bedrails? 3  -CM 3  -KL    Moving from lying on back to sitting on the side of a flat bed without bedrails? 1  -CM 2  -KL    Moving to and from a bed to a chair (including a wheelchair)? 2  -CM 2  -KL    Standing up from a chair using your arms (e.g., wheelchair, bedside chair)? 2  -CM 2  -KL    Climbing 3-5 steps with a railing? 1  -CM 1  -KL    To walk in hospital room? 1  -CM 1  -KL    AM-PAC 6 Clicks Score (PT) 10  -CM 11  -KL    Highest level of mobility 4 --> Transferred to chair/commode  -CM 4 --> Transferred to chair/commode  -KL              User Key  (r) = Recorded By, (t) = Taken By, (c) = Cosigned By      Initials Name Provider Type    Marianna Connolly, PT Physical Therapist    Radha Tobin RN Registered Nurse                                 Physical Therapy Education       Title: PT OT SLP Therapies (Done)       Topic: Physical Therapy (Done)       Point: Mobility training (Done)       Learning Progress Summary             Patient Acceptance, E,TB, VU by JITENDRA at 8/2/2023 0932                         Point: Precautions (Done)       Learning Progress Summary             Patient Acceptance, E,TB, VU by JITENDRA at 8/2/2023 0932                                         User Key        Initials Effective Dates Name Provider Type Discipline    CM 06/16/21 -  Marianna Shah, PT Physical Therapist PT                  PT Recommendation and Plan  Planned Therapy Interventions (PT): balance training, bed mobility training, gait training, patient/family education, postural re-education, strengthening, ROM (range of motion), transfer training  Plan of Care Reviewed With: patient  Outcome Evaluation: 84 yo female adm 8/1/23 after fall at assisted living when L knee buckled. Has been seen by orthopedist, who recommended wbat w/ cam boot for management of non-displaced distal L fibular fx. Complicated medical hx including cad, chf, PE, breast ca w/ mastectomy on R, cva, among other comorbidities. At baseline, pt uses rollator wx to amb around assisted living. Today, pt is alert and oriented x 4. Needs max assist to come to sit at eob. Needed min to mod assist x 2 to come to stand at rw while wearing cam boot on LLE. Then required min to mod assist of 2 to transfer to chair. Unable to take step w/ RLE due to pain in LLE w/ wb, but was able to pivot on R foot to perform standing transfer to chair. Pt far below her baseline and will require SNF at d/c. Will follow.     Time Calculation:   PT Evaluation Complexity  History, PT Evaluation Complexity: 1-2 personal factors and/or comorbidities  Examination of Body Systems (PT Eval Complexity): total of 4 or more elements  Clinical Presentation (PT Evaluation Complexity): stable  Clinical Decision Making (PT Evaluation Complexity): low complexity  Overall Complexity (PT Evaluation Complexity): low complexity     PT Charges       Row Name 08/02/23 0933             Time Calculation    Start Time 0825  -CM      Stop Time 0852  -CM      Time Calculation (min) 27 min  -CM      PT Received On 08/02/23  -CM      PT - Next Appointment 08/03/23  -CM      PT Goal Re-Cert Due Date 08/16/23  -CM         Time Calculation- PT    Total Timed Code Minutes- PT 0 minute(s)   -JITENDRA                User Key  (r) = Recorded By, (t) = Taken By, (c) = Cosigned By      Initials Name Provider Type    CM Marianna Shah, PT Physical Therapist                  Therapy Charges for Today       Code Description Service Date Service Provider Modifiers Qty    96911931470 HC PT EVAL LOW COMPLEXITY 4 8/2/2023 Marianna Shah, PT GP 1            PT G-Codes  AM-PAC 6 Clicks Score (PT): 10  PT Discharge Summary  Anticipated Discharge Disposition (PT): skilled nursing facility    Marianna Shah, PT  8/2/2023

## 2023-08-02 NOTE — CASE MANAGEMENT/SOCIAL WORK
"Discharge Planning Assessment   David     Patient Name: Kendy Worrell  MRN: [id number]  Today's Date: 8/2/2023    Admit Date: 8/1/2023    Plan: Referral to [facility] pending acceptance. No precert or pasrr required.   Discharge Needs Assessment       Row Name 08/02/23 1507       Living Environment    People in Home facility resident    Current Living Arrangements assisted living facility    Potentially Unsafe Housing Conditions none    Primary Care Provided by other (see comments)  assisted living    Provides Primary Care For no one, unable/limited ability to care for self    Family Caregiver if Needed none    Quality of Family Relationships --  patient refuses to answer    Able to Return to Prior Arrangements yes       Transition Planning    Patient/Family Anticipates Transition to inpatient rehabilitation facility    Transportation Anticipated health plan transportation       Discharge Needs Assessment    Readmission Within the Last 30 Days no previous admission in last 30 days    Current Outpatient/Agency/Support Group assisted living facility    Equipment Currently Used at Home walker, rolling    Concerns to be Addressed discharge planning                   Discharge Plan       Row Name 08/02/23 1508       Plan    Plan Referral to [facility] pending acceptance. No precert or pasrr required.    Patient/Family in Agreement with Plan yes    Plan Comments Spoke with patient at bedside. PCP and pharmacy confirmed. Instructed patient on PT recommendations of snf. Verbalizes understanding. Patient asks for [facility] referral. Educated patient that [facility] is IRF and PT recommends snf. verbalizes understatnding. States, \" I refuse to give any other choices. I talked to the head of my assisted living and she told me I should go to [facility]. I will not go anywhere else\". CM director notfied of patients staements. Referral made to [facility] and is pending acceptance. DCP sent                  Continued Care and Services - Admitted Since 8/1/2023       " Destination       Service Provider Request Status Selected Services Address Phone Fax Patient Preferred    [facility] Pending - Request Sent N/A [address] [place] [address] [phone] --                  Expected Discharge Date and Time       Expected Discharge Date Expected Discharge Time    Aug 3, 2023            Demographic Summary       Row Name 08/02/23 1506       General Information    Preferred Language English      Row Name 08/02/23 1459       General Information    Admission Type inpatient    Arrived From emergency department    Required Notices Provided Observation Status Notice    Referral Source admission list                   Functional Status       Row Name 08/02/23 1506       Functional Status    Usual Activity Tolerance moderate    Current Activity Tolerance fair       Functional Status, IADL    Medications assistive person    Meal Preparation assistive person    Housekeeping assistive person    Shopping assistive person    IADL Comments from [facility]       Mental Status    General Appearance WDL WDL       Mental Status Summary    Recent Changes in Mental Status/Cognitive Functioning no changes       Employment/    Employment Status retired                               Mae Silver RN

## 2023-08-02 NOTE — DISCHARGE PLACEMENT REQUEST
"Alejandra Oseas J (83 y.o. Female)       Date of Birth   1940    Social Security Number       Address   [address]  [place] [facility]    Home Phone   [phone]    MRN   [id number]       Denominational   Alevism    Marital Status   Single                            Admission Date   8/1/23    Admission Type   Urgent    Admitting Provider   Farhat Dan MD    Attending Provider   Farhat Dan MD    Department, Room/Bed   [facility] SURGICAL INPATIENT, 4132/1       Discharge Date       Discharge Disposition       Discharge Destination                                 Attending Provider: Farhat Dan MD    Allergies: Aspirin, Nsaids    Isolation: None   Infection: None   Code Status: CPR    Ht: 149.9 cm (59\")   Wt: 84 kg (185 lb 3 oz)    Admission Cmt: None   Principal Problem: Closed fibular fracture [S82.409A]                   Active Insurance as of 8/1/2023       Primary Coverage       Payor Plan Insurance Group Employer/Plan Group    MEDICARE MEDICARE A & B        Payor Plan Address Payor Plan Phone Number Payor Plan Fax Number Effective Dates    [address] [phone]  2/1/2004 - None Entered    [facility] [id number]         Subscriber Name Subscriber Birth Date Member ID       JUSTYNOSEAS J 1940 [id number]               Secondary Coverage       Payor Plan Insurance Group Employer/Plan Group    [place] [id number]       Payor Plan Address Payor Plan Phone Number Payor Plan Fax Number Effective Dates    [address]   1/9/1993 - None Entered    [facility] [address]         Subscriber Name Subscriber Birth Date Member ID       ALEJANDRAOSEAS CID 1940 [id number]               Tertiary Coverage       Payor Plan Insurance Group Employer/Plan Group    [place]        Payor Plan Address Payor Plan Phone Number Payor Plan Fax Number Effective Dates    [address]   11/18/2021 - None Entered    [place] [address] "         Subscriber Name Subscriber Birth Date Member ID       JESSY ALEJANDRAKONG CID 1940 [id number]                     Emergency Contacts        (Rel.) Home Phone Work Phone Mobile Phone    Ramandeep PROCTOR (Daughter) [phone] -- [phone]    JOVANNA PROCTOR (Grandchild) [phone] -- --    Akiko Alejandra (Other) -- -- [phone]    HUGO ALEJANDRA (Son) -- -- [phone]

## 2023-08-02 NOTE — THERAPY EVALUATION
Patient Name: Kendy Worrell  : 1940    MRN: [id number]                              Today's Date: 2023       Admit Date: 2023    Visit Dx:     ICD-10-CM ICD-9-CM   1. Closed fracture of distal end of left fibula, unspecified fracture morphology, initial encounter  S82.832A 824.8   2. Skin tear of left forearm without complication, initial encounter  S51.812A 881.00   3. Inability to ambulate due to ankle or foot  R26.2 719.7     Patient Active Problem List   Diagnosis    Essential hypertension    Acquired hypothyroidism    Coronary artery disease involving native coronary artery of native heart without angina pectoris    Hyperlipidemia    Arthritis    Skin lesion of chest wall    NSTEMI (non-ST elevated myocardial infarction)    Supraventricular tachycardia    Normal cardiac stress test    Gastrointestinal bleed    Angina effort    Morbidly obese    Bilateral pulmonary embolism    New onset a-fib    UTI (urinary tract infection)    Chronic diastolic CHF (congestive heart failure)    Acute ischemic stroke    Chest pain    Bright red rectal bleeding    Closed fibular fracture     Past Medical History:   Diagnosis Date    A-fib     Arthritis     Breast cancer     CHF (congestive heart failure)     CVA (cerebral vascular accident) 2019    History of pulmonary embolus (PE)     Hyperlipidemia     Hypertension     Hyperthyroidism     patient has hypothyroidism    Hypothyroidism      Past Surgical History:   Procedure Laterality Date    BREAST BIOPSY      BREAST SURGERY Right     CHOLECYSTECTOMY      COLON SURGERY      Removed    COLONOSCOPY      COLONOSCOPY N/A 11/3/2022    Procedure: COLONOSCOPY to anastamosis with biopsies and cold snare polypectomy;  Surgeon: Saroj Oakes MD;  Location: [facility] ENDOSCOPY;  Service: Gastroenterology;  Laterality: N/A;  PRe: rectal bleeding  Post: hemorrhoids, diverticulosis, anastamotic ulcer, polyp, hemostasis clip free-floating    HYSTERECTOMY      MAMMO  BILATERAL  2015    MASTECTOMY      TONSILLECTOMY        General Information       Row Name 08/02/23 0819          OT Time and Intention    Document Type evaluation  -     Mode of Treatment co-treatment  -BL       Row Name 08/02/23 0819          General Information    Patient Profile Reviewed yes  -BL     Existing Precautions/Restrictions fall;left;other (see comments)  WBAT LLE with cam boot  -BL     Barriers to Rehab previous functional deficit;medically complex  -BL       Row Name 08/02/23 0819          Living Environment    People in Home other (see comments)  [facility]  -       Row Name 08/02/23 0819          Home Main Entrance    Number of Stairs, Main Entrance none  -BL     Stair Railings, Main Entrance none  -BL       Row Name 08/02/23 0819          Stairs Within Home, Primary    Number of Stairs, Within Home, Primary none  -BL       Row Name 08/02/23 0819          Cognition    Orientation Status (Cognition) oriented x 4  -BL       Row Name 08/02/23 0819          Safety Issues, Functional Mobility    Impairments Affecting Function (Mobility) balance;endurance/activity tolerance;strength;pain;range of motion (ROM);other (see comments)  -               User Key  (r) = Recorded By, (t) = Taken By, (c) = Cosigned By      Initials Name Provider Type     Marly Doss OT Occupational Therapist                     Mobility/ADL's       Row Name 08/02/23 1128          Bed Mobility    Bed Mobility supine-sit  -BL     Supine-Sit [place] (Bed Mobility) maximum assist (25% patient effort);2 person assist  -BL     Assistive Device (Bed Mobility) draw sheet;head of bed elevated;leg   -       Row Name 08/02/23 1128          Bed-Chair Transfer    Bed-Chair [place] (Transfers) minimum assist (75% patient effort);moderate assist (50% patient effort);2 person assist  -BL     Assistive Device (Bed-Chair Transfers) walker, front-wheeled;other (see comments)  -     Comment, (Bed-Chair  Transfer) Unable to take steps with REL only pivots secondary to pain  -[facility] Name 08/02/23 1128          Sit-Stand Transfer    Sit-Stand [place] (Transfers) minimum assist (75% patient effort);moderate assist (50% patient effort);2 person assist  -     Assistive Device (Sit-Stand Transfers) walker, front-wheeled  -       Row Name 08/02/23 1128          Activities of Daily Living    BADL Assessment/Intervention lower body dressing;toileting  -[facility] Name 08/02/23 1128 08/02/23 0821       Mobility    Extremity Weight-bearing Status left lower extremity  - left lower extremity  -    Left Lower Extremity (Weight-bearing Status) weight-bearing as tolerated (WBAT)  - weight-bearing as tolerated (WBAT)  -[facility] Name 08/02/23 1128          Lower Body Dressing Assessment/Training    [place] Level (Lower Body Dressing) don;maximum assist (25% patient effort);shoes/slippers  -[facility] Name 08/02/23 1128          Toileting Assessment/Training    [place] Level (Toileting) change pad/brief;maximum assist (25% patient effort)  -     Position (Toileting) supported standing;unsupported sitting  -               User Key  (r) = Recorded By, (t) = Taken By, (c) = Cosigned By      Initials Name Provider Type    Marly Banuelos OT Occupational Therapist                   Obj/Interventions       [facility] Name 08/02/23 1130          Sensory Assessment (Somatosensory)    Sensory Assessment (Somatosensory) sensation intact  -BL       Row Name 08/02/23 1130          Vision Assessment/Intervention    Visual Impairment/Limitations corrective lenses full-time  -[facility] Name 08/02/23 1130          Range of Motion Comprehensive    General Range of Motion bilateral upper extremity ROM WFL  -[facility] Name 08/02/23 1130          Strength Comprehensive (MMT)    Comment, General Manual Muscle Testing (MMT) Assessment BUEs grossly 3+/5 functionally  -[facility] Name 08/02/23 1130           Motor Skills    Motor Skills functional endurance  -BL     Functional Endurance fair  -       Row Name 08/02/23 1130          Balance    Balance Assessment sitting static balance;sitting dynamic balance;standing static balance;standing dynamic balance  -BL     Static Sitting Balance supervision  -BL     Dynamic Sitting Balance supervision  -BL     Position, Sitting Balance unsupported;sitting edge of bed  -BL     Static Standing Balance minimal assist  -BL     Dynamic Standing Balance moderate assist;minimal assist  -BL     Position/Device Used, Standing Balance supported;walker, front-wheeled  -               User Key  (r) = Recorded By, (t) = Taken By, (c) = Cosigned By      Initials Name Provider Type    BL Marly Doss, OT Occupational Therapist                   Goals/Plan       [facility] Name 08/02/23 1139          Dressing Goal 1 (OT)    Activity/Device (Dressing Goal 1, OT) dressing skills, all  -BL     [place]/Cues Needed (Dressing Goal 1, OT) moderate assist (50-74% patient effort)  -BL     Time Frame (Dressing Goal 1, OT) 2 weeks  -\[facility]\"" Name 08/02/23 1139          Toileting Goal 1 (OT)    Activity/Device (Toileting Goal 1, OT) toileting skills, all  -BL     [place] Level/Cues Needed (Toileting Goal 1, OT) moderate assist (50-74% patient effort)  -BL     Time Frame (Toileting Goal 1, OT) 2 weeks  -\[facility]\"" Name 08/02/23 1139          Grooming Goal 1 (OT)    Activity/Device (Grooming Goal 1, OT) grooming skills, all  -BL     [place] (Grooming Goal 1, OT) minimum assist (75% or more patient effort)  -BL     Time Frame (Grooming Goal 1, OT) 2 weeks  -\[facility]\"" Name 08/02/23 1139          Therapy Assessment/Plan (OT)    Planned Therapy Interventions (OT) activity tolerance training;BADL retraining;cognitive/visual perception retraining;functional balance retraining;IADL retraining;neuromuscular control/coordination retraining;passive ROM/stretching;occupation/activity based  interventions;patient/caregiver education/training;ROM/therapeutic exercise;transfer/mobility retraining;strengthening exercise  -               User Key  (r) = Recorded By, (t) = Taken By, (c) = Cosigned By      Initials Name Provider Type     Marly Doss, WILMA Occupational Therapist                   Clinical Impression       Row Name 08/02/23 1131          Pain Assessment    Pretreatment Pain Rating 0/10 - no pain  -BL     Posttreatment Pain Rating 8/10  -BL     Pre/Posttreatment Pain Comment Distal LLE with movement  -     Pain Intervention(s) Medication (See MAR);Repositioned;Emotional support;Ambulation/increased activity  -       Row Name 08/02/23 1131          Plan of Care Review    Plan of Care Reviewed With patient  -     Outcome Evaluation 84 yo female adm 8/1/23 after fall at assisted living when L knee buckled. Has been seen by orthopedist, who recommended wbat w/ cam boot for management of non-displaced distal L fibular fx. Complicated medical hx including cad, chf, PE, breast ca w/ mastectomy on R, cva, among other comorbidities.At baseline, pt lives at an [facility] and uses a rollator for functional mobility. At time of evaluation, pt A&OX4, however pt with repetitive questions about WB following education.Pt required max A for supine to sitting at EOB. Pt required max A for LB dressing and donning/doffing brief. Pt required mod A to come to stand and transfer to chair with limited movement of RLE. Pt far from baseline and would benefit from SNF. OT will follow.  -       Row Name 08/02/23 1131          Therapy Assessment/Plan (OT)    Criteria for Skilled Therapeutic Interventions Met (OT) yes;skilled treatment is necessary  -     Therapy Frequency (OT) 5 times/wk  -       Row Name 08/02/23 1131          Therapy Plan Review/Discharge Plan (OT)    Anticipated Discharge Disposition (OT) skilled nursing facility  -       Row Name 08/02/23 1131          Vital Signs    O2 Delivery Pre  Treatment room air  -BL     O2 Delivery Intra Treatment room air  -BL     O2 Delivery Post Treatment room air  -BL     Recovery Time VSS  -BL       Row Name 08/02/23 1131          Positioning and Restraints    Pre-Treatment Position in bed  -BL     Post Treatment Position chair  -BL     In Chair notified nsg;call light within reach;encouraged to call for assist;exit alarm on  -BL               User Key  (r) = Recorded By, (t) = Taken By, (c) = Cosigned By      Initials Name Provider Type     Marly Doss OT Occupational Therapist                   Outcome Measures       Row Name 08/02/23 0932 08/02/23 0800       How much help from another person do you currently need...    Turning from your back to your side while in flat bed without using bedrails? 3  -CM 3  -KL    Moving from lying on back to sitting on the side of a flat bed without bedrails? 1  -CM 2  -KL    Moving to and from a bed to a chair (including a wheelchair)? 2  -CM 2  -KL    Standing up from a chair using your arms (e.g., wheelchair, bedside chair)? 2  -CM 2  -KL    Climbing 3-5 steps with a railing? 1  -CM 1  -KL    To walk in hospital room? 1  -CM 1  -KL    AM-PAC 6 Clicks Score (PT) 10  -CM 11  -KL    Highest level of mobility 4 --> Transferred to chair/commode  -CM 4 --> Transferred to chair/commode  -KL              User Key  (r) = Recorded By, (t) = Taken By, (c) = Cosigned By      Initials Name Provider Type    Marianna Connolly, PT Physical Therapist    Radha Tobin, RN Registered Nurse                    Occupational Therapy Education       Title: PT OT SLP Therapies (Done)       Topic: Occupational Therapy (Done)       Point: ADL training (Done)       Description:   Instruct learner(s) on proper safety adaptation and remediation techniques during self care or transfers.   Instruct in proper use of assistive devices.                  Learning Progress Summary             Patient Acceptance, E,TB, VU by  at 8/2/2023 114                                          User Key       Initials Effective Dates Name Provider Type Discipline     09/22/22 -  Marly Doss OT Occupational Therapist OT                  OT Recommendation and Plan  Planned Therapy Interventions (OT): activity tolerance training, BADL retraining, cognitive/visual perception retraining, functional balance retraining, IADL retraining, neuromuscular control/coordination retraining, passive ROM/stretching, occupation/activity based interventions, patient/caregiver education/training, ROM/therapeutic exercise, transfer/mobility retraining, strengthening exercise  Therapy Frequency (OT): 5 times/wk  Plan of Care Review  Plan of Care Reviewed With: patient  Outcome Evaluation: 84 yo female adm 8/1/23 after fall at assisted living when L knee buckled. Has been seen by orthopedist, who recommended wbat w/ cam boot for management of non-displaced distal L fibular fx. Complicated medical hx including cad, chf, PE, breast ca w/ mastectomy on R, cva, among other comorbidities.At baseline, pt lives at an [facility] and uses a rollator for functional mobility. At time of evaluation, pt A&OX4, however pt with repetitive questions about WB following education.Pt required max A for supine to sitting at EOB. Pt required max A for LB dressing and donning/doffing brief. Pt required mod A to come to stand and transfer to chair with limited movement of RLE. Pt far from baseline and would benefit from SNF. OT will follow.     Time Calculation:         Time Calculation- OT       Row Name 08/02/23 1141             Time Calculation- OT    OT Start Time 0817  -BL      OT Stop Time 0852  -BL      OT Time Calculation (min) 35 min  -      OT Received On 08/02/23  -      OT - Next Appointment 08/03/23  -      OT Goal Re-Cert Due Date 08/16/23  -                User Key  (r) = Recorded By, (t) = Taken By, (c) = Cosigned By      Initials Name Provider Type    BL Marly Doss OT Occupational  Therapist                  Therapy Charges for Today       Code Description Service Date Service Provider Modifiers Qty    [id number] HC OT EVAL MOD COMPLEXITY 4 8/2/2023 Marly Doss OT GO 1                 Marly Doss OT  8/2/2023

## 2023-08-02 NOTE — CONSULTS
"  Orthopaedic Surgery  Consult Note  Dr. SAGE Carney" Mary II  [phone]    HPI:  Patient is a 83 y.o. Not  or  female who presents with left ankle bilateral knee and bilateral hip pain after a fall.  Patient has a history of significant knee osteoarthritis.  As result of her knee pain, she states that she fell and injured her left ankle.  X-rays were obtained that demonstrated no fractures in her hips or knees but she does have a nondisplaced Vogel B ankle fracture on the left side.  She was placed into a boot.  She complains of achy pain in the left ankle.  She has not mobilized since being admitted.  Pain is better with immobilization and rest.    MEDICAL HISTORY  Past Medical History:   Diagnosis Date    A-fib     Arthritis     Breast cancer     CHF (congestive heart failure)     CVA (cerebral vascular accident) 09/2019    History of pulmonary embolus (PE)     Hyperlipidemia     Hypertension     Hyperthyroidism     patient has hypothyroidism    Hypothyroidism      Past Surgical History:   Procedure Laterality Date    BREAST BIOPSY      BREAST SURGERY Right     CHOLECYSTECTOMY      COLON SURGERY      Removed    COLONOSCOPY      COLONOSCOPY N/A 11/3/2022    Procedure: COLONOSCOPY to anastamosis with biopsies and cold snare polypectomy;  Surgeon: Saroj Oakes MD;  Location: [facility] ENDOSCOPY;  Service: Gastroenterology;  Laterality: N/A;  PRe: rectal bleeding  Post: hemorrhoids, diverticulosis, anastamotic ulcer, polyp, hemostasis clip free-floating    HYSTERECTOMY      MAMMO BILATERAL  2015    MASTECTOMY      TONSILLECTOMY       Prior to Admission medications    Medication Sig Start Date End Date Taking? Authorizing Provider   acetaminophen (TYLENOL) 325 MG tablet Take 2 tablets by mouth Every 6 (Six) Hours As Needed for Mild Pain . 9/9/19  Yes Vladimir Barrera MD   albuterol sulfate  (90 Base) MCG/ACT inhaler Inhale 2 puffs Every 4 (Four) Hours As Needed for Wheezing.   Yes " "ProviderKatelin MD   apixaban (ELIQUIS) 5 MG tablet tablet Take 1 tablet by mouth Every 12 (Twelve) Hours. 9/15/19  Yes Vladimir Barrera MD   furosemide (LASIX) 40 MG tablet Take 1 tablet by mouth 2 (Two) Times a Day.   Yes Katelin St MD   Hydrocortisone, Perianal, (ANUSOL-HC) 2.5 % rectal cream Insert 1 application  into the rectum 2 (Two) Times a Day.   Yes Katelin St MD   isosorbide mononitrate (IMDUR) 30 MG 24 hr tablet Take 1 tablet by mouth Daily.   Yes Katelin St MD   levothyroxine (SYNTHROID, LEVOTHROID) 100 MCG tablet Take 1 tablet by mouth Daily. 5/9/18  Yes Mac Bond MD   metoprolol succinate XL (TOPROL-XL) 100 MG 24 hr tablet Take 1 tablet by mouth Every 12 (Twelve) Hours. 9/9/19  Yes Vladimir Barrera MD   pantoprazole (PROTONIX) 40 MG EC tablet Take 1 tablet by mouth Daily.   Yes ProviderKatelin MD     Allergies   Allergen Reactions    Aspirin      bleeding    Nsaids      bleeding     Most Recent Immunizations   Administered Date(s) Administered    COVID-19 (MODERNA) 1st,2nd,3rd Dose Monovalent 11/18/2021    Fluzone High Dose =>65 Years ([employer] ONLY) 09/26/2018    Hep A, 2 Dose 05/07/2018    Hepatitis A 11/01/2018    Pneumococcal Conjugate 13-Valent (PCV13) 02/06/2015    Pneumococcal Polysaccharide (PPSV23) 11/02/2017    Tdap 08/01/2023     Social History     Tobacco Use    Smoking status: Former    Smokeless tobacco: Never    Tobacco comments:     quit in 1988   Substance Use Topics    Alcohol use: Yes     Alcohol/week: 3.0 standard drinks     Types: 3 Shots of liquor per week     Comment: OCC      Social History     Substance and Sexual Activity   Drug Use Yes    Types: Hydrocodone       VITALS: /83 (BP Location: Left arm)   Pulse 70   Temp 98 øF (36.7 øC) (Axillary)   Resp 14   Ht 149.9 cm (59\")   Wt 84 kg (185 lb 3 oz)   SpO2 91%   BMI 37.40 kg/mý  Body mass index is 37.4 kg/mý.    PHYSICAL EXAM:   CONSTITUTIONAL: No acute " distress  LUNGS: Equal chest rise, no shortness of air  CARDIOVASCULAR: palpable peripheral pulses  SKIN: no skin lesions in the area examined  LYMPH: no lymphadenopathy in the area examined  EXTREMITY: Left Lower Extremity  Tenderness to Palpation: Tenderness to palpation at the knee and at the fibula laterally  Gross Deformity:  Mild swelling at the ankle although she has the appearance of chronic swelling in both legs at baseline  Pulses:  Brisk Capillary Refill  Sensation: Intact to Saphenous, Sural, Deep Peroneal, Superficial Peroneal, and Tibial Nerves and grossly throughout extremity  Motor: 5/5 EHL/FHL/TA/GS motor complexes    RADIOLOGY REVIEW:   XR Knee 3 View Left    Result Date: 8/1/2023  Impression: 1. Negative for fracture. 2. Severe osteoarthritis with bone-on-bone articulation at the lateral compartment. 3. 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 4. Diffuse osteopenia. Electronically Signed: [employer]  8/1/2023 5:44 PM EDT  Workstation ID: [id number]    XR Ankle 3+ View Left    Result Date: 8/1/2023  Impression: 1. Oblique nondisplaced fracture of the fibular metaphysis. 2. Diffuse osteopenia. 3. Additional chronic findings above. Electronically Signed: [employer]  8/1/2023 4:27 PM EDT  Workstation ID: [id number]    CT Head Without Contrast    Result Date: 8/1/2023  Impression: Stable chronic findings without acute process. Electronically Signed: [employer]  8/1/2023 5:40 PM EDT  Workstation ID: [id number]    XR Hips Bilateral With or Without Pelvis 5 View    Result Date: 8/1/2023  Impression: 1. Negative for acute fracture. 2. Osteopenia. Electronically Signed: [facility] [employer]  8/1/2023 5:42 PM EDT  Workstation ID: [id number]     LABS:   Results for the past 24 hours:   Recent Results (from the past 24 hour(s))   Comprehensive Metabolic Panel    Collection Time: 08/01/23  5:44 PM    Specimen: Blood   Result Value Ref Range    Glucose 113 (H) 65 - 99 mg/dL    BUN 18 8 - 23 mg/dL     Creatinine 1.22 (H) 0.57 - 1.00 mg/dL    Sodium 135 (L) 136 - 145 mmol/L    Potassium 3.7 3.5 - 5.2 mmol/L    Chloride 97 (L) 98 - 107 mmol/L    CO2 26.4 22.0 - 29.0 mmol/L    Calcium 10.6 (H) 8.6 - 10.5 mg/dL    Total Protein 8.0 6.0 - 8.5 g/dL    Albumin 4.5 3.5 - 5.2 g/dL    ALT (SGPT) 19 1 - 33 U/L    AST (SGOT) 33 (H) 1 - 32 U/L    Alkaline Phosphatase 135 (H) 39 - 117 U/L    Total Bilirubin 1.6 (H) 0.0 - 1.2 mg/dL    Globulin 3.5 gm/dL    A/G Ratio 1.3 g/dL    BUN/Creatinine Ratio 14.8 7.0 - 25.0    Anion Gap 11.6 5.0 - 15.0 mmol/L    eGFR 43.8 (L) >60.0 mL/min/1.73   CK    Collection Time: 08/01/23  5:44 PM    Specimen: Blood   Result Value Ref Range    Creatine Kinase 68 20 - 180 U/L   CBC Auto Differential    Collection Time: 08/01/23  5:44 PM    Specimen: Blood   Result Value Ref Range    WBC 8.48 3.40 - 10.80 10*3/mm3    RBC 3.82 3.77 - 5.28 10*6/mm3    Hemoglobin 9.0 (L) 12.0 - 15.9 g/dL    Hematocrit 29.7 (L) 34.0 - 46.6 %    MCV 77.7 (L) 79.0 - 97.0 fL    MCH 23.6 (L) 26.6 - 33.0 pg    MCHC 30.3 (L) 31.5 - 35.7 g/dL    RDW 19.9 (H) 12.3 - 15.4 %    RDW-SD 56.2 (H) 37.0 - 54.0 fl    MPV 9.5 6.0 - 12.0 fL    Platelets 260 140 - 450 10*3/mm3    Neutrophil % 68.2 42.7 - 76.0 %    Lymphocyte % 16.3 (L) 19.6 - 45.3 %    Monocyte % 14.2 (H) 5.0 - 12.0 %    Eosinophil % 0.7 0.3 - 6.2 %    Basophil % 0.5 0.0 - 1.5 %    Immature Grans % 0.1 0.0 - 0.5 %    Neutrophils, Absolute 5.79 1.70 - 7.00 10*3/mm3    Lymphocytes, Absolute 1.38 0.70 - 3.10 10*3/mm3    Monocytes, Absolute 1.20 (H) 0.10 - 0.90 10*3/mm3    Eosinophils, Absolute 0.06 0.00 - 0.40 10*3/mm3    Basophils, Absolute 0.04 0.00 - 0.20 10*3/mm3    Immature Grans, Absolute 0.01 0.00 - 0.05 10*3/mm3   Iron Profile    Collection Time: 08/02/23 12:43 AM    Specimen: Blood   Result Value Ref Range    Iron 21 (L) 37 - 145 mcg/dL    Iron Saturation (TSAT) 4 (L) 20 - 50 %    Transferrin 361 (H) 200 - 360 mg/dL    TIBC 538 (H) 298 - 536 mcg/dL   Ferritin     "Collection Time: 08/02/23 12:43 AM    Specimen: Blood   Result Value Ref Range    Ferritin 26.71 13.00 - 150.00 ng/mL   CBC (No Diff)    Collection Time: 08/02/23 12:43 AM    Specimen: Blood   Result Value Ref Range    WBC 7.10 3.40 - 10.80 10*3/mm3    RBC 3.33 (L) 3.77 - 5.28 10*6/mm3    Hemoglobin 8.3 (L) 12.0 - 15.9 g/dL    Hematocrit 26.3 (L) 34.0 - 46.6 %    MCV 78.9 (L) 79.0 - 97.0 fL    MCH 25.0 (L) 26.6 - 33.0 pg    MCHC 31.7 31.5 - 35.7 g/dL    RDW 20.2 (H) 12.3 - 15.4 %    RDW-SD 56.9 (H) 37.0 - 54.0 fl    MPV 8.4 6.0 - 12.0 fL    Platelets 224 140 - 450 10*3/mm3   Basic Metabolic Panel    Collection Time: 08/02/23 12:43 AM    Specimen: Blood   Result Value Ref Range    Glucose 103 (H) 65 - 99 mg/dL    BUN 19 8 - 23 mg/dL    Creatinine 1.22 (H) 0.57 - 1.00 mg/dL    Sodium 137 136 - 145 mmol/L    Potassium 3.7 3.5 - 5.2 mmol/L    Chloride 99 98 - 107 mmol/L    CO2 26.0 22.0 - 29.0 mmol/L    Calcium 9.6 8.6 - 10.5 mg/dL    BUN/Creatinine Ratio 15.6 7.0 - 25.0    Anion Gap 12.0 5.0 - 15.0 mmol/L    eGFR 43.8 (L) >60.0 mL/min/1.73       IMPRESSION:  Patient is a 83 y.o. Not  or  female with left Vogel B ankle fracture and left knee osteoarthritis    PLAN:   Admited to: Louise Reese DO  Disposition: No need for surgical intervention on the ankle.  Okay for weightbearing as tolerated in a boot.  Follow-up in the office in 3 to 4 weeks for x-ray.  If her knee is hurting her still at that time, we could consider some treatment.  Recommend physical therapy for mobilization.    R \"Santi\" Mary ALCALA MD  Orthopaedic Surgery  [facility]  [phone] - [place] Office  [phone] - [place] Office            "

## 2023-08-02 NOTE — PLAN OF CARE
Goal Outcome Evaluation:  Plan of Care Reviewed With: patient        Progress: improving          Patient admitted, c/o pain to LLL, boot in place, makes needs known

## 2023-08-03 VITALS
HEIGHT: 59 IN | OXYGEN SATURATION: 97 % | SYSTOLIC BLOOD PRESSURE: 130 MMHG | HEART RATE: 80 BPM | TEMPERATURE: 99.1 F | DIASTOLIC BLOOD PRESSURE: 69 MMHG | BODY MASS INDEX: 37.78 KG/M2 | WEIGHT: 187.39 LBS | RESPIRATION RATE: 19 BRPM

## 2023-08-03 LAB
ALBUMIN SERPL-MCNC: 3.5 G/DL (ref 3.5–5.2)
ALBUMIN/GLOB SERPL: 1.1 G/DL
ALP SERPL-CCNC: 108 U/L (ref 39–117)
ALT SERPL W P-5'-P-CCNC: 17 U/L (ref 1–33)
ANION GAP SERPL CALCULATED.3IONS-SCNC: 10 MMOL/L (ref 5–15)
AST SERPL-CCNC: 28 U/L (ref 1–32)
BILIRUB SERPL-MCNC: 1 MG/DL (ref 0–1.2)
BUN SERPL-MCNC: 12 MG/DL (ref 8–23)
BUN/CREAT SERPL: 12.1 (ref 7–25)
CALCIUM SPEC-SCNC: 9.4 MG/DL (ref 8.6–10.5)
CHLORIDE SERPL-SCNC: 99 MMOL/L (ref 98–107)
CO2 SERPL-SCNC: 26 MMOL/L (ref 22–29)
CREAT SERPL-MCNC: 0.99 MG/DL (ref 0.57–1)
DEPRECATED RDW RBC AUTO: 54.7 FL (ref 37–54)
EGFRCR SERPLBLD CKD-EPI 2021: 56.3 ML/MIN/1.73
ERYTHROCYTE [DISTWIDTH] IN BLOOD BY AUTOMATED COUNT: 19.9 % (ref 12.3–15.4)
GLOBULIN UR ELPH-MCNC: 3.2 GM/DL
GLUCOSE SERPL-MCNC: 115 MG/DL (ref 65–99)
HCT VFR BLD AUTO: 24.6 % (ref 34–46.6)
HGB BLD-MCNC: 7.9 G/DL (ref 12–15.9)
MCH RBC QN AUTO: 24.1 PG (ref 26.6–33)
MCHC RBC AUTO-ENTMCNC: 32 G/DL (ref 31.5–35.7)
MCV RBC AUTO: 75.3 FL (ref 79–97)
PLATELET # BLD AUTO: 224 10*3/MM3 (ref 140–450)
PMV BLD AUTO: 7.8 FL (ref 6–12)
POTASSIUM SERPL-SCNC: 3.4 MMOL/L (ref 3.5–5.2)
POTASSIUM SERPL-SCNC: 4 MMOL/L (ref 3.5–5.2)
PROT SERPL-MCNC: 6.7 G/DL (ref 6–8.5)
RBC # BLD AUTO: 3.26 10*6/MM3 (ref 3.77–5.28)
SODIUM SERPL-SCNC: 135 MMOL/L (ref 136–145)
WBC NRBC COR # BLD: 6.6 10*3/MM3 (ref 3.4–10.8)

## 2023-08-03 PROCEDURE — 97530 THERAPEUTIC ACTIVITIES: CPT

## 2023-08-03 PROCEDURE — 94761 N-INVAS EAR/PLS OXIMETRY MLT: CPT

## 2023-08-03 PROCEDURE — 80053 COMPREHEN METABOLIC PANEL: CPT | Performed by: NURSE PRACTITIONER

## 2023-08-03 PROCEDURE — 84132 ASSAY OF SERUM POTASSIUM: CPT | Performed by: INTERNAL MEDICINE

## 2023-08-03 PROCEDURE — 85027 COMPLETE CBC AUTOMATED: CPT | Performed by: STUDENT IN AN ORGANIZED HEALTH CARE EDUCATION/TRAINING PROGRAM

## 2023-08-03 PROCEDURE — 94799 UNLISTED PULMONARY SVC/PX: CPT

## 2023-08-03 PROCEDURE — 94664 DEMO&/EVAL PT USE INHALER: CPT

## 2023-08-03 PROCEDURE — 97116 GAIT TRAINING THERAPY: CPT

## 2023-08-03 RX ORDER — FERROUS SULFATE TAB EC 324 MG (65 MG FE EQUIVALENT) 324 (65 FE) MG
324 TABLET DELAYED RESPONSE ORAL
Qty: 30 TABLET | Refills: 2 | Status: SHIPPED | OUTPATIENT
Start: 2023-08-04

## 2023-08-03 RX ORDER — MULTIPLE VITAMINS W/ MINERALS TAB 9MG-400MCG
1 TAB ORAL DAILY
Status: DISCONTINUED | OUTPATIENT
Start: 2023-08-03 | End: 2023-08-03 | Stop reason: HOSPADM

## 2023-08-03 RX ORDER — ATORVASTATIN CALCIUM 10 MG/1
10 TABLET, FILM COATED ORAL NIGHTLY
COMMUNITY

## 2023-08-03 RX ORDER — NYSTATIN 100000 [USP'U]/G
POWDER TOPICAL 3 TIMES DAILY PRN
COMMUNITY

## 2023-08-03 RX ORDER — OXYCODONE HYDROCHLORIDE 5 MG/1
5 TABLET ORAL EVERY 6 HOURS PRN
Qty: 20 TABLET | Refills: 0 | Status: SHIPPED | OUTPATIENT
Start: 2023-08-03 | End: 2023-08-08

## 2023-08-03 RX ORDER — BISACODYL 10 MG
10 SUPPOSITORY, RECTAL RECTAL DAILY PRN
Qty: 2 EACH | Refills: 0 | Status: SHIPPED | OUTPATIENT
Start: 2023-08-03

## 2023-08-03 RX ORDER — GABAPENTIN 300 MG/1
300 CAPSULE ORAL 3 TIMES DAILY
Status: DISCONTINUED | OUTPATIENT
Start: 2023-08-03 | End: 2023-08-03 | Stop reason: HOSPADM

## 2023-08-03 RX ORDER — POTASSIUM CHLORIDE 20 MEQ/1
40 TABLET, EXTENDED RELEASE ORAL EVERY 4 HOURS
Status: COMPLETED | OUTPATIENT
Start: 2023-08-03 | End: 2023-08-03

## 2023-08-03 RX ORDER — CLONIDINE HYDROCHLORIDE 0.1 MG/1
0.1 TABLET ORAL EVERY 6 HOURS PRN
COMMUNITY

## 2023-08-03 RX ORDER — ATORVASTATIN CALCIUM 10 MG/1
10 TABLET, FILM COATED ORAL NIGHTLY
Status: DISCONTINUED | OUTPATIENT
Start: 2023-08-03 | End: 2023-08-03 | Stop reason: HOSPADM

## 2023-08-03 RX ORDER — POTASSIUM CHLORIDE 20 MEQ/1
20 TABLET, EXTENDED RELEASE ORAL
Status: DISCONTINUED | OUTPATIENT
Start: 2023-08-03 | End: 2023-08-03 | Stop reason: HOSPADM

## 2023-08-03 RX ORDER — AMOXICILLIN 250 MG
2 CAPSULE ORAL 2 TIMES DAILY
Qty: 60 TABLET | Refills: 0 | Status: SHIPPED | OUTPATIENT
Start: 2023-08-03

## 2023-08-03 RX ORDER — MULTIPLE VITAMINS W/ MINERALS TAB 9MG-400MCG
1 TAB ORAL DAILY
COMMUNITY

## 2023-08-03 RX ORDER — POTASSIUM CHLORIDE 20 MEQ/1
40 TABLET, EXTENDED RELEASE ORAL
Status: DISCONTINUED | OUTPATIENT
Start: 2023-08-03 | End: 2023-08-03 | Stop reason: SDUPTHER

## 2023-08-03 RX ORDER — GABAPENTIN 300 MG/1
300 CAPSULE ORAL 3 TIMES DAILY
COMMUNITY

## 2023-08-03 RX ORDER — CLONIDINE HYDROCHLORIDE 0.1 MG/1
0.1 TABLET ORAL EVERY 6 HOURS PRN
Status: DISCONTINUED | OUTPATIENT
Start: 2023-08-03 | End: 2023-08-03 | Stop reason: HOSPADM

## 2023-08-03 RX ORDER — AMLODIPINE BESYLATE 5 MG/1
5 TABLET ORAL DAILY
COMMUNITY
End: 2023-08-03 | Stop reason: HOSPADM

## 2023-08-03 RX ORDER — POLYETHYLENE GLYCOL 3350 17 G/17G
17 POWDER, FOR SOLUTION ORAL DAILY PRN
Qty: 14 EACH | Refills: 0 | Status: SHIPPED | OUTPATIENT
Start: 2023-08-03

## 2023-08-03 RX ORDER — POTASSIUM CHLORIDE 750 MG/1
20 CAPSULE, EXTENDED RELEASE ORAL 3 TIMES DAILY
COMMUNITY

## 2023-08-03 RX ADMIN — PANTOPRAZOLE SODIUM 40 MG: 40 TABLET, DELAYED RELEASE ORAL at 08:41

## 2023-08-03 RX ADMIN — MULTIPLE VITAMINS W/ MINERALS TAB 1 TABLET: TAB at 12:35

## 2023-08-03 RX ADMIN — ISOSORBIDE MONONITRATE 30 MG: 30 TABLET, EXTENDED RELEASE ORAL at 08:41

## 2023-08-03 RX ADMIN — OXYCODONE HYDROCHLORIDE 5 MG: 5 TABLET ORAL at 12:36

## 2023-08-03 RX ADMIN — FUROSEMIDE 40 MG: 40 TABLET ORAL at 08:41

## 2023-08-03 RX ADMIN — GABAPENTIN 300 MG: 300 CAPSULE ORAL at 15:20

## 2023-08-03 RX ADMIN — POTASSIUM CHLORIDE 40 MEQ: 1500 TABLET, EXTENDED RELEASE ORAL at 05:48

## 2023-08-03 RX ADMIN — BUDESONIDE AND FORMOTEROL FUMARATE DIHYDRATE 2 PUFF: 160; 4.5 AEROSOL RESPIRATORY (INHALATION) at 07:10

## 2023-08-03 RX ADMIN — OXYCODONE HYDROCHLORIDE 5 MG: 5 TABLET ORAL at 07:25

## 2023-08-03 RX ADMIN — POTASSIUM CHLORIDE 40 MEQ: 1500 TABLET, EXTENDED RELEASE ORAL at 01:30

## 2023-08-03 RX ADMIN — FERROUS SULFATE TAB EC 324 MG (65 MG FE EQUIVALENT) 324 MG: 324 (65 FE) TABLET DELAYED RESPONSE at 07:25

## 2023-08-03 RX ADMIN — POTASSIUM CHLORIDE 20 MEQ: 1500 TABLET, EXTENDED RELEASE ORAL at 17:20

## 2023-08-03 RX ADMIN — APIXABAN 5 MG: 5 TABLET, FILM COATED ORAL at 08:42

## 2023-08-03 RX ADMIN — METOPROLOL SUCCINATE 100 MG: 50 TABLET, EXTENDED RELEASE ORAL at 08:42

## 2023-08-03 RX ADMIN — Medication 10 ML: at 08:42

## 2023-08-03 RX ADMIN — Medication 200 MG: at 12:36

## 2023-08-03 RX ADMIN — LEVOTHYROXINE SODIUM 100 MCG: 0.1 TABLET ORAL at 05:48

## 2023-08-03 NOTE — PLAN OF CARE
Goal Outcome Evaluation:         Pt. Demonstrates improved functional mobility this date SPS transfer EOB to armchair w/ mod  For safety + Rolling walker support. Pt. Unable to maintain static standing > 1 minute secondary to pain and fatigue, continued max A for all LB Adls. Recommend IP rehab at d/c to address aforementioned deficits and advance ADL independence.

## 2023-08-03 NOTE — PROGRESS NOTES
[facility] Medicine Services   Daily Progress Note    Patient Name: Kendy Worrell  : 1940  MRN: [id number]  Primary Care Physician:  Marisela Monte APRN  Date of admission: 2023  Date and Time of Service: 8/3/2023 at 0900 hours      Subjective      Chief Complaint: Inability to walk secondary to left ankle pain.      Brief Patient Summary:  Kendy Worrell is a 83 y.o. female admitted with nondisplaced left ankle fracture due to fall after right knee buckled from chronic osteoarthritis.  She has been evaluated by orthopedic surgery who recommend weightbearing as tolerated with orthopedic boot in place.  Anemic on admission, significant iron deficiency, will check heme stool with her history of GI bleeding and colectomy.  Discontinue IV fluids with her history of diastolic heart failure, repeat BMP in a.m. to follow for renal insufficiency acute versus chronic.     Patient Reports 8/3/2023: Patient reports that she feels okay this morning but had a lot of difficulty getting and ambulating to the bathroom.  The patient reports that she had a small BM that was normal in consistency for her.  Patient has a history of ascending colon resection and normally has loose stools.  The patient denies any dark, tarry, or bloody stools.  She states she has decreased appetite but no nausea.  Patient is voiding clear yellow urine per PureWick catheter.    Subjective:  Symptoms:  Stable.  No shortness of breath, cough, chest pain, chest pressure or diarrhea.    Diet:  Poor intake.  No nausea or vomiting.    Activity level: Impaired due to pain.    Pain:  She complains of pain that is moderate.  She reports pain is improving.  Pain is well controlled and requiring pain medication.        Objective      Vitals:   Temp:  [98.5 øF (36.9 øC)-99.2 øF (37.3 øC)] 99.2 øF (37.3 øC)  Heart Rate:  [71-96] 78  Resp:  [11-23] 12  BP: (117-143)/(72-87) 133/87  Objective:  General Appearance:  Comfortable, well-appearing,  "in no acute distress and not in pain.    Vital signs: (most recent): Blood pressure 133/87, pulse 78, temperature 99.2 øF (37.3 øC), temperature source Oral, resp. rate 12, height 149.9 cm (59\"), weight 85 kg (187 lb 6.3 oz), SpO2 95 %.  Vital signs are normal.  No fever.    Output: Producing urine and producing stool.    HEENT: Normal HEENT exam.    Lungs:  Normal effort and normal respiratory rate.    Heart: Normal rate.  Irregular rhythm.  Positive for murmur.    Abdomen: Abdomen is non-distended.  Bowel sounds are normal.   There is generalized tenderness.     Extremities: Normal range of motion.  There is dependent edema.  (Decreased range of motion and pain in the left lower extremity with noted nondisplaced ankle fracture.  Surgery recommending conservative treatment with physical therapy and follow-up as outpatient.)  Pulses: Distal pulses are intact.    Neurological: Patient is alert and oriented to person, place and time.    Pupils:  Pupils are equal, round, and reactive to light.    Skin:  Warm and dry.                    Result Review    Result Review:  I have personally reviewed the results from the time of this admission to 8/3/2023 10:30 EDT and agree with these findings:  [x]  Laboratory  []  Microbiology  [x]  Radiology  []  EKG/Telemetry   []  Cardiology/Vascular   []  Pathology  [x]  Old records  []  Other:  Most notable findings include: Anemia          Assessment & Plan        apixaban, 5 mg, Oral, Q12H  budesonide-formoterol, 2 puff, Inhalation, BID - RT  ferrous sulfate, 324 mg, Oral, Daily With Breakfast  furosemide, 40 mg, Oral, Daily  isosorbide mononitrate, 30 mg, Oral, Daily  levothyroxine, 100 mcg, Oral, Q AM  metoprolol succinate XL, 100 mg, Oral, Q12H  pantoprazole, 40 mg, Oral, Daily  senna-docusate sodium, 2 tablet, Oral, BID  sodium chloride, 10 mL, Intravenous, Q12H             Active Hospital Problems:  Active Hospital Problems    Diagnosis     **Closed fibular fracture     Anemia "      Plan:       Left fibular ankle fracture, status post fall  Left knee OA, severe  CPPD, Left knee  -CT head without acute mass, shift, hemorrhage  -Left ankle x-ray shows oblique nondisplaced fracture of the fibular metaphysis.    -Left knee x-ray without acute fracture but does show severe OA with chondocalcinosis compatible with CPPD arthropathy.    -Hips x-ray negative for acute fracture  -CK normal  -boot on LLE  -ortho evaluated----recommend weightbearing as tolerated with orthopedic boot, follow-up in their office in 3 to 4 weeks for repeat x-ray  -Continue pt/ot  -lives at [facility], required maximal assistance with PT and OT, anticipate need for SNF unless improvement in mobility       #Permanent A. Fib, SVT  #h/o bilateral PE 12/2022  #CVA 2019  -Continue home Toprol  -follows with cardiologist through [facility]  -Continue telemetry for now  -Resume home Eliquis, discontinue heparin prophylaxis     #MYLENE  #Hyponatremia  #Hypercalcemia  -Cr 1.22 on admission, previous creatinine 0.8 approximate 1 year ago  -sodium 135 --- normalized this morning  -Ca 10.6 on admission--- normalized after hydration     #COPD  -No exacerbation  -stable on room air  -Continue Duoneb PRN  -Continue symbicort BID     #h/o breast cancer status postsurgery  -Noncontributory     #Transaminitis  -AST 33 and tbili 1.6 on admission  -appears chronically elevated  -Recheck CMP in a.m.     #Anemia, microcytic  #Lower GI bleed 11/2022  #GI bleed, colectomy 2006  -hgb 9.0 on admission---8.3 this morning after fluid hydration  -Low serum iron, normal ferritin level  -Check heme stool  -Start p.o. iron supplement  -Recheck CBC in a.m.     #Chronic diastolic heart failure  #Chronic b/l LE edema  #CAD apical chronic occlusion with collateral circulation as per cardiology note  -DC IV fluids  -Resume home Lasix at once daily dosage     #HTN  -Relatively well controlled  -Continue home Toprol and resume Imdur     #HLD  -No statin on home  medication list  -Follow outpatient with primary care     #Hypothyroid  -resume home synthroid      DVT prophylaxis:  Medical DVT prophylaxis orders are present.    CODE STATUS:    Code Status (Patient has no pulse and is not breathing): CPR (Attempt to Resuscitate)  Medical Interventions (Patient has pulse or is breathing): Full Support  Release to patient: Routine Release      Disposition:  I expect patient to be discharged as soon as [facility] has a bed available.    Plan of care discussed with patient, primary nursing, attending physician, case management.     This patient has been examined wearing appropriate Personal Protective Equipment   Electronically signed by MENDOZA Juarez, 08/03/23, 10:30 EDT.  [facility] Hospitalist Team

## 2023-08-03 NOTE — THERAPY TREATMENT NOTE
"Subjective: Pt agreeable to therapeutic plan of care.    Objective:     Bed mobility - MaxA and Assist x 2  - supine > sit EOB   Transfers - Min-A and Assist x 2 and with rolling walker - Sit <> stand   Ambulation - 2 feet CGA and with rolling walker; fwd flexed posture, short step length, decreased ability to weight shift, shuffling      Vitals: WNL    Pain: 5 (faces)   Location: LLE   Intervention for pain: Repositioned, Increased Activity, and Therapeutic Presence    Education: Provided education on the importance of mobility in the acute care setting, Verbal/Tactile Cues, Transfer Training, and Gait Training    Assessment: Kendy Worrell presents with functional mobility impairments which indicate the need for skilled intervention. Pt continues to require maxA x2 for bed mobility. Pt able to perform STS w/ a rolling walker and take a couple small steps towards the chair. She required max verbal cues to push through her BUEs on the walker and take weight off her LLE as she advanced her RLE. Pt had difficulty weight shifting and advancing BLEs during ambulation even w/ max verbal cues. She becomes very fatigued w/ minimal activity and demonstrated wheezing after performing transfer from bed to chair. Tolerating session today without incident. Will continue to follow and progress as tolerated.     Plan/Recommendations:     Moderate Intensity Therapy recommended post-acute care. This is recommended as therapy feels the patient would require 3-4 days per week and wouldn't tolerate \"3 hour daily\" rehab intensity. SNF would be the preferred choice. If the patient does not agree to SNF, arrange HH or OP depending on home bound status. If patient is medically complex, consider LTACH.. Pt requires no DME at discharge.      Pt desires Skilled Rehab placement at discharge. Pt cooperative; agreeable to therapeutic recommendations and plan of care.         Basic Mobility 6-click:  Rollin = Total, A lot = 2, A little " = 3; 4 = None  Supine>Sit:   1 = Total, A lot = 2, A little = 3; 4 = None   Sit>Stand with arms:  1 = Total, A lot = 2, A little = 3; 4 = None  Bed>Chair:   1 = Total, A lot = 2, A little = 3; 4 = None  Ambulate in room:  1 = Total, A lot = 2, A little = 3; 4 = None  3-5 Steps with railin = Total, A lot = 2, A little = 3; 4 = None  Score: 10    Modified [place]: N/A = No pre-op stroke/TIA    Post-Tx Position: Up in Chair, Alarms activated, and Call light and personal items within reach  PPE: gloves

## 2023-08-03 NOTE — PLAN OF CARE
"Goal Outcome Evaluation:         Assessment: Kendy Worrell presents with functional mobility impairments which indicate the need for skilled intervention. Pt continues to require maxA x2 for bed mobility. Pt able to perform STS w/ a rolling walker and take a couple small steps towards the chair. She required max verbal cues to push through her BUEs on the walker and take weight off her LLE as she advanced her RLE. Pt had difficulty weight shifting and advancing BLEs during ambulation even w/ max verbal cues. She becomes very fatigued w/ minimal activity and demonstrated wheezing after performing transfer from bed to chair. Tolerating session today without incident. Will continue to follow and progress as tolerated.      Plan/Recommendations:      Moderate Intensity Therapy recommended post-acute care. This is recommended as therapy feels the patient would require 3-4 days per week and wouldn't tolerate \"3 hour daily\" rehab intensity. SNF would be the preferred choice. If the patient does not agree to SNF, arrange HH or OP depending on home bound status. If patient is medically complex, consider LTACH.. Pt requires no DME at discharge.      Pt desires Skilled Rehab placement at discharge. Pt cooperative; agreeable to therapeutic recommendations and plan of care.                   "

## 2023-08-03 NOTE — PLAN OF CARE
Goal Outcome Evaluation:  Plan of Care Reviewed With: patient           Outcome Evaluation: Abed at this time. Continues to complain of pain in left ankle and knee and has been taking Oxycodone 5mg PRN for pain. CAM boot remains in place to left leg. Awaiting rehab placement. Will monitor

## 2023-08-03 NOTE — CASE MANAGEMENT/SOCIAL WORK
Continued Stay Note   David     Patient Name: Kendy Worrell  MRN: [id number]  Today's Date: 8/3/2023    Admit Date: 8/1/2023    Plan: Accepted at [facility] with bed ready 8/3. [facility] to transport and  at 6pm.   Discharge Plan       Row Name 08/03/23 1423       Plan    Plan Accepted at [facility] with bed ready 8/3. [facility] to transport and  at 6pm.    Plan Comments NP and bedside nurse notfied of bed ready and transportation      Row Name 08/03/23 1201       Plan    Plan Referral to [facility] pending acceptance. No precert required. PASRR approved.                      Expected Discharge Date and Time       Expected Discharge Date Expected Discharge Time    Aug 3, 2023               Mae Silver RN

## 2023-08-03 NOTE — CASE MANAGEMENT/SOCIAL WORK
Social Work Assessment  [facility]     Patient Name: Kendy Worrell  MRN: [id number]  Today's Date: 8/3/2023    Admit Date: 8/1/2023     Discharge Plan       Row Name 08/03/23 1201       Plan    Plan Referral to [facility] pending acceptance. No precert required. PASRR approved.           CAREN San, W  Medical Social Worker  Ph [phone]  Fax [phone]  [email or web address]

## 2023-08-03 NOTE — THERAPY TREATMENT NOTE
Subjective: Pt agreeable to therapeutic plan of care.  Cognition: oriented to Person and Place    Objective:     Bed Mobility: Mod-A   Functional Transfers: Mod-A     Balance: supported, static, and standing Min-A  Functional Ambulation: N/A or Not attempted.    Lower Body Dressing: Max-A  ADL Position: supported sitting and supported standing      Vitals: WNL    Pain: 6 VAS  Location: RLE  Interventions for pain: Repositioned  Education: Provided education on the importance of mobility in the acute care setting      Assessment: Kendy Worrell presents with ADL impairments affecting function including balance, endurance / activity tolerance, pain, and strength. Demonstrated functioning below baseline abilities indicate the need for continued skilled intervention while inpatient. Tolerating session today without incident. Will continue to follow and progress as tolerated.     Plan/Recommendations:   High Intensity Therapy recommended post-acute care. This is recommended as therapy feels the patient would require 5-6 days per week, 2-3 hours per day. At this time, inpatient rehabilitation (acute rehab) would be the first choice and SNF would be second.. Pt requires no DME at discharge.     Pt desires Inpatient Rehabilitation placement at discharge. Pt cooperative; agreeable to therapeutic recommendations and plan of care.     Modified [place]: N/A = No pre-op stroke/TIA    Post-Tx Position: Up in Chair  PPE: gloves

## 2023-08-03 NOTE — DISCHARGE SUMMARY
[facility] Medicine Services  DISCHARGE SUMMARY    Patient Name: Kendy Worrell  : 1940  MRN: [id number]    Date of Admission: 2023  Discharge Diagnosis: Oblique nondisplaced fracture of the fibular metaphysis  Date of Discharge: 8/3/2023  Primary Care Physician: Marisela Monte APRN      Presenting Problem:   Inability to ambulate due to ankle or foot [R26.2]  Closed fibular fracture [S82.409A]  Skin tear of left forearm without complication, initial encounter [S51.812A]  Closed fracture of distal end of left fibula, unspecified fracture morphology, initial encounter [S82.832A]  Anemia [D64.9]    Active and Resolved Hospital Problems:  Active Hospital Problems    Diagnosis POA    **Closed fibular fracture [S82.409A] Yes    Anemia [D64.9] Yes      Resolved Hospital Problems   No resolved problems to display.         Hospital Course     Hospital Course:  Kendy Worrell is a 83 y.o. female admitted with nondisplaced left ankle fracture due to a chemical fall after right knee buckled from chronic osteoarthritis.  She has been evaluated by orthopedic surgery who recommend weightbearing as tolerated with orthopedic boot in place.  Acro Citic anemia, hemoglobin 9.0 on admission, with significant iron deficiency noted.  Patient denies any dark, tarry, or bloody stools.  Patient was started on oral iron supplementation with recommendation to follow-up in 2 to 4 weeks with PCP for repeat iron levels to ensure improvement.  Patient initially noted to have acute kidney injury, creatinine 1.22 with comparison 0.85, secondary to dehydration which improved to normal at 0.99 date of discharge.  The patient had transient drop in potassium level which improved with supplementation.  Mild asymptomatic hyponatremia with sodium 135.  The patient is to follow-up with orthopedic surgeon who recommended nonsurgical management with surgical boot and weightbearing as tolerated.  Follow-up in his  office in 3 to 4 weeks for follow-up x-ray.  The patient had also complained of left knee pain and orthopedic surgeon said he would address this if pain continued at follow-up.  Patient reports she feels ready to start physical rehab and is eager for discharge.  Patient was evaluated by physical therapy and Occupational Therapy with recommendation SNF at discharge.  She will be discharged to [facility] for acute physical rehab.        DISCHARGE Follow Up Recommendations for labs and diagnostics:     Repeat iron profile in 2 to 4 weeks to determine need for iron transfusion.  Patient was noted to be anemic and started on oral iron supplementation.  If the patient does not improve on oral iron she may need iron transfusion.      Reasons For Change In Medications and Indications for New Medications:      Ferrous sulfate 324 mg daily for anemia    Stool softener, as needed MiraLAX, as needed Dulcolax suppository for bowel management due to narcotic pain medicine    Short course oxycodone for acute pain management of lower extremity fracture.    Day of Discharge     Vital Signs:  Temp:  [98.4 øF (36.9 øC)-99.2 øF (37.3 øC)] 98.4 øF (36.9 øC)  Heart Rate:  [71-96] 74  Resp:  [12-23] 21  BP: (117-143)/(68-87) 117/68    Physical Exam:  Physical Exam  Vitals and nursing note reviewed.   Constitutional:       Appearance: Normal appearance. She is not ill-appearing.   HENT:      Right Ear: External ear normal.      Left Ear: External ear normal.      Nose: Nose normal.      Mouth/Throat:      Mouth: Mucous membranes are dry.   Eyes:      General: No scleral icterus.        Right eye: No discharge.         Left eye: No discharge.      Extraocular Movements: Extraocular movements intact.      Conjunctiva/sclera: Conjunctivae normal.      Pupils: Pupils are equal, round, and reactive to light.   Cardiovascular:      Rate and Rhythm: Normal rate and regular rhythm.      Pulses: Normal pulses.      Heart sounds:  Normal heart sounds.   Pulmonary:      Effort: Pulmonary effort is normal.      Breath sounds: Normal breath sounds.   Abdominal:      General: Bowel sounds are normal. There is no distension.      Palpations: Abdomen is soft.   Musculoskeletal:         General: Normal range of motion.      Cervical back: Normal range of motion and neck supple.      Right lower leg: Edema present.      Left lower leg: Edema present.      Comments: Surgical boot to left lower extremity.  Chronic bilateral lower extremity edema.   Skin:     General: Skin is warm and dry.      Capillary Refill: Capillary refill takes less than 2 seconds.   Neurological:      General: No focal deficit present.      Mental Status: She is alert and oriented to person, place, and time.   Psychiatric:         Mood and Affect: Mood normal.         Behavior: Behavior normal.         Thought Content: Thought content normal.         Judgment: Judgment normal.          Pertinent  and/or Most Recent Results     LAB RESULTS:      Lab 08/03/23 0034 08/02/23 0043 08/01/23  1744   WBC 6.60 7.10 8.48   HEMOGLOBIN 7.9* 8.3* 9.0*   HEMATOCRIT 24.6* 26.3* 29.7*   PLATELETS 224 224 260   NEUTROS ABS  --   --  5.79   IMMATURE GRANS (ABS)  --   --  0.01   LYMPHS ABS  --   --  1.38   MONOS ABS  --   --  1.20*   EOS ABS  --   --  0.06   MCV 75.3* 78.9* 77.7*         Lab 08/03/23  1104 08/03/23  0034 08/02/23 0043 08/01/23  1744   SODIUM  --  135* 137 135*   POTASSIUM 4.0 3.4* 3.7 3.7   CHLORIDE  --  99 99 97*   CO2  --  26.0 26.0 26.4   ANION GAP  --  10.0 12.0 11.6   BUN  --  12 19 18   CREATININE  --  0.99 1.22* 1.22*   EGFR  --  56.3* 43.8* 43.8*   GLUCOSE  --  115* 103* 113*   CALCIUM  --  9.4 9.6 10.6*         Lab 08/03/23  0034 08/01/23  1744   TOTAL PROTEIN 6.7 8.0   ALBUMIN 3.5 4.5   GLOBULIN 3.2 3.5   ALT (SGPT) 17 19   AST (SGOT) 28 33*   BILIRUBIN 1.0 1.6*   ALK PHOS 108 135*                 Lab 08/02/23  0043   IRON 21*   IRON SATURATION (TSAT) 4*   TIBC 538*    TRANSFERRIN 361*   FERRITIN 26.71         Brief Urine Lab Results       None          Microbiology Results (last 10 days)       ** No results found for the last 240 hours. **            XR Knee 3 View Left    Result Date: 8/1/2023  Impression: Impression: 1. Negative for fracture. 2. Severe osteoarthritis with bone-on-bone articulation at the lateral compartment. 3. 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 4. Diffuse osteopenia. Electronically Signed: [employer]  8/1/2023 5:44 PM EDT  Workstation ID: [id number]    XR Ankle 3+ View Left    Result Date: 8/1/2023  Impression: Impression: 1. Oblique nondisplaced fracture of the fibular metaphysis. 2. Diffuse osteopenia. 3. Additional chronic findings above. Electronically Signed: [employer]  8/1/2023 4:27 PM EDT  Workstation ID: [id number]    CT Head Without Contrast    Result Date: 8/1/2023  Impression: Impression: Stable chronic findings without acute process. Electronically Signed: [employer]  8/1/2023 5:40 PM EDT  Workstation ID: [id number]    XR Hips Bilateral With or Without Pelvis 5 View    Result Date: 8/1/2023  Impression: Impression: 1. Negative for acute fracture. 2. Osteopenia. Electronically Signed: [employer]  8/1/2023 5:42 PM EDT  Workstation ID: [id number]     Results for orders placed during the hospital encounter of 09/04/19    Duplex Venous Lower Extremity - Bilateral CAR    Interpretation Summary  ú Normal bilateral lower extremity venous duplex scan.      Results for orders placed during the hospital encounter of 09/04/19    Duplex Venous Lower Extremity - Bilateral CAR    Interpretation Summary  ú Normal bilateral lower extremity venous duplex scan.      Results for orders placed during the hospital encounter of 09/04/19    Adult Transthoracic Echo Complete W/ Cont if Necessary Per Protocol    Interpretation Summary  ú Calculated EF = 56.0%.  ú Left ventricular systolic function is normal.  ú Left ventricular wall thickness is  consistent with hypertrophy. Sigmoid-shaped ventricular septum is present.  ú Left atrial cavity size is moderately dilated.  ú There is moderate calcification of the aortic valve.  ú Aortic valve area is 1.7 cm2.  ú Aortic valve mean pressure gradient is 4.0 mmHg.  ú Mild mitral valve regurgitation is present  ú Moderate tricuspid valve regurgitation is present.  ú Estimated right ventricular systolic pressure from tricuspid regurgitation is markedly elevated (>55 mmHg).  ú Calculated right ventricular systolic pressure from tricuspid regurgitation is 61.8 mmHg.      Labs Pending at Discharge:      Procedures Performed           Consults:   Consults       Date and Time Order Name Status Description    8/1/2023 11:56 PM Inpatient Orthopedic Surgery Consult Completed               Discharge Details        Discharge Medications        New Medications        Instructions Start Date   bisacodyl 10 MG suppository  Commonly known as: DULCOLAX   10 mg, Rectal, Daily PRN      ferrous sulfate 324 (65 Fe) MG tablet delayed-release EC tablet   324 mg, Oral, Daily With Breakfast   Start Date: August 4, 2023     oxyCODONE 5 MG immediate release tablet  Commonly known as: ROXICODONE   5 mg, Oral, Every 6 Hours PRN      polyethylene glycol 17 g packet  Commonly known as: MIRALAX   17 g, Oral, Daily PRN      sennosides-docusate 8.6-50 MG per tablet  Commonly known as: PERICOLACE   2 tablets, Oral, 2 Times Daily             Continue These Medications        Instructions Start Date   acetaminophen 325 MG tablet  Commonly known as: TYLENOL   650 mg, Oral, Every 6 Hours PRN      albuterol sulfate  (90 Base) MCG/ACT inhaler  Commonly known as: PROVENTIL HFA;VENTOLIN HFA;PROAIR HFA   2 puffs, Inhalation, Every 4 Hours PRN      apixaban 5 MG tablet tablet  Commonly known as: ELIQUIS   5 mg, Oral, Every 12 Hours Scheduled      atorvastatin 10 MG tablet  Commonly known as: LIPITOR   10 mg, Oral, Nightly      cloNIDine 0.1 MG  tablet  Commonly known as: CATAPRES   0.1 mg, Oral, Every 6 Hours PRN      furosemide 40 MG tablet  Commonly known as: LASIX   40 mg, Oral, 2 Times Daily      gabapentin 300 MG capsule  Commonly known as: NEURONTIN   300 mg, Oral, 3 Times Daily      Hydrocortisone (Perianal) 2.5 % rectal cream  Commonly known as: ANUSOL-HC   1 application , Rectal, 2 Times Daily      isosorbide mononitrate 30 MG 24 hr tablet  Commonly known as: IMDUR   30 mg, Oral, Daily      levothyroxine 100 MCG tablet  Commonly known as: SYNTHROID, LEVOTHROID   100 mcg, Oral, Daily      magnesium oxide 250 MG tablet   250 mg, Oral, 2 Times Daily      metoprolol succinate  MG 24 hr tablet  Commonly known as: TOPROL-XL   100 mg, Oral, Every 12 Hours Scheduled      multivitamin with minerals tablet tablet   1 tablet, Oral, Daily      nystatin 339990 UNIT/GM powder  Commonly known as: MYCOSTATIN   Topical, 3 Times Daily PRN, Apply under breasts      pantoprazole 40 MG EC tablet  Commonly known as: PROTONIX   40 mg, Oral, Daily      potassium chloride 10 MEQ CR capsule  Commonly known as: MICRO-K   20 mEq, Oral, 3 Times Daily             Stop These Medications      amLODIPine 5 MG tablet  Commonly known as: NORVASC              Allergies   Allergen Reactions    Aspirin      bleeding    Nsaids      bleeding         Discharge Disposition: [facility].  Rehab Facility or Unit (DC - External)    Diet:  Hospital:  Diet Order   Procedures    Diet: Cardiac Diets; Healthy Heart (2-3 Na+); Texture: Regular Texture (IDDSI 7); Fluid Consistency: Thin (IDDSI 0)         Discharge Activity: As tolerated; physical therapy        CODE STATUS:  Code Status and Medical Interventions:   Ordered at: 08/1940     Code Status (Patient has no pulse and is not breathing):    CPR (Attempt to Resuscitate)     Medical Interventions (Patient has pulse or is breathing):    Full Support     Release to patient:    Routine Release         No future  appointments.        Time spent on Discharge including face to face service:  35 minutes    This patient has been examined wearing appropriate Personal Protective Equipment 08/03/23      Signature: Electronically signed by MENDOZA Juarez, 08/03/23, 2:39 PM EDT.

## 2023-08-03 NOTE — PLAN OF CARE
Goal Outcome Evaluation:  Plan of Care Reviewed With: patient        Progress: improving          Patient resting, makes needs known, replacement of K+ x2 2am and 8am

## 2023-08-04 NOTE — CASE MANAGEMENT/SOCIAL WORK
Case Management Discharge Note      Final Note: KAMALA         Selected Continued Care - Discharged on 8/3/2023 Admission date: 8/1/2023 - Discharge disposition: Rehab Facility or Unit (DC - External)      Destination Coordination complete.      Service Provider Selected Services Address Phone Fax Patient Preferred    [facility] [address] [place] [id number] [phone] --                 Transportation Services  W/C Van: Other ([place])    Final Discharge Disposition Code: 62 - inpatient rehab facility

## 2023-08-07 PROBLEM — E87.1 HYPONATREMIA: Status: ACTIVE | Noted: 2023-08-07

## 2023-08-07 NOTE — PROGRESS NOTES
Enter Query Response Below      Query Response: HFpEF, secondary to HTN             If applicable, please update the problem list.     Patient: Kendy Worrell        : 1940  Account: [id number]           Admit Date:         How to Respond to this query:       a. Click New Note     b. Answer query within the yellow box.                c. Update the Problem List, if applicable.        MENDOZA Elkins,    83 year old female with HTN, diastolic HF, Pulmonary HTN, permanent Atrial fibrillation admitted  for MYLENE and a Left fibular fracture after a mechanical fall.  Patient takes Eliquis, Lasix, Imdur and Toprol-xl at home.  There was not Cardiology consult this admission    Please further clarify the etiology of the Chronic heart failure as:    - Heart failure due to Hypertension  - Heart failure due to Permanent Atrial fibrillation  - Heart failure due to Pulmonary HTN  - Heart failure due to HTN, Permanent Atrial fibrillation, and Pulmonary HTN  - Other_____________( please specify)  - Unable to determine    By submitting this query, we are merely seeking further clarification of documentation to accurately reflect all conditions that you are monitoring, evaluating, treating or that extend the hospitalization or utilize additional resources of care. Please utilize your independent clinical judgment when addressing the question(s) above.     This query and your response, once completed, will be entered into the legal medical record.    Sincerely,  Isaac Garner RN CDIS  Clinical Documentation Integrity Program   [email or web address]

## 2023-08-07 NOTE — PROGRESS NOTES
"Enter Query Response Below      Query Response: Hyponatremia, determined to be clinically insignificant              If applicable, please update the problem list.     Patient: Kendy Worrell        : 1940  Account: [id number]           Admit Date:         How to Respond to this query:       a. Click New Note     b. Answer query within the yellow box.                c. Update the Problem List, if applicable.      MENDOZA Elkins,    83 year old female with HTN, diastolic HF, permanent Atrial fibrillation, admitted  for MYLENE and a Left fibular fracture after a mechanical fall. Her fracture did not require surgery.  Discharge summary includes \"Mild asymptomatic hyponatremia.\"  Sodium levels this admission:   - 137  8/3 - 135  Patient was treated with NS IVF at 100mls/hr on .    Please clarify the following:    - Hyponatremia- clinically significant  - Hyponatremia- clinically insignificant  - Other- specify______  - Unable to determine    By submitting this query, we are merely seeking further clarification of documentation to accurately reflect all conditions that you are monitoring, evaluating, treating or that extend the hospitalization or utilize additional resources of care. Please utilize your independent clinical judgment when addressing the question(s) above.     This query and your response, once completed, will be entered into the legal medical record.    Sincerely,  Isaac Garner RN CDIS  Clinical Documentation Integrity Program   [email or web address]  "

## 2024-01-22 ENCOUNTER — APPOINTMENT (OUTPATIENT)
Dept: GENERAL RADIOLOGY | Facility: HOSPITAL | Age: 84
End: 2024-01-22
Payer: MEDICARE

## 2024-01-22 ENCOUNTER — HOSPITAL ENCOUNTER (INPATIENT)
Facility: HOSPITAL | Age: 84
LOS: 8 days | Discharge: HOME HEALTH | End: 2024-01-30
Attending: EMERGENCY MEDICINE | Admitting: INTERNAL MEDICINE
Payer: MEDICARE

## 2024-01-22 ENCOUNTER — APPOINTMENT (OUTPATIENT)
Dept: CT IMAGING | Facility: HOSPITAL | Age: 84
End: 2024-01-22
Payer: MEDICARE

## 2024-01-22 DIAGNOSIS — D64.9 ANEMIA, UNSPECIFIED TYPE: ICD-10-CM

## 2024-01-22 DIAGNOSIS — K64.8 INTERNAL HEMORRHOID, BLEEDING: Primary | ICD-10-CM

## 2024-01-22 PROBLEM — K92.2 GI BLEED: Status: ACTIVE | Noted: 2022-11-01

## 2024-01-22 LAB
ABO GROUP BLD: NORMAL
ALBUMIN SERPL-MCNC: 3.5 G/DL (ref 3.5–5.2)
ALBUMIN/GLOB SERPL: 1.2 G/DL
ALP SERPL-CCNC: 80 U/L (ref 39–117)
ALT SERPL W P-5'-P-CCNC: 10 U/L (ref 1–33)
ANION GAP SERPL CALCULATED.3IONS-SCNC: 9 MMOL/L (ref 5–15)
AST SERPL-CCNC: 30 U/L (ref 1–32)
BACTERIA UR QL AUTO: NORMAL /HPF
BASOPHILS # BLD AUTO: 0 10*3/MM3 (ref 0–0.2)
BASOPHILS # BLD AUTO: 0.1 10*3/MM3 (ref 0–0.2)
BASOPHILS NFR BLD AUTO: 0.8 % (ref 0–1.5)
BASOPHILS NFR BLD AUTO: 1.1 % (ref 0–1.5)
BILIRUB SERPL-MCNC: 0.7 MG/DL (ref 0–1.2)
BILIRUB UR QL STRIP: NEGATIVE
BLD GP AB SCN SERPL QL: NEGATIVE
BUN SERPL-MCNC: 13 MG/DL (ref 8–23)
BUN/CREAT SERPL: 8.8 (ref 7–25)
CALCIUM SPEC-SCNC: 9.8 MG/DL (ref 8.6–10.5)
CHLORIDE SERPL-SCNC: 101 MMOL/L (ref 98–107)
CLARITY UR: CLEAR
CO2 SERPL-SCNC: 31 MMOL/L (ref 22–29)
COLOR UR: YELLOW
CREAT SERPL-MCNC: 1.48 MG/DL (ref 0.57–1)
DEPRECATED RDW RBC AUTO: 63 FL (ref 37–54)
DEPRECATED RDW RBC AUTO: 66.5 FL (ref 37–54)
EGFRCR SERPLBLD CKD-EPI 2021: 34.8 ML/MIN/1.73
EOSINOPHIL # BLD AUTO: 0 10*3/MM3 (ref 0–0.4)
EOSINOPHIL # BLD AUTO: 0 10*3/MM3 (ref 0–0.4)
EOSINOPHIL NFR BLD AUTO: 0.2 % (ref 0.3–6.2)
EOSINOPHIL NFR BLD AUTO: 0.4 % (ref 0.3–6.2)
ERYTHROCYTE [DISTWIDTH] IN BLOOD BY AUTOMATED COUNT: 20.7 % (ref 12.3–15.4)
ERYTHROCYTE [DISTWIDTH] IN BLOOD BY AUTOMATED COUNT: 21 % (ref 12.3–15.4)
FERRITIN SERPL-MCNC: 31.82 NG/ML (ref 13–150)
FLUAV RNA RESP QL NAA+PROBE: NOT DETECTED
FLUBV RNA RESP QL NAA+PROBE: NOT DETECTED
GLOBULIN UR ELPH-MCNC: 2.9 GM/DL
GLUCOSE SERPL-MCNC: 115 MG/DL (ref 65–99)
GLUCOSE UR STRIP-MCNC: NEGATIVE MG/DL
HCT VFR BLD AUTO: 24.8 % (ref 34–46.6)
HCT VFR BLD AUTO: 26.3 % (ref 34–46.6)
HGB BLD-MCNC: 8.1 G/DL (ref 12–15.9)
HGB BLD-MCNC: 8.4 G/DL (ref 12–15.9)
HGB UR QL STRIP.AUTO: ABNORMAL
HOLD SPECIMEN: NORMAL
HOLD SPECIMEN: NORMAL
HYALINE CASTS UR QL AUTO: NORMAL /LPF
INR PPP: 1.26 (ref 0.93–1.1)
IRON 24H UR-MRATE: 29 MCG/DL (ref 37–145)
IRON SATN MFR SERPL: 7 % (ref 20–50)
KETONES UR QL STRIP: NEGATIVE
LEUKOCYTE ESTERASE UR QL STRIP.AUTO: ABNORMAL
LIPASE SERPL-CCNC: 47 U/L (ref 13–60)
LYMPHOCYTES # BLD AUTO: 1.1 10*3/MM3 (ref 0.7–3.1)
LYMPHOCYTES # BLD AUTO: 1.6 10*3/MM3 (ref 0.7–3.1)
LYMPHOCYTES NFR BLD AUTO: 19.9 % (ref 19.6–45.3)
LYMPHOCYTES NFR BLD AUTO: 27.1 % (ref 19.6–45.3)
MCH RBC QN AUTO: 28.7 PG (ref 26.6–33)
MCH RBC QN AUTO: 29.2 PG (ref 26.6–33)
MCHC RBC AUTO-ENTMCNC: 32 G/DL (ref 31.5–35.7)
MCHC RBC AUTO-ENTMCNC: 32.5 G/DL (ref 31.5–35.7)
MCV RBC AUTO: 88.4 FL (ref 79–97)
MCV RBC AUTO: 91.1 FL (ref 79–97)
MONOCYTES # BLD AUTO: 0.5 10*3/MM3 (ref 0.1–0.9)
MONOCYTES # BLD AUTO: 0.6 10*3/MM3 (ref 0.1–0.9)
MONOCYTES NFR BLD AUTO: 10.8 % (ref 5–12)
MONOCYTES NFR BLD AUTO: 8.9 % (ref 5–12)
NEUTROPHILS NFR BLD AUTO: 3.6 10*3/MM3 (ref 1.7–7)
NEUTROPHILS NFR BLD AUTO: 3.7 10*3/MM3 (ref 1.7–7)
NEUTROPHILS NFR BLD AUTO: 60.9 % (ref 42.7–76)
NEUTROPHILS NFR BLD AUTO: 69.9 % (ref 42.7–76)
NITRITE UR QL STRIP: NEGATIVE
NRBC BLD AUTO-RTO: 0 /100 WBC (ref 0–0.2)
NRBC BLD AUTO-RTO: 0.1 /100 WBC (ref 0–0.2)
NT-PROBNP SERPL-MCNC: 1791 PG/ML (ref 0–1800)
PH UR STRIP.AUTO: 5.5 [PH] (ref 5–8)
PLATELET # BLD AUTO: 201 10*3/MM3 (ref 140–450)
PLATELET # BLD AUTO: 227 10*3/MM3 (ref 140–450)
PMV BLD AUTO: 7.9 FL (ref 6–12)
PMV BLD AUTO: 8.3 FL (ref 6–12)
POTASSIUM SERPL-SCNC: 4 MMOL/L (ref 3.5–5.2)
PROT SERPL-MCNC: 6.4 G/DL (ref 6–8.5)
PROT UR QL STRIP: NEGATIVE
PROTHROMBIN TIME: 13.5 SECONDS (ref 9.6–11.7)
RBC # BLD AUTO: 2.81 10*6/MM3 (ref 3.77–5.28)
RBC # BLD AUTO: 2.88 10*6/MM3 (ref 3.77–5.28)
RBC # UR STRIP: NORMAL /HPF
REF LAB TEST METHOD: NORMAL
RH BLD: NEGATIVE
RSV RNA NPH QL NAA+NON-PROBE: NOT DETECTED
SARS-COV-2 RNA RESP QL NAA+PROBE: NOT DETECTED
SODIUM SERPL-SCNC: 141 MMOL/L (ref 136–145)
SP GR UR STRIP: 1.01 (ref 1–1.03)
SQUAMOUS #/AREA URNS HPF: NORMAL /HPF
T&S EXPIRATION DATE: NORMAL
TIBC SERPL-MCNC: 434 MCG/DL (ref 298–536)
TRANSFERRIN SERPL-MCNC: 291 MG/DL (ref 200–360)
UROBILINOGEN UR QL STRIP: ABNORMAL
WBC # UR STRIP: NORMAL /HPF
WBC NRBC COR # BLD AUTO: 5.3 10*3/MM3 (ref 3.4–10.8)
WBC NRBC COR # BLD AUTO: 5.9 10*3/MM3 (ref 3.4–10.8)
WHOLE BLOOD HOLD COAG: NORMAL
WHOLE BLOOD HOLD SPECIMEN: NORMAL

## 2024-01-22 PROCEDURE — 80053 COMPREHEN METABOLIC PANEL: CPT | Performed by: NURSE PRACTITIONER

## 2024-01-22 PROCEDURE — 83690 ASSAY OF LIPASE: CPT | Performed by: NURSE PRACTITIONER

## 2024-01-22 PROCEDURE — 99285 EMERGENCY DEPT VISIT HI MDM: CPT

## 2024-01-22 PROCEDURE — 25010000002 BUMETANIDE PER 0.5 MG: Performed by: INTERNAL MEDICINE

## 2024-01-22 PROCEDURE — 86923 COMPATIBILITY TEST ELECTRIC: CPT

## 2024-01-22 PROCEDURE — 86850 RBC ANTIBODY SCREEN: CPT

## 2024-01-22 PROCEDURE — 86901 BLOOD TYPING SEROLOGIC RH(D): CPT

## 2024-01-22 PROCEDURE — 86900 BLOOD TYPING SEROLOGIC ABO: CPT

## 2024-01-22 PROCEDURE — 82728 ASSAY OF FERRITIN: CPT | Performed by: INTERNAL MEDICINE

## 2024-01-22 PROCEDURE — 87637 SARSCOV2&INF A&B&RSV AMP PRB: CPT | Performed by: EMERGENCY MEDICINE

## 2024-01-22 PROCEDURE — 81001 URINALYSIS AUTO W/SCOPE: CPT | Performed by: INTERNAL MEDICINE

## 2024-01-22 PROCEDURE — 84466 ASSAY OF TRANSFERRIN: CPT | Performed by: INTERNAL MEDICINE

## 2024-01-22 PROCEDURE — 85025 COMPLETE CBC W/AUTO DIFF WBC: CPT | Performed by: INTERNAL MEDICINE

## 2024-01-22 PROCEDURE — 85610 PROTHROMBIN TIME: CPT | Performed by: NURSE PRACTITIONER

## 2024-01-22 PROCEDURE — 83540 ASSAY OF IRON: CPT | Performed by: INTERNAL MEDICINE

## 2024-01-22 PROCEDURE — 83880 ASSAY OF NATRIURETIC PEPTIDE: CPT | Performed by: NURSE PRACTITIONER

## 2024-01-22 PROCEDURE — 85025 COMPLETE CBC W/AUTO DIFF WBC: CPT | Performed by: NURSE PRACTITIONER

## 2024-01-22 PROCEDURE — 74176 CT ABD & PELVIS W/O CONTRAST: CPT

## 2024-01-22 PROCEDURE — 71045 X-RAY EXAM CHEST 1 VIEW: CPT

## 2024-01-22 RX ORDER — FERROUS SULFATE 324(65)MG
324 TABLET, DELAYED RELEASE (ENTERIC COATED) ORAL
Status: DISCONTINUED | OUTPATIENT
Start: 2024-01-23 | End: 2024-01-30 | Stop reason: HOSPADM

## 2024-01-22 RX ORDER — METOPROLOL SUCCINATE 50 MG/1
100 TABLET, EXTENDED RELEASE ORAL EVERY 12 HOURS SCHEDULED
Status: DISCONTINUED | OUTPATIENT
Start: 2024-01-22 | End: 2024-01-30 | Stop reason: HOSPADM

## 2024-01-22 RX ORDER — BISACODYL 10 MG
10 SUPPOSITORY, RECTAL RECTAL DAILY PRN
Status: DISCONTINUED | OUTPATIENT
Start: 2024-01-22 | End: 2024-01-30 | Stop reason: HOSPADM

## 2024-01-22 RX ORDER — PROMETHAZINE HYDROCHLORIDE 12.5 MG/1
12.5 TABLET ORAL EVERY 6 HOURS PRN
Status: DISCONTINUED | OUTPATIENT
Start: 2024-01-22 | End: 2024-01-30 | Stop reason: HOSPADM

## 2024-01-22 RX ORDER — ATORVASTATIN CALCIUM 10 MG/1
10 TABLET, FILM COATED ORAL NIGHTLY
Status: DISCONTINUED | OUTPATIENT
Start: 2024-01-22 | End: 2024-01-30 | Stop reason: HOSPADM

## 2024-01-22 RX ORDER — HYDROCORTISONE ACETATE 25 MG/1
25 SUPPOSITORY RECTAL ONCE
Status: COMPLETED | OUTPATIENT
Start: 2024-01-22 | End: 2024-01-22

## 2024-01-22 RX ORDER — ALUMINA, MAGNESIA, AND SIMETHICONE 2400; 2400; 240 MG/30ML; MG/30ML; MG/30ML
15 SUSPENSION ORAL EVERY 6 HOURS PRN
Status: DISCONTINUED | OUTPATIENT
Start: 2024-01-22 | End: 2024-01-30 | Stop reason: HOSPADM

## 2024-01-22 RX ORDER — BUMETANIDE 0.25 MG/ML
1 INJECTION INTRAMUSCULAR; INTRAVENOUS EVERY 12 HOURS
Status: DISCONTINUED | OUTPATIENT
Start: 2024-01-22 | End: 2024-01-23

## 2024-01-22 RX ORDER — POLYETHYLENE GLYCOL 3350 17 G/17G
17 POWDER, FOR SOLUTION ORAL DAILY PRN
Status: DISCONTINUED | OUTPATIENT
Start: 2024-01-22 | End: 2024-01-30 | Stop reason: HOSPADM

## 2024-01-22 RX ORDER — SODIUM CHLORIDE 0.9 % (FLUSH) 0.9 %
10 SYRINGE (ML) INJECTION AS NEEDED
Status: DISCONTINUED | OUTPATIENT
Start: 2024-01-22 | End: 2024-01-30 | Stop reason: HOSPADM

## 2024-01-22 RX ORDER — PANTOPRAZOLE SODIUM 40 MG/10ML
40 INJECTION, POWDER, LYOPHILIZED, FOR SOLUTION INTRAVENOUS EVERY 12 HOURS SCHEDULED
Status: DISCONTINUED | OUTPATIENT
Start: 2024-01-22 | End: 2024-01-30 | Stop reason: HOSPADM

## 2024-01-22 RX ORDER — BISACODYL 5 MG/1
5 TABLET, DELAYED RELEASE ORAL DAILY PRN
Status: DISCONTINUED | OUTPATIENT
Start: 2024-01-22 | End: 2024-01-30 | Stop reason: HOSPADM

## 2024-01-22 RX ORDER — GABAPENTIN 300 MG/1
300 CAPSULE ORAL 3 TIMES DAILY
Status: DISCONTINUED | OUTPATIENT
Start: 2024-01-22 | End: 2024-01-30 | Stop reason: HOSPADM

## 2024-01-22 RX ORDER — ALBUTEROL SULFATE 2.5 MG/3ML
2.5 SOLUTION RESPIRATORY (INHALATION) EVERY 4 HOURS PRN
Status: DISCONTINUED | OUTPATIENT
Start: 2024-01-22 | End: 2024-01-30 | Stop reason: HOSPADM

## 2024-01-22 RX ORDER — AMOXICILLIN 250 MG
2 CAPSULE ORAL 2 TIMES DAILY
Status: DISCONTINUED | OUTPATIENT
Start: 2024-01-23 | End: 2024-01-30 | Stop reason: HOSPADM

## 2024-01-22 RX ORDER — SODIUM CHLORIDE 9 MG/ML
40 INJECTION, SOLUTION INTRAVENOUS AS NEEDED
Status: DISCONTINUED | OUTPATIENT
Start: 2024-01-22 | End: 2024-01-30 | Stop reason: HOSPADM

## 2024-01-22 RX ORDER — PROMETHAZINE HYDROCHLORIDE 12.5 MG/1
12.5 SUPPOSITORY RECTAL EVERY 6 HOURS PRN
Status: DISCONTINUED | OUTPATIENT
Start: 2024-01-22 | End: 2024-01-30 | Stop reason: HOSPADM

## 2024-01-22 RX ORDER — AMLODIPINE BESYLATE 5 MG/1
5 TABLET ORAL DAILY
COMMUNITY
End: 2024-01-30 | Stop reason: HOSPADM

## 2024-01-22 RX ORDER — ISOSORBIDE MONONITRATE 30 MG/1
30 TABLET, EXTENDED RELEASE ORAL DAILY
Status: DISCONTINUED | OUTPATIENT
Start: 2024-01-23 | End: 2024-01-30 | Stop reason: HOSPADM

## 2024-01-22 RX ORDER — LEVOTHYROXINE SODIUM 88 UG/1
88 TABLET ORAL DAILY
COMMUNITY

## 2024-01-22 RX ORDER — NITROGLYCERIN 0.4 MG/1
0.4 TABLET SUBLINGUAL
Status: DISCONTINUED | OUTPATIENT
Start: 2024-01-22 | End: 2024-01-30 | Stop reason: HOSPADM

## 2024-01-22 RX ORDER — SODIUM CHLORIDE 0.9 % (FLUSH) 0.9 %
10 SYRINGE (ML) INJECTION EVERY 12 HOURS SCHEDULED
Status: DISCONTINUED | OUTPATIENT
Start: 2024-01-22 | End: 2024-01-30 | Stop reason: HOSPADM

## 2024-01-22 RX ORDER — ACETAMINOPHEN 500 MG
1000 TABLET ORAL EVERY 8 HOURS
Status: DISPENSED | OUTPATIENT
Start: 2024-01-23 | End: 2024-01-24

## 2024-01-22 RX ORDER — LEVOTHYROXINE SODIUM 88 UG/1
88 TABLET ORAL
Status: DISCONTINUED | OUTPATIENT
Start: 2024-01-23 | End: 2024-01-30 | Stop reason: HOSPADM

## 2024-01-22 RX ORDER — CLONIDINE HYDROCHLORIDE 0.1 MG/1
0.1 TABLET ORAL EVERY 6 HOURS PRN
Status: DISCONTINUED | OUTPATIENT
Start: 2024-01-22 | End: 2024-01-30 | Stop reason: HOSPADM

## 2024-01-22 RX ADMIN — Medication 10 ML: at 21:19

## 2024-01-22 RX ADMIN — HYDROCORTISONE ACETATE 25 MG: 25 SUPPOSITORY RECTAL at 16:33

## 2024-01-22 RX ADMIN — PANTOPRAZOLE SODIUM 40 MG: 40 INJECTION, POWDER, FOR SOLUTION INTRAVENOUS at 21:15

## 2024-01-22 RX ADMIN — BUMETANIDE 1 MG: 0.25 INJECTION, SOLUTION INTRAMUSCULAR; INTRAVENOUS at 21:16

## 2024-01-22 NOTE — ED PROVIDER NOTES
Subjective   History of Present Illness  Patient is an 83-year-old female who comes in from extended-care facility complaining of bright red blood per rectum that started earlier today and was of significant amount.    She has a history of hemorrhoids he does not complain of any pain      Review of Systems   Constitutional:  Negative for chills, fatigue and fever.   HENT:  Negative for congestion, tinnitus and trouble swallowing.    Eyes:  Negative for photophobia, discharge and redness.   Respiratory:  Negative for cough and shortness of breath.    Cardiovascular:  Negative for chest pain and palpitations.   Gastrointestinal:  Negative for abdominal pain, diarrhea, nausea and vomiting.   Genitourinary:  Negative for dysuria, frequency and urgency.        Bright red blood per rectum   Musculoskeletal:  Negative for back pain, joint swelling and myalgias.   Skin:  Negative for rash.   Neurological:  Negative for dizziness and headaches.   Psychiatric/Behavioral:  Negative for confusion.    All other systems reviewed and are negative.      Past Medical History:   Diagnosis Date    A-fib     Arthritis     Breast cancer     CHF (congestive heart failure)     CVA (cerebral vascular accident) 09/2019    History of pulmonary embolus (PE)     Hyperlipidemia     Hypertension     Hyperthyroidism     patient has hypothyroidism    Hypothyroidism        Allergies   Allergen Reactions    Aspirin      bleeding    Nsaids      bleeding       Past Surgical History:   Procedure Laterality Date    BREAST BIOPSY      BREAST SURGERY Right     CHOLECYSTECTOMY      COLON SURGERY      Removed    COLONOSCOPY      COLONOSCOPY N/A 11/3/2022    Procedure: COLONOSCOPY to anastamosis with biopsies and cold snare polypectomy;  Surgeon: Saroj Oakes MD;  Location: [facility] ENDOSCOPY;  Service: Gastroenterology;  Laterality: N/A;  PRe: rectal bleeding  Post: hemorrhoids, diverticulosis, anastamotic ulcer, polyp, hemostasis clip free-floating     HYSTERECTOMY      MAMMO BILATERAL  2015    MASTECTOMY      TONSILLECTOMY         Family History   Problem Relation Age of Onset    Stroke Mother     Hypertension Mother     Heart disease Father     Cancer Father     Colon polyps Father        Social History     Socioeconomic History    Marital status: Single   Tobacco Use    Smoking status: Former    Smokeless tobacco: Never    Tobacco comments:     quit in 1988   Vaping Use    Vaping Use: Never used   Substance and Sexual Activity    Alcohol use: Yes     Alcohol/week: 3.0 standard drinks of alcohol     Types: 3 Shots of liquor per week     Comment: OCC    Drug use: Yes     Types: Hydrocodone    Sexual activity: Never           Objective   Physical Exam  Vitals reviewed. Exam conducted with a chaperone present.   Constitutional:       Appearance: Normal appearance. She is well-developed.   HENT:      Head: Normocephalic and atraumatic.   Eyes:      Conjunctiva/sclera: Conjunctivae normal.      Pupils: Pupils are equal, round, and reactive to light.   Cardiovascular:      Rate and Rhythm: Normal rate and regular rhythm.      Heart sounds: Normal heart sounds.   Pulmonary:      Effort: Pulmonary effort is normal. No respiratory distress.      Breath sounds: Normal breath sounds. No wheezing.   Abdominal:      General: Bowel sounds are normal. There is no distension.      Palpations: Abdomen is soft. There is no mass.      Tenderness: There is no abdominal tenderness. There is no guarding or rebound.   Genitourinary:     Exam position: Knee-chest position.      Rectum: Guaiac result positive. Tenderness and internal hemorrhoid present.   Musculoskeletal:         General: No deformity. Normal range of motion.      Cervical back: Normal range of motion and neck supple.   Skin:     General: Skin is warm and dry.      Capillary Refill: Capillary refill takes less than 2 seconds.   Neurological:      General: No focal deficit present.      Mental Status: She is alert and  "oriented to person, place, and time.      GCS: GCS eye subscore is 4. GCS verbal subscore is 5. GCS motor subscore is 6.   Psychiatric:         Attention and Perception: Attention normal.         Mood and Affect: Mood normal.         Speech: Speech normal.         Behavior: Behavior normal. Behavior is cooperative.         Procedures           ED Course  ED Course as of 01/22/24 1855 Mon Jan 22, 2024   1716 waiting for the CMP to result this is delayed due to hemolyzed specimen [KW]   1847 Patient was discussed with МАРИНА Randhawa who agreed to admit [KW]   1849 Marked ready for CT [KW]      ED Course User Index  [KW] Willis Kimberli D, APRN                                 /54   Pulse 78   Temp 97.8 °F (36.6 °C) (Oral)   Resp 16   Ht 154.9 cm (61\")   Wt 79.4 kg (175 lb)   SpO2 95%   BMI 33.07 kg/m²   Labs Reviewed   COMPREHENSIVE METABOLIC PANEL - Abnormal; Notable for the following components:       Result Value    Glucose 115 (*)     Creatinine 1.48 (*)     CO2 31.0 (*)     eGFR 34.8 (*)     All other components within normal limits    Narrative:     GFR Normal >60  Chronic Kidney Disease <60  Kidney Failure <15    The GFR formula is only valid for adults with stable renal function between ages 18 and 70.   PROTIME-INR - Abnormal; Notable for the following components:    Protime 13.5 (*)     INR 1.26 (*)     All other components within normal limits   CBC WITH AUTO DIFFERENTIAL - Abnormal; Notable for the following components:    RBC 2.88 (*)     Hemoglobin 8.4 (*)     Hematocrit 26.3 (*)     RDW 21.0 (*)     RDW-SD 66.5 (*)     Eosinophil % 0.2 (*)     All other components within normal limits   LIPASE - Normal   BNP (IN-HOUSE) - Normal    Narrative:     This assay is used as an aid in the diagnosis of individuals suspected of having heart failure. It can be used as an aid in the diagnosis of acute decompensated heart failure (ADHF) in patients presenting with signs and symptoms of " ADHF to the emergency department (ED). In addition, NT-proBNP of <300 pg/mL indicates ADHF is not likely.    Age Range Result Interpretation  NT-proBNP Concentration (pg/mL:      <50             Positive            >450                   Gray                 300-450                    Negative             <300    50-75           Positive            >900                  Gray                300-900                  Negative            <300      >75             Positive            >1800                  Gray                300-1800                  Negative            <300   COVID-19/FLUA&B/RSV, NP SWAB IN TRANSPORT MEDIA 1 HR TAT   RAINBOW DRAW    Narrative:     The following orders were created for panel order [place] Draw.  Procedure                               Abnormality         Status                     ---------                               -----------         ------                     Green Top (Gel)[[id number]]                                  Final result               Lavender Top[[id number]]                                     Final result               Gold Top - SST[[id number]]                                   Final result               Light Blue Top[[id number]]                                   Final result                 Please view results for these tests on the individual orders.   TYPE AND SCREEN   GREEN TOP   LAVENDER TOP   GOLD TOP - SST   LIGHT BLUE TOP   CBC AND DIFFERENTIAL    Narrative:     The following orders were created for panel order CBC & Differential.  Procedure                               Abnormality         Status                     ---------                               -----------         ------                     CBC Auto Differential[[id number]]        Abnormal            Final result                 Please view results for these tests on the individual orders.     Medications   sodium chloride 0.9 % flush 10 mL (has no administration in time range)   hydrocortisone  (ANUSOL-HC) suppository 25 mg (25 mg Rectal Given 1/22/24 4884)     No radiology results for the last day              Medical Decision Making  Is an 83-year-old female who comes in with bright red blood per rectum who is on Eliquis states that it started this morning the patient states that she has a history of hemorrhoids that have bled in the past.  Is concerning for bleeding hemorrhoid or lower GI bleed or diverticulitis colitis this is not an all-inclusive list.  Patient had above exam IV was established and blood work was obtained the patient was found of a normal white count at 5.3 her hemoglobin was 8.4 which on chart review shows to be very stable for this patient.  The CMP-found to be essentially normal as interpreted by myself  The rectal exam shows that the patient has no external hemorrhoids but does have an internal hemorrhoid that I can visualize and it is bleeding.-    I feel with the patient's age of 79 for her history of anemia and the amount of blood the patient's grandson showed me that she had passed earlier today and with the patient having active bleeding on exam that was visualized the patient should be admitted to the hospital for GI/general surgery consultation.  Did try to call Dr. Montana our colorectal surgeon but he was not available today.    I I did consider a CAT scan but the patient has no pain-but after speaking with the admitting physician with the hospitalist it was determined that the patient will have a CT without contrast at this time patient was agreeable this plan of care  The patient will be admitted to the hospitalist for further evaluation and treatment    Problems Addressed:  Anemia, unspecified type: complicated acute illness or injury  Internal hemorrhoid, bleeding: complicated acute illness or injury    Amount and/or Complexity of Data Reviewed  External Data Reviewed: labs.     Details: Previous hemoglobin and hematocrit  Labs: ordered. Decision-making details  documented in ED Course.  Radiology: ordered.  ECG/medicine tests: ordered and independent interpretation performed. Decision-making details documented in ED Course.    Risk  Prescription drug management.  Decision regarding hospitalization.        Final diagnoses:   Internal hemorrhoid, bleeding   Anemia, unspecified type       ED Disposition  ED Disposition       ED Disposition   Decision to Admit    Condition   --    Comment   Level of Care: Telemetry [5]   Admitting Physician: МАРИНА WILSON [[id number]]   Attending Physician: МАРИНА WILSON [[id number]]                 No follow-up provider specified.       Medication List      No changes were made to your prescriptions during this visit.            Kimberli Pedro, APRN  01/22/24 3600

## 2024-01-22 NOTE — Clinical Note
Level of Care: Telemetry [5]   Admitting Physician: МАРИНА WILSON [[id number]]   Attending Physician: МАРИНА WILSON [[id number]]

## 2024-01-23 ENCOUNTER — INPATIENT HOSPITAL (OUTPATIENT)
Dept: URBAN - METROPOLITAN AREA HOSPITAL 84 | Facility: HOSPITAL | Age: 84
End: 2024-01-23
Payer: MEDICAID

## 2024-01-23 ENCOUNTER — APPOINTMENT (OUTPATIENT)
Dept: CARDIOLOGY | Facility: HOSPITAL | Age: 84
End: 2024-01-23
Payer: MEDICARE

## 2024-01-23 DIAGNOSIS — E87.6 HYPOKALEMIA: ICD-10-CM

## 2024-01-23 DIAGNOSIS — Z79.01 LONG TERM (CURRENT) USE OF ANTICOAGULANTS: ICD-10-CM

## 2024-01-23 DIAGNOSIS — I48.91 UNSPECIFIED ATRIAL FIBRILLATION: ICD-10-CM

## 2024-01-23 DIAGNOSIS — K62.5 HEMORRHAGE OF ANUS AND RECTUM: ICD-10-CM

## 2024-01-23 DIAGNOSIS — Z90.49 ACQUIRED ABSENCE OF OTHER SPECIFIED PARTS OF DIGESTIVE TRACT: ICD-10-CM

## 2024-01-23 DIAGNOSIS — D50.0 IRON DEFICIENCY ANEMIA SECONDARY TO BLOOD LOSS (CHRONIC): ICD-10-CM

## 2024-01-23 DIAGNOSIS — R22.43 LOCALIZED SWELLING, MASS AND LUMP, LOWER LIMB, BILATERAL: ICD-10-CM

## 2024-01-23 LAB
ALBUMIN SERPL-MCNC: 3.2 G/DL (ref 3.5–5.2)
ALBUMIN/GLOB SERPL: 1.3 G/DL
ALP SERPL-CCNC: 67 U/L (ref 39–117)
ALT SERPL W P-5'-P-CCNC: 10 U/L (ref 1–33)
ANION GAP SERPL CALCULATED.3IONS-SCNC: 8 MMOL/L (ref 5–15)
AST SERPL-CCNC: 21 U/L (ref 1–32)
BH CV ECHO MEAS - ACS: 1.4 CM
BH CV ECHO MEAS - AO MAX PG: 20.6 MMHG
BH CV ECHO MEAS - AO MEAN PG: 12 MMHG
BH CV ECHO MEAS - AO ROOT DIAM: 3.4 CM
BH CV ECHO MEAS - AO V2 MAX: 227 CM/SEC
BH CV ECHO MEAS - AO V2 VTI: 50.4 CM
BH CV ECHO MEAS - AVA(I,D): 1.45 CM2
BH CV ECHO MEAS - EDV(CUBED): 85.2 ML
BH CV ECHO MEAS - EDV(MOD-SP2): 56.9 ML
BH CV ECHO MEAS - EDV(MOD-SP4): 74.2 ML
BH CV ECHO MEAS - EF(MOD-BP): 57.1 %
BH CV ECHO MEAS - EF(MOD-SP2): 50.8 %
BH CV ECHO MEAS - EF(MOD-SP4): 68.3 %
BH CV ECHO MEAS - ESV(CUBED): 17.6 ML
BH CV ECHO MEAS - ESV(MOD-SP2): 28 ML
BH CV ECHO MEAS - ESV(MOD-SP4): 23.5 ML
BH CV ECHO MEAS - FS: 40.9 %
BH CV ECHO MEAS - IVS/LVPW: 1.1 CM
BH CV ECHO MEAS - IVSD: 1.1 CM
BH CV ECHO MEAS - LA DIMENSION: 4.6 CM
BH CV ECHO MEAS - LAT PEAK E' VEL: 13.6 CM/SEC
BH CV ECHO MEAS - LV DIASTOLIC VOL/BSA (35-75): 42.3 CM2
BH CV ECHO MEAS - LV MASS(C)D: 158.2 GRAMS
BH CV ECHO MEAS - LV MAX PG: 4.9 MMHG
BH CV ECHO MEAS - LV MEAN PG: 3 MMHG
BH CV ECHO MEAS - LV SYSTOLIC VOL/BSA (12-30): 13.4 CM2
BH CV ECHO MEAS - LV V1 MAX: 111 CM/SEC
BH CV ECHO MEAS - LV V1 VTI: 23.2 CM
BH CV ECHO MEAS - LVIDD: 4.4 CM
BH CV ECHO MEAS - LVIDS: 2.6 CM
BH CV ECHO MEAS - LVOT AREA: 3.1 CM2
BH CV ECHO MEAS - LVOT DIAM: 2 CM
BH CV ECHO MEAS - LVPWD: 1 CM
BH CV ECHO MEAS - MED PEAK E' VEL: 9.5 CM/SEC
BH CV ECHO MEAS - MR MAX PG: 55.1 MMHG
BH CV ECHO MEAS - MR MAX VEL: 371 CM/SEC
BH CV ECHO MEAS - MV DEC SLOPE: 534.7 CM/SEC2
BH CV ECHO MEAS - MV DEC TIME: 0.2 SEC
BH CV ECHO MEAS - MV E MAX VEL: 109.3 CM/SEC
BH CV ECHO MEAS - MV MAX PG: 7.7 MMHG
BH CV ECHO MEAS - MV MEAN PG: 2 MMHG
BH CV ECHO MEAS - MV P1/2T: 77.2 MSEC
BH CV ECHO MEAS - MV V2 VTI: 35.1 CM
BH CV ECHO MEAS - MVA(P1/2T): 2.8 CM2
BH CV ECHO MEAS - MVA(VTI): 2.08 CM2
BH CV ECHO MEAS - PA V2 MAX: 100.1 CM/SEC
BH CV ECHO MEAS - PI END-D VEL: 124 CM/SEC
BH CV ECHO MEAS - QP/QS: 1
BH CV ECHO MEAS - RV MAX PG: 1.76 MMHG
BH CV ECHO MEAS - RV V1 MAX: 66.3 CM/SEC
BH CV ECHO MEAS - RV V1 VTI: 14.9 CM
BH CV ECHO MEAS - RVDD: 2.9 CM
BH CV ECHO MEAS - RVOT DIAM: 2.5 CM
BH CV ECHO MEAS - SI(MOD-SP2): 16.5 ML/M2
BH CV ECHO MEAS - SI(MOD-SP4): 28.9 ML/M2
BH CV ECHO MEAS - SV(LVOT): 72.9 ML
BH CV ECHO MEAS - SV(MOD-SP2): 28.9 ML
BH CV ECHO MEAS - SV(MOD-SP4): 50.7 ML
BH CV ECHO MEAS - SV(RVOT): 72.9 ML
BH CV ECHO MEAS - TAPSE (>1.6): 2.06 CM
BH CV ECHO MEAS - TR MAX PG: 33.6 MMHG
BH CV ECHO MEAS - TR MAX VEL: 290 CM/SEC
BH CV ECHO MEASUREMENTS AVERAGE E/E' RATIO: 9.46
BH CV XLRA - RV BASE: 3.6 CM
BH CV XLRA - RV LENGTH: 6.3 CM
BH CV XLRA - RV MID: 3.4 CM
BH CV XLRA - TDI S': 16 CM/SEC
BILIRUB SERPL-MCNC: 0.8 MG/DL (ref 0–1.2)
BUN SERPL-MCNC: 12 MG/DL (ref 8–23)
BUN/CREAT SERPL: 10 (ref 7–25)
CALCIUM SPEC-SCNC: 9.3 MG/DL (ref 8.6–10.5)
CHLORIDE SERPL-SCNC: 102 MMOL/L (ref 98–107)
CO2 SERPL-SCNC: 32 MMOL/L (ref 22–29)
CREAT SERPL-MCNC: 1.2 MG/DL (ref 0.57–1)
EGFRCR SERPLBLD CKD-EPI 2021: 44.7 ML/MIN/1.73
GLOBULIN UR ELPH-MCNC: 2.4 GM/DL
GLUCOSE SERPL-MCNC: 106 MG/DL (ref 65–99)
HCT VFR BLD AUTO: 22.8 % (ref 34–46.6)
HCT VFR BLD AUTO: 22.8 % (ref 34–46.6)
HCT VFR BLD AUTO: 24 % (ref 34–46.6)
HGB BLD-MCNC: 7.3 G/DL (ref 12–15.9)
HGB BLD-MCNC: 7.4 G/DL (ref 12–15.9)
HGB BLD-MCNC: 7.7 G/DL (ref 12–15.9)
LEFT ATRIUM VOLUME INDEX: 54.3 ML/M2
MAGNESIUM SERPL-MCNC: 1.6 MG/DL (ref 1.6–2.4)
POTASSIUM SERPL-SCNC: 3.3 MMOL/L (ref 3.5–5.2)
POTASSIUM SERPL-SCNC: 4.3 MMOL/L (ref 3.5–5.2)
PROT SERPL-MCNC: 5.6 G/DL (ref 6–8.5)
SINUS: 3.1 CM
SODIUM SERPL-SCNC: 142 MMOL/L (ref 136–145)
STJ: 2.5 CM

## 2024-01-23 PROCEDURE — 83735 ASSAY OF MAGNESIUM: CPT | Performed by: INTERNAL MEDICINE

## 2024-01-23 PROCEDURE — 25010000002 BUMETANIDE PER 0.5 MG: Performed by: INTERNAL MEDICINE

## 2024-01-23 PROCEDURE — 84132 ASSAY OF SERUM POTASSIUM: CPT | Performed by: INTERNAL MEDICINE

## 2024-01-23 PROCEDURE — 85014 HEMATOCRIT: CPT | Performed by: INTERNAL MEDICINE

## 2024-01-23 PROCEDURE — 85018 HEMOGLOBIN: CPT | Performed by: INTERNAL MEDICINE

## 2024-01-23 PROCEDURE — 97166 OT EVAL MOD COMPLEX 45 MIN: CPT | Performed by: OCCUPATIONAL THERAPIST

## 2024-01-23 PROCEDURE — 36415 COLL VENOUS BLD VENIPUNCTURE: CPT | Performed by: INTERNAL MEDICINE

## 2024-01-23 PROCEDURE — 93306 TTE W/DOPPLER COMPLETE: CPT | Performed by: INTERNAL MEDICINE

## 2024-01-23 PROCEDURE — 93306 TTE W/DOPPLER COMPLETE: CPT

## 2024-01-23 PROCEDURE — 99222 1ST HOSP IP/OBS MODERATE 55: CPT | Performed by: NURSE PRACTITIONER

## 2024-01-23 PROCEDURE — 97162 PT EVAL MOD COMPLEX 30 MIN: CPT

## 2024-01-23 PROCEDURE — 25010000002 NA FERRIC GLUC CPLX PER 12.5 MG: Performed by: NURSE PRACTITIONER

## 2024-01-23 PROCEDURE — 80053 COMPREHEN METABOLIC PANEL: CPT | Performed by: INTERNAL MEDICINE

## 2024-01-23 RX ORDER — POTASSIUM CHLORIDE 20 MEQ/1
40 TABLET, EXTENDED RELEASE ORAL EVERY 4 HOURS
Status: COMPLETED | OUTPATIENT
Start: 2024-01-23 | End: 2024-01-23

## 2024-01-23 RX ORDER — BUMETANIDE 0.25 MG/ML
1 INJECTION INTRAMUSCULAR; INTRAVENOUS
Status: DISCONTINUED | OUTPATIENT
Start: 2024-01-23 | End: 2024-01-29

## 2024-01-23 RX ADMIN — POTASSIUM CHLORIDE 40 MEQ: 1500 TABLET, EXTENDED RELEASE ORAL at 08:05

## 2024-01-23 RX ADMIN — BUMETANIDE 1 MG: 0.25 INJECTION INTRAMUSCULAR; INTRAVENOUS at 18:33

## 2024-01-23 RX ADMIN — LEVOTHYROXINE SODIUM 88 MCG: 0.09 TABLET ORAL at 06:13

## 2024-01-23 RX ADMIN — METOPROLOL SUCCINATE 100 MG: 50 TABLET, EXTENDED RELEASE ORAL at 21:17

## 2024-01-23 RX ADMIN — GABAPENTIN 300 MG: 300 CAPSULE ORAL at 08:05

## 2024-01-23 RX ADMIN — ACETAMINOPHEN 1000 MG: 500 TABLET ORAL at 08:05

## 2024-01-23 RX ADMIN — PANTOPRAZOLE SODIUM 40 MG: 40 INJECTION, POWDER, FOR SOLUTION INTRAVENOUS at 21:17

## 2024-01-23 RX ADMIN — SODIUM CHLORIDE 125 MG: 9 INJECTION, SOLUTION INTRAVENOUS at 21:17

## 2024-01-23 RX ADMIN — Medication 10 ML: at 21:17

## 2024-01-23 RX ADMIN — PANTOPRAZOLE SODIUM 40 MG: 40 INJECTION, POWDER, FOR SOLUTION INTRAVENOUS at 08:05

## 2024-01-23 RX ADMIN — METOPROLOL SUCCINATE 100 MG: 50 TABLET, EXTENDED RELEASE ORAL at 08:05

## 2024-01-23 RX ADMIN — ATORVASTATIN CALCIUM 10 MG: 10 TABLET, FILM COATED ORAL at 21:17

## 2024-01-23 RX ADMIN — GABAPENTIN 300 MG: 300 CAPSULE ORAL at 21:17

## 2024-01-23 RX ADMIN — Medication 10 ML: at 08:06

## 2024-01-23 RX ADMIN — ATORVASTATIN CALCIUM 10 MG: 10 TABLET, FILM COATED ORAL at 01:44

## 2024-01-23 RX ADMIN — Medication 10 ML: at 01:45

## 2024-01-23 RX ADMIN — ACETAMINOPHEN 1000 MG: 500 TABLET ORAL at 16:53

## 2024-01-23 RX ADMIN — ISOSORBIDE MONONITRATE 30 MG: 30 TABLET, EXTENDED RELEASE ORAL at 08:23

## 2024-01-23 RX ADMIN — POTASSIUM CHLORIDE 40 MEQ: 1500 TABLET, EXTENDED RELEASE ORAL at 12:02

## 2024-01-23 RX ADMIN — GABAPENTIN 300 MG: 300 CAPSULE ORAL at 16:53

## 2024-01-23 RX ADMIN — METOPROLOL SUCCINATE 100 MG: 50 TABLET, EXTENDED RELEASE ORAL at 01:45

## 2024-01-23 RX ADMIN — GABAPENTIN 300 MG: 300 CAPSULE ORAL at 01:45

## 2024-01-23 RX ADMIN — BUMETANIDE 1 MG: 0.25 INJECTION, SOLUTION INTRAMUSCULAR; INTRAVENOUS at 08:05

## 2024-01-23 NOTE — PLAN OF CARE
Goal Outcome Evaluation:  Plan of Care Reviewed With: patient           Outcome Evaluation: Pt is an 83 y.o. female with a history of CAD, paroxysmal atrial fibrillation on Eliquis, congestive heart failure diastolic, and hypertension adm to [facility] on 01/22/24 with s/s of GI bleed &  complains of bilateral lower extremity swelling with weeping of bilateral legs. At baseline pt lives in [place] and is mod ind with mobility & showering using rollator & shower seat respectively. She is ind with dressing, bathing & toileting. She has staff assist for IADLs PRN. Upon eval, pt is A&O X4. She completes grooming, feeding independently, donning of socks with min A due to edema in feet, toileting & ADL transfers with SBA-CGA using RW. OT will continue to follow for tx as pt has generalized weakness & dec endurance. Recommend home with home health upon discharge.      Anticipated Discharge Disposition (OT): home with assist, home with home health                         no anemia, no easy bruising, no jaundice, no swollen lymph nodes.

## 2024-01-23 NOTE — CONSULTS
"GI CONSULT  NOTE:    Referring Provider:  Dr. Davenport    Chief complaint: Rectal bleeding    Subjective . \" I have intermittent bleeding\"    History of present illness: Kendy Worrell is a 83 y.o. female who has a history of right hemicolectomy secondary to hemorrhage in 2006, cholecystectomy, chronic diarrhea, A-fib on Eliquis and heart failure.  She presented on 1/22/2024 with increased lower extremity edema with weeping, shortness of breath with exertion and rectal bleeding.  She reports bleeding from her hemorrhoids and large amounts over the last 2 days.  Aide at bedside reports green stool.  She has numerous bowel movements per day with chronic diarrhea, denies constipation.  No abdominal pain.  No nausea or vomiting.    Endo History:  11/2022 colonoscopy (Dr. Oakes) -end-to-side ileocolonic anastomosis of transverse colon with small ulceration, adenomatous polyps, diverticulosis, large hemorrhoids, anastomotic biopsies unremarkable    Past Medical History:  Past Medical History:   Diagnosis Date    A-fib     Arthritis     Breast cancer     CHF (congestive heart failure)     CVA (cerebral vascular accident) 09/2019    History of pulmonary embolus (PE)     Hyperlipidemia     Hypertension     Hyperthyroidism     patient has hypothyroidism    Hypothyroidism        Past Surgical History:  Past Surgical History:   Procedure Laterality Date    BREAST BIOPSY      BREAST SURGERY Right     CHOLECYSTECTOMY      COLON SURGERY      Removed    COLONOSCOPY      COLONOSCOPY N/A 11/3/2022    Procedure: COLONOSCOPY to anastamosis with biopsies and cold snare polypectomy;  Surgeon: Saroj Oakes MD;  Location: [facility] ENDOSCOPY;  Service: Gastroenterology;  Laterality: N/A;  PRe: rectal bleeding  Post: hemorrhoids, diverticulosis, anastamotic ulcer, polyp, hemostasis clip free-floating    HYSTERECTOMY      MAMMO BILATERAL  2015    MASTECTOMY      TONSILLECTOMY         Social History:  Social History     Tobacco Use    " Smoking status: Former    Smokeless tobacco: Never    Tobacco comments:     quit in 1988   Vaping Use    Vaping Use: Never used   Substance Use Topics    Alcohol use: Yes     Alcohol/week: 3.0 standard drinks of alcohol     Types: 3 Shots of liquor per week     Comment: OCC    Drug use: Yes     Types: Hydrocodone       Family History:  Family History   Problem Relation Age of Onset    Stroke Mother     Hypertension Mother     Heart disease Father     Cancer Father     Colon polyps Father        Medications:  Medications Prior to Admission   Medication Sig Dispense Refill Last Dose    acetaminophen (TYLENOL) 325 MG tablet Take 2 tablets by mouth Every 6 (Six) Hours As Needed for Mild Pain .       albuterol sulfate  (90 Base) MCG/ACT inhaler Inhale 2 puffs Every 4 (Four) Hours As Needed for Wheezing.       amLODIPine (NORVASC) 5 MG tablet Take 1 tablet by mouth Daily.       apixaban (ELIQUIS) 5 MG tablet tablet Take 1 tablet by mouth Every 12 (Twelve) Hours. 60 tablet      atorvastatin (LIPITOR) 10 MG tablet Take 1 tablet by mouth Every Night.       bisacodyl (DULCOLAX) 10 MG suppository Insert 1 suppository into the rectum Daily As Needed for Constipation (Use if bisacodyl oral is ineffective). 2 each 0     cloNIDine (CATAPRES) 0.1 MG tablet Take 1 tablet by mouth Every 6 (Six) Hours As Needed for High Blood Pressure (SBP > 160, DBP > 90).       furosemide (LASIX) 40 MG tablet Take 1 tablet by mouth 2 (Two) Times a Day.       gabapentin (NEURONTIN) 100 MG capsule Take 2 capsules by mouth 3 (Three) Times a Day.       Hydrocortisone, Perianal, (ANUSOL-HC) 2.5 % rectal cream Insert 1 application  into the rectum 2 (Two) Times a Day.       isosorbide mononitrate (IMDUR) 30 MG 24 hr tablet Take 1 tablet by mouth Daily.       levothyroxine (SYNTHROID, LEVOTHROID) 88 MCG tablet Take 1 tablet by mouth Daily.       magnesium oxide 250 MG tablet Take 1 tablet by mouth 2 (Two) Times a Day.       metoprolol succinate XL  (TOPROL-XL) 100 MG 24 hr tablet Take 1 tablet by mouth Every 12 (Twelve) Hours. 60 tablet 0     multivitamin with minerals tablet tablet Take 1 tablet by mouth Daily.       nystatin (MYCOSTATIN) 272547 UNIT/GM powder Apply  topically to the appropriate area as directed 3 (Three) Times a Day As Needed. Apply under breasts       pantoprazole (PROTONIX) 40 MG EC tablet Take 1 tablet by mouth Daily.       potassium chloride (MICRO-K) 10 MEQ CR capsule Take 2 capsules by mouth 3 (Three) Times a Day.          Scheduled Meds:acetaminophen, 1,000 mg, Oral, Q8H  [Held by provider] apixaban, 5 mg, Oral, Q12H  atorvastatin, 10 mg, Oral, Nightly  bumetanide, 1 mg, Intravenous, BID  ferrous sulfate, 324 mg, Oral, Daily With Breakfast  gabapentin, 300 mg, Oral, TID  isosorbide mononitrate, 30 mg, Oral, Daily  levothyroxine, 88 mcg, Oral, Q AM  metoprolol succinate XL, 100 mg, Oral, Q12H  pantoprazole, 40 mg, Intravenous, Q12H  senna-docusate sodium, 2 tablet, Oral, BID  sodium chloride, 10 mL, Intravenous, Q12H      Continuous Infusions:   PRN Meds:.  albuterol    aluminum-magnesium hydroxide-simethicone    senna-docusate sodium **AND** polyethylene glycol **AND** bisacodyl **AND** bisacodyl    Calcium Replacement - Follow Nurse / BPA Driven Protocol    cloNIDine    Magnesium Standard Dose Replacement - Follow Nurse / BPA Driven Protocol    nitroglycerin    Phosphorus Replacement - Follow Nurse / BPA Driven Protocol    Potassium Replacement - Follow Nurse / BPA Driven Protocol    promethazine **OR** promethazine    sodium chloride    sodium chloride    sodium chloride    ALLERGIES:  Aspirin and Nsaids    ROS:  The following systems were reviewed   Constitution:  No fevers, chills, no unintentional weight loss  Skin: no rash, no jaundice  Eyes:  No blurry vision, no eye pain  HENT:  No change in hearing or smell  Resp:  No dyspnea or cough  CV:  No chest pain or palpitations, + edema  : Dyspnea  Musculoskeletal:  No leg cramps  or arthralgias  Neuro:  No tremor, no numbness  Psych:  No depression or confusion    Objective chronically ill-appearing but no acute distress, no family present    Vital Signs:   Vitals:    01/23/24 0805 01/23/24 0831 01/23/24 1045 01/23/24 1300   BP: 111/61 121/74  102/66   BP Location:  Left arm  Left arm   Patient Position:  Lying  Lying   Pulse: 73 68  66   Resp:  18  18   Temp:  97.9 °F (36.6 °C)  97.6 °F (36.4 °C)   TempSrc:  Oral  Oral   SpO2:  92%  100%   Weight:   73.7 kg (162 lb 6.4 oz)    Height:           Physical Exam:     General Appearance:    Awake and alert, in no acute distress   Head:    Normocephalic, without obvious abnormality, atraumatic   Throat:   No oral lesions, no thrush, oral mucosa moist   Lungs:     Respirations regular, even and unlabored   Chest Wall:    No abnormalities observed   Abdomen:     Soft, non-tender, nondistended   Rectal:   Large internal hemorrhoid with prolapse with stigmata of recent bleeding, tender on exam   Extremities:   Moves all extremities, no cyanosis       Skin:   No obvious rash, no jaundice, normal palpation       Neurologic:   Cranial nerves 2 - 12 grossly intact       Results Review:   I reviewed the patient's labs and imaging.  CBC    Results from last 7 days   Lab Units 01/23/24  1136 01/23/24  0523 01/22/24  2043 01/22/24  1425   WBC 10*3/mm3  --   --  5.90 5.30   HEMOGLOBIN g/dL 7.7* 7.4* 8.1* 8.4*   PLATELETS 10*3/mm3  --   --  201 227     CMP   Results from last 7 days   Lab Units 01/23/24  1517 01/23/24  0523 01/22/24  1710 01/22/24  1606   SODIUM mmol/L  --  142 141  --    POTASSIUM mmol/L 4.3 3.3* 4.0  --    CHLORIDE mmol/L  --  102 101  --    CO2 mmol/L  --  32.0* 31.0*  --    BUN mg/dL  --  12 13  --    CREATININE mg/dL  --  1.20* 1.48*  --    GLUCOSE mg/dL  --  106* 115*  --    ALBUMIN g/dL  --  3.2* 3.5  --    BILIRUBIN mg/dL  --  0.8 0.7  --    ALK PHOS U/L  --  67 80  --    AST (SGOT) U/L  --  21 30  --    ALT (SGPT) U/L  --  10 10  --   "  MAGNESIUM mg/dL  --  1.6  --   --    LIPASE U/L  --   --   --  47     Cr Clearance Estimated Creatinine Clearance: 32.1 mL/min (A) (by C-G formula based on SCr of 1.2 mg/dL (H)).  Coag   Results from last 7 days   Lab Units 01/22/24  1425   INR  1.26*     HbA1C   Lab Results   Component Value Date    HGBA1C 5.10 11/03/2022    HGBA1C 4.80 09/05/2019     Blood Glucose No results found for: \"POCGLU\"  Infection     UA    Results from last 7 days   Lab Units 01/22/24 2047   NITRITE UA  Negative   WBC UA /HPF 0-2   BACTERIA UA /HPF None Seen   SQUAM EPITHEL UA /HPF 0-2     Imaging Results (Last 72 Hours)       Procedure Component Value Units Date/Time    XR Chest 1 View [[id number]] Collected: 01/22/24 1941     Updated: 01/22/24 1945    Narrative:      XR CHEST 1 VW    Date of Exam: 1/22/2024 7:38 PM EST    Indication: short of breath    Comparison: 11/18/2021    Findings:  Stable cardiomegaly. Lungs are without consolidation. Negative for pneumothorax or pleural effusion. Surgical clips in the right chest related to prior mastectomy. No pleural effusion or pneumothorax.      Impression:      Impression:  1. No acute process.  2. Stable cardiomegaly.        Electronically Signed: Sarath Simpson MD    1/22/2024 7:43 PM EST    Workstation ID: [id number]    CT Abdomen Pelvis Without Contrast [[id number]] Collected: 01/22/24 1917     Updated: 01/22/24 1922    Narrative:      CT ABDOMEN PELVIS WO CONTRAST    Date of Exam: 1/22/2024 7:11 PM EST    Indication: abd pain rectal bleed.    Comparison: 11/1/2022    Technique: Axial CT images were obtained of the abdomen and pelvis without the administration of contrast. Sagittal and coronal reconstructions were performed.  Automated exposure control and iterative reconstruction methods were used.    FINDINGS:    Lung bases: No masses. No consolidation.    Liver:No masses. No intrahepatic biliary ductal dilatation.    Spleen: Calcified granulomata    Pancreas:No pancreatic masses. No " evidence of pancreatitis.    Gallbladder and common bile duct: Question prior cholecystectomy    Adrenal glands:No adrenal masses    Kidneys and ureters: Peripherally calcified left renal cyst. No calculi present within the ureters. Normal caliber ureters.    Urinary bladder:No urinary bladder wall thickening. No bladder masses.    Small bowel:Normal caliber small bowel.    Large bowel: Extensive colonic diverticulosis without evidence of diverticulitis.    Appendix: Not definitively identified however there is no evidence of appendicitis.    GENITOURINARY: Prior hysterectomy    Ascites or pneumoperitoneum:None.    Adenopathy:None present    Osseous structures: Degenerative changes of the hips. Degenerative changes of the lumbar spine.    Other findings: None      Impression:      No acute pathology is demonstrated.              Electronically Signed: Missael Heck MD    1/22/2024 7:20 PM EST    Workstation ID: [id number]            ASSESSMENT:  Recurrent rectal bleeding  Normocytic anemia with recurrent GI blood loss/iron deficiency  Lower extremity edema  Hypokalemia  History of right hemicolectomy secondary to hemorrhage  History of A-fib -on Eliquis  History of heart failure    PLAN:  Patient is a 83-year-old female with a history of right hemicolectomy, cholecystectomy, heart failure and A-fib on Eliquis.  She presents with increased lower extremity edema with weeping as well as shortness of breath with exertion and recurrent rectal bleeding.  She reports large amounts of rectal bleeding from her hemorrhoids over the last 2 days in the setting of Eliquis.  She has chronic diarrhea that is unchanged and denies abdominal pain.  Hemoglobin 7.4 from 8.1 yesterday.  Iron 29 with sat 7%.  CT without contrast without acute changes.  Last colonoscopy in November 2022 as above.    Rectal exam showed green stool with large prolapsing internal hemorrhoid with stigmata of recent bleeding.  Suspect recurrent bleeding  hemorrhoids contributing to her worsening anemia in the setting of Eliquis.  We will ask colorectal surgery to evaluate for possible surgical intervention.  Give IV iron x 1.  Continue supportive care.    I discussed the patients findings and my recommendations with the patient.    We appreciate the referral    Electronically signed by MENDOZA Dowd, 01/23/24, 6:34 PM EST.

## 2024-01-23 NOTE — PROGRESS NOTES
[facility] MEDICINE SERVICE  DAILY PROGRESS NOTE    NAME: Kendy Worrell  : 1940  MRN: [id number]      LOS: 1 day     PROVIDER OF SERVICE: Joe Sahu MD    Chief Complaint: GI bleed    Subjective:     Interval History:    Patient states that she is no longer having any rectal bleeding.  She states that her legs are still swollen although somewhat better today.    Review of Systems:   Review of Systems    Objective:     Vital Signs  Temp:  [97.6 °F (36.4 °C)-98.1 °F (36.7 °C)] 97.9 °F (36.6 °C)  Heart Rate:  [68-98] 68  Resp:  [16-18] 18  BP: ()/(54-80) 121/74   Body mass index is 30.69 kg/m².    Physical Exam  Physical Exam  General Appearance:  Alert, cooperative, no distress, appears stated age  Head:  Normocephalic, without obvious abnormality, atraumatic  Eyes:  PERRL, conjunctiva/corneas clear, EOM's intact, fundi benign, both eyes  Ears:  Normal TM's and external ear canals, both ears  Nose: Nares normal, septum midline, mucosa normal, no drainage or sinus tenderness  Throat: Lips, mucosa, and tongue normal; teeth and gums normal  Neck: Supple, symmetrical, trachea midline, no adenopathy, thyroid: not enlarged, symmetric, no tenderness/mass/nodules, no carotid bruit or JVD  Lungs:   Clear to auscultation bilaterally, respirations unlabored  Heart:  Regular rate and rhythm, S1, S2 normal, no murmur, rub or gallop  Abdomen:  Soft, non-tender, bowel sounds active all four quadrants,  no masses, no organomegaly  Extremities: 2+ BL LE pitting edema  Pulses: 2+ and symmetric  Skin: Skin color, texture, turgor normal, no rashes or lesions  Neurologic: Normal      Scheduled Meds   acetaminophen, 1,000 mg, Oral, Q8H  [Held by provider] apixaban, 5 mg, Oral, Q12H  atorvastatin, 10 mg, Oral, Nightly  bumetanide, 1 mg, Intravenous, BID  ferrous sulfate, 324 mg, Oral, Daily With Breakfast  gabapentin, 300 mg, Oral, TID  isosorbide mononitrate, 30 mg, Oral, Daily  levothyroxine, 88 mcg, Oral, Q  AM  metoprolol succinate XL, 100 mg, Oral, Q12H  pantoprazole, 40 mg, Intravenous, Q12H  senna-docusate sodium, 2 tablet, Oral, BID  sodium chloride, 10 mL, Intravenous, Q12H       PRN Meds     albuterol    aluminum-magnesium hydroxide-simethicone    senna-docusate sodium **AND** polyethylene glycol **AND** bisacodyl **AND** bisacodyl    Calcium Replacement - Follow Nurse / BPA Driven Protocol    cloNIDine    Magnesium Standard Dose Replacement - Follow Nurse / BPA Driven Protocol    nitroglycerin    Phosphorus Replacement - Follow Nurse / BPA Driven Protocol    Potassium Replacement - Follow Nurse / BPA Driven Protocol    promethazine **OR** promethazine    sodium chloride    sodium chloride    sodium chloride   Infusions         Diagnostic Data    Results from last 7 days   Lab Units 01/23/24  1136 01/23/24  0523 01/22/24  2043   WBC 10*3/mm3  --   --  5.90   HEMOGLOBIN g/dL 7.7* 7.4* 8.1*   HEMATOCRIT % 24.0* 22.8* 24.8*   PLATELETS 10*3/mm3  --   --  201   GLUCOSE mg/dL  --  106*  --    CREATININE mg/dL  --  1.20*  --    BUN mg/dL  --  12  --    SODIUM mmol/L  --  142  --    POTASSIUM mmol/L  --  3.3*  --    AST (SGOT) U/L  --  21  --    ALT (SGPT) U/L  --  10  --    ALK PHOS U/L  --  67  --    BILIRUBIN mg/dL  --  0.8  --    ANION GAP mmol/L  --  8.0  --        XR Chest 1 View    Result Date: 1/22/2024  Impression: 1. No acute process. 2. Stable cardiomegaly. Electronically Signed: Sarath Simpson MD  1/22/2024 7:43 PM EST  Workstation ID: [id number]    CT Abdomen Pelvis Without Contrast    Result Date: 1/22/2024  No acute pathology is demonstrated. Electronically Signed: Missael Heck MD  1/22/2024 7:20 PM EST  Workstation ID: [id number]       I reviewed the patient's new clinical results.    Assessment/Plan:     Active and Resolved Problems  Active Hospital Problems    Diagnosis  POA    **GI bleed [K92.2]  Yes    Chronic diastolic CHF (congestive heart failure) [I50.32]  Yes    Essential hypertension [I10]  Yes     Acquired hypothyroidism [E03.9]  Yes    Coronary artery disease involving native coronary artery of native heart without angina pectoris [I25.10]  Yes      Resolved Hospital Problems   No resolved problems to display.       Acute lower GI bleed  -Probably rectal bleed, with concern for hemorrhoids versus diverticular bleed as patient does have a history of diverticulosis  -Patient also has previous history of colon resection secondary to diverticular hemorrhage  -Holding Eliquis  -GI consult  -Patient had recent colonoscopy in 2022 which shows diverticulosis and polyps that were resected  -Continue PPI    Acute blood loss anemia  -Secondary to GI bleed  -H&H close to transfusion threshold; transfuse for hemoglobin less than 7    Acute on chronic diastolic heart failure (POA)  -Patient was volume overloaded on presentation  -Her dry weight is around 159 pounds per the patient although I anticipate it is somewhat less than that  -Patient has had significant urine output and weight loss with diuresis here  -Likely transition to oral diuretics in the next 24 to 48 hours    Chronic atrial fibrillation  -Currently rate controlled on Toprol-XL  -Holding Eliquis in the setting of GI bleed  -Patient has elevated [id number] score 6, indicating a annual stroke risk rate of almost 10%, however she also has a HAS-BLED score of 4, indicating an associated bleeding risk of 8.7 %/year  -Defer to GI regarding restarting anticoagulation    Hypertension  -Continue home BP medications with Imdur, Toprol-XL    Hypothyroidism  -Continue Synthroid    CAD  -Continue GDMT, although patient is not on aspirin therapy due to previous hemorrhaging in the setting of aspirin use    Dyslipidemia  -Continue statin therapy    DVT prophylaxis:  Medical and mechanical DVT prophylaxis orders are present.         Code status is   Code Status and Medical Interventions:   Ordered at: 01/22/24 1912     Level Of Support Discussed With:    Patient     Code  Status (Patient has no pulse and is not breathing):    CPR (Attempt to Resuscitate)     Medical Interventions (Patient has pulse or is breathing):    Full Support       Plan for disposition: Pending clinical course    Time: 30 minutes    Signature: Electronically signed by Joe Sahu MD, 01/23/24, 13:16 EST.  [facility]

## 2024-01-23 NOTE — PLAN OF CARE
Goal Outcome Evaluation: Pt A&Ox4. Edema +4 in feet. Pt able to ambulate to chair and back to bed and tolerated well. Pain treated per MAR. Call light in reach and care plan ongoing.

## 2024-01-23 NOTE — DISCHARGE PLACEMENT REQUEST
"Oseas Alejandra (83 y.o. Female)       Date of Birth   1940    Social Security Number       Address   [address]  [place] [facility]    Home Phone   [phone]    MRN   [id number]       Hoahaoism   Hindu    Marital Status   Single                            Admission Date   1/22/24    Admission Type   Emergency    Admitting Provider   Mecca Page MD    Attending Provider   Joe Sahu MD    Department, Room/Bed   [facility] SURGICAL INPATIENT, 4109/1       Discharge Date       Discharge Disposition       Discharge Destination                                 Attending Provider: Joe Sahu MD    Allergies: Aspirin, Nsaids    Isolation: None   Infection: None   Code Status: CPR    Ht: 154.9 cm (61\")   Wt: 73.7 kg (162 lb 6.4 oz)    Admission Cmt: None   Principal Problem: GI bleed [K92.2]                   Active Insurance as of 1/22/2024       Primary Coverage       Payor Plan Insurance Group Employer/Plan Group    MEDICARE MEDICARE A & B        Payor Plan Address Payor Plan Phone Number Payor Plan Fax Number Effective Dates    [address] [phone]  2/1/2004 - None Entered    [facility] [id number]         Subscriber Name Subscriber Birth Date Member ID       OSEAS ALEJANDRA 1940 [id number]               Secondary Coverage       Payor Plan Insurance [place]er/[place] [id number]       Payor Plan Address Payor Plan Phone Number Payor Plan Fax Number Effective Dates    [address]   1/9/1993 - None Entered    [facility] [id number]         Subscriber Name Subscriber Birth Date Member ID       OSEAS ALEJANDRA 1940 [id number]               Tertiary Coverage       Payor Plan Insurance Group Employer/Plan Group    [place]        Payor Plan Address Payor Plan Phone Number Payor Plan Fax Number Effective Dates    [address]   11/18/2021 - None Entered    [place] [address]         Subscriber " Name Subscriber Birth Date Member ID       OSEAS ALEJANDRA PALAK 1940 [id number]                     Emergency Contacts        (Rel.) Home Phone Work Phone Mobile Phone    Ramandeep PROCTOR (Daughter) [phone] -- [phone]    JOVANNA PROCTOR (Grandchild) -- -- [phone]    Akiko Alejandra (Other) -- -- [phone]    HUGO ALEJANDRA (Son) -- -- [phone]

## 2024-01-23 NOTE — PLAN OF CARE
Goal Outcome Evaluation:              Outcome Evaluation: Pt admitted for GI bleed, has had no c/o pain, vss, call light in reach, plan of care ongoing

## 2024-01-23 NOTE — H&P
[facility] Medicine Services  History & Physical        Patient Name: Kendy Worrell  : 1940  MRN: [id number]  Primary Care Physician:  Marisela Monte APRN  Date of admission: 2024  Date of Service: 2024       ATTENDING PHYSICIAN    Viet Miller DO        CHIEF COMPLAINT    GI bleed    HPI    Kendy Worrell is a 83 y.o. female with a history of CAD, paroxysmal atrial fibrillation on Eliquis, congestive heart failure diastolic, and hypertension who presents to the emergency room with complaints of bright red blood per rectum.  On examination this is most likely from her hemorrhoids.  Patient also complains of bilateral lower extremity swelling with weeping of her legs that has gotten worse.  She is on Bumex 1 mg oral daily.  Monitor changes in hemoglobin.  Hemoglobin stable at the present time.  Hold Eliquis.  Patient denies fever, chills, cough, nausea, vomiting, chest pain, shortness of breath, palpitations, and abdominal pain.  States that she does get short of breath upon walking a few blocks distance.        PAST MEDICAL HISTORY    Past Medical History:   Diagnosis Date    A-fib     Arthritis     Breast cancer     CHF (congestive heart failure)     CVA (cerebral vascular accident) 2019    History of pulmonary embolus (PE)     Hyperlipidemia     Hypertension     Hyperthyroidism     patient has hypothyroidism    Hypothyroidism        SURGICAL HISTORY    Past Surgical History:   Procedure Laterality Date    BREAST BIOPSY      BREAST SURGERY Right     CHOLECYSTECTOMY      COLON SURGERY      Removed    COLONOSCOPY      COLONOSCOPY N/A 11/3/2022    Procedure: COLONOSCOPY to anastamosis with biopsies and cold snare polypectomy;  Surgeon: Saroj Oakes MD;  Location: [facility] ENDOSCOPY;  Service: Gastroenterology;  Laterality: N/A;  PRe: rectal bleeding  Post: hemorrhoids, diverticulosis, anastamotic ulcer, polyp, hemostasis clip free-floating    HYSTERECTOMY      MAMMO  "BILATERAL  2015    MASTECTOMY      TONSILLECTOMY         FAMILY HISTORY    Family History   Problem Relation Age of Onset    Stroke Mother     Hypertension Mother     Heart disease Father     Cancer Father     Colon polyps Father        SOCIAL HISTORY    Social History     Socioeconomic History    Marital status: Single   Tobacco Use    Smoking status: Former    Smokeless tobacco: Never    Tobacco comments:     quit in 1988   Vaping Use    Vaping Use: Never used   Substance and Sexual Activity    Alcohol use: Yes     Alcohol/week: 3.0 standard drinks of alcohol     Types: 3 Shots of liquor per week     Comment: OCC    Drug use: Yes     Types: Hydrocodone    Sexual activity: Never       CURRENT MEDICATIONS    No outpatient medications have been marked as taking for the 1/22/24 encounter (Hospital Encounter).     (Not in a hospital admission)      ALLERGIES    Allergies   Allergen Reactions    Aspirin      bleeding    Nsaids      bleeding       REVIEW OF SYSTEMS    All systems have been reviewed and reported as negative unless otherwise stated in the interval history.     PHYSICAL EXAM    /54   Pulse 78   Temp 97.8 °F (36.6 °C) (Oral)   Resp 16   Ht 154.9 cm (61\")   Wt 79.4 kg (175 lb)   SpO2 95%   BMI 33.07 kg/m²       Physical Exam  General: Not in any acute distress  HEENT: Normocephalic, atraumatic  Neck: Supple, No JVD  CV: Regular rate and rhythm, no murmurs/rubs/or gallops  Lungs: Clear to auscultation bilaterally, no rales/rhonchi/wheezes  Abdomen: Soft, non-distended, non-tender, bowel sounds present  Rectal: Bright red blood in rectal vault  EXT: 2 edema of both legs   Neuro: Cranial nerves II through XII intact  Skin: Intact, no rashes, no lesions, no erythema      Result Review:  I have personally reviewed the results from the time of this admission to 1/22/2024 19:13 EST and agree with these findings:  [x]  Laboratory  []  Microbiology  [x]  Radiology  []  EKG/Telemetry   []  " Cardiology/Vascular   []  Pathology  []  Old records  []  Other:      Assessment/Plan:     GI bleed  Most likely rectal bleed  Bright red blood per rectum on physical exam  Hold Eliquis  Monitor change in hemoglobin hematocrit every 6 hours for 2 days  Iron profile  Ferritin  Protonix IV push twice daily  Consider GI consult    Diastolic congestive heart failure  Not in exacerbation  Continue Bumex  Monitor changes in creatinine  Chronic and stable  Echocardiogram  Consider cardiology consult    Paroxysmal atrial fibrillation  Hold Eliquis    Venous thromboembolism prophylaxis: SCDs  Code: Full        Admission Status:  I believe this patient meets Inpatient status.    Expected Length of Stay: 2 midnights     PDMP and Medication Dispenses via Sidebar reviewed and consistent with patient reported medications.    I discussed the patient's findings and my recommendations with the patient.      Mecca Page MD    1/22/2024    19:13 EST

## 2024-01-23 NOTE — THERAPY EVALUATION
Patient Name: Kendy Worrell  : 1940    MRN: [id number]                              Today's Date: 2024       Admit Date: 2024    Visit Dx:     ICD-10-CM ICD-9-CM   1. Internal hemorrhoid, bleeding  K64.8 455.2   2. Anemia, unspecified type  D64.9 285.9     Patient Active Problem List   Diagnosis    Essential hypertension    Acquired hypothyroidism    Coronary artery disease involving native coronary artery of native heart without angina pectoris    Hyperlipidemia    Arthritis    Skin lesion of chest wall    NSTEMI (non-ST elevated myocardial infarction)    Supraventricular tachycardia    Normal cardiac stress test    Gastrointestinal bleed    Angina effort    Morbidly obese    Bilateral pulmonary embolism    New onset a-fib    UTI (urinary tract infection)    Chronic diastolic CHF (congestive heart failure)    Acute ischemic stroke    Chest pain    GI bleed    Closed fibular fracture    Anemia    Hyponatremia     Past Medical History:   Diagnosis Date    A-fib     Arthritis     Breast cancer     CHF (congestive heart failure)     CVA (cerebral vascular accident) 2019    History of pulmonary embolus (PE)     Hyperlipidemia     Hypertension     Hyperthyroidism     patient has hypothyroidism    Hypothyroidism      Past Surgical History:   Procedure Laterality Date    BREAST BIOPSY      BREAST SURGERY Right     CHOLECYSTECTOMY      COLON SURGERY      Removed    COLONOSCOPY      COLONOSCOPY N/A 11/3/2022    Procedure: COLONOSCOPY to anastamosis with biopsies and cold snare polypectomy;  Surgeon: Saroj Oakes MD;  Location: [facility] ENDOSCOPY;  Service: Gastroenterology;  Laterality: N/A;  PRe: rectal bleeding  Post: hemorrhoids, diverticulosis, anastamotic ulcer, polyp, hemostasis clip free-floating    HYSTERECTOMY      MAMMO BILATERAL  2015    MASTECTOMY      TONSILLECTOMY        General Information       Row Name 24 1453          OT Time and Intention    Document Type evaluation  -DT      Mode of Treatment occupational therapy  -DT       Row Name 01/23/24 1458          General Information    Patient Profile Reviewed yes  -DT     Prior Level of Function all household mobility;community mobility;ADL's;independent:  amb with rollator. Staff assist pt with apt IADLs (dishes, cleaning, making bed, etc) & helps her in shower at times.  -DT     Existing Precautions/Restrictions fall  -DT     Barriers to Rehab none identified  -DT       Row Name 01/23/24 1453          Living Environment    People in Home other (see comments)  lives in assisted living facility and has been recieving PT in home.  -DT       Row Name 01/23/24 1453          Cognition    Orientation Status (Cognition) oriented x 4  -DT       Row Name 01/23/24 1453          Safety Issues, Functional Mobility    Impairments Affecting Function (Mobility) balance;endurance/activity tolerance;strength  -DT               User Key  (r) = Recorded By, (t) = Taken By, (c) = Cosigned By      Initials Name Provider Type    DT Maya Lobato, OT Occupational Therapist                     Mobility/ADL's       Row Name 01/23/24 1451          Bed Mobility    Bed Mobility supine-sit  -DT     Supine-Sit [place] (Bed Mobility) contact guard;1 person assist  -DT     Assistive Device (Bed Mobility) head of bed elevated  -DT       Row Name 01/23/24 1455          Transfers    Transfers toilet transfer  -DT       Row Name 01/23/24 1455          Sit-Stand Transfer    Sit-Stand [place] (Transfers) contact guard;1 person assist  -DT     Assistive Device (Sit-Stand Transfers) walker, front-wheeled  -DT       Row Name 01/23/24 1455          Toilet Transfer    [place] Level (Toilet Transfer) contact guard  -DT     Assistive Device (Toilet Transfer) grab bars/safety frame;walker, front-wheeled  -DT       Row Name 01/23/24 1455          Functional Mobility    Functional Mobility- Ind. Level contact guard assist  -DT     Functional Mobility- Device  walker, front-wheeled  -DT     Patient was able to Ambulate yes  -DT       Row Name 01/23/24 1457          Activities of Daily Living    BADL Assessment/Intervention lower body dressing;toileting;grooming  -DT       Row Name 01/23/24 1459          Lower Body Dressing Assessment/Training    [place] Level (Lower Body Dressing) lower body dressing skills;minimum assist (75% patient effort);socks  due to inc edema in feet.  -DT     Position (Lower Body Dressing) edge of bed sitting  -DT       Row Name 01/23/24 1450          Toileting Assessment/Training    [place] Level (Toileting) toileting skills;adjust/manage clothing;change pad/brief;perform perineal hygiene;standby assist;contact guard assist  -DT     Position (Toileting) supported sitting;supported standing  -DT       Row Name 01/23/24 6662          Grooming Assessment/Training    [place] Level (Grooming) grooming skills;wash face, hands;standby assist  -DT     Position (Grooming) sink side;supported standing  -DT               User Key  (r) = Recorded By, (t) = Taken By, (c) = Cosigned By      Initials Name Provider Type    DT Maya Lobato, OT Occupational Therapist                   Obj/Interventions       Row Name 01/23/24 1455          Range of Motion Comprehensive    General Range of Motion bilateral upper extremity ROM WNL  -DT       Row Name 01/23/24 3148          Strength Comprehensive (MMT)    General Manual Muscle Testing (MMT) Assessment upper extremity strength deficits identified  -DT     Comment, General Manual Muscle Testing (MMT) Assessment BUE = 4/5  -DT       Row Name 01/23/24 1452          Balance    Balance Assessment sitting static balance;sitting dynamic balance;standing static balance;standing dynamic balance  -DT     Static Sitting Balance independent  -DT     Dynamic Sitting Balance independent  -DT     Position, Sitting Balance sitting edge of bed  -DT     Static Standing Balance contact guard  -DT     Dynamic  Standing Balance contact guard  -DT     Position/Device Used, Standing Balance walker, front-wheeled  -DT               User Key  (r) = Recorded By, (t) = Taken By, (c) = Cosigned By      Initials Name Provider Type    Maya Perkins, OT Occupational Therapist                   Goals/Plan       Row Name 01/23/24 1503          Transfer Goal 1 (OT)    Activity/Assistive Device (Transfer Goal 1, OT) transfers, all;bed-to-chair/chair-to-bed;toilet  -DT     [place] Level/Cues Needed (Transfer Goal 1, OT) modified independence  -DT     Time Frame (Transfer Goal 1, OT) 2 weeks  -DT       Row Name 01/23/24 1503          Bathing Goal 1 (OT)    Activity/Device (Bathing Goal 1, OT) bathing skills, all  -DT     [place] Level/Cues Needed (Bathing Goal 1, OT) modified independence  -DT     Time Frame (Bathing Goal 1, OT) 2 weeks  -DT       Row Name 01/23/24 1503          Dressing Goal 1 (OT)    Activity/Device (Dressing Goal 1, OT) dressing skills, all  -DT     [place]/Cues Needed (Dressing Goal 1, OT) modified independence  -DT     Time Frame (Dressing Goal 1, OT) 2 weeks  -DT       Row Name 01/23/24 1503          Toileting Goal 1 (OT)    Activity/Device (Toileting Goal 1, OT) toileting skills, all  -DT     [place] Level/Cues Needed (Toileting Goal 1, OT) modified independence  -DT     Time Frame (Toileting Goal 1, OT) 2 weeks  -DT       Row Name 01/23/24 1503          Therapy Assessment/Plan (OT)    Planned Therapy Interventions (OT) activity tolerance training;adaptive equipment training;BADL retraining;functional balance retraining;neuromuscular control/coordination retraining;IADL retraining;occupation/activity based interventions;patient/caregiver education/training;ROM/therapeutic exercise;strengthening exercise;transfer/mobility retraining  -DT               User Key  (r) = Recorded By, (t) = Taken By, (c) = Cosigned By      Initials Name Provider Type    Maya Perkins, OT  Occupational Therapist                   Clinical Impression       Row Name 01/23/24 1458          Pain Assessment    Pretreatment Pain Rating 0/10 - no pain  -DT     Posttreatment Pain Rating 0/10 - no pain  -DT       Row Name 01/23/24 1458          Plan of Care Review    Plan of Care Reviewed With patient  -DT     Outcome Evaluation Pt is an 83 y.o. female with a history of CAD, paroxysmal atrial fibrillation on Eliquis, congestive heart failure diastolic, and hypertension adm to [facility] on 01/22/24 with s/s of GI bleed &  complains of bilateral lower extremity swelling with weeping of bilateral legs. At baseline pt lives in [place] and is mod ind with mobility & showering using rollator & shower seat respectively. She is ind with dressing, bathing & toileting. She has staff assist for IADLs PRN. Upon eval, pt is A&O X4. She completes grooming, feeding independently, donning of socks with min A due to edema in feet, toileting & ADL transfers with SBA-CGA using RW. OT will continue to follow for tx as pt has generalized weakness & dec endurance. Recommend home with home health upon discharge.  -DT       Row Name 01/23/24 1458          Therapy Assessment/Plan (OT)    Rehab Potential (OT) good, to achieve stated therapy goals  -DT     Criteria for Skilled Therapeutic Interventions Met (OT) yes;skilled treatment is necessary;meets criteria  -DT     Therapy Frequency (OT) 3 times/wk  -DT     Predicted Duration of Therapy Intervention (OT) until d/c  -DT       Row Name 01/23/24 1458          Therapy Plan Review/Discharge Plan (OT)    Anticipated Discharge Disposition (OT) home with assist;home with home health  -DT       Row Name 01/23/24 1458          Positioning and Restraints    Pre-Treatment Position in bed  -DT     Post Treatment Position chair  -DT     In Chair notified nsg;sitting;call light within reach;encouraged to call for assist;exit alarm on  -DT               User Key  (r) = Recorded By, (t) = Taken By, (c) =  Cosigned By      Initials Name Provider Type    DT Maya Lobato, OT Occupational Therapist                   Outcome Measures       Row Name 01/23/24 1428 01/23/24 0825       How much help from another person do you currently need...    Turning from your back to your side while in flat bed without using bedrails? 3  -AM 3  -LB    Moving from lying on back to sitting on the side of a flat bed without bedrails? 3  -AM 3  -LB    Moving to and from a bed to a chair (including a wheelchair)? 3  -AM 3  -LB    Standing up from a chair using your arms (e.g., wheelchair, bedside chair)? 3  -AM 3  -LB    Climbing 3-5 steps with a railing? 2  -AM 3  -LB    To walk in hospital room? 3  -AM 3  -LB    AM-PAC 6 Clicks Score (PT) 17  -AM 18  -LB    Highest Level of Mobility Goal 5 --> Static standing  -AM 6 --> Walk 10 steps or more  -LB      Row Name 01/23/24 1428          Functional Assessment    Outcome Measure Options AM-PAC 6 Clicks Basic Mobility (PT)  -AM               User Key  (r) = Recorded By, (t) = Taken By, (c) = Cosigned By      Initials Name Provider Type    AM Issac Stapleton, PT Physical Therapist    LB Ari Allison, RN Registered Nurse                    Occupational Therapy Education       Title: PT OT SLP Therapies (In Progress)       Topic: Occupational Therapy (In Progress)       Point: ADL training (Done)       Description:   Instruct learner(s) on proper safety adaptation and remediation techniques during self care or transfers.   Instruct in proper use of assistive devices.                  Learning Progress Summary             Patient Acceptance, E,TB, VU by DT at 1/23/2024 1504    Comment: Role of OT,goals & POC, safety prec in hosp setting                         Point: Home exercise program (Not Started)       Description:   Instruct learner(s) on appropriate technique for monitoring, assisting and/or progressing therapeutic exercises/activities.                  Learner Progress:  Not  documented in this visit.              Point: Precautions (Done)       Description:   Instruct learner(s) on prescribed precautions during self-care and functional transfers.                  Learning Progress Summary             Patient Acceptance, E,TB, VU by DT at 1/23/2024 1504    Comment: Role of OT,goals & POC, safety prec in hosp setting                         Point: Body mechanics (Done)       Description:   Instruct learner(s) on proper positioning and spine alignment during self-care, functional mobility activities and/or exercises.                  Learning Progress Summary             Patient Acceptance, E,TB, VU by DT at 1/23/2024 1504    Comment: Role of OT,goals & POC, safety prec in hosp setting                                         User Key       Initials Effective Dates Name Provider Type Discipline    DT 07/11/23 -  Maya Lobato, OT Occupational Therapist OT                  OT Recommendation and Plan  Planned Therapy Interventions (OT): activity tolerance training, adaptive equipment training, BADL retraining, functional balance retraining, neuromuscular control/coordination retraining, IADL retraining, occupation/activity based interventions, patient/caregiver education/training, ROM/therapeutic exercise, strengthening exercise, transfer/mobility retraining  Therapy Frequency (OT): 3 times/wk  Plan of Care Review  Plan of Care Reviewed With: patient  Outcome Evaluation: Pt is an 83 y.o. female with a history of CAD, paroxysmal atrial fibrillation on Eliquis, congestive heart failure diastolic, and hypertension adm to [facility] on 01/22/24 with s/s of GI bleed &  complains of bilateral lower extremity swelling with weeping of bilateral legs. At baseline pt lives in assisted and is mod ind with mobility & showering using rollator & shower seat respectively. She is ind with dressing, bathing & toileting. She has staff assist for IADLs PRN. Upon eval, pt is A&O X4. She completes grooming, feeding  independently, donning of socks with min A due to edema in feet, toileting & ADL transfers with SBA-CGA using RW. OT will continue to follow for tx as pt has generalized weakness & dec endurance. Recommend home with home health upon discharge.     Time Calculation:         Time Calculation- OT       Row Name 01/23/24 1505             Time Calculation- OT    OT Start Time 0839  -DT      OT Stop Time 0908  -DT      OT Time Calculation (min) 29 min  -DT      OT Received On 01/23/24  -DT      OT - Next Appointment 01/25/24  -DT      OT Goal Re-Cert Due Date 02/06/24  -DT                User Key  (r) = Recorded By, (t) = Taken By, (c) = Cosigned By      Initials Name Provider Type    Maya Perkins, OT Occupational Therapist                           Maya Lobato, OT  1/23/2024

## 2024-01-23 NOTE — CASE MANAGEMENT/SOCIAL WORK
Discharge Planning Assessment   David     Patient Name: Kendy Worrell  MRN: [id number]  Today's Date: 1/23/2024    Admit Date: 1/22/2024    Plan: From [facility] and would like to return. Also current with [facility] for PT.   Discharge Needs Assessment       Row Name 01/23/24 1545       Living Environment    People in Home facility resident  From [facility]    Current Living Arrangements assisted living facility  From [facility]    Potentially Unsafe Housing Conditions none    In the past 12 months has the electric, gas, oil, or water company threatened to shut off services in your home? No    Primary Care Provided by self;other (see comments)  From [facility]    Provides Primary Care For no one    Family Caregiver if Needed child(tatyana), adult    Quality of Family Relationships supportive;involved    Able to Return to Prior Arrangements yes    Living Arrangement Comments From [facility]       Resource/Environmental Concerns    Resource/Environmental Concerns none    Transportation Concerns none       Transportation Needs    In the past 12 months, has lack of transportation kept you from medical appointments or from getting medications? no    In the past 12 months, has lack of transportation kept you from meetings, work, or from getting things needed for daily living? No       Food Insecurity    Within the past 12 months, you worried that your food would run out before you got the money to buy more. Never true    Within the past 12 months, the food you bought just didn't last and you didn't have money to get more. Never true       Transition Planning    Patient/Family Anticipates Transition to other (see comments)  return  [facility]    Patient/Family Anticipated Services at Transition other (see comments)  return to [facility] with  PT    Transportation Anticipated family  or friend will provide       Discharge Needs Assessment    Readmission Within the Last 30 Days no previous admission in last 30 days    Current Outpatient/Agency/Support Group assisted living facility;homecare agency    Equipment Currently Used at Home rollator;shower chair;grab bar    Concerns to be Addressed discharge planning;care coordination/care conferences    Anticipated Changes Related to Illness none    Equipment Needed After Discharge none    Provided Post Acute Provider List? N/A    N/A Provider List Comment Plans to return to [facility] with [facility]                   Discharge Plan       Row Name 01/23/24 2782       Plan    Plan From [facility] and would like to return. Also current with [facility] for PT.    Patient/Family in Agreement with Plan yes    Plan Comments Patient lives at [facility] and would like to return.  Patient reports she has [facility] . . Patient no longer drive, family will transport at discharge. Patient performs ADL. PCP and pharmacy confirmed. Denies financial assistance needs for medication and/or food                  Continued Care and Services - Admitted Since 1/22/2024       Destination       Service Provider Request Status Selected Services Address Phone Fax Patient Preferred    [facility] Pending - Request Sent N/A [id number] [place] [address] [phone] --              Home Medical Care       Service Provider Request Status Selected Services Address Phone Fax Patient Preferred    [facility] [place] Pending - Request Sent N/A [address] [place] [address] [phone] --                  Expected Discharge Date and Time       Expected Discharge Date Expected Discharge Time    Jan 25, 2024            Demographic Summary       Row Name 01/23/24 7819       General Information     Admission Type inpatient    Arrived From emergency department    Referral Source admission list    Reason for Consult discharge planning    Preferred Language English       Contact Information    Permission Granted to Share Info With                    Functional Status       Row Name 01/23/24 1545       Functional Status    Usual Activity Tolerance moderate    Current Activity Tolerance moderate       Functional Status, IADL    Medications assistive person    Meal Preparation assistive person    Housekeeping assistive person    Laundry assistive person    Shopping assistive person    IADL Comments From [facility]       Mental Status    General Appearance WDL WDL       Mental Status Summary    Recent Changes in Mental Status/Cognitive Functioning no changes                    Dalia Valencia, RN

## 2024-01-23 NOTE — THERAPY EVALUATION
Patient Name: Kendy Worrell  : 1940    MRN: [id number]                              Today's Date: 2024       Admit Date: 2024    Visit Dx:     ICD-10-CM ICD-9-CM   1. Internal hemorrhoid, bleeding  K64.8 455.2   2. Anemia, unspecified type  D64.9 285.9     Patient Active Problem List   Diagnosis    Essential hypertension    Acquired hypothyroidism    Coronary artery disease involving native coronary artery of native heart without angina pectoris    Hyperlipidemia    Arthritis    Skin lesion of chest wall    NSTEMI (non-ST elevated myocardial infarction)    Supraventricular tachycardia    Normal cardiac stress test    Gastrointestinal bleed    Angina effort    Morbidly obese    Bilateral pulmonary embolism    New onset a-fib    UTI (urinary tract infection)    Chronic diastolic CHF (congestive heart failure)    Acute ischemic stroke    Chest pain    GI bleed    Closed fibular fracture    Anemia    Hyponatremia     Past Medical History:   Diagnosis Date    A-fib     Arthritis     Breast cancer     CHF (congestive heart failure)     CVA (cerebral vascular accident) 2019    History of pulmonary embolus (PE)     Hyperlipidemia     Hypertension     Hyperthyroidism     patient has hypothyroidism    Hypothyroidism      Past Surgical History:   Procedure Laterality Date    BREAST BIOPSY      BREAST SURGERY Right     CHOLECYSTECTOMY      COLON SURGERY      Removed    COLONOSCOPY      COLONOSCOPY N/A 11/3/2022    Procedure: COLONOSCOPY to anastamosis with biopsies and cold snare polypectomy;  Surgeon: Saroj Oakes MD;  Location: [facility] ENDOSCOPY;  Service: Gastroenterology;  Laterality: N/A;  PRe: rectal bleeding  Post: hemorrhoids, diverticulosis, anastamotic ulcer, polyp, hemostasis clip free-floating    HYSTERECTOMY      MAMMO BILATERAL  2015    MASTECTOMY      TONSILLECTOMY        General Information       Row Name 24 1421          Physical Therapy Time and Intention    Document Type  evaluation  -AM     Mode of Treatment physical therapy  -AM       Row Name 01/23/24 1421          General Information    Patient Profile Reviewed yes  -AM     Prior Level of Function independent:;community mobility;gait;transfer;bed mobility  ambulates with rollator  -AM     Existing Precautions/Restrictions fall  -AM     Barriers to Rehab none identified  -AM       Row Name 01/23/24 1421          Living Environment    People in Home --  assisted living resident  -AM       Row Name 01/23/24 1421          Home Main Entrance    Stair Railings, Main Entrance none  -AM       Row Name 01/23/24 1421          Stairs Within Home, Primary    Number of Stairs, Within Home, Primary none  -AM       Row Name 01/23/24 1421          Cognition    Orientation Status (Cognition) oriented x 4  -AM       Row Name 01/23/24 1421          Safety Issues, Functional Mobility    Impairments Affecting Function (Mobility) balance;endurance/activity tolerance;strength  -AM     Comment, Safety Issues/Impairments (Mobility) gait belt utilized  -AM               User Key  (r) = Recorded By, (t) = Taken By, (c) = Cosigned By      Initials Name Provider Type    AM Issac Stapleton, PT Physical Therapist                   Mobility       Row Name 01/23/24 1423          Bed Mobility    Bed Mobility supine-sit  -AM     Supine-Sit [place] (Bed Mobility) contact guard;1 person assist  -AM     Assistive Device (Bed Mobility) head of bed elevated  -AM     Comment, (Bed Mobility) with increased time and effort  -AM       Row Name 01/23/24 1423          Sit-Stand Transfer    Sit-Stand [place] (Transfers) contact guard;1 person assist  -AM     Assistive Device (Sit-Stand Transfers) walker, front-wheeled  -AM     Comment, (Sit-Stand Transfer) cues for hand placement  -AM       Row Name 01/23/24 1423          Gait/Stairs (Locomotion)    [place] Level (Gait) contact guard;1 person assist  -AM     Assistive Device (Gait) walker, front-wheeled  -AM      Distance in Feet (Gait) 40' x 2  -AM     Deviations/Abnormal Patterns (Gait) base of support, wide;gait speed decreased  -AM     Bilateral Gait Deviations forward flexed posture  -AM               User Key  (r) = Recorded By, (t) = Taken By, (c) = Cosigned By      Initials Name Provider Type    AM Issac Stapleton, PT Physical Therapist                   Obj/Interventions       Row Name 01/23/24 1424          Range of Motion Comprehensive    General Range of Motion bilateral lower extremity ROM WFL  -AM     Comment, General Range of Motion Defer BUE ROM to OT  -AM       Row Name 01/23/24 1424          Strength Comprehensive (MMT)    Comment, General Manual Muscle Testing (MMT) Assessment Defer BUE MMT to OT.  BLEs: 4/5  -AM       Row Name 01/23/24 1424          Motor Skills    Motor Skills functional endurance  -AM     Functional Endurance fair  -AM       Row Name 01/23/24 1424          Balance    Balance Assessment sitting static balance;sitting dynamic balance;sit to stand dynamic balance;standing static balance;standing dynamic balance  -AM     Static Sitting Balance independent  -AM     Dynamic Sitting Balance independent  -AM     Position, Sitting Balance unsupported;sitting edge of bed  -AM     Sit to Stand Dynamic Balance contact guard  -AM     Static Standing Balance contact guard  -AM     Dynamic Standing Balance contact guard  -AM     Position/Device Used, Standing Balance supported;walker, front-wheeled  -AM       Row Name 01/23/24 1424          Sensory Assessment (Somatosensory)    Sensory Assessment (Somatosensory) sensation intact  -AM               User Key  (r) = Recorded By, (t) = Taken By, (c) = Cosigned By      Initials Name Provider Type    AM Issac Stapleton, PT Physical Therapist                   Goals/Plan       Row Name 01/23/24 1428          Bed Mobility Goal 1 (PT)    Activity/Assistive Device (Bed Mobility Goal 1, PT) bed mobility activities, all  -AM     [place] Level/Cues Needed (Bed  Mobility Goal 1, PT) modified independence  -AM     Time Frame (Bed Mobility Goal 1, PT) long term goal (LTG)  -AM       Row Name 01/23/24 1428          Transfer Goal 1 (PT)    Activity/Assistive Device (Transfer Goal 1, PT) transfers, all;walker, rolling  -AM     [place] Level/Cues Needed (Transfer Goal 1, PT) modified independence  -AM     Time Frame (Transfer Goal 1, PT) long term goal (LTG)  -AM       Row Name 01/23/24 1428          Gait Training Goal 1 (PT)    Activity/Assistive Device (Gait Training Goal 1, PT) gait (walking locomotion);walker, rolling  -AM     [place] Level (Gait Training Goal 1, PT) modified independence  -AM     Distance (Gait Training Goal 1, PT) 150'  -AM     Time Frame (Gait Training Goal 1, PT) long term goal (LTG)  -AM       Row Name 01/23/24 1428          Therapy Assessment/Plan (PT)    Planned Therapy Interventions (PT) balance training;bed mobility training;gait training;strengthening;patient/family education;transfer training  -AM               User Key  (r) = Recorded By, (t) = Taken By, (c) = Cosigned By      Initials Name Provider Type    AM Issac Stapleton, PT Physical Therapist                   Clinical Impression       Row Name 01/23/24 1425          Pain    Pretreatment Pain Rating 0/10 - no pain  -AM     Posttreatment Pain Rating 0/10 - no pain  -AM       Row Name 01/23/24 1425          Plan of Care Review    Plan of Care Reviewed With patient  -AM     Outcome Evaluation Pt is a 84 y/o female wtih c/o bright red blood per rectum, BLE edema wtih progressively worsening weeping, and SOB with activity.  Dx:  GI bleed.   CT abdomen/pelvis: (-) acute  Chest X-ray: (-) acute.  Pt reports she lives in an assisted living facility and ambulated with a rollator prior to hospitalization.  Pt on room air, telemetry and purewick with signficant edema present BLEs.  Pt required CGA for bed moblity with increased time and effort.  Sit to stand with RW: CGA.  Pt ambulated 40' x 2  with rollator with CGA with wide BALDEMAR.  Recommend HHPT.  -AM       Row Name 01/23/24 1425          Therapy Assessment/Plan (PT)    Patient/Family Therapy Goals Statement (PT) To go home  -AM     Rehab Potential (PT) good, to achieve stated therapy goals  -AM     Criteria for Skilled Interventions Met (PT) yes  -AM     Therapy Frequency (PT) 3 times/wk  -AM     Predicted Duration of Therapy Intervention (PT) until d/c  -AM       Row Name 01/23/24 1425          Vital Signs    O2 Delivery Pre Treatment room air  -AM     O2 Delivery Intra Treatment room air  -AM     O2 Delivery Post Treatment room air  -AM     Pre Patient Position Supine  -AM     Intra Patient Position Standing  -AM     Post Patient Position Sitting  -AM       Row Name 01/23/24 1425          Positioning and Restraints    Pre-Treatment Position in bed  -AM     Post Treatment Position chair  -AM     In Chair notified nsg;reclined;call light within reach;encouraged to call for assist;exit alarm on  -AM               User Key  (r) = Recorded By, (t) = Taken By, (c) = Cosigned By      Initials Name Provider Type    AM Issac Stapleton, PT Physical Therapist                   Outcome Measures       Row Name 01/23/24 1428 01/23/24 0825       How much help from another person do you currently need...    Turning from your back to your side while in flat bed without using bedrails? 3  -AM 3  -LB    Moving from lying on back to sitting on the side of a flat bed without bedrails? 3  -AM 3  -LB    Moving to and from a bed to a chair (including a wheelchair)? 3  -AM 3  -LB    Standing up from a chair using your arms (e.g., wheelchair, bedside chair)? 3  -AM 3  -LB    Climbing 3-5 steps with a railing? 2  -AM 3  -LB    To walk in hospital room? 3  -AM 3  -LB    AM-PAC 6 Clicks Score (PT) 17  -AM 18  -LB    Highest Level of Mobility Goal 5 --> Static standing  -AM 6 --> Walk 10 steps or more  -LB      Row Name 01/23/24 1428          Functional Assessment    Outcome  Measure Options AM-PAC 6 Clicks Basic Mobility (PT)  -AM               User Key  (r) = Recorded By, (t) = Taken By, (c) = Cosigned By      Initials Name Provider Type    AM Issac Stapleton PT Physical Therapist    Ari Hahn RN Registered Nurse                                 Physical Therapy Education       Title: PT OT SLP Therapies (Done)       Topic: Physical Therapy (Done)       Point: Mobility training (Done)       Learning Progress Summary             Patient Acceptance, E,TB, VU by AM at 1/23/2024 1429                         Point: Body mechanics (Done)       Learning Progress Summary             Patient Acceptance, E,TB, VU by AM at 1/23/2024 1429                         Point: Precautions (Done)       Learning Progress Summary             Patient Acceptance, E,TB, VU by AM at 1/23/2024 1429                                         User Key       Initials Effective Dates Name Provider Type Discipline    AM 05/10/21 -  Issac Stapleton PT Physical Therapist PT                  PT Recommendation and Plan  Planned Therapy Interventions (PT): balance training, bed mobility training, gait training, strengthening, patient/family education, transfer training  Plan of Care Reviewed With: patient  Outcome Evaluation: Pt is a 82 y/o female wtih c/o bright red blood per rectum, BLE edema wtih progressively worsening weeping, and SOB with activity.  Dx:  GI bleed.   CT abdomen/pelvis: (-) acute  Chest X-ray: (-) acute.  Pt reports she lives in an assisted living facility and ambulated with a rollator prior to hospitalization.  Pt on room air, telemetry and purewick with signficant edema present BLEs.  Pt required CGA for bed moblity with increased time and effort.  Sit to stand with RW: CGA.  Pt ambulated 40' x 2 with rollator with CGA with wide BALDEMAR.  Recommend HHPT.     Time Calculation:         PT Charges       Row Name 01/23/24 1432             Time Calculation    Start Time 0838  -AM      Stop Time 0908  -AM       Time Calculation (min) 30 min  -AM      PT Received On 01/23/24  -AM      PT - Next Appointment 01/25/24  -AM      PT Goal Re-Cert Due Date 02/06/24  -AM                User Key  (r) = Recorded By, (t) = Taken By, (c) = Cosigned By      Initials Name Provider Type    AM Issac Stapleton, PT Physical Therapist                  Therapy Charges for Today       Code Description Service Date Service Provider Modifiers Qty    69566987078 HC PT EVAL MOD COMPLEXITY 4 1/23/2024 Issac Stapleton, PT GP 1            PT G-Codes  Outcome Measure Options: AM-PAC 6 Clicks Basic Mobility (PT)  AM-PAC 6 Clicks Score (PT): 17  PT Discharge Summary  Anticipated Discharge Disposition (PT): home with home health    Issac Stapleton, PT  1/23/2024

## 2024-01-23 NOTE — PLAN OF CARE
Goal Outcome Evaluation:  Plan of Care Reviewed With: patient           Outcome Evaluation: Pt is a 84 y/o female wtih c/o bright red blood per rectum, BLE edema wtih progressively worsening weeping, and SOB with activity.  Dx:  GI bleed.   CT abdomen/pelvis: (-) acute  Chest X-ray: (-) acute.  Pt reports she lives in an assisted living facility and ambulated with a rollator prior to hospitalization.  Pt on room air, telemetry and purewick with signficant edema present BLEs.  Pt required CGA for bed moblity with increased time and effort.  Sit to stand with RW: CGA.  Pt ambulated 40' x 2 with rollator with CGA with wide BALDEMAR.  Recommend HHPT.      Anticipated Discharge Disposition (PT): home with home health

## 2024-01-24 ENCOUNTER — INPATIENT HOSPITAL (OUTPATIENT)
Dept: URBAN - METROPOLITAN AREA HOSPITAL 84 | Facility: HOSPITAL | Age: 84
End: 2024-01-24
Payer: MEDICAID

## 2024-01-24 DIAGNOSIS — I48.91 UNSPECIFIED ATRIAL FIBRILLATION: ICD-10-CM

## 2024-01-24 DIAGNOSIS — R60.0 LOCALIZED EDEMA: ICD-10-CM

## 2024-01-24 DIAGNOSIS — Z79.01 LONG TERM (CURRENT) USE OF ANTICOAGULANTS: ICD-10-CM

## 2024-01-24 DIAGNOSIS — D50.0 IRON DEFICIENCY ANEMIA SECONDARY TO BLOOD LOSS (CHRONIC): ICD-10-CM

## 2024-01-24 DIAGNOSIS — Z90.49 ACQUIRED ABSENCE OF OTHER SPECIFIED PARTS OF DIGESTIVE TRACT: ICD-10-CM

## 2024-01-24 DIAGNOSIS — E87.6 HYPOKALEMIA: ICD-10-CM

## 2024-01-24 DIAGNOSIS — K62.5 HEMORRHAGE OF ANUS AND RECTUM: ICD-10-CM

## 2024-01-24 LAB
ALBUMIN SERPL-MCNC: 3.1 G/DL (ref 3.5–5.2)
ALBUMIN/GLOB SERPL: 1.4 G/DL
ALP SERPL-CCNC: 58 U/L (ref 39–117)
ALT SERPL W P-5'-P-CCNC: 7 U/L (ref 1–33)
ANION GAP SERPL CALCULATED.3IONS-SCNC: 8 MMOL/L (ref 5–15)
AST SERPL-CCNC: 19 U/L (ref 1–32)
BILIRUB SERPL-MCNC: 0.6 MG/DL (ref 0–1.2)
BUN SERPL-MCNC: 10 MG/DL (ref 8–23)
BUN/CREAT SERPL: 8.4 (ref 7–25)
CALCIUM SPEC-SCNC: 8.9 MG/DL (ref 8.6–10.5)
CHLORIDE SERPL-SCNC: 101 MMOL/L (ref 98–107)
CO2 SERPL-SCNC: 31 MMOL/L (ref 22–29)
CREAT SERPL-MCNC: 1.19 MG/DL (ref 0.57–1)
EGFRCR SERPLBLD CKD-EPI 2021: 45.2 ML/MIN/1.73
GLOBULIN UR ELPH-MCNC: 2.2 GM/DL
GLUCOSE SERPL-MCNC: 80 MG/DL (ref 65–99)
HCT VFR BLD AUTO: 22.1 % (ref 34–46.6)
HCT VFR BLD AUTO: 24.6 % (ref 34–46.6)
HCT VFR BLD AUTO: 27.4 % (ref 34–46.6)
HGB BLD-MCNC: 7.1 G/DL (ref 12–15.9)
HGB BLD-MCNC: 8 G/DL (ref 12–15.9)
HGB BLD-MCNC: 9 G/DL (ref 12–15.9)
POTASSIUM SERPL-SCNC: 3.7 MMOL/L (ref 3.5–5.2)
PROT SERPL-MCNC: 5.3 G/DL (ref 6–8.5)
SODIUM SERPL-SCNC: 140 MMOL/L (ref 136–145)

## 2024-01-24 PROCEDURE — 25010000002 BUMETANIDE PER 0.5 MG: Performed by: INTERNAL MEDICINE

## 2024-01-24 PROCEDURE — 85018 HEMOGLOBIN: CPT | Performed by: INTERNAL MEDICINE

## 2024-01-24 PROCEDURE — 36415 COLL VENOUS BLD VENIPUNCTURE: CPT | Performed by: INTERNAL MEDICINE

## 2024-01-24 PROCEDURE — P9016 RBC LEUKOCYTES REDUCED: HCPCS

## 2024-01-24 PROCEDURE — 99221 1ST HOSP IP/OBS SF/LOW 40: CPT | Performed by: STUDENT IN AN ORGANIZED HEALTH CARE EDUCATION/TRAINING PROGRAM

## 2024-01-24 PROCEDURE — 85014 HEMATOCRIT: CPT | Performed by: INTERNAL MEDICINE

## 2024-01-24 PROCEDURE — 99232 SBSQ HOSP IP/OBS MODERATE 35: CPT | Performed by: NURSE PRACTITIONER

## 2024-01-24 PROCEDURE — 0DJD8ZZ INSPECTION OF LOWER INTESTINAL TRACT, VIA NATURAL OR ARTIFICIAL OPENING ENDOSCOPIC: ICD-10-PCS | Performed by: STUDENT IN AN ORGANIZED HEALTH CARE EDUCATION/TRAINING PROGRAM

## 2024-01-24 PROCEDURE — 36430 TRANSFUSION BLD/BLD COMPNT: CPT

## 2024-01-24 PROCEDURE — 80053 COMPREHEN METABOLIC PANEL: CPT | Performed by: INTERNAL MEDICINE

## 2024-01-24 PROCEDURE — 86900 BLOOD TYPING SEROLOGIC ABO: CPT

## 2024-01-24 PROCEDURE — 46500 INJECTION INTO HEMORRHOID(S): CPT | Performed by: STUDENT IN AN ORGANIZED HEALTH CARE EDUCATION/TRAINING PROGRAM

## 2024-01-24 RX ORDER — SODIUM TETRADECYL SULFATE 30 MG/ML
1 INJECTION, SOLUTION INTRAVENOUS ONCE
Status: COMPLETED | OUTPATIENT
Start: 2024-01-24 | End: 2024-01-24

## 2024-01-24 RX ADMIN — GABAPENTIN 300 MG: 300 CAPSULE ORAL at 09:23

## 2024-01-24 RX ADMIN — PSYLLIUM HUSK 1 PACKET: 3.4 POWDER ORAL at 15:19

## 2024-01-24 RX ADMIN — ISOSORBIDE MONONITRATE 30 MG: 30 TABLET, EXTENDED RELEASE ORAL at 09:23

## 2024-01-24 RX ADMIN — ACETAMINOPHEN 1000 MG: 500 TABLET ORAL at 09:23

## 2024-01-24 RX ADMIN — GABAPENTIN 300 MG: 300 CAPSULE ORAL at 20:21

## 2024-01-24 RX ADMIN — LEVOTHYROXINE SODIUM 88 MCG: 0.09 TABLET ORAL at 05:29

## 2024-01-24 RX ADMIN — METOPROLOL SUCCINATE 100 MG: 50 TABLET, EXTENDED RELEASE ORAL at 09:23

## 2024-01-24 RX ADMIN — METOPROLOL SUCCINATE 100 MG: 50 TABLET, EXTENDED RELEASE ORAL at 20:21

## 2024-01-24 RX ADMIN — GABAPENTIN 300 MG: 300 CAPSULE ORAL at 17:18

## 2024-01-24 RX ADMIN — ATORVASTATIN CALCIUM 10 MG: 10 TABLET, FILM COATED ORAL at 20:21

## 2024-01-24 RX ADMIN — BUMETANIDE 1 MG: 0.25 INJECTION INTRAMUSCULAR; INTRAVENOUS at 09:23

## 2024-01-24 RX ADMIN — Medication 10 ML: at 20:21

## 2024-01-24 RX ADMIN — ACETAMINOPHEN 1000 MG: 500 TABLET ORAL at 17:18

## 2024-01-24 RX ADMIN — SODIUM TETRADECYL SULFATE 1 ML: 30 INJECTION, SOLUTION INTRAVENOUS at 20:22

## 2024-01-24 RX ADMIN — Medication 10 ML: at 09:23

## 2024-01-24 RX ADMIN — PANTOPRAZOLE SODIUM 40 MG: 40 INJECTION, POWDER, FOR SOLUTION INTRAVENOUS at 20:22

## 2024-01-24 RX ADMIN — BUMETANIDE 1 MG: 0.25 INJECTION INTRAMUSCULAR; INTRAVENOUS at 18:24

## 2024-01-24 RX ADMIN — PANTOPRAZOLE SODIUM 40 MG: 40 INJECTION, POWDER, FOR SOLUTION INTRAVENOUS at 09:23

## 2024-01-24 NOTE — PROGRESS NOTES
" LOS: 2 days   Patient Care Team:  Marisela Monte APRN as PCP - General (Nurse Practitioner)  Tone Tubbs MD as Consulting Physician (Cardiology)      Subjective \" doing pretty good\"    Interval History:     Subjective: No rectal bleeding overnight.  No nausea or vomiting and denies abdominal pain.  She reports discussing options with colorectal surgeon this morning for further management of her hemorrhoids.    ROS:   No chest pain, shortness of breath, or cough.      Medication Review:     Current Facility-Administered Medications:     acetaminophen (TYLENOL) tablet 1,000 mg, 1,000 mg, Oral, Q8H, Mecca Page MD, 1,000 mg at 01/24/24 0923    albuterol (PROVENTIL) nebulizer solution 0.083% 2.5 mg/3mL, 2.5 mg, Nebulization, Q4H PRN, Mecca Page MD    aluminum-magnesium hydroxide-simethicone (MAALOX MAX) 400-400-40 MG/5ML suspension 15 mL, 15 mL, Oral, Q6H PRN, Mecca Page MD    [Held by provider] apixaban (ELIQUIS) tablet 5 mg, 5 mg, Oral, Q12H, Mecca Page MD    atorvastatin (LIPITOR) tablet 10 mg, 10 mg, Oral, Nightly, Mecca Page MD, 10 mg at 01/23/24 2117    sennosides-docusate (PERICOLACE) 8.6-50 MG per tablet 2 tablet, 2 tablet, Oral, BID **AND** polyethylene glycol (MIRALAX) packet 17 g, 17 g, Oral, Daily PRN **AND** bisacodyl (DULCOLAX) EC tablet 5 mg, 5 mg, Oral, Daily PRN **AND** bisacodyl (DULCOLAX) suppository 10 mg, 10 mg, Rectal, Daily PRN, Mecca Page MD    bumetanide (BUMEX) injection 1 mg, 1 mg, Intravenous, BID, Joe Sahu MD, 1 mg at 01/24/24 0923    Calcium Replacement - Follow Nurse / BPA Driven Protocol, , Does not apply, PRN, Mecca Page MD    cloNIDine (CATAPRES) tablet 0.1 mg, 0.1 mg, Oral, Q6H PRN, Mecca Page MD    ferrous sulfate EC tablet 324 mg, 324 mg, Oral, Daily With Breakfast, Mecca Page MD    gabapentin (NEURONTIN) capsule 300 mg, 300 mg, Oral, TID, Mecca Page MD, 300 " mg at 01/24/24 0923    isosorbide mononitrate (IMDUR) 24 hr tablet 30 mg, 30 mg, Oral, Daily, Mecca Page MD, 30 mg at 01/24/24 0923    levothyroxine (SYNTHROID, LEVOTHROID) tablet 88 mcg, 88 mcg, Oral, Q AM, Mecca Page MD, 88 mcg at 01/24/24 0529    Magnesium Standard Dose Replacement - Follow Nurse / BPA Driven Protocol, , Does not apply, PRN, Mecca Page MD    metoprolol succinate XL (TOPROL-XL) 24 hr tablet 100 mg, 100 mg, Oral, Q12H, Mecca Page MD, 100 mg at 01/24/24 0923    nitroglycerin (NITROSTAT) SL tablet 0.4 mg, 0.4 mg, Sublingual, Q5 Min PRN, Mecca Page MD    pantoprazole (PROTONIX) injection 40 mg, 40 mg, Intravenous, Q12H, Mecca Page MD, 40 mg at 01/24/24 0923    Phosphorus Replacement - Follow Nurse / BPA Driven Protocol, , Does not apply, Camilo GAN Garri, MD    Potassium Replacement - Follow Nurse / BPA Driven Protocol, , Does not apply, PRN, Mecca Page MD    promethazine (PHENERGAN) tablet 12.5 mg, 12.5 mg, Oral, Q6H PRN **OR** promethazine (PHENERGAN) suppository 12.5 mg, 12.5 mg, Rectal, Q6H PRN, Mecca Page MD    sodium chloride 0.9 % flush 10 mL, 10 mL, Intravenous, PRN, Kimberli Pedro, APRN, 10 mL at 01/22/24 2119    sodium chloride 0.9 % flush 10 mL, 10 mL, Intravenous, Q12H, Mecca Page MD, 10 mL at 01/24/24 0923    sodium chloride 0.9 % flush 10 mL, 10 mL, Intravenous, PRN, Mecca Page MD    sodium chloride 0.9 % infusion 40 mL, 40 mL, Intravenous, PRN, Mecca Page MD      Objective chronically ill-appearing but no acute distress, no family present    Vital Signs  Vitals:    01/23/24 2052 01/24/24 0923 01/24/24 0943 01/24/24 1007   BP:  163/95 171/84 151/88   BP Location:   Left arm    Patient Position:   Lying    Pulse:  74 74 70   Resp: 19  19 19   Temp: 97.7 °F (36.5 °C)  97.3 °F (36.3 °C) 97.3 °F (36.3 °C)   TempSrc: Oral  Oral Oral   SpO2:   98% 95%    Weight:       Height:           Physical Exam:     General Appearance:    Awake and alert, in no acute distress, overweight   Head:    Normocephalic, without obvious abnormality   Eyes:          Conjunctivae normal, anicteric sclera   Throat:   No oral lesions, no thrush, oral mucosa moist   Neck:   supple, no JVD   Lungs:     respirations regular, even and unlabored   Abdomen:     Soft, non-tender, nondistended   Rectal:     Deferred   Extremities:   no cyanosis   Skin:   No bruising or rash, no jaundice        Results Review:    CBC    Results from last 7 days   Lab Units 01/24/24  0910 01/24/24  0232 01/23/24  1859 01/23/24  1136 01/23/24  0523 01/22/24  2043 01/22/24  1425   WBC 10*3/mm3  --   --   --   --   --  5.90 5.30   HEMOGLOBIN g/dL 8.0* 7.1* 7.3* 7.7* 7.4* 8.1* 8.4*   PLATELETS 10*3/mm3  --   --   --   --   --  201 227     CMP   Results from last 7 days   Lab Units 01/24/24  0232 01/23/24  1517 01/23/24  0523 01/22/24  1710 01/22/24  1606   SODIUM mmol/L 140  --  142 141  --    POTASSIUM mmol/L 3.7 4.3 3.3* 4.0  --    CHLORIDE mmol/L 101  --  102 101  --    CO2 mmol/L 31.0*  --  32.0* 31.0*  --    BUN mg/dL 10  --  12 13  --    CREATININE mg/dL 1.19*  --  1.20* 1.48*  --    GLUCOSE mg/dL 80  --  106* 115*  --    ALBUMIN g/dL 3.1*  --  3.2* 3.5  --    BILIRUBIN mg/dL 0.6  --  0.8 0.7  --    ALK PHOS U/L 58  --  67 80  --    AST (SGOT) U/L 19  --  21 30  --    ALT (SGPT) U/L 7  --  10 10  --    MAGNESIUM mg/dL  --   --  1.6  --   --    LIPASE U/L  --   --   --   --  47     Cr Clearance Estimated Creatinine Clearance: 32.3 mL/min (A) (by C-G formula based on SCr of 1.19 mg/dL (H)).  Coag   Results from last 7 days   Lab Units 01/22/24  1425   INR  1.26*     HbA1C   Lab Results   Component Value Date    HGBA1C 5.10 11/03/2022    HGBA1C 4.80 09/05/2019         Infection     UA    Results from last 7 days   Lab Units 01/22/24 2047   NITRITE UA  Negative   WBC UA /HPF 0-2   BACTERIA UA /HPF None Seen   SQUAM  EPITHEL UA /HPF 0-2     Microbiology Results (last 10 days)       Procedure Component Value - Date/Time    COVID-19, FLU A/B, RSV PCR 1 HR TAT - Swab, Nasopharynx [[id number]]  (Normal) Collected: 01/22/24 1950    Lab Status: Final result Specimen: Swab from Nasopharynx Updated: 01/22/24 2028     COVID19 Not Detected     Influenza A PCR Not Detected     Influenza B PCR Not Detected     RSV, PCR Not Detected    Narrative:      Fact sheet for providers: https://www.fda.gov/media/784326/download    Fact sheet for patients: https://www.fda.gov/media/124509/download    Test performed by PCR.          Imaging Results (Last 72 Hours)       Procedure Component Value Units Date/Time    XR Chest 1 View [[id number]] Collected: 01/22/24 1941     Updated: 01/22/24 1945    Narrative:      XR CHEST 1 VW    Date of Exam: 1/22/2024 7:38 PM EST    Indication: short of breath    Comparison: 11/18/2021    Findings:  Stable cardiomegaly. Lungs are without consolidation. Negative for pneumothorax or pleural effusion. Surgical clips in the right chest related to prior mastectomy. No pleural effusion or pneumothorax.      Impression:      Impression:  1. No acute process.  2. Stable cardiomegaly.        Electronically Signed: Sarath Simpson MD    1/22/2024 7:43 PM EST    Workstation ID: [id number]    CT Abdomen Pelvis Without Contrast [[id number]] Collected: 01/22/24 1917     Updated: 01/22/24 1922    Narrative:      CT ABDOMEN PELVIS WO CONTRAST    Date of Exam: 1/22/2024 7:11 PM EST    Indication: abd pain rectal bleed.    Comparison: 11/1/2022    Technique: Axial CT images were obtained of the abdomen and pelvis without the administration of contrast. Sagittal and coronal reconstructions were performed.  Automated exposure control and iterative reconstruction methods were used.    FINDINGS:    Lung bases: No masses. No consolidation.    Liver:No masses. No intrahepatic biliary ductal dilatation.    Spleen: Calcified granulomata    Pancreas:No  pancreatic masses. No evidence of pancreatitis.    Gallbladder and common bile duct: Question prior cholecystectomy    Adrenal glands:No adrenal masses    Kidneys and ureters: Peripherally calcified left renal cyst. No calculi present within the ureters. Normal caliber ureters.    Urinary bladder:No urinary bladder wall thickening. No bladder masses.    Small bowel:Normal caliber small bowel.    Large bowel: Extensive colonic diverticulosis without evidence of diverticulitis.    Appendix: Not definitively identified however there is no evidence of appendicitis.    GENITOURINARY: Prior hysterectomy    Ascites or pneumoperitoneum:None.    Adenopathy:None present    Osseous structures: Degenerative changes of the hips. Degenerative changes of the lumbar spine.    Other findings: None      Impression:      No acute pathology is demonstrated.              Electronically Signed: Missael Heck MD    1/22/2024 7:20 PM EST    Workstation ID: [id number]            Assessment & Plan   Recurrent rectal bleeding -hemorrhoidal  Normocytic anemia with recurrent GI blood loss/iron deficiency  Lower extremity edema  Hypokalemia  History of right hemicolectomy secondary to hemorrhage  History of A-fib -on Eliquis  History of heart failure     PLAN:  Patient is a 83-year-old female with a history of right hemicolectomy, cholecystectomy, heart failure and A-fib on Eliquis.  She presents with increased lower extremity edema with weeping as well as shortness of breath with exertion and recurrent rectal bleeding.  She reports large amounts of rectal bleeding from her hemorrhoids over the last 2 days in the setting of Eliquis.  She has chronic diarrhea that is unchanged and denies abdominal pain.  Hemoglobin 7.4 from 8.1 yesterday.  Iron 29 with sat 7%.  CT without contrast without acute changes.  Last colonoscopy in November 2022 as above.     Rectal exam showed green stool with large prolapsing internal hemorrhoid with stigmata of recent  bleeding.  Appreciate colorectal surgeon evaluation with plan for injection of sclerosing agent at bedside without anesthesia to lessen her risk of complications.  Metamucil or Citrucel also recommended.  Hemoglobin 8.0 from 7.7 yesterday.  LFTs normal.  No more for GI to offer at this time.  We will see inpatient as needed, call with questions or concerns.    Electronically signed by MENDOZA Dowd, 01/24/24, 10:55 AM EST.

## 2024-01-24 NOTE — CONSULTS
Colorectal Surgery Consultation Note    ID:  Kendy Worrell;   : 1940  DATE OF VISIT: 2024    Chief Complaint  Rectal Bleeding       History of Present Illness  Kendy Worrell is a 83 y.o. female who I was asked to see in consultation by Marisela Thurman APRN for anorectal bleeding.    Patient states she has a bowel movement 3-4 per day. It is loose in consistency. Patient has bleeding with stools; she has no pain with bowel movements; she has no itching;  she states and has no protrusion of tissue while straining.    Patient does not take supplemental fiber.    She was admitted to the hospital for blood loss anemia. Her Hgb has been mid 7's. She is hemodynamically stable. She is on eliquis for afib.     Last colonoscopy: 2023 with no colonic source of bleeding.      Past Medical History  Past Medical History:   Diagnosis Date    A-fib     Arthritis     Breast cancer     CHF (congestive heart failure)     CVA (cerebral vascular accident) 2019    History of pulmonary embolus (PE)     Hyperlipidemia     Hypertension     Hyperthyroidism     patient has hypothyroidism    Hypothyroidism      Past Surgical History  Past Surgical History:   Procedure Laterality Date    BREAST BIOPSY      BREAST SURGERY Right     CHOLECYSTECTOMY      COLON SURGERY      Removed    COLONOSCOPY      COLONOSCOPY N/A 11/3/2022    Procedure: COLONOSCOPY to anastamosis with biopsies and cold snare polypectomy;  Surgeon: Saroj Oakes MD;  Location: [facility] ENDOSCOPY;  Service: Gastroenterology;  Laterality: N/A;  PRe: rectal bleeding  Post: hemorrhoids, diverticulosis, anastamotic ulcer, polyp, hemostasis clip free-floating    HYSTERECTOMY      MAMMO BILATERAL  2015    MASTECTOMY      TONSILLECTOMY       Family History  Family History   Problem Relation Age of Onset    Stroke Mother     Hypertension Mother     Heart disease Father     Cancer Father     Colon polyps Father      No colorectal cancer history in immediate  family members.  Social History  Social History     Tobacco Use    Smoking status: Former    Smokeless tobacco: Never    Tobacco comments:     quit in 1988   Vaping Use    Vaping Use: Never used   Substance Use Topics    Alcohol use: Yes     Alcohol/week: 3.0 standard drinks of alcohol     Types: 3 Shots of liquor per week     Comment: OCC    Drug use: Yes     Types: Hydrocodone     For further details please see Health History Questionnaire scanned in Epic.  Medication List  [unfilled]  Allergies  Allergies   Allergen Reactions    Aspirin      bleeding    Nsaids      bleeding     Review of Systems  All systems reviewed and are otherwise negative, pertinent positives noted in the HPI and Health History Questionnaire scanned in Epic.    Physical Exam  General:  No acute distress  Head: Normocephalic, atraumatic  Neuro: Alert and oriented    Abdomen:  Soft, non-tender, non-distended, no hernias, no hepatomegaly, no splenomegaly.       Assessment  -Symptomatic internal hemorrhoids  -Anorectal bleeding   -Pruritis ani   -Chronic Diarrhea     Plan / Recommendations    1. I have discussed with the patient regarding the various treatment options for bleeding internal hemorrhoids. Unfortunately she is no a good candidate for rubber band ligation or surgical excision given active anticoagulation and risk of bleeding.     The best treatment option for her is injection of sclerosing agent. This can be performed at bedside without any anesthesia and risk for bleeding post procedure. Other option include surgical ligation of internal hemorrhoids. Though its painless, she might require anesthesia to keep her comfortable in a prone position.       2.  The risks and benefits of sclerosing injection of the internal hemorrhoid were explained to patient. I will perform the injection later today.       3. In order to help with her bowel movements a high fiber diet is encouraged along with supplemental fiber in the form of Citrucel or  Metamucil or any of the other psyllium based products. I have recommended that she start by taking 2 teaspoons in a glass of water once per day for a week and to gradually work up to taking it twice per day. I explained that it is normal to have increased gas and bloating when first starting on fiber, but that this ought to get better after ~ 3 weeks to 3 months.     4. Clear for discharge after injection therapy. She will need additional injections, which will perform in clinic after discharge.       Rios Thomas MD  Colon and Rectal Surgery   [facility]

## 2024-01-24 NOTE — PROGRESS NOTES
[facility] MEDICINE SERVICE  DAILY PROGRESS NOTE    NAME: Kendy Worrell  : 1940  MRN: [id number]      LOS: 2 days     PROVIDER OF SERVICE: Joe Sahu MD    Chief Complaint: GI bleed    Subjective:     Interval History:    Patient states that she is no longer having any rectal bleeding.  Her legs are continuing to improve although she still feels heavy and some pain in the legs.    Review of Systems:   Review of Systems    Objective:     Vital Signs  Temp:  [97.3 °F (36.3 °C)-97.7 °F (36.5 °C)] 97.3 °F (36.3 °C)  Heart Rate:  [66-74] 70  Resp:  [18-19] 19  BP: (102-171)/(66-95) 151/88   Body mass index is 30.69 kg/m².    Physical Exam  Physical Exam  General Appearance:  Alert, cooperative, no distress, appears stated age  Head:  Normocephalic, without obvious abnormality, atraumatic  Eyes:  PERRL, conjunctiva/corneas clear, EOM's intact, fundi benign, both eyes  Ears:  Normal TM's and external ear canals, both ears  Nose: Nares normal, septum midline, mucosa normal, no drainage or sinus tenderness  Throat: Lips, mucosa, and tongue normal; teeth and gums normal  Neck: Supple, symmetrical, trachea midline, no adenopathy, thyroid: not enlarged, symmetric, no tenderness/mass/nodules, no carotid bruit or JVD  Lungs:   Clear to auscultation bilaterally, respirations unlabored  Heart:  Regular rate and rhythm, S1, S2 normal, no murmur, rub or gallop  Abdomen:  Soft, non-tender, bowel sounds active all four quadrants,  no masses, no organomegaly  Extremities: 2+ BL LE pitting edema  Pulses: 2+ and symmetric  Skin: Skin color, texture, turgor normal, no rashes or lesions  Neurologic: Normal      Scheduled Meds   acetaminophen, 1,000 mg, Oral, Q8H  [Held by provider] apixaban, 5 mg, Oral, Q12H  atorvastatin, 10 mg, Oral, Nightly  bumetanide, 1 mg, Intravenous, BID  ferrous sulfate, 324 mg, Oral, Daily With Breakfast  gabapentin, 300 mg, Oral, TID  isosorbide mononitrate, 30 mg, Oral, Daily  levothyroxine,  88 mcg, Oral, Q AM  metoprolol succinate XL, 100 mg, Oral, Q12H  pantoprazole, 40 mg, Intravenous, Q12H  senna-docusate sodium, 2 tablet, Oral, BID  sodium chloride, 10 mL, Intravenous, Q12H       PRN Meds     albuterol    aluminum-magnesium hydroxide-simethicone    senna-docusate sodium **AND** polyethylene glycol **AND** bisacodyl **AND** bisacodyl    Calcium Replacement - Follow Nurse / BPA Driven Protocol    cloNIDine    Magnesium Standard Dose Replacement - Follow Nurse / BPA Driven Protocol    nitroglycerin    Phosphorus Replacement - Follow Nurse / BPA Driven Protocol    Potassium Replacement - Follow Nurse / BPA Driven Protocol    promethazine **OR** promethazine    sodium chloride    sodium chloride    sodium chloride   Infusions         Diagnostic Data    Results from last 7 days   Lab Units 01/24/24  0910 01/24/24  0232 01/23/24  0523 01/22/24  2043   WBC 10*3/mm3  --   --   --  5.90   HEMOGLOBIN g/dL 8.0* 7.1*   < > 8.1*   HEMATOCRIT % 24.6* 22.1*   < > 24.8*   PLATELETS 10*3/mm3  --   --   --  201   GLUCOSE mg/dL  --  80   < >  --    CREATININE mg/dL  --  1.19*   < >  --    BUN mg/dL  --  10   < >  --    SODIUM mmol/L  --  140   < >  --    POTASSIUM mmol/L  --  3.7   < >  --    AST (SGOT) U/L  --  19   < >  --    ALT (SGPT) U/L  --  7   < >  --    ALK PHOS U/L  --  58   < >  --    BILIRUBIN mg/dL  --  0.6   < >  --    ANION GAP mmol/L  --  8.0   < >  --     < > = values in this interval not displayed.       XR Chest 1 View    Result Date: 1/22/2024  Impression: 1. No acute process. 2. Stable cardiomegaly. Electronically Signed: Sarath Simpson MD  1/22/2024 7:43 PM EST  Workstation ID: [id number]    CT Abdomen Pelvis Without Contrast    Result Date: 1/22/2024  No acute pathology is demonstrated. Electronically Signed: Missael Heck MD  1/22/2024 7:20 PM EST  Workstation ID: [id number]       I reviewed the patient's new clinical results.    Assessment/Plan:     Active and Resolved Problems  Active Hospital  Problems    Diagnosis  POA    **GI bleed [K92.2]  Yes    Chronic diastolic CHF (congestive heart failure) [I50.32]  Yes    Essential hypertension [I10]  Yes    Acquired hypothyroidism [E03.9]  Yes    Coronary artery disease involving native coronary artery of native heart without angina pectoris [I25.10]  Yes      Resolved Hospital Problems   No resolved problems to display.       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  -Holding Eliquis  -GI consult appreciated; consulted colorectal surgery for further evaluation  -Colorectal surgery recommending sclerosing agent injection to thrombose hemorrhoids as she is not a good candidate for band ligation; plan to perform this later today  -Patient had recent colonoscopy in 2022 which shows diverticulosis and polyps that were resected  -Continue PPI    Acute blood loss anemia  -Secondary to GI bleed  -H&H decreased further today and will transfuse 1 unit PRBC    Acute on chronic diastolic heart failure (POA)  -Patient was volume overloaded on presentation  -Her dry weight is around 159 pounds per the patient although I anticipate it is somewhat less than that  -Patient has had significant urine output and weight loss with diuresis here  -Likely transition to oral diuretics in the next 24 to 48 hours    Chronic atrial fibrillation  -Currently rate controlled on Toprol-XL  -Holding Eliquis in the setting of GI bleed  -Patient has elevated [id number]-GLAn score 6, indicating a annual stroke risk rate of almost 10%, however she also has a HAS-BLED score of 4, indicating an associated bleeding risk of 8.7 %/year  -Defer to GI regarding restarting anticoagulation    Hypertension  -Continue home BP medications with Imdur, Toprol-XL    Hypothyroidism  -Continue Synthroid    CAD  -Continue GDMT, although patient  is not on aspirin therapy due to previous hemorrhaging in the setting of aspirin use    Dyslipidemia  -Continue statin therapy    DVT prophylaxis:  Medical and mechanical DVT prophylaxis orders are present.         Code status is   Code Status and Medical Interventions:   Ordered at: 01/22/24 1912     Level Of Support Discussed With:    Patient     Code Status (Patient has no pulse and is not breathing):    CPR (Attempt to Resuscitate)     Medical Interventions (Patient has pulse or is breathing):    Full Support       Plan for disposition: Likely DC in the next 24 to 48 hours    Time: 30 minutes    Signature: Electronically signed by Joe Sahu MD, 01/24/24, 12:21 EST.  [facility] Hospitalist Team

## 2024-01-24 NOTE — CASE MANAGEMENT/SOCIAL WORK
Continued Stay Note  [facility]     Patient Name: Kendy Worrell  MRN: [id number]  Today's Date: 1/24/2024    Admit Date: 1/22/2024    Plan: From [facility] and okay to return per facility.  Patient also current with [facility] at facility   Discharge Plan       Row Name 01/24/24 1538       Plan    Plan From [facility] and okay to return per facility.  Patient also current with [facility] at facility    Plan Comments DC barriers: monitoring hgb, colorectal surgeon to see patient today,  1 unit of prbc's today, will need Eliquis restarted once bleeding has stopped.                        Dalia Valencia RN

## 2024-01-24 NOTE — NURSING NOTE
Rectal bleeding.  Patient consult received to assess patient's bilateral lower extremities.  Patient complains of edema to her lower extremities.  She states that they have weeping in the past.  States that she is trying to wear compression hose in the past and is not able to get them off and on.    Patient has no open ulcerations.  Patient has no weeping to the lower extremities.  Patient does have edema particularly to her feet.  She states that the edema has gotten worse over the last few weeks.  They have changed her diuretics.  This patient also has a history of CHF.  I did wrap her legs with Ace wrap using two 4 inch Ace wrap's from the base of the toes to just below the knee today.  I also instructed and encouraged her to visit [facility] pharmacy as they can help fit her with different styles of hose that may be easier for her to get off it on.  She verbalizes understanding

## 2024-01-24 NOTE — PLAN OF CARE
Problem: Adult Inpatient Plan of Care  Goal: Plan of Care Review  Outcome: Ongoing, Progressing  Flowsheets (Taken 1/24/2024 0220)  Progress: improving  Plan of Care Reviewed With: patient  Outcome Evaluation: Patient is stale and no complaints of pain. safety measures in place. Call light within reach. Patient able to make needs known. Plan of care ongoing.  Goal: Patient-Specific Goal (Individualized)  Outcome: Ongoing, Progressing  Goal: Absence of Hospital-Acquired Illness or Injury  Outcome: Ongoing, Progressing  Intervention: Identify and Manage Fall Risk  Recent Flowsheet Documentation  Taken 1/24/2024 0219 by Sherrie Arnold LPN  Safety Promotion/Fall Prevention:   activity supervised   assistive device/personal items within reach   clutter free environment maintained   fall prevention program maintained   gait belt   nonskid shoes/slippers when out of bed   room organization consistent   safety round/check completed  Taken 1/24/2024 0047 by Sherrie Arnold LPN  Safety Promotion/Fall Prevention:   activity supervised   clutter free environment maintained   assistive device/personal items within reach   nonskid shoes/slippers when out of bed   room organization consistent   safety round/check completed   fall prevention program maintained   gait belt  Taken 1/23/2024 2228 by Sherrie Arnold LPN  Safety Promotion/Fall Prevention:   activity supervised   assistive device/personal items within reach   clutter free environment maintained   fall prevention program maintained   gait belt   nonskid shoes/slippers when out of bed   room organization consistent   safety round/check completed  Intervention: Prevent and Manage VTE (Venous Thromboembolism) Risk  Recent Flowsheet Documentation  Taken 1/24/2024 0047 by Sherrie Arnold LPN  Activity Management: activity encouraged  Intervention: Prevent Infection  Recent Flowsheet Documentation  Taken 1/24/2024 0047 by Sherrie Arnold LPN  Infection Prevention:   hand  hygiene promoted   personal protective equipment utilized   single patient room provided  Goal: Optimal Comfort and Wellbeing  Outcome: Ongoing, Progressing  Goal: Readiness for Transition of Care  Outcome: Ongoing, Progressing     Problem: Pain Acute  Goal: Acceptable Pain Control and Functional Ability  Outcome: Ongoing, Progressing  Intervention: Prevent or Manage Pain  Recent Flowsheet Documentation  Taken 1/24/2024 0219 by Sherrie Arnold LPN  Medication Review/Management: medications reviewed  Taken 1/24/2024 0047 by Sehrrie Arnold LPN  Medication Review/Management: medications reviewed  Taken 1/23/2024 2228 by Sherrie Arnold LPN  Medication Review/Management: medications reviewed     Problem: Skin Injury Risk Increased  Goal: Skin Health and Integrity  Outcome: Ongoing, Progressing     Problem: Fall Injury Risk  Goal: Absence of Fall and Fall-Related Injury  Outcome: Ongoing, Progressing  Intervention: Identify and Manage Contributors  Recent Flowsheet Documentation  Taken 1/24/2024 0219 by Sherrie Arnold LPN  Medication Review/Management: medications reviewed  Taken 1/24/2024 0047 by Sherrie Arnold LPN  Medication Review/Management: medications reviewed  Taken 1/23/2024 2228 by Sherrie Arnold LPN  Medication Review/Management: medications reviewed  Intervention: Promote Injury-Free Environment  Recent Flowsheet Documentation  Taken 1/24/2024 0219 by Sherrie Arnold LPN  Safety Promotion/Fall Prevention:   activity supervised   assistive device/personal items within reach   clutter free environment maintained   fall prevention program maintained   gait belt   nonskid shoes/slippers when out of bed   room organization consistent   safety round/check completed  Taken 1/24/2024 0047 by Sherrie Arnold LPN  Safety Promotion/Fall Prevention:   activity supervised   clutter free environment maintained   assistive device/personal items within reach   nonskid shoes/slippers when out of bed   room organization  consistent   safety round/check completed   fall prevention program maintained   gait belt  Taken 1/23/2024 2228 by Sherrie Arnold LPN  Safety Promotion/Fall Prevention:   activity supervised   assistive device/personal items within reach   clutter free environment maintained   fall prevention program maintained   gait belt   nonskid shoes/slippers when out of bed   room organization consistent   safety round/check completed   Goal Outcome Evaluation:  Plan of Care Reviewed With: patient        Progress: improving  Outcome Evaluation: Patient is stale and no complaints of pain. safety measures in place. Call light within reach. Patient able to make needs known. Plan of care ongoing.

## 2024-01-25 LAB
ALBUMIN SERPL-MCNC: 3.1 G/DL (ref 3.5–5.2)
ALBUMIN/GLOB SERPL: 1.2 G/DL
ALP SERPL-CCNC: 67 U/L (ref 39–117)
ALT SERPL W P-5'-P-CCNC: 10 U/L (ref 1–33)
ANION GAP SERPL CALCULATED.3IONS-SCNC: 10 MMOL/L (ref 5–15)
AST SERPL-CCNC: 25 U/L (ref 1–32)
BH BB BLOOD EXPIRATION DATE: NORMAL
BH BB BLOOD TYPE BARCODE: 1700
BH BB DISPENSE STATUS: NORMAL
BH BB PRODUCT CODE: NORMAL
BH BB UNIT NUMBER: NORMAL
BILIRUB SERPL-MCNC: 0.7 MG/DL (ref 0–1.2)
BUN SERPL-MCNC: 9 MG/DL (ref 8–23)
BUN/CREAT SERPL: 7.6 (ref 7–25)
CALCIUM SPEC-SCNC: 9.2 MG/DL (ref 8.6–10.5)
CHLORIDE SERPL-SCNC: 100 MMOL/L (ref 98–107)
CO2 SERPL-SCNC: 31 MMOL/L (ref 22–29)
CREAT SERPL-MCNC: 1.18 MG/DL (ref 0.57–1)
CROSSMATCH INTERPRETATION: NORMAL
EGFRCR SERPLBLD CKD-EPI 2021: 45.6 ML/MIN/1.73
GLOBULIN UR ELPH-MCNC: 2.5 GM/DL
GLUCOSE SERPL-MCNC: 78 MG/DL (ref 65–99)
HCT VFR BLD AUTO: 27.8 % (ref 34–46.6)
HCT VFR BLD AUTO: 31.7 % (ref 34–46.6)
HGB BLD-MCNC: 10.3 G/DL (ref 12–15.9)
HGB BLD-MCNC: 9.3 G/DL (ref 12–15.9)
POTASSIUM SERPL-SCNC: 3.4 MMOL/L (ref 3.5–5.2)
POTASSIUM SERPL-SCNC: 3.9 MMOL/L (ref 3.5–5.2)
PROT SERPL-MCNC: 5.6 G/DL (ref 6–8.5)
SODIUM SERPL-SCNC: 141 MMOL/L (ref 136–145)
UNIT  ABO: NORMAL
UNIT  RH: NORMAL

## 2024-01-25 PROCEDURE — 84132 ASSAY OF SERUM POTASSIUM: CPT | Performed by: INTERNAL MEDICINE

## 2024-01-25 PROCEDURE — 85014 HEMATOCRIT: CPT | Performed by: INTERNAL MEDICINE

## 2024-01-25 PROCEDURE — 80053 COMPREHEN METABOLIC PANEL: CPT | Performed by: INTERNAL MEDICINE

## 2024-01-25 PROCEDURE — 97110 THERAPEUTIC EXERCISES: CPT

## 2024-01-25 PROCEDURE — 85018 HEMOGLOBIN: CPT | Performed by: INTERNAL MEDICINE

## 2024-01-25 PROCEDURE — 97530 THERAPEUTIC ACTIVITIES: CPT

## 2024-01-25 PROCEDURE — 99232 SBSQ HOSP IP/OBS MODERATE 35: CPT | Performed by: STUDENT IN AN ORGANIZED HEALTH CARE EDUCATION/TRAINING PROGRAM

## 2024-01-25 PROCEDURE — 25010000002 BUMETANIDE PER 0.5 MG: Performed by: INTERNAL MEDICINE

## 2024-01-25 PROCEDURE — C1751 CATH, INF, PER/CENT/MIDLINE: HCPCS

## 2024-01-25 PROCEDURE — 97116 GAIT TRAINING THERAPY: CPT

## 2024-01-25 RX ORDER — POTASSIUM CHLORIDE 20 MEQ/1
40 TABLET, EXTENDED RELEASE ORAL EVERY 4 HOURS
Status: COMPLETED | OUTPATIENT
Start: 2024-01-25 | End: 2024-01-25

## 2024-01-25 RX ADMIN — ISOSORBIDE MONONITRATE 30 MG: 30 TABLET, EXTENDED RELEASE ORAL at 09:25

## 2024-01-25 RX ADMIN — BUMETANIDE 1 MG: 0.25 INJECTION INTRAMUSCULAR; INTRAVENOUS at 17:10

## 2024-01-25 RX ADMIN — GABAPENTIN 300 MG: 300 CAPSULE ORAL at 09:25

## 2024-01-25 RX ADMIN — GABAPENTIN 300 MG: 300 CAPSULE ORAL at 20:37

## 2024-01-25 RX ADMIN — GABAPENTIN 300 MG: 300 CAPSULE ORAL at 17:10

## 2024-01-25 RX ADMIN — POTASSIUM CHLORIDE 40 MEQ: 1500 TABLET, EXTENDED RELEASE ORAL at 09:25

## 2024-01-25 RX ADMIN — Medication 10 ML: at 09:25

## 2024-01-25 RX ADMIN — POTASSIUM CHLORIDE 40 MEQ: 1500 TABLET, EXTENDED RELEASE ORAL at 05:11

## 2024-01-25 RX ADMIN — Medication 10 ML: at 20:37

## 2024-01-25 RX ADMIN — ATORVASTATIN CALCIUM 10 MG: 10 TABLET, FILM COATED ORAL at 20:37

## 2024-01-25 RX ADMIN — LEVOTHYROXINE SODIUM 88 MCG: 0.09 TABLET ORAL at 05:11

## 2024-01-25 RX ADMIN — METOPROLOL SUCCINATE 100 MG: 50 TABLET, EXTENDED RELEASE ORAL at 09:25

## 2024-01-25 RX ADMIN — PANTOPRAZOLE SODIUM 40 MG: 40 INJECTION, POWDER, FOR SOLUTION INTRAVENOUS at 20:37

## 2024-01-25 RX ADMIN — BUMETANIDE 1 MG: 0.25 INJECTION INTRAMUSCULAR; INTRAVENOUS at 09:25

## 2024-01-25 NOTE — PLAN OF CARE
Goal Outcome Evaluation:         Patient doing well, no complaints of pain or nausea tolerating diet with no complaints. Patient has call light within reach and is able to make needs known.

## 2024-01-25 NOTE — PLAN OF CARE
"Goal Outcome Evaluation:        Assessment: Kendy Worrell presents with functional mobility impairments which indicate the need for skilled intervention. Pt had loose, thin stool contained in brief. Pt reports she has battled diarrhea for a year. Purewick was removed and pt was instructed to call for nursing to assist her with bathroom-pt agreed. Tolerating session today without incident. Will continue to follow and progress as tolerated.     Plan/Recommendations:   If medically appropriate, Low Intensity Therapy recommended post-acute care - This is recommended as therapy feels this patient would require 2-3 visits per week. OP or HH would be the best option depending on patient's home bound status. Consider, if the patient has other  \"skilled\" needs such as wounds, IV antibiotics, etc. Combined with \"low intensity\" could also equate to a SNF. If patient is medically complex, consider LTAC. Pt requires no DME at discharge.     Pt desires Home with Home Health at discharge. Pt cooperative; agreeable to therapeutic recommendations and plan of care.                                           "

## 2024-01-25 NOTE — THERAPY TREATMENT NOTE
"Subjective: Pt agreeable to therapeutic plan of care.    Objective:   Pt had a bowel incontinence episode. PT assisted CNA with cleaning pt up and brief change.   Bed mobility - Supervision  Transfers - SBA  Ambulation - 125 feet CGA and with rolling walker    Therapeutic Exercise - 5 Reps B LE AROM supported sitting / chair    Vitals: WNL    Pain: 0 VAS     Intervention for pain: N/A    Education: Provided education on the importance of mobility in the acute care setting, Verbal/Tactile Cues, Transfer Training, and Gait Training    Assessment: Kendy Worrell presents with functional mobility impairments which indicate the need for skilled intervention. Pt had loose, thin stool contained in brief. Pt reports she has battled diarrhea for a year. Purewick was removed and pt was instructed to call for nursing to assist her with bathroom-pt agreed. Tolerating session today without incident. Will continue to follow and progress as tolerated.     Plan/Recommendations:   If medically appropriate, Low Intensity Therapy recommended post-acute care - This is recommended as therapy feels this patient would require 2-3 visits per week. OP or HH would be the best option depending on patient's home bound status. Consider, if the patient has other  \"skilled\" needs such as wounds, IV antibiotics, etc. Combined with \"low intensity\" could also equate to a SNF. If patient is medically complex, consider LTAC. Pt requires no DME at discharge.     Pt desires Home with Home Health at discharge. Pt cooperative; agreeable to therapeutic recommendations and plan of care.         Basic Mobility 6-click:  Rollin = Total, A lot = 2, A little = 3; 4 = None  Supine>Sit:   1 = Total, A lot = 2, A little = 3; 4 = None   Sit>Stand with arms:  1 = Total, A lot = 2, A little = 3; 4 = None  Bed>Chair:   1 = Total, A lot = 2, A little = 3; 4 = None  Ambulate in room:  1 = Total, A lot = 2, A little = 3; 4 = None  3-5 Steps with railin = " Total, A lot = 2, A little = 3; 4 = None  Score: 20    Modified [place]: N/A = No pre-op stroke/TIA    Post-Tx Position: Up in Chair, Alarms activated, and Call light and personal items within reach  PPE: gloves

## 2024-01-25 NOTE — PROCEDURES
Procedure Note  Procedure: anoscopy, sclerosing injection   Indication: rectal bleeding  Description: After digital examination was completed the scope was inserted into the anal canal. The rectum/anus was visualized to 5cm. See Findings below. The scope was then removed. Patient tolerated procedure well.    Findings:Prominent and prolapsing internal hemorrhoids in all quadrants. Larger in right anterior quadrant.     1.5 cc of 1% sodium tetradecyl sulfate was injected to the right anterior column.        She tolerated procedure well.

## 2024-01-25 NOTE — PROGRESS NOTES
[facility] MEDICINE SERVICE  DAILY PROGRESS NOTE    NAME: Kendy Worrell  : 1940  MRN: [id number]      LOS: 3 days     PROVIDER OF SERVICE: Joe Sahu MD    Chief Complaint: GI bleed    Subjective:     Interval History:    Patient states that her legs seem to be less swollen today after being wrapped and the pain has improved significantly.  She denies any further rectal bleeding.  She denies any rectal pain.    Review of Systems:   Review of Systems    Objective:     Vital Signs  Temp:  [97.3 °F (36.3 °C)-97.9 °F (36.6 °C)] 97.9 °F (36.6 °C)  Heart Rate:  [66-78] 78  Resp:  [13-19] 17  BP: (116-136)/(72-83) 117/75   Body mass index is 30.37 kg/m².    Physical Exam  Physical Exam  General Appearance:  Alert, cooperative, no distress, appears stated age  Head:  Normocephalic, without obvious abnormality, atraumatic  Eyes:  PERRL, conjunctiva/corneas clear, EOM's intact, fundi benign, both eyes  Ears:  Normal TM's and external ear canals, both ears  Nose: Nares normal, septum midline, mucosa normal, no drainage or sinus tenderness  Throat: Lips, mucosa, and tongue normal; teeth and gums normal  Neck: Supple, symmetrical, trachea midline, no adenopathy, thyroid: not enlarged, symmetric, no tenderness/mass/nodules, no carotid bruit or JVD  Lungs:   Clear to auscultation bilaterally, respirations unlabored  Heart:  Regular rate and rhythm, S1, S2 normal, no murmur, rub or gallop  Abdomen:  Soft, non-tender, bowel sounds active all four quadrants,  no masses, no organomegaly  Extremities: 2+ BL LE pitting edema, currently wrapped with Ace bandage  Pulses: 2+ and symmetric  Skin: Skin color, texture, turgor normal, no rashes or lesions  Neurologic: Normal      Scheduled Meds   [Held by provider] apixaban, 5 mg, Oral, Q12H  atorvastatin, 10 mg, Oral, Nightly  bumetanide, 1 mg, Intravenous, BID  ferrous sulfate, 324 mg, Oral, Daily With Breakfast  gabapentin, 300 mg, Oral, TID  isosorbide mononitrate, 30  mg, Oral, Daily  levothyroxine, 88 mcg, Oral, Q AM  metoprolol succinate XL, 100 mg, Oral, Q12H  pantoprazole, 40 mg, Intravenous, Q12H  psyllium, 1 packet, Oral, Daily  senna-docusate sodium, 2 tablet, Oral, BID  sodium chloride, 10 mL, Intravenous, Q12H       PRN Meds     albuterol    aluminum-magnesium hydroxide-simethicone    senna-docusate sodium **AND** polyethylene glycol **AND** bisacodyl **AND** bisacodyl    Calcium Replacement - Follow Nurse / BPA Driven Protocol    cloNIDine    Magnesium Standard Dose Replacement - Follow Nurse / BPA Driven Protocol    nitroglycerin    Phosphorus Replacement - Follow Nurse / BPA Driven Protocol    Potassium Replacement - Follow Nurse / BPA Driven Protocol    promethazine **OR** promethazine    sodium chloride    sodium chloride    sodium chloride   Infusions         Diagnostic Data    Results from last 7 days   Lab Units 01/25/24  0238 01/23/24  0523 01/22/24  2043   WBC 10*3/mm3  --   --  5.90   HEMOGLOBIN g/dL 9.3*   < > 8.1*   HEMATOCRIT % 27.8*   < > 24.8*   PLATELETS 10*3/mm3  --   --  201   GLUCOSE mg/dL 78   < >  --    CREATININE mg/dL 1.18*   < >  --    BUN mg/dL 9   < >  --    SODIUM mmol/L 141   < >  --    POTASSIUM mmol/L 3.4*   < >  --    AST (SGOT) U/L 25   < >  --    ALT (SGPT) U/L 10   < >  --    ALK PHOS U/L 67   < >  --    BILIRUBIN mg/dL 0.7   < >  --    ANION GAP mmol/L 10.0   < >  --     < > = values in this interval not displayed.       No radiology results for the last day      I reviewed the patient's new clinical results.    Assessment/Plan:     Active and Resolved Problems  Active Hospital Problems    Diagnosis  POA    **GI bleed [K92.2]  Yes    Chronic diastolic CHF (congestive heart failure) [I50.32]  Yes    Essential hypertension [I10]  Yes    Acquired hypothyroidism [E03.9]  Yes    Coronary artery disease involving native coronary artery of native heart without angina pectoris [I25.10]  Yes      Resolved Hospital Problems   No resolved  problems to display.       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  -Holding Eliquis  -GI consult appreciated; consulted colorectal surgery for further evaluation  -Patient underwent injection of sclerosing agent to hemorrhoids on 1/24 with no further sequela  -Patient will need to follow-up as an outpatient with colorectal surgery for repeat injection  -Patient had recent colonoscopy in 2022 which shows diverticulosis and polyps that were resected  -Continue PPI    Acute blood loss anemia  -Secondary to GI bleed  -H&H improved after PRBC transfusion on 1/24    Acute on chronic diastolic heart failure (POA)  -Patient was volume overloaded on presentation  -Her dry weight is around 159 pounds per the patient although I anticipate it is somewhat less than that  -Patient has had significant urine output and weight loss with diuresis here  -Likely transition to oral diuretics in the next 24 to 48 hours    Chronic atrial fibrillation  -Currently rate controlled on Toprol-XL  -Holding Eliquis in the setting of GI bleed  -Patient has elevated [id number] score 6, indicating a annual stroke risk rate of almost 10%, however she also has a HAS-BLED score of 4, indicating an associated bleeding risk of 8.7 %/year  -Likely can resume anticoagulation tomorrow on discharge now that hemorrhoids have been dealt with    Hypertension  -Continue home BP medications with Imdur, Toprol-XL    Hypothyroidism  -Continue Synthroid    CAD  -Continue GDMT, although patient is not on aspirin therapy due to previous hemorrhaging in the setting of aspirin use    Dyslipidemia  -Continue statin therapy    DVT prophylaxis:  Medical and mechanical DVT prophylaxis orders are present.         Code status is   Code Status and Medical Interventions:   Ordered at: 01/22/24 1912      Level Of Support Discussed With:    Patient     Code Status (Patient has no pulse and is not breathing):    CPR (Attempt to Resuscitate)     Medical Interventions (Patient has pulse or is breathing):    Full Support       Plan for disposition: Likely DC in the next 24 to 48 hours    Time: 30 minutes    Signature: Electronically signed by Joe Sahu MD, 01/25/24, 12:17 [facility].  [facility]

## 2024-01-25 NOTE — PLAN OF CARE
Goal Outcome Evaluation: Pt A&Ox4. No complaints of pain. 1 unit of PRBC's infused and pt tolerated well. Call light in reach and able to make needs known. Care plan ongoing.

## 2024-01-25 NOTE — PROGRESS NOTES
Colorectal Surgery Progress Note    Pt. Name/Age/:  Kendy Worrell   83 y.o.    1940         Med. Record Number:   [id number]  Date of admission:  2024      Service(s): Colorectal Surgery      Subjective    Doing well.  Tolerated injection yesterday well  Had a normal loose BM       Objective  Vitals:     Patient Vitals for the past 24 hrs:   BP Temp Temp src Pulse Resp SpO2 Weight   24 0700 -- -- -- 78 -- -- --   24 0552 -- -- -- -- -- -- 72.9 kg (160 lb 11.5 oz)   24 0439 123/81 97.9 °F (36.6 °C) Oral 74 15 94 % --   24 2100 127/73 97.5 °F (36.4 °C) Oral 74 13 97 % --   24 1300 136/81 97.7 °F (36.5 °C) Oral -- 19 -- --   24 1229 116/72 97.3 °F (36.3 °C) -- 66 19 95 % --   24 1007 151/88 97.3 °F (36.3 °C) Oral 70 19 95 % --   24 0943 171/84 97.3 °F (36.3 °C) Oral 74 19 98 % --   24 0923 163/95 -- -- 74 -- -- --           Wt. Admission: Weight: 79.4 kg (175 lb)     Wt. Current: Weight: 72.9 kg (160 lb 11.5 oz)   Body mass index is 30.37 kg/m².      I&O:  Intake/Output Summary (Last 24 hours) at 2024 0757  Last data filed at 2024 0220  Gross per 24 hour   Intake 1500 ml   Output 3200 ml   Net -1700 ml     1901 -  0700  In: 1500 [P.O.:1200]  Out: 4150 [Urine:4150]      Exam  General:  No acute distress, resting comfortably.  Cardiovascular: regular rate.  Respiratory: no increased work of breathing.    Labs:     [unfilled]          Scheduled Meds:[Held by provider] apixaban, 5 mg, Oral, Q12H  atorvastatin, 10 mg, Oral, Nightly  bumetanide, 1 mg, Intravenous, BID  ferrous sulfate, 324 mg, Oral, Daily With Breakfast  gabapentin, 300 mg, Oral, TID  isosorbide mononitrate, 30 mg, Oral, Daily  levothyroxine, 88 mcg, Oral, Q AM  metoprolol succinate XL, 100 mg, Oral, Q12H  pantoprazole, 40 mg, Intravenous, Q12H  potassium chloride ER, 40 mEq, Oral, Q4H  psyllium, 1 packet, Oral, Daily  senna-docusate sodium, 2 tablet, Oral, BID  sodium  chloride, 10 mL, Intravenous, Q12H      Continuous Infusions:   PRN Meds:.  albuterol    aluminum-magnesium hydroxide-simethicone    senna-docusate sodium **AND** polyethylene glycol **AND** bisacodyl **AND** bisacodyl    Calcium Replacement - Follow Nurse / BPA Driven Protocol    cloNIDine    Magnesium Standard Dose Replacement - Follow Nurse / BPA Driven Protocol    nitroglycerin    Phosphorus Replacement - Follow Nurse / BPA Driven Protocol    Potassium Replacement - Follow Nurse / BPA Driven Protocol    promethazine **OR** promethazine    sodium chloride    sodium chloride    sodium chloride          Assessment  83 y.o. female with symptomatic anemia from bleeding internal hemorrhoids     Plan / Recommendations    - A right anterior prolapsing internal hemorrhoid injected yesterday. Tolerated well  - continue with metamucil fiber to bulk stool   - ok for discharge from colorectal stand point when cleared medically   - follow up in clinic for further treatment   - will sign off at this time, please re consult if needed     07:57 EST; 1/25/2024        Rios Thomas MD  Colon and Rectal Surgery   [facility]

## 2024-01-25 NOTE — NURSING NOTE
IV occluded this morning. Tried to place a new IV twice with two different nurses. PICC team called.

## 2024-01-26 PROCEDURE — 97535 SELF CARE MNGMENT TRAINING: CPT

## 2024-01-26 PROCEDURE — 97530 THERAPEUTIC ACTIVITIES: CPT

## 2024-01-26 PROCEDURE — 25010000002 BUMETANIDE PER 0.5 MG: Performed by: INTERNAL MEDICINE

## 2024-01-26 PROCEDURE — 97110 THERAPEUTIC EXERCISES: CPT

## 2024-01-26 RX ADMIN — Medication 10 ML: at 20:57

## 2024-01-26 RX ADMIN — METOPROLOL SUCCINATE 100 MG: 50 TABLET, EXTENDED RELEASE ORAL at 20:57

## 2024-01-26 RX ADMIN — PANTOPRAZOLE SODIUM 40 MG: 40 INJECTION, POWDER, FOR SOLUTION INTRAVENOUS at 09:23

## 2024-01-26 RX ADMIN — BUMETANIDE 1 MG: 0.25 INJECTION INTRAMUSCULAR; INTRAVENOUS at 09:23

## 2024-01-26 RX ADMIN — METOPROLOL SUCCINATE 100 MG: 50 TABLET, EXTENDED RELEASE ORAL at 09:23

## 2024-01-26 RX ADMIN — DOCUSATE SODIUM 50MG AND SENNOSIDES 8.6MG 2 TABLET: 8.6; 5 TABLET, FILM COATED ORAL at 20:56

## 2024-01-26 RX ADMIN — BUMETANIDE 1 MG: 0.25 INJECTION INTRAMUSCULAR; INTRAVENOUS at 18:15

## 2024-01-26 RX ADMIN — ISOSORBIDE MONONITRATE 30 MG: 30 TABLET, EXTENDED RELEASE ORAL at 09:23

## 2024-01-26 RX ADMIN — Medication 10 ML: at 09:23

## 2024-01-26 RX ADMIN — PANTOPRAZOLE SODIUM 40 MG: 40 INJECTION, POWDER, FOR SOLUTION INTRAVENOUS at 20:56

## 2024-01-26 RX ADMIN — GABAPENTIN 300 MG: 300 CAPSULE ORAL at 18:15

## 2024-01-26 RX ADMIN — PSYLLIUM HUSK 1 PACKET: 3.4 POWDER ORAL at 09:23

## 2024-01-26 RX ADMIN — APIXABAN 5 MG: 5 TABLET, FILM COATED ORAL at 20:57

## 2024-01-26 RX ADMIN — GABAPENTIN 300 MG: 300 CAPSULE ORAL at 20:57

## 2024-01-26 RX ADMIN — LEVOTHYROXINE SODIUM 88 MCG: 0.09 TABLET ORAL at 05:59

## 2024-01-26 RX ADMIN — ATORVASTATIN CALCIUM 10 MG: 10 TABLET, FILM COATED ORAL at 20:57

## 2024-01-26 RX ADMIN — GABAPENTIN 300 MG: 300 CAPSULE ORAL at 09:23

## 2024-01-26 NOTE — CONSULTS
Nutrition Services    Patient Name: Kendy Worrell  YOB: 1940  MRN: [id number]  Admission date: 1/22/2024    NUTRITION SCREENING      Trending Narrative: 1/26: Pt assessed due to MST score for unsure weight Hx. Pt has been NPO or on clears for much of admission so far. Just recently was able to advance to Regular solids. Prior lower GI bleeding has resolved, and pt likely will be able to continue PO. Of note, weight has trended down in the last five months, though pt unsure of exact weight change. At this point, does not meet criteria for malnutrition, but could be at increased risk if unintentional weight loss continues. Will continue to monitor.        PO Diet: Diet: Regular/House Diet; Fluid Consistency: Thin (IDDSI 0)   PO Supplements: None ordered    Trending PO Intake:  Diet newly advanced to solids        Nutritionally-Pertinent Medications RDN Reviewed, C/W clinical course         Labs (reviewed below): Reviewed      Results from last 7 days   Lab Units 01/25/24  1225 01/25/24  0238 01/24/24  0232 01/23/24  1517 01/23/24  0523   SODIUM mmol/L  --  141 140  --  142   POTASSIUM mmol/L 3.9 3.4* 3.7   < > 3.3*   CHLORIDE mmol/L  --  100 101  --  102   CO2 mmol/L  --  31.0* 31.0*  --  32.0*   BUN mg/dL  --  9 10  --  12   CREATININE mg/dL  --  1.18* 1.19*  --  1.20*   CALCIUM mg/dL  --  9.2 8.9  --  9.3   BILIRUBIN mg/dL  --  0.7 0.6  --  0.8   ALK PHOS U/L  --  67 58  --  67   ALT (SGPT) U/L  --  10 7  --  10   AST (SGOT) U/L  --  25 19  --  21   GLUCOSE mg/dL  --  78 80  --  106*    < > = values in this interval not displayed.     Results from last 7 days   Lab Units 01/25/24  1225 01/23/24  1136 01/23/24  0523   MAGNESIUM mg/dL  --   --  1.6   HEMOGLOBIN g/dL 10.3*   < > 7.4*   HEMATOCRIT % 31.7*   < > 22.8*    < > = values in this interval not displayed.     Lab Results   Component Value Date    HGBA1C 5.10 11/03/2022          GI Function:  BM 1/22 - bowel regimen in place       Skin: No pressure  "injuries documented        Weight Review: Estimated body mass index is 32.07 kg/m² as calculated from the following:    Height as of this encounter: 154.9 cm (61\").    Weight as of this encounter: 77 kg (169 lb 12.1 oz).    Comment:   1/26: Scale weight 77 kg -- 8.4% weight loss in 5 months -- not significant for timeframe     Wt Readings from Last 30 Encounters:   01/26/24 0512 77 kg (169 lb 12.1 oz)   01/25/24 0552 72.9 kg (160 lb 11.5 oz)   01/23/24 1045 73.7 kg (162 lb 6.4 oz)   01/23/24 0106 76.5 kg (168 lb 10.4 oz)   01/22/24 1338 79.4 kg (175 lb)   08/03/23 0600 85 kg (187 lb 6.3 oz)   08/02/23 0600 84 kg (185 lb 3 oz)   08/01/23 2300 84 kg (185 lb 3 oz)   08/01/23 1529 81.6 kg (180 lb)   11/06/22 0429 88.3 kg (194 lb 9.6 oz)   11/05/22 0500 86 kg (189 lb 9.5 oz)   11/04/22 0522 86.4 kg (190 lb 7.6 oz)   11/03/22 0500 86.8 kg (191 lb 5.8 oz)   11/02/22 0016 89 kg (196 lb 3.2 oz)   11/01/22 1818 81.6 kg (180 lb)   11/18/21 1825 84.8 kg (186 lb 15.2 oz)   11/18/21 1204 81.6 kg (180 lb)   02/18/20 1201 78.5 kg (173 lb)   09/09/19 0324 79 kg (174 lb 3.2 oz)   09/06/19 0521 81.4 kg (179 lb 6.4 oz)   09/05/19 0500 81.5 kg (179 lb 9.6 oz)   09/04/19 2142 79.5 kg (175 lb 3.2 oz)   09/04/19 1520 76.5 kg (168 lb 11.2 oz)   07/09/19 1146 85.7 kg (189 lb)   04/08/19 1056 87.5 kg (193 lb)   01/10/19 1528 85.3 kg (188 lb)   01/04/19 1326 86.2 kg (190 lb)   12/12/18 1515 83.9 kg (185 lb)   09/26/18 1253 86.2 kg (190 lb)   06/20/18 1051 86.2 kg (190 lb)   03/20/18 1118 84.8 kg (187 lb)   12/19/17 1112 84.4 kg (186 lb)   09/14/17 1058 83.9 kg (185 lb)   06/09/17 1320 85.7 kg (189 lb)   04/03/17 1545 83 kg (183 lb)   03/07/17 1325 85.3 kg (188 lb)   12/01/16 1500 88.5 kg (195 lb)   11/03/16 1434 88.5 kg (195 lb)   10/04/16 1102 86.2 kg (190 lb)   07/01/16 1442 90.7 kg (200 lb)   03/31/16 1104 91.6 kg (202 lb)              Trending Physical   Appearance, NFPE 1/26: NFPE completed and not consistent with nutrition diagnosis of " malnutrition at this time using AND/ASPEN criteria             Nutrition Problem Statement: Unintentional weight loss R/t uncertain etiology; as evidenced by 8.4% weight loss in 5 months.        Nutrition Intervention: Continue Regular diet as tolerated.     Update weight at least twice weekly.           Monitoring/Evaluation PO intake, Supplement intake, Pertinent labs, Weight, Skin status, GI status        RD to follow up per protocol.    Electronically signed by:  Ama Askew RD  01/26/24 15:02 EST

## 2024-01-26 NOTE — PROGRESS NOTES
[facility] MEDICINE SERVICE  DAILY PROGRESS NOTE    NAME: Kendy Worrell  : 1940  MRN: [id number]      LOS: 4 days     PROVIDER OF SERVICE: Joe Sahu MD    Chief Complaint: GI bleed    Subjective:     Interval History:    Patient is reporting her legs are more swollen today.  She denies any further rectal bleeding.  She is tolerating p.o. intake well.    Review of Systems:   Review of Systems    Objective:     Vital Signs  Temp:  [98.2 °F (36.8 °C)-98.6 °F (37 °C)] 98.6 °F (37 °C)  Heart Rate:  [69-76] 73  Resp:  [17-20] 17  BP: ()/(51-77) 96/51   Body mass index is 32.07 kg/m².    Physical Exam  Physical Exam  General Appearance:  Alert, cooperative, no distress, appears stated age  Head:  Normocephalic, without obvious abnormality, atraumatic  Eyes:  PERRL, conjunctiva/corneas clear, EOM's intact, fundi benign, both eyes  Ears:  Normal TM's and external ear canals, both ears  Nose: Nares normal, septum midline, mucosa normal, no drainage or sinus tenderness  Throat: Lips, mucosa, and tongue normal; teeth and gums normal  Neck: Supple, symmetrical, trachea midline, no adenopathy, thyroid: not enlarged, symmetric, no tenderness/mass/nodules, no carotid bruit or JVD  Lungs:   Clear to auscultation bilaterally, respirations unlabored  Heart:  Regular rate and rhythm, S1, S2 normal, no murmur, rub or gallop  Abdomen:  Soft, non-tender, bowel sounds active all four quadrants,  no masses, no organomegaly  Extremities: 2+ BL LE pitting edema, currently wrapped with Ace bandage  Pulses: 2+ and symmetric  Skin: Skin color, texture, turgor normal, no rashes or lesions  Neurologic: Normal      Scheduled Meds   apixaban, 5 mg, Oral, Q12H  atorvastatin, 10 mg, Oral, Nightly  bumetanide, 1 mg, Intravenous, BID  ferrous sulfate, 324 mg, Oral, Daily With Breakfast  gabapentin, 300 mg, Oral, TID  isosorbide mononitrate, 30 mg, Oral, Daily  levothyroxine, 88 mcg, Oral, Q AM  metoprolol succinate XL, 100 mg,  Oral, Q12H  pantoprazole, 40 mg, Intravenous, Q12H  psyllium, 1 packet, Oral, Daily  senna-docusate sodium, 2 tablet, Oral, BID  sodium chloride, 10 mL, Intravenous, Q12H       PRN Meds     albuterol    aluminum-magnesium hydroxide-simethicone    senna-docusate sodium **AND** polyethylene glycol **AND** bisacodyl **AND** bisacodyl    Calcium Replacement - Follow Nurse / BPA Driven Protocol    cloNIDine    Magnesium Standard Dose Replacement - Follow Nurse / BPA Driven Protocol    nitroglycerin    Phosphorus Replacement - Follow Nurse / BPA Driven Protocol    Potassium Replacement - Follow Nurse / BPA Driven Protocol    promethazine **OR** promethazine    sodium chloride    sodium chloride    sodium chloride   Infusions         Diagnostic Data    Results from last 7 days   Lab Units 01/25/24  1225 01/25/24  0238 01/23/24  0523 01/22/24  2043   WBC 10*3/mm3  --   --   --  5.90   HEMOGLOBIN g/dL 10.3* 9.3*   < > 8.1*   HEMATOCRIT % 31.7* 27.8*   < > 24.8*   PLATELETS 10*3/mm3  --   --   --  201   GLUCOSE mg/dL  --  78   < >  --    CREATININE mg/dL  --  1.18*   < >  --    BUN mg/dL  --  9   < >  --    SODIUM mmol/L  --  141   < >  --    POTASSIUM mmol/L 3.9 3.4*   < >  --    AST (SGOT) U/L  --  25   < >  --    ALT (SGPT) U/L  --  10   < >  --    ALK PHOS U/L  --  67   < >  --    BILIRUBIN mg/dL  --  0.7   < >  --    ANION GAP mmol/L  --  10.0   < >  --     < > = values in this interval not displayed.       No radiology results for the last day      I reviewed the patient's new clinical results.    Assessment/Plan:     Active and Resolved Problems  Active Hospital Problems    Diagnosis  POA    **GI bleed [K92.2]  Yes    Chronic diastolic CHF (congestive heart failure) [I50.32]  Yes    Essential hypertension [I10]  Yes    Acquired hypothyroidism [E03.9]  Yes    Coronary artery disease involving native coronary artery of native heart without angina pectoris [I25.10]  Yes      Resolved Hospital Problems   No resolved  problems to display.       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  -Holding Eliquis  -GI consult appreciated; consulted colorectal surgery for further evaluation  -Patient underwent injection of sclerosing agent to hemorrhoids on 1/24 with no further sequela  -Patient will need to follow-up as an outpatient with colorectal surgery for repeat injection  -Patient had recent colonoscopy in 2022 which shows diverticulosis and polyps that were resected  -Continue PPI    Acute blood loss anemia  -Secondary to GI bleed  -H&H improved after PRBC transfusion on 1/24 and remained stable    Acute on chronic diastolic heart failure (POA)  -Patient was volume overloaded on presentation  -Her dry weight is around 159 pounds per the patient although I anticipate it is somewhat less than that  -Patient has had significant urine output and weight loss with diuresis here  -Hopefully transition to oral diuretics in the next 24 to 48 hours    Chronic atrial fibrillation  -Currently rate controlled on Toprol-XL  -Initially held Eliquis in the setting of GI bleed  -Patient has elevated ICQ6BQ2-BRNb score 6, indicating a annual stroke risk rate of almost 10%, however she also has a HAS-BLED score of 4, indicating an associated bleeding risk of 8.7 %/year  -Restarted on Eliquis    Hypertension  -Continue home BP medications with Imdur, Toprol-XL    Hypothyroidism  -Continue Synthroid    CAD  -Continue GDMT, although patient is not on aspirin therapy due to previous hemorrhaging in the setting of aspirin use    Dyslipidemia  -Continue statin therapy    DVT prophylaxis:  Medical and mechanical DVT prophylaxis orders are present.         Code status is   Code Status and Medical Interventions:   Ordered at: 01/22/24 1912     Level Of Support Discussed With:    Patient      Code Status (Patient has no pulse and is not breathing):    CPR (Attempt to Resuscitate)     Medical Interventions (Patient has pulse or is breathing):    Full Support       Plan for disposition: Likely DC in the next 24 to 48 hours back to her assisted living facility.    Time: 30 minutes    Signature: Electronically signed by Joe Sahu MD, 01/26/24, 13:56 EST.  [facility] [place] Hospitalist Team

## 2024-01-26 NOTE — PLAN OF CARE
Goal Outcome Evaluation:              Outcome Evaluation: pt doing well, still remains with swelling in her legs. Will possibly discharge tomorrow.

## 2024-01-26 NOTE — PLAN OF CARE
Goal Outcome Evaluation:       Patient able to make needs known. VSS on room air. No complaints of pain. Personal items and call light with in reach. Plan of care on going.

## 2024-01-26 NOTE — PLAN OF CARE
Goal Outcome Evaluation:  Kendy Worrell presents with ADL impairments affecting function including balance, endurance / activity tolerance, and strength. Demonstrated functioning below baseline abilities indicate the need for continued skilled intervention while inpatient. Pt completed bed mobility with CGA.  Pt required DEP to don socks.  Pt completed transfers with CGA.  Pt completed functional mobility to bathroom with CGA-SBA.  Pt completed toileting tasks with CGA.  Pt completed g/h tasks with SBA.  Pt completed BUE AROM 1 x 20 focusing on improving strength and ROM.Tolerating session today without incident. Will continue to follow and progress as tolerated.

## 2024-01-26 NOTE — SIGNIFICANT NOTE
01/26/24 1552   OTHER   Discipline physical therapist   Rehab Time/Intention   Session Not Performed other (see comments)  (Pt refusing this pm, reporting too tired. Encouraged to mobilize with [facility] staff this pm.)   Therapy Assessment/Plan (PT)   Criteria for Skilled Interventions Met (PT) yes;meets criteria   Recommendation   PT - Next Appointment 01/29/24

## 2024-01-26 NOTE — CASE MANAGEMENT/SOCIAL WORK
Continued Stay Note  [facility]     Patient Name: Kendy Worrell  MRN: [id number]  Today's Date: 1/26/2024    Admit Date: 1/22/2024    Plan: From [facility] and okay to return per facility. Patient also current with [facility] at facility   Discharge Plan       Row Name 01/26/24 1652       Plan    Plan From [facility] and okay to return per facility. Patient also current with [facility] at facility    Plan Comments DC barriers: monitoring hgb, colorectal surgeon to see patient today, , will need Eliquis restarted once bleeding has stopped.                    Expected Discharge Date and Time       Expected Discharge Date Expected Discharge Time    Jan 27, 2024               Dalia Valencia RN

## 2024-01-26 NOTE — THERAPY TREATMENT NOTE
"Subjective: Pt agreeable to therapeutic plan of care.  Cognition: oriented to Person, Place, Time, and Situation    Objective:     Bed Mobility: CGA   Functional Transfers: CGA and with rolling walker     Balance: supported and standing SBA  Functional Ambulation: SBA and with rolling walker    Toileting: CGA  ADL Position: supported sitting and supported standing  ADL Comments: CGA    Lower Body Dressing: Dependent  ADL Position: edge of bed sitting  ADL Comments: socks    Therapeutic Exercise - 20 Reps B UE AROM supported sitting / chair    Vitals: WNL    Pain: 0 VAS      Education: Provided education on the importance of mobility in the acute care setting, ADL training, and Transfer Training      Assessment: Kendy Worrell presents with ADL impairments affecting function including balance, endurance / activity tolerance, and strength. Demonstrated functioning below baseline abilities indicate the need for continued skilled intervention while inpatient. Pt completed bed mobility with CGA.  Pt required DEP to don socks.  Pt completed transfers with CGA.  Pt completed functional mobility to bathroom with CGA-SBA.  Pt completed toileting tasks with CGA.  Pt completed g/h tasks with SBA.  Pt completed BUE AROM 1 x 20 focusing on improving strength and ROM.Tolerating session today without incident. Will continue to follow and progress as tolerated.     Plan/Recommendations:   Low Intensity Therapy recommended post-acute care - This is recommended as therapy feels this patient would require 2-3 visits per week. OP or HH would be the best option depending on patient's home bound status. Consider, if the patient has other  \"skilled\" needs such as wounds, IV antibiotics, etc. Combined with \"low intensity\" could also equate to a SNF. If patient is medically complex, consider LTAC.. Pt requires no DME at discharge.     Pt desires Home with Home Health and and Home Health at discharge. Pt cooperative; agreeable to therapeutic " recommendations and plan of care.     Modified Karissa: N/A = No pre-op stroke/TIA    Post-Tx Position: Up in Chair, Alarms activated, and Call light and personal items within reach  PPE: gloves    no

## 2024-01-27 LAB
ANION GAP SERPL CALCULATED.3IONS-SCNC: 11 MMOL/L (ref 5–15)
BASOPHILS # BLD AUTO: 0.1 10*3/MM3 (ref 0–0.2)
BASOPHILS NFR BLD AUTO: 1.5 % (ref 0–1.5)
BUN SERPL-MCNC: 14 MG/DL (ref 8–23)
BUN/CREAT SERPL: 13.5 (ref 7–25)
CALCIUM SPEC-SCNC: 9.4 MG/DL (ref 8.6–10.5)
CHLORIDE SERPL-SCNC: 97 MMOL/L (ref 98–107)
CO2 SERPL-SCNC: 30 MMOL/L (ref 22–29)
CREAT SERPL-MCNC: 1.04 MG/DL (ref 0.57–1)
DEPRECATED RDW RBC AUTO: 59.1 FL (ref 37–54)
EGFRCR SERPLBLD CKD-EPI 2021: 53.1 ML/MIN/1.73
EOSINOPHIL # BLD AUTO: 0.1 10*3/MM3 (ref 0–0.4)
EOSINOPHIL NFR BLD AUTO: 1.6 % (ref 0.3–6.2)
ERYTHROCYTE [DISTWIDTH] IN BLOOD BY AUTOMATED COUNT: 19.6 % (ref 12.3–15.4)
GLUCOSE SERPL-MCNC: 91 MG/DL (ref 65–99)
HCT VFR BLD AUTO: 31.2 % (ref 34–46.6)
HGB BLD-MCNC: 10.1 G/DL (ref 12–15.9)
LYMPHOCYTES # BLD AUTO: 2.4 10*3/MM3 (ref 0.7–3.1)
LYMPHOCYTES NFR BLD AUTO: 29.7 % (ref 19.6–45.3)
MAGNESIUM SERPL-MCNC: 1.2 MG/DL (ref 1.6–2.4)
MAGNESIUM SERPL-MCNC: 2.2 MG/DL (ref 1.6–2.4)
MCH RBC QN AUTO: 28.7 PG (ref 26.6–33)
MCHC RBC AUTO-ENTMCNC: 32.5 G/DL (ref 31.5–35.7)
MCV RBC AUTO: 88.3 FL (ref 79–97)
MONOCYTES # BLD AUTO: 0.9 10*3/MM3 (ref 0.1–0.9)
MONOCYTES NFR BLD AUTO: 11.3 % (ref 5–12)
NEUTROPHILS NFR BLD AUTO: 4.5 10*3/MM3 (ref 1.7–7)
NEUTROPHILS NFR BLD AUTO: 55.9 % (ref 42.7–76)
NRBC BLD AUTO-RTO: 0.1 /100 WBC (ref 0–0.2)
PLATELET # BLD AUTO: 233 10*3/MM3 (ref 140–450)
PMV BLD AUTO: 7.9 FL (ref 6–12)
POTASSIUM SERPL-SCNC: 3.3 MMOL/L (ref 3.5–5.2)
POTASSIUM SERPL-SCNC: 3.9 MMOL/L (ref 3.5–5.2)
RBC # BLD AUTO: 3.53 10*6/MM3 (ref 3.77–5.28)
SODIUM SERPL-SCNC: 138 MMOL/L (ref 136–145)
WBC NRBC COR # BLD AUTO: 8.1 10*3/MM3 (ref 3.4–10.8)

## 2024-01-27 PROCEDURE — 83735 ASSAY OF MAGNESIUM: CPT | Performed by: INTERNAL MEDICINE

## 2024-01-27 PROCEDURE — 84132 ASSAY OF SERUM POTASSIUM: CPT | Performed by: INTERNAL MEDICINE

## 2024-01-27 PROCEDURE — 80048 BASIC METABOLIC PNL TOTAL CA: CPT | Performed by: INTERNAL MEDICINE

## 2024-01-27 PROCEDURE — 25010000002 MAGNESIUM SULFATE 2 GM/50ML SOLUTION: Performed by: INTERNAL MEDICINE

## 2024-01-27 PROCEDURE — 85025 COMPLETE CBC W/AUTO DIFF WBC: CPT | Performed by: INTERNAL MEDICINE

## 2024-01-27 PROCEDURE — 25010000002 BUMETANIDE PER 0.5 MG: Performed by: INTERNAL MEDICINE

## 2024-01-27 RX ORDER — MAGNESIUM SULFATE HEPTAHYDRATE 40 MG/ML
2 INJECTION, SOLUTION INTRAVENOUS ONCE
Status: COMPLETED | OUTPATIENT
Start: 2024-01-27 | End: 2024-01-28

## 2024-01-27 RX ORDER — POTASSIUM CHLORIDE 20 MEQ/1
40 TABLET, EXTENDED RELEASE ORAL EVERY 4 HOURS
Status: COMPLETED | OUTPATIENT
Start: 2024-01-27 | End: 2024-01-27

## 2024-01-27 RX ORDER — MAGNESIUM SULFATE HEPTAHYDRATE 40 MG/ML
2 INJECTION, SOLUTION INTRAVENOUS
Status: DISCONTINUED | OUTPATIENT
Start: 2024-01-27 | End: 2024-01-27

## 2024-01-27 RX ADMIN — GABAPENTIN 300 MG: 300 CAPSULE ORAL at 20:33

## 2024-01-27 RX ADMIN — ISOSORBIDE MONONITRATE 30 MG: 30 TABLET, EXTENDED RELEASE ORAL at 09:36

## 2024-01-27 RX ADMIN — MAGNESIUM SULFATE HEPTAHYDRATE 2 G: 40 INJECTION, SOLUTION INTRAVENOUS at 22:07

## 2024-01-27 RX ADMIN — MAGNESIUM SULFATE HEPTAHYDRATE 2 G: 40 INJECTION, SOLUTION INTRAVENOUS at 12:41

## 2024-01-27 RX ADMIN — METOPROLOL SUCCINATE 100 MG: 50 TABLET, EXTENDED RELEASE ORAL at 09:36

## 2024-01-27 RX ADMIN — ATORVASTATIN CALCIUM 10 MG: 10 TABLET, FILM COATED ORAL at 20:33

## 2024-01-27 RX ADMIN — APIXABAN 5 MG: 5 TABLET, FILM COATED ORAL at 20:33

## 2024-01-27 RX ADMIN — BUMETANIDE 1 MG: 0.25 INJECTION INTRAMUSCULAR; INTRAVENOUS at 09:36

## 2024-01-27 RX ADMIN — GABAPENTIN 300 MG: 300 CAPSULE ORAL at 16:12

## 2024-01-27 RX ADMIN — PANTOPRAZOLE SODIUM 40 MG: 40 INJECTION, POWDER, FOR SOLUTION INTRAVENOUS at 09:36

## 2024-01-27 RX ADMIN — PANTOPRAZOLE SODIUM 40 MG: 40 INJECTION, POWDER, FOR SOLUTION INTRAVENOUS at 20:32

## 2024-01-27 RX ADMIN — METOPROLOL SUCCINATE 100 MG: 50 TABLET, EXTENDED RELEASE ORAL at 20:33

## 2024-01-27 RX ADMIN — GABAPENTIN 300 MG: 300 CAPSULE ORAL at 09:36

## 2024-01-27 RX ADMIN — POTASSIUM CHLORIDE 40 MEQ: 1500 TABLET, EXTENDED RELEASE ORAL at 16:12

## 2024-01-27 RX ADMIN — MAGNESIUM SULFATE HEPTAHYDRATE 2 G: 40 INJECTION, SOLUTION INTRAVENOUS at 16:12

## 2024-01-27 RX ADMIN — Medication 10 ML: at 20:46

## 2024-01-27 RX ADMIN — PSYLLIUM HUSK 1 PACKET: 3.4 POWDER ORAL at 09:36

## 2024-01-27 RX ADMIN — Medication 10 ML: at 09:37

## 2024-01-27 RX ADMIN — POTASSIUM CHLORIDE 40 MEQ: 1500 TABLET, EXTENDED RELEASE ORAL at 12:41

## 2024-01-27 RX ADMIN — LEVOTHYROXINE SODIUM 88 MCG: 0.09 TABLET ORAL at 05:41

## 2024-01-27 RX ADMIN — BUMETANIDE 1 MG: 0.25 INJECTION INTRAMUSCULAR; INTRAVENOUS at 16:12

## 2024-01-27 RX ADMIN — APIXABAN 5 MG: 5 TABLET, FILM COATED ORAL at 09:36

## 2024-01-27 NOTE — PROGRESS NOTES
[facility] MEDICINE SERVICE  DAILY PROGRESS NOTE    NAME: Kendy oWrrell  : 1940  MRN: [id number]      LOS: 5 days     PROVIDER OF SERVICE: Joe Sahu MD    Chief Complaint: GI bleed    Subjective:     Interval History:    Patient states that her legs are less swollen today but still hurt and that her feet remain fairly swollen.  Still denies any further bleeding after restarting Eliquis yesterday.    Review of Systems:   Review of Systems    Objective:     Vital Signs  Temp:  [97.9 °F (36.6 °C)-98.5 °F (36.9 °C)] 98.2 °F (36.8 °C)  Heart Rate:  [73-76] 73  Resp:  [18-19] 18  BP: (107-120)/(67-79) 120/75   Body mass index is 31.2 kg/m².    Physical Exam  Physical Exam  General Appearance:  Alert, cooperative, no distress, appears stated age  Head:  Normocephalic, without obvious abnormality, atraumatic  Eyes:  PERRL, conjunctiva/corneas clear, EOM's intact, fundi benign, both eyes  Ears:  Normal TM's and external ear canals, both ears  Nose: Nares normal, septum midline, mucosa normal, no drainage or sinus tenderness  Throat: Lips, mucosa, and tongue normal; teeth and gums normal  Neck: Supple, symmetrical, trachea midline, no adenopathy, thyroid: not enlarged, symmetric, no tenderness/mass/nodules, no carotid bruit or JVD  Lungs:   Clear to auscultation bilaterally, respirations unlabored  Heart:  Regular rate and rhythm, S1, S2 normal, no murmur, rub or gallop  Abdomen:  Soft, non-tender, bowel sounds active all four quadrants,  no masses, no organomegaly  Extremities: 2+ BL LE pitting edema, currently wrapped with Ace bandage  Pulses: 2+ and symmetric  Skin: Skin color, texture, turgor normal, no rashes or lesions  Neurologic: Normal      Scheduled Meds   apixaban, 5 mg, Oral, Q12H  atorvastatin, 10 mg, Oral, Nightly  bumetanide, 1 mg, Intravenous, BID  ferrous sulfate, 324 mg, Oral, Daily With Breakfast  gabapentin, 300 mg, Oral, TID  isosorbide mononitrate, 30 mg, Oral, Daily  levothyroxine,  88 mcg, Oral, Q AM  magnesium sulfate, 2 g, Intravenous, Q2H  metoprolol succinate XL, 100 mg, Oral, Q12H  pantoprazole, 40 mg, Intravenous, Q12H  potassium chloride ER, 40 mEq, Oral, Q4H  psyllium, 1 packet, Oral, Daily  senna-docusate sodium, 2 tablet, Oral, BID  sodium chloride, 10 mL, Intravenous, Q12H       PRN Meds     albuterol    aluminum-magnesium hydroxide-simethicone    senna-docusate sodium **AND** polyethylene glycol **AND** bisacodyl **AND** bisacodyl    Calcium Replacement - Follow Nurse / BPA Driven Protocol    cloNIDine    Magnesium Standard Dose Replacement - Follow Nurse / BPA Driven Protocol    nitroglycerin    Phosphorus Replacement - Follow Nurse / BPA Driven Protocol    Potassium Replacement - Follow Nurse / BPA Driven Protocol    promethazine **OR** promethazine    sodium chloride    sodium chloride    sodium chloride   Infusions         Diagnostic Data    Results from last 7 days   Lab Units 01/27/24  0915 01/25/24  1225 01/25/24  0238   WBC 10*3/mm3 8.10  --   --    HEMOGLOBIN g/dL 10.1*   < > 9.3*   HEMATOCRIT % 31.2*   < > 27.8*   PLATELETS 10*3/mm3 233  --   --    GLUCOSE mg/dL 91  --  78   CREATININE mg/dL 1.04*  --  1.18*   BUN mg/dL 14  --  9   SODIUM mmol/L 138  --  141   POTASSIUM mmol/L 3.3*   < > 3.4*   AST (SGOT) U/L  --   --  25   ALT (SGPT) U/L  --   --  10   ALK PHOS U/L  --   --  67   BILIRUBIN mg/dL  --   --  0.7   ANION GAP mmol/L 11.0  --  10.0    < > = values in this interval not displayed.       No radiology results for the last day      I reviewed the patient's new clinical results.    Assessment/Plan:     Active and Resolved Problems  Active Hospital Problems    Diagnosis  POA    **GI bleed [K92.2]  Yes    Chronic diastolic CHF (congestive heart failure) [I50.32]  Yes    Essential hypertension [I10]  Yes    Acquired hypothyroidism [E03.9]  Yes    Coronary artery disease involving native coronary artery of native heart without angina pectoris [I25.10]  Yes      Resolved  Hospital Problems   No resolved problems to display.       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  -Initially held Eliquis in the setting of GI bleed  -GI consult appreciated; consulted colorectal surgery for further evaluation  -Patient underwent injection of sclerosing agent to hemorrhoids on 1/24 with no further sequela  -Patient will need to follow-up as an outpatient with colorectal surgery for repeat injection  -Patient had recent colonoscopy in 2022 which shows diverticulosis and polyps that were resected  -Continue PPI    Acute blood loss anemia  -Secondary to GI bleed  -H&H improved after PRBC transfusion on 1/24 and remained stable    Acute on chronic diastolic heart failure (POA)  -Patient was volume overloaded on presentation  -Her dry weight is around 159 pounds per the patient although I anticipate it is somewhat less than that  -Patient has had significant urine output and weight loss with diuresis here  -Hopefully transition to oral diuretics in the next 24 to 48 hours  -Re-wrap lower extremities for improved compression    Chronic atrial fibrillation  -Currently rate controlled on Toprol-XL  -Initially held Eliquis in the setting of GI bleed  -Patient has elevated [id number] score 6, indicating a annual stroke risk rate of almost 10%, however she also has a HAS-BLED score of 4, indicating an associated bleeding risk of 8.7 %/year  -Restarted on Eliquis on 1/26    Hypertension  -Continue home BP medications with Imdur, Toprol-XL    Hypothyroidism  -Continue Synthroid    CAD  -Continue GDMT, although patient is not on aspirin therapy due to previous hemorrhaging in the setting of aspirin use    Dyslipidemia  -Continue statin therapy    DVT prophylaxis:  Medical and mechanical DVT prophylaxis orders are present.         Code  status is   Code Status and Medical Interventions:   Ordered at: 01/22/24 1912     Level Of Support Discussed With:    Patient     Code Status (Patient has no pulse and is not breathing):    CPR (Attempt to Resuscitate)     Medical Interventions (Patient has pulse or is breathing):    Full Support       Plan for disposition: Likely DC in the next 24 to 48 hours back to her assisted living facility.    Time: 30 minutes    Signature: Electronically signed by Joe Sahu MD, 01/27/24, 11:58 EST.  [facility] Hospitalist Team

## 2024-01-27 NOTE — PLAN OF CARE
Goal Outcome Evaluation:         Patient able to make needs known. VSS. No complaints of pain. Personal items and call light with in reach. Plan of care on going.

## 2024-01-27 NOTE — PLAN OF CARE
Goal Outcome Evaluation:    Patient able to make needs known. Rewrapped legs this morning, edema improving.

## 2024-01-28 LAB
ANION GAP SERPL CALCULATED.3IONS-SCNC: 8 MMOL/L (ref 5–15)
BASOPHILS # BLD AUTO: 0.1 10*3/MM3 (ref 0–0.2)
BASOPHILS NFR BLD AUTO: 1.1 % (ref 0–1.5)
BUN SERPL-MCNC: 16 MG/DL (ref 8–23)
BUN/CREAT SERPL: 14.4 (ref 7–25)
CALCIUM SPEC-SCNC: 9 MG/DL (ref 8.6–10.5)
CHLORIDE SERPL-SCNC: 101 MMOL/L (ref 98–107)
CO2 SERPL-SCNC: 30 MMOL/L (ref 22–29)
CREAT SERPL-MCNC: 1.11 MG/DL (ref 0.57–1)
DEPRECATED RDW RBC AUTO: 62.1 FL (ref 37–54)
EGFRCR SERPLBLD CKD-EPI 2021: 49.1 ML/MIN/1.73
EOSINOPHIL # BLD AUTO: 0.1 10*3/MM3 (ref 0–0.4)
EOSINOPHIL NFR BLD AUTO: 1.7 % (ref 0.3–6.2)
ERYTHROCYTE [DISTWIDTH] IN BLOOD BY AUTOMATED COUNT: 20.1 % (ref 12.3–15.4)
GLUCOSE SERPL-MCNC: 107 MG/DL (ref 65–99)
HCT VFR BLD AUTO: 27.5 % (ref 34–46.6)
HGB BLD-MCNC: 9.1 G/DL (ref 12–15.9)
LYMPHOCYTES # BLD AUTO: 1.7 10*3/MM3 (ref 0.7–3.1)
LYMPHOCYTES NFR BLD AUTO: 23.9 % (ref 19.6–45.3)
MAGNESIUM SERPL-MCNC: 2.5 MG/DL (ref 1.6–2.4)
MCH RBC QN AUTO: 29.3 PG (ref 26.6–33)
MCHC RBC AUTO-ENTMCNC: 33.1 G/DL (ref 31.5–35.7)
MCV RBC AUTO: 88.5 FL (ref 79–97)
MONOCYTES # BLD AUTO: 0.9 10*3/MM3 (ref 0.1–0.9)
MONOCYTES NFR BLD AUTO: 12.5 % (ref 5–12)
NEUTROPHILS NFR BLD AUTO: 4.3 10*3/MM3 (ref 1.7–7)
NEUTROPHILS NFR BLD AUTO: 60.8 % (ref 42.7–76)
NRBC BLD AUTO-RTO: 0.1 /100 WBC (ref 0–0.2)
PLATELET # BLD AUTO: 218 10*3/MM3 (ref 140–450)
PMV BLD AUTO: 7.7 FL (ref 6–12)
POTASSIUM SERPL-SCNC: 3.7 MMOL/L (ref 3.5–5.2)
RBC # BLD AUTO: 3.1 10*6/MM3 (ref 3.77–5.28)
SODIUM SERPL-SCNC: 139 MMOL/L (ref 136–145)
WBC NRBC COR # BLD AUTO: 7.1 10*3/MM3 (ref 3.4–10.8)

## 2024-01-28 PROCEDURE — 85025 COMPLETE CBC W/AUTO DIFF WBC: CPT | Performed by: INTERNAL MEDICINE

## 2024-01-28 PROCEDURE — 25010000002 BUMETANIDE PER 0.5 MG: Performed by: INTERNAL MEDICINE

## 2024-01-28 PROCEDURE — 83735 ASSAY OF MAGNESIUM: CPT | Performed by: INTERNAL MEDICINE

## 2024-01-28 PROCEDURE — 80048 BASIC METABOLIC PNL TOTAL CA: CPT | Performed by: INTERNAL MEDICINE

## 2024-01-28 PROCEDURE — 99222 1ST HOSP IP/OBS MODERATE 55: CPT | Performed by: INTERNAL MEDICINE

## 2024-01-28 RX ADMIN — GABAPENTIN 300 MG: 300 CAPSULE ORAL at 16:51

## 2024-01-28 RX ADMIN — PANTOPRAZOLE SODIUM 40 MG: 40 INJECTION, POWDER, FOR SOLUTION INTRAVENOUS at 08:07

## 2024-01-28 RX ADMIN — GABAPENTIN 300 MG: 300 CAPSULE ORAL at 21:13

## 2024-01-28 RX ADMIN — BUMETANIDE 1 MG: 0.25 INJECTION INTRAMUSCULAR; INTRAVENOUS at 16:51

## 2024-01-28 RX ADMIN — APIXABAN 5 MG: 5 TABLET, FILM COATED ORAL at 08:07

## 2024-01-28 RX ADMIN — GABAPENTIN 300 MG: 300 CAPSULE ORAL at 08:07

## 2024-01-28 RX ADMIN — Medication 10 ML: at 08:08

## 2024-01-28 RX ADMIN — PANTOPRAZOLE SODIUM 40 MG: 40 INJECTION, POWDER, FOR SOLUTION INTRAVENOUS at 21:12

## 2024-01-28 RX ADMIN — LEVOTHYROXINE SODIUM 88 MCG: 0.09 TABLET ORAL at 05:17

## 2024-01-28 RX ADMIN — BUMETANIDE 1 MG: 0.25 INJECTION INTRAMUSCULAR; INTRAVENOUS at 08:07

## 2024-01-28 RX ADMIN — Medication 10 ML: at 21:13

## 2024-01-28 RX ADMIN — METOPROLOL SUCCINATE 100 MG: 50 TABLET, EXTENDED RELEASE ORAL at 08:07

## 2024-01-28 RX ADMIN — ATORVASTATIN CALCIUM 10 MG: 10 TABLET, FILM COATED ORAL at 21:13

## 2024-01-28 RX ADMIN — METOPROLOL SUCCINATE 100 MG: 50 TABLET, EXTENDED RELEASE ORAL at 21:13

## 2024-01-28 RX ADMIN — ISOSORBIDE MONONITRATE 30 MG: 30 TABLET, EXTENDED RELEASE ORAL at 08:07

## 2024-01-28 RX ADMIN — PSYLLIUM HUSK 1 PACKET: 3.4 POWDER ORAL at 08:07

## 2024-01-28 RX ADMIN — APIXABAN 5 MG: 5 TABLET, FILM COATED ORAL at 21:13

## 2024-01-28 NOTE — PROGRESS NOTES
[facility] MEDICINE SERVICE  DAILY PROGRESS NOTE    NAME: Kendy Worrell  : 1940  MRN: [id number]      LOS: 6 days     PROVIDER OF SERVICE: MENDOZA Brasher    Chief Complaint: GI bleed    Subjective:     Interval History:  History taken from: patient chart  Patient states that her legs look the same as yesterday, not much pain only when pressure is applied.    Review of Systems:   Review of Systems   Constitutional: Negative.    HENT: Negative.     Eyes: Negative.    Respiratory: Negative.     Cardiovascular:  Positive for leg swelling.   Gastrointestinal: Negative.    Endocrine: Negative.    Genitourinary: Negative.    Musculoskeletal: Negative.    Skin: Negative.    Allergic/Immunologic: Negative.    Neurological: Negative.    Hematological: Negative.    Psychiatric/Behavioral: Negative.         Objective:     Vital Signs  Temp:  [98 °F (36.7 °C)-98.6 °F (37 °C)] 98 °F (36.7 °C)  Heart Rate:  [73-76] 73  Resp:  [14-20] 20  BP: (107-110)/(62-68) 109/65   Body mass index is 31.2 kg/m².    Physical Exam  Physical Exam  General Appearance:  Alert, cooperative, no distress, appears stated age  Head:   Normocephalic, without obvious abnormality, atraumatic  Eyes:   PERRL, conjunctiva/corneas clear, EOM's intact, fundi benign, both eyes  Ears:    Normal TM's and external ear canals, both ears  Nose:   Nares normal, septum midline, mucosa normal, no drainage or sinus tenderness  Throat: Lips, mucosa, and tongue normal; teeth and gums normal  Neck:   Supple, symmetrical, trachea midline, no adenopathy, thyroid: not enlarged, symmetric, no tenderness/mass/nodules, no carotid bruit or JVD  Lungs:             Clear to auscultation bilaterally, respirations unlabored  Heart:  Regular rate and rhythm, S1, S2 normal, no murmur, rub or gallop  Abdomen:  Soft, non-tender, bowel sounds active all four quadrants,  no masses, no organomegaly  Extremities:     2+ BL LE pitting edema, currently wrapped with Ace  bandage. CR <2  Pulses:            2+ and symmetric  Skin:    Skin color, texture, turgor normal, no rashes or lesions  Neurologic: Normal    Scheduled Meds   apixaban, 5 mg, Oral, Q12H  atorvastatin, 10 mg, Oral, Nightly  bumetanide, 1 mg, Intravenous, BID  ferrous sulfate, 324 mg, Oral, Daily With Breakfast  gabapentin, 300 mg, Oral, TID  isosorbide mononitrate, 30 mg, Oral, Daily  levothyroxine, 88 mcg, Oral, Q AM  metoprolol succinate XL, 100 mg, Oral, Q12H  pantoprazole, 40 mg, Intravenous, Q12H  psyllium, 1 packet, Oral, Daily  senna-docusate sodium, 2 tablet, Oral, BID  sodium chloride, 10 mL, Intravenous, Q12H       PRN Meds     albuterol    aluminum-magnesium hydroxide-simethicone    senna-docusate sodium **AND** polyethylene glycol **AND** bisacodyl **AND** bisacodyl    Calcium Replacement - Follow Nurse / BPA Driven Protocol    cloNIDine    Magnesium Standard Dose Replacement - Follow Nurse / BPA Driven Protocol    nitroglycerin    Phosphorus Replacement - Follow Nurse / BPA Driven Protocol    Potassium Replacement - Follow Nurse / BPA Driven Protocol    promethazine **OR** promethazine    sodium chloride    sodium chloride    sodium chloride   Infusions         Diagnostic Data    Results from last 7 days   Lab Units 01/28/24  0509 01/25/24  1225 01/25/24  0238   WBC 10*3/mm3 7.10   < >  --    HEMOGLOBIN g/dL 9.1*   < > 9.3*   HEMATOCRIT % 27.5*   < > 27.8*   PLATELETS 10*3/mm3 218   < >  --    GLUCOSE mg/dL 107*   < > 78   CREATININE mg/dL 1.11*   < > 1.18*   BUN mg/dL 16   < > 9   SODIUM mmol/L 139   < > 141   POTASSIUM mmol/L 3.7   < > 3.4*   AST (SGOT) U/L  --   --  25   ALT (SGPT) U/L  --   --  10   ALK PHOS U/L  --   --  67   BILIRUBIN mg/dL  --   --  0.7   ANION GAP mmol/L 8.0   < > 10.0    < > = values in this interval not displayed.       No radiology results for the last day      I reviewed the patient's new clinical results.    Assessment/Plan:     Active and Resolved Problems  Active Hospital  Problems    Diagnosis  POA    **GI bleed [K92.2]  Yes    Chronic diastolic CHF (congestive heart failure) [I50.32]  Yes    Essential hypertension [I10]  Yes    Acquired hypothyroidism [E03.9]  Yes    Coronary artery disease involving native coronary artery of native heart without angina pectoris [I25.10]  Yes      Resolved Hospital Problems   No resolved problems to display.       Acute lower GI bleed  -Patient underwent injection of sclerosing agent to hemorrhoids on 1/24 with no further sequela  -Patient will need to follow-up as an outpatient with colorectal surgery for repeat injection  -Patient had recent colonoscopy in 2022 which shows diverticulosis and polyps that were resected  -Continue PPI     Acute blood loss anemia  -Secondary to GI bleed  -H&H improved after PRBC transfusion on 1/24 and remained stable.  With latest H&H of 9.1, 27.5 respectively.     Acute on chronic diastolic heart failure (POA)  -Patient was volume overloaded on presentation  -Her dry weight is around 159 pounds per the patient although I anticipate it is somewhat less than that  -Patient has had significant urine output and weight loss with diuresis here  -Hopefully transition to oral diuretics tomorrow  -Continue with compression wrapping     Chronic atrial fibrillation  -Currently rate controlled on Toprol-XL  -Patient has elevated [id number] score 6, indicating a annual stroke risk rate of almost 10%, however she also has a HAS-BLED score of 4, indicating an associated bleeding risk of 8.7 %/year  -Restarted on Eliquis on 1/26  -Cardiology consulted for consultation to determine if patient is candidate for Watchman procedure due to patient's recurrent episodes of GI bleeding    Hypertension  -Continue home BP medications with Imdur, Toprol-XL     Hypothyroidism  -Continue Synthroid     CAD  -Continue GDMT, although patient is not on aspirin therapy due to previous hemorrhaging in the setting of aspirin use      Dyslipidemia  -Continue statin therapy    DVT prophylaxis:  Medical and mechanical DVT prophylaxis orders are present.         Code status is   Code Status and Medical Interventions:   Ordered at: 01/22/24 1912     Level Of Support Discussed With:    Patient     Code Status (Patient has no pulse and is not breathing):    CPR (Attempt to Resuscitate)     Medical Interventions (Patient has pulse or is breathing):    Full Support       Plan for disposition: In 1 to 2 days pending clinical consult, and cardiology evaluation    Time: 30 minutes    Signature: Electronically signed by MENDOZA Brasher, 01/28/24, 13:20 EST.  [facility] Hospitalist Team

## 2024-01-28 NOTE — PLAN OF CARE
Goal Outcome Evaluation:      Patient able to make needs known. Vss on room air. No complaints this shift. Call ight in reach. Plan of care on going.

## 2024-01-28 NOTE — PLAN OF CARE
Goal Outcome Evaluation:    Patient able to make needs known. Legs rewrapped this morning, edema down some. Cardiology consulted.

## 2024-01-29 LAB
ANION GAP SERPL CALCULATED.3IONS-SCNC: 7 MMOL/L (ref 5–15)
BASOPHILS # BLD AUTO: 0.1 10*3/MM3 (ref 0–0.2)
BASOPHILS NFR BLD AUTO: 0.9 % (ref 0–1.5)
BUN SERPL-MCNC: 16 MG/DL (ref 8–23)
BUN/CREAT SERPL: 11.1 (ref 7–25)
CALCIUM SPEC-SCNC: 9 MG/DL (ref 8.6–10.5)
CHLORIDE SERPL-SCNC: 103 MMOL/L (ref 98–107)
CO2 SERPL-SCNC: 29 MMOL/L (ref 22–29)
CREAT SERPL-MCNC: 1.44 MG/DL (ref 0.57–1)
DEPRECATED RDW RBC AUTO: 63 FL (ref 37–54)
EGFRCR SERPLBLD CKD-EPI 2021: 35.9 ML/MIN/1.73
EOSINOPHIL # BLD AUTO: 0.2 10*3/MM3 (ref 0–0.4)
EOSINOPHIL NFR BLD AUTO: 2.2 % (ref 0.3–6.2)
ERYTHROCYTE [DISTWIDTH] IN BLOOD BY AUTOMATED COUNT: 19.7 % (ref 12.3–15.4)
GLUCOSE SERPL-MCNC: 100 MG/DL (ref 65–99)
HCT VFR BLD AUTO: 25.7 % (ref 34–46.6)
HGB BLD-MCNC: 8.2 G/DL (ref 12–15.9)
LYMPHOCYTES # BLD AUTO: 1.9 10*3/MM3 (ref 0.7–3.1)
LYMPHOCYTES NFR BLD AUTO: 27.3 % (ref 19.6–45.3)
MAGNESIUM SERPL-MCNC: 1.7 MG/DL (ref 1.6–2.4)
MCH RBC QN AUTO: 28 PG (ref 26.6–33)
MCHC RBC AUTO-ENTMCNC: 32.1 G/DL (ref 31.5–35.7)
MCV RBC AUTO: 87.3 FL (ref 79–97)
MONOCYTES # BLD AUTO: 0.9 10*3/MM3 (ref 0.1–0.9)
MONOCYTES NFR BLD AUTO: 12.7 % (ref 5–12)
NEUTROPHILS NFR BLD AUTO: 4 10*3/MM3 (ref 1.7–7)
NEUTROPHILS NFR BLD AUTO: 56.9 % (ref 42.7–76)
NRBC BLD AUTO-RTO: 0 /100 WBC (ref 0–0.2)
PLATELET # BLD AUTO: 221 10*3/MM3 (ref 140–450)
PMV BLD AUTO: 7.7 FL (ref 6–12)
POTASSIUM SERPL-SCNC: 3.5 MMOL/L (ref 3.5–5.2)
RBC # BLD AUTO: 2.94 10*6/MM3 (ref 3.77–5.28)
SODIUM SERPL-SCNC: 139 MMOL/L (ref 136–145)
WBC NRBC COR # BLD AUTO: 7 10*3/MM3 (ref 3.4–10.8)

## 2024-01-29 PROCEDURE — 99232 SBSQ HOSP IP/OBS MODERATE 35: CPT | Performed by: NURSE PRACTITIONER

## 2024-01-29 PROCEDURE — 97116 GAIT TRAINING THERAPY: CPT

## 2024-01-29 PROCEDURE — 83735 ASSAY OF MAGNESIUM: CPT | Performed by: INTERNAL MEDICINE

## 2024-01-29 PROCEDURE — 25010000002 BUMETANIDE PER 0.5 MG: Performed by: INTERNAL MEDICINE

## 2024-01-29 PROCEDURE — 97110 THERAPEUTIC EXERCISES: CPT

## 2024-01-29 PROCEDURE — 85025 COMPLETE CBC W/AUTO DIFF WBC: CPT | Performed by: INTERNAL MEDICINE

## 2024-01-29 PROCEDURE — 80048 BASIC METABOLIC PNL TOTAL CA: CPT | Performed by: INTERNAL MEDICINE

## 2024-01-29 RX ORDER — BUMETANIDE 1 MG/1
2 TABLET ORAL DAILY
Status: DISCONTINUED | OUTPATIENT
Start: 2024-01-30 | End: 2024-01-30 | Stop reason: HOSPADM

## 2024-01-29 RX ADMIN — EMPAGLIFLOZIN 10 MG: 10 TABLET, FILM COATED ORAL at 15:14

## 2024-01-29 RX ADMIN — GABAPENTIN 300 MG: 300 CAPSULE ORAL at 15:14

## 2024-01-29 RX ADMIN — FERROUS SULFATE TAB EC 324 MG (65 MG FE EQUIVALENT) 324 MG: 324 (65 FE) TABLET DELAYED RESPONSE at 08:17

## 2024-01-29 RX ADMIN — Medication 10 ML: at 08:18

## 2024-01-29 RX ADMIN — APIXABAN 5 MG: 5 TABLET, FILM COATED ORAL at 08:17

## 2024-01-29 RX ADMIN — BUMETANIDE 1 MG: 0.25 INJECTION INTRAMUSCULAR; INTRAVENOUS at 08:17

## 2024-01-29 RX ADMIN — LEVOTHYROXINE SODIUM 88 MCG: 0.09 TABLET ORAL at 06:10

## 2024-01-29 RX ADMIN — METOPROLOL SUCCINATE 100 MG: 50 TABLET, EXTENDED RELEASE ORAL at 08:17

## 2024-01-29 RX ADMIN — PSYLLIUM HUSK 1 PACKET: 3.4 POWDER ORAL at 08:17

## 2024-01-29 RX ADMIN — ISOSORBIDE MONONITRATE 30 MG: 30 TABLET, EXTENDED RELEASE ORAL at 08:17

## 2024-01-29 RX ADMIN — GABAPENTIN 300 MG: 300 CAPSULE ORAL at 21:36

## 2024-01-29 RX ADMIN — GABAPENTIN 300 MG: 300 CAPSULE ORAL at 08:17

## 2024-01-29 RX ADMIN — PANTOPRAZOLE SODIUM 40 MG: 40 INJECTION, POWDER, FOR SOLUTION INTRAVENOUS at 08:17

## 2024-01-29 RX ADMIN — PANTOPRAZOLE SODIUM 40 MG: 40 INJECTION, POWDER, FOR SOLUTION INTRAVENOUS at 21:35

## 2024-01-29 RX ADMIN — ATORVASTATIN CALCIUM 10 MG: 10 TABLET, FILM COATED ORAL at 21:36

## 2024-01-29 RX ADMIN — Medication 10 ML: at 21:36

## 2024-01-29 RX ADMIN — APIXABAN 5 MG: 5 TABLET, FILM COATED ORAL at 21:36

## 2024-01-29 RX ADMIN — METOPROLOL SUCCINATE 100 MG: 50 TABLET, EXTENDED RELEASE ORAL at 21:38

## 2024-01-29 NOTE — PLAN OF CARE
Goal Outcome Evaluation:      Patient able to make needs known. Vss on room air. No complaints this shift. Call ight in reach. Plan of care on going.    Problem: Adult Inpatient Plan of Care  Goal: Plan of Care Review  Outcome: Ongoing, Progressing  Goal: Patient-Specific Goal (Individualized)  Outcome: Ongoing, Progressing  Goal: Absence of Hospital-Acquired Illness or Injury  Outcome: Ongoing, Progressing  Intervention: Identify and Manage Fall Risk  Recent Flowsheet Documentation  Taken 1/29/2024 0200 by Christine Mullen LPN  Safety Promotion/Fall Prevention: safety round/check completed  Taken 1/29/2024 0015 by Christine Mullen LPN  Safety Promotion/Fall Prevention: safety round/check completed  Taken 1/28/2024 2200 by Christine Mullen LPN  Safety Promotion/Fall Prevention: safety round/check completed  Taken 1/28/2024 2000 by Christine Mullen LPN  Safety Promotion/Fall Prevention: safety round/check completed  Taken 1/28/2024 1945 by Christine Mullen LPN  Safety Promotion/Fall Prevention: safety round/check completed  Intervention: Prevent Skin Injury  Recent Flowsheet Documentation  Taken 1/28/2024 1945 by Christine Mullen LPN  Skin Protection:   adhesive use limited   tubing/devices free from skin contact  Intervention: Prevent Infection  Recent Flowsheet Documentation  Taken 1/29/2024 0015 by Christine Mullen LPN  Infection Prevention:   single patient room provided   rest/sleep promoted   hand hygiene promoted   personal protective equipment utilized  Taken 1/28/2024 1945 by Christine Mullen LPN  Infection Prevention:   visitors restricted/screened   single patient room provided   rest/sleep promoted   personal protective equipment utilized   hand hygiene promoted  Goal: Optimal Comfort and Wellbeing  Outcome: Ongoing, Progressing  Intervention: Provide Person-Centered Care  Recent Flowsheet Documentation  Taken 1/28/2024 1945 by Christine Mullen LPN  Trust Relationship/Rapport:   care explained   choices provided   thoughts/feelings  acknowledged  Goal: Readiness for Transition of Care  Outcome: Ongoing, Progressing     Problem: Pain Acute  Goal: Acceptable Pain Control and Functional Ability  Outcome: Ongoing, Progressing  Intervention: Prevent or Manage Pain  Recent Flowsheet Documentation  Taken 1/28/2024 1945 by Christine Mullen LPN  Sensory Stimulation Regulation: television on  Medication Review/Management: medications reviewed  Intervention: Optimize Psychosocial Wellbeing  Recent Flowsheet Documentation  Taken 1/28/2024 1945 by Christine Mullen LPN  Diversional Activities:   television   smartphone     Problem: Skin Injury Risk Increased  Goal: Skin Health and Integrity  Outcome: Ongoing, Progressing  Intervention: Optimize Skin Protection  Recent Flowsheet Documentation  Taken 1/28/2024 1945 by Christine Mullen LPN  Pressure Reduction Techniques:   frequent weight shift encouraged   weight shift assistance provided  Pressure Reduction Devices: specialty bed utilized  Skin Protection:   adhesive use limited   tubing/devices free from skin contact

## 2024-01-29 NOTE — CONSULTS
CARDIOLOGY CONSULT:    Kendy Worrell  1940  female  [id number]      Referring Provider: Hospitalist  Reason for Consultation: Atrial fibrillation and GI bleed    Patient Care Team:  Marisela Monte APRN as PCP - General (Nurse Practitioner)  Tone Tubbs MD as Consulting Physician (Cardiology)    Chief complaint lower GI bleed    Subjective .     History of present illness:  Kendy Worrell is a 83 y.o. female with history of atrial fibrillation HFpEF CVA hypertension hyperlipidemia and pulm embolism presented to the hospital with complaints of bleeding per rectum.  Patient has been having the symptoms on and off for the last few days.  Patient also has history of GI bleed in the past.  No complaints of any chest pain.  No shortness of breath at rest but has some shortness of breath with exertion.  No complaint of any PND orthopnea.  No palpitation dizziness syncope or swelling of the feet.  In the ER patient is noted to have atrial fibrillation with controlled ventricular response.  Patient is seen by gastroenterologist and will have workup done.    Review of Systems   Constitutional: Negative for fever and malaise/fatigue.   HENT:  Negative for ear pain and nosebleeds.    Eyes:  Negative for blurred vision and double vision.   Cardiovascular:  Negative for chest pain, dyspnea on exertion and palpitations.   Respiratory:  Positive for shortness of breath. Negative for cough.    Skin:  Negative for rash.   Musculoskeletal:  Negative for joint pain.   Gastrointestinal:  Positive for hematochezia. Negative for abdominal pain, nausea and vomiting.   Neurological:  Negative for focal weakness and headaches.   Psychiatric/Behavioral:  Negative for depression. The patient is not nervous/anxious.    All other systems reviewed and are negative.      History  Past Medical History:   Diagnosis Date    A-fib     Arthritis     Breast cancer     CHF (congestive heart failure)     CVA (cerebral vascular accident)  09/2019    History of pulmonary embolus (PE)     Hyperlipidemia     Hypertension     Hyperthyroidism     patient has hypothyroidism    Hypothyroidism        Past Surgical History:   Procedure Laterality Date    BREAST BIOPSY      BREAST SURGERY Right     CHOLECYSTECTOMY      COLON SURGERY      Removed    COLONOSCOPY      COLONOSCOPY N/A 11/3/2022    Procedure: COLONOSCOPY to anastamosis with biopsies and cold snare polypectomy;  Surgeon: Saroj Oakes MD;  Location: [facility] ENDOSCOPY;  Service: Gastroenterology;  Laterality: N/A;  PRe: rectal bleeding  Post: hemorrhoids, diverticulosis, anastamotic ulcer, polyp, hemostasis clip free-floating    HYSTERECTOMY      MAMMO BILATERAL  2015    MASTECTOMY      TONSILLECTOMY         Family History   Problem Relation Age of Onset    Stroke Mother     Hypertension Mother     Heart disease Father     Cancer Father     Colon polyps Father        Social History     Tobacco Use    Smoking status: Former    Smokeless tobacco: Never    Tobacco comments:     quit in 1988   Vaping Use    Vaping Use: Never used   Substance Use Topics    Alcohol use: Yes     Alcohol/week: 3.0 standard drinks of alcohol     Types: 3 Shots of liquor per week     Comment: OCC    Drug use: Yes     Types: Hydrocodone        Medications Prior to Admission   Medication Sig Dispense Refill Last Dose    acetaminophen (TYLENOL) 325 MG tablet Take 2 tablets by mouth Every 6 (Six) Hours As Needed for Mild Pain .       albuterol sulfate  (90 Base) MCG/ACT inhaler Inhale 2 puffs Every 4 (Four) Hours As Needed for Wheezing.       amLODIPine (NORVASC) 5 MG tablet Take 1 tablet by mouth Daily.       apixaban (ELIQUIS) 5 MG tablet tablet Take 1 tablet by mouth Every 12 (Twelve) Hours. 60 tablet      atorvastatin (LIPITOR) 10 MG tablet Take 1 tablet by mouth Every Night.       bisacodyl (DULCOLAX) 10 MG suppository Insert 1 suppository into the rectum Daily As Needed for Constipation (Use if bisacodyl oral  is ineffective). 2 each 0     cloNIDine (CATAPRES) 0.1 MG tablet Take 1 tablet by mouth Every 6 (Six) Hours As Needed for High Blood Pressure (SBP > 160, DBP > 90).       furosemide (LASIX) 40 MG tablet Take 1 tablet by mouth 2 (Two) Times a Day.       gabapentin (NEURONTIN) 100 MG capsule Take 2 capsules by mouth 3 (Three) Times a Day.       Hydrocortisone, Perianal, (ANUSOL-HC) 2.5 % rectal cream Insert 1 application  into the rectum 2 (Two) Times a Day.       isosorbide mononitrate (IMDUR) 30 MG 24 hr tablet Take 1 tablet by mouth Daily.       levothyroxine (SYNTHROID, LEVOTHROID) 88 MCG tablet Take 1 tablet by mouth Daily.       magnesium oxide 250 MG tablet Take 1 tablet by mouth 2 (Two) Times a Day.       metoprolol succinate XL (TOPROL-XL) 100 MG 24 hr tablet Take 1 tablet by mouth Every 12 (Twelve) Hours. 60 tablet 0     multivitamin with minerals tablet tablet Take 1 tablet by mouth Daily.       nystatin (MYCOSTATIN) 815589 UNIT/GM powder Apply  topically to the appropriate area as directed 3 (Three) Times a Day As Needed. Apply under breasts       pantoprazole (PROTONIX) 40 MG EC tablet Take 1 tablet by mouth Daily.       potassium chloride (MICRO-K) 10 MEQ CR capsule Take 2 capsules by mouth 3 (Three) Times a Day.          Allergies: Aspirin and Nsaids    Scheduled Meds:apixaban, 5 mg, Oral, Q12H  atorvastatin, 10 mg, Oral, Nightly  bumetanide, 1 mg, Intravenous, BID  ferrous sulfate, 324 mg, Oral, Daily With Breakfast  gabapentin, 300 mg, Oral, TID  isosorbide mononitrate, 30 mg, Oral, Daily  levothyroxine, 88 mcg, Oral, Q AM  metoprolol succinate XL, 100 mg, Oral, Q12H  pantoprazole, 40 mg, Intravenous, Q12H  psyllium, 1 packet, Oral, Daily  senna-docusate sodium, 2 tablet, Oral, BID  sodium chloride, 10 mL, Intravenous, Q12H      Continuous Infusions:   PRN Meds:.  albuterol    aluminum-magnesium hydroxide-simethicone    senna-docusate sodium **AND** polyethylene glycol **AND** bisacodyl **AND**  "bisacodyl    Calcium Replacement - Follow Nurse / BPA Driven Protocol    cloNIDine    Magnesium Standard Dose Replacement - Follow Nurse / BPA Driven Protocol    nitroglycerin    Phosphorus Replacement - Follow Nurse / BPA Driven Protocol    Potassium Replacement - Follow Nurse / BPA Driven Protocol    promethazine **OR** promethazine    sodium chloride    sodium chloride    sodium chloride    Objective     VITAL SIGNS  Vitals:    01/27/24 2041 01/28/24 0511 01/28/24 1238 01/28/24 2022   BP: 107/62 110/68 109/65 119/77   BP Location: Left arm Left arm Left arm Left arm   Patient Position: Lying Lying Lying Lying   Pulse: 76 73     Resp: 15 14 20    Temp: 98.6 °F (37 °C)  98 °F (36.7 °C) 97.5 °F (36.4 °C)   TempSrc: Oral  Oral Oral   SpO2:  95% 97% 96%   Weight:       Height:           Flowsheet Rows      Flowsheet Row First Filed Value   Admission Height 154.9 cm (61\") Documented at 01/22/2024 1338   Admission Weight 79.4 kg (175 lb) Documented at 01/22/2024 1338             TELEMETRY: Atrial fibrillation with controlled ventricular response    Physical Exam:  Constitutional:       Appearance: Well-developed.   Eyes:      General: No scleral icterus.     Conjunctiva/sclera: Conjunctivae normal.      Pupils: Pupils are equal, round, and reactive to light.   HENT:      Head: Normocephalic and atraumatic.   Neck:      Vascular: No carotid bruit or JVD.   Pulmonary:      Effort: Pulmonary effort is normal.      Breath sounds: Normal breath sounds. No wheezing. No rales.   Cardiovascular:      Normal rate. Regular rhythm.   Pulses:     Intact distal pulses.   Abdominal:      General: Bowel sounds are normal.      Palpations: Abdomen is soft.   Musculoskeletal: Normal range of motion.      Cervical back: Normal range of motion and neck supple. Skin:     General: Skin is warm and dry.      Findings: No rash.   Neurological:      Mental Status: Alert.      Comments: No focal deficits          Results Review:   I reviewed " the patient's new clinical results.  Lab Results (last 24 hours)       Procedure Component Value Units Date/Time    Magnesium [[id number]]  (Abnormal) Collected: 01/28/24 0509    Specimen: Blood from Arm, Left Updated: 01/28/24 0547     Magnesium 2.5 mg/dL     Basic Metabolic Panel [[id number]]  (Abnormal) Collected: 01/28/24 0509    Specimen: Blood from Arm, Left Updated: 01/28/24 0547     Glucose 107 mg/dL      BUN 16 mg/dL      Creatinine 1.11 mg/dL      Sodium 139 mmol/L      Potassium 3.7 mmol/L      Chloride 101 mmol/L      CO2 30.0 mmol/L      Calcium 9.0 mg/dL      BUN/Creatinine Ratio 14.4     Anion Gap 8.0 mmol/L      eGFR 49.1 mL/min/1.73     Narrative:      GFR Normal >60  Chronic Kidney Disease <60  Kidney Failure <15    The GFR formula is only valid for adults with stable renal function between ages 18 and 70.    CBC & Differential [[id number]]  (Abnormal) Collected: 01/28/24 0509    Specimen: Blood from Arm, Left Updated: 01/28/24 0523    Narrative:      The following orders were created for panel order CBC & Differential.  Procedure                               Abnormality         Status                     ---------                               -----------         ------                     CBC Auto Differential[[id number]]        Abnormal            Final result                 Please view results for these tests on the individual orders.    CBC Auto Differential [[id number]]  (Abnormal) Collected: 01/28/24 0509    Specimen: Blood from Arm, Left Updated: 01/28/24 0523     WBC 7.10 10*3/mm3      RBC 3.10 10*6/mm3      Hemoglobin 9.1 g/dL      Hematocrit 27.5 %      MCV 88.5 fL      MCH 29.3 pg      MCHC 33.1 g/dL      RDW 20.1 %      RDW-SD 62.1 fl      MPV 7.7 fL      Platelets 218 10*3/mm3      Neutrophil % 60.8 %      Lymphocyte % 23.9 %      Monocyte % 12.5 %      Eosinophil % 1.7 %      Basophil % 1.1 %      Neutrophils, Absolute 4.30 10*3/mm3      Lymphocytes, Absolute 1.70 10*3/mm3       Monocytes, Absolute 0.90 10*3/mm3      Eosinophils, Absolute 0.10 10*3/mm3      Basophils, Absolute 0.10 10*3/mm3      nRBC 0.1 /100 WBC     Magnesium [[id number]]  (Normal) Collected: 01/27/24 2042    Specimen: Blood from Arm, Left Updated: 01/27/24 2122     Magnesium 2.2 mg/dL      Comment: Result checked       Potassium [[id number]]  (Normal) Collected: 01/27/24 2042    Specimen: Blood from Arm, Left Updated: 01/27/24 2111     Potassium 3.9 mmol/L             Imaging Results (Last 24 Hours)       ** No results found for the last 24 hours. **            EKG      I personally viewed and interpreted the patient's EKG/Telemetry data:    ECHOCARDIOGRAM:  Results for orders placed during the hospital encounter of 01/22/24    Adult Transthoracic Echo Complete W/ Cont if Necessary Per Protocol    Interpretation Summary    Left ventricular systolic function is normal. Left ventricular ejection fraction appears to be 56 - 60%.    Left ventricular wall thickness is consistent with hypertrophy. Sigmoid-shaped ventricular septum is present.    The left atrial cavity is severely dilated.    The right atrial cavity is severely  dilated.    Moderate aortic valve stenosis is present. Aortic valve area is 1.5 cm2.    Aortic valve maximum pressure gradient is 21 mmHg. Aortic valve mean pressure gradient is 12 mmHg.    Severe tricuspid valve regurgitation is present.    There is a small (<1cm) pericardial effusion. There is no evidence of cardiac tamponade.         STRESS MYOVIEW:  Results for orders placed during the hospital encounter of 11/18/21    Stress Test With Myocardial Perfusion One Day    Interpretation Summary  · Myocardial perfusion imaging indicates a normal myocardial perfusion study with no evidence of ischemia.  · Left ventricular ejection fraction is hyperdynamic (Calculated EF > 70%). .  · Findings consistent with a normal ECG stress test.  · Impressions are consistent with a low risk study.  · There is no prior  study available for comparison.       CARDIAC CATHETERIZATION:    No results found for this or any previous visit.       OTHER:         MDM      Acute lower GI bleed  Patient underwent sclerosing agent to hemorrhoids in the past and now is seen by gastroenterologist for further workup.    Anemia  Patient has anemia from GI bleed and is being followed by the gastroenterologist.    HFpEF  Patient has history of congestive heart failure diastolic dysfunction and is currently stable on medications    Chronic atrial fibrillation  Patient has been on Toprol and Eliquis and the chads Vascor is 6 and that has bled score is 4.  Since patient is having recurrent GI bleeds discussed with her about Watchman procedure and she will talk to her primary cardiologist    Hypertension  Patient blood pressure currently stable on Imdur and Toprol    Coronary disease  Patient has nonobstructive disease in the past and is currently stable on medications    Dyslipidemia  Patient is currently on statins and the lipid levels are followed by the primary care doctor.    I discussed the patients findings and my recommendations with patient and nurse    Elbert Mathew MD  01/28/24  20:59 EST

## 2024-01-29 NOTE — THERAPY TREATMENT NOTE
"Subjective: Pt agreeable to therapeutic plan of care.    Objective:     Bed mobility - Supervision  Transfers - SBA  Ambulation - 135 feet CGA and with rolling walker    Therapeutic Exercise - 10 Reps B LE AROM supported sitting / chair    Vitals: WNL    Pain: 0 VAS     Intervention for pain: N/A    Education: Provided education on the importance of mobility in the acute care setting, Verbal/Tactile Cues, and Transfer Training    Assessment: Kendy Worrell presents with functional mobility impairments which indicate the need for skilled intervention. Pt mobilizes well. PT encouraging pt to spend more time out of bed. Pt to return to [facility] with HH PT to follow. Tolerating session today without incident. Will continue to follow and progress as tolerated.     Plan/Recommendations:   If medically appropriate, Low Intensity Therapy recommended post-acute care - This is recommended as therapy feels this patient would require 2-3 visits per week. OP or HH would be the best option depending on patient's home bound status. Consider, if the patient has other  \"skilled\" needs such as wounds, IV antibiotics, etc. Combined with \"low intensity\" could also equate to a SNF. If patient is medically complex, consider LTAC. Pt requires no DME at discharge.     Pt desires Home with Home Health at discharge. Pt cooperative; agreeable to therapeutic recommendations and plan of care.         Basic Mobility 6-click:  Rollin = Total, A lot = 2, A little = 3; 4 = None  Supine>Sit:   1 = Total, A lot = 2, A little = 3; 4 = None   Sit>Stand with arms:  1 = Total, A lot = 2, A little = 3; 4 = None  Bed>Chair:   1 = Total, A lot = 2, A little = 3; 4 = None  Ambulate in room:  1 = Total, A lot = 2, A little = 3; 4 = None  3-5 Steps with railin = Total, A lot = 2, A little = 3; 4 = None  Score: 20    Modified [place]: N/A = No pre-op stroke/TIA    Post-Tx Position: Up in Chair, Alarms activated, and Call light and personal items within " reach  PPE: gloves

## 2024-01-29 NOTE — PLAN OF CARE
Goal Outcome Evaluation:                                       Pt with minimal complaints of pain, able to make needs known, plan of care ongoing.

## 2024-01-29 NOTE — PLAN OF CARE
"Goal Outcome Evaluation:        Assessment: Kendy Worrell presents with functional mobility impairments which indicate the need for skilled intervention. Pt mobilizes well. PT encouraging pt to spend more time out of bed. Pt to return to [facility] with HH PT to follow. Tolerating session today without incident. Will continue to follow and progress as tolerated.     Plan/Recommendations:   If medically appropriate, Low Intensity Therapy recommended post-acute care - This is recommended as therapy feels this patient would require 2-3 visits per week. OP or HH would be the best option depending on patient's home bound status. Consider, if the patient has other  \"skilled\" needs such as wounds, IV antibiotics, etc. Combined with \"low intensity\" could also equate to a SNF. If patient is medically complex, consider LTAC. Pt requires no DME at discharge.     Pt desires Home with Home Health at discharge. Pt cooperative; agreeable to therapeutic recommendations and plan of care.                    Anticipated Discharge Disposition (PT): home with home health, assisted living                        "

## 2024-01-29 NOTE — PROGRESS NOTES
[facility]   PROGRESS NOTE    Reason for follow-up: Afib, GI bleed      Patient Care Team:  Marisela Monte APRN as PCP - General (Nurse Practitioner)  Tone Tubbs MD as Consulting Physician (Cardiology)    Subjective .   Patient seen and examined.  Sitting in chair without any distress.  She is in Afib/Aflutter        Review of Systems   Constitutional: Positive for malaise/fatigue.   Cardiovascular:  Positive for leg swelling. Negative for chest pain.   Respiratory:  Positive for shortness of breath (intermittent). Negative for cough.    Gastrointestinal:  Negative for melena, nausea and vomiting.       Allergies:  Aspirin and Nsaids    Scheduled Meds:apixaban, 5 mg, Oral, Q12H  atorvastatin, 10 mg, Oral, Nightly  [START ON 1/30/2024] bumetanide, 2 mg, Oral, Daily  empagliflozin, 10 mg, Oral, Daily  ferrous sulfate, 324 mg, Oral, Daily With Breakfast  gabapentin, 300 mg, Oral, TID  isosorbide mononitrate, 30 mg, Oral, Daily  levothyroxine, 88 mcg, Oral, Q AM  metoprolol succinate XL, 100 mg, Oral, Q12H  pantoprazole, 40 mg, Intravenous, Q12H  psyllium, 1 packet, Oral, Daily  senna-docusate sodium, 2 tablet, Oral, BID  sodium chloride, 10 mL, Intravenous, Q12H      Continuous Infusions:   PRN Meds:.  albuterol    aluminum-magnesium hydroxide-simethicone    senna-docusate sodium **AND** polyethylene glycol **AND** bisacodyl **AND** bisacodyl    Calcium Replacement - Follow Nurse / BPA Driven Protocol    cloNIDine    Magnesium Standard Dose Replacement - Follow Nurse / BPA Driven Protocol    nitroglycerin    Phosphorus Replacement - Follow Nurse / BPA Driven Protocol    Potassium Replacement - Follow Nurse / BPA Driven Protocol    promethazine **OR** promethazine    sodium chloride    sodium chloride    sodium chloride    Objective       VITAL SIGNS  Vitals:    01/28/24 2022 01/29/24 0415 01/29/24 0600 01/29/24 1246   BP: 119/77 129/82  110/69   BP Location: Left arm  "Right arm  Left arm   Patient Position: Lying Lying  Sitting   Pulse:       Resp:  19 22   Temp: 97.5 °F (36.4 °C) 98.2 °F (36.8 °C)  97.6 °F (36.4 °C)   TempSrc: Oral Oral  Oral   SpO2: 96% 97%  97%   Weight:   75.6 kg (166 lb 10.7 oz)    Height:           Flowsheet Rows      Flowsheet Row First Filed Value   Admission Height 154.9 cm (61\") Documented at 01/22/2024 1338   Admission Weight 79.4 kg (175 lb) Documented at 01/22/2024 1338             TELEMETRY: Afib, rate controlled     Physical Exam:  Vitals reviewed.   Constitutional:       Appearance: Not in distress. Frail.   Neck:      Vascular: No JVR. JVD normal.   Pulmonary:      Effort: Pulmonary effort is normal.      Breath sounds: Normal breath sounds. No wheezing. No rhonchi. No rales.   Chest:      Chest wall: Not tender to palpatation.   Cardiovascular:      PMI at left midclavicular line. Normal rate. Irregularly irregular rhythm. Normal S1. Normal S2.       Murmurs: There is a systolic murmur.      No gallop.  No click. No rub.   Pulses:     Intact distal pulses.   Edema:     Peripheral edema present.  Abdominal:      General: Bowel sounds are normal.      Palpations: Abdomen is soft.      Tenderness: There is no abdominal tenderness.   Musculoskeletal: Normal range of motion.         General: No tenderness. Skin:     General: Skin is warm and dry.      Comments: Bilateral lower extremities wrapped in ACE bandages    Neurological:      General: No focal deficit present.      Mental Status: Alert and oriented to person, place and time.                  LAB RESULTS (LAST 7 DAYS)    CBC  Results from last 7 days   Lab Units 01/29/24  0117 01/28/24  0509 01/27/24  0915 01/25/24  1225 01/25/24  0238 01/24/24  1426 01/24/24  0910 01/23/24  0523 01/22/24  2043   WBC 10*3/mm3 7.00 7.10 8.10  --   --   --   --   --  5.90   RBC 10*6/mm3 2.94* 3.10* 3.53*  --   --   --   --   --  2.81*   HEMOGLOBIN g/dL 8.2* 9.1* 10.1* 10.3* 9.3* 9.0* 8.0*   < > 8.1* "   HEMATOCRIT % 25.7* 27.5* 31.2* 31.7* 27.8* 27.4* 24.6*   < > 24.8*   MCV fL 87.3 88.5 88.3  --   --   --   --   --  88.4   PLATELETS 10*3/mm3 221 218 233  --   --   --   --   --  201    < > = values in this interval not displayed.       BMP  Results from last 7 days   Lab Units 01/29/24 0117 01/28/24 0509 01/27/24 2042 01/27/24 0915 01/25/24 1225 01/25/24 0238 01/24/24 0232 01/23/24  1517 01/23/24 0523 01/22/24  1710   SODIUM mmol/L 139 139  --  138  --  141 140  --  142 141   POTASSIUM mmol/L 3.5 3.7 3.9 3.3* 3.9 3.4* 3.7   < > 3.3* 4.0   CHLORIDE mmol/L 103 101  --  97*  --  100 101  --  102 101   CO2 mmol/L 29.0 30.0*  --  30.0*  --  31.0* 31.0*  --  32.0* 31.0*   BUN mg/dL 16 16  --  14  --  9 10  --  12 13   CREATININE mg/dL 1.44* 1.11*  --  1.04*  --  1.18* 1.19*  --  1.20* 1.48*   GLUCOSE mg/dL 100* 107*  --  91  --  78 80  --  106* 115*   MAGNESIUM mg/dL 1.7 2.5* 2.2 1.2*  --   --   --   --  1.6  --     < > = values in this interval not displayed.       CMP   Results from last 7 days   Lab Units 01/29/24 0117 01/28/24 0509 01/27/24 2042 01/27/24 0915 01/25/24 1225 01/25/24 0238 01/24/24 0232 01/23/24  1517 01/23/24  0523 01/22/24  1710 01/22/24  1710 01/22/24  1606   SODIUM mmol/L 139 139  --  138  --  141 140  --  142  --  141  --    POTASSIUM mmol/L 3.5 3.7 3.9 3.3* 3.9 3.4* 3.7   < > 3.3*   < > 4.0  --    CHLORIDE mmol/L 103 101  --  97*  --  100 101  --  102  --  101  --    CO2 mmol/L 29.0 30.0*  --  30.0*  --  31.0* 31.0*  --  32.0*  --  31.0*  --    BUN mg/dL 16 16  --  14  --  9 10  --  12  --  13  --    CREATININE mg/dL 1.44* 1.11*  --  1.04*  --  1.18* 1.19*  --  1.20*  --  1.48*  --    GLUCOSE mg/dL 100* 107*  --  91  --  78 80  --  106*  --  115*  --    ALBUMIN g/dL  --   --   --   --   --  3.1* 3.1*  --  3.2*  --  3.5  --    BILIRUBIN mg/dL  --   --   --   --   --  0.7 0.6  --  0.8  --  0.7  --    ALK PHOS U/L  --   --   --   --   --  67 58  --  67  --  80  --    AST (SGOT) U/L   --   --   --   --   --  25 19  --  21  --  30  --    ALT (SGPT) U/L  --   --   --   --   --  10 7  --  10  --  10  --    LIPASE U/L  --   --   --   --   --   --   --   --   --   --   --  47    < > = values in this interval not displayed.         BNP        TROPONIN        CoAg          Creatinine Clearance  Estimated Creatinine Clearance: 27 mL/min (A) (by C-G formula based on SCr of 1.44 mg/dL (H)).    ABG        Radiology  No radiology results for the last day          EKG    I personally viewed and interpreted the patient's EKG/Telemetry data:    ECHOCARDIOGRAM:    Results for orders placed during the hospital encounter of 01/22/24    Adult Transthoracic Echo Complete W/ Cont if Necessary Per Protocol    Interpretation Summary    Left ventricular systolic function is normal. Left ventricular ejection fraction appears to be 56 - 60%.    Left ventricular wall thickness is consistent with hypertrophy. Sigmoid-shaped ventricular septum is present.    The left atrial cavity is severely dilated.    The right atrial cavity is severely  dilated.    Moderate aortic valve stenosis is present. Aortic valve area is 1.5 cm2.    Aortic valve maximum pressure gradient is 21 mmHg. Aortic valve mean pressure gradient is 12 mmHg.    Severe tricuspid valve regurgitation is present.    There is a small (<1cm) pericardial effusion. There is no evidence of cardiac tamponade.    STRESS MYOVIEW:    CARDIAC CATHETERIZATION:    OTHER:         Assessment & Plan     Acute lower GI bleed  Anemia  Patient underwent sclerosing agent to hemorrhoids on 1/24/2024  She is status post PRBC transfusion earlier this admission   Hgb 8.2 today (9.1 1/28/2024)           Acute on Chronic HFpEF due to diastolic dysfunction and valvular diease   Moderate AS  Severe TR   Patient is status post IV diuresis   now on PO Bumex    currently - 5 liters    Continue GDMT with bumex, metoprolol succ.   Will add jardiance   Renal function slightly worsened, will  recheck in am   Echocardiogram 1/23/2024 showed normal LV EF with moderate aortic valve stenosis, severe TR and  biatrial enlargement          Chronic atrial fibrillation  [id number] SCORE   [id number] Score: 8 (1/29/2024  2:37 PM)    HAS-BLED SCORE   Total Risk Score (>5 Very High, 3-5 High, 2 Moderate, 1 Low): 4 (1/29/2024  2:39 PM)    Continue metoprolol-  rate is controlled   Continue Eliquis 5mg BID   Discussed again today regarding Watchman procedure   She will discuss with her primary Cardiologist Dr. Tubbs        Hypertension  Patient blood pressure currently stable on Imdur and Toprol       Coronary disease  Patient has nonobstructive disease in the past and is currently stable on medications     Dyslipidemia  Continue statin therapy       I discussed the patients findings and my recommendations with patient     MENDOZA Kramer  01/29/24  14:40 EST    Electronically signed by MENDOZA Kramer, 01/29/24, 2:41 PM EST.

## 2024-01-29 NOTE — CASE MANAGEMENT/SOCIAL WORK
Continued Stay Note  BRENDA Hernandez     Patient Name: Kendy Worrell  MRN: [id number]  Today's Date: 1/29/2024    Admit Date: 1/22/2024    Plan: From [facility] and okay to return per facility. Patient also current with [facility] at facility   Discharge Plan       Row Name 01/29/24 1636       Plan    Plan From [facility] and okay to return per facility. Patient also current with [facility] at facility    Plan Comments DC barriers: monitoring fluid, po bumex given,                        Dalia Valencia RN

## 2024-01-29 NOTE — PROGRESS NOTES
[facility] MEDICINE SERVICE  DAILY PROGRESS NOTE    NAME: Kendy Worrell  : 1940  MRN: [id number]      LOS: 7 days     PROVIDER OF SERVICE: Joe Sahu MD    Chief Complaint: GI bleed    Subjective:     Interval History: Patient states that her leg swelling continues to improve.  She still denies any further rectal bleeding.    Review of Systems:   Review of Systems   Constitutional: Negative.    HENT: Negative.     Eyes: Negative.    Respiratory: Negative.     Cardiovascular:  Positive for leg swelling.   Gastrointestinal: Negative.    Endocrine: Negative.    Genitourinary: Negative.    Musculoskeletal: Negative.    Skin: Negative.    Allergic/Immunologic: Negative.    Neurological: Negative.    Hematological: Negative.    Psychiatric/Behavioral: Negative.         Objective:     Vital Signs  Temp:  [97.5 °F (36.4 °C)-98.2 °F (36.8 °C)] 97.6 °F (36.4 °C)  Resp:  [19-22] 22  BP: (110-129)/(69-82) 110/69   Body mass index is 31.49 kg/m².    Physical Exam  Physical Exam  General Appearance:  Alert, cooperative, no distress, appears stated age  Head:   Normocephalic, without obvious abnormality, atraumatic  Eyes:   PERRL, conjunctiva/corneas clear, EOM's intact, fundi benign, both eyes  Ears:    Normal TM's and external ear canals, both ears  Nose:   Nares normal, septum midline, mucosa normal, no drainage or sinus tenderness  Throat: Lips, mucosa, and tongue normal; teeth and gums normal  Neck:   Supple, symmetrical, trachea midline, no adenopathy, thyroid: not enlarged, symmetric, no tenderness/mass/nodules, no carotid bruit or JVD  Lungs:             Clear to auscultation bilaterally, respirations unlabored  Heart:  Regular rate and rhythm, S1, S2 normal, no murmur, rub or gallop  Abdomen:  Soft, non-tender, bowel sounds active all four quadrants,  no masses, no organomegaly  Extremities:     1+ BL LE pitting edema, bilateral extremities currently wrapped with compression wrapping  Pulses:             2+ and symmetric  Skin:    Skin color, texture, turgor normal, no rashes or lesions  Neurologic: Normal    Scheduled Meds   apixaban, 5 mg, Oral, Q12H  atorvastatin, 10 mg, Oral, Nightly  [START ON 1/30/2024] bumetanide, 2 mg, Oral, Daily  empagliflozin, 10 mg, Oral, Daily  ferrous sulfate, 324 mg, Oral, Daily With Breakfast  gabapentin, 300 mg, Oral, TID  isosorbide mononitrate, 30 mg, Oral, Daily  levothyroxine, 88 mcg, Oral, Q AM  metoprolol succinate XL, 100 mg, Oral, Q12H  pantoprazole, 40 mg, Intravenous, Q12H  psyllium, 1 packet, Oral, Daily  senna-docusate sodium, 2 tablet, Oral, BID  sodium chloride, 10 mL, Intravenous, Q12H       PRN Meds     albuterol    aluminum-magnesium hydroxide-simethicone    senna-docusate sodium **AND** polyethylene glycol **AND** bisacodyl **AND** bisacodyl    Calcium Replacement - Follow Nurse / BPA Driven Protocol    cloNIDine    Magnesium Standard Dose Replacement - Follow Nurse / BPA Driven Protocol    nitroglycerin    Phosphorus Replacement - Follow Nurse / BPA Driven Protocol    Potassium Replacement - Follow Nurse / BPA Driven Protocol    promethazine **OR** promethazine    sodium chloride    sodium chloride    sodium chloride   Infusions         Diagnostic Data    Results from last 7 days   Lab Units 01/29/24  0117 01/25/24  1225 01/25/24  0238   WBC 10*3/mm3 7.00   < >  --    HEMOGLOBIN g/dL 8.2*   < > 9.3*   HEMATOCRIT % 25.7*   < > 27.8*   PLATELETS 10*3/mm3 221   < >  --    GLUCOSE mg/dL 100*   < > 78   CREATININE mg/dL 1.44*   < > 1.18*   BUN mg/dL 16   < > 9   SODIUM mmol/L 139   < > 141   POTASSIUM mmol/L 3.5   < > 3.4*   AST (SGOT) U/L  --   --  25   ALT (SGPT) U/L  --   --  10   ALK PHOS U/L  --   --  67   BILIRUBIN mg/dL  --   --  0.7   ANION GAP mmol/L 7.0   < > 10.0    < > = values in this interval not displayed.       No radiology results for the last day      I reviewed the patient's new clinical results.    Assessment/Plan:     Active and Resolved  Problems  Active Hospital Problems    Diagnosis  POA    **GI bleed [K92.2]  Yes    Chronic diastolic CHF (congestive heart failure) [I50.32]  Yes    Essential hypertension [I10]  Yes    Acquired hypothyroidism [E03.9]  Yes    Coronary artery disease involving native coronary artery of native heart without angina pectoris [I25.10]  Yes      Resolved Hospital Problems   No resolved problems to display.       Acute lower GI bleed  -Patient underwent injection of sclerosing agent to hemorrhoids on 1/24 with no further sequela  -Patient will need to follow-up as an outpatient with colorectal surgery for repeat injection  -Patient had recent colonoscopy in 2022 which shows diverticulosis and polyps that were resected  -Continue PPI     Acute blood loss anemia  -Secondary to GI bleed  -H&H improved after PRBC transfusion on 1/24 and remained stable although slight decrease today     Acute on chronic diastolic heart failure (POA)  -Patient was volume overloaded on presentation  -Her dry weight is around 159 pounds per the patient although I anticipate it is somewhat less than that  -Patient has had significant urine output and weight loss with diuresis here  -Transition to oral diuretics today as patient has a slight bump in her creatinine  -Continue with compression wrapping of lower extremities     Chronic atrial fibrillation  -Currently rate controlled on Toprol-XL  -Patient has elevated [id number] score 6, indicating a annual stroke risk rate of almost 10%, however she also has a HAS-BLED score of 4, indicating an associated bleeding risk of 8.7 %/year  -Restarted on Eliquis on 1/26  -Patient discussed with cardiology regarding possible candidacy for Watchman procedure; she will discuss with her primary cardiologist as an outpatient    Hypertension  -Continue home BP medications with Imdur, Toprol-XL     Hypothyroidism  -Continue Synthroid     CAD  -Continue GDMT, although patient is not on aspirin therapy due to  previous hemorrhaging in the setting of aspirin use     Dyslipidemia  -Continue statin therapy    DVT prophylaxis:  Medical and mechanical DVT prophylaxis orders are present.         Code status is   Code Status and Medical Interventions:   Ordered at: 01/22/24 1912     Level Of Support Discussed With:    Patient     Code Status (Patient has no pulse and is not breathing):    CPR (Attempt to Resuscitate)     Medical Interventions (Patient has pulse or is breathing):    Full Support       Plan for disposition: Likely DC home tomorrow if patient continues to do well clinically    Time: 30 minutes    Signature: Electronically signed by Joe Sahu MD, 01/29/24, 15:34 EST.  [facility] Hospitalist Team

## 2024-01-30 ENCOUNTER — READMISSION MANAGEMENT (OUTPATIENT)
Dept: CALL CENTER | Facility: HOSPITAL | Age: 84
End: 2024-01-30
Payer: MEDICARE

## 2024-01-30 VITALS
HEART RATE: 58 BPM | OXYGEN SATURATION: 96 % | SYSTOLIC BLOOD PRESSURE: 148 MMHG | BODY MASS INDEX: 31.93 KG/M2 | HEIGHT: 61 IN | TEMPERATURE: 98.4 F | WEIGHT: 169.09 LBS | DIASTOLIC BLOOD PRESSURE: 48 MMHG | RESPIRATION RATE: 18 BRPM

## 2024-01-30 LAB
ANION GAP SERPL CALCULATED.3IONS-SCNC: 6 MMOL/L (ref 5–15)
BASOPHILS # BLD AUTO: 0.1 10*3/MM3 (ref 0–0.2)
BASOPHILS NFR BLD AUTO: 1.1 % (ref 0–1.5)
BUN SERPL-MCNC: 21 MG/DL (ref 8–23)
BUN/CREAT SERPL: 14.1 (ref 7–25)
CALCIUM SPEC-SCNC: 9.6 MG/DL (ref 8.6–10.5)
CHLORIDE SERPL-SCNC: 102 MMOL/L (ref 98–107)
CO2 SERPL-SCNC: 30 MMOL/L (ref 22–29)
CREAT SERPL-MCNC: 1.49 MG/DL (ref 0.57–1)
DEPRECATED RDW RBC AUTO: 62.1 FL (ref 37–54)
EGFRCR SERPLBLD CKD-EPI 2021: 34.5 ML/MIN/1.73
EOSINOPHIL # BLD AUTO: 0.2 10*3/MM3 (ref 0–0.4)
EOSINOPHIL NFR BLD AUTO: 2.4 % (ref 0.3–6.2)
ERYTHROCYTE [DISTWIDTH] IN BLOOD BY AUTOMATED COUNT: 20.2 % (ref 12.3–15.4)
GLUCOSE SERPL-MCNC: 91 MG/DL (ref 65–99)
HCT VFR BLD AUTO: 25.6 % (ref 34–46.6)
HGB BLD-MCNC: 8.5 G/DL (ref 12–15.9)
LYMPHOCYTES # BLD AUTO: 2.1 10*3/MM3 (ref 0.7–3.1)
LYMPHOCYTES NFR BLD AUTO: 28.7 % (ref 19.6–45.3)
MAGNESIUM SERPL-MCNC: 1.5 MG/DL (ref 1.6–2.4)
MCH RBC QN AUTO: 29.5 PG (ref 26.6–33)
MCHC RBC AUTO-ENTMCNC: 33.2 G/DL (ref 31.5–35.7)
MCV RBC AUTO: 88.7 FL (ref 79–97)
MONOCYTES # BLD AUTO: 0.9 10*3/MM3 (ref 0.1–0.9)
MONOCYTES NFR BLD AUTO: 12.9 % (ref 5–12)
NEUTROPHILS NFR BLD AUTO: 4 10*3/MM3 (ref 1.7–7)
NEUTROPHILS NFR BLD AUTO: 54.9 % (ref 42.7–76)
NRBC BLD AUTO-RTO: 0 /100 WBC (ref 0–0.2)
PLATELET # BLD AUTO: 248 10*3/MM3 (ref 140–450)
PMV BLD AUTO: 7.8 FL (ref 6–12)
POTASSIUM SERPL-SCNC: 4 MMOL/L (ref 3.5–5.2)
RBC # BLD AUTO: 2.89 10*6/MM3 (ref 3.77–5.28)
SODIUM SERPL-SCNC: 138 MMOL/L (ref 136–145)
WBC NRBC COR # BLD AUTO: 7.3 10*3/MM3 (ref 3.4–10.8)

## 2024-01-30 PROCEDURE — 85025 COMPLETE CBC W/AUTO DIFF WBC: CPT | Performed by: INTERNAL MEDICINE

## 2024-01-30 PROCEDURE — 97530 THERAPEUTIC ACTIVITIES: CPT | Performed by: OCCUPATIONAL THERAPIST

## 2024-01-30 PROCEDURE — 25010000002 MAGNESIUM SULFATE IN D5W 1G/100ML (PREMIX) 1-5 GM/100ML-% SOLUTION: Performed by: INTERNAL MEDICINE

## 2024-01-30 PROCEDURE — 97535 SELF CARE MNGMENT TRAINING: CPT | Performed by: OCCUPATIONAL THERAPIST

## 2024-01-30 PROCEDURE — 99232 SBSQ HOSP IP/OBS MODERATE 35: CPT | Performed by: INTERNAL MEDICINE

## 2024-01-30 PROCEDURE — 80048 BASIC METABOLIC PNL TOTAL CA: CPT | Performed by: INTERNAL MEDICINE

## 2024-01-30 PROCEDURE — 83735 ASSAY OF MAGNESIUM: CPT | Performed by: INTERNAL MEDICINE

## 2024-01-30 RX ORDER — MAGNESIUM SULFATE 1 G/100ML
1 INJECTION INTRAVENOUS
Status: COMPLETED | OUTPATIENT
Start: 2024-01-30 | End: 2024-01-30

## 2024-01-30 RX ORDER — BUMETANIDE 2 MG/1
2 TABLET ORAL DAILY
Qty: 90 TABLET | Refills: 1 | Status: SHIPPED | OUTPATIENT
Start: 2024-01-31

## 2024-01-30 RX ADMIN — PANTOPRAZOLE SODIUM 40 MG: 40 INJECTION, POWDER, FOR SOLUTION INTRAVENOUS at 08:38

## 2024-01-30 RX ADMIN — EMPAGLIFLOZIN 10 MG: 10 TABLET, FILM COATED ORAL at 08:38

## 2024-01-30 RX ADMIN — MAGNESIUM SULFATE IN DEXTROSE 1 G: 10 INJECTION, SOLUTION INTRAVENOUS at 08:39

## 2024-01-30 RX ADMIN — LEVOTHYROXINE SODIUM 88 MCG: 0.09 TABLET ORAL at 05:03

## 2024-01-30 RX ADMIN — PSYLLIUM HUSK 1 PACKET: 3.4 POWDER ORAL at 08:38

## 2024-01-30 RX ADMIN — GABAPENTIN 300 MG: 300 CAPSULE ORAL at 08:38

## 2024-01-30 RX ADMIN — APIXABAN 5 MG: 5 TABLET, FILM COATED ORAL at 08:38

## 2024-01-30 RX ADMIN — METOPROLOL SUCCINATE 100 MG: 50 TABLET, EXTENDED RELEASE ORAL at 08:38

## 2024-01-30 RX ADMIN — Medication 10 ML: at 08:40

## 2024-01-30 RX ADMIN — MAGNESIUM SULFATE IN DEXTROSE 1 G: 10 INJECTION, SOLUTION INTRAVENOUS at 09:53

## 2024-01-30 RX ADMIN — MAGNESIUM SULFATE IN DEXTROSE 1 G: 10 INJECTION, SOLUTION INTRAVENOUS at 07:35

## 2024-01-30 RX ADMIN — FERROUS SULFATE TAB EC 324 MG (65 MG FE EQUIVALENT) 324 MG: 324 (65 FE) TABLET DELAYED RESPONSE at 08:38

## 2024-01-30 RX ADMIN — BUMETANIDE 2 MG: 1 TABLET ORAL at 08:38

## 2024-01-30 RX ADMIN — ISOSORBIDE MONONITRATE 30 MG: 30 TABLET, EXTENDED RELEASE ORAL at 08:38

## 2024-01-30 NOTE — THERAPY TREATMENT NOTE
"Subjective: Pt agreeable to therapeutic plan of care. Pt stating her right heel feels a little sore.  Cognition: oriented to Person, Place, Time, and Situation    Objective:     Bed Mobility: Modified-Independent   Functional Transfers: SBA with RW      Balance: sitting EOB Independent; standing = SBA with RW   Functional Ambulation: SBA and with rolling walker    Upper Body Dressing & bathing: Independent  ADL Position: supported sitting  ADL Comments:     Lower Body Dressing: Min-A  ADL Position: supported sitting and supported standing  ADL Comments: Pt has BLE ace wraps from knee down, so needs assist donning clothing over wrapping.     Vitals: WNL    Pain: c/o right heel soreness.  Repositioned  Education: Provided education on the importance of mobility in the acute care setting and ADL training      Assessment: Kendy Worrell presents with ADL impairments affecting function including balance, endurance / activity tolerance, and range of motion (ROM). Pt demo improved ADL transfer skills & LB ADL skills. She continues to have difficulty with LB dressing due to LE edema & BLE wraps. Demonstrated functioning below baseline abilities indicate the need for continued skilled intervention while inpatient. Tolerating session today without incident. Will continue to follow and progress as tolerated.     Plan/Recommendations:   Low Intensity Therapy recommended post-acute care - This is recommended as therapy feels this patient would require 2-3 visits per week. OP or HH would be the best option depending on patient's home bound status. Consider, if the patient has other  \"skilled\" needs such as wounds, IV antibiotics, etc. Combined with \"low intensity\" could also equate to a SNF. If patient is medically complex, consider LTAC.. Pt requires no DME at discharge.     Pt desires Home with Home Health at discharge. Pt cooperative; agreeable to therapeutic recommendations and plan of care.     Modified Karissa: N/A = No pre-op " stroke/TIA    Post-Tx Position: Up in Chair, Staff Present, Alarms activated, and Call light and personal items within reach  PPE: gloves

## 2024-01-30 NOTE — DISCHARGE SUMMARY
[facility] Medicine Services  Discharge Summary    Date of Service: 2024  Patient Name: Kendy Worrell  : 1940  MRN: [id number]    Date of Admission: 2024  Discharge Diagnosis:   Acute lower GI bleed secondary to hemorrhoids  Acute loss anemia  Acute on chronic diastolic heart failure  Chronic atrial fibrillation  Hypertension  Hypothyroidism  CAD  Dyslipidemia  Date of Discharge: 2024  Primary Care Physician: Marisela Monte APRN      Presenting Problem:   GI bleed [K92.2]  Internal hemorrhoid, bleeding [K64.8]  Anemia, unspecified type [D64.9]    Active and Resolved Hospital Problems:  Active Hospital Problems    Diagnosis POA    **GI bleed [K92.2] Yes    Chronic diastolic CHF (congestive heart failure) [I50.32] Yes    Essential hypertension [I10] Yes    Acquired hypothyroidism [E03.9] Yes    Coronary artery disease involving native coronary artery of native heart without angina pectoris [I25.10] Yes      Resolved Hospital Problems   No resolved problems to display.         Hospital Course     HPI:  Patient is an 83-year-old female who presented to the hospital with complaints of shortness of breath and rectal bleeding.  Please see H&P for details.    Hospital Course:  The patient was admitted for acute on chronic diastolic heart failure.  She was diuresed with IV Bumex and had significant weight loss and urine output.  Her renal function did have slight increase in creatinine prior to discharge and she was transitioned to oral Bumex with her renal function remaining stable.  She also developed rectal bleeding and there was concern for possible internal hemorrhoids causing this bleeding in the setting of Eliquis use for her chronic A-fib.  Eliquis was initially held.  She was seen by colorectal surgery and underwent injection of sclerosing agent to the hemorrhoids with no further bleeding episodes.  Eliquis was restarted with no further bleeding.  She did require a single  unit PRBC transfusion on 1/24.  PT/OT evaluated the patient and she will follow-up with outpatient home health therapy.  She will also follow-up with her cardiologist as an outpatient.  Cardiology did evaluate here her here and discussed with her possible Watchman procedure which she will discuss with her cardiologist.  She is medically stable for discharge.        DISCHARGE Follow Up Recommendations for labs and diagnostics: Follow-up with cardiology in 2 weeks.  Follow-up with colorectal surgery in 2 weeks.      Reasons For Change In Medications and Indications for New Medications:      Day of Discharge     Vital Signs:  Temp:  [97.7 °F (36.5 °C)-98.4 °F (36.9 °C)] 98.4 °F (36.9 °C)  Heart Rate:  [58-62] 58  Resp:  [16-18] 18  BP: (108-148)/(48-73) 148/48    Physical Exam:  Physical Exam   General Appearance:  Alert, cooperative, no distress, appears stated age  Head:  Normocephalic, without obvious abnormality, atraumatic  Eyes:  PERRL, conjunctiva/corneas clear, EOM's intact, fundi benign, both eyes  Ears:  Normal TM's and external ear canals, both ears  Nose: Nares normal, septum midline, mucosa normal, no drainage or sinus tenderness  Throat: Lips, mucosa, and tongue normal; teeth and gums normal  Neck: Supple, symmetrical, trachea midline, no adenopathy, thyroid: not enlarged, symmetric, no tenderness/mass/nodules, no carotid bruit or JVD  Lungs:   Clear to auscultation bilaterally, respirations unlabored  Heart:  Regular rate and rhythm, S1, S2 normal, no murmur, rub or gallop  Abdomen:  Soft, non-tender, bowel sounds active all four quadrants,  no masses, no organomegaly  Extremities: Extremities normal, atraumatic, no cyanosis or edema  Pulses: 2+ and symmetric  Skin: Skin color, texture, turgor normal, no rashes or lesions  Neurologic: Normal        Pertinent  and/or Most Recent Results     LAB RESULTS:      Lab 01/30/24  0407 01/29/24  0117 01/28/24  0509 01/27/24  0915 01/25/24  1225   WBC 7.30 7.00  7.10 8.10  --    HEMOGLOBIN 8.5* 8.2* 9.1* 10.1* 10.3*   HEMATOCRIT 25.6* 25.7* 27.5* 31.2* 31.7*   PLATELETS 248 221 218 233  --    NEUTROS ABS 4.00 4.00 4.30 4.50  --    LYMPHS ABS 2.10 1.90 1.70 2.40  --    MONOS ABS 0.90 0.90 0.90 0.90  --    EOS ABS 0.20 0.20 0.10 0.10  --    MCV 88.7 87.3 88.5 88.3  --          Lab 01/30/24  0407 01/29/24  0117 01/28/24  0509 01/27/24 2042 01/27/24  0915 01/25/24  1225 01/25/24 0238   SODIUM 138 139 139  --  138  --  141   POTASSIUM 4.0 3.5 3.7 3.9 3.3*   < > 3.4*   CHLORIDE 102 103 101  --  97*  --  100   CO2 30.0* 29.0 30.0*  --  30.0*  --  31.0*   ANION GAP 6.0 7.0 8.0  --  11.0  --  10.0   BUN 21 16 16  --  14  --  9   CREATININE 1.49* 1.44* 1.11*  --  1.04*  --  1.18*   EGFR 34.5* 35.9* 49.1*  --  53.1*  --  45.6*   GLUCOSE 91 100* 107*  --  91  --  78   CALCIUM 9.6 9.0 9.0  --  9.4  --  9.2   MAGNESIUM 1.5* 1.7 2.5* 2.2 1.2*  --   --     < > = values in this interval not displayed.         Lab 01/25/24  0238 01/24/24  0232   TOTAL PROTEIN 5.6* 5.3*   ALBUMIN 3.1* 3.1*   GLOBULIN 2.5 2.2   ALT (SGPT) 10 7   AST (SGOT) 25 19   BILIRUBIN 0.7 0.6   ALK PHOS 67 58                     Brief Urine Lab Results  (Last result in the past 365 days)        Color   Clarity   Blood   Leuk Est   Nitrite   Protein   CREAT   Urine HCG        01/22/24 2047 Yellow   Clear   Moderate (2+)   Trace   Negative   Negative                 Microbiology Results (last 10 days)       Procedure Component Value - Date/Time    COVID-19, FLU A/B, RSV PCR 1 HR TAT - Swab, Nasopharynx [[id number]]  (Normal) Collected: 01/22/24 1950    Lab Status: Final result Specimen: Swab from Nasopharynx Updated: 01/22/24 2028     COVID19 Not Detected     Influenza A PCR Not Detected     Influenza B PCR Not Detected     RSV, PCR Not Detected    Narrative:      Fact sheet for providers: https://www.fda.gov/media/337118/download    Fact sheet for patients: https://www.fda.gov/media/308095/download    Test performed by  PCR.            XR Chest 1 View    Result Date: 1/22/2024  Impression: Impression: 1. No acute process. 2. Stable cardiomegaly. Electronically Signed: Sarath Simpson MD  1/22/2024 7:43 PM EST  Workstation ID: [id number]    CT Abdomen Pelvis Without Contrast    Result Date: 1/22/2024  Impression: No acute pathology is demonstrated. Electronically Signed: Missael Heck MD  1/22/2024 7:20 PM EST  Workstation ID: [id number]     Results for orders placed during the hospital encounter of 09/04/19    Duplex Venous Lower Extremity - Bilateral CAR    Interpretation Summary  · Normal bilateral lower extremity venous duplex scan.      Results for orders placed during the hospital encounter of 09/04/19    Duplex Venous Lower Extremity - Bilateral CAR    Interpretation Summary  · Normal bilateral lower extremity venous duplex scan.      Results for orders placed during the hospital encounter of 01/22/24    Adult Transthoracic Echo Complete W/ Cont if Necessary Per Protocol    Interpretation Summary    Left ventricular systolic function is normal. Left ventricular ejection fraction appears to be 56 - 60%.    Left ventricular wall thickness is consistent with hypertrophy. Sigmoid-shaped ventricular septum is present.    The left atrial cavity is severely dilated.    The right atrial cavity is severely  dilated.    Moderate aortic valve stenosis is present. Aortic valve area is 1.5 cm2.    Aortic valve maximum pressure gradient is 21 mmHg. Aortic valve mean pressure gradient is 12 mmHg.    Severe tricuspid valve regurgitation is present.    There is a small (<1cm) pericardial effusion. There is no evidence of cardiac tamponade.      Labs Pending at Discharge:      Procedures Performed           Consults:   Consults       Date and Time Order Name Status Description    1/28/2024  1:29 PM Inpatient Cardiology Consult Completed     1/23/2024  6:34 PM Inpatient Colorectal Surgery Consult Completed     1/23/2024  8:38 AM Inpatient  Gastroenterology Consult Completed     1/22/2024  5:53 PM Hospitalist (on-call MD unless specified)                Discharge Details        Discharge Medications        New Medications        Instructions Start Date   bumetanide 2 MG tablet  Commonly known as: BUMEX   2 mg, Oral, Daily   Start Date: January 31, 2024     Jardiance 10 MG tablet tablet  Generic drug: empagliflozin   10 mg, Oral, Daily   Start Date: January 31, 2024            Continue These Medications        Instructions Start Date   acetaminophen 325 MG tablet  Commonly known as: TYLENOL   650 mg, Oral, Every 6 Hours PRN      albuterol sulfate  (90 Base) MCG/ACT inhaler  Commonly known as: PROVENTIL HFA;VENTOLIN HFA;PROAIR HFA   2 puffs, Inhalation, Every 4 Hours PRN      apixaban 5 MG tablet tablet  Commonly known as: ELIQUIS   5 mg, Oral, Every 12 Hours Scheduled      atorvastatin 10 MG tablet  Commonly known as: LIPITOR   10 mg, Oral, Nightly      bisacodyl 10 MG suppository  Commonly known as: DULCOLAX   10 mg, Rectal, Daily PRN      cloNIDine 0.1 MG tablet  Commonly known as: CATAPRES   0.1 mg, Oral, Every 6 Hours PRN      gabapentin 100 MG capsule  Commonly known as: NEURONTIN   200 mg, Oral, 3 Times Daily      Hydrocortisone (Perianal) 2.5 % rectal cream  Commonly known as: ANUSOL-HC   1 application , Rectal, 2 Times Daily      isosorbide mononitrate 30 MG 24 hr tablet  Commonly known as: IMDUR   30 mg, Oral, Daily      levothyroxine 88 MCG tablet  Commonly known as: SYNTHROID, LEVOTHROID   88 mcg, Oral, Daily      magnesium oxide 250 MG tablet   250 mg, Oral, 2 Times Daily      metoprolol succinate  MG 24 hr tablet  Commonly known as: TOPROL-XL   100 mg, Oral, Every 12 Hours Scheduled      multivitamin with minerals tablet tablet   1 tablet, Oral, Daily      nystatin 882522 UNIT/GM powder  Commonly known as: MYCOSTATIN   Topical, 3 Times Daily PRN, Apply under breasts      pantoprazole 40 MG EC tablet  Commonly known as:  PROTONIX   40 mg, Oral, Daily      potassium chloride 10 MEQ CR capsule  Commonly known as: MICRO-K   20 mEq, Oral, 3 Times Daily             Stop These Medications      amLODIPine 5 MG tablet  Commonly known as: NORVASC     furosemide 40 MG tablet  Commonly known as: LASIX              Allergies   Allergen Reactions    Aspirin      bleeding    Nsaids      bleeding         Discharge Disposition:     Home-Health Care Svc    Diet:  Hospital:  Diet Order   Procedures    Diet: Regular/House Diet; Fluid Consistency: Thin (IDDSI 0)         Discharge Activity:   Activity Instructions    Activity as tolerated             CODE STATUS:  Code Status and Medical Interventions:   Ordered at: 01/22/24 1912     Level Of Support Discussed With:    Patient     Code Status (Patient has no pulse and is not breathing):    CPR (Attempt to Resuscitate)     Medical Interventions (Patient has pulse or is breathing):    Full Support         Future Appointments   Date Time Provider Department Center   2/14/2024  1:30 PM Rios Thomas MD [facility]       Additional Instructions for the Follow-ups that You Need to Schedule       Discharge Follow-up with Specialty: Follow up with cardiology in 2 weeks; 2 Weeks   As directed      Specialty: Follow up with cardiology in 2 weeks   Follow Up: 2 Weeks        Discharge Follow-up with Specified Provider: Follow up with colorectal surgery in 2 weeks; 2 Weeks   As directed      To: Follow up with colorectal surgery in 2 weeks   Follow Up: 2 Weeks                Time spent on Discharge including face to face service:  >30 minutes    Signature: Electronically signed by Joe Sahu MD, 01/30/24, 14:24 EST.  [facility] Hospitalist Team

## 2024-01-30 NOTE — PROGRESS NOTES
[facility]   PROGRESS NOTE    Reason for follow-up: Afib, GI bleed      Patient Care Team:  Marisela Monte APRN as PCP - General (Nurse Practitioner)  Tone Tubbs MD as Consulting Physician (Cardiology)    Subjective .   No chest pain or shortness of breath       Review of Systems   Constitutional: Positive for malaise/fatigue.   Cardiovascular:  Positive for leg swelling. Negative for chest pain, dyspnea on exertion and palpitations.   Respiratory:  Positive for shortness of breath (intermittent). Negative for cough.    Gastrointestinal:  Negative for abdominal pain, melena, nausea and vomiting.   Neurological:  Negative for dizziness, focal weakness, headaches, light-headedness and numbness.   All other systems reviewed and are negative.      Allergies:  Aspirin and Nsaids    Scheduled Meds:apixaban, 5 mg, Oral, Q12H  atorvastatin, 10 mg, Oral, Nightly  bumetanide, 2 mg, Oral, Daily  empagliflozin, 10 mg, Oral, Daily  ferrous sulfate, 324 mg, Oral, Daily With Breakfast  gabapentin, 300 mg, Oral, TID  isosorbide mononitrate, 30 mg, Oral, Daily  levothyroxine, 88 mcg, Oral, Q AM  metoprolol succinate XL, 100 mg, Oral, Q12H  pantoprazole, 40 mg, Intravenous, Q12H  psyllium, 1 packet, Oral, Daily  senna-docusate sodium, 2 tablet, Oral, BID  sodium chloride, 10 mL, Intravenous, Q12H      Continuous Infusions:   PRN Meds:.  albuterol    aluminum-magnesium hydroxide-simethicone    senna-docusate sodium **AND** polyethylene glycol **AND** bisacodyl **AND** bisacodyl    Calcium Replacement - Follow Nurse / BPA Driven Protocol    cloNIDine    Magnesium Standard Dose Replacement - Follow Nurse / BPA Driven Protocol    nitroglycerin    Phosphorus Replacement - Follow Nurse / BPA Driven Protocol    Potassium Replacement - Follow Nurse / BPA Driven Protocol    promethazine **OR** promethazine    sodium chloride    sodium chloride    sodium chloride    Objective       VITAL  "SIGNS  Vitals:    01/29/24 1246 01/29/24 2046 01/30/24 0414 01/30/24 0600   BP: 110/69 108/69 122/73    BP Location: Left arm Left arm Right arm    Patient Position: Sitting Lying Lying    Pulse:  62     Resp: 22 17 16    Temp: 97.6 °F (36.4 °C) 97.7 °F (36.5 °C) 98.2 °F (36.8 °C)    TempSrc: Oral Oral Oral    SpO2: 97% 96% 96%    Weight:    76.7 kg (169 lb 1.5 oz)   Height:           Flowsheet Rows      Flowsheet Row First Filed Value   Admission Height 154.9 cm (61\") Documented at 01/22/2024 1338   Admission Weight 79.4 kg (175 lb) Documented at 01/22/2024 1338             TELEMETRY: Afib, rate controlled     Physical Exam:  Vitals reviewed.   Constitutional:       Appearance: Not in distress. Frail.   Neck:      Vascular: No JVR. JVD normal.   Pulmonary:      Effort: Pulmonary effort is normal.      Breath sounds: Normal breath sounds. No wheezing. No rhonchi. No rales.   Chest:      Chest wall: Not tender to palpatation.   Cardiovascular:      PMI at left midclavicular line. Normal rate. Irregularly irregular rhythm. Normal S1. Normal S2.       Murmurs: There is a systolic murmur.      No gallop.  No click. No rub.   Pulses:     Intact distal pulses.   Edema:     Peripheral edema present.  Abdominal:      General: Bowel sounds are normal.      Palpations: Abdomen is soft.      Tenderness: There is no abdominal tenderness.   Musculoskeletal: Normal range of motion.         General: No tenderness. Skin:     General: Skin is warm and dry.      Comments: Bilateral lower extremities wrapped in ACE bandages    Neurological:      General: No focal deficit present.      Mental Status: Alert and oriented to person, place and time.                  LAB RESULTS (LAST 7 DAYS)    CBC  Results from last 7 days   Lab Units 01/30/24  0407 01/29/24  0117 01/28/24  0509 01/27/24  0915 01/25/24  1225 01/25/24  0238 01/24/24  1426   WBC 10*3/mm3 7.30 7.00 7.10 8.10  --   --   --    RBC 10*6/mm3 2.89* 2.94* 3.10* 3.53*  --   --   -- "    HEMOGLOBIN g/dL 8.5* 8.2* 9.1* 10.1* 10.3* 9.3* 9.0*   HEMATOCRIT % 25.6* 25.7* 27.5* 31.2* 31.7* 27.8* 27.4*   MCV fL 88.7 87.3 88.5 88.3  --   --   --    PLATELETS 10*3/mm3 248 221 218 233  --   --   --        BMP  Results from last 7 days   Lab Units 01/30/24  0407 01/29/24  0117 01/28/24  0509 01/27/24 2042 01/27/24  0915 01/25/24  1225 01/25/24  0238 01/24/24  0232   SODIUM mmol/L 138 139 139  --  138  --  141 140   POTASSIUM mmol/L 4.0 3.5 3.7 3.9 3.3* 3.9 3.4* 3.7   CHLORIDE mmol/L 102 103 101  --  97*  --  100 101   CO2 mmol/L 30.0* 29.0 30.0*  --  30.0*  --  31.0* 31.0*   BUN mg/dL 21 16 16  --  14  --  9 10   CREATININE mg/dL 1.49* 1.44* 1.11*  --  1.04*  --  1.18* 1.19*   GLUCOSE mg/dL 91 100* 107*  --  91  --  78 80   MAGNESIUM mg/dL 1.5* 1.7 2.5* 2.2 1.2*  --   --   --        CMP   Results from last 7 days   Lab Units 01/30/24  0407 01/29/24  0117 01/28/24  0509 01/27/24 2042 01/27/24  0915 01/25/24  1225 01/25/24  0238 01/24/24  0232   SODIUM mmol/L 138 139 139  --  138  --  141 140   POTASSIUM mmol/L 4.0 3.5 3.7 3.9 3.3* 3.9 3.4* 3.7   CHLORIDE mmol/L 102 103 101  --  97*  --  100 101   CO2 mmol/L 30.0* 29.0 30.0*  --  30.0*  --  31.0* 31.0*   BUN mg/dL 21 16 16  --  14  --  9 10   CREATININE mg/dL 1.49* 1.44* 1.11*  --  1.04*  --  1.18* 1.19*   GLUCOSE mg/dL 91 100* 107*  --  91  --  78 80   ALBUMIN g/dL  --   --   --   --   --   --  3.1* 3.1*   BILIRUBIN mg/dL  --   --   --   --   --   --  0.7 0.6   ALK PHOS U/L  --   --   --   --   --   --  67 58   AST (SGOT) U/L  --   --   --   --   --   --  25 19   ALT (SGPT) U/L  --   --   --   --   --   --  10 7         BNP        TROPONIN        CoAg          Creatinine Clearance  Estimated Creatinine Clearance: 26.4 mL/min (A) (by C-G formula based on SCr of 1.49 mg/dL (H)).    ABG        Radiology  No radiology results for the last day          EKG    I personally viewed and interpreted the patient's EKG/Telemetry data:    ECHOCARDIOGRAM:    Results for  orders placed during the hospital encounter of 01/22/24    Adult Transthoracic Echo Complete W/ Cont if Necessary Per Protocol    Interpretation Summary    Left ventricular systolic function is normal. Left ventricular ejection fraction appears to be 56 - 60%.    Left ventricular wall thickness is consistent with hypertrophy. Sigmoid-shaped ventricular septum is present.    The left atrial cavity is severely dilated.    The right atrial cavity is severely  dilated.    Moderate aortic valve stenosis is present. Aortic valve area is 1.5 cm2.    Aortic valve maximum pressure gradient is 21 mmHg. Aortic valve mean pressure gradient is 12 mmHg.    Severe tricuspid valve regurgitation is present.    There is a small (<1cm) pericardial effusion. There is no evidence of cardiac tamponade.    STRESS MYOVIEW:    CARDIAC CATHETERIZATION:    OTHER:         Assessment & Plan     Acute lower GI bleed  Anemia  Patient underwent sclerosing agent to hemorrhoids on 1/24/2024  She is status post PRBC transfusion earlier this admission   Hemoglobin is stable after transfusion.          Acute on Chronic HFpEF due to diastolic dysfunction and valvular diease   Moderate AS  Severe TR   Patient is status post IV diuresis   now on PO Bumex    currently - 5 liters    Continue GDMT with bumex, metoprolol succ.   Will add jardiance   Renal function slightly worsened, will recheck in am   Echocardiogram 1/23/2024 showed normal LV EF with moderate aortic valve stenosis, severe TR and  biatrial enlargement          Chronic atrial fibrillation  [id number] SCORE   [id number] Score: 8 (1/29/2024  2:37 PM)    HAS-BLED SCORE   Total Risk Score (>5 Very High, 3-5 High, 2 Moderate, 1 Low): 4 (1/29/2024  2:39 PM)    Continue metoprolol-  rate is controlled   Continue Eliquis 5mg BID   Discussed again today regarding Watchman procedure   Patient will follow with her primary care doctor and will discuss about Watchman procedure.        Hypertension  Patient blood pressure currently stable on Imdur and Toprol       Coronary disease  Patient has nonobstructive disease in the past and is currently stable on medications     Dyslipidemia  Continue statin therapy       I discussed the patients findings and my recommendations with patient     Elbert Mathew MD  01/30/24  12:16 EST

## 2024-01-30 NOTE — PLAN OF CARE
Goal Outcome Evaluation:   Pt denies any pain. VSS. Plan to d/c back to [facility]. Plan of care ongoing

## 2024-01-30 NOTE — PLAN OF CARE
"Goal Outcome Evaluation:   Assessment: Kendy Worrell presents with ADL impairments affecting function including balance, endurance / activity tolerance, and range of motion (ROM). Pt demo improved ADL transfer skills & LB ADL skills. She continues to have difficulty with LB dressing due to LE edema & BLE wraps. Demonstrated functioning below baseline abilities indicate the need for continued skilled intervention while inpatient. Tolerating session today without incident. Will continue to follow and progress as tolerated.     Plan/Recommendations:   Low Intensity Therapy recommended post-acute care - This is recommended as therapy feels this patient would require 2-3 visits per week. OP or HH would be the best option depending on patient's home bound status. Consider, if the patient has other  \"skilled\" needs such as wounds, IV antibiotics, etc. Combined with \"low intensity\" could also equate to a SNF. If patient is medically complex, consider LTAC.. Pt requires no DME at discharge.     Pt desires Home with Home Health at discharge. Pt cooperative; agreeable to therapeutic recommendations and plan of care.                    "

## 2024-01-30 NOTE — PROGRESS NOTES
Nutrition Services    Patient Name: Kendy Worrell  YOB: 1940  MRN: [id number]  Admission date: 1/22/2024    PROGRESS NOTE      Encounter Information: Checking on PO intake. Pt on regular diet since 1/25.        PO Diet: Diet: Regular/House Diet; Fluid Consistency: Thin (IDDSI 0)   PO Supplements: No supplements ordered.   PO Intake:  89% PO intake since 1/26        Current nutrition support:    Nutrition support review:        Labs (reviewed below): Hypomagnesemia - being replaced        GI Function:  Last documented BM 1/27 - scheduled pericolace in place, pt refused most doses, PRN bowel regimen available.        Nutrition Intervention Updates: Continue regular/house diet as tolerated.    Encourage continued good PO intake.       Results from last 7 days   Lab Units 01/30/24 0407 01/29/24  0117 01/28/24  0509 01/25/24  1225 01/25/24  0238 01/24/24  0232   SODIUM mmol/L 138 139 139   < > 141 140   POTASSIUM mmol/L 4.0 3.5 3.7   < > 3.4* 3.7   CHLORIDE mmol/L 102 103 101   < > 100 101   CO2 mmol/L 30.0* 29.0 30.0*   < > 31.0* 31.0*   BUN mg/dL 21 16 16   < > 9 10   CREATININE mg/dL 1.49* 1.44* 1.11*   < > 1.18* 1.19*   CALCIUM mg/dL 9.6 9.0 9.0   < > 9.2 8.9   BILIRUBIN mg/dL  --   --   --   --  0.7 0.6   ALK PHOS U/L  --   --   --   --  67 58   ALT (SGPT) U/L  --   --   --   --  10 7   AST (SGOT) U/L  --   --   --   --  25 19   GLUCOSE mg/dL 91 100* 107*   < > 78 80    < > = values in this interval not displayed.     Results from last 7 days   Lab Units 01/30/24 0407 01/29/24  0117 01/28/24  0509   MAGNESIUM mg/dL 1.5* 1.7 2.5*   HEMOGLOBIN g/dL 8.5* 8.2* 9.1*   HEMATOCRIT % 25.6* 25.7* 27.5*     COVID19   Date Value Ref Range Status   01/22/2024 Not Detected Not Detected - Ref. Range Final     Lab Results   Component Value Date    HGBA1C 5.10 11/03/2022       RD to follow up per protocol.    Electronically signed by:  Donya Maxwell  01/30/24 09:36 EST

## 2024-01-31 NOTE — OUTREACH NOTE
Prep Survey      Flowsheet Row Responses   [facility]? [facility]   Is LACE score < 7 ? No   Eligibility Readm Mgmt   Discharge diagnosis Acute lower GI bleed secondary to hemorrhoids   Does the patient have one of the following disease processes/diagnoses(primary or secondary)? Other   Does the patient have Home health ordered? Yes   What is the Home health agency?  [facility] [place]   Is there a DME ordered? No   General alerts for this patient [facility]   Prep survey completed? Yes            KIRAN LEBLANC - Registered Nurse

## 2024-02-07 ENCOUNTER — READMISSION MANAGEMENT (OUTPATIENT)
Dept: CALL CENTER | Facility: HOSPITAL | Age: 84
End: 2024-02-07
Payer: MEDICARE

## 2024-02-07 NOTE — OUTREACH NOTE
Medical Week 2 Survey      Flowsheet Row Responses   [facility] patient discharged from? [facility]   Does the patient have one of the following disease processes/diagnoses(primary or secondary)? Other   Week 2 attempt successful? Yes   Call start time 1639   General alerts for this patient [facility]   Discharge diagnosis Acute lower GI bleed secondary to hemorrhoids, acute loss anemia   Call end time 1645   Person spoke with today (if not patient) and relationship Patient   Meds reviewed with patient/caregiver? Yes   Does the patient have all medications ordered at discharge? Yes   Is the patient taking all medications as directed (includes completed medication regime)? Yes  [Patient reports meds are set up in planner box for her and she takes them likes she's supposed to.]   Does the patient have a primary care provider?  Yes   Does the patient have an appointment with their PCP within 7 days of discharge? Greater than 7 days   Comments regarding PCP Reports has facility NP that sees her. She reports expected to see her next week.   Nursing Interventions Verified appointment date/time/provider   Has the patient kept scheduled appointments due by today? N/A   Comments Patient scheduled to see colorectal surgeon on 2/14   What is the Home health agency?  [facility]   Has home health visited the patient within 72 hours of discharge? Yes   Psychosocial issues? No   Did the patient receive a copy of their discharge instructions? Yes   Nursing interventions Reviewed instructions with patient   What is the patient's perception of their health status since discharge? Improving   Is the patient/caregiver able to teach back signs and symptoms related to disease process for when to call PCP? Yes   Is the patient/caregiver able to teach back signs and symptoms related to disease process for when to call 911? Yes   Is the patient/caregiver able to teach back the hierarchy  of who to call/visit for symptoms/problems? PCP, Specialist, Home health nurse, Urgent Care, ED, 911 Yes   If the patient is a current smoker, are they able to teach back resources for cessation? Not a smoker   Week 2 Call Completed? Yes   Is the patient interested in additional calls from an ambulatory ? No   Would this patient benefit from a Referral to Amb Social Work? No   Call end time 4585            SONJA WARD - Registered Nurse

## 2024-02-08 ENCOUNTER — TELEPHONE (OUTPATIENT)
Dept: CARDIOLOGY | Facility: CLINIC | Age: 84
End: 2024-02-08
Payer: MEDICARE

## 2024-02-08 NOTE — TELEPHONE ENCOUNTER
Caller: SAMREEN    Relationship: Other    Best call back number: [phone]    What form or medical record are you requesting: PROGRESS NOTES/ CONSULT NOTES FROM 1.28.24 WITH DR ROWE    Who is requesting this form or medical record from you: PCP    How would you like to receive the form or medical records (pick-up, mail, fax): FAX  If fax, what is the fax number: [phone]    Timeframe paperwork needed: ASAP    Additional notes:

## 2024-02-14 ENCOUNTER — OFFICE VISIT (OUTPATIENT)
Age: 84
End: 2024-02-14
Payer: MEDICARE

## 2024-02-14 VITALS
HEART RATE: 76 BPM | TEMPERATURE: 98 F | OXYGEN SATURATION: 98 % | SYSTOLIC BLOOD PRESSURE: 107 MMHG | WEIGHT: 168.2 LBS | HEIGHT: 61 IN | BODY MASS INDEX: 31.75 KG/M2 | DIASTOLIC BLOOD PRESSURE: 69 MMHG

## 2024-02-14 DIAGNOSIS — K64.8 BLEEDING INTERNAL HEMORRHOIDS: Primary | ICD-10-CM

## 2024-02-14 DIAGNOSIS — R19.8 IRREGULAR BOWEL HABITS: ICD-10-CM

## 2024-02-14 RX ORDER — FERROUS SULFATE 325(65) MG
325 TABLET ORAL
COMMUNITY
Start: 2024-02-08

## 2024-02-15 RX ORDER — SODIUM TETRADECYL SULFATE 30 MG/ML
2 INJECTION, SOLUTION INTRAVENOUS ONCE
Status: DISCONTINUED | OUTPATIENT
Start: 2024-02-15 | End: 2024-02-15

## 2024-03-21 ENCOUNTER — OFFICE VISIT (OUTPATIENT)
Age: 84
End: 2024-03-21
Payer: MEDICARE

## 2024-03-21 VITALS
HEART RATE: 68 BPM | SYSTOLIC BLOOD PRESSURE: 157 MMHG | HEIGHT: 61 IN | BODY MASS INDEX: 31.15 KG/M2 | WEIGHT: 165 LBS | OXYGEN SATURATION: 97 % | DIASTOLIC BLOOD PRESSURE: 99 MMHG | TEMPERATURE: 98.4 F

## 2024-03-21 DIAGNOSIS — R19.8 IRREGULAR BOWEL HABITS: ICD-10-CM

## 2024-03-21 DIAGNOSIS — K64.8 BLEEDING INTERNAL HEMORRHOIDS: Primary | ICD-10-CM

## 2024-03-21 DIAGNOSIS — L29.0 PRURITUS ANI: ICD-10-CM

## 2024-03-21 RX ORDER — ANORECTAL OINTMENT 15.7; .44; 24; 20.6 G/100G; G/100G; G/100G; G/100G
1 OINTMENT TOPICAL 2 TIMES DAILY
Qty: 4 PACKET | Refills: 0 | COMMUNITY
Start: 2024-03-21

## 2024-03-21 RX ORDER — SODIUM TETRADECYL SULFATE 30 MG/ML
2 INJECTION, SOLUTION INTRAVENOUS ONCE
Status: COMPLETED | OUTPATIENT
Start: 2024-03-21 | End: 2024-03-21

## 2024-03-21 RX ORDER — TORSEMIDE 20 MG/1
20 TABLET ORAL 2 TIMES DAILY
COMMUNITY

## 2024-03-21 RX ADMIN — SODIUM TETRADECYL SULFATE 2 ML: 30 INJECTION, SOLUTION INTRAVENOUS at 14:25

## 2024-03-21 NOTE — PROGRESS NOTES
Colorectal Surgery Followup Note    ID:  Kendy Worrell;   : 1940  DATE OF VISIT: 3/21/2024    Chief Complaint  Follow-up (4 week follow up on internal hemorrhoids )       Rectal Bleeding (Hospital follow up, rectal bleeding; pt reports some bleeding on 24 and 24. Not bad like before per patient)        Subjective     Mrs Worrell is here for follow up visit. She reports significant improvement in the bleeding bowel movement since last injection. She only two episodes of bleeding. She has no other complaints. She is on supplemental fiber but her stool is still loose.      Exam  General:  No acute distress  Head: Normocephalic, atraumatic  Neuro: Alert and oriented     External anorectal exam: Significant irritation of the perianal skin blanching and trauma from excessive wiping  Digital rectal exam: no tenderness with exam, no palpable masses, tone is decreased     Procedure Note  Procedure: Anoscopy  Indication: rectal bleeding  Description: After digital examination was completed the scope was inserted into the anal canal. The anal canal was visualized to 4-5 cm. See Findings below. The scope was then removed. Patient tolerated procedure well.  Findings: Internal hemorrhoids appear  enlarged, no other mucosal lesions noted.      Assessment  -Bleeding internal hemorrhoid  -Irregular bowel movements  - Pruritus ani     Plan / Recommendations  -Improvement with the first sclerosing injection is promising.  She will still have small amount of blood with bowel movement intermittently.  I have injected 1.5 cc of 1% sodium tetradecyl sulfate was injected to the left lateral column.      -Recommended to continue on Metamucil.  Currently her bowel movements are loose and liquid.  I have recommended increasing to 3 tablespoon in the morning.     -Recommend avoiding excessive wiping and using chemical wipes which will worsen chemical and mechanical trauma to the perianal region exacerbating her pruritus ani.  Sample Calmoseptine provided.      -Follow-up 4 to 6 weeks if she continues to bleed or as needed.      Rios Thomas MD  Colon and Rectal Surgery   [facility]

## 2024-05-09 ENCOUNTER — OFFICE VISIT (OUTPATIENT)
Age: 84
End: 2024-05-09
Payer: MEDICARE

## 2024-05-09 VITALS
TEMPERATURE: 97.8 F | HEIGHT: 61 IN | HEART RATE: 77 BPM | OXYGEN SATURATION: 95 % | BODY MASS INDEX: 28.92 KG/M2 | DIASTOLIC BLOOD PRESSURE: 87 MMHG | SYSTOLIC BLOOD PRESSURE: 121 MMHG | WEIGHT: 153.2 LBS

## 2024-05-09 DIAGNOSIS — R19.8 IRREGULAR BOWEL HABITS: ICD-10-CM

## 2024-05-09 DIAGNOSIS — K64.8 BLEEDING INTERNAL HEMORRHOIDS: ICD-10-CM

## 2024-05-09 DIAGNOSIS — L29.0 PRURITUS ANI: Primary | ICD-10-CM

## 2024-05-09 RX ORDER — AMLODIPINE BESYLATE 5 MG/1
5 TABLET ORAL DAILY PRN
COMMUNITY

## 2024-05-09 RX ORDER — LACOSAMIDE 50 MG/1
50 TABLET ORAL DAILY
COMMUNITY
Start: 2024-04-15

## 2024-05-09 RX ORDER — NITROGLYCERIN 0.4 MG/1
0.4 TABLET SUBLINGUAL
COMMUNITY
Start: 2024-05-06

## 2024-05-09 RX ORDER — METOLAZONE 2.5 MG/1
1 TABLET ORAL WEEKLY
COMMUNITY
Start: 2024-04-26

## 2024-05-09 RX ORDER — POTASSIUM CHLORIDE 20 MEQ/1
40 TABLET, EXTENDED RELEASE ORAL 3 TIMES DAILY
COMMUNITY
Start: 2024-04-26

## 2024-05-09 RX ORDER — SODIUM TETRADECYL SULFATE 30 MG/ML
2 INJECTION, SOLUTION INTRAVENOUS ONCE
Status: COMPLETED | OUTPATIENT
Start: 2024-05-09 | End: 2024-05-09

## 2024-05-09 RX ORDER — TORSEMIDE 100 MG/1
100 TABLET ORAL DAILY
COMMUNITY
Start: 2024-04-08

## 2024-05-09 RX ADMIN — SODIUM TETRADECYL SULFATE 2 ML: 30 INJECTION, SOLUTION INTRAVENOUS at 11:30

## 2024-05-23 ENCOUNTER — HOSPITAL ENCOUNTER (INPATIENT)
Facility: HOSPITAL | Age: 84
LOS: 3 days | Discharge: NURSING, REHAB OR LONG-TERM CARE | End: 2024-05-27
Attending: INTERNAL MEDICINE | Admitting: INTERNAL MEDICINE
Payer: MEDICARE

## 2024-05-23 ENCOUNTER — APPOINTMENT (OUTPATIENT)
Dept: GENERAL RADIOLOGY | Facility: HOSPITAL | Age: 84
End: 2024-05-23
Payer: MEDICARE

## 2024-05-23 ENCOUNTER — APPOINTMENT (OUTPATIENT)
Dept: CT IMAGING | Facility: HOSPITAL | Age: 84
End: 2024-05-23
Payer: MEDICARE

## 2024-05-23 DIAGNOSIS — N28.9 ACUTE RENAL INSUFFICIENCY: ICD-10-CM

## 2024-05-23 DIAGNOSIS — R42 DIZZINESS: Primary | ICD-10-CM

## 2024-05-23 LAB
ALBUMIN SERPL-MCNC: 3.7 G/DL (ref 3.5–5.2)
ALBUMIN/GLOB SERPL: 1 G/DL
ALP SERPL-CCNC: 109 U/L (ref 39–117)
ALT SERPL W P-5'-P-CCNC: 12 U/L (ref 1–33)
ANION GAP SERPL CALCULATED.3IONS-SCNC: 14.8 MMOL/L (ref 5–15)
AST SERPL-CCNC: 36 U/L (ref 1–32)
BASOPHILS # BLD AUTO: 0.06 10*3/MM3 (ref 0–0.2)
BASOPHILS NFR BLD AUTO: 0.8 % (ref 0–1.5)
BILIRUB SERPL-MCNC: 1.1 MG/DL (ref 0–1.2)
BUN SERPL-MCNC: 28 MG/DL (ref 8–23)
BUN/CREAT SERPL: 12.3 (ref 7–25)
CALCIUM SPEC-SCNC: 10.4 MG/DL (ref 8.6–10.5)
CHLORIDE SERPL-SCNC: 87 MMOL/L (ref 98–107)
CHOLEST SERPL-MCNC: 141 MG/DL (ref 0–200)
CO2 SERPL-SCNC: 28.2 MMOL/L (ref 22–29)
CREAT SERPL-MCNC: 2.27 MG/DL (ref 0.57–1)
DEPRECATED RDW RBC AUTO: 47.3 FL (ref 37–54)
EGFRCR SERPLBLD CKD-EPI 2021: 20.7 ML/MIN/1.73
EOSINOPHIL # BLD AUTO: 0.12 10*3/MM3 (ref 0–0.4)
EOSINOPHIL NFR BLD AUTO: 1.6 % (ref 0.3–6.2)
ERYTHROCYTE [DISTWIDTH] IN BLOOD BY AUTOMATED COUNT: 14.3 % (ref 12.3–15.4)
ERYTHROCYTE [SEDIMENTATION RATE] IN BLOOD: 19 MM/HR (ref 0–30)
GLOBULIN UR ELPH-MCNC: 3.7 GM/DL
GLUCOSE BLDC GLUCOMTR-MCNC: 118 MG/DL (ref 70–105)
GLUCOSE SERPL-MCNC: 98 MG/DL (ref 65–99)
HBA1C MFR BLD: 5.53 % (ref 4.8–5.6)
HCT VFR BLD AUTO: 39.2 % (ref 34–46.6)
HDLC SERPL-MCNC: 98 MG/DL (ref 40–60)
HGB BLD-MCNC: 12.5 G/DL (ref 12–15.9)
IMM GRANULOCYTES # BLD AUTO: 0.04 10*3/MM3 (ref 0–0.05)
IMM GRANULOCYTES NFR BLD AUTO: 0.5 % (ref 0–0.5)
LDLC SERPL CALC-MCNC: 31 MG/DL (ref 0–100)
LDLC/HDLC SERPL: 0.32 {RATIO}
LYMPHOCYTES # BLD AUTO: 1.48 10*3/MM3 (ref 0.7–3.1)
LYMPHOCYTES NFR BLD AUTO: 20.3 % (ref 19.6–45.3)
MAGNESIUM SERPL-MCNC: 1.5 MG/DL (ref 1.6–2.4)
MCH RBC QN AUTO: 29.1 PG (ref 26.6–33)
MCHC RBC AUTO-ENTMCNC: 31.9 G/DL (ref 31.5–35.7)
MCV RBC AUTO: 91.2 FL (ref 79–97)
MONOCYTES # BLD AUTO: 0.86 10*3/MM3 (ref 0.1–0.9)
MONOCYTES NFR BLD AUTO: 11.8 % (ref 5–12)
NEUTROPHILS NFR BLD AUTO: 4.73 10*3/MM3 (ref 1.7–7)
NEUTROPHILS NFR BLD AUTO: 65 % (ref 42.7–76)
NRBC BLD AUTO-RTO: 0 /100 WBC (ref 0–0.2)
NT-PROBNP SERPL-MCNC: 1248 PG/ML (ref 0–1800)
OSMOLALITY SERPL: 287 MOSM/KG (ref 280–301)
OSMOLALITY UR: 246 MOSM/KG (ref 300–800)
PLATELET # BLD AUTO: 267 10*3/MM3 (ref 140–450)
PMV BLD AUTO: 10 FL (ref 6–12)
POTASSIUM SERPL-SCNC: 3.6 MMOL/L (ref 3.5–5.2)
PROT SERPL-MCNC: 7.4 G/DL (ref 6–8.5)
RBC # BLD AUTO: 4.3 10*6/MM3 (ref 3.77–5.28)
SODIUM SERPL-SCNC: 130 MMOL/L (ref 136–145)
SODIUM UR-SCNC: 37 MMOL/L
T4 FREE SERPL-MCNC: 2.29 NG/DL (ref 0.93–1.7)
TRIGL SERPL-MCNC: 56 MG/DL (ref 0–150)
TROPONIN T SERPL HS-MCNC: 30 NG/L
TROPONIN T SERPL HS-MCNC: 35 NG/L
TSH SERPL DL<=0.05 MIU/L-ACNC: 0.43 UIU/ML (ref 0.27–4.2)
VLDLC SERPL-MCNC: 12 MG/DL (ref 5–40)
WBC NRBC COR # BLD AUTO: 7.29 10*3/MM3 (ref 3.4–10.8)

## 2024-05-23 PROCEDURE — 85025 COMPLETE CBC W/AUTO DIFF WBC: CPT | Performed by: NURSE PRACTITIONER

## 2024-05-23 PROCEDURE — 83735 ASSAY OF MAGNESIUM: CPT | Performed by: NURSE PRACTITIONER

## 2024-05-23 PROCEDURE — 80061 LIPID PANEL: CPT | Performed by: NURSE PRACTITIONER

## 2024-05-23 PROCEDURE — 93005 ELECTROCARDIOGRAM TRACING: CPT | Performed by: INTERNAL MEDICINE

## 2024-05-23 PROCEDURE — 84443 ASSAY THYROID STIM HORMONE: CPT | Performed by: NURSE PRACTITIONER

## 2024-05-23 PROCEDURE — 82948 REAGENT STRIP/BLOOD GLUCOSE: CPT

## 2024-05-23 PROCEDURE — 84484 ASSAY OF TROPONIN QUANT: CPT | Performed by: NURSE PRACTITIONER

## 2024-05-23 PROCEDURE — 25810000003 SODIUM CHLORIDE 0.9 % SOLUTION: Performed by: INTERNAL MEDICINE

## 2024-05-23 PROCEDURE — 84300 ASSAY OF URINE SODIUM: CPT | Performed by: NURSE PRACTITIONER

## 2024-05-23 PROCEDURE — 93005 ELECTROCARDIOGRAM TRACING: CPT

## 2024-05-23 PROCEDURE — G0378 HOSPITAL OBSERVATION PER HR: HCPCS

## 2024-05-23 PROCEDURE — 83935 ASSAY OF URINE OSMOLALITY: CPT | Performed by: NURSE PRACTITIONER

## 2024-05-23 PROCEDURE — 71045 X-RAY EXAM CHEST 1 VIEW: CPT

## 2024-05-23 PROCEDURE — 70450 CT HEAD/BRAIN W/O DYE: CPT

## 2024-05-23 PROCEDURE — 83880 ASSAY OF NATRIURETIC PEPTIDE: CPT | Performed by: NURSE PRACTITIONER

## 2024-05-23 PROCEDURE — 80053 COMPREHEN METABOLIC PANEL: CPT | Performed by: NURSE PRACTITIONER

## 2024-05-23 PROCEDURE — 83036 HEMOGLOBIN GLYCOSYLATED A1C: CPT | Performed by: NURSE PRACTITIONER

## 2024-05-23 PROCEDURE — 82570 ASSAY OF URINE CREATININE: CPT | Performed by: NURSE PRACTITIONER

## 2024-05-23 PROCEDURE — 25810000003 SODIUM CHLORIDE 0.9 % SOLUTION: Performed by: NURSE PRACTITIONER

## 2024-05-23 PROCEDURE — 83930 ASSAY OF BLOOD OSMOLALITY: CPT | Performed by: NURSE PRACTITIONER

## 2024-05-23 PROCEDURE — 99285 EMERGENCY DEPT VISIT HI MDM: CPT

## 2024-05-23 PROCEDURE — 85652 RBC SED RATE AUTOMATED: CPT | Performed by: NURSE PRACTITIONER

## 2024-05-23 PROCEDURE — 36415 COLL VENOUS BLD VENIPUNCTURE: CPT

## 2024-05-23 PROCEDURE — 84439 ASSAY OF FREE THYROXINE: CPT | Performed by: NURSE PRACTITIONER

## 2024-05-23 RX ORDER — ALBUTEROL SULFATE 2.5 MG/3ML
2.5 SOLUTION RESPIRATORY (INHALATION) EVERY 4 HOURS PRN
Status: DISCONTINUED | OUTPATIENT
Start: 2024-05-23 | End: 2024-05-27 | Stop reason: HOSPADM

## 2024-05-23 RX ORDER — POLYETHYLENE GLYCOL 3350 17 G/17G
17 POWDER, FOR SOLUTION ORAL DAILY PRN
Status: DISCONTINUED | OUTPATIENT
Start: 2024-05-23 | End: 2024-05-27 | Stop reason: HOSPADM

## 2024-05-23 RX ORDER — BISACODYL 10 MG
10 SUPPOSITORY, RECTAL RECTAL DAILY PRN
Status: DISCONTINUED | OUTPATIENT
Start: 2024-05-23 | End: 2024-05-27 | Stop reason: HOSPADM

## 2024-05-23 RX ORDER — ACETAMINOPHEN 325 MG/1
650 TABLET ORAL EVERY 4 HOURS PRN
Status: DISCONTINUED | OUTPATIENT
Start: 2024-05-23 | End: 2024-05-27 | Stop reason: HOSPADM

## 2024-05-23 RX ORDER — BISACODYL 5 MG/1
5 TABLET, DELAYED RELEASE ORAL DAILY PRN
Status: DISCONTINUED | OUTPATIENT
Start: 2024-05-23 | End: 2024-05-27 | Stop reason: HOSPADM

## 2024-05-23 RX ORDER — PANTOPRAZOLE SODIUM 40 MG/1
40 TABLET, DELAYED RELEASE ORAL DAILY
COMMUNITY

## 2024-05-23 RX ORDER — AMOXICILLIN 250 MG
2 CAPSULE ORAL 2 TIMES DAILY PRN
Status: DISCONTINUED | OUTPATIENT
Start: 2024-05-23 | End: 2024-05-27 | Stop reason: HOSPADM

## 2024-05-23 RX ORDER — IBUPROFEN 600 MG/1
1 TABLET ORAL
Status: DISCONTINUED | OUTPATIENT
Start: 2024-05-23 | End: 2024-05-27 | Stop reason: HOSPADM

## 2024-05-23 RX ORDER — SODIUM CHLORIDE 9 MG/ML
40 INJECTION, SOLUTION INTRAVENOUS AS NEEDED
Status: DISCONTINUED | OUTPATIENT
Start: 2024-05-23 | End: 2024-05-27 | Stop reason: HOSPADM

## 2024-05-23 RX ORDER — LEVOTHYROXINE SODIUM 88 UG/1
88 TABLET ORAL
Status: DISCONTINUED | OUTPATIENT
Start: 2024-05-24 | End: 2024-05-27 | Stop reason: HOSPADM

## 2024-05-23 RX ORDER — SODIUM CHLORIDE 0.9 % (FLUSH) 0.9 %
10 SYRINGE (ML) INJECTION AS NEEDED
Status: DISCONTINUED | OUTPATIENT
Start: 2024-05-23 | End: 2024-05-27 | Stop reason: HOSPADM

## 2024-05-23 RX ORDER — NICOTINE POLACRILEX 4 MG
15 LOZENGE BUCCAL
Status: DISCONTINUED | OUTPATIENT
Start: 2024-05-23 | End: 2024-05-27 | Stop reason: HOSPADM

## 2024-05-23 RX ORDER — ONDANSETRON 2 MG/ML
4 INJECTION INTRAMUSCULAR; INTRAVENOUS EVERY 6 HOURS PRN
Status: DISCONTINUED | OUTPATIENT
Start: 2024-05-23 | End: 2024-05-27 | Stop reason: HOSPADM

## 2024-05-23 RX ORDER — SODIUM CHLORIDE 9 MG/ML
50 INJECTION, SOLUTION INTRAVENOUS CONTINUOUS
Status: DISCONTINUED | OUTPATIENT
Start: 2024-05-23 | End: 2024-05-24

## 2024-05-23 RX ORDER — DEXTROSE MONOHYDRATE 25 G/50ML
25 INJECTION, SOLUTION INTRAVENOUS
Status: DISCONTINUED | OUTPATIENT
Start: 2024-05-23 | End: 2024-05-27 | Stop reason: HOSPADM

## 2024-05-23 RX ORDER — ATORVASTATIN CALCIUM 10 MG/1
10 TABLET, FILM COATED ORAL NIGHTLY
Status: DISCONTINUED | OUTPATIENT
Start: 2024-05-23 | End: 2024-05-27 | Stop reason: HOSPADM

## 2024-05-23 RX ORDER — PANTOPRAZOLE SODIUM 40 MG/1
40 TABLET, DELAYED RELEASE ORAL
Status: DISCONTINUED | OUTPATIENT
Start: 2024-05-24 | End: 2024-05-27 | Stop reason: HOSPADM

## 2024-05-23 RX ORDER — SODIUM CHLORIDE 0.9 % (FLUSH) 0.9 %
10 SYRINGE (ML) INJECTION EVERY 12 HOURS SCHEDULED
Status: DISCONTINUED | OUTPATIENT
Start: 2024-05-23 | End: 2024-05-27 | Stop reason: HOSPADM

## 2024-05-23 RX ORDER — INSULIN LISPRO 100 [IU]/ML
2-9 INJECTION, SOLUTION INTRAVENOUS; SUBCUTANEOUS
Status: DISCONTINUED | OUTPATIENT
Start: 2024-05-24 | End: 2024-05-27

## 2024-05-23 RX ORDER — SODIUM CHLORIDE 9 MG/ML
50 INJECTION, SOLUTION INTRAVENOUS CONTINUOUS
Status: DISCONTINUED | OUTPATIENT
Start: 2024-05-23 | End: 2024-05-23 | Stop reason: SDUPTHER

## 2024-05-23 RX ORDER — ISOSORBIDE MONONITRATE 30 MG/1
30 TABLET, EXTENDED RELEASE ORAL DAILY
Status: DISCONTINUED | OUTPATIENT
Start: 2024-05-24 | End: 2024-05-27 | Stop reason: HOSPADM

## 2024-05-23 RX ORDER — LACOSAMIDE 50 MG/1
50 TABLET ORAL 2 TIMES DAILY
Status: DISCONTINUED | OUTPATIENT
Start: 2024-05-23 | End: 2024-05-27 | Stop reason: HOSPADM

## 2024-05-23 RX ADMIN — APIXABAN 5 MG: 5 TABLET, FILM COATED ORAL at 23:28

## 2024-05-23 RX ADMIN — SODIUM CHLORIDE 500 ML: 9 INJECTION, SOLUTION INTRAVENOUS at 15:51

## 2024-05-23 RX ADMIN — SODIUM CHLORIDE 50 ML/HR: 9 INJECTION, SOLUTION INTRAVENOUS at 21:18

## 2024-05-23 RX ADMIN — LACOSAMIDE 50 MG: 50 TABLET, FILM COATED ORAL at 23:28

## 2024-05-23 RX ADMIN — ATORVASTATIN CALCIUM 10 MG: 10 TABLET, FILM COATED ORAL at 23:28

## 2024-05-23 NOTE — ED PROVIDER NOTES
Subjective   History of Present Illness  Patient is an 84-year-old female who was at home this morning when her home health nurse came and did her blood pressure she states that she had her do it while sitting and then standing and when she stood she became dizzy she states it only lasted a few minutes but she decided to come in because she had some decreased oral intake and has just generally not been feeling well over the last several days.  She comes in from [facility].  Her daughter at bedside states that her blood pressure has been low over the last several days and she has been weaker than normal.      Review of Systems   Constitutional:  Positive for appetite change. Negative for chills, fatigue and fever.   HENT:  Negative for congestion, tinnitus and trouble swallowing.    Eyes:  Negative for photophobia, discharge and redness.   Respiratory:  Negative for cough and shortness of breath.    Cardiovascular:  Negative for chest pain and palpitations.   Gastrointestinal:  Negative for abdominal pain, diarrhea, nausea and vomiting.   Genitourinary:  Negative for dysuria, frequency and urgency.   Musculoskeletal:  Negative for back pain, joint swelling and myalgias.   Skin:  Negative for rash.   Neurological:  Positive for dizziness and weakness. Negative for headaches.   Psychiatric/Behavioral:  Negative for confusion.    All other systems reviewed and are negative.      Past Medical History:   Diagnosis Date    A-fib     Arthritis     Breast cancer     CHF (congestive heart failure)     CVA (cerebral vascular accident) 09/2019    History of pulmonary embolus (PE)     Hyperlipidemia     Hypertension     Hyperthyroidism     patient has hypothyroidism    Hypothyroidism        Allergies   Allergen Reactions    Aspirin Other (See Comments)     Severe bleeding    Nsaids Other (See Comments)     Severe bleeding       Past Surgical History:   Procedure Laterality Date    BREAST BIOPSY      BREAST  SURGERY Right     CHOLECYSTECTOMY      COLON SURGERY      Removed    COLONOSCOPY      COLONOSCOPY N/A 11/3/2022    Procedure: COLONOSCOPY to anastamosis with biopsies and cold snare polypectomy;  Surgeon: Saroj Oakes MD;  Location: [facility] ENDOSCOPY;  Service: Gastroenterology;  Laterality: N/A;  PRe: rectal bleeding  Post: hemorrhoids, diverticulosis, anastamotic ulcer, polyp, hemostasis clip free-floating    HYSTERECTOMY      MAMMO BILATERAL  2015    MASTECTOMY      TONSILLECTOMY         Family History   Problem Relation Age of Onset    Stroke Mother     Hypertension Mother     Heart disease Father     Cancer Father     Colon polyps Father        Social History     Socioeconomic History    Marital status: Single   Tobacco Use    Smoking status: Former     Current packs/day: 0.00     Types: Cigarettes     Quit date:      Years since quittin.4     Passive exposure: Past    Smokeless tobacco: Never    Tobacco comments:     quit in    Vaping Use    Vaping status: Never Used   Substance and Sexual Activity    Alcohol use: Yes     Alcohol/week: 3.0 standard drinks of alcohol     Types: 3 Shots of liquor per week     Comment: Last drink 3 weeks ago.    Sexual activity: Never           Objective   Physical Exam  Vitals reviewed.   Constitutional:       General: She is not in acute distress.     Appearance: Normal appearance. She is well-developed. She is obese. She is not toxic-appearing.   HENT:      Head: Normocephalic and atraumatic.      Right Ear: External ear normal.      Left Ear: External ear normal.   Eyes:      Conjunctiva/sclera: Conjunctivae normal.      Pupils: Pupils are equal, round, and reactive to light.   Cardiovascular:      Rate and Rhythm: Normal rate and regular rhythm.      Heart sounds: Normal heart sounds.   Pulmonary:      Effort: Pulmonary effort is normal. No respiratory distress.      Breath sounds: Normal breath sounds. No wheezing.   Abdominal:      General: Bowel  "sounds are normal.      Palpations: Abdomen is soft.      Tenderness: There is no abdominal tenderness.   Musculoskeletal:         General: No deformity. Normal range of motion.      Cervical back: Normal range of motion and neck supple.   Skin:     General: Skin is warm and dry.      Capillary Refill: Capillary refill takes less than 2 seconds.   Neurological:      General: No focal deficit present.      Mental Status: She is alert and oriented to person, place, and time.      GCS: GCS eye subscore is 4. GCS verbal subscore is 5. GCS motor subscore is 6.      Motor: No weakness.   Psychiatric:         Attention and Perception: Attention normal.         Mood and Affect: Mood normal.         Speech: Speech normal.         Behavior: Behavior normal.       Procedures             EKG was interpreted by myself as well as Dr. Monte as atrial fibrillation with a rate of 64 it was compared to the previous dated 11/18/2021 which was atrial for but the rate of 82 and no significant changes were identified.    ED Course  ED Course as of 05/23/24 1735   Thu May 23, 2024   1449 Marked ready for CT [KW]      ED Course User Index  [KW] Kimberli Pedro, MENDOZA    /76 (Patient Position: Sitting)   Pulse 66   Temp 97.5 °F (36.4 °C) (Oral)   Resp 13   Ht 154.9 cm (61\")   Wt 70 kg (154 lb 5.2 oz)   SpO2 91%   BMI 29.16 kg/m²   Labs Reviewed   COMPREHENSIVE METABOLIC PANEL - Abnormal; Notable for the following components:       Result Value    BUN 28 (*)     Creatinine 2.27 (*)     Sodium 130 (*)     Chloride 87 (*)     AST (SGOT) 36 (*)     eGFR 20.7 (*)     All other components within normal limits    Narrative:     GFR Normal >60  Chronic Kidney Disease <60  Kidney Failure <15    The GFR formula is only valid for adults with stable renal function between ages 18 and 70.   CBC WITH AUTO DIFFERENTIAL - Normal   BNP (IN-HOUSE) - Normal    Narrative:     This assay is used as an aid in the diagnosis of individuals " suspected of having heart failure. It can be used as an aid in the diagnosis of acute decompensated heart failure (ADHF) in patients presenting with signs and symptoms of ADHF to the emergency department (ED). In addition, NT-proBNP of <300 pg/mL indicates ADHF is not likely.    Age Range Result Interpretation  NT-proBNP Concentration (pg/mL:      <50             Positive            >450                   Gray                 300-450                    Negative             <300    50-75           Positive            >900                  Gray                300-900                  Negative            <300      >75             Positive            >1800                  Gray                300-1800                  Negative            <300   CBC AND DIFFERENTIAL    Narrative:     The following orders were created for panel order CBC & Differential.  Procedure                               Abnormality         Status                     ---------                               -----------         ------                     CBC Auto Differential[[id number]]        Normal              Final result                 Please view results for these tests on the individual orders.     Medications   sodium chloride 0.9 % flush 10 mL (has no administration in time range)   sodium chloride 0.9 % bolus 500 mL (500 mL Intravenous New Bag 5/23/24 1551)     CT Head Without Contrast    Result Date: 5/23/2024  Impression: No acute intracranial findings. Chronic and senescent changes as above. Left maxillary sinus disease. Electronically Signed: Wilfrid Fish MD  5/23/2024 3:21 PM EDT  Workstation ID: [id number]    XR Chest 1 View    Result Date: 5/23/2024  Impression: 1.Cardiomegaly. 2.Atelectasis or scarring left lung base Electronically Signed: Seth Guerrero MD  5/23/2024 3:05 PM EDT  Workstation ID: [id number]                                            Medical Decision Making  Patient is an 84-year-old female who comes in with some  weakness and dizziness that happened this morning and is since resolved she is also reportedly having some decreased oral intake.  Today she had IV established and blood work was obtained and she was found to not be orthostatic but was dizzy upon standing.  She did have a blood pressure of 87/50 while standing as well and it was 90/60 when lying flat.  She was found to have some renal insufficiency which is relatively new to her she has a BUN of 28 and a creatinine of 2.27 and 3 months ago the BUN was 21 creatinine is 1.49 her sodium is also 130 today when 3 months ago it was 138 chloride is 87 when 3 months ago it was 102 patient did have a CT of the head today which was within normal limits.  She was hydrated and will be admitted to the hospital for acute renal injury some mild dehydration and dizzy spells.  She was agreeable to this plan of care.    Spoke with Yari Ledezma nurse practitioner who agreed to admit for the hospitalist Dr. Hudson    Problems Addressed:  Acute renal insufficiency: complicated acute illness or injury  Dizziness: complicated acute illness or injury    Amount and/or Complexity of Data Reviewed  External Data Reviewed: labs, radiology, ECG and notes.  Labs: ordered. Decision-making details documented in ED Course.  Radiology: ordered and independent interpretation performed. Decision-making details documented in ED Course.  ECG/medicine tests: ordered and independent interpretation performed. Decision-making details documented in ED Course.    Risk  OTC drugs.  Prescription drug management.  Decision regarding hospitalization.        Final diagnoses:   Dizziness   Acute renal insufficiency       ED Disposition  ED Disposition       ED Disposition   Decision to Admit    Condition   --    Comment   Level of Care: Telemetry [5]   Admitting Physician: EVERTON HALL [[id number]]   Attending Physician: EVERTON HALL [[id number]]                 No follow-up provider specified.       Medication  List      No changes were made to your prescriptions during this visit.            Kimberli Pedro, APRN  05/23/24 0477

## 2024-05-23 NOTE — Clinical Note
Level of Care: Telemetry [5]   Admitting Physician: EVERTON HALL [[id number]]   Attending Physician: EVERTON HALL [[id number]]

## 2024-05-23 NOTE — H&P
[facility] Medicine Services  History & Physical    Patient Name: Kendy Worrell  : 1940  MRN: [id number]  Primary Care Physician:  Marisela Monte APRN  Date of admission: 2024  Date and Time of Service: 2024 at 1735    Subjective      Chief Complaint: dizziness with standing    History of Present Illness: Kendy Worrell is a 84 y.o. female with a CMH of HTN, hypothyroidism, CAD, chronic diastolic heart failure, CVA, hyponatremia, paroxysmal Afib on eliquis who presented to [facility] on 2024 with orthostatic dizziness. Lives at home in assisted living apartment, uses rollator walker for ambulation.   Presents to ED with orthostatic dizziness. [facility] nurse completed BP checks and was dizzy with standing. Reports over past 4 weeks with orthostatic lightheadedness/dizziness and improves after lying down. Followed up with OP nephrology Dr. Fernandez on  when torsemide was increased to 100 mg daily. Reports weight loss of 25-30 pounds since increase in torsemide. On arrival to ED VS: 97.1-78--96% room air.     Upon evaluation, awake, alert, resting in bed, daughter at bedside. Current VS: 67-/76-93% room air. Denies any lightheadedness at rest. Denies headache, vision changes. Denies recent N/V/D. Reports daily use of 1 bourbon drink at bedtime and daily oral intake of 16 oz sprite and 48 oz of water.   EKG reveals Afib rate 64, , Qtc 444  CXR cardiomegaly. Atelectasis left lung base  CT head reveals no acute findings.   Blood work reveals BUN 28, creatinine 2.27, sodium 130, chloride 87, AST 36, GFR 20, WBC 7.29, Hgb 12.5, Hct 39.2, pro BNP normal at 1248.         Review of Systems   Constitutional:  Positive for activity change and fatigue.   Respiratory:  Positive for shortness of breath.         BATEMAN   Neurological:  Positive for dizziness, weakness and light-headedness.       Personal History     Past Medical History:   Diagnosis Date    A-fib      Arthritis     Breast cancer     CHF (congestive heart failure)     CVA (cerebral vascular accident) 09/2019    History of pulmonary embolus (PE)     Hyperlipidemia     Hypertension     Hyperthyroidism     patient has hypothyroidism    Hypothyroidism        Past Surgical History:   Procedure Laterality Date    BREAST BIOPSY      BREAST SURGERY Right     CHOLECYSTECTOMY      COLON SURGERY      Removed    COLONOSCOPY      COLONOSCOPY N/A 11/3/2022    Procedure: COLONOSCOPY to anastamosis with biopsies and cold snare polypectomy;  Surgeon: Saroj Oakes MD;  Location: [facility] ENDOSCOPY;  Service: Gastroenterology;  Laterality: N/A;  PRe: rectal bleeding  Post: hemorrhoids, diverticulosis, anastamotic ulcer, polyp, hemostasis clip free-floating    HYSTERECTOMY      MAMMO BILATERAL  2015    MASTECTOMY      TONSILLECTOMY         Family History: family history includes Cancer in her father; Colon polyps in her father; Heart disease in her father; Hypertension in her mother; Stroke in her mother. Otherwise pertinent FHx was reviewed and not pertinent to current issue.    Social History:  reports that she quit smoking about 36 years ago. Her smoking use included cigarettes. She has been exposed to tobacco smoke. She has never used smokeless tobacco. She reports current alcohol use of about 3.0 standard drinks of alcohol per week.    Home Medications:  Prior to Admission Medications       Prescriptions Last Dose Informant Patient Reported? Taking?    apixaban (ELIQUIS) 5 MG tablet tablet 5/23/2024 Nursing Home No Yes    Take 1 tablet by mouth Every 12 (Twelve) Hours.    atorvastatin (LIPITOR) 10 MG tablet 5/22/2024  Yes Yes    Take 1 tablet by mouth Every Night.    empagliflozin (JARDIANCE) 10 MG tablet tablet 5/23/2024  No Yes    Take 1 tablet by mouth Daily.    lacosamide (VIMPAT) 50 MG tablet tablet 5/22/2024  Yes Yes    Take 1 tablet by mouth 2 (Two) Times a Day.    levothyroxine (SYNTHROID, LEVOTHROID) 88 MCG  tablet 5/23/2024  Yes Yes    Take 1 tablet by mouth Daily.    magnesium oxide 250 MG tablet 5/22/2024  Yes Yes    Take 1 tablet by mouth 2 (Two) Times a Day.    metOLazone (ZAROXOLYN) 2.5 MG tablet Past Week  Yes Yes    Take 1 tablet by mouth 1 (One) Time Per Week. On Mondays    multivitamin with minerals tablet tablet 5/22/2024  Yes Yes    Take 1 tablet by mouth Daily.    nitroglycerin (NITROSTAT) 0.4 MG SL tablet Past Month  Yes Yes    Place 1 tablet under the tongue Every 5 (Five) Minutes As Needed for Chest Pain.    nystatin (MYCOSTATIN) 905004 UNIT/GM powder Past Week  Yes Yes    Apply  topically to the appropriate area as directed 3 (Three) Times a Day As Needed. Apply under breasts    potassium chloride (KLOR-CON M20) 20 MEQ CR tablet 5/23/2024  Yes Yes    Take 2 tablets by mouth 3 (Three) Times a Day.    torsemide (DEMADEX) 100 MG tablet 5/23/2024  Yes Yes    Take 1 tablet by mouth Daily.    acetaminophen (TYLENOL) 325 MG tablet  Nursing Home No No    Take 2 tablets by mouth Every 6 (Six) Hours As Needed for Mild Pain .    albuterol sulfate  (90 Base) MCG/ACT inhaler  Nursing Home Yes No    Inhale 2 puffs Every 4 (Four) Hours As Needed for Wheezing.    cloNIDine (CATAPRES) 0.1 MG tablet Unknown  Yes No    Take 1 tablet by mouth Every 6 (Six) Hours As Needed for High Blood Pressure (SBP > 160, DBP > 90).    Hydrocortisone, Perianal, (ANUSOL-HC) 2.5 % rectal cream   Yes No    Insert 1 Application into the rectum 2 (Two) Times a Day.    isosorbide mononitrate (IMDUR) 30 MG 24 hr tablet Unknown Nursing Home Yes No    Take 1 tablet by mouth Daily.    Menthol-Zinc Oxide (Calmoseptine) 0.44-20.6 % ointment   No No    Apply 1 Application topically to the appropriate area as directed 2 (Two) Times a Day.    metoprolol succinate XL (TOPROL-XL) 100 MG 24 hr tablet Unknown Nursing Home No No    Take 1 tablet by mouth Every 12 (Twelve) Hours.    pantoprazole (PROTONIX) 40 MG EC tablet Unknown  Yes No    Take 1  tablet by mouth Daily.              Allergies:  Allergies   Allergen Reactions    Aspirin Other (See Comments)     Severe bleeding    Nsaids Other (See Comments)     Severe bleeding       Objective      Vitals:   Temp:  [97.3 °F (36.3 °C)-97.5 °F (36.4 °C)] 97.5 °F (36.4 °C)  Heart Rate:  [52-72] 66  Resp:  [13-18] 13  BP: ()/(50-76) 112/76  Body mass index is 29.16 kg/m².  Physical Exam  Constitutional:       Appearance: Normal appearance.   HENT:      Head: Normocephalic and atraumatic.      Mouth/Throat:      Mouth: Mucous membranes are moist.   Eyes:      Extraocular Movements: Extraocular movements intact.      Pupils: Pupils are equal, round, and reactive to light.   Cardiovascular:      Rate and Rhythm: Normal rate. Rhythm irregular.      Pulses: Normal pulses.      Heart sounds: Murmur heard.   Pulmonary:      Effort: Pulmonary effort is normal.      Breath sounds: Normal breath sounds.   Abdominal:      General: Bowel sounds are normal. There is no distension.      Palpations: Abdomen is soft.      Tenderness: There is no abdominal tenderness.   Musculoskeletal:      Cervical back: Normal range of motion.      Comments: Chronic BLE tenderness to ankles     Skin:     General: Skin is warm and dry.   Neurological:      Mental Status: She is alert and oriented to person, place, and time. Mental status is at baseline.      Motor: Weakness present.         Diagnostic Data:  Lab Results (last 24 hours)       Procedure Component Value Units Date/Time    Comprehensive Metabolic Panel [[id number]]  (Abnormal) Collected: 05/23/24 1457    Specimen: Blood Updated: 05/23/24 1535     Glucose 98 mg/dL      BUN 28 mg/dL      Creatinine 2.27 mg/dL      Sodium 130 mmol/L      Potassium 3.6 mmol/L      Comment: Slight hemolysis detected by analyzer. Result may be falsely elevated.        Chloride 87 mmol/L      CO2 28.2 mmol/L      Calcium 10.4 mg/dL      Total Protein 7.4 g/dL      Albumin 3.7 g/dL      ALT (SGPT) 12  U/L      AST (SGOT) 36 U/L      Alkaline Phosphatase 109 U/L      Total Bilirubin 1.1 mg/dL      Globulin 3.7 gm/dL      A/G Ratio 1.0 g/dL      BUN/Creatinine Ratio 12.3     Anion Gap 14.8 mmol/L      eGFR 20.7 mL/min/1.73     Narrative:      GFR Normal >60  Chronic Kidney Disease <60  Kidney Failure <15    The GFR formula is only valid for adults with stable renal function between ages 18 and 70.    BNP [[id number]]  (Normal) Collected: 05/23/24 1457    Specimen: Blood Updated: 05/23/24 1534     proBNP 1,248.0 pg/mL     Narrative:      This assay is used as an aid in the diagnosis of individuals suspected of having heart failure. It can be used as an aid in the diagnosis of acute decompensated heart failure (ADHF) in patients presenting with signs and symptoms of ADHF to the emergency department (ED). In addition, NT-proBNP of <300 pg/mL indicates ADHF is not likely.    Age Range Result Interpretation  NT-proBNP Concentration (pg/mL:      <50             Positive            >450                   Gray                 300-450                    Negative             <300    50-75           Positive            >900                  Gray                300-900                  Negative            <300      >75             Positive            >1800                  Gray                300-1800                  Negative            <300    CBC & Differential [[id number]]  (Normal) Collected: 05/23/24 1457    Specimen: Blood Updated: 05/23/24 1510    Narrative:      The following orders were created for panel order CBC & Differential.  Procedure                               Abnormality         Status                     ---------                               -----------         ------                     CBC Auto Differential[[id number]]        Normal              Final result                 Please view results for these tests on the individual orders.    CBC Auto Differential [[id number]]  (Normal) Collected: 05/23/24  1457    Specimen: Blood Updated: 05/23/24 1510     WBC 7.29 10*3/mm3      RBC 4.30 10*6/mm3      Hemoglobin 12.5 g/dL      Hematocrit 39.2 %      MCV 91.2 fL      MCH 29.1 pg      MCHC 31.9 g/dL      RDW 14.3 %      RDW-SD 47.3 fl      MPV 10.0 fL      Platelets 267 10*3/mm3      Neutrophil % 65.0 %      Lymphocyte % 20.3 %      Monocyte % 11.8 %      Eosinophil % 1.6 %      Basophil % 0.8 %      Immature Grans % 0.5 %      Neutrophils, Absolute 4.73 10*3/mm3      Lymphocytes, Absolute 1.48 10*3/mm3      Monocytes, Absolute 0.86 10*3/mm3      Eosinophils, Absolute 0.12 10*3/mm3      Basophils, Absolute 0.06 10*3/mm3      Immature Grans, Absolute 0.04 10*3/mm3      nRBC 0.0 /100 WBC              Imaging Results (Last 24 Hours)       Procedure Component Value Units Date/Time    CT Head Without Contrast [[id number]] Collected: 05/23/24 1518     Updated: 05/23/24 1523    Narrative:      CT HEAD WO CONTRAST    Date of Exam: 5/23/2024 3:10 PM EDT    Indication: dizzy.    Comparison: Head CT 8/1/2023    Technique: Axial CT images were obtained of the head without contrast administration.  Coronal reconstructions were performed.  Automated exposure control and iterative reconstruction methods were used.      Findings:  No acute intracranial hemorrhage. No acute large territory infarct. There are scattered subcortical and periventricular white matter hypodensities which are nonspecific and can be seen in the setting of chronic small vessel ischemic change. There is   intracranial atherosclerosis. No extra-axial collections. No midline shift or herniation. Normal size and configuration of the ventricles. Unremarkable appearance of the orbits. There is near complete opacification of the left maxillary sinus with bony   wall thickening compatible with chronic sinusitis. The mastoid air cells are clear. No acute or suspicious bony findings.      Impression:      Impression:  No acute intracranial findings.    Chronic and  senescent changes as above.    Left maxillary sinus disease.      Electronically Signed: Wilfrid Fish MD    5/23/2024 3:21 PM EDT    Workstation ID: [id number]    XR Chest 1 View [[id number]] Collected: 05/23/24 1504     Updated: 05/23/24 1507    Narrative:      XR CHEST 1 VW    Date of Exam: 5/23/2024 3:01 PM EDT    Indication: dizzy    Comparison: Temo 22nd 2024    Findings:  The heart looks enlarged. There is some atelectasis or scarring at the left base. Surgical clips are noted about the right chest area.      Impression:      Impression:  1.Cardiomegaly.  2.Atelectasis or scarring left lung base      Electronically Signed: Seth Guerrero MD    5/23/2024 3:05 PM EDT    Workstation ID: [id number]              Assessment & Plan        This is a 84 y.o. female with PMH of HTN, hypothyroidism, CAD, chronic diastolic heart failure, CVA, hyponatremia, paroxysmal Afib on eliquis who presents to ED for orthostatic lightheadedness for past month. [facility] nurse noted hypotension today 90/60.           Active and Resolved Problems  Active Hospital Problems    Diagnosis  POA    **Orthostatic dizziness [R42]  Yes      Resolved Hospital Problems   No resolved problems to display.     Orthostatic lightheadedness/dizziness:  Near syncope:  -presents with orthostatic lightheadedness/dizziness for past month. Today [facility] nurse reported BP 90's/60's.   -on 4/8 torsemide increased to 100 mg daily and endorses 25-30 pounds weight loss.   -orthostatic VS every shift.   -EKG no acute findings.   -serial troponins x 3 to evaluate for ACS  -telemetry  -echocardiogram on 1/23/2024 revealed EF 56-60%, LV with hypertrophy, LA severely dilated. RA severely dilated. Moderate aortic valve stenosis. Severe tricuspid valve regurg. Small pericardial effusion no evidence of tamponade.   -consider repeat echo in AM  -CT head no acute findings.   -obtain carotid US    MYLENE:  hyponatremia  -admission creatinine 2.27 was 1.49 on 1/30/2024  -admission GFR 20  was 35 on 1/30/2024  -admission sodium 130 baseline 138  -daily BMP to monitor renal function  -s/p 500 ml fluid bolus, continue gentle hydration overnight.   -nephrology consult with Dr Fernandez.   -holding torsemide and metolazone tonight.   -telemetry  -urine lytes and osmo pending.     Paroxysmal Afib:  -telemetry  -rate controlled  -holding metoprolol tonight due to hypotension while in ED  -continue eliquis, statin    Hypothyroidism:  -TSH pending  -continue levothyroxine.                 DVT prophylaxis:  Mechanical and medical DVT prophylaxis orders are signed and held.         The patient desires to be as follows:    CODE STATUS:    Level Of Support Discussed With: Patient  Code Status (Patient has no pulse and is not breathing): No CPR (Do Not Attempt to Resuscitate)  Medical Interventions (Patient has pulse or is breathing): Comfort Measures        Admission Status:  I believe this patient meets OBS status.    Expected Length of Stay: < 2 midnights    PDMP and Medication Dispenses via Sidebar reviewed and consistent with patient reported medications.    I discussed the patient's findings and my recommendations with patient, family, and nursing staff.      Signature:     This document has been electronically signed by MENDOZA Diaz on May 23, 2024 18:07 EDT   [facility] Hospitalist Team

## 2024-05-24 ENCOUNTER — APPOINTMENT (OUTPATIENT)
Dept: CARDIOLOGY | Facility: HOSPITAL | Age: 84
End: 2024-05-24
Payer: MEDICARE

## 2024-05-24 LAB
ANION GAP SERPL CALCULATED.3IONS-SCNC: 12.2 MMOL/L (ref 5–15)
ANION GAP SERPL CALCULATED.3IONS-SCNC: 14.4 MMOL/L (ref 5–15)
BH CV XLRA MEAS LEFT CAROTID BULB PSV: 54.1 CM/SEC
BH CV XLRA MEAS LEFT DIST CCA EDV: 20.6 CM/SEC
BH CV XLRA MEAS LEFT DIST CCA PSV: 61.4 CM/SEC
BH CV XLRA MEAS LEFT DIST ICA EDV: -18.7 CM/SEC
BH CV XLRA MEAS LEFT DIST ICA PSV: -59 CM/SEC
BH CV XLRA MEAS LEFT ICA/CCA RATIO: -0.9
BH CV XLRA MEAS LEFT PROX CCA EDV: 19.3 CM/SEC
BH CV XLRA MEAS LEFT PROX CCA PSV: 77.7 CM/SEC
BH CV XLRA MEAS LEFT PROX ECA PSV: -59 CM/SEC
BH CV XLRA MEAS LEFT PROX ICA EDV: -27.5 CM/SEC
BH CV XLRA MEAS LEFT PROX ICA PSV: -69.8 CM/SEC
BH CV XLRA MEAS LEFT PROX SCLA PSV: 112 CM/SEC
BH CV XLRA MEAS LEFT VERTEBRAL A PSV: -34.9 CM/SEC
BH CV XLRA MEAS RIGHT CAROTID BULB EDV: -14.4 CM/SEC
BH CV XLRA MEAS RIGHT CAROTID BULB PSV: -33.6 CM/SEC
BH CV XLRA MEAS RIGHT DIST CCA EDV: 23 CM/SEC
BH CV XLRA MEAS RIGHT DIST CCA PSV: 52.8 CM/SEC
BH CV XLRA MEAS RIGHT DIST ICA EDV: -22.4 CM/SEC
BH CV XLRA MEAS RIGHT DIST ICA PSV: -86.4 CM/SEC
BH CV XLRA MEAS RIGHT ICA/CCA RATIO: -1.2
BH CV XLRA MEAS RIGHT PROX CCA EDV: 15.5 CM/SEC
BH CV XLRA MEAS RIGHT PROX CCA PSV: 72.1 CM/SEC
BH CV XLRA MEAS RIGHT PROX ECA PSV: -91.3 CM/SEC
BH CV XLRA MEAS RIGHT PROX ICA EDV: -11.9 CM/SEC
BH CV XLRA MEAS RIGHT PROX ICA PSV: -31.2 CM/SEC
BH CV XLRA MEAS RIGHT PROX SCLA PSV: 90.7 CM/SEC
BH CV XLRA MEAS RIGHT VERTEBRAL A PSV: -32.9 CM/SEC
BUN SERPL-MCNC: 31 MG/DL (ref 8–23)
BUN SERPL-MCNC: 32 MG/DL (ref 8–23)
BUN/CREAT SERPL: 12.7 (ref 7–25)
BUN/CREAT SERPL: 14.7 (ref 7–25)
CALCIUM SPEC-SCNC: 10.1 MG/DL (ref 8.6–10.5)
CALCIUM SPEC-SCNC: 9.6 MG/DL (ref 8.6–10.5)
CHLORIDE SERPL-SCNC: 91 MMOL/L (ref 98–107)
CHLORIDE SERPL-SCNC: 91 MMOL/L (ref 98–107)
CO2 SERPL-SCNC: 30.6 MMOL/L (ref 22–29)
CO2 SERPL-SCNC: 32.8 MMOL/L (ref 22–29)
CREAT SERPL-MCNC: 2.17 MG/DL (ref 0.57–1)
CREAT SERPL-MCNC: 2.45 MG/DL (ref 0.57–1)
CREAT UR-MCNC: 42.5 MG/DL
DEPRECATED RDW RBC AUTO: 48.1 FL (ref 37–54)
EGFRCR SERPLBLD CKD-EPI 2021: 18.9 ML/MIN/1.73
EGFRCR SERPLBLD CKD-EPI 2021: 21.8 ML/MIN/1.73
ERYTHROCYTE [DISTWIDTH] IN BLOOD BY AUTOMATED COUNT: 14.3 % (ref 12.3–15.4)
GEN 5 2HR TROPONIN T REFLEX: 36 NG/L
GLUCOSE BLDC GLUCOMTR-MCNC: 107 MG/DL (ref 70–105)
GLUCOSE BLDC GLUCOMTR-MCNC: 115 MG/DL (ref 70–105)
GLUCOSE BLDC GLUCOMTR-MCNC: 122 MG/DL (ref 70–105)
GLUCOSE BLDC GLUCOMTR-MCNC: 136 MG/DL (ref 70–105)
GLUCOSE SERPL-MCNC: 110 MG/DL (ref 65–99)
GLUCOSE SERPL-MCNC: 95 MG/DL (ref 65–99)
HCT VFR BLD AUTO: 35.2 % (ref 34–46.6)
HGB BLD-MCNC: 11.2 G/DL (ref 12–15.9)
LEFT ARM BP: NORMAL MMHG
MAGNESIUM SERPL-MCNC: 1.5 MG/DL (ref 1.6–2.4)
MCH RBC QN AUTO: 29.2 PG (ref 26.6–33)
MCHC RBC AUTO-ENTMCNC: 31.8 G/DL (ref 31.5–35.7)
MCV RBC AUTO: 91.7 FL (ref 79–97)
PLATELET # BLD AUTO: 226 10*3/MM3 (ref 140–450)
PMV BLD AUTO: 10 FL (ref 6–12)
POTASSIUM SERPL-SCNC: 2.3 MMOL/L (ref 3.5–5.2)
POTASSIUM SERPL-SCNC: 3 MMOL/L (ref 3.5–5.2)
QT INTERVAL: 430 MS
QTC INTERVAL: 444 MS
RBC # BLD AUTO: 3.84 10*6/MM3 (ref 3.77–5.28)
SODIUM SERPL-SCNC: 136 MMOL/L (ref 136–145)
SODIUM SERPL-SCNC: 136 MMOL/L (ref 136–145)
TROPONIN T DELTA: 6 NG/L
TROPONIN T SERPL HS-MCNC: 27 NG/L
WBC NRBC COR # BLD AUTO: 7.21 10*3/MM3 (ref 3.4–10.8)

## 2024-05-24 PROCEDURE — 80048 BASIC METABOLIC PNL TOTAL CA: CPT | Performed by: NURSE PRACTITIONER

## 2024-05-24 PROCEDURE — 99222 1ST HOSP IP/OBS MODERATE 55: CPT | Performed by: INTERNAL MEDICINE

## 2024-05-24 PROCEDURE — 93306 TTE W/DOPPLER COMPLETE: CPT | Performed by: INTERNAL MEDICINE

## 2024-05-24 PROCEDURE — 80048 BASIC METABOLIC PNL TOTAL CA: CPT | Performed by: INTERNAL MEDICINE

## 2024-05-24 PROCEDURE — 85027 COMPLETE CBC AUTOMATED: CPT | Performed by: NURSE PRACTITIONER

## 2024-05-24 PROCEDURE — 93880 EXTRACRANIAL BILAT STUDY: CPT

## 2024-05-24 PROCEDURE — 97166 OT EVAL MOD COMPLEX 45 MIN: CPT

## 2024-05-24 PROCEDURE — 82948 REAGENT STRIP/BLOOD GLUCOSE: CPT

## 2024-05-24 PROCEDURE — 84484 ASSAY OF TROPONIN QUANT: CPT | Performed by: NURSE PRACTITIONER

## 2024-05-24 PROCEDURE — 93306 TTE W/DOPPLER COMPLETE: CPT

## 2024-05-24 PROCEDURE — 84484 ASSAY OF TROPONIN QUANT: CPT | Performed by: INTERNAL MEDICINE

## 2024-05-24 PROCEDURE — 82948 REAGENT STRIP/BLOOD GLUCOSE: CPT | Performed by: NURSE PRACTITIONER

## 2024-05-24 PROCEDURE — 93880 EXTRACRANIAL BILAT STUDY: CPT | Performed by: SURGERY

## 2024-05-24 PROCEDURE — 83735 ASSAY OF MAGNESIUM: CPT | Performed by: NURSE PRACTITIONER

## 2024-05-24 PROCEDURE — 25010000002 MAGNESIUM SULFATE 2 GM/50ML SOLUTION: Performed by: STUDENT IN AN ORGANIZED HEALTH CARE EDUCATION/TRAINING PROGRAM

## 2024-05-24 PROCEDURE — 97162 PT EVAL MOD COMPLEX 30 MIN: CPT

## 2024-05-24 RX ORDER — POTASSIUM CHLORIDE 20 MEQ/1
40 TABLET, EXTENDED RELEASE ORAL ONCE
Status: COMPLETED | OUTPATIENT
Start: 2024-05-24 | End: 2024-05-24

## 2024-05-24 RX ORDER — POTASSIUM CHLORIDE 20 MEQ/1
40 TABLET, EXTENDED RELEASE ORAL ONCE
Qty: 2 TABLET | Refills: 0 | Status: COMPLETED | OUTPATIENT
Start: 2024-05-24 | End: 2024-05-24

## 2024-05-24 RX ORDER — MAGNESIUM SULFATE HEPTAHYDRATE 40 MG/ML
4 INJECTION, SOLUTION INTRAVENOUS ONCE
Status: COMPLETED | OUTPATIENT
Start: 2024-05-24 | End: 2024-05-24

## 2024-05-24 RX ADMIN — POTASSIUM CHLORIDE 40 MEQ: 1500 TABLET, EXTENDED RELEASE ORAL at 06:17

## 2024-05-24 RX ADMIN — ATORVASTATIN CALCIUM 10 MG: 10 TABLET, FILM COATED ORAL at 21:21

## 2024-05-24 RX ADMIN — POTASSIUM CHLORIDE 40 MEQ: 1500 TABLET, EXTENDED RELEASE ORAL at 17:08

## 2024-05-24 RX ADMIN — PANTOPRAZOLE SODIUM 40 MG: 40 TABLET, DELAYED RELEASE ORAL at 05:22

## 2024-05-24 RX ADMIN — MAGNESIUM SULFATE HEPTAHYDRATE 4 G: 40 INJECTION, SOLUTION INTRAVENOUS at 06:17

## 2024-05-24 RX ADMIN — LEVOTHYROXINE SODIUM 88 MCG: 88 TABLET ORAL at 05:22

## 2024-05-24 RX ADMIN — POTASSIUM CHLORIDE 40 MEQ: 1500 TABLET, EXTENDED RELEASE ORAL at 21:21

## 2024-05-24 RX ADMIN — POTASSIUM CHLORIDE 40 MEQ: 1500 TABLET, EXTENDED RELEASE ORAL at 09:48

## 2024-05-24 RX ADMIN — APIXABAN 5 MG: 5 TABLET, FILM COATED ORAL at 09:48

## 2024-05-24 RX ADMIN — Medication 10 ML: at 09:48

## 2024-05-24 RX ADMIN — Medication 10 ML: at 21:21

## 2024-05-24 RX ADMIN — LACOSAMIDE 50 MG: 50 TABLET, FILM COATED ORAL at 10:09

## 2024-05-24 RX ADMIN — LACOSAMIDE 50 MG: 50 TABLET, FILM COATED ORAL at 21:21

## 2024-05-24 RX ADMIN — APIXABAN 5 MG: 5 TABLET, FILM COATED ORAL at 21:21

## 2024-05-24 NOTE — PLAN OF CARE
Goal Outcome Evaluation:     Pt is an 83 y/o female who presents to [facility] with reports of dizziness in standing. Pt reports orthostatic vitals [id number] when HH nurse takes vitals. At [facility] pt lives at [facility] and prior to four weeks ago when dizziness started worsening she had been ambulating to the cafeteria using a rollator for meals. Since this date she has been mainly ambulating within her room. At this time pt's orthostatic vitals were: supine 101/61, sitting 100/72, standing 105/67, and BP would not take assuming value was too low to give reading on the dynamap following short distance ambulation x8ft. Pt has generalized weakness and decreased activity tolerance from poor endurance over the past few weeks and would benefit from SNF at this time. PT will continue to follow and assess for changes.

## 2024-05-24 NOTE — CONSULTS
[facility] CONSULT NOTE    Referring Provider: Kimberli Pedro APRN   Reason for Consultation: Acute kidney injury    Chief complaint fatigue, dizziness    History of present illness: Patient is 84 years old female with history of chronic kidney disease stage III, hypertension, diastolic CHF, CVA, paroxysmal A-fib, presented to the hospital with generalized weakness and orthostatic dizziness.  Patient follows up with me as outpatient and torsemide dose was increased recently due to significant lower EXTR edema.  Patient has lost significant weight and edema has improved significantly.  Patient denies any chest pain, nausea, vomiting, cough, fever, cough expectoration, hematuria or dysuria    History  Past Medical History:   Diagnosis Date    A-fib     Arthritis     Breast cancer     CHF (congestive heart failure)     CVA (cerebral vascular accident) 2019    History of pulmonary embolus (PE)     Hyperlipidemia     Hypertension     Hyperthyroidism     patient has hypothyroidism    Hypothyroidism      Past Surgical History:   Procedure Laterality Date    BREAST BIOPSY      BREAST SURGERY Right     CHOLECYSTECTOMY      COLON SURGERY      Removed    COLONOSCOPY      COLONOSCOPY N/A 11/3/2022    Procedure: COLONOSCOPY to anastamosis with biopsies and cold snare polypectomy;  Surgeon: Saroj Oakes MD;  Location: [facility] ENDOSCOPY;  Service: Gastroenterology;  Laterality: N/A;  PRe: rectal bleeding  Post: hemorrhoids, diverticulosis, anastamotic ulcer, polyp, hemostasis clip free-floating    HYSTERECTOMY      MAMMO BILATERAL  2015    MASTECTOMY      TONSILLECTOMY       Social History     Tobacco Use    Smoking status: Former     Current packs/day: 0.00     Types: Cigarettes     Quit date:      Years since quittin.4     Passive exposure: Past    Smokeless tobacco: Never    Tobacco comments:     quit in    Vaping Use    Vaping status: Never Used   Substance Use Topics    Alcohol use: Yes      Alcohol/week: 3.0 standard drinks of alcohol     Types: 3 Shots of liquor per week     Comment: Last drink 3 weeks ago.    Drug use: Never     Family History   Problem Relation Age of Onset    Stroke Mother     Hypertension Mother     Heart disease Father     Cancer Father     Colon polyps Father        Review of Systems  ROS  As per HPI  Objective     Vital Signs  Temp:  [97.3 °F (36.3 °C)-97.8 °F (36.6 °C)] 97.7 °F (36.5 °C)  Heart Rate:  [52-72] 64  Resp:  [13-19] 16  BP: ()/(50-76) 101/65    No intake/output data recorded.  No intake/output data recorded.    Physical Exam:  Physical Exam    General Appearance: Chronically ill-appearing, NAD  Skin: warm and dry  HEENT: oral mucosa normal, nonicteric sclera  Neck: supple, no JVD  Lungs: CTA  Heart: RRR, normal S1 and S2  Abdomen: soft, nontender, nondistended  : no palpable bladder  Extremities: Trace edema  Neuro: normal speech and mental status     Results Review:   I reviewed the patient's new clinical results.     Lab Results   Component Value Date    CALCIUM 9.6 05/24/2024    PHOS 3.0 11/19/2021     Results from last 7 days   Lab Units 05/24/24  0456 05/23/24  2019 05/23/24  1457   MAGNESIUM mg/dL 1.5* 1.5*  --    SODIUM mmol/L 136  --  130*   POTASSIUM mmol/L 2.3*  --  3.6   CHLORIDE mmol/L 91*  --  87*   CO2 mmol/L 30.6*  --  28.2   BUN mg/dL 31*  --  28*   CREATININE mg/dL 2.45*  --  2.27*   GLUCOSE mg/dL 95  --  98   CALCIUM mg/dL 9.6  --  10.4   WBC 10*3/mm3 7.21  --  7.29   HEMOGLOBIN g/dL 11.2*  --  12.5   PLATELETS 10*3/mm3 226  --  267   ALT (SGPT) U/L  --   --  12   AST (SGOT) U/L  --   --  36*     Lab Results   Component Value Date    CKTOTAL 68 08/01/2023    TROPONINT 36 (H) 05/24/2024     Estimated Creatinine Clearance: 15 mL/min (A) (by C-G formula based on SCr of 2.45 mg/dL (H)).  Lab Results   Component Value Date    URICACID 6.4 (H) 03/20/2018       Brief Urine Lab Results  (Last result in the past 365 days)        Color   Clarity    Blood   Leuk Est   Nitrite   Protein   CREAT   Urine HCG        01/22/24 2047 Yellow   Clear   Moderate (2+)   Trace   Negative   Negative                   Prior to Admission medications    Medication Sig Start Date End Date Taking? Authorizing Provider   apixaban (ELIQUIS) 5 MG tablet tablet Take 1 tablet by mouth Every 12 (Twelve) Hours. 9/15/19  Yes Vladimir Barrera MD   atorvastatin (LIPITOR) 10 MG tablet Take 1 tablet by mouth Every Night.   Yes Katelin St MD   empagliflozin (JARDIANCE) 10 MG tablet tablet Take 1 tablet by mouth Daily. 1/31/24  Yes Joe Sahu MD   lacosamide (VIMPAT) 50 MG tablet tablet Take 1 tablet by mouth 2 (Two) Times a Day. 4/15/24  Yes Katelin St MD   levothyroxine (SYNTHROID, LEVOTHROID) 88 MCG tablet Take 1 tablet by mouth Daily.   Yes Katelin St MD   magnesium oxide 250 MG tablet Take 1 tablet by mouth 2 (Two) Times a Day.   Yes Katelin St MD   metOLazone (ZAROXOLYN) 2.5 MG tablet Take 1 tablet by mouth 1 (One) Time Per Week. On Mondays 4/26/24  Yes Katelin St MD   multivitamin with minerals tablet tablet Take 1 tablet by mouth Daily.   Yes Katelin St MD   nitroglycerin (NITROSTAT) 0.4 MG SL tablet Place 1 tablet under the tongue Every 5 (Five) Minutes As Needed for Chest Pain. 5/6/24  Yes Katelin St MD   nystatin (MYCOSTATIN) 108902 UNIT/GM powder Apply  topically to the appropriate area as directed 3 (Three) Times a Day As Needed. Apply under breasts   Yes Katelin St MD   potassium chloride (KLOR-CON M20) 20 MEQ CR tablet Take 2 tablets by mouth 3 (Three) Times a Day. 4/26/24  Yes Katelin St MD   torsemide (DEMADEX) 100 MG tablet Take 1 tablet by mouth Daily. 4/8/24  Yes Katelin St MD   acetaminophen (TYLENOL) 325 MG tablet Take 2 tablets by mouth Every 6 (Six) Hours As Needed for Mild Pain . 9/9/19   Vladimir Barrera MD   albuterol sulfate  (90 Base)  MCG/ACT inhaler Inhale 2 puffs Every 4 (Four) Hours As Needed for Wheezing.    Katelin St MD   cloNIDine (CATAPRES) 0.1 MG tablet Take 1 tablet by mouth Every 6 (Six) Hours As Needed for High Blood Pressure (SBP > 160, DBP > 90).    Katelin St MD   Hydrocortisone, Perianal, (ANUSOL-HC) 2.5 % rectal cream Insert 1 Application into the rectum 2 (Two) Times a Day.    Katelin St MD   isosorbide mononitrate (IMDUR) 30 MG 24 hr tablet Take 1 tablet by mouth Daily.    Katelin St MD   Menthol-Zinc Oxide (Calmoseptine) 0.44-20.6 % ointment Apply 1 Application topically to the appropriate area as directed 2 (Two) Times a Day. 3/21/24   Rios Thomas MD   metoprolol succinate XL (TOPROL-XL) 100 MG 24 hr tablet Take 1 tablet by mouth Every 12 (Twelve) Hours. 9/9/19   Vladimir Barrera MD   pantoprazole (PROTONIX) 40 MG EC tablet Take 1 tablet by mouth Daily.    Katelin St MD       apixaban, 5 mg, Oral, Q12H  atorvastatin, 10 mg, Oral, Nightly  insulin lispro, 2-9 Units, Subcutaneous, 4x Daily AC & at Bedtime  isosorbide mononitrate, 30 mg, Oral, Daily  lacosamide, 50 mg, Oral, BID  levothyroxine, 88 mcg, Oral, Q AM  pantoprazole, 40 mg, Oral, Q AM  sodium chloride, 10 mL, Intravenous, Q12H      sodium chloride, 50 mL/hr, Last Rate: 50 mL/hr (05/24/24 0825)        Assessment & Plan       Acute kidney injury on CKD 3 A.  Most likely prerenal due to diuresis and hypotension.  Torsemide dose was recently increased due to fluid overload.  Creatinine 2.45 Mg/DL this morning.  Seems to be dry intravascularly  Orthostatic hypotension.  Due to diuretics.  Patient lost significant weight after recent increase in torsemide dose.  Congestive heart failure.  Systolic.  Chronic.  Patient has diastolic dysfunction along with moderate AAS, severe TR and pulmonary hypertension.  Edema improved with diuretics  Hypertension with chronic kidney disease.  Off antihypertensive at this time  due to hypotension  Severe hypokalemia  Hypomagnesemia    Plan:  I agree holding torsemide  Continue gentle IV hydration  Aggressive oral potassium replacement  IV magnesium replacement  Follow-up repeat labs    I discussed the patients findings and my recommendations with patient, family, and nursing staff    Karsten Fernandez MD  05/24/24  10:38 EDT

## 2024-05-24 NOTE — THERAPY EVALUATION
Patient Name: Kendy Worrell  : 1940    MRN: [id number]                              Today's Date: 2024       Admit Date: 2024    Visit Dx:     ICD-10-CM ICD-9-CM   1. Dizziness  R42 780.4   2. Acute renal insufficiency  N28.9 593.9     Patient Active Problem List   Diagnosis    Essential hypertension    Acquired hypothyroidism    Coronary artery disease involving native coronary artery of native heart without angina pectoris    Hyperlipidemia    Arthritis    Skin lesion of chest wall    NSTEMI (non-ST elevated myocardial infarction)    Supraventricular tachycardia    Normal cardiac stress test    Gastrointestinal bleed    Angina effort    Morbidly obese    Bilateral pulmonary embolism    New onset a-fib    UTI (urinary tract infection)    Chronic diastolic CHF (congestive heart failure)    Acute ischemic stroke    Chest pain    GI bleed    Closed fibular fracture    Anemia    Hyponatremia    Orthostatic dizziness     Past Medical History:   Diagnosis Date    A-fib     Arthritis     Breast cancer     CHF (congestive heart failure)     CVA (cerebral vascular accident) 2019    History of pulmonary embolus (PE)     Hyperlipidemia     Hypertension     Hyperthyroidism     patient has hypothyroidism    Hypothyroidism      Past Surgical History:   Procedure Laterality Date    BREAST BIOPSY      BREAST SURGERY Right     CHOLECYSTECTOMY      COLON SURGERY      Removed    COLONOSCOPY      COLONOSCOPY N/A 11/3/2022    Procedure: COLONOSCOPY to anastamosis with biopsies and cold snare polypectomy;  Surgeon: Saroj Oakes MD;  Location: [facility] ENDOSCOPY;  Service: Gastroenterology;  Laterality: N/A;  PRe: rectal bleeding  Post: hemorrhoids, diverticulosis, anastamotic ulcer, polyp, hemostasis clip free-floating    HYSTERECTOMY      MAMMO BILATERAL  2015    MASTECTOMY      TONSILLECTOMY        General Information       Row Name 24 1018          General Information    Prior Level of Function  independent:;all household mobility;ADL's  uses Rolator in her apartment & has transport chair and RW.  -     Existing Precautions/Restrictions fall  -     Barriers to Rehab medically complex;previous functional deficit  -       Row Name 05/24/24 1018          Living Environment    [facility] in Home facility resident  assisted living, has not had to have help up until now with ADL, except someone does wrap her lower legs for edema.  -       Row Name 05/24/24 1018          Home Main Entrance    Number of Stairs, Main Entrance none  -[facility] Name 05/24/24 1018          Stairs Within Home, Primary    Number of Stairs, Within Home, Primary none  -[facility] Name 05/24/24 1018          Cognition    Orientation Status (Cognition) oriented x 4  -[facility] Name 05/24/24 1018          Safety Issues, Functional Mobility    Impairments Affecting Function (Mobility) endurance/activity tolerance;strength;balance  -               User Key  (r) = Recorded By, (t) = Taken By, (c) = Cosigned By      Initials Name Provider Type     Cait Mckeon, OT Occupational Therapist                     Mobility/ADL's       Row Name 05/24/24 1019          Bed Mobility    Bed Mobility supine-sit  -     Supine-Sit Diana (Bed Mobility) set up;standby assist;verbal cues  -     Assistive Device (Bed Mobility) bed rails;head of bed elevated  -       Row Name 05/24/24 1019          Transfers    Transfers toilet transfer;other (see comments)  -     Comment, (Transfers) went to [facility] [place] [facility] then walked into [facility] several feet to w/c to go to CT w/ RW.  -       Row Name 05/24/24 1019          Toilet Transfer    Diana Level (Toilet Transfer) contact guard;verbal cues  -     Assistive Device (Toilet Transfer) commode, bedside without drop arms  -       Row Name 05/24/24 1019          Functional Mobility    Functional Mobility- Ind. Level contact guard assist  -     Functional Mobility- Device walker,  front-wheeled  -     Functional Mobility-Distance (Feet) 6  -[facility] Name 05/24/24 1019          Activities of Daily Living    BADL Assessment/Intervention lower body dressing;toileting  -MH       Row Name 05/24/24 1019          Lower Body Dressing Assessment/Training    Glenn Level (Lower Body Dressing) don;socks;shoes/slippers;dependent (less than 25% patient effort)  -     Position (Lower Body Dressing) edge of bed sitting  -MH       Row Name 05/24/24 1019          Toileting Assessment/Training    Glenn Level (Toileting) adjust/manage clothing;perform perineal hygiene;set up;standby assist;verbal cues  -     Position (Toileting) unsupported sitting  -               User Key  (r) = Recorded By, (t) = Taken By, (c) = Cosigned By      Initials Name Provider Type     Cait Mckeon, OT Occupational Therapist                   Obj/Interventions       Row Name 05/24/24 1021          Vision Assessment/Intervention    Visual Impairment/Limitations corrective lenses full-time  -MH       Row Name 05/24/24 1021          Range of Motion Comprehensive    General Range of Motion no range of motion deficits identified  -MH       Row Name 05/24/24 1021          Strength Comprehensive (MMT)    Comment, General Manual Muscle Testing (MMT) Assessment BUE 4-/5  -               User Key  (r) = Recorded By, (t) = Taken By, (c) = Cosigned By      Initials Name Provider Type     Cait Mckeon, OT Occupational Therapist                   Goals/Plan       Row Name 05/24/24 1026          Bathing Goal 1 (OT)    Activity/Device (Bathing Goal 1, OT) bathing skills, all  -     Glenn Level/Cues Needed (Bathing Goal 1, OT) minimum assist (75% or more patient effort)  -     Time Frame (Bathing Goal 1, OT) 2 weeks  -MH       Row Name 05/24/24 1026          Dressing Goal 1 (OT)    Activity/Device (Dressing Goal 1, OT) dressing skills, all  -     Glenn/Cues Needed (Dressing Goal 1, OT) set-up  required;supervision required  -     Time Frame (Dressing Goal 1, OT) 2 weeks  -       Row Name 05/24/24 1026          Toileting Goal 1 (OT)    Activity/Device (Toileting Goal 1, OT) toileting skills, all  -     Gause Level/Cues Needed (Toileting Goal 1, OT) modified independence  -     Time Frame (Toileting Goal 1, OT) 2 weeks  -       Row Name 05/24/24 1026          Therapy Assessment/Plan (OT)    Planned Therapy Interventions (OT) activity tolerance training;adaptive equipment training;BADL retraining;occupation/activity based interventions;patient/caregiver education/training;transfer/mobility retraining;ROM/therapeutic exercise  -               User Key  (r) = Recorded By, (t) = Taken By, (c) = Cosigned By      Initials Name Provider Type     Cait Mckeon, OT Occupational Therapist                   Clinical Impression       Row Name 05/24/24 1022          Pain Assessment    Pretreatment Pain Rating 0/10 - no pain  -     Posttreatment Pain Rating 0/10 - no pain  -       Row Name 05/24/24 1022          Plan of Care Review    Plan of Care Reviewed With patient  -     Progress no change  -     Outcome Evaluation Pt is 85 y/o F with Hx LE edema, heart disease, GI bleed in January, admitted with inabiltiy to be up and active due to delayed dizziness after being up for a minute or three. for the past couple of weeks her BP drops so low after she tries to do little things like go to the bathroom that she stopped going to the cafeteria and had a friend bring her meals to her apartment. Pt normally uses Rolator and does not need (A) for ADL. She currently requires supervision for all activities, max (A) for LB ADL, and has minimal upright tolerance due to a delayed, symptomatic drop in her BP so low the equipment could not read it. Current recommendation is SNF for rehab pending pt clinical course.  -       Row Name 05/24/24 1022          Therapy Assessment/Plan (OT)    Rehab Potential  (OT) good, to achieve stated therapy goals  -     Criteria for Skilled Therapeutic Interventions Met (OT) skilled treatment is necessary  -     Therapy Frequency (OT) 5 times/wk  -     Predicted Duration of Therapy Intervention (OT) until d/c  -       Row Name 05/24/24 1022          Therapy Plan Review/Discharge Plan (OT)    Anticipated Discharge Disposition (OT) skilled nursing facility  -       Row Name 05/24/24 1022          Vital Signs    O2 Delivery Pre Treatment room air  -MH     O2 Delivery Intra Treatment room air  -MH     O2 Delivery Post Treatment room air  -MH     Pre Patient Position Supine  -     Intra Patient Position Standing  -     Post Patient Position Sitting  -       Row Name 05/24/24 1022          Positioning and Restraints    Pre-Treatment Position in bed  -     Post Treatment Position wheelchair  -     In Wheelchair notified nsg;sitting;with other staff  -               User Key  (r) = Recorded By, (t) = Taken By, (c) = Cosigned By      Initials Name Provider Type    Cait Marques, OT Occupational Therapist                   Outcome Measures       Row Name 05/24/24 1027 05/23/24 2986       How much help from another person do you currently need...    Turning from your back to your side while in flat bed without using bedrails? 3  - 4  -LL    Moving from lying on back to sitting on the side of a flat bed without bedrails? 3  - 4  -LL    Moving to and from a bed to a chair (including a wheelchair)? 3  - 4  -LL    Standing up from a chair using your arms (e.g., wheelchair, bedside chair)? 3  - 4  -LL    Climbing 3-5 steps with a railing? 3  - 4  -LL    To walk in hospital room? 3  - 4  -LL    AM-PAC 6 Clicks Score (PT) 18  - 24  -LL    Highest Level of Mobility Goal 6 --> Walk 10 steps or more  - 8 --> Walked 250 feet or more  -LL              User Key  (r) = Recorded By, (t) = Taken By, (c) = Cosigned By      Initials Name Provider Type    CLAUS Mckeon  WILMA Chavira Occupational Therapist    Alfreda Sepulveda RN Registered Nurse                    Occupational Therapy Education       Title: PT OT SLP Therapies (In Progress)       Topic: Occupational Therapy (In Progress)       Point: ADL training (Done)       Description:   Instruct learner(s) on proper safety adaptation and remediation techniques during self care or transfers.   Instruct in proper use of assistive devices.                  Learning Progress Summary             Patient Acceptance, E,TB, VU,NR by  at 5/24/2024 1027                         Point: Home exercise program (Not Started)       Description:   Instruct learner(s) on appropriate technique for monitoring, assisting and/or progressing therapeutic exercises/activities.                  Learner Progress:  Not documented in this visit.              Point: Precautions (Done)       Description:   Instruct learner(s) on prescribed precautions during self-care and functional transfers.                  Learning Progress Summary             Patient Acceptance, E,TB, VU,NR by  at 5/24/2024 1027                         Point: Body mechanics (Done)       Description:   Instruct learner(s) on proper positioning and spine alignment during self-care, functional mobility activities and/or exercises.                  Learning Progress Summary             Patient Acceptance, E,TB, VU,NR by  at 5/24/2024 1027                                         User Key       Initials Effective Dates Name Provider Type Discipline     06/16/21 -  Cait Mckeon OT Occupational Therapist OT                  OT Recommendation and Plan  Planned Therapy Interventions (OT): activity tolerance training, adaptive equipment training, BADL retraining, occupation/activity based interventions, patient/caregiver education/training, transfer/mobility retraining, ROM/therapeutic exercise  Therapy Frequency (OT): 5 times/wk  Plan of Care Review  Plan of Care Reviewed With:  patient  Progress: no change  Outcome Evaluation: Pt is 85 y/o F with Hx LE edema, heart disease, GI bleed in January, admitted with inabiltiy to be up and active due to delayed dizziness after being up for a minute or three. for the past couple of weeks her BP drops so low after she tries to do little things like go to the bathroom that she stopped going to the cafeteria and had a friend bring her meals to her apartment. Pt normally uses Rolator and does not need (A) for ADL. She currently requires supervision for all activities, max (A) for LB ADL, and has minimal upright tolerance due to a delayed, symptomatic drop in her BP so low the equipment could not read it. Current recommendation is SNF for rehab pending pt clinical course.     Time Calculation:         Time Calculation- OT       Row Name 05/24/24 1028             Time Calculation-     OT Start Time 0800  -      OT Stop Time 0827  -      OT Time Calculation (min) 27 min  -      Total Timed Code Minutes- OT 0 minute(s)  -      OT Received On 05/24/24  -      OT - Next Appointment 05/28/24  -      OT Goal Re-Cert Due Date 06/07/24  -                User Key  (r) = Recorded By, (t) = Taken By, (c) = Cosigned By      Initials Name Provider Type     Cait Mckeon OT Occupational Therapist                  Therapy Charges for Today       Code Description Service Date Service Provider Modifiers Qty    [id number]  OT EVAL MOD COMPLEXITY 4 5/24/2024 Cait Mckeon OT GO 1                 Cait Mckeon OT  5/24/2024

## 2024-05-24 NOTE — CASE MANAGEMENT/SOCIAL WORK
Discharge Planning Assessment   David     Patient Name: Kendy Worrell  MRN: [id number]  Today's Date: 5/24/2024    Admit Date: 5/23/2024    Plan: From [facility]. Referral to [facility] (pending). No PASRR or pre-cert needed for [facility].   Discharge Needs Assessment       Row Name 05/24/24 1620       Living Environment    People in Home facility resident    Unique Family Situation [facility]    Current Living Arrangements assisted living facility    Potentially Unsafe Housing Conditions none    In the past 12 months has the electric, gas, oil, or water company threatened to shut off services in your home? No    Primary Care Provided by self;other (see comments)    Provides Primary Care For no one    Family Caregiver if Needed other (see comments)    Quality of Family Relationships helpful;involved;supportive    Able to Return to Prior Arrangements yes       Resource/Environmental Concerns    Resource/Environmental Concerns none    Transportation Concerns none       Transportation Needs    In the past 12 months, has lack of transportation kept you from medical appointments or from getting medications? no    In the past 12 months, has lack of transportation kept you from meetings, work, or from getting things needed for daily living? No       Food Insecurity    Within the past 12 months, you worried that your food would run out before you got the money to buy more. Never true    Within the past 12 months, the food you bought just didn't last and you didn't have money to get more. Never true       Transition Planning    Patient/Family Anticipates Transition to home;inpatient rehabilitation facility  Back to [facility] or \[facility]\""    Patient/Family Anticipated Services at Transition none    Transportation Anticipated health plan transportation;family or friend will provide       Discharge Needs Assessment    Readmission Within the Last 30 Days no previous admission in last 30 days    Equipment Currently Used at Home cane,  straight;rollator;shower chair;walker, rolling;wheelchair;bp cuff    Anticipated Changes Related to Illness none    Outpatient/Agency/Support Group Needs assisted living facility    Discharge Facility/Level of Care Needs assisted living facility;rehabilitation facility    Provided Post Acute Provider List? Yes  1st choice [facility]    Post Acute Provider List Inpatient Rehab    Delivered To Patient;Support Person    Support Person kenji Sabillon    Method of Delivery In person                   Discharge Plan       Row Name 05/24/24 1624       Plan    Plan From [facility]. Referral to [facility] (pending). No PASRR or pre-cert needed for [facility].    Plan Comments CM met with patient and daughter Ramandeep at the bedside. Confirmed PCP, insurance, and pharmacy. Patient denies any difficulty affording medications. Patient is not current with any HHC/OPPT/OT services. Patient lives at [facility] assisted living, is IADLS, and does not drive. Family provides DC transport, and daughter Ramandeep can provide transport. Daughter would like referral sent to [facility] for DC planning. CM placed referral to [facility] via Epic basket and messaged liaison Jesi. DC Barriers: Cardiology and Nephrology following, echo pending, K+ 2.3.                  Continued Care and Services - Admitted Since 5/23/2024    No active coordination exists for this encounter.       Expected Discharge Date and Time       Expected Discharge Date Expected Discharge Time    May 26, 2024            Demographic Summary       Row Name 05/24/24 1619       General Information    Admission Type inpatient    Arrived From emergency department    Required Notices Provided Important Message from Medicare    Referral Source admission list    Reason for Consult discharge planning    Preferred Language English       Contact Information    Permission Granted to Share Info With     Contact Information Obtained for                    Functional Status       Row Name 05/24/24  1620       Functional Status    Usual Activity Tolerance moderate    Current Activity Tolerance moderate       Functional Status, IADL    Medications independent;assistive person    Meal Preparation independent;assistive person    Housekeeping independent;assistive person    Laundry independent;assistive person    Shopping independent;assistive person       Mental Status    General Appearance WDL WDL       Mental Status Summary    Recent Changes in Mental Status/Cognitive Functioning no changes                Maikol Dobbins RN     Cell number [phone]  Office number [phone]

## 2024-05-24 NOTE — CONSULTS
CARDIOLOGY CONSULT:    Kendy Worrell  1940  female  [id number]      Referring Provider: Hospitalist  Reason for Consultation: Dizziness    Patient Care Team:  Marisela Monte APRN as PCP - General (Nurse Practitioner)  Tone Tubbs MD as Consulting Physician (Cardiology)  Rios Thomas MD as Consulting Physician (Colon and Rectal Surgery)    Chief complaint dizziness    Subjective .     History of present illness:  Kendy Worrell is a 84 y.o. female with history of congestive heart failure with diastolic dysfunction history of atrial fibrillation CVA hypertension hyperlipidemia presented to hospital with complaints of dizziness.  The patient lives in assisted living place and presented to the ER with dizziness and was noted that her blood pressure and heart rate were low and hence she present to the ER.  No complaint of any chest pain or shortness of breath.  No PND orthopnea.  No palpitations.  Patient had some swelling of the feet and recently her torsemide was increased Man[place] lost 30 pounds with diuresis.  In the ER patient had atrial fibrillation with slow ventricular response and was initially hypotensive.  She also developed acute renal failure and is seen by nephrologist.  Review of Systems   Constitutional: Negative for fever and malaise/fatigue.   HENT:  Negative for ear pain and nosebleeds.    Eyes:  Negative for blurred vision and double vision.   Cardiovascular:  Negative for chest pain, dyspnea on exertion and palpitations.   Respiratory:  Negative for cough and shortness of breath.    Skin:  Negative for rash.   Musculoskeletal:  Negative for joint pain.   Gastrointestinal:  Negative for abdominal pain, nausea and vomiting.   Neurological:  Positive for dizziness. Negative for focal weakness and headaches.   Psychiatric/Behavioral:  Negative for depression. The patient is not nervous/anxious.    All other systems reviewed and are negative.      History  Past Medical History:   Diagnosis  Date    A-fib     Arthritis     Breast cancer     CHF (congestive heart failure)     CVA (cerebral vascular accident) 2019    History of pulmonary embolus (PE)     Hyperlipidemia     Hypertension     Hyperthyroidism     patient has hypothyroidism    Hypothyroidism        Past Surgical History:   Procedure Laterality Date    BREAST BIOPSY      BREAST SURGERY Right     CHOLECYSTECTOMY      COLON SURGERY      Removed    COLONOSCOPY      COLONOSCOPY N/A 11/3/2022    Procedure: COLONOSCOPY to anastamosis with biopsies and cold snare polypectomy;  Surgeon: Saroj Oakes MD;  Location: [facility] ENDOSCOPY;  Service: Gastroenterology;  Laterality: N/A;  PRe: rectal bleeding  Post: hemorrhoids, diverticulosis, anastamotic ulcer, polyp, hemostasis clip free-floating    HYSTERECTOMY      MAMMO BILATERAL  2015    MASTECTOMY      TONSILLECTOMY         Family History   Problem Relation Age of Onset    Stroke Mother     Hypertension Mother     Heart disease Father     Cancer Father     Colon polyps Father        Social History     Tobacco Use    Smoking status: Former     Current packs/day: 0.00     Types: Cigarettes     Quit date:      Years since quittin.4     Passive exposure: Past    Smokeless tobacco: Never    Tobacco comments:     quit in    Vaping Use    Vaping status: Never Used   Substance Use Topics    Alcohol use: Yes     Alcohol/week: 3.0 standard drinks of alcohol     Types: 3 Shots of liquor per week     Comment: Last drink 3 weeks ago.    Drug use: Never        Medications Prior to Admission   Medication Sig Dispense Refill Last Dose    apixaban (ELIQUIS) 5 MG tablet tablet Take 1 tablet by mouth Every 12 (Twelve) Hours. 60 tablet  2024    atorvastatin (LIPITOR) 10 MG tablet Take 1 tablet by mouth Every Night.   2024    empagliflozin (JARDIANCE) 10 MG tablet tablet Take 1 tablet by mouth Daily. 90 tablet 1 2024    lacosamide (VIMPAT) 50 MG tablet tablet Take 1 tablet by mouth 2  (Two) Times a Day.   5/22/2024    levothyroxine (SYNTHROID, LEVOTHROID) 88 MCG tablet Take 1 tablet by mouth Daily.   5/23/2024    magnesium oxide 250 MG tablet Take 1 tablet by mouth 2 (Two) Times a Day.   5/22/2024    metOLazone (ZAROXOLYN) 2.5 MG tablet Take 1 tablet by mouth 1 (One) Time Per Week. On Mondays   Past Week    multivitamin with minerals tablet tablet Take 1 tablet by mouth Daily.   5/22/2024    nitroglycerin (NITROSTAT) 0.4 MG SL tablet Place 1 tablet under the tongue Every 5 (Five) Minutes As Needed for Chest Pain.   Past Month    nystatin (MYCOSTATIN) 374322 UNIT/GM powder Apply  topically to the appropriate area as directed 3 (Three) Times a Day As Needed. Apply under breasts   Past Week    potassium chloride (KLOR-CON M20) 20 MEQ CR tablet Take 2 tablets by mouth 3 (Three) Times a Day.   5/23/2024    torsemide (DEMADEX) 100 MG tablet Take 1 tablet by mouth Daily.   5/23/2024    acetaminophen (TYLENOL) 325 MG tablet Take 2 tablets by mouth Every 6 (Six) Hours As Needed for Mild Pain .       albuterol sulfate  (90 Base) MCG/ACT inhaler Inhale 2 puffs Every 4 (Four) Hours As Needed for Wheezing.       cloNIDine (CATAPRES) 0.1 MG tablet Take 1 tablet by mouth Every 6 (Six) Hours As Needed for High Blood Pressure (SBP > 160, DBP > 90).   Unknown    Hydrocortisone, Perianal, (ANUSOL-HC) 2.5 % rectal cream Insert 1 Application into the rectum 2 (Two) Times a Day.       isosorbide mononitrate (IMDUR) 30 MG 24 hr tablet Take 1 tablet by mouth Daily.   Unknown    Menthol-Zinc Oxide (Calmoseptine) 0.44-20.6 % ointment Apply 1 Application topically to the appropriate area as directed 2 (Two) Times a Day. 4 packet 0     metoprolol succinate XL (TOPROL-XL) 100 MG 24 hr tablet Take 1 tablet by mouth Every 12 (Twelve) Hours. 60 tablet 0 Unknown    pantoprazole (PROTONIX) 40 MG EC tablet Take 1 tablet by mouth Daily.   Unknown       Allergies: Aspirin and Nsaids    Scheduled Meds:apixaban, 5 mg, Oral,  "Q12H  atorvastatin, 10 mg, Oral, Nightly  insulin lispro, 2-9 Units, Subcutaneous, 4x Daily AC & at Bedtime  isosorbide mononitrate, 30 mg, Oral, Daily  lacosamide, 50 mg, Oral, BID  levothyroxine, 88 mcg, Oral, Q AM  pantoprazole, 40 mg, Oral, Q AM  sodium chloride, 10 mL, Intravenous, Q12H      Continuous Infusions:sodium chloride, 50 mL/hr, Last Rate: 50 mL/hr (05/24/24 0825)      PRN Meds:.  acetaminophen    albuterol    senna-docusate sodium **AND** polyethylene glycol **AND** bisacodyl **AND** bisacodyl    Calcium Replacement - Follow Nurse / BPA Driven Protocol    dextrose    dextrose    glucagon (human recombinant)    Magnesium Cardiology Dose Replacement - Follow Nurse / BPA Driven Protocol    ondansetron    Phosphorus Replacement - Follow Nurse / BPA Driven Protocol    Potassium Replacement - Follow Nurse / BPA Driven Protocol    [COMPLETED] Insert Peripheral IV **AND** sodium chloride    sodium chloride    sodium chloride    Objective     VITAL SIGNS  Vitals:    05/23/24 2352 05/24/24 0430 05/24/24 0941 05/24/24 1214   BP:  104/55 101/65 120/86   BP Location:  Left arm Left arm Left arm   Patient Position:  Lying Lying Lying   Pulse:  67 64 64   Resp:  18 16 14   Temp:  97.8 °F (36.6 °C) 97.7 °F (36.5 °C) 97.5 °F (36.4 °C)   TempSrc:  Oral Oral Oral   SpO2:  99% 99% 100%   Weight: 69.6 kg (153 lb 7 oz)      Height:           Flowsheet Rows      Flowsheet Row First Filed Value   Admission Height 154.9 cm (61\") Documented at 05/23/2024 1421   Admission Weight 70 kg (154 lb 5.2 oz) Documented at 05/23/2024 1421             TELEMETRY: Atrial fibrillation controlled ventricular response    Physical Exam:  Constitutional:       Appearance: Well-developed.   Eyes:      General: No scleral icterus.     Conjunctiva/sclera: Conjunctivae normal.   HENT:      Head: Normocephalic and atraumatic.   Neck:      Vascular: No carotid bruit or JVD.   Pulmonary:      Effort: Pulmonary effort is normal.      Breath sounds: " Normal breath sounds. No wheezing. No rales.   Cardiovascular:      Normal rate. Irregularly irregular rhythm.      Murmurs: There is a systolic murmur.   Pulses:     Intact distal pulses.   Abdominal:      General: Bowel sounds are normal.      Palpations: Abdomen is soft.   Musculoskeletal:      Cervical back: Normal range of motion and neck supple. Skin:     General: Skin is warm and dry.      Findings: No rash.   Neurological:      Mental Status: Alert.          Results Review:   I reviewed the patient's new clinical results.  Lab Results (last 24 hours)       Procedure Component Value Units Date/Time    Creatinine Urine Random (kidney function) GFR component - Urine, Clean Catch [[id number]] Collected: 05/23/24 2321    Specimen: Urine, Clean Catch Updated: 05/24/24 1314     Creatinine, Urine 42.5 mg/dL     Narrative:      Reference intervals for random urine have not been established.  Clinical usage is dependent upon physician's interpretation in combination with other laboratory tests.       POC Glucose 4x Daily Before Meals & at Bedtime [[id number]]  (Abnormal) Collected: 05/24/24 1211    Specimen: Blood Updated: 05/24/24 1215     Glucose 122 mg/dL      Comment: Serial Number: [id number]:  [id number]       POC Glucose 4x Daily Before Meals & at Bedtime [[id number]]  (Abnormal) Collected: 05/24/24 0857    Specimen: Blood Updated: 05/24/24 0858     Glucose 107 mg/dL      Comment: Serial Number: [id number]:  [id number]       High Sensitivity Troponin T 2Hr [[id number]]  (Abnormal) Collected: 05/24/24 0456    Specimen: Blood from Arm, Left Updated: 05/24/24 0541     HS Troponin T 36 ng/L      Troponin T Delta 6 ng/L     Narrative:      High Sensitive Troponin T Reference Range:  <14.0 ng/L- Negative Female for AMI  <22.0 ng/L- Negative Male for AMI  >=14 - Abnormal Female indicating possible myocardial injury.  >=22 - Abnormal Male indicating possible myocardial injury.   Clinicians would have to utilize  clinical acumen, EKG, Troponin, and serial changes to determine if it is an Acute Myocardial Infarction or myocardial injury due to an underlying chronic condition.         Basic Metabolic Panel [[id number]]  (Abnormal) Collected: 05/24/24 0456    Specimen: Blood from Arm, Left Updated: 05/24/24 0541     Glucose 95 mg/dL      BUN 31 mg/dL      Creatinine 2.45 mg/dL      Sodium 136 mmol/L      Potassium 2.3 mmol/L      Chloride 91 mmol/L      CO2 30.6 mmol/L      Calcium 9.6 mg/dL      BUN/Creatinine Ratio 12.7     Anion Gap 14.4 mmol/L      eGFR 18.9 mL/min/1.73     Narrative:      GFR Normal >60  Chronic Kidney Disease <60  Kidney Failure <15    The GFR formula is only valid for adults with stable renal function between ages 18 and 70.    Magnesium [[id number]]  (Abnormal) Collected: 05/24/24 0456    Specimen: Blood from Arm, Left Updated: 05/24/24 0536     Magnesium 1.5 mg/dL     CBC (No Diff) [[id number]]  (Abnormal) Collected: 05/24/24 0456    Specimen: Blood from Arm, Left Updated: 05/24/24 0504     WBC 7.21 10*3/mm3      RBC 3.84 10*6/mm3      Hemoglobin 11.2 g/dL      Hematocrit 35.2 %      MCV 91.7 fL      MCH 29.2 pg      MCHC 31.8 g/dL      RDW 14.3 %      RDW-SD 48.1 fl      MPV 10.0 fL      Platelets 226 10*3/mm3     Osmolality, Urine - Urine, Clean Catch [[id number]]  (Abnormal) Collected: 05/23/24 2321    Specimen: Urine, Clean Catch Updated: 05/23/24 2352     Osmolality, Urine 246 mOsm/kg     Sodium, Urine, Random - Urine, Clean Catch [[id number]] Collected: 05/23/24 2321    Specimen: Urine, Clean Catch Updated: 05/23/24 2340     Sodium, Urine 37 mmol/L     Narrative:      Reference intervals for random urine have not been established.  Clinical usage is dependent upon physician's interpretation in combination with other laboratory tests.       POC Glucose Once [[id number]]  (Abnormal) Collected: 05/23/24 2304    Specimen: Blood Updated: 05/23/24 2306     Glucose 118 mg/dL      Comment: Serial Number:  [id number]:  [id number]       High Sensitivity Troponin T [[id number]]  (Abnormal) Collected: 05/23/24 2019    Specimen: Blood Updated: 05/23/24 2247     HS Troponin T 30 ng/L     Narrative:      High Sensitive Troponin T Reference Range:  <14.0 ng/L- Negative Female for AMI  <22.0 ng/L- Negative Male for AMI  >=14 - Abnormal Female indicating possible myocardial injury.  >=22 - Abnormal Male indicating possible myocardial injury.   Clinicians would have to utilize clinical acumen, EKG, Troponin, and serial changes to determine if it is an Acute Myocardial Infarction or myocardial injury due to an underlying chronic condition.         Magnesium [[id number]]  (Abnormal) Collected: 05/23/24 2019    Specimen: Blood Updated: 05/23/24 2247     Magnesium 1.5 mg/dL     Lipid Panel [[id number]]  (Abnormal) Collected: 05/23/24 2019    Specimen: Blood Updated: 05/23/24 2247     Total Cholesterol 141 mg/dL      Triglycerides 56 mg/dL      HDL Cholesterol 98 mg/dL      LDL Cholesterol  31 mg/dL      VLDL Cholesterol 12 mg/dL      LDL/HDL Ratio 0.32    Narrative:      Cholesterol Reference Ranges  (U.S. Department of Health and Human Services ATP III Classifications)    Desirable          <200 mg/dL  Borderline High    200-239 mg/dL  High Risk          >240 mg/dL      Triglyceride Reference Ranges  (U.S. Department of Health and Human Services ATP III Classifications)    Normal           <150 mg/dL  Borderline High  150-199 mg/dL  High             200-499 mg/dL  Very High        >500 mg/dL    HDL Reference Ranges  (U.S. Department of Health and Human Services ATP III Classifications)    Low     <40 mg/dl (major risk factor for CHD)  High    >60 mg/dl ('negative' risk factor for CHD)        LDL Reference Ranges  (U.S. Department of Health and Human Services ATP III Classifications)    Optimal          <100 mg/dL  Near Optimal     100-129 mg/dL  Borderline High  130-159 mg/dL  High             160-189 mg/dL  Very High         >189 mg/dL    TSH [[id number]]  (Normal) Collected: 05/23/24 2019    Specimen: Blood Updated: 05/23/24 2247     TSH 0.426 uIU/mL     T4, Free [[id number]]  (Abnormal) Collected: 05/23/24 2019    Specimen: Blood Updated: 05/23/24 2247     Free T4 2.29 ng/dL     Narrative:      Results may be falsely increased if patient taking Biotin.      Hemoglobin A1c [[id number]]  (Normal) Collected: 05/23/24 2019    Specimen: Blood Updated: 05/23/24 2116     Hemoglobin A1C 5.53 %     Osmolality, Serum [[id number]]  (Normal) Collected: 05/23/24 2019    Specimen: Blood Updated: 05/23/24 2050     Osmolality 287 mOsm/kg     Sedimentation Rate [[id number]]  (Normal) Collected: 05/23/24 2019    Specimen: Blood Updated: 05/23/24 2031     Sed Rate 19 mm/hr     High Sensitivity Troponin T [[id number]]  (Abnormal) Collected: 05/23/24 1457    Specimen: Blood Updated: 05/23/24 1857     HS Troponin T 35 ng/L     Narrative:      High Sensitive Troponin T Reference Range:  <14.0 ng/L- Negative Female for AMI  <22.0 ng/L- Negative Male for AMI  >=14 - Abnormal Female indicating possible myocardial injury.  >=22 - Abnormal Male indicating possible myocardial injury.   Clinicians would have to utilize clinical acumen, EKG, Troponin, and serial changes to determine if it is an Acute Myocardial Infarction or myocardial injury due to an underlying chronic condition.         Comprehensive Metabolic Panel [[id number]]  (Abnormal) Collected: 05/23/24 1457    Specimen: Blood Updated: 05/23/24 1535     Glucose 98 mg/dL      BUN 28 mg/dL      Creatinine 2.27 mg/dL      Sodium 130 mmol/L      Potassium 3.6 mmol/L      Comment: Slight hemolysis detected by analyzer. Result may be falsely elevated.        Chloride 87 mmol/L      CO2 28.2 mmol/L      Calcium 10.4 mg/dL      Total Protein 7.4 g/dL      Albumin 3.7 g/dL      ALT (SGPT) 12 U/L      AST (SGOT) 36 U/L      Alkaline Phosphatase 109 U/L      Total Bilirubin 1.1 mg/dL      Globulin 3.7 gm/dL      A/G Ratio  1.0 g/dL      BUN/Creatinine Ratio 12.3     Anion Gap 14.8 mmol/L      eGFR 20.7 mL/min/1.73     Narrative:      GFR Normal >60  Chronic Kidney Disease <60  Kidney Failure <15    The GFR formula is only valid for adults with stable renal function between ages 18 and 70.    BNP [[id number]]  (Normal) Collected: 05/23/24 1457    Specimen: Blood Updated: 05/23/24 1534     proBNP 1,248.0 pg/mL     Narrative:      This assay is used as an aid in the diagnosis of individuals suspected of having heart failure. It can be used as an aid in the diagnosis of acute decompensated heart failure (ADHF) in patients presenting with signs and symptoms of ADHF to the emergency department (ED). In addition, NT-proBNP of <300 pg/mL indicates ADHF is not likely.    Age Range Result Interpretation  NT-proBNP Concentration (pg/mL:      <50             Positive            >450                   Gray                 300-450                    Negative             <300    50-75           Positive            >900                  Gray                300-900                  Negative            <300      >75             Positive            >1800                  Gray                300-1800                  Negative            <300    CBC & Differential [[id number]]  (Normal) Collected: 05/23/24 1457    Specimen: Blood Updated: 05/23/24 1510    Narrative:      The following orders were created for panel order CBC & Differential.  Procedure                               Abnormality         Status                     ---------                               -----------         ------                     CBC Auto Differential[[id number]]        Normal              Final result                 Please view results for these tests on the individual orders.    CBC Auto Differential [[id number]]  (Normal) Collected: 05/23/24 1457    Specimen: Blood Updated: 05/23/24 1510     WBC 7.29 10*3/mm3      RBC 4.30 10*6/mm3      Hemoglobin 12.5 g/dL      Hematocrit  39.2 %      MCV 91.2 fL      MCH 29.1 pg      MCHC 31.9 g/dL      RDW 14.3 %      RDW-SD 47.3 fl      MPV 10.0 fL      Platelets 267 10*3/mm3      Neutrophil % 65.0 %      Lymphocyte % 20.3 %      Monocyte % 11.8 %      Eosinophil % 1.6 %      Basophil % 0.8 %      Immature Grans % 0.5 %      Neutrophils, Absolute 4.73 10*3/mm3      Lymphocytes, Absolute 1.48 10*3/mm3      Monocytes, Absolute 0.86 10*3/mm3      Eosinophils, Absolute 0.12 10*3/mm3      Basophils, Absolute 0.06 10*3/mm3      Immature Grans, Absolute 0.04 10*3/mm3      nRBC 0.0 /100 WBC             Imaging Results (Last 24 Hours)       Procedure Component Value Units Date/Time    CT Head Without Contrast [[id number]] Collected: 05/23/24 1518     Updated: 05/23/24 1523    Narrative:      CT HEAD WO CONTRAST    Date of Exam: 5/23/2024 3:10 PM EDT    Indication: dizzy.    Comparison: Head CT 8/1/2023    Technique: Axial CT images were obtained of the head without contrast administration.  Coronal reconstructions were performed.  Automated exposure control and iterative reconstruction methods were used.      Findings:  No acute intracranial hemorrhage. No acute large territory infarct. There are scattered subcortical and periventricular white matter hypodensities which are nonspecific and can be seen in the setting of chronic small vessel ischemic change. There is   intracranial atherosclerosis. No extra-axial collections. No midline shift or herniation. Normal size and configuration of the ventricles. Unremarkable appearance of the orbits. There is near complete opacification of the left maxillary sinus with bony   wall thickening compatible with chronic sinusitis. The mastoid air cells are clear. No acute or suspicious bony findings.      Impression:      Impression:  No acute intracranial findings.    Chronic and senescent changes as above.    Left maxillary sinus disease.      Electronically Signed: Wilfrid Fish MD    5/23/2024 3:21 PM EDT     Workstation ID: [id number]    XR Chest 1 View [[id number]] Collected: 05/23/24 1504     Updated: 05/23/24 1507    Narrative:      XR CHEST 1 VW    Date of Exam: 5/23/2024 3:01 PM EDT    Indication: dizzy    Comparison: Temo 22nd 2024    Findings:  The heart looks enlarged. There is some atelectasis or scarring at the left base. Surgical clips are noted about the right chest area.      Impression:      Impression:  1.Cardiomegaly.  2.Atelectasis or scarring left lung base      Electronically Signed: Seth Guerrero MD    5/23/2024 3:05 PM EDT    Workstation ID: [id number]            EKG      I personally viewed and interpreted the patient's EKG/Telemetry data:    ECHOCARDIOGRAM:  Results for orders placed during the hospital encounter of 01/22/24    Adult Transthoracic Echo Complete W/ Cont if Necessary Per Protocol    Interpretation Summary    Left ventricular systolic function is normal. Left ventricular ejection fraction appears to be 56 - 60%.    Left ventricular wall thickness is consistent with hypertrophy. Sigmoid-shaped ventricular septum is present.    The left atrial cavity is severely dilated.    The right atrial cavity is severely  dilated.    Moderate aortic valve stenosis is present. Aortic valve area is 1.5 cm2.    Aortic valve maximum pressure gradient is 21 mmHg. Aortic valve mean pressure gradient is 12 mmHg.    Severe tricuspid valve regurgitation is present.    There is a small (<1cm) pericardial effusion. There is no evidence of cardiac tamponade.         STRESS MYOVIEW:  Results for orders placed during the hospital encounter of 11/18/21    Stress Test With Myocardial Perfusion One Day    Interpretation Summary  · Myocardial perfusion imaging indicates a normal myocardial perfusion study with no evidence of ischemia.  · Left ventricular ejection fraction is hyperdynamic (Calculated EF > 70%). .  · Findings consistent with a normal ECG stress test.  · Impressions are consistent with a low risk study.  ·  There is no prior study available for comparison.       CARDIAC CATHETERIZATION:    No results found for this or any previous visit.       OTHER:         MDM    Dizziness  Patient presents with dizziness and has more bradycardia and orthostasis  Patient was on aggressive diuresis and lost about 30 pounds in the last few days.  Patient is ruled out for MI by EKG and enzymes  Patient has normal LV systolic function but has LV diastolic dysfunction and hence no further cardiac workup except monitoring the heart rates    Bradycardia/atrial fibrillation  Patient has history of atrial fibrillation is on metoprolol which will be held because of both bradycardia and hypotension but will continue the Eliquis.    Hypertension  Patient blood pressure on the lower side diabetes stable we will place her on only isosorbide and stop the metoprolol.    Hyperlipidemia  Patient on statins and the lipid levels are well within normal limits.    Acute renal failure with hyponatremia  Patient ARB acute renal failure with hyponatremia because of aggressive diuresis and hence is seen by the nephrologist.      I discussed the patients findings and my recommendations with patient and nurse    Elbert Mathew MD  05/24/24  13:33 EDT

## 2024-05-24 NOTE — DISCHARGE PLACEMENT REQUEST
"Kendy Worrell (84 y.o. Female)       Date of Birth   1940    Social Security Number       Address   [address]  [place] [address]    Home Phone   [phone]    MRN   [id number]       Synagogue   [employer]    Marital Status   Single                            Admission Date   5/23/24    Admission Type   Emergency    Admitting Provider   Travis Davenport MD    Attending Provider   Jess Song MD    Department, Room/Bed   [facility], 1116/1       Discharge Date       Discharge Disposition       Discharge Destination                                 Attending Provider: Jess Song MD    Allergies: Aspirin, Nsaids    Isolation: None   Infection: None   Code Status: No CPR    Ht: 154.9 cm (61\")   Wt: 69.6 kg (153 lb 7 oz)    Admission Cmt: None   Principal Problem: Orthostatic dizziness [R42]                   Active Insurance as of 5/23/2024       Primary Coverage       Payor Plan Insurance Group Employer/Plan Group    MEDICARE MEDICARE A & B        Payor Plan Address Payor Plan Phone Number Payor Plan Fax Number Effective Dates    [address] [phone]  2/1/2004 - None Entered    [facility] [id number]         Subscriber Name Subscriber Birth Date Member ID       KENDY WORRELL 1940 [id number]               Secondary Coverage       Payor Plan Insurance Group Employer/Plan Group    [place] [id number]       Payor Plan Address Payor Plan Phone Number Payor Plan Fax Number Effective Dates    [address]   1/9/1993 - None Entered    [facility] [id number]         Subscriber Name Subscriber Birth Date Member ID       KENDY WORRELL 1940 [id number]               Tertiary Coverage       Payor Plan Insurance Group Employer/Plan Group    [place]        Payor Plan Address Payor Plan Phone Number Payor Plan Fax Number Effective Dates    [address]   11/18/2021 - None Entered    [place] [address]         " Subscriber Name Subscriber Birth Date Member ID       JESSY ALEJANDRAKONG CID 1940 [id number]                     Emergency Contacts        (Rel.) Home Phone Work Phone Mobile Phone    Ramandeep PROCTOR (Daughter) [phone] -- [phone]    JOVANNA PROCTOR (Grandchild) -- -- [phone]    Akiko Alejandra (Other) -- -- [phone]    HUGO ALEJANDRA (Son) -- -- [phone]

## 2024-05-24 NOTE — PLAN OF CARE
Goal Outcome Evaluation:  Plan of Care Reviewed With: patient        Progress: no change  Outcome Evaluation: Pt is 83 y/o F with Hx LE edema, heart disease, GI bleed in January, admitted with inabiltiy to be up and active due to delayed dizziness after being up for a minute or three. for the past couple of weeks her BP drops so low after she tries to do little things like go to the bathroom that she stopped going to the cafeteria and had a friend bring her meals to her apartment. Pt normally uses Rolator and does not need (A) for ADL. She currently requires supervision for all activities, max (A) for LB ADL, and has minimal upright tolerance due to a delayed, symptomatic drop in her BP so low the equipment could not read it. Current recommendation is SNF for rehab pending pt clinical course.      Anticipated Discharge Disposition (OT): skilled nursing facility

## 2024-05-24 NOTE — PLAN OF CARE
Problem: Adult Inpatient Plan of Care  Goal: Plan of Care Review  Outcome: Ongoing, Progressing  Flowsheets (Taken 5/24/2024 1752)  Plan of Care Reviewed With:   patient   daughter  Outcome Evaluation: patient stable on room air, orthostatic blood pressure remains low when out of bed. Patient will possibly discharge to rehab when applicable. Potassium to be replaced per protocol. Plan of care to continue.  Goal: Patient-Specific Goal (Individualized)  Outcome: Ongoing, Progressing  Goal: Absence of Hospital-Acquired Illness or Injury  Outcome: Ongoing, Progressing  Intervention: Identify and Manage Fall Risk  Description: Perform standard risk assessment on admission using a validated tool or comprehensive approach appropriate to the patient; reassess fall risk frequently, with change in status or transfer to another level of care.  Communicate fall injury risk to interprofessional healthcare team.  Determine need for increased observation, equipment and environmental modification, such as low bed, signage and supportive, nonskid footwear.  Adjust safety measures to individual developmental age, stage and identified risk factors.  Reinforce the importance of safety and physical activity with patient and family.  Perform regular intentional rounding to assess need for position change, pain assessment and personal needs, including assistance with toileting.  Recent Flowsheet Documentation  Taken 5/24/2024 1600 by Zara Walker RN  Safety Promotion/Fall Prevention:   activity supervised   safety round/check completed  Taken 5/24/2024 1500 by Zara Walker RN  Safety Promotion/Fall Prevention:   activity supervised   safety round/check completed  Taken 5/24/2024 1400 by Zara Walker RN  Safety Promotion/Fall Prevention:   activity supervised   safety round/check completed  Taken 5/24/2024 1300 by Zara Walker RN  Safety Promotion/Fall Prevention:   activity supervised   safety round/check completed  Taken  5/24/2024 1200 by Zara Walker RN  Safety Promotion/Fall Prevention:   activity supervised   safety round/check completed  Taken 5/24/2024 1100 by Zara Walker RN  Safety Promotion/Fall Prevention:   activity supervised   safety round/check completed  Taken 5/24/2024 1000 by Zara Walker RN  Safety Promotion/Fall Prevention:   activity supervised   safety round/check completed  Taken 5/24/2024 0900 by Zara Walker RN  Safety Promotion/Fall Prevention:   activity supervised   safety round/check completed  Taken 5/24/2024 0800 by Zara Walker RN  Safety Promotion/Fall Prevention: safety round/check completed  Intervention: Prevent Skin Injury  Description: Perform a screening for skin injury risk, such as pressure or moisture associated skin damage on admission and at regular intervals throughout hospital stay.  Keep all areas of skin (especially folds) clean and dry.  Maintain adequate skin hydration.  Relieve and redistribute pressure and protect bony prominences; implement measures based on patient-specific risk factors.  Match turning and repositioning schedule to clinical condition.  Encourage weight shift frequently; assist with reposition if unable to complete independently.  Float heels off bed; avoid pressure on the Achilles tendon.  Keep skin free from extended contact with medical devices.  Encourage functional activity and mobility, as early as tolerated.  Use aids (e.g., slide boards, mechanical lift) during transfer.  Recent Flowsheet Documentation  Taken 5/24/2024 1600 by Zara Walker RN  Body Position:   position changed independently   tilted   weight shifting  Taken 5/24/2024 1200 by Zara Walker RN  Body Position:   position changed independently   tilted   weight shifting  Taken 5/24/2024 0800 by Zara Walker RN  Body Position:   position changed independently   tilted   weight shifting  Skin Protection:   adhesive use limited   tubing/devices free from skin contact  Intervention:  Prevent and Manage VTE (Venous Thromboembolism) Risk  Description: Assess for VTE (venous thromboembolism) risk.  Encourage and assist with early ambulation.  Initiate and maintain compression or other therapy, as indicated, based on identified risk in accordance with organizational protocol and provider order.  Encourage both active and passive leg exercises while in bed, if unable to ambulate.  Recent Flowsheet Documentation  Taken 5/24/2024 1200 by Zara Walker RN  Activity Management: up to bedside commode  Taken 5/24/2024 0800 by Zara Walker RN  Activity Management: up to bedside commode  VTE Prevention/Management: (eliquis)   bilateral   sequential compression devices off  Goal: Optimal Comfort and Wellbeing  Outcome: Ongoing, Progressing  Intervention: Provide Person-Centered Care  Description: Use a family-focused approach to care.  Develop trust and rapport by proactively providing information, encouraging questions, addressing concerns and offering reassurance.  Acknowledge emotional response to hospitalization.  Recognize and utilize personal coping strategies.  Honor spiritual and cultural preferences.  Recent Flowsheet Documentation  Taken 5/24/2024 0800 by Zara Walker RN  Trust Relationship/Rapport:   care explained   choices provided   reassurance provided   thoughts/feelings acknowledged  Goal: Readiness for Transition of Care  Outcome: Ongoing, Progressing   Goal Outcome Evaluation:  Plan of Care Reviewed With: patient, daughter           Outcome Evaluation: patient stable on room air, orthostatic blood pressure remains low when out of bed. Patient will possibly discharge to rehab when applicable. Potassium to be replaced per protocol. Plan of care to continue.

## 2024-05-24 NOTE — PROGRESS NOTES
[facility] MEDICINE SERVICE  DAILY PROGRESS NOTE    NAME: Kendy Worrell  : 1940  MRN: [id number]      LOS: 0 days     PROVIDER OF SERVICE: Jess Song MD    Chief Complaint: Orthostatic dizziness    Subjective:     Interval History:  History taken from: patient  Patient Complaints: No complaints      Review of Systems:   Review of Systems   All other systems reviewed and are negative.      Objective:     Vital Signs  Temp:  [97.5 °F (36.4 °C)-98.1 °F (36.7 °C)] 98.1 °F (36.7 °C)  Heart Rate:  [61-73] 73  Resp:  [14-19] 14  BP: ()/(55-86) 109/73   Body mass index is 28.99 kg/m².    Physical Exam  Physical Exam  Constitutional:       Appearance: Normal appearance.   HENT:      Head: Normocephalic and atraumatic.      Nose: Nose normal.      Mouth/Throat:      Mouth: Mucous membranes are moist.   Eyes:      Extraocular Movements: Extraocular movements intact.      Pupils: Pupils are equal, round, and reactive to light.   Cardiovascular:      Rate and Rhythm: Normal rate and regular rhythm.   Pulmonary:      Effort: Pulmonary effort is normal.      Breath sounds: Normal breath sounds.   Abdominal:      General: Abdomen is flat. Bowel sounds are normal.      Palpations: Abdomen is soft.   Musculoskeletal:         General: Normal range of motion.      Cervical back: Normal range of motion and neck supple.   Skin:     General: Skin is warm and dry.      Capillary Refill: Capillary refill takes less than 2 seconds.   Neurological:      General: No focal deficit present.      Mental Status: She is alert and oriented to person, place, and time.   Psychiatric:         Mood and Affect: Mood normal.         Behavior: Behavior normal.         Thought Content: Thought content normal.         Judgment: Judgment normal.         Scheduled Meds   apixaban, 5 mg, Oral, Q12H  atorvastatin, 10 mg, Oral, Nightly  insulin lispro, 2-9 Units, Subcutaneous, 4x Daily AC & at Bedtime  isosorbide mononitrate, 30 mg,  Oral, Daily  lacosamide, 50 mg, Oral, BID  levothyroxine, 88 mcg, Oral, Q AM  pantoprazole, 40 mg, Oral, Q AM  potassium chloride, 40 mEq, Oral, Once  sodium chloride, 10 mL, Intravenous, Q12H       PRN Meds     acetaminophen    albuterol    senna-docusate sodium **AND** polyethylene glycol **AND** bisacodyl **AND** bisacodyl    Calcium Replacement - Follow Nurse / BPA Driven Protocol    dextrose    dextrose    glucagon (human recombinant)    Magnesium Cardiology Dose Replacement - Follow Nurse / BPA Driven Protocol    ondansetron    Phosphorus Replacement - Follow Nurse / BPA Driven Protocol    Potassium Replacement - Follow Nurse / BPA Driven Protocol    [COMPLETED] Insert Peripheral IV **AND** sodium chloride    sodium chloride    sodium chloride   Infusions         Diagnostic Data    Results from last 7 days   Lab Units 05/24/24  1448 05/24/24  0456 05/23/24  1457   WBC 10*3/mm3  --  7.21 7.29   HEMOGLOBIN g/dL  --  11.2* 12.5   HEMATOCRIT %  --  35.2 39.2   PLATELETS 10*3/mm3  --  226 267   GLUCOSE mg/dL 110* 95 98   CREATININE mg/dL 2.17* 2.45* 2.27*   BUN mg/dL 32* 31* 28*   SODIUM mmol/L 136 136 130*   POTASSIUM mmol/L 3.0* 2.3* 3.6   AST (SGOT) U/L  --   --  36*   ALT (SGPT) U/L  --   --  12   ALK PHOS U/L  --   --  109   BILIRUBIN mg/dL  --   --  1.1   ANION GAP mmol/L 12.2 14.4 14.8       CT Head Without Contrast    Result Date: 5/23/2024  Impression: No acute intracranial findings. Chronic and senescent changes as above. Left maxillary sinus disease. Electronically Signed: Wilfrid Fish MD  5/23/2024 3:21 PM EDT  Workstation ID: [id number]    XR Chest 1 View    Result Date: 5/23/2024  Impression: 1.Cardiomegaly. 2.Atelectasis or scarring left lung base Electronically Signed: Seth Guerrero MD  5/23/2024 3:05 PM EDT  Workstation ID: [id number]       I reviewed the patient's new clinical results.    Assessment/Plan:     Active and Resolved Problems  Active Hospital Problems    Diagnosis  POA    **Orthostatic  dizziness [R42]  Yes      Resolved Hospital Problems   No resolved problems to display.       Orthostatic lightheadedness/dizziness:  Near syncope:  -presents with orthostatic lightheadedness/dizziness for past month. Today [facility] nurse reported BP 90's/60's.   -Check orthostatic vital signs daily. Adjustments made to BP med regimen to prevent hypotension. Cardiology is following.     MYLENE:  hyponatremia  -Continue Ivf and monitor renal function     Paroxysmal Afib:  -telemetry  -rate controlled  -held metoprolol tonight due to hypotension while in ED  -continue eliquis.     Hypothyroidism:  -continue levothyroxine.                 DVT prophylaxis:  Medical and mechanical DVT prophylaxis orders are present.         Code status is   Code Status and Medical Interventions:   Ordered at: 05/23/24 1804     Level Of Support Discussed With:    Patient     Code Status (Patient has no pulse and is not breathing):    No CPR (Do Not Attempt to Resuscitate)     Medical Interventions (Patient has pulse or is breathing):    Comfort Measures       Plan for disposition:Discharge home in 2-3 days. I    Time: 35 minutes    Signature: Electronically signed by Jess Song MD, 05/24/24, 19:38 EDT.  [facility] Hospitalist Team

## 2024-05-24 NOTE — PLAN OF CARE
Problem: Adult Inpatient Plan of Care  Goal: Plan of Care Review  Outcome: Ongoing, Progressing  Flowsheets (Taken 5/24/2024 0324)  Progress: no change  Plan of Care Reviewed With: patient  Goal: Patient-Specific Goal (Individualized)  Outcome: Ongoing, Progressing  Goal: Absence of Hospital-Acquired Illness or Injury  Outcome: Ongoing, Progressing  Intervention: Identify and Manage Fall Risk  Recent Flowsheet Documentation  Taken 5/24/2024 0200 by Alfreda Romano RN  Safety Promotion/Fall Prevention:   activity supervised   assistive device/personal items within reach   clutter free environment maintained   safety round/check completed  Taken 5/23/2024 2346 by Alfreda Romano RN  Safety Promotion/Fall Prevention:   activity supervised   assistive device/personal items within reach   clutter free environment maintained   safety round/check completed  Intervention: Prevent Skin Injury  Recent Flowsheet Documentation  Taken 5/24/2024 0200 by Alfreda Romano RN  Body Position: position changed independently  Taken 5/23/2024 2346 by Alfreda Romano RN  Body Position: position changed independently  Intervention: Prevent and Manage VTE (Venous Thromboembolism) Risk  Recent Flowsheet Documentation  Taken 5/24/2024 0200 by Alfreda oRmano RN  Activity Management: bedrest  Taken 5/23/2024 2346 by Alfreda Romano RN  Activity Management: bedrest  Intervention: Prevent Infection  Recent Flowsheet Documentation  Taken 5/24/2024 0200 by Alfreda Romano RN  Infection Prevention: hand hygiene promoted  Taken 5/23/2024 2346 by Alfreda Romano RN  Infection Prevention: hand hygiene promoted  Goal: Optimal Comfort and Wellbeing  Outcome: Ongoing, Progressing  Goal: Readiness for Transition of Care  Outcome: Ongoing, Progressing     Problem: Fall Injury Risk  Goal: Absence of Fall and Fall-Related Injury  Outcome: Ongoing, Progressing  Intervention: Identify and Manage Contributors  Recent Flowsheet Documentation  Taken 5/24/2024  0200 by Alfreda Romano RN  Medication Review/Management: medications reviewed  Self-Care Promotion: independence encouraged  Taken 5/23/2024 2346 by Alfreda Romano RN  Medication Review/Management: medications reviewed  Intervention: Promote Injury-Free Environment  Recent Flowsheet Documentation  Taken 5/24/2024 0200 by Alfreda Romano RN  Safety Promotion/Fall Prevention:   activity supervised   assistive device/personal items within reach   clutter free environment maintained   safety round/check completed  Taken 5/23/2024 2346 by Alfreda Romano RN  Safety Promotion/Fall Prevention:   activity supervised   assistive device/personal items within reach   clutter free environment maintained   safety round/check completed     Problem: Fluid and Electrolyte Imbalance (Acute Kidney Injury/Impairment)  Goal: Fluid and Electrolyte Balance  Outcome: Ongoing, Progressing     Problem: Oral Intake Inadequate (Acute Kidney Injury/Impairment)  Goal: Optimal Nutrition Intake  Outcome: Ongoing, Progressing     Problem: Renal Function Impairment (Acute Kidney Injury/Impairment)  Goal: Effective Renal Function  Outcome: Ongoing, Progressing  Intervention: Monitor and Support Renal Function  Recent Flowsheet Documentation  Taken 5/24/2024 0200 by Alfreda Romano RN  Medication Review/Management: medications reviewed  Taken 5/23/2024 2346 by Alfreda Romano RN  Medication Review/Management: medications reviewed   Goal Outcome Evaluation:  Plan of Care Reviewed With: patient        Progress: no change

## 2024-05-24 NOTE — THERAPY EVALUATION
Patient Name: Kendy Worrell  : 1940    MRN: [id number]                              Today's Date: 2024       Admit Date: 2024    Visit Dx:     ICD-10-CM ICD-9-CM   1. Dizziness  R42 780.4   2. Acute renal insufficiency  N28.9 593.9     Patient Active Problem List   Diagnosis    Essential hypertension    Acquired hypothyroidism    Coronary artery disease involving native coronary artery of native heart without angina pectoris    Hyperlipidemia    Arthritis    Skin lesion of chest wall    NSTEMI (non-ST elevated myocardial infarction)    Supraventricular tachycardia    Normal cardiac stress test    Gastrointestinal bleed    Angina effort    Morbidly obese    Bilateral pulmonary embolism    New onset a-fib    UTI (urinary tract infection)    Chronic diastolic CHF (congestive heart failure)    Acute ischemic stroke    Chest pain    GI bleed    Closed fibular fracture    Anemia    Hyponatremia    Orthostatic dizziness     Past Medical History:   Diagnosis Date    A-fib     Arthritis     Breast cancer     CHF (congestive heart failure)     CVA (cerebral vascular accident) 2019    History of pulmonary embolus (PE)     Hyperlipidemia     Hypertension     Hyperthyroidism     patient has hypothyroidism    Hypothyroidism      Past Surgical History:   Procedure Laterality Date    BREAST BIOPSY      BREAST SURGERY Right     CHOLECYSTECTOMY      COLON SURGERY      Removed    COLONOSCOPY      COLONOSCOPY N/A 11/3/2022    Procedure: COLONOSCOPY to anastamosis with biopsies and cold snare polypectomy;  Surgeon: Saroj Oakes MD;  Location: [facility] ENDOSCOPY;  Service: Gastroenterology;  Laterality: N/A;  PRe: rectal bleeding  Post: hemorrhoids, diverticulosis, anastamotic ulcer, polyp, hemostasis clip free-floating    HYSTERECTOMY      MAMMO BILATERAL  2015    MASTECTOMY      TONSILLECTOMY        General Information       Row Name 24 1012          Physical Therapy Time and Intention    Document Type  evaluation  -     Mode of Treatment physical therapy  -       Row Name 05/24/24 1012          General Information    Prior Level of Function independent:;all household mobility;transfer;gait;bed mobility  Pt has caregiver to assist with household cleaning. Friend has been bringing her meals to her room the past four weeks.  nurse. Uses rollator  -     Existing Precautions/Restrictions fall  -     Barriers to Rehab medically complex  -       Row Name 05/24/24 1012          Living Environment    People in Home facility resident  GARY  -       Row Name 05/24/24 1012          Safety Issues, Functional Mobility    Impairments Affecting Function (Mobility) endurance/activity tolerance;balance;strength  -               User Key  (r) = Recorded By, (t) = Taken By, (c) = Cosigned By      Initials Name Provider Type    Rena Saenz, PT Physical Therapist                   Mobility       Row Name 05/24/24 1016          Bed Mobility    Bed Mobility supine-sit  -ELYSIA     Supine-Sit [place] (Bed Mobility) standby assist  -       Row Name 05/24/24 1016          Sit-Stand Transfer    Sit-Stand [place] (Transfers) contact guard  -     Comment, (Sit-Stand Transfer) STS and SPS from EOB to BSC. Pt unsteady without AD  -       Row Name 05/24/24 1016          Gait/Stairs (Locomotion)    [place] Level (Gait) contact guard  -ELYSIA     Assistive Device (Gait) walker, front-wheeled  -ELYSIA     Distance in Feet (Gait) 10  -ELYSIA     Deviations/Abnormal Patterns (Gait) gait speed decreased  -ELYSIA     Bilateral Gait Deviations heel strike decreased  -ELYSIA     Comment, (Gait/Stairs) Decreased LE foot clearance. Pt reports feeling weak and lightheaded following ambulation. Dynamap did not give reading after multiple attempts (likely too low to read). Pt was taken by transport to testing following session.  -               User Key  (r) = Recorded By, (t) = Taken By, (c) = Cosigned By      Initials Name Provider Type     Rena Saenz, PT Physical Therapist                   Obj/Interventions       [facility] Name 05/24/24 1025          Range of Motion Comprehensive    General Range of Motion no range of motion deficits identified  -[facility] Name 05/24/24 1025          Strength Comprehensive (MMT)    Comment, General Manual Muscle Testing (MMT) Assessment BLE >/=3/5, functionally tested  -[facility] Name 05/24/24 1025          Sensory Assessment (Somatosensory)    Sensory Assessment (Somatosensory) --  -               User Key  (r) = Recorded By, (t) = Taken By, (c) = Cosigned By      Initials Name Provider Type    Rena Saenz, PT Physical Therapist                   Goals/Plan       Row Name 05/24/24 1027          Bed Mobility Goal 1 (PT)    Activity/Assistive Device (Bed Mobility Goal 1, PT) bed mobility activities, all  -     [place] Level/Cues Needed (Bed Mobility Goal 1, PT) independent  -     Time Frame (Bed Mobility Goal 1, PT) long term goal (LTG);2 weeks  -ELYSIA       Row Name 05/24/24 1027          Transfer Goal 1 (PT)    Activity/Assistive Device (Transfer Goal 1, PT) sit-to-stand/stand-to-sit;bed-to-chair/chair-to-bed  -     [place] Level/Cues Needed (Transfer Goal 1, PT) modified independence  -     Time Frame (Transfer Goal 1, PT) long term goal (LTG);2 weeks  -ELYSIA       Row Name 05/24/24 1027          Gait Training Goal 1 (PT)    Activity/Assistive Device (Gait Training Goal 1, PT) gait (walking locomotion)  -     [place] Level (Gait Training Goal 1, PT) supervision required  -     Distance (Gait Training Goal 1, PT) 100ft  -     Time Frame (Gait Training Goal 1, PT) long term goal (LTG);2 weeks  -ELYSIA       Row Name 05/24/24 1027          Therapy Assessment/Plan (PT)    Planned Therapy Interventions (PT) balance training;bed mobility training;gait training;home exercise program;patient/family education;strengthening;neuromuscular re-education;transfer training  -                User Key  (r) = Recorded By, (t) = Taken By, (c) = Cosigned By      Initials Name Provider Type    Rena Saenz, PT Physical Therapist                   Clinical Impression       Row Name 05/24/24 1025          Pain    Pretreatment Pain Rating 0/10 - no pain  -ELYSIA     Posttreatment Pain Rating 0/10 - no pain  -       Row Name 05/24/24 1025          Plan of Care Review    Plan of Care Reviewed With patient  -     Outcome Evaluation Pt is an 83 y/o female who presents to [facility] with reports of dizziness in standing. Pt reports orthostatic vitals [id number] when HH nurse takes vitals. At [facility] pt lives at [facility] and prior to four weeks ago when dizziness started worsening she had been ambulating to the cafeteria using a rollator for meals. Since this date she has been mainly ambulating within her room. At this time pt's orthostatic vitals were: supine 101/61, sitting 100/72, standing 105/67, and BP would not take assuming value was too low to give reading on the dynamap following short distance ambulation x8ft. Pt has generalized weakness and decreased activity tolerance from poor endurance over the past few weeks and would benefit from SNF at this time. PT will continue to follow and assess for changes.  -       Row Name 05/24/24 1025          Therapy Assessment/Plan (PT)    Rehab Potential (PT) good, to achieve stated therapy goals  -     Criteria for Skilled Interventions Met (PT) yes;meets criteria  -     Therapy Frequency (PT) 5 times/wk  -     Predicted Duration of Therapy Intervention (PT) Until d/c  -       Row Name 05/24/24 1025          Vital Signs    Pre Systolic BP Rehab 101  -ELYSIA     Pre Treatment Diastolic BP 60  -ELYSIA     Intra Systolic BP Rehab 100  -ELYSIA     Intra Treatment Diastolic BP 72  -ELYSIA     Post Systolic BP Rehab 105  -ELYSIA     Post Treatment Diastolic BP 67  -ELYSIA     O2 Delivery Pre Treatment room air  -ELYSIA     O2 Delivery Intra Treatment room air  -ELYSIA     O2 Delivery Post Treatment room air   -       Row Name 05/24/24 1025          Positioning and Restraints    Pre-Treatment Position in bed  -     Post Treatment Position chair  -ELYSIA     In Chair notified nsg;sitting  transport for testing  -               User Key  (r) = Recorded By, (t) = Taken By, (c) = Cosigned By      Initials Name Provider Type    Rena Saenz, ERASTO Physical Therapist                   Outcome Measures       Row Name 05/24/24 1035 05/24/24 1027       How much help from another person do you currently need...    Turning from your back to your side while in flat bed without using bedrails? 3  -ELYSIA 3  -MH    Moving from lying on back to sitting on the side of a flat bed without bedrails? 3  -ELYSIA 3  -MH    Moving to and from a bed to a chair (including a wheelchair)? 3  -ELYSIA 3  -MH    Standing up from a chair using your arms (e.g., wheelchair, bedside chair)? 3  -ELYSIA 3  -MH    Climbing 3-5 steps with a railing? 3  -ELYSIA 3  -MH    To walk in hospital room? 3  -ELYSIA 3  -MH    AM-PAC 6 Clicks Score (PT) 18  -ELYSIA 18  -MH    Highest Level of Mobility Goal 6 --> Walk 10 steps or more  - 6 --> Walk 10 steps or more  -      Row Name 05/23/24 2346          How much help from another person do you currently need...    Turning from your back to your side while in flat bed without using bedrails? 4  -LL     Moving from lying on back to sitting on the side of a flat bed without bedrails? 4  -LL     Moving to and from a bed to a chair (including a wheelchair)? 4  -LL     Standing up from a chair using your arms (e.g., wheelchair, bedside chair)? 4  -LL     Climbing 3-5 steps with a railing? 4  -LL     To walk in hospital room? 4  -LL     AM-PAC 6 Clicks Score (PT) 24  -LL     Highest Level of Mobility Goal 8 --> Walked 250 feet or more  -LL       Row Name 05/24/24 1035          Functional Assessment    Outcome Measure Options AM-PAC 6 Clicks Basic Mobility (PT)  -               User Key  (r) = Recorded By, (t) = Taken By, (c) = Cosigned By       Initials Name Provider Type     Cait Mckeon, OT Occupational Therapist    Alfreda Sepulveda, RN Registered Nurse    Rena Saenz, PT Physical Therapist                                 Physical Therapy Education       Title: PT OT SLP Therapies (In Progress)       Topic: Physical Therapy (In Progress)       Point: Mobility training (Done)       Learning Progress Summary             Patient Acceptance, E,TB, VU by  at 5/24/2024 1035                         Point: Home exercise program (Not Started)       Learner Progress:  Not documented in this visit.              Point: Body mechanics (Done)       Learning Progress Summary             Patient Acceptance, E,TB, VU by  at 5/24/2024 1035                         Point: Precautions (Done)       Learning Progress Summary             Patient Acceptance, E,TB, VU by  at 5/24/2024 1035                                         User Key       Initials Effective Dates Name Provider Type Discipline     08/23/21 -  Rena Park, ERASTO Physical Therapist PT                  PT Recommendation and Plan  Planned Therapy Interventions (PT): balance training, bed mobility training, gait training, home exercise program, patient/family education, strengthening, neuromuscular re-education, transfer training  Plan of Care Reviewed With: patient  Outcome Evaluation: Pt is an 85 y/o female who presents to [facility] with reports of dizziness in standing. Pt reports orthostatic vitals [id number] when HH nurse takes vitals. At [facility] pt lives at [facility] and prior to four weeks ago when dizziness started worsening she had been ambulating to the cafeteria using a rollator for meals. Since this date she has been mainly ambulating within her room. At this time pt's orthostatic vitals were: supine 101/61, sitting 100/72, standing 105/67, and BP would not take assuming value was too low to give reading on the dynamap following short distance ambulation x8ft. Pt has generalized weakness and  decreased activity tolerance from poor endurance over the past few weeks and would benefit from SNF at this time. PT will continue to follow and assess for changes.     Time Calculation:         PT Charges       Row Name 05/24/24 1035             Time Calculation    Start Time 0805  -ELYSIA      Stop Time 0825  -ELYSIA      Time Calculation (min) 20 min  -ELYSIA      PT Received On 05/24/24  -ELYSIA      PT - Next Appointment 05/28/24  -ELYSIA      PT Goal Re-Cert Due Date 06/07/24  -ELYSIA                User Key  (r) = Recorded By, (t) = Taken By, (c) = Cosigned By      Initials Name Provider Type    Rena Saenz, PT Physical Therapist                  Therapy Charges for Today       Code Description Service Date Service Provider Modifiers Qty    [id number] HC PT EVAL MOD COMPLEXITY 4 5/24/2024 Rena Park, PT GP 1            PT G-Codes  Outcome Measure Options: AM-PAC 6 Clicks Basic Mobility (PT)  AM-PAC 6 Clicks Score (PT): 18  PT Discharge Summary  Anticipated Discharge Disposition (PT): skilled nursing facility    Rena Park PT  5/24/2024

## 2024-05-25 LAB
ANION GAP SERPL CALCULATED.3IONS-SCNC: 12 MMOL/L (ref 5–15)
AORTIC DIMENSIONLESS INDEX: 0.48 (DI)
BH CV ECHO MEAS - AO MAX PG: 15 MMHG
BH CV ECHO MEAS - AO MEAN PG: 8 MMHG
BH CV ECHO MEAS - AO V2 MAX: 191 CM/SEC
BH CV ECHO MEAS - AO V2 VTI: 36.4 CM
BH CV ECHO MEAS - AVA PLANIMETRY TRACED: 1.3 CM2
BH CV ECHO MEAS - EF(MOD-BP): 64.1 %
BH CV ECHO MEAS - FS: 39 %
BH CV ECHO MEAS - LV MAX PG: 3 MMHG
BH CV ECHO MEAS - LV MEAN PG: 2 MMHG
BH CV ECHO MEAS - LV V1 MAX: 83.8 CM/SEC
BH CV ECHO MEAS - LV V1 VTI: 15.2 CM
BH CV ECHO MEAS - LVIDD: 4.1 CM
BH CV ECHO MEAS - LVIDS: 2.5 CM
BH CV ECHO MEAS - LVOT DIAM: 2 CM
BH CV ECHO MEAS - MR MAX VEL: 389 CM/SEC
BH CV ECHO MEAS - MV DEC TIME: 198 SEC
BH CV ECHO MEAS - MV MAX PG: 5 MMHG
BH CV ECHO MEAS - MV MEAN PG: 2 MMHG
BH CV ECHO MEAS - MV P1/2T: 56 MSEC
BH CV ECHO MEAS - MV V2 VTI: 25.3 CM
BH CV ECHO MEAS - MVA(P1/2T): 3.93 CM2
BH CV ECHO MEAS - TR MAX PG: 35 MMHG
BH CV ECHO MEAS - TR MAX VEL: 294 CM/SEC
BUN SERPL-MCNC: 29 MG/DL (ref 8–23)
BUN/CREAT SERPL: 16.8 (ref 7–25)
CALCIUM SPEC-SCNC: 10 MG/DL (ref 8.6–10.5)
CHLORIDE SERPL-SCNC: 94 MMOL/L (ref 98–107)
CO2 SERPL-SCNC: 29 MMOL/L (ref 22–29)
CREAT SERPL-MCNC: 1.73 MG/DL (ref 0.57–1)
DEPRECATED RDW RBC AUTO: 46.3 FL (ref 37–54)
EGFRCR SERPLBLD CKD-EPI 2021: 28.7 ML/MIN/1.73
ERYTHROCYTE [DISTWIDTH] IN BLOOD BY AUTOMATED COUNT: 14.4 % (ref 12.3–15.4)
GLUCOSE BLDC GLUCOMTR-MCNC: 112 MG/DL (ref 70–105)
GLUCOSE BLDC GLUCOMTR-MCNC: 115 MG/DL (ref 70–105)
GLUCOSE BLDC GLUCOMTR-MCNC: 155 MG/DL (ref 70–105)
GLUCOSE BLDC GLUCOMTR-MCNC: 91 MG/DL (ref 70–105)
GLUCOSE SERPL-MCNC: 98 MG/DL (ref 65–99)
HCT VFR BLD AUTO: 34.2 % (ref 34–46.6)
HGB BLD-MCNC: 11.3 G/DL (ref 12–15.9)
LEFT ATRIUM VOLUME INDEX: 45.4 ML/M2
LEFT ATRIUM VOLUME: 76.7 ML
MCH RBC QN AUTO: 29.3 PG (ref 26.6–33)
MCHC RBC AUTO-ENTMCNC: 33 G/DL (ref 31.5–35.7)
MCV RBC AUTO: 88.6 FL (ref 79–97)
MV VALVE AREA BY PLANIMETRY: 1.89 CM2
PLATELET # BLD AUTO: 250 10*3/MM3 (ref 140–450)
PMV BLD AUTO: 9.8 FL (ref 6–12)
POTASSIUM SERPL-SCNC: 4.3 MMOL/L (ref 3.5–5.2)
RBC # BLD AUTO: 3.86 10*6/MM3 (ref 3.77–5.28)
SODIUM SERPL-SCNC: 135 MMOL/L (ref 136–145)
WBC NRBC COR # BLD AUTO: 7.51 10*3/MM3 (ref 3.4–10.8)

## 2024-05-25 PROCEDURE — 82948 REAGENT STRIP/BLOOD GLUCOSE: CPT

## 2024-05-25 PROCEDURE — 80048 BASIC METABOLIC PNL TOTAL CA: CPT | Performed by: INTERNAL MEDICINE

## 2024-05-25 PROCEDURE — 99232 SBSQ HOSP IP/OBS MODERATE 35: CPT | Performed by: INTERNAL MEDICINE

## 2024-05-25 PROCEDURE — 82948 REAGENT STRIP/BLOOD GLUCOSE: CPT | Performed by: NURSE PRACTITIONER

## 2024-05-25 PROCEDURE — 85027 COMPLETE CBC AUTOMATED: CPT | Performed by: INTERNAL MEDICINE

## 2024-05-25 RX ADMIN — APIXABAN 5 MG: 5 TABLET, FILM COATED ORAL at 21:50

## 2024-05-25 RX ADMIN — LEVOTHYROXINE SODIUM 88 MCG: 88 TABLET ORAL at 05:08

## 2024-05-25 RX ADMIN — APIXABAN 5 MG: 5 TABLET, FILM COATED ORAL at 08:19

## 2024-05-25 RX ADMIN — ATORVASTATIN CALCIUM 10 MG: 10 TABLET, FILM COATED ORAL at 21:50

## 2024-05-25 RX ADMIN — LACOSAMIDE 50 MG: 50 TABLET, FILM COATED ORAL at 22:06

## 2024-05-25 RX ADMIN — PANTOPRAZOLE SODIUM 40 MG: 40 TABLET, DELAYED RELEASE ORAL at 05:08

## 2024-05-25 RX ADMIN — Medication 10 ML: at 21:51

## 2024-05-25 RX ADMIN — LACOSAMIDE 50 MG: 50 TABLET, FILM COATED ORAL at 08:43

## 2024-05-25 NOTE — PROGRESS NOTES
[facility] MEDICINE SERVICE  DAILY PROGRESS NOTE    NAME: Kendy Worrell  : 1940  MRN: [id number]      LOS: 1 day     PROVIDER OF SERVICE: Lorena Monroe MD    Chief Complaint: Orthostatic dizziness    History of Present Illness:     Kendy Worrell is a 84 y.o. female with a CMH of HTN, hypothyroidism, CAD, chronic diastolic heart failure, CVA, hyponatremia, paroxysmal Afib on eliquis who presented to [facility] with orthostatic dizziness. Lives at home in assisted living apartment, uses rollator walker for ambulation.   Presents to ED with orthostatic dizziness. [facility] nurse completed BP checks and was dizzy with standing. Reports over past 4 weeks with orthostatic lightheadedness/dizziness and improves after lying down. Followed up with OP nephrology Dr. Fernandez on  when torsemide was increased to 100 mg daily. Reports weight loss of 25-30 pounds since increase in torsemide. On arrival to ED VS: 97.5-88--96% room air.      Upon evaluation, awake, alert, resting in bed, daughter at bedside. Current VS: 67-/76-93% room air. Denies any lightheadedness at rest. Denies headache, vision changes. Denies recent N/V/D. Reports daily use of 1 bourbon drink at bedtime and daily oral intake of 16 oz sprite and 48 oz of water.   EKG reveals Afib rate 64, , Qtc 444  CXR cardiomegaly. Atelectasis left lung base  CT head reveals no acute findings.   Blood work reveals BUN 28, creatinine 2.27, sodium 130, chloride 87, AST 36, GFR 20, WBC 7.29, Hgb 12.5, Hct 39.2, pro BNP normal at 1248.           Subjective:     Interval History:    -I am seeing this patient for the first time and her chart was reviewed.  Patient was admitted with orthostatic hypotension.  Recently patient's torsemide was increased to 100 mg.  Patient has lost almost 25 LB.  Patient also developed MYLENE.  Patient received IV fluid.  Patient feeling better.  Blood pressure is better.  Creatinine is  1.7    Review of Systems:   Negative except what is listed above     Objective:     Vital Signs  Temp:  [97.5 °F (36.4 °C)-98.1 °F (36.7 °C)] 98 °F (36.7 °C)  Heart Rate:  [61-73] 68  Resp:  [14-19] 16  BP: (101-122)/(65-86) 110/67   Body mass index is 28.99 kg/m².    Physical Exam    General Appearance:    Alert, cooperative, in no acute distress   Head:    Normocephalic, without obvious abnormality, atraumatic   Eyes:            conjunctivae and sclerae normal, no   icterus, no pallor, corneas  clear, PERRLA   Neck:   No adenopathy, supple, trachea midline, no thyromegaly, no   carotid bruit, no JVD   Lungs:     Clear to auscultation,respirations regular, even and                  unlabored    Heart:    Regular rhythm and normal rate, normal S1 and S2, no            murmur, no gallop, no rub, no click   Abdomen:     Normal bowel sounds, no masses, no organomegaly, soft        non-tender, non-distended, no guarding, no rebound                No tenderness   Extremities:   Moves all extremities well, no edema, no cyanosis, no             redness   Lymph nodes:   No palpable adenopathy   Neurologic:   Cranial nerves 2 - 12 grossly intact, sensation intact, DTR       present and equal bilaterally       Scheduled Meds   apixaban, 5 mg, Oral, Q12H  atorvastatin, 10 mg, Oral, Nightly  insulin lispro, 2-9 Units, Subcutaneous, 4x Daily AC & at Bedtime  isosorbide mononitrate, 30 mg, Oral, Daily  lacosamide, 50 mg, Oral, BID  levothyroxine, 88 mcg, Oral, Q AM  pantoprazole, 40 mg, Oral, Q AM  sodium chloride, 10 mL, Intravenous, Q12H       PRN Meds     acetaminophen    albuterol    senna-docusate sodium **AND** polyethylene glycol **AND** bisacodyl **AND** bisacodyl    Calcium Replacement - Follow Nurse / BPA Driven Protocol    dextrose    dextrose    glucagon (human recombinant)    Magnesium Cardiology Dose Replacement - Follow Nurse / BPA Driven Protocol    ondansetron    Phosphorus Replacement - Follow Nurse / BPA Driven  Protocol    Potassium Replacement - Follow Nurse / BPA Driven Protocol    [COMPLETED] Insert Peripheral IV **AND** sodium chloride    sodium chloride    sodium chloride   Infusions         Diagnostic Data    Results from last 7 days   Lab Units 05/25/24  0203 05/24/24  0456 05/23/24  1457   WBC 10*3/mm3 7.51   < > 7.29   HEMOGLOBIN g/dL 11.3*   < > 12.5   HEMATOCRIT % 34.2   < > 39.2   PLATELETS 10*3/mm3 250   < > 267   GLUCOSE mg/dL 98   < > 98   CREATININE mg/dL 1.73*   < > 2.27*   BUN mg/dL 29*   < > 28*   SODIUM mmol/L 135*   < > 130*   POTASSIUM mmol/L 4.3   < > 3.6   AST (SGOT) U/L  --   --  36*   ALT (SGPT) U/L  --   --  12   ALK PHOS U/L  --   --  109   BILIRUBIN mg/dL  --   --  1.1   ANION GAP mmol/L 12.0   < > 14.8    < > = values in this interval not displayed.       CT Head Without Contrast    Result Date: 5/23/2024  Impression: No acute intracranial findings. Chronic and senescent changes as above. Left maxillary sinus disease. Electronically Signed: Wilfrid Fish MD  5/23/2024 3:21 PM EDT  Workstation ID: [id number]    XR Chest 1 View    Result Date: 5/23/2024  Impression: 1.Cardiomegaly. 2.Atelectasis or scarring left lung base Electronically Signed: Seth Guerrero MD  5/23/2024 3:05 PM EDT  Workstation ID: [id number]       I have personally reviewed the patient's new results.     Assessment/Plan:     Active and Resolved Problems    Dizziness and lightheadedness  Orthostatic hypotension due to dehydration  MYLENE/CKD stage III AA  Hyponatremia  Paroxysmal A-fib on Eliquis  Chronic diastolic CHF  Hypothyroidism  History of breast cancer with bilateral mastectomy  Daily alcohol drinker  Hypertension  History of CVA    Suggestion:    5/25-at this time I will continue current supportive care.  PT OT.  Monitor creatinine.  Nephrology is following.  Hopefully discharge this patient soon    DVT prophylaxis:  Medical and mechanical DVT prophylaxis orders are present.         Code status is   Code Status and Medical  Interventions:   Ordered at: 05/23/24 1804     Level Of Support Discussed With:    Patient     Code Status (Patient has no pulse and is not breathing):    No CPR (Do Not Attempt to Resuscitate)     Medical Interventions (Patient has pulse or is breathing):    Comfort Measures       Plan for disposition: 5/28    Time: 30 minutes    Signature: Electronically signed by Lorena Monroe MD, 05/25/24, 09:09 EDT.  [facility] Hospitalist Team

## 2024-05-25 NOTE — PROGRESS NOTES
[facility] Progress Note      Patient Name: Kendy Worrell  : 1940  MRN: [id number]  Primary Care Physician:  Marisela Monte APRN  Date of admission: 2024    Subjective     Interval History:     Patient feeling little better.  Denies any headache, dizziness, chest pain, shortness of breath, nausea or vomiting.  IV fluids stopped last night    Review of Systems:   As noted above    Objective     Vitals:   Temp:  [97.7 °F (36.5 °C)-98.1 °F (36.7 °C)] 98 °F (36.7 °C)  Heart Rate:  [61-73] 68  Resp:  [14-19] 16  BP: (106-122)/(67-76) 110/67    Intake/Output Summary (Last 24 hours) at 2024 1306  Last data filed at 2024 2200  Gross per 24 hour   Intake 240 ml   Output 0 ml   Net 240 ml       Physical Exam:    General Appearance: NAD  HEENT: oral mucosa normal, nonicteric sclera  Neck: supple, no JVD  Lungs: CTA  Heart: RRR, normal S1 and S2  Abdomen: soft, nondistended  Extremities: Trace edema  Neuro: Awake alert and moving all extremities    Scheduled Meds:     apixaban, 5 mg, Oral, Q12H  atorvastatin, 10 mg, Oral, Nightly  insulin lispro, 2-9 Units, Subcutaneous, 4x Daily AC & at Bedtime  isosorbide mononitrate, 30 mg, Oral, Daily  lacosamide, 50 mg, Oral, BID  levothyroxine, 88 mcg, Oral, Q AM  pantoprazole, 40 mg, Oral, Q AM  sodium chloride, 10 mL, Intravenous, Q12H      IV Meds:        Results Reviewed:   I have personally reviewed the results from the time of this admission to 2024 13:06 EDT     Results from last 7 days   Lab Units 24  0203 24  1448 24  0456 24  1457   SODIUM mmol/L 135* 136 136 130*   POTASSIUM mmol/L 4.3 3.0* 2.3* 3.6   CHLORIDE mmol/L 94* 91* 91* 87*   CO2 mmol/L 29.0 32.8* 30.6* 28.2   BUN mg/dL 29* 32* 31* 28*   CREATININE mg/dL 1.73* 2.17* 2.45* 2.27*   CALCIUM mg/dL 10.0 10.1 9.6 10.4   BILIRUBIN mg/dL  --   --   --  1.1   ALK PHOS U/L  --   --   --  109   ALT (SGPT) U/L  --   --   --  12   AST (SGOT) U/L   --   --   --  36*   GLUCOSE mg/dL 98 110* 95 98     Estimated Creatinine Clearance: 21.2 mL/min (A) (by C-G formula based on SCr of 1.73 mg/dL (H)).  Results from last 7 days   Lab Units 05/24/24  0456 05/23/24  2019   MAGNESIUM mg/dL 1.5* 1.5*         Results from last 7 days   Lab Units 05/25/24  0203 05/24/24  0456 05/23/24  1457   WBC 10*3/mm3 7.51 7.21 7.29   HEMOGLOBIN g/dL 11.3* 11.2* 12.5   PLATELETS 10*3/mm3 250 226 267           Assessment / Plan     ASSESSMENT:    Acute kidney injury on CKD 3A.  Most likely prerenal due to diuresis and hypotension.  Torsemide dose was recently increased due to fluid overload.  Renal function improving.  Creatinine down to 1.7 from peak of 2.45 Mg/DL  Orthostatic hypotension.  Due to diuretics.  Patient lost significant weight after recent increase in torsemide dose.  Congestive heart failure.  Systolic.  Chronic.  Patient has diastolic dysfunction along with moderate AS, severe TR and pulmonary hypertension.  Edema improved with diuretics  Hypertension with chronic kidney disease.  Off antihypertensive at this time due to hypotension  Severe hypokalemia.  Improved  Hypomagnesemia    PLAN:  Keep off diuretics  Monitor electrolytes and replace as needed  [place] labs in a.m.    Karsten Fernandez MD  05/25/24  13:06 EDT    [facility]  [phone]

## 2024-05-25 NOTE — PROGRESS NOTES
CARDIOLOGY PROGRESS NOTE:    Kendy Worrell  84 y.o.  female  1940  [id number]      Referring Provider: Hospitalist    Reason for follow-up: Dizziness     Patient Care Team:  Marisela Monte APRN as PCP - General (Nurse Practitioner)  Tone Tubbs MD as Consulting Physician (Cardiology)  Rios Thomas MD as Consulting Physician (Colon and Rectal Surgery)    Subjective .  Patient is doing better with less dizziness    Objective lying in bed comfortably     Review of Systems   Constitutional: Negative for malaise/fatigue.   Cardiovascular:  Negative for chest pain, dyspnea on exertion, leg swelling and palpitations.   Respiratory:  Negative for cough and shortness of breath.    Gastrointestinal:  Negative for abdominal pain, nausea and vomiting.   Neurological:  Negative for dizziness, focal weakness, headaches, light-headedness and numbness.   All other systems reviewed and are negative.      Allergies: Aspirin and Nsaids    Scheduled Meds:apixaban, 5 mg, Oral, Q12H  atorvastatin, 10 mg, Oral, Nightly  insulin lispro, 2-9 Units, Subcutaneous, 4x Daily AC & at Bedtime  isosorbide mononitrate, 30 mg, Oral, Daily  lacosamide, 50 mg, Oral, BID  levothyroxine, 88 mcg, Oral, Q AM  pantoprazole, 40 mg, Oral, Q AM  sodium chloride, 10 mL, Intravenous, Q12H      Continuous Infusions:   PRN Meds:.  acetaminophen    albuterol    senna-docusate sodium **AND** polyethylene glycol **AND** bisacodyl **AND** bisacodyl    Calcium Replacement - Follow Nurse / BPA Driven Protocol    dextrose    dextrose    glucagon (human recombinant)    Magnesium Cardiology Dose Replacement - Follow Nurse / BPA Driven Protocol    ondansetron    Phosphorus Replacement - Follow Nurse / BPA Driven Protocol    Potassium Replacement - Follow Nurse / BPA Driven Protocol    [COMPLETED] Insert Peripheral IV **AND** sodium chloride    sodium chloride    sodium chloride        VITAL SIGNS  Vitals:    05/25/24 0100 05/25/24 0400 05/25/24 0800  "05/25/24 1200   BP:  117/73 110/67 154/68   Pulse: 70 72 68 62   Resp:  15 16 17   Temp:  97.7 °F (36.5 °C) 98 °F (36.7 °C) 98 °F (36.7 °C)   TempSrc:  Oral Oral Oral   SpO2: 96% 95% 98% 98%   Weight:       Height:           Flowsheet Rows      Flowsheet Row First Filed Value   Admission Height 154.9 cm (61\") Documented at 05/23/2024 1421   Admission Weight 70 kg (154 lb 5.2 oz) Documented at 05/23/2024 1421             TELEMETRY: Atrial fibrillation with controlled ventricular response    Physical Exam:  Constitutional:       Appearance: Well-developed.   Eyes:      General: No scleral icterus.     Conjunctiva/sclera: Conjunctivae normal.   HENT:      Head: Normocephalic and atraumatic.   Neck:      Vascular: No carotid bruit or JVD.   Pulmonary:      Effort: Pulmonary effort is normal.      Breath sounds: Normal breath sounds. No wheezing. No rales.   Cardiovascular:      Normal rate. Regular rhythm.   Pulses:     Intact distal pulses.   Abdominal:      General: Bowel sounds are normal.      Palpations: Abdomen is soft.   Musculoskeletal:      Cervical back: Normal range of motion and neck supple. Skin:     General: Skin is warm and dry.      Findings: No rash.   Neurological:      Mental Status: Alert.          Results Review:   I reviewed the patient's new clinical results.  Lab Results (last 24 hours)       Procedure Component Value Units Date/Time    POC Glucose 4x Daily Before Meals & at Bedtime [[id number]]  (Abnormal) Collected: 05/25/24 1239    Specimen: Blood Updated: 05/25/24 1241     Glucose 155 mg/dL      Comment: Serial Number: [id number]:  [id number]       POC Glucose Once [[id number]]  (Normal) Collected: 05/25/24 0808    Specimen: Blood Updated: 05/25/24 0810     Glucose 91 mg/dL      Comment: Serial Number: [id number]:  [id number]       Basic Metabolic Panel [[id number]]  (Abnormal) Collected: 05/25/24 0203    Specimen: Blood from Arm, Left Updated: 05/25/24 0236     Glucose 98 mg/dL      " BUN 29 mg/dL      Creatinine 1.73 mg/dL      Sodium 135 mmol/L      Potassium 4.3 mmol/L      Chloride 94 mmol/L      CO2 29.0 mmol/L      Calcium 10.0 mg/dL      BUN/Creatinine Ratio 16.8     Anion Gap 12.0 mmol/L      eGFR 28.7 mL/min/1.73     Narrative:      GFR Normal >60  Chronic Kidney Disease <60  Kidney Failure <15    The GFR formula is only valid for adults with stable renal function between ages 18 and 70.    CBC (No Diff) [[id number]]  (Abnormal) Collected: 05/25/24 0203    Specimen: Blood from Arm, Left Updated: 05/25/24 0211     WBC 7.51 10*3/mm3      RBC 3.86 10*6/mm3      Hemoglobin 11.3 g/dL      Hematocrit 34.2 %      MCV 88.6 fL      MCH 29.3 pg      MCHC 33.0 g/dL      RDW 14.4 %      RDW-SD 46.3 fl      MPV 9.8 fL      Platelets 250 10*3/mm3     POC Glucose Once [[id number]]  (Abnormal) Collected: 05/24/24 2100    Specimen: Blood Updated: 05/24/24 2101     Glucose 136 mg/dL      Comment: Serial Number: [id number]:  [id number]       POC Glucose 4x Daily Before Meals & at Bedtime [[id number]]  (Abnormal) Collected: 05/24/24 1738    Specimen: Blood Updated: 05/24/24 1739     Glucose 115 mg/dL      Comment: Serial Number: [id number]:  [id number]               Imaging Results (Last 24 Hours)       ** No results found for the last 24 hours. **            EKG      I personally viewed and interpreted the patient's EKG/Telemetry data:    ECHOCARDIOGRAM:  Results for orders placed during the hospital encounter of 05/23/24    Adult Transthoracic Echo Complete W/ Cont if Necessary Per Protocol    Interpretation Summary    Left ventricular ejection fraction appears to be 61 - 65%.    Left ventricular wall thickness is consistent with moderate eccentric hypertrophy.    Mild to moderate aortic valve stenosis is present.       STRESS MYOVIEW:  Results for orders placed during the hospital encounter of 11/18/21    Stress Test With Myocardial Perfusion One Day    Interpretation Summary  · Myocardial  perfusion imaging indicates a normal myocardial perfusion study with no evidence of ischemia.  · Left ventricular ejection fraction is hyperdynamic (Calculated EF > 70%). .  · Findings consistent with a normal ECG stress test.  · Impressions are consistent with a low risk study.  · There is no prior study available for comparison.       CARDIAC CATHETERIZATION:  No results found for this or any previous visit.       OTHER:         Assessment & Plan     Dizziness  Patient presents with dizziness and has more bradycardia and orthostasis  Patient was on aggressive diuresis and lost about 30 pounds in the last few days.  Patient is ruled out for MI by EKG and enzymes  Patient has normal LV systolic function but has LV diastolic dysfunction and hence no further cardiac workup except monitoring the heart rates     Bradycardia/atrial fibrillation  Patient has history of atrial fibrillation is on metoprolol which will be held because of both bradycardia and hypotension but will continue the Eliquis.  Patient's heart rates are better     Hypertension  Patient blood pressure on the lower side diabetes stable we will place her on only isosorbide and stop the metoprolol.  Blood pressure is stable with isosorbide     Hyperlipidemia  Patient on statins and the lipid levels are well within normal limits.     Acute renal failure with hyponatremia  Patient ARB acute renal failure with hyponatremia because of aggressive diuresis and hence is seen by the nephrologis  Patient is not on diuretics and electrolytes are being replaced.    Hypokalemia/hypomagnesemia  Patient presented with severe hypokalemia and hypomagnesemia which have been replaced and is better now.  Probably secondary to significant diuresis.  I discussed the patients findings and my recommendations with patient and nurse    Elbert Mathew MD  05/25/24  15:44 EDT

## 2024-05-26 LAB
ALBUMIN SERPL-MCNC: 3.4 G/DL (ref 3.5–5.2)
ANION GAP SERPL CALCULATED.3IONS-SCNC: 9.6 MMOL/L (ref 5–15)
BUN SERPL-MCNC: 28 MG/DL (ref 8–23)
BUN/CREAT SERPL: 18.1 (ref 7–25)
CALCIUM SPEC-SCNC: 10.3 MG/DL (ref 8.6–10.5)
CHLORIDE SERPL-SCNC: 97 MMOL/L (ref 98–107)
CO2 SERPL-SCNC: 29.4 MMOL/L (ref 22–29)
CREAT SERPL-MCNC: 1.55 MG/DL (ref 0.57–1)
EGFRCR SERPLBLD CKD-EPI 2021: 32.7 ML/MIN/1.73
GLUCOSE BLDC GLUCOMTR-MCNC: 110 MG/DL (ref 70–105)
GLUCOSE BLDC GLUCOMTR-MCNC: 133 MG/DL (ref 70–105)
GLUCOSE BLDC GLUCOMTR-MCNC: 90 MG/DL (ref 70–105)
GLUCOSE BLDC GLUCOMTR-MCNC: 99 MG/DL (ref 70–105)
GLUCOSE SERPL-MCNC: 96 MG/DL (ref 65–99)
PHOSPHATE SERPL-MCNC: 3.1 MG/DL (ref 2.5–4.5)
POTASSIUM SERPL-SCNC: 3.5 MMOL/L (ref 3.5–5.2)
SODIUM SERPL-SCNC: 136 MMOL/L (ref 136–145)

## 2024-05-26 PROCEDURE — 82948 REAGENT STRIP/BLOOD GLUCOSE: CPT

## 2024-05-26 PROCEDURE — 99232 SBSQ HOSP IP/OBS MODERATE 35: CPT | Performed by: INTERNAL MEDICINE

## 2024-05-26 PROCEDURE — 97551 CAREGIVER TRAING EA ADDL 15: CPT

## 2024-05-26 PROCEDURE — 82948 REAGENT STRIP/BLOOD GLUCOSE: CPT | Performed by: NURSE PRACTITIONER

## 2024-05-26 PROCEDURE — 97530 THERAPEUTIC ACTIVITIES: CPT

## 2024-05-26 PROCEDURE — 80069 RENAL FUNCTION PANEL: CPT | Performed by: INTERNAL MEDICINE

## 2024-05-26 PROCEDURE — 97535 SELF CARE MNGMENT TRAINING: CPT

## 2024-05-26 PROCEDURE — 97116 GAIT TRAINING THERAPY: CPT

## 2024-05-26 RX ORDER — TORSEMIDE 10 MG/1
20 TABLET ORAL DAILY
Status: DISCONTINUED | OUTPATIENT
Start: 2024-05-26 | End: 2024-05-27 | Stop reason: HOSPADM

## 2024-05-26 RX ADMIN — LACOSAMIDE 50 MG: 50 TABLET, FILM COATED ORAL at 21:41

## 2024-05-26 RX ADMIN — APIXABAN 5 MG: 5 TABLET, FILM COATED ORAL at 21:40

## 2024-05-26 RX ADMIN — Medication 10 ML: at 09:11

## 2024-05-26 RX ADMIN — PANTOPRAZOLE SODIUM 40 MG: 40 TABLET, DELAYED RELEASE ORAL at 06:13

## 2024-05-26 RX ADMIN — LEVOTHYROXINE SODIUM 88 MCG: 88 TABLET ORAL at 06:13

## 2024-05-26 RX ADMIN — LACOSAMIDE 50 MG: 50 TABLET, FILM COATED ORAL at 09:11

## 2024-05-26 RX ADMIN — ISOSORBIDE MONONITRATE 30 MG: 30 TABLET, EXTENDED RELEASE ORAL at 09:11

## 2024-05-26 RX ADMIN — ATORVASTATIN CALCIUM 10 MG: 10 TABLET, FILM COATED ORAL at 21:40

## 2024-05-26 RX ADMIN — Medication 10 ML: at 21:41

## 2024-05-26 RX ADMIN — APIXABAN 5 MG: 5 TABLET, FILM COATED ORAL at 09:11

## 2024-05-26 NOTE — PLAN OF CARE
Problem: Adult Inpatient Plan of Care  Goal: Plan of Care Review  Outcome: Ongoing, Progressing  Goal: Patient-Specific Goal (Individualized)  Outcome: Ongoing, Progressing  Goal: Absence of Hospital-Acquired Illness or Injury  Outcome: Ongoing, Progressing  Intervention: Identify and Manage Fall Risk  Recent Flowsheet Documentation  Taken 5/26/2024 0438 by Kayleen Olsen RN  Safety Promotion/Fall Prevention:   room organization consistent   safety round/check completed  Taken 5/26/2024 0207 by Kayleen Olsen RN  Safety Promotion/Fall Prevention:   room organization consistent   safety round/check completed  Taken 5/26/2024 0059 by Kayleen Olsen RN  Safety Promotion/Fall Prevention:   safety round/check completed   room organization consistent  Taken 5/25/2024 2202 by Kayleen Olsen RN  Safety Promotion/Fall Prevention:   safety round/check completed   room organization consistent  Taken 5/25/2024 1945 by Kayleen Olsen RN  Safety Promotion/Fall Prevention:   safety round/check completed   room organization consistent  Intervention: Prevent Skin Injury  Recent Flowsheet Documentation  Taken 5/26/2024 0438 by Kayleen Olsen RN  Body Position: position changed independently  Taken 5/26/2024 0059 by Kayleen Olsen RN  Body Position: position changed independently  Taken 5/25/2024 1945 by Kayleen Olsen RN  Body Position: position changed independently  Intervention: Prevent and Manage VTE (Venous Thromboembolism) Risk  Recent Flowsheet Documentation  Taken 5/26/2024 0438 by Kayleen Olsen RN  Activity Management: activity minimized  Taken 5/26/2024 0059 by Kayleen Olsen RN  Activity Management: activity minimized  Taken 5/25/2024 1945 by Kayleen Olsen RN  Activity Management:   up to bedside commode   back to bed  Intervention: Prevent Infection  Recent Flowsheet Documentation  Taken 5/26/2024 0438 by Kayleen Olsen RN  Infection Prevention:   hand hygiene promoted   single  patient room provided  Taken 5/26/2024 0207 by Kayleen Olsen RN  Infection Prevention:   hand hygiene promoted   single patient room provided  Taken 5/26/2024 0059 by Kayleen Olsen RN  Infection Prevention:   hand hygiene promoted   single patient room provided  Taken 5/25/2024 2202 by Kayleen Olsen RN  Infection Prevention:   hand hygiene promoted   single patient room provided  Taken 5/25/2024 1945 by Kayleen Olsen RN  Infection Prevention:   hand hygiene promoted   single patient room provided  Goal: Optimal Comfort and Wellbeing  Outcome: Ongoing, Progressing  Goal: Readiness for Transition of Care  Outcome: Ongoing, Progressing     Problem: Fall Injury Risk  Goal: Absence of Fall and Fall-Related Injury  Outcome: Ongoing, Progressing  Intervention: Identify and Manage Contributors  Recent Flowsheet Documentation  Taken 5/26/2024 0438 by Kayleen Olsen RN  Medication Review/Management:   medications reviewed   high-risk medications identified  Taken 5/26/2024 0207 by Kayleen Olsen RN  Medication Review/Management:   medications reviewed   high-risk medications identified  Taken 5/26/2024 0059 by Kayleen Olsen RN  Medication Review/Management:   medications reviewed   infusion held  Taken 5/25/2024 2202 by Kayleen Olsen RN  Medication Review/Management:   medications reviewed   high-risk medications identified  Taken 5/25/2024 1945 by Kayleen Olsen RN  Medication Review/Management:   medications reviewed   high-risk medications identified  Intervention: Promote Injury-Free Environment  Recent Flowsheet Documentation  Taken 5/26/2024 0438 by Kayleen Olsen RN  Safety Promotion/Fall Prevention:   room organization consistent   safety round/check completed  Taken 5/26/2024 0207 by Kayleen Olsen RN  Safety Promotion/Fall Prevention:   room organization consistent   safety round/check completed  Taken 5/26/2024 0059 by Kayleen Olsen RN  Safety Promotion/Fall Prevention:    safety round/check completed   room organization consistent  Taken 5/25/2024 2202 by Kayleen Olsen, RN  Safety Promotion/Fall Prevention:   safety round/check completed   room organization consistent  Taken 5/25/2024 1945 by Kayleen Olsen RN  Safety Promotion/Fall Prevention:   safety round/check completed   room organization consistent     Problem: Oral Intake Inadequate (Acute Kidney Injury/Impairment)  Goal: Optimal Nutrition Intake  Outcome: Ongoing, Progressing     Problem: Renal Function Impairment (Acute Kidney Injury/Impairment)  Goal: Effective Renal Function  Outcome: Ongoing, Progressing  Intervention: Monitor and Support Renal Function  Recent Flowsheet Documentation  Taken 5/26/2024 0438 by Kayleen Olsen, RN  Medication Review/Management:   medications reviewed   high-risk medications identified  Taken 5/26/2024 0207 by Kayleen Olsen RN  Medication Review/Management:   medications reviewed   high-risk medications identified  Taken 5/26/2024 0059 by Kayleen Olsen RN  Medication Review/Management:   medications reviewed   infusion held  Taken 5/25/2024 2202 by Kayleen Olsen RN  Medication Review/Management:   medications reviewed   high-risk medications identified  Taken 5/25/2024 1945 by Kayleen Olsen RN  Medication Review/Management:   medications reviewed   high-risk medications identified     Problem: Skin Injury Risk Increased  Goal: Skin Health and Integrity  Outcome: Ongoing, Progressing   Goal Outcome Evaluation:

## 2024-05-26 NOTE — PROGRESS NOTES
[facility] MEDICINE SERVICE  DAILY PROGRESS NOTE    NAME: Kendy Worrell  : 1940  MRN: [id number]      LOS: 2 days     PROVIDER OF SERVICE: Lorena Monroe MD    Chief Complaint: Orthostatic dizziness    History of Present Illness:     Kendy Worrell is a 84 y.o. female with a CMH of HTN, hypothyroidism, CAD, chronic diastolic heart failure, CVA, hyponatremia, paroxysmal Afib on eliquis who presented to [facility] on 2024 with orthostatic dizziness. Lives at home in assisted living apartment, uses rollator walker for ambulation.   Presents to ED with orthostatic dizziness. [facility] nurse completed BP checks and was dizzy with standing. Reports over past 4 weeks with orthostatic lightheadedness/dizziness and improves after lying down. Followed up with OP nephrology Dr. Fernandez on  when torsemide was increased to 100 mg daily. Reports weight loss of 25-30 pounds since increase in torsemide. On arrival to ED VS: 97.0-02--96% room air.      Upon evaluation, awake, alert, resting in bed, daughter at bedside. Current VS: 67-/76-93% room air. Denies any lightheadedness at rest. Denies headache, vision changes. Denies recent N/V/D. Reports daily use of 1 bourbon drink at bedtime and daily oral intake of 16 oz sprite and 48 oz of water.   EKG reveals Afib rate 64, , Qtc 444  CXR cardiomegaly. Atelectasis left lung base  CT head reveals no acute findings.   Blood work reveals BUN 28, creatinine 2.27, sodium 130, chloride 87, AST 36, GFR 20, WBC 7.29, Hgb 12.5, Hct 39.2, pro BNP normal at 1248.           Subjective:     Interval History:    -I am seeing this patient for the first time and her chart was reviewed.  Patient was admitted with orthostatic hypotension.  Recently patient's torsemide was increased to 100 mg.  Patient has lost almost 25 LB.  Patient also developed MYLENE.  Patient received IV fluid.  Patient feeling better.  Blood pressure is better.  Creatinine is  1.7    5/26-patient remained stable overnight.  Patient resting comfortably.  Patient's creatinine is improved to 1.5    Review of Systems:   Negative except what is listed above     Objective:     Vital Signs  Temp:  [97.6 °F (36.4 °C)-97.9 °F (36.6 °C)] 97.6 °F (36.4 °C)  Heart Rate:  [69-76] 76  Resp:  [16-21] 21  BP: (131-137)/(84-85) 131/85   Body mass index is 28.99 kg/m².    Physical Exam    General Appearance:    Alert, cooperative, in no acute distress   Head:    Normocephalic, without obvious abnormality, atraumatic   Eyes:            conjunctivae and sclerae normal, no   icterus, no pallor, corneas  clear, PERRLA   Neck:   No adenopathy, supple, trachea midline, no thyromegaly, no   carotid bruit, no JVD   Lungs:     Clear to auscultation,respirations regular, even and                  unlabored    Heart:    Regular rhythm and normal rate, normal S1 and S2, no            murmur, no gallop, no rub, no click   Abdomen:     Normal bowel sounds, no masses, no organomegaly, soft        non-tender, non-distended, no guarding, no rebound                No tenderness   Extremities:   Moves all extremities well, no edema, no cyanosis, no             redness   Lymph nodes:   No palpable adenopathy   Neurologic:   Cranial nerves 2 - 12 grossly intact, sensation intact, DTR       present and equal bilaterally       Scheduled Meds   apixaban, 5 mg, Oral, Q12H  atorvastatin, 10 mg, Oral, Nightly  insulin lispro, 2-9 Units, Subcutaneous, 4x Daily AC & at Bedtime  isosorbide mononitrate, 30 mg, Oral, Daily  lacosamide, 50 mg, Oral, BID  levothyroxine, 88 mcg, Oral, Q AM  pantoprazole, 40 mg, Oral, Q AM  sodium chloride, 10 mL, Intravenous, Q12H  torsemide, 20 mg, Oral, Daily       PRN Meds     acetaminophen    albuterol    senna-docusate sodium **AND** polyethylene glycol **AND** bisacodyl **AND** bisacodyl    Calcium Replacement - Follow Nurse / BPA Driven Protocol    dextrose    dextrose    glucagon (human  recombinant)    Magnesium Cardiology Dose Replacement - Follow Nurse / BPA Driven Protocol    ondansetron    Phosphorus Replacement - Follow Nurse / BPA Driven Protocol    Potassium Replacement - Follow Nurse / BPA Driven Protocol    [COMPLETED] Insert Peripheral IV **AND** sodium chloride    sodium chloride    sodium chloride   Infusions         Diagnostic Data    Results from last 7 days   Lab Units 05/26/24  0622 05/25/24  0203 05/24/24  0456 05/23/24  1457   WBC 10*3/mm3  --  7.51   < > 7.29   HEMOGLOBIN g/dL  --  11.3*   < > 12.5   HEMATOCRIT %  --  34.2   < > 39.2   PLATELETS 10*3/mm3  --  250   < > 267   GLUCOSE mg/dL 96 98   < > 98   CREATININE mg/dL 1.55* 1.73*   < > 2.27*   BUN mg/dL 28* 29*   < > 28*   SODIUM mmol/L 136 135*   < > 130*   POTASSIUM mmol/L 3.5 4.3   < > 3.6   AST (SGOT) U/L  --   --   --  36*   ALT (SGPT) U/L  --   --   --  12   ALK PHOS U/L  --   --   --  109   BILIRUBIN mg/dL  --   --   --  1.1   ANION GAP mmol/L 9.6 12.0   < > 14.8    < > = values in this interval not displayed.       No radiology results for the last day      I have personally reviewed the patient's new results.     Assessment/Plan:     Active and Resolved Problems    Dizziness and lightheadedness  Orthostatic hypotension due to dehydration  MYLENE/CKD stage III AA  Hyponatremia  Paroxysmal A-fib on Eliquis  Chronic diastolic CHF  Hypothyroidism  History of breast cancer with bilateral mastectomy  Daily alcohol drinker  Hypertension  History of CVA    Suggestion:    5/26-at this time we will continue current supportive care.  Monitor orthostatic.  Nephrology and cardiology is following.  PT OT.    5/25-at this time I will continue current supportive care.  PT OT.  Monitor creatinine.  Nephrology is following.  Hopefully discharge this patient soon    DVT prophylaxis:  Medical and mechanical DVT prophylaxis orders are present.         Code status is   Code Status and Medical Interventions:   Ordered at: 05/23/24 9722      Level Of Support Discussed With:    Patient     Code Status (Patient has no pulse and is not breathing):    No CPR (Do Not Attempt to Resuscitate)     Medical Interventions (Patient has pulse or is breathing):    Comfort Measures       Plan for disposition: 5/28    Time: 30 minutes    Signature: Electronically signed by Lorena Monroe MD, 05/26/24, 13:11 EDT.  [facility] Hospitalist Team

## 2024-05-26 NOTE — PROGRESS NOTES
CARDIOLOGY PROGRESS NOTE:    Kendy Worrell  84 y.o.  female  1940  [id number]      Referring Provider: Hospitalist    Reason for follow-up: Dizziness     Patient Care Team:  Marislea Monte APRN as PCP - General (Nurse Practitioner)  Tone Tubbs MD as Consulting Physician (Cardiology)  Rios Thomas MD as Consulting Physician (Colon and Rectal Surgery)    Subjective .  Patient is doing better with less dizziness    Objective lying in bed comfortably     Review of Systems   Constitutional: Negative for malaise/fatigue.   Cardiovascular:  Negative for chest pain, dyspnea on exertion, leg swelling and palpitations.   Respiratory:  Negative for cough and shortness of breath.    Gastrointestinal:  Negative for abdominal pain, nausea and vomiting.   Neurological:  Negative for dizziness, focal weakness, headaches, light-headedness and numbness.   All other systems reviewed and are negative.      Allergies: Aspirin and Nsaids    Scheduled Meds:apixaban, 5 mg, Oral, Q12H  atorvastatin, 10 mg, Oral, Nightly  insulin lispro, 2-9 Units, Subcutaneous, 4x Daily AC & at Bedtime  isosorbide mononitrate, 30 mg, Oral, Daily  lacosamide, 50 mg, Oral, BID  levothyroxine, 88 mcg, Oral, Q AM  pantoprazole, 40 mg, Oral, Q AM  sodium chloride, 10 mL, Intravenous, Q12H  torsemide, 20 mg, Oral, Daily      Continuous Infusions:   PRN Meds:.  acetaminophen    albuterol    senna-docusate sodium **AND** polyethylene glycol **AND** bisacodyl **AND** bisacodyl    Calcium Replacement - Follow Nurse / BPA Driven Protocol    dextrose    dextrose    glucagon (human recombinant)    Magnesium Cardiology Dose Replacement - Follow Nurse / BPA Driven Protocol    ondansetron    Phosphorus Replacement - Follow Nurse / BPA Driven Protocol    Potassium Replacement - Follow Nurse / BPA Driven Protocol    [COMPLETED] Insert Peripheral IV **AND** sodium chloride    sodium chloride    sodium chloride        VITAL SIGNS  Vitals:    05/25/24 1200  "05/25/24 2100 05/26/24 0800 05/26/24 1221   BP: 154/68  137/84 131/85   BP Location:   Left arm Left arm   Patient Position:   Sitting Sitting   Pulse: 62 69 73 76   Resp: 17 16 18 21   Temp: 98 °F (36.7 °C) 97.9 °F (36.6 °C) 97.8 °F (36.6 °C) 97.6 °F (36.4 °C)   TempSrc: Oral Oral Oral Oral   SpO2: 98% 97% 97% 97%   Weight:       Height:           Flowsheet Rows      Flowsheet Row First Filed Value   Admission Height 154.9 cm (61\") Documented at 05/23/2024 1421   Admission Weight 70 kg (154 lb 5.2 oz) Documented at 05/23/2024 1421             TELEMETRY: Atrial fibrillation with controlled ventricular response    Physical Exam:  Constitutional:       Appearance: Well-developed.   Eyes:      General: No scleral icterus.     Conjunctiva/sclera: Conjunctivae normal.   HENT:      Head: Normocephalic and atraumatic.   Neck:      Vascular: No carotid bruit or JVD.   Pulmonary:      Effort: Pulmonary effort is normal.      Breath sounds: Normal breath sounds. No wheezing. No rales.   Cardiovascular:      Normal rate. Regular rhythm.   Pulses:     Intact distal pulses.   Abdominal:      General: Bowel sounds are normal.      Palpations: Abdomen is soft.   Musculoskeletal:      Cervical back: Normal range of motion and neck supple. Skin:     General: Skin is warm and dry.      Findings: No rash.   Neurological:      Mental Status: Alert.          Results Review:   I reviewed the patient's new clinical results.  Lab Results (last 24 hours)       Procedure Component Value Units Date/Time    POC Glucose 4x Daily Before Meals & at Bedtime [[id number]]  (Abnormal) Collected: 05/26/24 1205    Specimen: Blood Updated: 05/26/24 1206     Glucose 133 mg/dL      Comment: Serial Number: [id number]:  [id number]       Renal Function Panel [[id number]]  (Abnormal) Collected: 05/26/24 0622    Specimen: Blood Updated: 05/26/24 0822     Glucose 96 mg/dL      BUN 28 mg/dL      Creatinine 1.55 mg/dL      Sodium 136 mmol/L      Potassium 3.5 " mmol/L      Chloride 97 mmol/L      CO2 29.4 mmol/L      Calcium 10.3 mg/dL      Albumin 3.4 g/dL      Phosphorus 3.1 mg/dL      Anion Gap 9.6 mmol/L      BUN/Creatinine Ratio 18.1     eGFR 32.7 mL/min/1.73     Narrative:      GFR Normal >60  Chronic Kidney Disease <60  Kidney Failure <15    The GFR formula is only valid for adults with stable renal function between ages 18 and 70.    POC Glucose 4x Daily Before Meals & at Bedtime [[id number]]  (Normal) Collected: 05/26/24 0753    Specimen: Blood Updated: 05/26/24 0754     Glucose 99 mg/dL      Comment: Serial Number: [id number]:  [id number]       POC Glucose Once [[id number]]  (Abnormal) Collected: 05/25/24 2017    Specimen: Blood Updated: 05/25/24 2018     Glucose 115 mg/dL      Comment: Serial Number: [id number]:  [id number]       POC Glucose Once [[id number]]  (Abnormal) Collected: 05/25/24 1650    Specimen: Blood Updated: 05/25/24 1652     Glucose 112 mg/dL      Comment: Serial Number: [id number]:  [id number]               Imaging Results (Last 24 Hours)       ** No results found for the last 24 hours. **            EKG      I personally viewed and interpreted the patient's EKG/Telemetry data:    ECHOCARDIOGRAM:  Results for orders placed during the hospital encounter of 05/23/24    Adult Transthoracic Echo Complete W/ Cont if Necessary Per Protocol    Interpretation Summary    Left ventricular ejection fraction appears to be 61 - 65%.    Left ventricular wall thickness is consistent with moderate eccentric hypertrophy.    Mild to moderate aortic valve stenosis is present.       STRESS MYOVIEW:  Results for orders placed during the hospital encounter of 11/18/21    Stress Test With Myocardial Perfusion One Day    Interpretation Summary  · Myocardial perfusion imaging indicates a normal myocardial perfusion study with no evidence of ischemia.  · Left ventricular ejection fraction is hyperdynamic (Calculated EF > 70%). .  · Findings consistent with  a normal ECG stress test.  · Impressions are consistent with a low risk study.  · There is no prior study available for comparison.       CARDIAC CATHETERIZATION:  No results found for this or any previous visit.       OTHER:         Assessment & Plan     Dizziness  Patient presents with dizziness and has more bradycardia and orthostasis  Patient was on aggressive diuresis and lost about 30 pounds in the last few days.  Patient is ruled out for MI by EKG and enzymes  Patient has normal LV systolic function but has LV diastolic dysfunction and hence no further cardiac workup except monitoring the heart rates     Bradycardia/atrial fibrillation  Patient has history of atrial fibrillation is on metoprolol which will be held because of both bradycardia and hypotension but will continue the Eliquis.  Patient's heart rates are better     Hypertension  Patient blood pressure on the lower side diabetes stable we will place her on only isosorbide and stop the metoprolol.  Blood pressure is stable with isosorbide.  Will hold off beta-blockers for now.     Hyperlipidemia  Patient on statins and the lipid levels are well within normal limits.     Acute renal failure with hyponatremia  Patient ARB acute renal failure with hyponatremia because of aggressive diuresis and hence is seen by the nephrologis  Patient is not on diuretics and electrolytes are being replaced.    Hypokalemia/hypomagnesemia  Patient presented with severe hypokalemia and hypomagnesemia which have been replaced and is better now.  Probably secondary to significant diuresis.  I discussed the patients findings and my recommendations with patient and nurse    Elbert Mathew MD  05/26/24  15:39 EDT

## 2024-05-26 NOTE — THERAPY TREATMENT NOTE
"Subjective: Pt agreeable to therapeutic plan of care. Pt expressed needing to use commode and have linen changed.    Objective:     Bed mobility - SBA  Transfers - CGA and with rolling walker  Ambulation - 40 feet CGA and with rolling walker    Vitals: WNL  BP: 121/81 supine; 130/73 seated in bedside chair following gait; unable to attain standing BP       Pain: 0 VAS   Location: N/a  Intervention for pain: N/A    Education: Provided education on the importance of mobility in the acute care setting, Verbal/Tactile Cues, Transfer Training, Gait Training, and Energy conservation strategies    Assessment: Kendy Worrell presents with functional mobility impairments which indicate the need for skilled intervention. Pt displayed supine in bed with soiled linen. Pt able to perform bed mobility with SBA and cues for proper technique. Pt required this PTA to don shoes prior to mobilization. Pt performed stand pivot with HHA from bed to bedside chair with CGA. Pt required extended time on bedside commode with this PTA/RN performing linen change on bed and assisting pt change gown. Pt required this PTA to perform pericare following using bedside commode. Pt expressed feeling short of breath and dizzy upon standing during pericare with O2 sats reading 96% on RA. Pt then ambulated in hallway with RW/CGA without any LOB but displayed decreased activity tolerance. Pt required to be wheeled back to room in bedside chair. Tolerating session today without incident. Will continue to follow and progress as tolerated.     Plan/Recommendations:   If medically appropriate, Moderate Intensity Therapy recommended post-acute care. This is recommended as therapy feels the patient would require 3-4 days per week and wouldn't tolerate \"3 hour daily\" rehab intensity. SNF would be the preferred choice. If the patient does not agree to SNF, arrange HH or OP depending on home bound status. If patient is medically complex, consider LTACH. Pt requires " no DME at discharge.     Pt desires Inpatient Rehabilitation placement at discharge. Pt cooperative; agreeable to therapeutic recommendations and plan of care.         Basic Mobility 6-click:  Rollin = Total, A lot = 2, A little = 3; 4 = None  Supine>Sit:   1 = Total, A lot = 2, A little = 3; 4 = None   Sit>Stand with arms:  1 = Total, A lot = 2, A little = 3; 4 = None  Bed>Chair:   1 = Total, A lot = 2, A little = 3; 4 = None  Ambulate in room:  1 = Total, A lot = 2, A little = 3; 4 = None  3-5 Steps with railin = Total, A lot = 2, A little = 3; 4 = None  Score: 17    Modified Rich: N/A = No pre-op stroke/TIA    Post-Tx Position: Up in Chair, Alarms activated, and Call light and personal items within reach  PPE: gloves and surgical mask

## 2024-05-26 NOTE — PLAN OF CARE
Problem: Adult Inpatient Plan of Care  Goal: Plan of Care Review  Outcome: Ongoing, Progressing  Flowsheets (Taken 5/26/2024 5926)  Plan of Care Reviewed With: patient  Outcome Evaluation: Patient resting abed since arriving to the unit. Patient has no complaints at this time.  Goal: Patient-Specific Goal (Individualized)  Outcome: Ongoing, Progressing  Goal: Absence of Hospital-Acquired Illness or Injury  Outcome: Ongoing, Progressing  Goal: Optimal Comfort and Wellbeing  Outcome: Ongoing, Progressing  Goal: Readiness for Transition of Care  Outcome: Ongoing, Progressing     Problem: Fall Injury Risk  Goal: Absence of Fall and Fall-Related Injury  Outcome: Ongoing, Progressing     Problem: Fluid and Electrolyte Imbalance (Acute Kidney Injury/Impairment)  Goal: Fluid and Electrolyte Balance  Outcome: Ongoing, Progressing     Problem: Oral Intake Inadequate (Acute Kidney Injury/Impairment)  Goal: Optimal Nutrition Intake  Outcome: Ongoing, Progressing     Problem: Renal Function Impairment (Acute Kidney Injury/Impairment)  Goal: Effective Renal Function  Outcome: Ongoing, Progressing     Problem: Skin Injury Risk Increased  Goal: Skin Health and Integrity  Outcome: Ongoing, Progressing   Goal Outcome Evaluation:  Plan of Care Reviewed With: patient           Outcome Evaluation: Patient resting abed since arriving to the unit. Patient has no complaints at this time.

## 2024-05-26 NOTE — PLAN OF CARE
Goal Outcome Evaluation:                      Kendy Worrell presents with functional mobility impairments which indicate the need for skilled intervention. Pt displayed supine in bed with soiled linen. Pt able to perform bed mobility with SBA and cues for proper technique. Pt required this PTA to don shoes prior to mobilization. Pt performed stand pivot with HHA from bed to bedside chair with CGA. Pt required extended time on bedside commode with this PTA/RN performing linen change on bed and assisting pt change gown. Pt required this PTA to perform pericare following using bedside commode. Pt expressed feeling short of breath and dizzy upon standing during pericare with O2 sats reading 96% on RA. Pt then ambulated in hallway with RW/CGA without any LOB but displayed decreased activity tolerance. Pt required to be wheeled back to room in bedside chair. Tolerating session today without incident. Will continue to follow and progress as tolerated.

## 2024-05-26 NOTE — PROGRESS NOTES
[facility] Progress Note      Patient Name: Kendy Worrell  : 1940  MRN: [id number]  Primary Care Physician:  Marisela Monte APRN  Date of admission: 2024    Subjective     Interval History:     Patient resting comfortably.  Complaining of some dyspnea.  Started to have some edema    Review of Systems:   As noted above    Objective     Vitals:   Temp:  [97.8 °F (36.6 °C)-98 °F (36.7 °C)] 97.8 °F (36.6 °C)  Heart Rate:  [62-73] 73  Resp:  [16-18] 18  BP: (137-154)/(68-84) 137/84    Intake/Output Summary (Last 24 hours) at 2024 1135  Last data filed at 2024 194  Gross per 24 hour   Intake 240 ml   Output --   Net 240 ml       Physical Exam:    General Appearance: NAD  HEENT: oral mucosa normal, nonicteric sclera  Neck: supple, no JVD  Lungs: CTA  Heart: RRR, normal S1 and S2  Abdomen: soft, nondistended  Extremities: Trace edema  Neuro: Awake alert and moving all extremities    Scheduled Meds:     apixaban, 5 mg, Oral, Q12H  atorvastatin, 10 mg, Oral, Nightly  insulin lispro, 2-9 Units, Subcutaneous, 4x Daily AC & at Bedtime  isosorbide mononitrate, 30 mg, Oral, Daily  lacosamide, 50 mg, Oral, BID  levothyroxine, 88 mcg, Oral, Q AM  pantoprazole, 40 mg, Oral, Q AM  sodium chloride, 10 mL, Intravenous, Q12H      IV Meds:        Results Reviewed:   I have personally reviewed the results from the time of this admission to 2024 11:35 EDT     Results from last 7 days   Lab Units 24  0622 24  0203 24  1448 24  0456 24  1457   SODIUM mmol/L 136 135* 136   < > 130*   POTASSIUM mmol/L 3.5 4.3 3.0*   < > 3.6   CHLORIDE mmol/L 97* 94* 91*   < > 87*   CO2 mmol/L 29.4* 29.0 32.8*   < > 28.2   BUN mg/dL 28* 29* 32*   < > 28*   CREATININE mg/dL 1.55* 1.73* 2.17*   < > 2.27*   CALCIUM mg/dL 10.3 10.0 10.1   < > 10.4   BILIRUBIN mg/dL  --   --   --   --  1.1   ALK PHOS U/L  --   --   --   --  109   ALT (SGPT) U/L  --   --   --   --  12   AST  (SGOT) U/L  --   --   --   --  36*   GLUCOSE mg/dL 96 98 110*   < > 98    < > = values in this interval not displayed.     Estimated Creatinine Clearance: 23.7 mL/min (A) (by C-G formula based on SCr of 1.55 mg/dL (H)).  Results from last 7 days   Lab Units 05/26/24  0622 05/24/24  0456 05/23/24 2019   MAGNESIUM mg/dL  --  1.5* 1.5*   PHOSPHORUS mg/dL 3.1  --   --          Results from last 7 days   Lab Units 05/25/24  0203 05/24/24  0456 05/23/24  1457   WBC 10*3/mm3 7.51 7.21 7.29   HEMOGLOBIN g/dL 11.3* 11.2* 12.5   PLATELETS 10*3/mm3 250 226 267           Assessment / Plan     ASSESSMENT:    Acute kidney injury on CKD 3A.  Most likely prerenal due to diuresis and hypotension. Renal function improving.  Creatinine down to 1.5 from peak of 2.45 Mg/DL  Orthostatic hypotension.  Due to diuretics.  Patient lost significant weight after recent increase in torsemide dose.  Congestive heart failure.  Systolic.  Chronic.  Patient has diastolic dysfunction along with moderate AS, severe TR and pulmonary hypertension.   Hypertension with chronic kidney disease.  Blood pressure okay   severe hypokalemia.  Improved  Hypomagnesemia    PLAN:  Restart torsemide at low-dose  Monitor volume status and electrolytes    Karsten Fernandez MD  05/26/24  11:35 EDT    [facility]  [phone]

## 2024-05-27 VITALS
DIASTOLIC BLOOD PRESSURE: 76 MMHG | BODY MASS INDEX: 28.97 KG/M2 | HEIGHT: 61 IN | SYSTOLIC BLOOD PRESSURE: 113 MMHG | OXYGEN SATURATION: 98 % | TEMPERATURE: 97.5 F | RESPIRATION RATE: 16 BRPM | WEIGHT: 153.44 LBS | HEART RATE: 66 BPM

## 2024-05-27 LAB
ALBUMIN SERPL-MCNC: 3.4 G/DL (ref 3.5–5.2)
ANION GAP SERPL CALCULATED.3IONS-SCNC: 8.7 MMOL/L (ref 5–15)
BUN SERPL-MCNC: 27 MG/DL (ref 8–23)
BUN/CREAT SERPL: 14.8 (ref 7–25)
CALCIUM SPEC-SCNC: 10 MG/DL (ref 8.6–10.5)
CHLORIDE SERPL-SCNC: 97 MMOL/L (ref 98–107)
CO2 SERPL-SCNC: 29.3 MMOL/L (ref 22–29)
CREAT SERPL-MCNC: 1.83 MG/DL (ref 0.57–1)
EGFRCR SERPLBLD CKD-EPI 2021: 26.8 ML/MIN/1.73
GLUCOSE BLDC GLUCOMTR-MCNC: 82 MG/DL (ref 70–105)
GLUCOSE BLDC GLUCOMTR-MCNC: 99 MG/DL (ref 70–105)
GLUCOSE SERPL-MCNC: 94 MG/DL (ref 65–99)
PHOSPHATE SERPL-MCNC: 2.7 MG/DL (ref 2.5–4.5)
POTASSIUM SERPL-SCNC: 3.6 MMOL/L (ref 3.5–5.2)
SODIUM SERPL-SCNC: 135 MMOL/L (ref 136–145)

## 2024-05-27 PROCEDURE — 82948 REAGENT STRIP/BLOOD GLUCOSE: CPT | Performed by: NURSE PRACTITIONER

## 2024-05-27 PROCEDURE — 80069 RENAL FUNCTION PANEL: CPT | Performed by: INTERNAL MEDICINE

## 2024-05-27 PROCEDURE — 82948 REAGENT STRIP/BLOOD GLUCOSE: CPT

## 2024-05-27 RX ADMIN — LACOSAMIDE 50 MG: 50 TABLET, FILM COATED ORAL at 08:58

## 2024-05-27 RX ADMIN — ISOSORBIDE MONONITRATE 30 MG: 30 TABLET, EXTENDED RELEASE ORAL at 08:58

## 2024-05-27 RX ADMIN — Medication 10 ML: at 09:04

## 2024-05-27 RX ADMIN — APIXABAN 5 MG: 5 TABLET, FILM COATED ORAL at 08:58

## 2024-05-27 RX ADMIN — TORSEMIDE 20 MG: 10 TABLET ORAL at 08:58

## 2024-05-27 RX ADMIN — PANTOPRAZOLE SODIUM 40 MG: 40 TABLET, DELAYED RELEASE ORAL at 04:27

## 2024-05-27 RX ADMIN — LEVOTHYROXINE SODIUM 88 MCG: 88 TABLET ORAL at 04:27

## 2024-05-27 NOTE — PLAN OF CARE
Problem: Adult Inpatient Plan of Care  Goal: Plan of Care Review  Outcome: Met  Goal: Patient-Specific Goal (Individualized)  Outcome: Met  Goal: Absence of Hospital-Acquired Illness or Injury  Outcome: Met  Intervention: Identify and Manage Fall Risk  Recent Flowsheet Documentation  Taken 5/27/2024 1200 by Sharon Alegria RN  Safety Promotion/Fall Prevention: safety round/check completed  Taken 5/27/2024 1000 by Sharon Alegria RN  Safety Promotion/Fall Prevention: safety round/check completed  Taken 5/27/2024 0859 by Sharon Alegria RN  Safety Promotion/Fall Prevention: safety round/check completed  Intervention: Prevent Skin Injury  Recent Flowsheet Documentation  Taken 5/27/2024 0859 by Sharon Alegria RN  Body Position: position changed independently  Intervention: Prevent and Manage VTE (Venous Thromboembolism) Risk  Recent Flowsheet Documentation  Taken 5/27/2024 0859 by Sharon Alegria RN  Range of Motion: active ROM (range of motion) encouraged  Goal: Optimal Comfort and Wellbeing  Outcome: Met  Intervention: Provide Person-Centered Care  Recent Flowsheet Documentation  Taken 5/27/2024 0859 by Sharon Alegria RN  Trust Relationship/Rapport:   care explained   choices provided   emotional support provided   empathic listening provided   questions encouraged  Goal: Readiness for Transition of Care  Outcome: Met     Problem: Fall Injury Risk  Goal: Absence of Fall and Fall-Related Injury  Outcome: Met  Intervention: Identify and Manage Contributors  Recent Flowsheet Documentation  Taken 5/27/2024 0859 by Sharon Alegria RN  Medication Review/Management: medications reviewed  Intervention: Promote Injury-Free Environment  Recent Flowsheet Documentation  Taken 5/27/2024 1200 by Sharon Alegria RN  Safety Promotion/Fall Prevention: safety round/check completed  Taken 5/27/2024 1000 by Sharon Alegria RN  Safety Promotion/Fall Prevention: safety round/check  completed  Taken 5/27/2024 0859 by Sharon Alegria, RN  Safety Promotion/Fall Prevention: safety round/check completed     Problem: Fluid and Electrolyte Imbalance (Acute Kidney Injury/Impairment)  Goal: Fluid and Electrolyte Balance  Outcome: Met     Problem: Oral Intake Inadequate (Acute Kidney Injury/Impairment)  Goal: Optimal Nutrition Intake  Outcome: Met     Problem: Renal Function Impairment (Acute Kidney Injury/Impairment)  Goal: Effective Renal Function  Outcome: Met  Intervention: Monitor and Support Renal Function  Recent Flowsheet Documentation  Taken 5/27/2024 0859 by Sharon Alegria RN  Medication Review/Management: medications reviewed     Problem: Skin Injury Risk Increased  Goal: Skin Health and Integrity  Outcome: Met   Goal Outcome Evaluation:   Discharge paperwork and medications reviewed with pt. IV removed; intact and clean. Pt to transport via [facility] to [facility]. Report called to [facility]. Daughter notified of patients discharge from the hospital as well. No concerns.

## 2024-05-27 NOTE — PLAN OF CARE
Goal Outcome Evaluation:           Progress: no change     Pt rested throughout the night. No new issues addressed will continue to monitor.

## 2024-05-27 NOTE — DISCHARGE SUMMARY
[facility]         DISCHARGE SUMMARY    Patient Name: Kendy Worrell  : 1940  MRN: [id number]    Date of Admission: 2024  Date of Discharge:  2024  Primary Care Physician: Marisela Monte APRN    Consults       Date and Time Order Name Status Description    2024 10:19 AM Inpatient Cardiology Consult Completed     2024  5:09 PM Nephrology (on -call MD unless specified) Completed     2024  5:09 PM Hospitalist (on-call MD unless specified)              Presenting Problem:   Dizziness [R42]  Acute renal insufficiency [N28.9]  Orthostatic dizziness [R42]    Active and Resolved Hospital Problems:  Active Hospital Problems    Diagnosis POA    **Orthostatic dizziness [R42] Yes      Resolved Hospital Problems   No resolved problems to display.         Hospital Course     Hospital Course:    Kendy Worrell is a 84 y.o. female with a CMH of HTN, hypothyroidism, CAD, chronic diastolic heart failure, CVA, hyponatremia, paroxysmal Afib on eliquis who presented to [facility] on 2024 with orthostatic dizziness. Lives at home in assisted living apartment, uses rollator walker for ambulation.   Presents to ED with orthostatic dizziness. [facility] nurse completed BP checks and was dizzy with standing. Reports over past 4 weeks with orthostatic lightheadedness/dizziness and improves after lying down. Followed up with OP nephrology Dr. Fernandez on  when torsemide was increased to 100 mg daily. Reports weight loss of 25-30 pounds since increase in torsemide. On arrival to ED VS: 97.3-23--96% room air.      Upon evaluation, awake, alert, resting in bed, daughter at bedside. Current VS: 67-/76-93% room air. Denies any lightheadedness at rest. Denies headache, vision changes. Denies recent N/V/D. Reports daily use of 1 bourbon drink at bedtime and daily oral intake of 16 oz sprite and 48 oz of water.   EKG reveals Afib rate 64, , Qtc 444  CXR cardiomegaly.  Atelectasis left lung base  CT head reveals no acute findings.   Blood work reveals BUN 28, creatinine 2.27, sodium 130, chloride 87, AST 36, GFR 20, WBC 7.29, Hgb 12.5, Hct 39.2, pro BNP normal at 1248.       The patient was admitted, patient was evaluated by cardiology, nephrology, patient had a carotid Doppler study that shows right internal carotid artery less than 50% stenosis and left internal carotid artery demonstrates less than 50% stenosis, patient had PT OT evaluation, LVEF appears to be 61 to 65% had a normal left ventricular cavity size noted, patient initially was kept off diuretics, and then torsemide was restarted on 5/26/2024 patient will be discharged to inpatient rehab today         Day of Discharge     Vital Signs:  Temp:  [97.6 °F (36.4 °C)-98.3 °F (36.8 °C)] 97.8 °F (36.6 °C)  Heart Rate:  [65-85] 85  Resp:  [18-25] 18  BP: (116-152)/(60-99) 143/60  Physical Exam:  Constitutional: Awake, alert   Eyes: PERRLA, sclerae anicteric, no conjunctival injection   HENT: NCAT, mucous membranes moist   Neck: Supple, no thyromegaly, no lymphadenopathy, trachea midline   Respiratory: Clear to auscultation bilaterally, nonlabored respirations    Cardiovascular: RRR, no murmurs, rubs, or gallops, palpable pedal pulses bilaterally   Gastrointestinal: Positive bowel sounds, soft, nontender, nondistended   Musculoskeletal: No bilateral ankle edema, no clubbing or cyanosis to extremities   Psychiatric: Appropriate affect, cooperative   Neurologic: Oriented x 3, strength symmetric in all extremities, Cranial Nerves grossly intact to confrontation, speech clear   Skin: No rashes     Pertinent  and/or Most Recent Results     LAB RESULTS:      Lab 05/25/24  0203 05/24/24  0456 05/23/24 2019 05/23/24  1457   WBC 7.51 7.21  --  7.29   HEMOGLOBIN 11.3* 11.2*  --  12.5   HEMATOCRIT 34.2 35.2  --  39.2   PLATELETS 250 226  --  267   NEUTROS ABS  --   --   --  4.73   IMMATURE GRANS (ABS)  --   --   --  0.04   LYMPHS ABS   --   --   --  1.48   MONOS ABS  --   --   --  0.86   EOS ABS  --   --   --  0.12   MCV 88.6 91.7  --  91.2   SED RATE  --   --  19  --          Lab 05/27/24 0214 05/26/24 0622 05/25/24 0203 05/24/24 1448 05/24/24 0456 05/23/24 2019   SODIUM 135* 136 135* 136 136  --    POTASSIUM 3.6 3.5 4.3 3.0* 2.3*  --    CHLORIDE 97* 97* 94* 91* 91*  --    CO2 29.3* 29.4* 29.0 32.8* 30.6*  --    ANION GAP 8.7 9.6 12.0 12.2 14.4  --    BUN 27* 28* 29* 32* 31*  --    CREATININE 1.83* 1.55* 1.73* 2.17* 2.45*  --    EGFR 26.8* 32.7* 28.7* 21.8* 18.9*  --    GLUCOSE 94 96 98 110* 95  --    CALCIUM 10.0 10.3 10.0 10.1 9.6  --    MAGNESIUM  --   --   --   --  1.5* 1.5*   PHOSPHORUS 2.7 3.1  --   --   --   --    HEMOGLOBIN A1C  --   --   --   --   --  5.53   TSH  --   --   --   --   --  0.426         Lab 05/27/24 0214 05/26/24 0622 05/23/24  1457   TOTAL PROTEIN  --   --  7.4   ALBUMIN 3.4* 3.4* 3.7   GLOBULIN  --   --  3.7   ALT (SGPT)  --   --  12   AST (SGOT)  --   --  36*   BILIRUBIN  --   --  1.1   ALK PHOS  --   --  109         Lab 05/24/24 1448 05/24/24 0456 05/23/24 2019 05/23/24  1457   PROBNP  --   --   --  1,248.0   HSTROP T 27* 36* 30* 35*         Lab 05/23/24 2019   CHOLESTEROL 141   LDL CHOL 31   HDL CHOL 98*   TRIGLYCERIDES 56             Brief Urine Lab Results  (Last result in the past 365 days)        Color   Clarity   Blood   Leuk Est   Nitrite   Protein   CREAT   Urine HCG        05/23/24 2321             42.5               Microbiology Results (last 10 days)       ** No results found for the last 240 hours. **            CT Head Without Contrast    Result Date: 5/23/2024  Impression: Impression: No acute intracranial findings. Chronic and senescent changes as above. Left maxillary sinus disease. Electronically Signed: Wilfrid Fish MD  5/23/2024 3:21 PM EDT  Workstation ID: [id number]    XR Chest 1 View    Result Date: 5/23/2024  Impression: Impression: 1.Cardiomegaly. 2.Atelectasis or scarring left lung  base Electronically Signed: Seth Guerrero MD  5/23/2024 3:05 PM EDT  Workstation ID: [id number]     Results for orders placed during the hospital encounter of 05/23/24    Duplex Carotid Ultrasound CAR    Interpretation Summary    Right internal carotid artery demonstrates a less than 50% stenosis.    Left internal carotid artery demonstrates a less than 50% stenosis.      Results for orders placed during the hospital encounter of 05/23/24    Duplex Carotid Ultrasound CAR    Interpretation Summary    Right internal carotid artery demonstrates a less than 50% stenosis.    Left internal carotid artery demonstrates a less than 50% stenosis.      Results for orders placed during the hospital encounter of 05/23/24    Adult Transthoracic Echo Complete W/ Cont if Necessary Per Protocol    Interpretation Summary    Left ventricular ejection fraction appears to be 61 - 65%.    Left ventricular wall thickness is consistent with moderate eccentric hypertrophy.    Mild to moderate aortic valve stenosis is present.      Labs Pending at Discharge:        Discharge Details        Discharge Medications        Continue These Medications        Instructions Start Date   acetaminophen 325 MG tablet  Commonly known as: TYLENOL   650 mg, Oral, Every 6 Hours PRN      albuterol sulfate  (90 Base) MCG/ACT inhaler  Commonly known as: PROVENTIL HFA;VENTOLIN HFA;PROAIR HFA   2 puffs, Inhalation, Every 4 Hours PRN      apixaban 5 MG tablet tablet  Commonly known as: ELIQUIS   5 mg, Oral, Every 12 Hours Scheduled      atorvastatin 10 MG tablet  Commonly known as: LIPITOR   10 mg, Oral, Nightly      Calmoseptine 0.44-20.6 % ointment  Generic drug: Menthol-Zinc Oxide   1 Application, Topical, 2 Times Daily      cloNIDine 0.1 MG tablet  Commonly known as: CATAPRES   0.1 mg, Oral, Every 6 Hours PRN      Hydrocortisone (Perianal) 2.5 % rectal cream  Commonly known as: ANUSOL-HC   1 application , Rectal, 2 Times Daily      isosorbide mononitrate  30 MG 24 hr tablet  Commonly known as: IMDUR   30 mg, Oral, Daily      Jardiance 10 MG tablet tablet  Generic drug: empagliflozin   10 mg, Oral, Daily      lacosamide 50 MG tablet tablet  Commonly known as: VIMPAT   50 mg, Oral, 2 Times Daily      levothyroxine 88 MCG tablet  Commonly known as: SYNTHROID, LEVOTHROID   88 mcg, Oral, Daily      magnesium oxide 250 MG tablet   250 mg, Oral, 2 Times Daily      metOLazone 2.5 MG tablet  Commonly known as: ZAROXOLYN   1 tablet, Oral, Weekly, On Mondays      metoprolol succinate  MG 24 hr tablet  Commonly known as: TOPROL-XL   100 mg, Oral, Every 12 Hours Scheduled      multivitamin with minerals tablet tablet   1 tablet, Oral, Daily      nitroglycerin 0.4 MG SL tablet  Commonly known as: NITROSTAT   0.4 mg, Sublingual, Every 5 Minutes PRN      nystatin 084244 UNIT/GM powder  Commonly known as: MYCOSTATIN   Topical, 3 Times Daily PRN, Apply under breasts      pantoprazole 40 MG EC tablet  Commonly known as: PROTONIX   40 mg, Oral, Daily      potassium chloride 20 MEQ CR tablet  Commonly known as: KLOR-CON M20   40 mEq, Oral, 3 Times Daily      torsemide 100 MG tablet  Commonly known as: DEMADEX   100 mg, Oral, Daily               Allergies   Allergen Reactions    Aspirin Other (See Comments)     Severe bleeding    Nsaids Other (See Comments)     Severe bleeding         Discharge Disposition:   Rehab Facility or Unit (DC - External)    Discharge Condition: .cond    Diet:  Hospital:  Diet Order   Procedures    Diet: Cardiac, Diabetic; Healthy Heart (2-3 Na+); Consistent Carbohydrate; Fluid Consistency: Thin (IDDSI 0)         Discharge Activity:   Activity Instructions       Activity as Tolerated                CODE STATUS:  Code Status and Medical Interventions:   Ordered at: 05/23/24 0946     Level Of Support Discussed With:    Patient     Code Status (Patient has no pulse and is not breathing):    No CPR (Do Not Attempt to Resuscitate)     Medical Interventions  (Patient has pulse or is breathing):    Comfort Measures         Future Appointments   Date Time Provider Department Center   7/1/2024  1:45 PM Rios Thomas MD [facility]           Time spent on Discharge including face to face service:  30 minutes    Travis Davenport MD

## 2024-05-27 NOTE — PROGRESS NOTES
[facility] Progress Note      Patient Name: Kendy Worrell  : 1940  MRN: [id number]  Primary Care Physician:  Marisela Monte APRN  Date of admission: 2024    Subjective     Interval History:     Says her orthopnea and soa at rest is unchanged from yesterday    Review of Systems:   As noted above    Objective     Vitals:   Temp:  [97.6 °F (36.4 °C)-98.3 °F (36.8 °C)] 97.8 °F (36.6 °C)  Heart Rate:  [65-85] 85  Resp:  [18-25] 18  BP: (116-152)/(60-99) 143/60    Intake/Output Summary (Last 24 hours) at 2024 1058  Last data filed at 2024 0913  Gross per 24 hour   Intake 360 ml   Output 800 ml   Net -440 ml       Physical Exam:    General Appearance: NAD  HEENT: oral mucosa normal, nonicteric sclera  Neck: supple, no JVD  Lungs: CTA  Heart: RRR, normal S1 and S2  Abdomen: soft, nondistended  Extremities: Trace edema  Neuro: Awake alert and moving all extremities    Scheduled Meds:     apixaban, 5 mg, Oral, Q12H  atorvastatin, 10 mg, Oral, Nightly  isosorbide mononitrate, 30 mg, Oral, Daily  lacosamide, 50 mg, Oral, BID  levothyroxine, 88 mcg, Oral, Q AM  pantoprazole, 40 mg, Oral, Q AM  sodium chloride, 10 mL, Intravenous, Q12H  torsemide, 20 mg, Oral, Daily      IV Meds:        Results Reviewed:   I have personally reviewed the results from the time of this admission to 2024 10:58 EDT     Results from last 7 days   Lab Units 24  0214 24  0622 24  0203 24  0456 24  1457   SODIUM mmol/L 135* 136 135*   < > 130*   POTASSIUM mmol/L 3.6 3.5 4.3   < > 3.6   CHLORIDE mmol/L 97* 97* 94*   < > 87*   CO2 mmol/L 29.3* 29.4* 29.0   < > 28.2   BUN mg/dL 27* 28* 29*   < > 28*   CREATININE mg/dL 1.83* 1.55* 1.73*   < > 2.27*   CALCIUM mg/dL 10.0 10.3 10.0   < > 10.4   BILIRUBIN mg/dL  --   --   --   --  1.1   ALK PHOS U/L  --   --   --   --  109   ALT (SGPT) U/L  --   --   --   --  12   AST (SGOT) U/L  --   --   --   --  36*   GLUCOSE mg/dL 94  96 98   < > 98    < > = values in this interval not displayed.     Estimated Creatinine Clearance: 20 mL/min (A) (by C-G formula based on SCr of 1.83 mg/dL (H)).  Results from last 7 days   Lab Units 05/27/24  0214 05/26/24  0622 05/24/24  0456 05/23/24 2019   MAGNESIUM mg/dL  --   --  1.5* 1.5*   PHOSPHORUS mg/dL 2.7 3.1  --   --          Results from last 7 days   Lab Units 05/25/24  0203 05/24/24  0456 05/23/24  1457   WBC 10*3/mm3 7.51 7.21 7.29   HEMOGLOBIN g/dL 11.3* 11.2* 12.5   PLATELETS 10*3/mm3 250 226 267           Assessment / Plan     ASSESSMENT:    Acute kidney injury on CKD 3A.  Most likely prerenal due to diuresis and hypotension. Creatinine darryl a little with needed diuresis.  Orthostatic hypotension.  Due to diuretics.  Patient lost significant weight after recent increase in torsemide dose.  Congestive heart failure.  Systolic.  Chronic.  Patient has diastolic dysfunction along with moderate AS, severe TR and pulmonary hypertension. These will make it more difficult to balance her volume status.  Hypertension with chronic kidney disease.  Blood pressure okay   severe hypokalemia.  Improved  Hypomagnesemia    PLAN:  Continue torsemide as is today  Monitor volume status and electrolytes    Allen Ernst MD  05/27/24  10:58 EDT    [facility]  [phone]

## 2024-05-27 NOTE — CASE MANAGEMENT/SOCIAL WORK
Discharge Planning Assessment  [facility]     Patient Name: Kendy Worrell  MRN: [id number]  Today's Date: 5/27/2024    Admit Date: 5/23/2024    Plan: KAMALA today on 5/27 with facility to transport at 2:00.       Discharge Plan       Row Name 05/27/24 1027       Plan    Plan KAMALA today on 5/27 with facility to transport at 2:00.    Plan Comments [facility]- [facility] stated facility is able to accept Pt today if medically ready for discharge. Pt's daughter-Ramandeep requested facility van to transport. Facility stated the van will pick Pt up at 2:00 today.                  Continued Care and Services - Admitted Since 5/23/2024       Destination Coordination complete.      Service Provider Request Status Selected Services Address Phone Fax Patient Preferred    [facility]  Selected Inpatient Rehabilitation [address] [place], [place] [id number] [phone] --                Asha Gabriel, OLIVERW, LCSW, APHSW-C  Medical Social Worker  [facility] Office:[phone]  [facility] Office: [phone]   [email or web address]

## 2024-05-28 NOTE — CASE MANAGEMENT/SOCIAL WORK
Case Management Discharge Note      Final Note: [facility]      Selected Continued Care - Discharged on 5/27/2024 Admission date: 5/23/2024 - Discharge disposition: Rehab Facility or Unit (DC - External)      Destination Coordination complete.      Service Provider Selected Services Address Phone Fax Patient Preferred    [facility] Inpatient Rehabilitation [address] [place] [address] [place] [address] [phone] --                 Transportation Services  W/C Van: Other (facility van)    Final Discharge Disposition Code: 02 - short term [facility] for [facility]

## 2024-07-05 ENCOUNTER — APPOINTMENT (OUTPATIENT)
Dept: GENERAL RADIOLOGY | Facility: HOSPITAL | Age: 84
End: 2024-07-05
Payer: MEDICARE

## 2024-07-05 ENCOUNTER — APPOINTMENT (OUTPATIENT)
Dept: CT IMAGING | Facility: HOSPITAL | Age: 84
End: 2024-07-05
Payer: MEDICARE

## 2024-07-05 ENCOUNTER — HOSPITAL ENCOUNTER (INPATIENT)
Facility: HOSPITAL | Age: 84
LOS: 5 days | Discharge: NURSING, REHAB OR LONG-TERM CARE | End: 2024-07-11
Attending: EMERGENCY MEDICINE | Admitting: FAMILY MEDICINE
Payer: MEDICARE

## 2024-07-05 DIAGNOSIS — M47.814 SPONDYLOSIS OF THORACIC REGION WITHOUT MYELOPATHY OR RADICULOPATHY: ICD-10-CM

## 2024-07-05 DIAGNOSIS — R55 RECURRENT SYNCOPE: ICD-10-CM

## 2024-07-05 DIAGNOSIS — I50.32 CHRONIC HEART FAILURE WITH PRESERVED EJECTION FRACTION (HFPEF): ICD-10-CM

## 2024-07-05 DIAGNOSIS — R53.1 WEAKNESS: ICD-10-CM

## 2024-07-05 DIAGNOSIS — M51.36 LUMBAR DEGENERATIVE DISC DISEASE: ICD-10-CM

## 2024-07-05 DIAGNOSIS — J01.00 ACUTE NON-RECURRENT MAXILLARY SINUSITIS: ICD-10-CM

## 2024-07-05 DIAGNOSIS — M48.00 CENTRAL STENOSIS OF SPINAL CANAL: ICD-10-CM

## 2024-07-05 DIAGNOSIS — M47.816 SPONDYLOSIS OF LUMBAR REGION WITHOUT MYELOPATHY OR RADICULOPATHY: ICD-10-CM

## 2024-07-05 DIAGNOSIS — A41.9 SEPSIS, DUE TO UNSPECIFIED ORGANISM, UNSPECIFIED WHETHER ACUTE ORGAN DYSFUNCTION PRESENT: Primary | ICD-10-CM

## 2024-07-05 DIAGNOSIS — N18.9 CHRONIC KIDNEY DISEASE, UNSPECIFIED CKD STAGE: ICD-10-CM

## 2024-07-05 LAB
ALBUMIN SERPL-MCNC: 4.2 G/DL (ref 3.5–5.2)
ALBUMIN/GLOB SERPL: 1.4 G/DL
ALP SERPL-CCNC: 109 U/L (ref 39–117)
ALT SERPL W P-5'-P-CCNC: 20 U/L (ref 1–33)
ANION GAP SERPL CALCULATED.3IONS-SCNC: 16.4 MMOL/L (ref 5–15)
AST SERPL-CCNC: 39 U/L (ref 1–32)
BASOPHILS # BLD AUTO: 0.03 10*3/MM3 (ref 0–0.2)
BASOPHILS NFR BLD AUTO: 0.3 % (ref 0–1.5)
BILIRUB SERPL-MCNC: 1.4 MG/DL (ref 0–1.2)
BILIRUB UR QL STRIP: NEGATIVE
BUN SERPL-MCNC: 29 MG/DL (ref 8–23)
BUN/CREAT SERPL: 16.4 (ref 7–25)
CALCIUM SPEC-SCNC: 10.5 MG/DL (ref 8.6–10.5)
CHLORIDE SERPL-SCNC: 81 MMOL/L (ref 98–107)
CK SERPL-CCNC: 78 U/L (ref 20–180)
CLARITY UR: CLEAR
CO2 SERPL-SCNC: 33.6 MMOL/L (ref 22–29)
COLOR UR: YELLOW
CREAT SERPL-MCNC: 1.77 MG/DL (ref 0.57–1)
D-LACTATE SERPL-SCNC: 2.4 MMOL/L (ref 0.3–2)
DEPRECATED RDW RBC AUTO: 43.6 FL (ref 37–54)
EGFRCR SERPLBLD CKD-EPI 2021: 27.9 ML/MIN/1.73
EOSINOPHIL # BLD AUTO: 0.01 10*3/MM3 (ref 0–0.4)
EOSINOPHIL NFR BLD AUTO: 0.1 % (ref 0.3–6.2)
ERYTHROCYTE [DISTWIDTH] IN BLOOD BY AUTOMATED COUNT: 14.2 % (ref 12.3–15.4)
GLOBULIN UR ELPH-MCNC: 3 GM/DL
GLUCOSE SERPL-MCNC: 109 MG/DL (ref 65–99)
GLUCOSE UR STRIP-MCNC: ABNORMAL MG/DL
HCT VFR BLD AUTO: 39.2 % (ref 34–46.6)
HGB BLD-MCNC: 13.2 G/DL (ref 12–15.9)
HGB UR QL STRIP.AUTO: NEGATIVE
HOLD SPECIMEN: NORMAL
HOLD SPECIMEN: NORMAL
IMM GRANULOCYTES # BLD AUTO: 0.06 10*3/MM3 (ref 0–0.05)
IMM GRANULOCYTES NFR BLD AUTO: 0.5 % (ref 0–0.5)
KETONES UR QL STRIP: ABNORMAL
LEUKOCYTE ESTERASE UR QL STRIP.AUTO: NEGATIVE
LYMPHOCYTES # BLD AUTO: 0.84 10*3/MM3 (ref 0.7–3.1)
LYMPHOCYTES NFR BLD AUTO: 7.1 % (ref 19.6–45.3)
MAGNESIUM SERPL-MCNC: 2.6 MG/DL (ref 1.6–2.4)
MCH RBC QN AUTO: 28.8 PG (ref 26.6–33)
MCHC RBC AUTO-ENTMCNC: 33.7 G/DL (ref 31.5–35.7)
MCV RBC AUTO: 85.4 FL (ref 79–97)
MONOCYTES # BLD AUTO: 0.97 10*3/MM3 (ref 0.1–0.9)
MONOCYTES NFR BLD AUTO: 8.2 % (ref 5–12)
NEUTROPHILS NFR BLD AUTO: 83.8 % (ref 42.7–76)
NEUTROPHILS NFR BLD AUTO: 9.96 10*3/MM3 (ref 1.7–7)
NITRITE UR QL STRIP: NEGATIVE
NRBC BLD AUTO-RTO: 0 /100 WBC (ref 0–0.2)
PH UR STRIP.AUTO: 6.5 [PH] (ref 5–8)
PLATELET # BLD AUTO: 276 10*3/MM3 (ref 140–450)
PMV BLD AUTO: 10.2 FL (ref 6–12)
POTASSIUM SERPL-SCNC: 2.7 MMOL/L (ref 3.5–5.2)
PROT SERPL-MCNC: 7.2 G/DL (ref 6–8.5)
PROT UR QL STRIP: NEGATIVE
RBC # BLD AUTO: 4.59 10*6/MM3 (ref 3.77–5.28)
SODIUM SERPL-SCNC: 131 MMOL/L (ref 136–145)
SP GR UR STRIP: 1.01 (ref 1–1.03)
TROPONIN T SERPL HS-MCNC: 39 NG/L
UROBILINOGEN UR QL STRIP: ABNORMAL
WBC NRBC COR # BLD AUTO: 11.87 10*3/MM3 (ref 3.4–10.8)
WHOLE BLOOD HOLD COAG: NORMAL
WHOLE BLOOD HOLD SPECIMEN: NORMAL

## 2024-07-05 PROCEDURE — 82550 ASSAY OF CK (CPK): CPT | Performed by: EMERGENCY MEDICINE

## 2024-07-05 PROCEDURE — P9612 CATHETERIZE FOR URINE SPEC: HCPCS

## 2024-07-05 PROCEDURE — 25010000002 CEFTRIAXONE PER 250 MG: Performed by: EMERGENCY MEDICINE

## 2024-07-05 PROCEDURE — G0378 HOSPITAL OBSERVATION PER HR: HCPCS

## 2024-07-05 PROCEDURE — 84443 ASSAY THYROID STIM HORMONE: CPT | Performed by: NURSE PRACTITIONER

## 2024-07-05 PROCEDURE — 93005 ELECTROCARDIOGRAM TRACING: CPT | Performed by: EMERGENCY MEDICINE

## 2024-07-05 PROCEDURE — 36415 COLL VENOUS BLD VENIPUNCTURE: CPT

## 2024-07-05 PROCEDURE — 71045 X-RAY EXAM CHEST 1 VIEW: CPT

## 2024-07-05 PROCEDURE — 83735 ASSAY OF MAGNESIUM: CPT | Performed by: EMERGENCY MEDICINE

## 2024-07-05 PROCEDURE — 72131 CT LUMBAR SPINE W/O DYE: CPT

## 2024-07-05 PROCEDURE — 70450 CT HEAD/BRAIN W/O DYE: CPT

## 2024-07-05 PROCEDURE — 84484 ASSAY OF TROPONIN QUANT: CPT | Performed by: EMERGENCY MEDICINE

## 2024-07-05 PROCEDURE — 83605 ASSAY OF LACTIC ACID: CPT

## 2024-07-05 PROCEDURE — 99285 EMERGENCY DEPT VISIT HI MDM: CPT

## 2024-07-05 PROCEDURE — 85025 COMPLETE CBC W/AUTO DIFF WBC: CPT | Performed by: EMERGENCY MEDICINE

## 2024-07-05 PROCEDURE — 80053 COMPREHEN METABOLIC PANEL: CPT | Performed by: EMERGENCY MEDICINE

## 2024-07-05 PROCEDURE — 25010000002 CEFEPIME PER 500 MG: Performed by: EMERGENCY MEDICINE

## 2024-07-05 PROCEDURE — 84145 PROCALCITONIN (PCT): CPT | Performed by: NURSE PRACTITIONER

## 2024-07-05 PROCEDURE — 25810000003 SEPSIS FLUID NS 0.9 % SOLUTION: Performed by: EMERGENCY MEDICINE

## 2024-07-05 PROCEDURE — 72128 CT CHEST SPINE W/O DYE: CPT

## 2024-07-05 PROCEDURE — 93005 ELECTROCARDIOGRAM TRACING: CPT

## 2024-07-05 PROCEDURE — 87040 BLOOD CULTURE FOR BACTERIA: CPT | Performed by: EMERGENCY MEDICINE

## 2024-07-05 PROCEDURE — 81003 URINALYSIS AUTO W/O SCOPE: CPT | Performed by: EMERGENCY MEDICINE

## 2024-07-05 RX ORDER — POTASSIUM CHLORIDE 20 MEQ/1
40 TABLET, EXTENDED RELEASE ORAL ONCE
Status: COMPLETED | OUTPATIENT
Start: 2024-07-05 | End: 2024-07-05

## 2024-07-05 RX ADMIN — POTASSIUM CHLORIDE 40 MEQ: 1500 TABLET, EXTENDED RELEASE ORAL at 22:59

## 2024-07-05 RX ADMIN — CEFTRIAXONE 2000 MG: 2 INJECTION, POWDER, FOR SOLUTION INTRAMUSCULAR; INTRAVENOUS at 22:59

## 2024-07-05 RX ADMIN — NITROGLYCERIN 1 INCH: 20 OINTMENT TOPICAL at 20:44

## 2024-07-05 RX ADMIN — CEFEPIME 2000 MG: 2 INJECTION, POWDER, FOR SOLUTION INTRAVENOUS at 20:44

## 2024-07-05 RX ADMIN — SODIUM CHLORIDE 2000 ML: 9 INJECTION, SOLUTION INTRAVENOUS at 20:41

## 2024-07-05 NOTE — Clinical Note
Level of Care: Telemetry [5]   Diagnosis: Sepsis, unspecified organism [[id number]]   Admitting Physician: JULIANN DUFFY [[id number]]   Attending Physician: JULIANN DUFFY [[id number]]

## 2024-07-06 PROBLEM — I95.1 ORTHOSTATIC HYPOTENSION: Status: RESOLVED | Noted: 2024-07-06 | Resolved: 2024-07-06

## 2024-07-06 PROBLEM — R53.1 GENERALIZED WEAKNESS: Status: ACTIVE | Noted: 2024-07-06

## 2024-07-06 PROBLEM — A41.9 SEPSIS, UNSPECIFIED ORGANISM: Status: RESOLVED | Noted: 2024-07-05 | Resolved: 2024-07-06

## 2024-07-06 PROBLEM — R29.6 FREQUENT FALLS: Status: ACTIVE | Noted: 2024-07-06

## 2024-07-06 PROBLEM — I95.1 ORTHOSTATIC HYPOTENSION: Status: ACTIVE | Noted: 2024-07-06

## 2024-07-06 LAB
ALBUMIN SERPL-MCNC: 3.4 G/DL (ref 3.5–5.2)
ALBUMIN/GLOB SERPL: 1.2 G/DL
ALP SERPL-CCNC: 84 U/L (ref 39–117)
ALT SERPL W P-5'-P-CCNC: 13 U/L (ref 1–33)
ANION GAP SERPL CALCULATED.3IONS-SCNC: 14 MMOL/L (ref 5–15)
AST SERPL-CCNC: 29 U/L (ref 1–32)
BASOPHILS # BLD AUTO: 0.02 10*3/MM3 (ref 0–0.2)
BASOPHILS NFR BLD AUTO: 0.2 % (ref 0–1.5)
BILIRUB SERPL-MCNC: 0.8 MG/DL (ref 0–1.2)
BUN SERPL-MCNC: 26 MG/DL (ref 8–23)
BUN/CREAT SERPL: 17.4 (ref 7–25)
CALCIUM SPEC-SCNC: 9.4 MG/DL (ref 8.6–10.5)
CHLORIDE SERPL-SCNC: 92 MMOL/L (ref 98–107)
CO2 SERPL-SCNC: 29 MMOL/L (ref 22–29)
CREAT SERPL-MCNC: 1.49 MG/DL (ref 0.57–1)
D-LACTATE SERPL-SCNC: 1.4 MMOL/L (ref 0.5–2)
D-LACTATE SERPL-SCNC: 1.9 MMOL/L (ref 0.5–2)
DEPRECATED RDW RBC AUTO: 43.9 FL (ref 37–54)
EGFRCR SERPLBLD CKD-EPI 2021: 34.3 ML/MIN/1.73
EOSINOPHIL # BLD AUTO: 0 10*3/MM3 (ref 0–0.4)
EOSINOPHIL NFR BLD AUTO: 0 % (ref 0.3–6.2)
ERYTHROCYTE [DISTWIDTH] IN BLOOD BY AUTOMATED COUNT: 14.3 % (ref 12.3–15.4)
GLOBULIN UR ELPH-MCNC: 2.8 GM/DL
GLUCOSE SERPL-MCNC: 86 MG/DL (ref 65–99)
HCT VFR BLD AUTO: 34.2 % (ref 34–46.6)
HGB BLD-MCNC: 11.2 G/DL (ref 12–15.9)
IMM GRANULOCYTES # BLD AUTO: 0.03 10*3/MM3 (ref 0–0.05)
IMM GRANULOCYTES NFR BLD AUTO: 0.3 % (ref 0–0.5)
LYMPHOCYTES # BLD AUTO: 1.01 10*3/MM3 (ref 0.7–3.1)
LYMPHOCYTES NFR BLD AUTO: 11.2 % (ref 19.6–45.3)
MCH RBC QN AUTO: 28.3 PG (ref 26.6–33)
MCHC RBC AUTO-ENTMCNC: 32.7 G/DL (ref 31.5–35.7)
MCV RBC AUTO: 86.4 FL (ref 79–97)
MONOCYTES # BLD AUTO: 1.01 10*3/MM3 (ref 0.1–0.9)
MONOCYTES NFR BLD AUTO: 11.2 % (ref 5–12)
MRSA DNA SPEC QL NAA+PROBE: NORMAL
NEUTROPHILS NFR BLD AUTO: 6.97 10*3/MM3 (ref 1.7–7)
NEUTROPHILS NFR BLD AUTO: 77.1 % (ref 42.7–76)
NRBC BLD AUTO-RTO: 0 /100 WBC (ref 0–0.2)
PLATELET # BLD AUTO: 219 10*3/MM3 (ref 140–450)
PMV BLD AUTO: 9.9 FL (ref 6–12)
POTASSIUM SERPL-SCNC: 2.6 MMOL/L (ref 3.5–5.2)
POTASSIUM SERPL-SCNC: 3.8 MMOL/L (ref 3.5–5.2)
PROCALCITONIN SERPL-MCNC: 0.09 NG/ML (ref 0–0.25)
PROT SERPL-MCNC: 6.2 G/DL (ref 6–8.5)
QT INTERVAL: 648 MS
QTC INTERVAL: 672 MS
RBC # BLD AUTO: 3.96 10*6/MM3 (ref 3.77–5.28)
SODIUM SERPL-SCNC: 135 MMOL/L (ref 136–145)
TSH SERPL DL<=0.05 MIU/L-ACNC: 0.2 UIU/ML (ref 0.27–4.2)
WBC NRBC COR # BLD AUTO: 9.04 10*3/MM3 (ref 3.4–10.8)

## 2024-07-06 PROCEDURE — 87481 CANDIDA DNA AMP PROBE: CPT | Performed by: FAMILY MEDICINE

## 2024-07-06 PROCEDURE — 80053 COMPREHEN METABOLIC PANEL: CPT | Performed by: NURSE PRACTITIONER

## 2024-07-06 PROCEDURE — 83605 ASSAY OF LACTIC ACID: CPT

## 2024-07-06 PROCEDURE — 85025 COMPLETE CBC W/AUTO DIFF WBC: CPT | Performed by: NURSE PRACTITIONER

## 2024-07-06 PROCEDURE — 25010000002 POTASSIUM CHLORIDE 10 MEQ/100ML SOLUTION

## 2024-07-06 PROCEDURE — 84132 ASSAY OF SERUM POTASSIUM: CPT | Performed by: FAMILY MEDICINE

## 2024-07-06 PROCEDURE — 92610 EVALUATE SWALLOWING FUNCTION: CPT

## 2024-07-06 PROCEDURE — 83605 ASSAY OF LACTIC ACID: CPT | Performed by: NURSE PRACTITIONER

## 2024-07-06 PROCEDURE — 25810000003 SODIUM CHLORIDE 0.9 % SOLUTION: Performed by: NURSE PRACTITIONER

## 2024-07-06 PROCEDURE — 87641 MR-STAPH DNA AMP PROBE: CPT | Performed by: NURSE PRACTITIONER

## 2024-07-06 RX ORDER — BISACODYL 5 MG/1
5 TABLET, DELAYED RELEASE ORAL DAILY PRN
Status: DISCONTINUED | OUTPATIENT
Start: 2024-07-06 | End: 2024-07-11 | Stop reason: HOSPADM

## 2024-07-06 RX ORDER — TRAMADOL HYDROCHLORIDE 50 MG/1
25 TABLET ORAL 2 TIMES DAILY
COMMUNITY
End: 2024-07-11 | Stop reason: HOSPADM

## 2024-07-06 RX ORDER — DAPAGLIFLOZIN 10 MG/1
10 TABLET, FILM COATED ORAL DAILY
COMMUNITY

## 2024-07-06 RX ORDER — POTASSIUM CHLORIDE 1.5 G/1.58G
40 POWDER, FOR SOLUTION ORAL ONCE
Status: COMPLETED | OUTPATIENT
Start: 2024-07-06 | End: 2024-07-06

## 2024-07-06 RX ORDER — SODIUM CHLORIDE 0.9 % (FLUSH) 0.9 %
10 SYRINGE (ML) INJECTION AS NEEDED
Status: DISCONTINUED | OUTPATIENT
Start: 2024-07-06 | End: 2024-07-11 | Stop reason: HOSPADM

## 2024-07-06 RX ORDER — SODIUM CHLORIDE 9 MG/ML
40 INJECTION, SOLUTION INTRAVENOUS AS NEEDED
Status: DISCONTINUED | OUTPATIENT
Start: 2024-07-06 | End: 2024-07-11 | Stop reason: HOSPADM

## 2024-07-06 RX ORDER — ANTIOX #8/OM3/DHA/EPA/LUT/ZEAX 250-2.5 MG
1 CAPSULE ORAL 2 TIMES DAILY
COMMUNITY

## 2024-07-06 RX ORDER — ZINC OXIDE 20 %
1 OINTMENT (GRAM) TOPICAL 2 TIMES DAILY
COMMUNITY

## 2024-07-06 RX ORDER — SODIUM CHLORIDE 0.9 % (FLUSH) 0.9 %
10 SYRINGE (ML) INJECTION EVERY 12 HOURS SCHEDULED
Status: DISCONTINUED | OUTPATIENT
Start: 2024-07-06 | End: 2024-07-11 | Stop reason: HOSPADM

## 2024-07-06 RX ORDER — AMOXICILLIN 250 MG
2 CAPSULE ORAL 2 TIMES DAILY PRN
Status: DISCONTINUED | OUTPATIENT
Start: 2024-07-06 | End: 2024-07-11 | Stop reason: HOSPADM

## 2024-07-06 RX ORDER — SODIUM CHLORIDE 9 MG/ML
100 INJECTION, SOLUTION INTRAVENOUS CONTINUOUS
Status: DISCONTINUED | OUTPATIENT
Start: 2024-07-06 | End: 2024-07-07

## 2024-07-06 RX ORDER — POLYETHYLENE GLYCOL 3350 17 G/17G
17 POWDER, FOR SOLUTION ORAL DAILY PRN
Status: DISCONTINUED | OUTPATIENT
Start: 2024-07-06 | End: 2024-07-11 | Stop reason: HOSPADM

## 2024-07-06 RX ORDER — ONDANSETRON 2 MG/ML
4 INJECTION INTRAMUSCULAR; INTRAVENOUS EVERY 6 HOURS PRN
Status: DISCONTINUED | OUTPATIENT
Start: 2024-07-06 | End: 2024-07-06 | Stop reason: ALTCHOICE

## 2024-07-06 RX ORDER — ALUMINA, MAGNESIA, AND SIMETHICONE 2400; 2400; 240 MG/30ML; MG/30ML; MG/30ML
15 SUSPENSION ORAL EVERY 6 HOURS PRN
Status: DISCONTINUED | OUTPATIENT
Start: 2024-07-06 | End: 2024-07-11 | Stop reason: HOSPADM

## 2024-07-06 RX ORDER — ONDANSETRON 4 MG/1
4 TABLET, ORALLY DISINTEGRATING ORAL EVERY 6 HOURS PRN
Status: DISCONTINUED | OUTPATIENT
Start: 2024-07-06 | End: 2024-07-06 | Stop reason: ALTCHOICE

## 2024-07-06 RX ORDER — RANOLAZINE 500 MG/1
500 TABLET, EXTENDED RELEASE ORAL 2 TIMES DAILY
COMMUNITY

## 2024-07-06 RX ORDER — POTASSIUM CHLORIDE 7.45 MG/ML
10 INJECTION INTRAVENOUS
Status: DISCONTINUED | OUTPATIENT
Start: 2024-07-06 | End: 2024-07-06

## 2024-07-06 RX ORDER — PROCHLORPERAZINE EDISYLATE 5 MG/ML
5 INJECTION INTRAMUSCULAR; INTRAVENOUS EVERY 6 HOURS PRN
Status: DISCONTINUED | OUTPATIENT
Start: 2024-07-06 | End: 2024-07-11 | Stop reason: HOSPADM

## 2024-07-06 RX ORDER — POTASSIUM CHLORIDE 1.5 G/1.58G
40 POWDER, FOR SOLUTION ORAL ONCE
Status: DISCONTINUED | OUTPATIENT
Start: 2024-07-06 | End: 2024-07-06

## 2024-07-06 RX ORDER — POTASSIUM CHLORIDE 20 MEQ/1
40 TABLET, EXTENDED RELEASE ORAL EVERY 4 HOURS
Qty: 4 TABLET | Refills: 0 | Status: DISCONTINUED | OUTPATIENT
Start: 2024-07-06 | End: 2024-07-06

## 2024-07-06 RX ORDER — UREA 10 %
5 LOTION (ML) TOPICAL NIGHTLY PRN
Status: DISCONTINUED | OUTPATIENT
Start: 2024-07-06 | End: 2024-07-11 | Stop reason: HOSPADM

## 2024-07-06 RX ORDER — LANOLIN ALCOHOL/MO/W.PET/CERES
1000 CREAM (GRAM) TOPICAL DAILY
COMMUNITY

## 2024-07-06 RX ORDER — MELATONIN
1000 DAILY
COMMUNITY

## 2024-07-06 RX ORDER — ACETAMINOPHEN 325 MG/1
650 TABLET ORAL EVERY 6 HOURS PRN
Status: DISCONTINUED | OUTPATIENT
Start: 2024-07-06 | End: 2024-07-11 | Stop reason: HOSPADM

## 2024-07-06 RX ORDER — BISACODYL 10 MG
10 SUPPOSITORY, RECTAL RECTAL DAILY PRN
Status: DISCONTINUED | OUTPATIENT
Start: 2024-07-06 | End: 2024-07-11 | Stop reason: HOSPADM

## 2024-07-06 RX ADMIN — SODIUM CHLORIDE 100 ML/HR: 9 INJECTION, SOLUTION INTRAVENOUS at 02:22

## 2024-07-06 RX ADMIN — SODIUM CHLORIDE 100 ML/HR: 9 INJECTION, SOLUTION INTRAVENOUS at 16:06

## 2024-07-06 RX ADMIN — Medication 10 ML: at 21:30

## 2024-07-06 RX ADMIN — POTASSIUM CHLORIDE 40 MEQ: 1.5 POWDER, FOR SOLUTION ORAL at 11:12

## 2024-07-06 RX ADMIN — POTASSIUM CHLORIDE 40 MEQ: 1.5 POWDER, FOR SOLUTION ORAL at 07:03

## 2024-07-06 RX ADMIN — POTASSIUM CHLORIDE 10 MEQ: 10 INJECTION, SOLUTION INTRAVENOUS at 10:09

## 2024-07-06 RX ADMIN — Medication 10 ML: at 00:17

## 2024-07-06 NOTE — THERAPY EVALUATION
Acute Care - Speech Language Pathology   Swallow Initial Evaluation  David     Patient Name: Kendy Worrell  : 1940  MRN: [id number]  Today's Date: 2024               Admit Date: 2024    Visit Dx:     ICD-10-CM ICD-9-CM   1. Sepsis, due to unspecified organism, unspecified whether acute organ dysfunction present  A41.9 038.9     995.91   2. Acute non-recurrent maxillary sinusitis  J01.00 461.0   3. Weakness  R53.1 780.79   4. Recurrent syncope  R55 780.2   5. Spondylosis of thoracic region without myelopathy or radiculopathy  M47.814 721.2   6. Lumbar degenerative disc disease  M51.36 722.52   7. Spondylosis of lumbar region without myelopathy or radiculopathy  M47.816 721.3   8. Central stenosis of spinal canal  M48.00 724.00   9. Chronic kidney disease, unspecified CKD stage  N18.9 585.9     Patient Active Problem List   Diagnosis    Essential hypertension    Acquired hypothyroidism    Coronary artery disease involving native coronary artery of native heart without angina pectoris    Hyperlipidemia    Arthritis    Skin lesion of chest wall    NSTEMI (non-ST elevated myocardial infarction)    Supraventricular tachycardia    Normal cardiac stress test    Gastrointestinal bleed    Angina effort    Morbidly obese    Bilateral pulmonary embolism    New onset a-fib    UTI (urinary tract infection)    Hypokalemia    Chronic diastolic CHF (congestive heart failure)    Acute ischemic stroke    Chest pain    GI bleed    Closed fibular fracture    Anemia    Hyponatremia    Orthostatic dizziness    Frequent falls    Generalized weakness     Past Medical History:   Diagnosis Date    A-fib     Arthritis     Breast cancer     CHF (congestive heart failure)     CVA (cerebral vascular accident) 2019    History of pulmonary embolus (PE)     Hyperlipidemia     Hypertension     Hyperthyroidism     patient has hypothyroidism    Hypothyroidism      Past Surgical History:   Procedure Laterality Date    BREAST BIOPSY       BREAST SURGERY Right     CHOLECYSTECTOMY      COLON SURGERY      Removed    COLONOSCOPY      COLONOSCOPY N/A 11/3/2022    Procedure: COLONOSCOPY to anastamosis with biopsies and cold snare polypectomy;  Surgeon: Saroj Oakes MD;  Location: [facility] ENDOSCOPY;  Service: Gastroenterology;  Laterality: N/A;  PRe: rectal bleeding  Post: hemorrhoids, diverticulosis, anastamotic ulcer, polyp, hemostasis clip free-floating    HYSTERECTOMY      MAMMO BILATERAL  2015    MASTECTOMY      TONSILLECTOMY         SLP Recommendation and Plan  SLP Swallowing Diagnosis: mild-moderate, oral dysphagia (07/06/24 1600)  SLP Diet Recommendation: mechanical ground textures, thin liquids (07/06/24 1600)  Recommended Precautions and Strategies: upright posture during/after eating, small bites of food and sips of liquid, multiple swallows per bite of food, alternate between small bites of food and sips of liquid, check mouth frequently for oral residue/pocketing, general aspiration precautions, fatigue precautions (07/06/24 1600)  SLP Rec. for Method of Medication Administration: meds whole, meds crushed, as tolerated (07/06/24 1600)     Monitor for Signs of Aspiration: yes, notify SLP if any concerns (07/06/24 1600)     Swallow Criteria for Skilled Therapeutic Interventions Met: demonstrates skilled criteria (07/06/24 1600)     Rehab Potential/Prognosis, Swallowing: good, to achieve stated therapy goals (07/06/24 1600)  Therapy Frequency (Swallow): PRN (07/06/24 1600)  Predicted Duration Therapy Intervention (Days): until discharge (07/06/24 1600)  Oral Care Recommendations: Oral Care BID/PRN, Swab (07/06/24 1600)        SWALLOW EVALUATION (Last 72 Hours)       SLP Adult Swallow Evaluation       Row Name 07/06/24 1600       Rehab Evaluation    Document Type evaluation  -CB    Subjective Information no complaints  -CB    Patient Observations alert;cooperative  -CB    Patient/Family/Caregiver Comments/Observations Patient was able  to follow simple directives. Family present during assessment.  -CB    Patient Effort good  -CB       General Information    Patient Profile Reviewed yes  -CB    Pertinent History Of Current Problem Kendy Worrell is a 84 y.o. female with PMH of  HTN, hypothyroidism, CAD, chronic diastolic heart failure, CVA, hyponatremia, paroxysmal Afib on eliquis who presented to [facility] ED 7/5/2024 with reports of frequent falls, weakness.  She states she has fallen twice today.  The first time was when she went to the bathroom and her right knee gave out.  She stated she was helped up by staff and laid on her couch for a while. She then got back up and used her rolling walker to go to the bathroom and she fell in the exact same spot.  She did hit her head but denied passing out. She laid on the floor until her daughter found her. She denied any lateralizing weakness. She denied any known illness but has had some intermittent chills. She denied any changes in urination. She lives in assisted living and MAR showed the patient had low BP the last three days and her metoprolol has been held for this reason. Upon medical record review the patient was admitted back in May for orthostatic dizziness.      In the ED she had CT head that showed chronic senescent changes.  Improved appearance left maxillary sinus with some residual underlying sinus disease.  Atherosclerotic vascular calcification.  CXR showed no acute cardiopulmonary abnormality.  CT thoracic and lumbar spine showed no acute fracture, thoracic scoliosis and lumbar degenerative anterolisthesis, spinal stenosis. WBC 11.8, lactate 2.4, BUN 29, creatinine 1.77, potassium 2.7, magnesium 2.6 CK total 78, high-sensitivity troponin 39 and urinalysis showed 250 glucose, trace ketones.  Vital signs all within normal limits.  Blood cultures drawn.  Patient was given IV cefepime, IV Rocephin, Nitropaste, 40meq KCL, 2L IVFs.  She was admitted for further treatment of sepsis, weakness,  syncope (pt denies passing out).  -CB    Current Method of Nutrition regular textures;thin liquids  -CB       Pain    Additional Documentation Pain Scale: FACES Pre/Post-Treatment (Group)  -CB       Pain Scale: FACES Pre/Post-Treatment    Pain: FACES Scale, Pretreatment 0-->no hurt  -CB    Posttreatment Pain Rating 0-->no hurt  -CB       Oral Motor Structure and Function    Dentition Assessment edentulous  -CB    Secretion Management WNL/WFL  -CB    Mucosal Quality moist, healthy  -CB    Gag Response WFL  -CB       Oral Musculature and Cranial Nerve Assessment    Oral Motor General Assessment WFL  -CB    Oral Motor, Comment Oral mechanism examination revealed patient demonstrated full ROM and mobility of lingual and labial structures. Lingual and jaw strength was adequate. Palatal movement was present. Noted positive gag reflex.  -CB       General Eating/Swallowing Observations    Respiratory Support Currently in Use nasal cannula  -CB    O2 Liters 2L  -CB    Eating/Swallowing Skills self-fed;fed by SLP  -CB    Positioning During Eating upright in bed  -CB    Utensils Used straw  -CB    Consistencies Trialed regular textures;soft to chew textures;mixed consistency;thin liquids  -CB       Clinical Swallow Eval    Clinical Swallow Evaluation Summary Patient was seen for dysphagia evaluation per MD order. Nursing reported that she had difficulty with potassium pill earlier today.  Patient is currently on regular diet at this time. Patient reports history of CVA 2019. No apparent facial droop was detected. Patient had pneumonia x 1. No history of esophageal stricture. Patient has had GERD before but does not take routine medication for condition at this time. Patient reports that she receives a regular diet at the assisted living facility where she resides. However, she reports having no appetite. Most recent chest x-ray revealed no acute cardiopulmonary abnormality. Patient reports that she has upper and lower dentures  but the dentures are ill fitted and therefore, she does not wear them during meals. She is edentulous during this assessment. Oral mechanism examination revealed patient demonstrated full ROM and mobility of lingual and labial structures. No facial weakness was detected. Lingual and jaw strength are adequate. Palatal movement was present. Noted positive gag reflex. Properly positioned patient upright in bed near 90 degree hip flexion. Provided trials of thins via straw x 6. No overt s/s of aspiration was observed. No wet vocal quality was detected. Given digital palpation, swallow was timely. Provided trials of mixed consistency x 2, STC x 3 and regular x 3. Patient demonstrated adequate munching pattern but did display some trace to mild residue with all consistencies attempted. Given liquid wash, patient would eventually clear oral cavity or she would spit out the remaining amounts that did not clear. Patient exhibited prolong munching particularly with regular consistency. No clearing of throat, cough and/or vocal changes were identified.  It is likely patient would benefit from downgrading to mechanical ground diet at this time. ST will follow to assure safety and adequacy of recommended diet and independent use of safe swallow strategies.  -CB       SLP Evaluation Clinical Impression    SLP Swallowing Diagnosis mild-moderate;oral dysphagia  -CB    Functional Impact risk of aspiration/pneumonia  -CB    Rehab Potential/Prognosis, Swallowing good, to achieve stated therapy goals  -CB    Swallow Criteria for Skilled Therapeutic Interventions Met demonstrates skilled criteria  -CB       Recommendations    Therapy Frequency (Swallow) PRN  -CB    Predicted Duration Therapy Intervention (Days) until discharge  -CB    SLP Diet Recommendation mechanical ground textures;thin liquids  -CB    Recommended Precautions and Strategies upright posture during/after eating;small bites of food and sips of liquid;multiple swallows  per bite of food;alternate between small bites of food and sips of liquid;check mouth frequently for oral residue/pocketing;general aspiration precautions;fatigue precautions  -CB    Oral Care Recommendations Oral Care BID/PRN;Swab  -CB    SLP Rec. for Method of Medication Administration meds whole;meds crushed;as tolerated  -CB    Monitor for Signs of Aspiration yes;notify SLP if any concerns  -CB       Swallow Goals (SLP)    Swallow LTGs Swallow Long Term Goal (free text)  -CB    Swallow STGs diet tolerance goal selection (SLP)  -CB    Diet Tolerance Goal Selection (SLP) Swallow Short Term Goal 1  -CB       (LTG) Swallow    (LTG) Swallow Patient will tolerate safest and least restrictive diet without complications from aspiration.  -CB    Time Frame (Swallow Long Term Goal) by discharge  -CB    Progress/Outcomes (Swallow Long Term Goal) new goal  -CB       (STG) Swallow 1    (STG) Swallow 1 Patient will participate in full meal assessment to assure safety and adequacy of recommended diet and independent use of safe swallow strategies.  -CB    Time Frame (Swallow Short Term Goal 1) 1 week  -CB    Progress/Outcomes (Swallow Short Term Goal 1) new goal  -CB              User Key  (r) = Recorded By, (t) = Taken By, (c) = Cosigned By      Initials Name Effective Dates    Bettina Morris, SLP 09/21/21 -                     EDUCATION  The patient has been educated in the following areas:   Dysphagia (Swallowing Impairment) Oral Care/Hydration.        SLP GOALS       Row Name 07/06/24 1600       (LTG) Swallow    (LTG) Swallow Patient will tolerate safest and least restrictive diet without complications from aspiration.  -CB    Time Frame (Swallow Long Term Goal) by discharge  -CB    Progress/Outcomes (Swallow Long Term Goal) new goal  -CB       (STG) Swallow 1    (STG) Swallow 1 Patient will participate in full meal assessment to assure safety and adequacy of recommended diet and independent use of safe swallow  strategies.  -CB    Time Frame (Swallow Short Term Goal 1) 1 week  -CB    Progress/Outcomes (Swallow Short Term Goal 1) new goal  -CB              User Key  (r) = Recorded By, (t) = Taken By, (c) = Cosigned By      Initials Name Provider Type    Bettina Morris, SLP Speech and Language Pathologist                         Time Calculation:                VIJAY Brar  7/6/2024

## 2024-07-06 NOTE — H&P
[facility] Medicine Services    Hospitalist History and Physical     Kendy Worrell : 1940 MRN:[id number] LOS:0 ROOM:      Reason for admission: Sepsis, unspecified organism     Assessment / Plan     Lactic acidosis  - lactic 2.4, WBC 11.87  - UA: +glucose and ketones, no leukocytes or bacteria  - CXR showed no acute cardiopulmonary abnormality  - afebrile, not tachycardic, not hypotensive currently  - given IV cefepime and rocephin in the ED  - given 2L IVFs in the ED  - follow blood cultures  - check procalcitonin to determine further need of antibiotics     Multiple falls  Generalized weakness, deconditioning   - check orthostatic vitals as patient has had recent low BP's in the am according to assisted living MAR  - CT head: chronic senescent changes.  Improved appearance left maxillary sinus with some residual underlying sinus disease.  Atherosclerotic vascular calcification  - EKG Atrial fibrillation rate 64. Nonspecific intraventricular conduction delay. Borderline ST depression, diffuse leads. Prolonged QT interval   - HS troponin 39 (appears chronically 20s-30s)  - PT eval    Hypokalemia  - K 2.7  - given 40meq in the ED  - monitor and replace as appropriate    CKD  - BUN 29, creatinine 1.77 (this is around baseline)  - Continue home torsemide, metolazone in the am    Hypertension  - hold home clonidine overnight due to recent low BP's    CAD  Chronic diastolic heart failure  Paroxysmal atrial fibrillation  - Continue home metoprolol as long as blood pressure stable, Eliquis  - Continue home torsemide, Imdur    Hypothyroidism  - Check TSH  - Continue home synthroid when verified     H/o CVA  - Continue Eliquis, statin    Chronic hyponatremia    History of seizures  - Continue home Vimpat    Medical Intervention Limits: No intubation (DNI)  Level Of Support Discussed With: Patient  Code Status (Patient has no pulse and is not breathing): No CPR (Do Not Attempt to Resuscitate)  Medical  Interventions (Patient has pulse or is breathing): Limited Support       Nutrition:   Diet: Diabetic; Consistent Carbohydrate; Fluid Consistency: Thin (IDDSI 0)     VTE Prophylaxis: on eliquis at home        History of Present illness     Kendy Worrell is a 84 y.o. female with PMH of  HTN, hypothyroidism, CAD, chronic diastolic heart failure, CVA, hyponatremia, paroxysmal Afib on eliquis who presented to [facility] ED 7/5/2024 with reports of frequent falls, weakness.  She states she has fallen twice today.  The first time was when she went to the bathroom and her right knee gave out.  She stated she was helped up by staff and laid on her couch for a while. She then got back up and used her rolling walker to go to the bathroom and she fell in the exact same spot.  She did hit her head but denied passing out. She laid on the floor until her daughter found her. She denied any lateralizing weakness. She denied any known illness but has had some intermittent chills. She denied any changes in urination. She lives in assisted living and MAR showed the patient had low BP the last three days and her metoprolol has been held for this reason. Upon medical record review the patient was admitted back in May for orthostatic dizziness.     In the ED she had CT head that showed chronic senescent changes.  Improved appearance left maxillary sinus with some residual underlying sinus disease.  Atherosclerotic vascular calcification.  CXR showed no acute cardiopulmonary abnormality.  CT thoracic and lumbar spine showed no acute fracture, thoracic scoliosis and lumbar degenerative anterolisthesis, spinal stenosis. WBC 11.8, lactate 2.4, BUN 29, creatinine 1.77, potassium 2.7, magnesium 2.6 CK total 78, high-sensitivity troponin 39 and urinalysis showed 250 glucose, trace ketones.  Vital signs all within normal limits.  Blood cultures drawn.  Patient was given IV cefepime, IV Rocephin, Nitropaste, 40meq KCL, 2L IVFs.  She was admitted for  further treatment of sepsis, weakness, syncope (pt denies passing out).     Subjective / Review of systems     Review of Systems   Constitutional:  Positive for chills and fatigue.   HENT: Negative.     Eyes: Negative.    Respiratory: Negative.     Cardiovascular:  Positive for chest pain.   Gastrointestinal:  Positive for nausea. Negative for vomiting.   Genitourinary: Negative.    Musculoskeletal:  Positive for back pain.   Skin: Negative.    Neurological:  Positive for dizziness and weakness. Negative for syncope.   Psychiatric/Behavioral: Negative.          Past Medical/Surgical/Social/Family History & Allergies     Past Medical History:   Diagnosis Date    A-fib     Arthritis     Breast cancer     CHF (congestive heart failure)     CVA (cerebral vascular accident) 2019    History of pulmonary embolus (PE)     Hyperlipidemia     Hypertension     Hyperthyroidism     patient has hypothyroidism    Hypothyroidism       Past Surgical History:   Procedure Laterality Date    BREAST BIOPSY      BREAST SURGERY Right     CHOLECYSTECTOMY      COLON SURGERY      Removed    COLONOSCOPY      COLONOSCOPY N/A 11/3/2022    Procedure: COLONOSCOPY to anastamosis with biopsies and cold snare polypectomy;  Surgeon: Saroj Oakes MD;  Location: [facility] ENDOSCOPY;  Service: Gastroenterology;  Laterality: N/A;  PRe: rectal bleeding  Post: hemorrhoids, diverticulosis, anastamotic ulcer, polyp, hemostasis clip free-floating    HYSTERECTOMY      MAMMO BILATERAL  2015    MASTECTOMY      TONSILLECTOMY        Social History     Socioeconomic History    Marital status: Single   Tobacco Use    Smoking status: Former     Current packs/day: 0.00     Types: Cigarettes     Quit date:      Years since quittin.5     Passive exposure: Past    Smokeless tobacco: Never    Tobacco comments:     quit in    Vaping Use    Vaping status: Never Used   Substance and Sexual Activity    Alcohol use: Yes     Alcohol/week: 3.0 standard  drinks of alcohol     Types: 3 Shots of liquor per week     Comment: Last drink 3 weeks ago.    Drug use: Never    Sexual activity: Never      Family History   Problem Relation Age of Onset    Stroke Mother     Hypertension Mother     Heart disease Father     Cancer Father     Colon polyps Father       Allergies   Allergen Reactions    Aspirin Other (See Comments)     Severe bleeding    Nsaids Other (See Comments)     Severe bleeding      Social Determinants of Health     Tobacco Use: Medium Risk (6/17/2024)    Received from [facility]    Patient History     Smoking Tobacco Use: Former     Smokeless Tobacco Use: Never     Passive Exposure: Not on file   Alcohol Use: Not At Risk (5/23/2024)    AUDIT-C     Frequency of Alcohol Consumption: Monthly or less     Average Number of Drinks: 1 or 2     Frequency of Binge Drinking: Never   Financial Resource Strain: Not on file   Food Insecurity: No Food Insecurity (5/24/2024)    Hunger Vital Sign     Worried About Running Out of Food in the Last Year: Never true     Ran Out of Food in the Last Year: Never true   Transportation Needs: No Transportation Needs (5/24/2024)    PRAPARE - Transportation     Lack of Transportation (Medical): No     Lack of Transportation (Non-Medical): No   Physical Activity: Not on file   Stress: Not on file   Social Connections: Unknown (10/10/2023)    Family and Community Support     Help with Day-to-Day Activities: Not on file     Lonely or Isolated: Not on file   Interpersonal Safety: Not At Risk (7/5/2024)    Abuse Screen     Unsafe at Home or Work/School: no     Feels Threatened by Someone?: no     Does Anyone Keep You from Contacting Others or Doint Things Outside the Home?: no     Physical Sign of Abuse Present: no   Depression: At risk (1/4/2019)    PHQ-2     PHQ-2 Score: 4   Housing Stability: Not At Risk (5/24/2024)    Housing Stability     Current Living Arrangements: assisted living facility     Potentially Unsafe Housing  Conditions: none   Utilities: Not At Risk (5/24/2024)    [facility] Utilities     Threatened with loss of utilities: No   Health Literacy: Unknown (5/24/2024)    Education     Help with school or training?: Not on file     Preferred Language: English   Employment: Unknown (10/10/2023)    Employment     Do you want help finding or keeping work or a job?: Not on file   Disabilities: At Risk (5/23/2024)    Disabilities     Concentrating, Remembering, or Making Decisions Difficulty: no     Doing Errands Independently Difficulty: yes        Home Medications     Prior to Admission medications    Medication Sig Start Date End Date Taking? Authorizing Provider   acetaminophen (TYLENOL) 325 MG tablet Take 2 tablets by mouth Every 6 (Six) Hours As Needed for Mild Pain . 9/9/19   Vladimir Barrera MD   albuterol sulfate  (90 Base) MCG/ACT inhaler Inhale 2 puffs Every 4 (Four) Hours As Needed for Wheezing.    Katelin St MD   apixaban (ELIQUIS) 5 MG tablet tablet Take 1 tablet by mouth Every 12 (Twelve) Hours. 9/15/19   Vladimir Barrera MD   atorvastatin (LIPITOR) 10 MG tablet Take 1 tablet by mouth Every Night.    Katelin St MD   cloNIDine (CATAPRES) 0.1 MG tablet Take 1 tablet by mouth Every 6 (Six) Hours As Needed for High Blood Pressure (SBP > 160, DBP > 90).    Katelin St MD   empagliflozin (JARDIANCE) 10 MG tablet tablet Take 1 tablet by mouth Daily. 1/31/24   Joe Sahu MD   Hydrocortisone, Perianal, (ANUSOL-HC) 2.5 % rectal cream Insert 1 Application into the rectum 2 (Two) Times a Day.    Katelin St MD   isosorbide mononitrate (IMDUR) 30 MG 24 hr tablet Take 1 tablet by mouth Daily.    Katelin St MD   lacosamide (VIMPAT) 50 MG tablet tablet Take 1 tablet by mouth 2 (Two) Times a Day. 4/15/24   Katelin St MD   levothyroxine (SYNTHROID, LEVOTHROID) 88 MCG tablet Take 1 tablet by mouth Daily.    Katelin St MD   magnesium oxide 250 MG  tablet Take 1 tablet by mouth 2 (Two) Times a Day.    Katelin St MD   Menthol-Zinc Oxide (Calmoseptine) 0.44-20.6 % ointment Apply 1 Application topically to the appropriate area as directed 2 (Two) Times a Day. 3/21/24   Rios Thomas MD   metOLazone (ZAROXOLYN) 2.5 MG tablet Take 1 tablet by mouth 1 (One) Time Per Week. On Mondays 4/26/24   Katelin St MD   metoprolol succinate XL (TOPROL-XL) 100 MG 24 hr tablet Take 1 tablet by mouth Every 12 (Twelve) Hours. 9/9/19   Vladimir Barrera MD   multivitamin with minerals tablet tablet Take 1 tablet by mouth Daily.    Katelin St MD   nitroglycerin (NITROSTAT) 0.4 MG SL tablet Place 1 tablet under the tongue Every 5 (Five) Minutes As Needed for Chest Pain. 5/6/24   Katelin St MD   nystatin (MYCOSTATIN) 332310 UNIT/GM powder Apply  topically to the appropriate area as directed 3 (Three) Times a Day As Needed. Apply under breasts    Katelin St MD   pantoprazole (PROTONIX) 40 MG EC tablet Take 1 tablet by mouth Daily.    Katelin St MD   potassium chloride (KLOR-CON M20) 20 MEQ CR tablet Take 2 tablets by mouth 3 (Three) Times a Day. 4/26/24   Katelin St MD   torsemide (DEMADEX) 100 MG tablet Take 1 tablet by mouth Daily. 4/8/24   Katelin St MD        Objective / Physical Exam     Vital signs:  Temp: 97.7 °F (36.5 °C)  BP: 147/77  Heart Rate: 70  Resp: 18  SpO2: 96 %  Weight: 67.1 kg (148 lb)    Admission Weight: Weight: 67.1 kg (148 lb)    Physical Exam  Vitals and nursing note reviewed.   HENT:      Head: Normocephalic and atraumatic.   Eyes:      Extraocular Movements: Extraocular movements intact.      Pupils: Pupils are equal, round, and reactive to light.   Cardiovascular:      Rate and Rhythm: Normal rate. Rhythm irregular.      Pulses: Normal pulses.      Heart sounds: Normal heart sounds.   Pulmonary:      Effort: Pulmonary effort is normal.      Breath sounds: Normal breath  sounds.   Abdominal:      General: Bowel sounds are normal.      Palpations: Abdomen is soft.      Tenderness: There is no abdominal tenderness.   Musculoskeletal:      Comments: Weakness bilateral lower extremities, moves bilateral upper extremities equally   Skin:     General: Skin is warm and dry.   Neurological:      Mental Status: She is alert and oriented to person, place, and time.   Psychiatric:         Mood and Affect: Mood normal.         Behavior: Behavior normal.          Labs     Results from last 7 days   Lab Units 07/05/24 2014   WBC 10*3/mm3 11.87*   HEMOGLOBIN g/dL 13.2   HEMATOCRIT % 39.2   PLATELETS 10*3/mm3 276      Results from last 7 days   Lab Units 07/05/24 2014   ALK PHOS U/L 109   AST (SGOT) U/L 39*   ALT (SGPT) U/L 20           Results from last 7 days   Lab Units 07/05/24 2014   SODIUM mmol/L 131*   POTASSIUM mmol/L 2.7*   CHLORIDE mmol/L 81*   CO2 mmol/L 33.6*   BUN mg/dL 29*   CREATININE mg/dL 1.77*   GLUCOSE mg/dL 109*        Imaging     CT Lumbar Spine Without Contrast    Result Date: 7/5/2024  CT THORACIC SPINE WO CONTRAST, CT LUMBAR SPINE WO CONTRAST Date of Exam: 7/5/2024 9:12 PM EDT Indication: Back pain after fall. Comparison: CTA chest 11/18/2021 Technique: Axial CT images were obtained of the thoracic and lumbar spine without contrast administration.  Sagittal and coronal reconstructions were performed.  Automated exposure control and iterative reconstruction methods were used. Findings: Thoracic spine: There is mild S-shaped thoracic scoliosis. There is accentuation of the mid thoracic kyphosis. No traumatic subluxation or significant spondylolisthesis. Accounting for bony demineralization, no evidence of acute displaced fracture. Similar mild chronic anterior wedging of the T6 vertebral body which is unchanged since 2021 CT. Otherwise the vertebral body heights are normal. There is mild to moderate degenerative disc disease. The paravertebral soft tissues are unremarkable.  No acute or suspicious findings within the chest or upper abdomen. There are coronary artery calcifications. Lumbar spine: There is degenerative 5 mm anterolisthesis of L5 on S1, and trace degenerative anterolisthesis of L4 and L5. No traumatic subluxation. The bones are demineralized. Accounting for this, no evidence of acute displaced fracture. The vertebral body heights appear preserved. No acute osseous findings in the partially imaged pelvis. There is degenerative disc disease appearing moderate to severe at L5-S1. There is moderate to severe facet arthropathy in the lower lumbar spine. There is moderate to severe spinal canal stenosis at L4-L5 on the basis of posterior disc bulge and ligamentum flavum hypertrophy and facet arthropathy. No acute findings within the partially imaged portions of the posterior abdomen and pelvis. There is a rim calcified 1.1 cm lesion of the left kidney which may reflect rim calcified cyst. There is diffuse dilatation of the common bile duct similar increased compared to prior CT, likely reservoir state status post cholecystectomy. There is distention of the partially imaged urinary bladder. There is sigmoid diverticulosis.     Impression: No acute fracture. Chronic and incidental ancillary findings detailed above. Electronically Signed: Wilfrid Fish MD  7/5/2024 9:50 PM EDT  Workstation ID: [id number]    CT Thoracic Spine Without Contrast    Result Date: 7/5/2024  CT THORACIC SPINE WO CONTRAST, CT LUMBAR SPINE WO CONTRAST Date of Exam: 7/5/2024 9:12 PM EDT Indication: Back pain after fall. Comparison: CTA chest 11/18/2021 Technique: Axial CT images were obtained of the thoracic and lumbar spine without contrast administration.  Sagittal and coronal reconstructions were performed.  Automated exposure control and iterative reconstruction methods were used. Findings: Thoracic spine: There is mild S-shaped thoracic scoliosis. There is accentuation of the mid thoracic kyphosis. No  traumatic subluxation or significant spondylolisthesis. Accounting for bony demineralization, no evidence of acute displaced fracture. Similar mild chronic anterior wedging of the T6 vertebral body which is unchanged since 2021 CT. Otherwise the vertebral body heights are normal. There is mild to moderate degenerative disc disease. The paravertebral soft tissues are unremarkable. No acute or suspicious findings within the chest or upper abdomen. There are coronary artery calcifications. Lumbar spine: There is degenerative 5 mm anterolisthesis of L5 on S1, and trace degenerative anterolisthesis of L4 and L5. No traumatic subluxation. The bones are demineralized. Accounting for this, no evidence of acute displaced fracture. The vertebral body heights appear preserved. No acute osseous findings in the partially imaged pelvis. There is degenerative disc disease appearing moderate to severe at L5-S1. There is moderate to severe facet arthropathy in the lower lumbar spine. There is moderate to severe spinal canal stenosis at L4-L5 on the basis of posterior disc bulge and ligamentum flavum hypertrophy and facet arthropathy. No acute findings within the partially imaged portions of the posterior abdomen and pelvis. There is a rim calcified 1.1 cm lesion of the left kidney which may reflect rim calcified cyst. There is diffuse dilatation of the common bile duct similar increased compared to prior CT, likely reservoir state status post cholecystectomy. There is distention of the partially imaged urinary bladder. There is sigmoid diverticulosis.     Impression: No acute fracture. Chronic and incidental ancillary findings detailed above. Electronically Signed: Wilfrid Fish MD  7/5/2024 9:50 PM EDT  Workstation ID: [id number]    CT Head Without Contrast    Result Date: 7/5/2024  CT HEAD WO CONTRAST Date of Exam: 7/5/2024 8:54 PM EDT Indication: Couple falls hit head. Comparison: May 23, 2024 Technique: Axial CT images were  obtained of the head without contrast administration.  Coronal reconstructions were performed.  Automated exposure control and iterative reconstruction methods were used. Findings: There is global atrophy. There are white matter changes which could reflect more chronic small vessel ischemic change. There is atherosclerotic vascular calcification. There is fusiform dilatation of the supraclinoid portion of the distal left internal carotid artery. This has been suggested. There is improved appearance to the left maxillary sinus as compared to the prior study. There is some left maxillary sinus disease.     Impression: 1.Chronic senescent changes. 2.Improved appearance left maxillary sinus with some residual underlying sinus disease. 3.Atherosclerotic vascular calcification Electronically Signed: Seth Guerrero MD  7/5/2024 9:06 PM EDT  Workstation ID: [id number]    XR Chest 1 View    Result Date: 7/5/2024  XR CHEST 1 VW Date of Exam: 7/5/2024 8:35 PM EDT Indication: Weakness, recurrent falls Comparison: Chest radiograph dated 5/23/2024 Findings: The cardiomediastinal silhouette is within normal limits. Pulmonary vascularity appears normal. There is no acute airspace consolidation or pleural effusion. There is no pneumothorax. There are degenerative changes of thoracic spine. There are surgical clips along the right chest wall.     Impression: 1. No acute cardiopulmonary abnormality. Electronically Signed: Lalit Laraelor  7/5/2024 8:41 PM EDT  Workstation ID: [id number]          Current Medications     Scheduled Meds:  sodium chloride, 10 mL, Intravenous, Q12H           [facility]  07/06/24   00:05 EDT

## 2024-07-06 NOTE — PROGRESS NOTES
[facility] MEDICINE SERVICE  DAILY PROGRESS NOTE    NAME: Kendy Worrell  : 1940  MRN: [id number]      LOS: 0 days     PROVIDER OF SERVICE: Valentina Dobson PA-C    Chief Complaint: Sepsis, unspecified organism    Subjective:     Interval History:  History taken from: patient chart family  Patient Complaints:  feeling weak  Patient Denies:  CP, SOA, abd pain, N/V/D, peripheral edema    Review of Systems:   Review of Systems   Constitutional:  Negative for chills and fever.   Eyes:  Negative for visual disturbance.   Respiratory:  Negative for shortness of breath.    Cardiovascular:  Negative for chest pain and leg swelling.   Gastrointestinal:  Negative for abdominal pain, diarrhea, nausea and vomiting.   Genitourinary:  Negative for dysuria.   Musculoskeletal:  Positive for gait problem.   Neurological:  Positive for weakness and light-headedness. Negative for dizziness, syncope and numbness.   Psychiatric/Behavioral:  Positive for confusion.        Objective:     Vital Signs  Temp:  [97.7 °F (36.5 °C)-98 °F (36.7 °C)] 97.8 °F (36.6 °C)  Heart Rate:  [61-74] 65  Resp:  [12-18] 12  BP: (104-156)/(65-98) 138/83  Flow (L/min):  [2] 2   Body mass index is 27.96 kg/m².    Physical Exam  Physical Exam  Constitutional:       Appearance: Normal appearance.   HENT:      Head: Normocephalic and atraumatic.      Mouth/Throat:      Mouth: Mucous membranes are dry.   Eyes:      Extraocular Movements: Extraocular movements intact.   Cardiovascular:      Rate and Rhythm: Normal rate and regular rhythm.   Pulmonary:      Effort: Pulmonary effort is normal.      Breath sounds: Normal breath sounds.   Abdominal:      General: Abdomen is flat.      Palpations: Abdomen is soft.      Tenderness: There is no abdominal tenderness.   Musculoskeletal:      Cervical back: Normal range of motion.      Right lower leg: No edema.      Left lower leg: No edema.   Skin:     General: Skin is warm.   Neurological:      General: No focal  deficit present.      Mental Status: She is alert.         Scheduled Meds   sodium chloride, 10 mL, Intravenous, Q12H       PRN Meds     aluminum-magnesium hydroxide-simethicone    senna-docusate sodium **AND** polyethylene glycol **AND** bisacodyl **AND** bisacodyl    Calcium Replacement - Follow Nurse / BPA Driven Protocol    Magnesium Standard Dose Replacement - Follow Nurse / BPA Driven Protocol    melatonin    Phosphorus Replacement - Follow Nurse / BPA Driven Protocol    Potassium Replacement - Follow Nurse / BPA Driven Protocol    prochlorperazine    sodium chloride    sodium chloride   Infusions  sodium chloride, 100 mL/hr, Last Rate: 100 mL/hr (07/06/24 0222)          Diagnostic Data    Results from last 7 days   Lab Units 07/06/24  0448   WBC 10*3/mm3 9.04   HEMOGLOBIN g/dL 11.2*   HEMATOCRIT % 34.2   PLATELETS 10*3/mm3 219   GLUCOSE mg/dL 86   CREATININE mg/dL 1.49*   BUN mg/dL 26*   SODIUM mmol/L 135*   POTASSIUM mmol/L 2.6*   AST (SGOT) U/L 29   ALT (SGPT) U/L 13   ALK PHOS U/L 84   BILIRUBIN mg/dL 0.8   ANION GAP mmol/L 14.0       CT Lumbar Spine Without Contrast    Result Date: 7/5/2024  Impression: No acute fracture. Chronic and incidental ancillary findings detailed above. Electronically Signed: Wilfrid Fish MD  7/5/2024 9:50 PM EDT  Workstation ID: [id number]    CT Thoracic Spine Without Contrast    Result Date: 7/5/2024  Impression: No acute fracture. Chronic and incidental ancillary findings detailed above. Electronically Signed: Wilfrid Fish MD  7/5/2024 9:50 PM EDT  Workstation ID: [id number]    CT Head Without Contrast    Result Date: 7/5/2024  Impression: 1.Chronic senescent changes. 2.Improved appearance left maxillary sinus with some residual underlying sinus disease. 3.Atherosclerotic vascular calcification Electronically Signed: Seth Guerrero MD  7/5/2024 9:06 PM EDT  Workstation ID: [id number]    XR Chest 1 View    Result Date: 7/5/2024  Impression: 1. No acute cardiopulmonary  abnormality. Electronically Signed: Lalit Silverman  7/5/2024 8:41 PM EDT  Workstation ID: [id number]       I reviewed the patient's new clinical results.  I reviewed the patient's other test results and agree with the interpretation    Assessment/Plan:     Active and Resolved Problems  Active Hospital Problems    Diagnosis  POA    Frequent falls [R29.6]  Not Applicable    Orthostatic hypotension [I95.1]  Unknown    Hypokalemia [E87.6]  Yes      Resolved Hospital Problems    Diagnosis Date Resolved POA    **Sepsis, unspecified organism [A41.9] 07/06/2024 Yes       Frequent falls  Debility/generalized weakness  -Reviewed CT head, lumbar spine, thoracic spine -no acute findings  -I discussed with patient's daughter at bedside and son over the phone.  Patient lives in an assisted living.  Daughter states that she has had a gradual decline since the start of the year and more frequent falls as of late.  She also states that she has begun noticing pressure ulcers on her sacrum and hips and patient has been having episodes of confusion.  She is concerned about her safety returning to an assisted living.  Hospice has been following her at the assisted living.  They have discussed possible nursing home placement but the patient has been adamant about not going to one.  I discussed with them about getting a PT/OT eval and speaking with CM/SW and Hospice to discuss options which they are agreeable to.  At this time I do not feel she is safe to return to the assisted living as she is high risk for injurious falls particularly because she is on anticoagulation.  -While her falls may primarily be related to her general decline, may also be medication related.  She appears dry on exam and her potassium level is critically low. Hold diuretics and antihypertensives.   -Obtain orthostatic vs  -Continuous IVFs for now, monitor for signs of fluid overload with her hx of HFpEF, CKD  -Initially flagged for sepsis, however, no evidence of  infectious process therefore will d/c abx.    Hypokalemia  -Receiving potassium replacement p.o.  She cannot tolerate IV potassium due to burning.  -Recheck potassium level  -Hold torsemide  -Continuous cardiac monitoring    Chronic kidney disease  -Stable  -Monitor    Hypertension  -Hold antihypertensives for now until OH ruled out    HFpEF  -Appears hypovolemic, diuretics on hold   -BB on hold     Paroxysmal atrial fibrillation  -HR controlled, BB on hold  -Continue Eliquis    Hypothyroidism  -TSH slightly low, will check FT3/FT4   -Continue levothyroxine    Seizures  -Continue Vimpat    H/o CVA  -No focal neurological deficits noted    VTE Prophylaxis:  Pharmacologic VTE prophylaxis orders are signed & held.           Code status is   Code Status and Medical Interventions:   Ordered at: 07/06/24 0004     Medical Intervention Limits:    No intubation (DNI)     Level Of Support Discussed With:    Patient     Code Status (Patient has no pulse and is not breathing):    No CPR (Do Not Attempt to Resuscitate)     Medical Interventions (Patient has pulse or is breathing):    Limited Support       Plan for disposition:discharge location to be determined, expect discharge in 3-4 days    Time: 30 minutes    Signature: Electronically signed by Valentina Dobson PA-C, 07/06/24, 15:40 EDT.  [facility] Hospitalist Team

## 2024-07-06 NOTE — ED PROVIDER NOTES
Subjective   History of Present Illness  84-year-old female with a history of another fall.  Reports she is fallen 4 times in the last month.  The patient has apparently been resistant to higher level of care in the past.  The patient has complained of facial pain and headache in the last 2 days and has reported some subjective fever and chills she denies neck pain or stiffness she has had no reports of cough.  Patient has complained of mid back pain since the fall.  She reports no radicular component to the pain.  Family has not detected any focal neurologic defects or lateralizing neurologic signs      Review of Systems   Constitutional:  Positive for chills, diaphoresis, fatigue and fever.   HENT:  Positive for sinus pressure and sinus pain. Negative for trouble swallowing.    Eyes:  Negative for visual disturbance.   Respiratory:  Positive for chest tightness and shortness of breath.    Musculoskeletal:  Negative for neck pain and neck stiffness.   Neurological:  Positive for syncope. Negative for speech difficulty.   Hematological:  Does not bruise/bleed easily.       Past Medical History:   Diagnosis Date    A-fib     Arthritis     Breast cancer     CHF (congestive heart failure)     CVA (cerebral vascular accident) 09/2019    History of pulmonary embolus (PE)     Hyperlipidemia     Hypertension     Hyperthyroidism     patient has hypothyroidism    Hypothyroidism        Allergies   Allergen Reactions    Aspirin Other (See Comments)     Severe bleeding    Nsaids Other (See Comments)     Severe bleeding       Past Surgical History:   Procedure Laterality Date    BREAST BIOPSY      BREAST SURGERY Right     CHOLECYSTECTOMY      COLON SURGERY      Removed    COLONOSCOPY      COLONOSCOPY N/A 11/3/2022    Procedure: COLONOSCOPY to anastamosis with biopsies and cold snare polypectomy;  Surgeon: Saroj Oakes MD;  Location: [facility] ENDOSCOPY;  Service: Gastroenterology;  Laterality: N/A;  PRe: rectal  bleeding  Post: hemorrhoids, diverticulosis, anastamotic ulcer, polyp, hemostasis clip free-floating    HYSTERECTOMY      MAMMO BILATERAL  2015    MASTECTOMY      TONSILLECTOMY         Family History   Problem Relation Age of Onset    Stroke Mother     Hypertension Mother     Heart disease Father     Cancer Father     Colon polyps Father        Social History     Socioeconomic History    Marital status: Single   Tobacco Use    Smoking status: Former     Current packs/day: 0.00     Types: Cigarettes     Quit date:      Years since quittin.5     Passive exposure: Past    Smokeless tobacco: Never    Tobacco comments:     quit in    Vaping Use    Vaping status: Never Used   Substance and Sexual Activity    Alcohol use: Yes     Alcohol/week: 3.0 standard drinks of alcohol     Types: 3 Shots of liquor per week     Comment: Last drink 3 weeks ago.    Drug use: Never    Sexual activity: Never     Has apparently been resistant to hospice involvement.  Patient has definitely been resistant to higher level of care.  Family feels as if the patient is not completely safe at home in her current arrangements      Objective   Physical Exam  Alert Beat[place] Coma Scale 15   HEENT: Pupils equal and reactive to light. Conjunctivae are not injected. Normal tympanic membranes. Oropharynx and nares are normal.  Frontal and maxillary sinus tenderness to percussion is noted   Neck: Supple. Midline trachea. No JVD. No goiter.   Chest: A few scattered rhonchi bilaterally and equal breath sounds bilaterally, regular rate and rhythm without murmur or rub.   Abdomen: Positive bowel sounds, nontender, nondistended. No rebound or peritoneal signs. No CVA tenderness.   Extremities neuro right-hand-dominant cranial nerves are intact no abnormal reflexes no neglect or drift no clubbing. cyanosis or edema. Motor sensory exam is normal. The full range of motion is intact   Skin: Warm and dry, no rashes or petechia.   Lymphatic: No regional  lymphadenopathy. No calf pain, swelling or Homans sign    Procedures           ED Course      Labs Reviewed   COMPREHENSIVE METABOLIC PANEL - Abnormal; Notable for the following components:       Result Value    Glucose 109 (*)     BUN 29 (*)     Creatinine 1.77 (*)     Sodium 131 (*)     Potassium 2.7 (*)     Chloride 81 (*)     CO2 33.6 (*)     AST (SGOT) 39 (*)     Total Bilirubin 1.4 (*)     Anion Gap 16.4 (*)     eGFR 27.9 (*)     All other components within normal limits    Narrative:     GFR Normal >60  Chronic Kidney Disease <60  Kidney Failure <15    The GFR formula is only valid for adults with stable renal function between ages 18 and 70.   URINALYSIS W/ CULTURE IF INDICATED - Abnormal; Notable for the following components:    Glucose,  mg/dL (1+) (*)     Ketones, UA Trace (*)     All other components within normal limits    Narrative:     In absence of clinical symptoms, the presence of pyuria, bacteria, and/or nitrites on the urinalysis result does not correlate with infection.  Urine microscopic not indicated.   SINGLE HS TROPONIN T - Abnormal; Notable for the following components:    HS Troponin T 39 (*)     All other components within normal limits    Narrative:     High Sensitive Troponin T Reference Range:  <14.0 ng/L- Negative Female for AMI  <22.0 ng/L- Negative Male for AMI  >=14 - Abnormal Female indicating possible myocardial injury.  >=22 - Abnormal Male indicating possible myocardial injury.   Clinicians would have to utilize clinical acumen, EKG, Troponin, and serial changes to determine if it is an Acute Myocardial Infarction or myocardial injury due to an underlying chronic condition.        MAGNESIUM - Abnormal; Notable for the following components:    Magnesium 2.6 (*)     All other components within normal limits   CBC WITH AUTO DIFFERENTIAL - Abnormal; Notable for the following components:    WBC 11.87 (*)     Neutrophil % 83.8 (*)     Lymphocyte % 7.1 (*)     Eosinophil % 0.1  (*)     Neutrophils, Absolute 9.96 (*)     Monocytes, Absolute 0.97 (*)     Immature Grans, Absolute 0.06 (*)     All other components within normal limits   POC LACTATE - Abnormal; Notable for the following components:    Lactate 2.4 (*)     All other components within normal limits   CK - Normal   BLOOD CULTURE   BLOOD CULTURE   RAINBOW DRAW    Narrative:     The following orders were created for panel order [place] Draw.  Procedure                               Abnormality         Status                     ---------                               -----------         ------                     Green Top (Gel)[[id number]]                                  Final result               Lavender Top[[id number]]                                     Final result               Gold Top - SST[[id number]]                                   Final result               Light Blue Top[[id number]]                                   Final result                 Please view results for these tests on the individual orders.   LACTIC ACID, REFLEX   POC LACTATE   GREEN TOP   LAVENDER TOP   GOLD TOP - SST   LIGHT BLUE TOP   CBC AND DIFFERENTIAL    Narrative:     The following orders were created for panel order CBC & Differential.  Procedure                               Abnormality         Status                     ---------                               -----------         ------                     CBC Auto Differential[[id number]]        Abnormal            Final result                 Please view results for these tests on the individual orders.     Medications   cefTRIAXone (ROCEPHIN) 2,000 mg in sodium chloride 0.9 % 100 mL MBP (has no administration in time range)   nitroglycerin (NITROSTAT) ointment 1 inch (1 inch Topical Given 7/5/24 2044)   sepsis fluid NS 0.9 % bolus 2,000 mL (2,000 mL Intravenous New Bag 7/5/24 2041)   cefepime 2000 mg IVPB in 100 mL NS (MBP) (0 mg Intravenous Stopped 7/5/24 2143)     CT Lumbar Spine Without  Contrast    Result Date: 7/5/2024  Impression: No acute fracture. Chronic and incidental ancillary findings detailed above. Electronically Signed: Wilfrid Fish MD  7/5/2024 9:50 PM EDT  Workstation ID: [id number]    CT Thoracic Spine Without Contrast    Result Date: 7/5/2024  Impression: No acute fracture. Chronic and incidental ancillary findings detailed above. Electronically Signed: Wilfrid Fish MD  7/5/2024 9:50 PM EDT  Workstation ID: [id number]    CT Head Without Contrast    Result Date: 7/5/2024  Impression: 1.Chronic senescent changes. 2.Improved appearance left maxillary sinus with some residual underlying sinus disease. 3.Atherosclerotic vascular calcification Electronically Signed: Seth Guerrero MD  7/5/2024 9:06 PM EDT  Workstation ID: [id number]    XR Chest 1 View    Result Date: 7/5/2024  Impression: 1. No acute cardiopulmonary abnormality. Electronically Signed: Lalit Silverman  7/5/2024 8:41 PM EDT  Workstation ID: [id number]                              SEPTIC SHOCK FOCUSED EXAM ATTESTATION    I attest that I have reassessed tissue perfusion after the fluid bolus given.    Mitch Arnold MD  07/05/24  22:34 EDT               Medical Decision Making  Patient was given sepsis bolus, and started on ceftriaxone.  There is no hemodynamic instability Emergency Department level of consciousness was stable [place] Coma Scale remained 15.  Patient's renal function is impaired but stable.  The patient will be admitted for management of potential sepsis and will need case management/PT OT evaluation.  The patient and family are agreeable this plan of treatment    Problems Addressed:  Acute non-recurrent maxillary sinusitis: complicated acute illness or injury  Central stenosis of spinal canal: complicated acute illness or injury  Chronic kidney disease, unspecified CKD stage: complicated acute illness or injury  Lumbar degenerative disc disease: complicated acute illness or injury  Recurrent syncope:  complicated acute illness or injury  Sepsis, due to unspecified organism, unspecified whether acute organ dysfunction present: complicated acute illness or injury  Spondylosis of lumbar region without myelopathy or radiculopathy: complicated acute illness or injury  Spondylosis of thoracic region without myelopathy or radiculopathy: complicated acute illness or injury  Weakness: complicated acute illness or injury    Amount and/or Complexity of Data Reviewed  Labs: ordered.  Radiology: ordered.  ECG/medicine tests: ordered.    Risk  Prescription drug management.  Decision regarding hospitalization.        Final diagnoses:   Sepsis, due to unspecified organism, unspecified whether acute organ dysfunction present   Acute non-recurrent maxillary sinusitis   Weakness   Recurrent syncope   Spondylosis of thoracic region without myelopathy or radiculopathy   Lumbar degenerative disc disease   Spondylosis of lumbar region without myelopathy or radiculopathy   Central stenosis of spinal canal   Chronic kidney disease, unspecified CKD stage       ED Disposition  ED Disposition       ED Disposition   Decision to Admit    Condition   --    Comment   Level of Care: Telemetry [5]   Diagnosis: Sepsis, unspecified organism [[id number]]   Admitting Physician: JULIANN DUFFY [[id number]]   Attending Physician: JULIANN DUFFY [[id number]]                 No follow-up provider specified.       Medication List      No changes were made to your prescriptions during this visit.            Mitch Arnold MD  07/05/24 2234       Mitch Arnold MD  07/05/24 2232

## 2024-07-07 LAB
ALBUMIN SERPL-MCNC: 3.3 G/DL (ref 3.5–5.2)
ALBUMIN/GLOB SERPL: 1.2 G/DL
ALP SERPL-CCNC: 86 U/L (ref 39–117)
ALT SERPL W P-5'-P-CCNC: 12 U/L (ref 1–33)
ANION GAP SERPL CALCULATED.3IONS-SCNC: 9.4 MMOL/L (ref 5–15)
AST SERPL-CCNC: 28 U/L (ref 1–32)
BASOPHILS # BLD AUTO: 0.03 10*3/MM3 (ref 0–0.2)
BASOPHILS NFR BLD AUTO: 0.3 % (ref 0–1.5)
BILIRUB SERPL-MCNC: 0.6 MG/DL (ref 0–1.2)
BUN SERPL-MCNC: 20 MG/DL (ref 8–23)
BUN/CREAT SERPL: 15.7 (ref 7–25)
C AURIS DNA SPEC QL NAA+NON-PROBE: NOT DETECTED
CALCIUM SPEC-SCNC: 9.7 MG/DL (ref 8.6–10.5)
CHLORIDE SERPL-SCNC: 94 MMOL/L (ref 98–107)
CO2 SERPL-SCNC: 31.6 MMOL/L (ref 22–29)
CREAT SERPL-MCNC: 1.27 MG/DL (ref 0.57–1)
DEPRECATED RDW RBC AUTO: 46.3 FL (ref 37–54)
EGFRCR SERPLBLD CKD-EPI 2021: 41.5 ML/MIN/1.73
EOSINOPHIL # BLD AUTO: 0.09 10*3/MM3 (ref 0–0.4)
EOSINOPHIL NFR BLD AUTO: 1 % (ref 0.3–6.2)
ERYTHROCYTE [DISTWIDTH] IN BLOOD BY AUTOMATED COUNT: 14.8 % (ref 12.3–15.4)
GLOBULIN UR ELPH-MCNC: 2.7 GM/DL
GLUCOSE SERPL-MCNC: 127 MG/DL (ref 65–99)
HCT VFR BLD AUTO: 32 % (ref 34–46.6)
HGB BLD-MCNC: 10.7 G/DL (ref 12–15.9)
IMM GRANULOCYTES # BLD AUTO: 0.06 10*3/MM3 (ref 0–0.05)
IMM GRANULOCYTES NFR BLD AUTO: 0.7 % (ref 0–0.5)
LYMPHOCYTES # BLD AUTO: 0.96 10*3/MM3 (ref 0.7–3.1)
LYMPHOCYTES NFR BLD AUTO: 11.1 % (ref 19.6–45.3)
MCH RBC QN AUTO: 29.8 PG (ref 26.6–33)
MCHC RBC AUTO-ENTMCNC: 33.4 G/DL (ref 31.5–35.7)
MCV RBC AUTO: 89.1 FL (ref 79–97)
MONOCYTES # BLD AUTO: 1.06 10*3/MM3 (ref 0.1–0.9)
MONOCYTES NFR BLD AUTO: 12.3 % (ref 5–12)
NEUTROPHILS NFR BLD AUTO: 6.44 10*3/MM3 (ref 1.7–7)
NEUTROPHILS NFR BLD AUTO: 74.6 % (ref 42.7–76)
NRBC BLD AUTO-RTO: 0 /100 WBC (ref 0–0.2)
PLATELET # BLD AUTO: 220 10*3/MM3 (ref 140–450)
PMV BLD AUTO: 10.4 FL (ref 6–12)
POTASSIUM SERPL-SCNC: 2.6 MMOL/L (ref 3.5–5.2)
POTASSIUM SERPL-SCNC: 3.4 MMOL/L (ref 3.5–5.2)
PROT SERPL-MCNC: 6 G/DL (ref 6–8.5)
RBC # BLD AUTO: 3.59 10*6/MM3 (ref 3.77–5.28)
SODIUM SERPL-SCNC: 135 MMOL/L (ref 136–145)
WBC NRBC COR # BLD AUTO: 8.64 10*3/MM3 (ref 3.4–10.8)

## 2024-07-07 PROCEDURE — 97162 PT EVAL MOD COMPLEX 30 MIN: CPT | Performed by: PHYSICAL THERAPIST

## 2024-07-07 PROCEDURE — 84132 ASSAY OF SERUM POTASSIUM: CPT

## 2024-07-07 PROCEDURE — 80053 COMPREHEN METABOLIC PANEL: CPT | Performed by: NURSE PRACTITIONER

## 2024-07-07 PROCEDURE — 25810000003 SODIUM CHLORIDE 0.9 % SOLUTION: Performed by: NURSE PRACTITIONER

## 2024-07-07 PROCEDURE — 97530 THERAPEUTIC ACTIVITIES: CPT | Performed by: PHYSICAL THERAPIST

## 2024-07-07 PROCEDURE — 25010000002 PROCHLORPERAZINE 10 MG/2ML SOLUTION: Performed by: NURSE PRACTITIONER

## 2024-07-07 RX ORDER — NYSTATIN 100000 [USP'U]/G
POWDER TOPICAL 3 TIMES DAILY PRN
Status: DISCONTINUED | OUTPATIENT
Start: 2024-07-07 | End: 2024-07-11 | Stop reason: HOSPADM

## 2024-07-07 RX ORDER — MELATONIN
1000 DAILY
Status: DISCONTINUED | OUTPATIENT
Start: 2024-07-08 | End: 2024-07-11 | Stop reason: HOSPADM

## 2024-07-07 RX ORDER — ASCORBIC ACID 500 MG
1000 TABLET ORAL DAILY
Status: DISCONTINUED | OUTPATIENT
Start: 2024-07-07 | End: 2024-07-11 | Stop reason: HOSPADM

## 2024-07-07 RX ORDER — LEVOTHYROXINE SODIUM 88 UG/1
88 TABLET ORAL
Status: DISCONTINUED | OUTPATIENT
Start: 2024-07-08 | End: 2024-07-08

## 2024-07-07 RX ORDER — POTASSIUM CHLORIDE 1.5 G/1.58G
40 POWDER, FOR SOLUTION ORAL ONCE
Status: COMPLETED | OUTPATIENT
Start: 2024-07-07 | End: 2024-07-07

## 2024-07-07 RX ORDER — RANOLAZINE 500 MG/1
500 TABLET, EXTENDED RELEASE ORAL 2 TIMES DAILY
Status: DISCONTINUED | OUTPATIENT
Start: 2024-07-07 | End: 2024-07-11 | Stop reason: HOSPADM

## 2024-07-07 RX ORDER — MIDODRINE HYDROCHLORIDE 5 MG/1
5 TABLET ORAL
Status: DISCONTINUED | OUTPATIENT
Start: 2024-07-07 | End: 2024-07-09

## 2024-07-07 RX ORDER — POTASSIUM CHLORIDE 1.5 G/1.58G
40 POWDER, FOR SOLUTION ORAL 3 TIMES DAILY
Status: DISCONTINUED | OUTPATIENT
Start: 2024-07-07 | End: 2024-07-11 | Stop reason: HOSPADM

## 2024-07-07 RX ORDER — ACETAMINOPHEN 325 MG/1
650 TABLET ORAL EVERY 6 HOURS PRN
Status: DISCONTINUED | OUTPATIENT
Start: 2024-07-07 | End: 2024-07-07 | Stop reason: SDUPTHER

## 2024-07-07 RX ORDER — LACOSAMIDE 100 MG/1
50 TABLET ORAL DAILY
Status: DISCONTINUED | OUTPATIENT
Start: 2024-07-07 | End: 2024-07-11 | Stop reason: HOSPADM

## 2024-07-07 RX ORDER — POTASSIUM CHLORIDE 1.5 G/1.58G
40 POWDER, FOR SOLUTION ORAL 2 TIMES DAILY
Status: DISCONTINUED | OUTPATIENT
Start: 2024-07-07 | End: 2024-07-07

## 2024-07-07 RX ORDER — LANOLIN ALCOHOL/MO/W.PET/CERES
1000 CREAM (GRAM) TOPICAL DAILY
Status: DISCONTINUED | OUTPATIENT
Start: 2024-07-07 | End: 2024-07-11 | Stop reason: HOSPADM

## 2024-07-07 RX ORDER — ALBUTEROL SULFATE 90 UG/1
2 AEROSOL, METERED RESPIRATORY (INHALATION) EVERY 4 HOURS PRN
Status: DISCONTINUED | OUTPATIENT
Start: 2024-07-07 | End: 2024-07-11 | Stop reason: HOSPADM

## 2024-07-07 RX ORDER — POTASSIUM CHLORIDE 1.5 G/1.58G
40 POWDER, FOR SOLUTION ORAL 3 TIMES DAILY
Status: DISCONTINUED | OUTPATIENT
Start: 2024-07-07 | End: 2024-07-07

## 2024-07-07 RX ORDER — ATORVASTATIN CALCIUM 10 MG/1
10 TABLET, FILM COATED ORAL NIGHTLY
Status: DISCONTINUED | OUTPATIENT
Start: 2024-07-07 | End: 2024-07-11 | Stop reason: HOSPADM

## 2024-07-07 RX ORDER — MULTIPLE VITAMINS W/ MINERALS TAB 9MG-400MCG
1 TAB ORAL DAILY
Status: DISCONTINUED | OUTPATIENT
Start: 2024-07-08 | End: 2024-07-11 | Stop reason: HOSPADM

## 2024-07-07 RX ADMIN — APIXABAN 5 MG: 5 TABLET, FILM COATED ORAL at 22:38

## 2024-07-07 RX ADMIN — MIDODRINE HYDROCHLORIDE 5 MG: 5 TABLET ORAL at 17:51

## 2024-07-07 RX ADMIN — RANOLAZINE 500 MG: 500 TABLET, EXTENDED RELEASE ORAL at 22:38

## 2024-07-07 RX ADMIN — SODIUM CHLORIDE 100 ML/HR: 9 INJECTION, SOLUTION INTRAVENOUS at 02:33

## 2024-07-07 RX ADMIN — Medication 10 ML: at 22:39

## 2024-07-07 RX ADMIN — OXYCODONE HYDROCHLORIDE AND ACETAMINOPHEN 1000 MG: 500 TABLET ORAL at 22:38

## 2024-07-07 RX ADMIN — LACOSAMIDE 50 MG: 100 TABLET, FILM COATED ORAL at 22:38

## 2024-07-07 RX ADMIN — CYANOCOBALAMIN TAB 1000 MCG 1000 MCG: 1000 TAB at 22:38

## 2024-07-07 RX ADMIN — POTASSIUM CHLORIDE 40 MEQ: 1.5 POWDER, FOR SOLUTION ORAL at 17:51

## 2024-07-07 RX ADMIN — POTASSIUM CHLORIDE 40 MEQ: 1.5 POWDER, FOR SOLUTION ORAL at 13:46

## 2024-07-07 RX ADMIN — PROCHLORPERAZINE EDISYLATE 5 MG: 5 INJECTION INTRAMUSCULAR; INTRAVENOUS at 23:36

## 2024-07-07 RX ADMIN — ATORVASTATIN CALCIUM 10 MG: 10 TABLET, FILM COATED ORAL at 22:38

## 2024-07-07 NOTE — THERAPY EVALUATION
Patient Name: Kendy Worrell  : 1940    MRN: [id number]                              Today's Date: 2024       Admit Date: 2024    Visit Dx:     ICD-10-CM ICD-9-CM   1. Sepsis, due to unspecified organism, unspecified whether acute organ dysfunction present  A41.9 038.9     995.91   2. Acute non-recurrent maxillary sinusitis  J01.00 461.0   3. Weakness  R53.1 780.79   4. Recurrent syncope  R55 780.2   5. Spondylosis of thoracic region without myelopathy or radiculopathy  M47.814 721.2   6. Lumbar degenerative disc disease  M51.36 722.52   7. Spondylosis of lumbar region without myelopathy or radiculopathy  M47.816 721.3   8. Central stenosis of spinal canal  M48.00 724.00   9. Chronic kidney disease, unspecified CKD stage  N18.9 585.9     Patient Active Problem List   Diagnosis    Essential hypertension    Acquired hypothyroidism    Coronary artery disease involving native coronary artery of native heart without angina pectoris    Hyperlipidemia    Arthritis    Skin lesion of chest wall    NSTEMI (non-ST elevated myocardial infarction)    Supraventricular tachycardia    Normal cardiac stress test    Gastrointestinal bleed    Angina effort    Morbidly obese    Bilateral pulmonary embolism    New onset a-fib    UTI (urinary tract infection)    Hypokalemia    Chronic diastolic CHF (congestive heart failure)    Acute ischemic stroke    Chest pain    GI bleed    Closed fibular fracture    Anemia    Hyponatremia    Orthostatic dizziness    Frequent falls    Generalized weakness     Past Medical History:   Diagnosis Date    A-fib     Arthritis     Breast cancer     CHF (congestive heart failure)     CVA (cerebral vascular accident) 2019    History of pulmonary embolus (PE)     Hyperlipidemia     Hypertension     Hyperthyroidism     patient has hypothyroidism    Hypothyroidism      Past Surgical History:   Procedure Laterality Date    BREAST BIOPSY      BREAST SURGERY Right     CHOLECYSTECTOMY      COLON  SURGERY      Removed    COLONOSCOPY      COLONOSCOPY N/A 11/3/2022    Procedure: COLONOSCOPY to anastamosis with biopsies and cold snare polypectomy;  Surgeon: Saroj Oakes MD;  Location: [facility] ENDOSCOPY;  Service: Gastroenterology;  Laterality: N/A;  PRe: rectal bleeding  Post: hemorrhoids, diverticulosis, anastamotic ulcer, polyp, hemostasis clip free-floating    HYSTERECTOMY      MAMMO BILATERAL  2015    MASTECTOMY      TONSILLECTOMY        General Information       Row Name 07/07/24 1203          Physical Therapy Time and Intention    Document Type evaluation  -EJ     Mode of Treatment physical therapy  -EJ       Row Name 07/07/24 1203          General Information    Patient Profile Reviewed yes  -EJ     Prior Level of Function independent:;bed mobility;transfer;gait;min assist:;ADL's;bathing;dressing  Per pt she typically was independent.  Sometimes would need assist with bathing/dressing.  Per pt's daughter, pt would not always ask for help when she needed it.  Pt reports she used a rollator to ambulate with.  -EJ     Existing Precautions/Restrictions fall;oxygen therapy device and L/min  No O2 at baseline, but currently on 2 L NC.  -EJ     Barriers to Rehab medically complex  -EJ       Row Name 07/07/24 1203          Living Environment    People in Home other (see comments)  Assisted Living at [facility]  -EJ       Row Name 07/07/24 1203          Home Main Entrance    Number of Stairs, Main Entrance none  -EJ       Row Name 07/07/24 1203          Stairs Within Home, Primary    Number of Stairs, Within Home, Primary none  -EJ       Row Name 07/07/24 1203          Cognition    Orientation Status (Cognition) oriented x 4  -EJ       Row Name 07/07/24 1203          Safety Issues, Functional Mobility    Safety Issues Affecting Function (Mobility) awareness of need for assistance;safety precautions follow-through/compliance  -EJ     Impairments Affecting Function (Mobility) balance;endurance/activity  tolerance;pain;strength;shortness of breath;range of motion (ROM)  -               User Key  (r) = Recorded By, (t) = Taken By, (c) = Cosigned By      Initials Name Provider Type    Kristen Bah, PT Physical Therapist                   Mobility       Row Name 07/07/24 1206          Bed Mobility    Bed Mobility bed mobility (all) activities  -EJ     All Activities, [place] (Bed Mobility) moderate assist (50% patient effort);2 person assist  -EJ     Assistive Device (Bed Mobility) draw sheet;head of bed elevated;bed rails  -       Row Name 07/07/24 1206          Bed-Chair Transfer    Bed-Chair [place] (Transfers) moderate assist (50% patient effort);1 person assist  -       Row Name 07/07/24 1206          Sit-Stand Transfer    Sit-Stand [place] (Transfers) minimum assist (75% patient effort);moderate assist (50% patient effort);1 person assist  -     Assistive Device (Sit-Stand Transfers) walker, front-wheeled  -       Row Name 07/07/24 1206          Gait/Stairs (Locomotion)    [place] Level (Gait) not tested;unable to assess  -EJ     Patient was able to Ambulate no, other medical factors prevent ambulation  -EJ     Reason Patient was unable to Ambulate Hypotension;Excessive Weakness  -EJ               User Key  (r) = Recorded By, (t) = Taken By, (c) = Cosigned By      Initials Name Provider Type    Kristen Bah, PT Physical Therapist                   Obj/Interventions       Row Name 07/07/24 1207          Range of Motion Comprehensive    Comment, General Range of Motion Pt with global AROM limitations in BLE and trunk.  Pt limited due to weakness, swelling in BLE, and pain in her thoracolumbar spine region.  -       Row Name 07/07/24 1207          Strength Comprehensive (MMT)    Comment, General Manual Muscle Testing (MMT) Assessment Global weakness present, 3- to 3/5.  -       Row Name 07/07/24 1207          Motor Skills    Motor Skills functional endurance  -      Functional Endurance Poor+ to Fair-  -       Row Name 07/07/24 1207          Balance    Balance Assessment sitting static balance;sitting dynamic balance;standing static balance;sit to stand dynamic balance;standing dynamic balance  -EJ     Static Sitting Balance supervision  -EJ     Dynamic Sitting Balance standby assist  -EJ     Position, Sitting Balance sitting edge of bed;unsupported  -EJ     Sit to Stand Dynamic Balance minimal assist;moderate assist  -EJ     Static Standing Balance contact guard;minimal assist  -EJ     Dynamic Standing Balance minimal assist;moderate assist  -EJ     Position/Device Used, Standing Balance supported;other (see comments)  FWW used for sit to stand, SPS via therapist assist for bed to chair.  -EJ               User Key  (r) = Recorded By, (t) = Taken By, (c) = Cosigned By      Initials Name Provider Type    EJ Kristen Moncada, PT Physical Therapist                   Goals/Plan       Row Name 07/07/24 1219          Bed Mobility Goal 1 (PT)    Activity/Assistive Device (Bed Mobility Goal 1, PT) bed mobility activities, all  -EJ     [place] Level/Cues Needed (Bed Mobility Goal 1, PT) minimum assist (75% or more patient effort)  -EJ     Time Frame (Bed Mobility Goal 1, PT) long term goal (LTG);2 weeks  -       Row Name 07/07/24 1219          Transfer Goal 1 (PT)    Activity/Assistive Device (Transfer Goal 1, PT) transfers, all;walker, rolling  -EJ     [place] Level/Cues Needed (Transfer Goal 1, PT) contact guard required  -EJ     Time Frame (Transfer Goal 1, PT) long term goal (LTG);2 weeks  -       Row Name 07/07/24 1219          Gait Training Goal 1 (PT)    Activity/Assistive Device (Gait Training Goal 1, PT) gait (walking locomotion);walker, rolling  -EJ     [place] Level (Gait Training Goal 1, PT) minimum assist (75% or more patient effort)  -EJ     Distance (Gait Training Goal 1, PT) 20  -EJ     Time Frame (Gait Training Goal 1, PT) long term goal (LTG);2  weeks  -       Row Name 07/07/24 1219          Therapy Assessment/Plan (PT)    Planned Therapy Interventions (PT) balance training;bed mobility training;gait training;postural re-education;patient/family education;neuromuscular re-education;strengthening;transfer training;ROM (range of motion)  -               User Key  (r) = Recorded By, (t) = Taken By, (c) = Cosigned By      Initials Name Provider Type     Kristen Moncada, PT Physical Therapist                   Clinical Impression       Row Name 07/07/24 1209          Pain    Additional Documentation Pain Scale: FACES Pre/Post-Treatment (Group)  -       Row Name 07/07/24 1209          Pain Scale: FACES Pre/Post-Treatment    Pain: FACES Scale, Pretreatment 2-->hurts little bit  while in bed not moving  -     Posttreatment Pain Rating 8-->hurts whole lot  during activity  -     Pain Location lower  -     Pain Location - back  -       Row Name 07/07/24 1209          Plan of Care Review    Plan of Care Reviewed With patient;daughter  -     Outcome Evaluation Patient is an 85 y/o F who was admitted to [facility] on 7/5/24 due to falls (reports 4 falls with in 1 month).  Pt presented to ED with facial pain, headache, fever, chills, and back pain.  All imaging of hips/spine/head all negative for fx or acute findings.  Admitted to hospital for management of potential sepsis.  PMHx includes A-fib, breast CA, CHF, CVA, PE, HTN.  At baseline pt lives in assisted living at [facility].  She reports using a rollator for ambulation, and reports independence with getting around her room.  She reports some assist at times for bathing/dressing.  Per pt's daughter, pt does not always ask for help when she needs it.  During today's evaluation pt required mod A x2 for supine to sit at EOB, min to mod A for sit to stand using FWW.  Pt limited in ambulation due to orthostatic hypotension.  Pt non-symptomatic with supine to sit, but with standing becomes dizzy, and per pt  "\"fuzzy.\"  Required seated rest break to improve BP prior to SPS performed with mod A x1 via therapist assist as BP dropped to 80's/50's.  Once pt seated, BP monitored and returned to safe range.  Family present, and nursing notified.  At this time, PT is recommending pt d/c to SNF for improvement in her strength, mobility, and balance.  PT will continue to follow pt in acute setting.  -EJ       Row Name 07/07/24 1209          Therapy Assessment/Plan (PT)    Criteria for Skilled Interventions Met (PT) yes;skilled treatment is necessary  -EJ     Therapy Frequency (PT) 5 times/wk  -EJ     Predicted Duration of Therapy Intervention (PT) until discharge  -EJ       Row Name 07/07/24 1209          Vital Signs    Pre Systolic BP Rehab 135  -EJ     Pre Treatment Diastolic BP 73  -EJ     Intra Systolic BP Rehab 115  -EJ     Intra Treatment Diastolic BP 69  -EJ     Post Systolic BP Rehab 107  -EJ     Post Treatment Diastolic BP 72  -EJ     Pre Patient Position Supine  -EJ     Intra Patient Position Sitting  -EJ     Post Patient Position Sitting  -EJ       Row Name 07/07/24 1209          Positioning and Restraints    Pre-Treatment Position in bed  -EJ     Post Treatment Position chair  -EJ     In Chair sitting;call light within reach;notified nsg;encouraged to call for assist;exit alarm on;with family/caregiver  -EJ               User Key  (r) = Recorded By, (t) = Taken By, (c) = Cosigned By      Initials Name Provider Type    Kristen Bah, PT Physical Therapist                   Outcome Measures       Row Name 07/07/24 1220 07/07/24 0800       How much help from another person do you currently need...    Turning from your back to your side while in flat bed without using bedrails? 2  -EJ 2  -RA    Moving from lying on back to sitting on the side of a flat bed without bedrails? 2  -EJ 2  -RA    Moving to and from a bed to a chair (including a wheelchair)? 2  -EJ 2  -RA    Standing up from a chair using your arms (e.g., " wheelchair, bedside chair)? 2  -EJ 2  -RA    Climbing 3-5 steps with a railing? 1  -EJ 2  -RA    To walk in hospital room? 2  -EJ 2  -RA    AM-PAC 6 Clicks Score (PT) 11  -EJ 12  -RA    Highest Level of Mobility Goal 4 --> Transfer to chair/commode  -EJ 4 --> Transfer to chair/commode  -RA      Row Name 07/07/24 1220          Functional Assessment    Outcome Measure Options AM-PAC 6 Clicks Basic Mobility (PT)  -               User Key  (r) = Recorded By, (t) = Taken By, (c) = Cosigned By      Initials Name Provider Type    EJ Kristen Moncada, PT Physical Therapist    Homero Hernandez, RN Registered Nurse                                 Physical Therapy Education       Title: PT OT SLP Therapies (In Progress)       Topic: Physical Therapy (In Progress)       Point: Mobility training (Done)       Learning Progress Summary             Patient Acceptance, E, VU,NR by  at 7/7/2024 1221   Family Acceptance, E, VU,NR by  at 7/7/2024 1221                         Point: Home exercise program (Not Started)       Learner Progress:  Not documented in this visit.              Point: Body mechanics (Done)       Learning Progress Summary             Patient Acceptance, E, VU,NR by  at 7/7/2024 1221   Family Acceptance, E, VU,NR by  at 7/7/2024 1221                         Point: Precautions (Done)       Learning Progress Summary             Patient Acceptance, E, VU,NR by  at 7/7/2024 1221   Family Acceptance, E, VU,NR by  at 7/7/2024 1221                                         User Key       Initials Effective Dates Name Provider Type [facility] 06/03/24 -  Kristen Moncada, PT Physical Therapist PT                  PT Recommendation and Plan  Planned Therapy Interventions (PT): balance training, bed mobility training, gait training, postural re-education, patient/family education, neuromuscular re-education, strengthening, transfer training, ROM (range of motion)  Plan of Care Reviewed With: patient,  "daughter  Outcome Evaluation: Patient is an 83 y/o F who was admitted to [facility] on 7/5/24 due to falls (reports 4 falls with in 1 month).  Pt presented to ED with facial pain, headache, fever, chills, and back pain.  All imaging of hips/spine/head all negative for fx or acute findings.  Admitted to hospital for management of potential sepsis.  PMHx includes A-fib, breast CA, CHF, CVA, PE, HTN.  At baseline pt lives in assisted living at [facility].  She reports using a rollator for ambulation, and reports independence with getting around her room.  She reports some assist at times for bathing/dressing.  Per pt's daughter, pt does not always ask for help when she needs it.  During today's evaluation pt required mod A x2 for supine to sit at EOB, min to mod A for sit to stand using FWW.  Pt limited in ambulation due to orthostatic hypotension.  Pt non-symptomatic with supine to sit, but with standing becomes dizzy, and per pt \"fuzzy.\"  Required seated rest break to improve BP prior to SPS performed with mod A x1 via therapist assist as BP dropped to 80's/50's.  Once pt seated, BP monitored and returned to safe range.  Family present, and nursing notified.  At this time, PT is recommending pt d/c to SNF for improvement in her strength, mobility, and balance.  PT will continue to follow pt in acute setting.     Time Calculation:         PT Charges       Row Name 07/07/24 1221             Time Calculation    Start Time 0954  -EJ      Stop Time 1040  -EJ      Time Calculation (min) 46 min  -EJ      PT Received On 07/07/24  -EJ      PT - Next Appointment 07/08/24  -EJ      PT Goal Re-Cert Due Date 07/21/24  -EJ         Time Calculation- PT    Total Timed Code Minutes- PT 20 minute(s)  -EJ                User Key  (r) = Recorded By, (t) = Taken By, (c) = Cosigned By      Initials Name Provider Type    Kristen Bah, PT Physical Therapist                  Therapy Charges for Today       Code Description Service Date " Service Provider Modifiers Qty    30860896552 HC PT THERAPEUTIC ACT EA 15 MIN 7/7/2024 Kristen Moncada, PT GP 1    80978672015 HC PT EVAL MOD COMPLEXITY 4 7/7/2024 Kristen Moncada, PT GP 1            PT G-Codes  Outcome Measure Options: AM-PAC 6 Clicks Basic Mobility (PT)  AM-PAC 6 Clicks Score (PT): 11  PT Discharge Summary  Anticipated Discharge Disposition (PT): skilled nursing facility    Kristen Moncada, PT  7/7/2024

## 2024-07-07 NOTE — PLAN OF CARE
Goal Outcome Evaluation:              Outcome Evaluation: Pt up to chair today at lunch time, Potassium being replaced for critical 2.6 value. Pt otherwise expresses feeling better.

## 2024-07-07 NOTE — PROGRESS NOTES
.    [facility] MEDICINE SERVICE  DAILY PROGRESS NOTE    NAME: Kendy Worrell  : 1940  MRN: [id number]      LOS: 1 day     PROVIDER OF SERVICE: Arcelia Robertson MD    Chief Complaint: Sepsis, unspecified organism    Subjective:     Interval History:  History taken from: patient  Patient seen and examined at bedside this morning.  She was attempting to work with PT to get out of bed and noted to have positive orthostatic hypotension.  She reports feeling the same as yesterday.  Says appetite is fair and has not yet had a bowel movement.    Review of Systems:   Review of Systems Negative except as mentioned above    Objective:     Vital Signs  Temp:  [97.8 °F (36.6 °C)-98.1 °F (36.7 °C)] 98.1 °F (36.7 °C)  Heart Rate:  [65-84] 84  Resp:  [12-21] 20  BP: (109-138)/(66-83) 129/72  Flow (L/min):  [2] 2   Body mass index is 27.96 kg/m².    Physical Exam  Physical Exam  Constitutional:       Appearance: Normal appearance.   HENT:      Head: Normocephalic and atraumatic.      Mouth/Throat:      Mouth: Mucous membranes are dry.   Eyes:      Extraocular Movements: Extraocular movements intact.   Cardiovascular:      Rate and Rhythm: Normal rate and regular rhythm.   Pulmonary:      Effort: Pulmonary effort is normal.      Breath sounds: Normal breath sounds.   Abdominal:      General: Abdomen is flat.      Palpations: Abdomen is soft.      Tenderness: There is no abdominal tenderness.   Musculoskeletal:      Cervical back: Normal range of motion.      Right lower leg: No edema.      Left lower leg: No edema.   Skin:     General: Skin is warm.   Neurological:      General: No focal deficit present.      Mental Status: She is alert.        Scheduled Meds   sodium chloride, 10 mL, Intravenous, Q12H       PRN Meds     acetaminophen    aluminum-magnesium hydroxide-simethicone    senna-docusate sodium **AND** polyethylene glycol **AND** bisacodyl **AND** bisacodyl    Calcium Replacement - Follow Nurse / BPA Driven  Protocol    Magnesium Standard Dose Replacement - Follow Nurse / BPA Driven Protocol    melatonin    Phosphorus Replacement - Follow Nurse / BPA Driven Protocol    Potassium Replacement - Follow Nurse / BPA Driven Protocol    prochlorperazine    sodium chloride    sodium chloride   Infusions         Diagnostic Data    Results from last 7 days   Lab Units 07/07/24  1200 07/06/24  2359   WBC 10*3/mm3  --  8.64   HEMOGLOBIN g/dL  --  10.7*   HEMATOCRIT %  --  32.0*   PLATELETS 10*3/mm3  --  220   GLUCOSE mg/dL  --  127*   CREATININE mg/dL  --  1.27*   BUN mg/dL  --  20   SODIUM mmol/L  --  135*   POTASSIUM mmol/L 2.6* 3.4*   AST (SGOT) U/L  --  28   ALT (SGPT) U/L  --  12   ALK PHOS U/L  --  86   BILIRUBIN mg/dL  --  0.6   ANION GAP mmol/L  --  9.4       CT Lumbar Spine Without Contrast    Result Date: 7/5/2024  Impression: No acute fracture. Chronic and incidental ancillary findings detailed above. Electronically Signed: Wilfrid Fish MD  7/5/2024 9:50 PM EDT  Workstation ID: [id number]    CT Thoracic Spine Without Contrast    Result Date: 7/5/2024  Impression: No acute fracture. Chronic and incidental ancillary findings detailed above. Electronically Signed: Wilfrid Fish MD  7/5/2024 9:50 PM EDT  Workstation ID: [id number]    CT Head Without Contrast    Result Date: 7/5/2024  Impression: 1.Chronic senescent changes. 2.Improved appearance left maxillary sinus with some residual underlying sinus disease. 3.Atherosclerotic vascular calcification Electronically Signed: Seth Guerrero MD  7/5/2024 9:06 PM EDT  Workstation ID: [id number]    XR Chest 1 View    Result Date: 7/5/2024  Impression: 1. No acute cardiopulmonary abnormality. Electronically Signed: Lalit Silverman  7/5/2024 8:41 PM EDT  Workstation ID: [id number]       I reviewed the patient's new clinical results.    Assessment/Plan:     Active and Resolved Problems  Active Hospital Problems    Diagnosis  POA    Frequent falls [R29.6]  Not Applicable    Generalized  weakness [R53.1]  Yes    Hypokalemia [E87.6]  Yes      Resolved Hospital Problems    Diagnosis Date Resolved POA    **Sepsis, unspecified organism [A41.9] 07/06/2024 Yes    Orthostatic hypotension [I95.1] 07/06/2024 Unknown       Frequent falls  Debility/generalized weakness  -Reviewed CT head, lumbar spine, thoracic spine -no acute findings  -+ ve orthostatic hypotension, will initiate midodrine.  -Continuous IVFs for now, monitor for signs of fluid overload with her hx of HFpEF, CKD     Hypokalemia  -Receiving potassium replacement p.o.  She cannot tolerate IV potassium due to burning.  -Recheck potassium level  -Hold torsemide  -Continuous cardiac monitoring     Chronic kidney disease  -Stable  -Monitor     Hypertension  -Hold antihypertensives for now     HFpEF  -Appears hypovolemic, diuretics on hold   -BB on hold      Paroxysmal atrial fibrillation  -HR controlled, BB on hold  -Continue Eliquis     Hypothyroidism  -TSH slightly low, will check FT3/FT4   -Continue levothyroxine     Seizures  -Continue Vimpat     H/o CVA  -No focal neurological deficits noted    VTE Prophylaxis:  Pharmacologic VTE prophylaxis orders are signed & held.           Code status is   Code Status and Medical Interventions:   Ordered at: 07/06/24 0004     Medical Intervention Limits:    No intubation (DNI)     Level Of Support Discussed With:    Patient     Code Status (Patient has no pulse and is not breathing):    No CPR (Do Not Attempt to Resuscitate)     Medical Interventions (Patient has pulse or is breathing):    Limited Support       Plan for disposition: SNF in 1-2 days    Time: 30 minutes    Signature: Electronically signed by Arcelia Robertson MD, 07/07/24, 12:50 EDT.  [facility] Hospitalist Team

## 2024-07-07 NOTE — PLAN OF CARE
Goal Outcome Evaluation:  Plan of Care Reviewed With: patient        Progress: no change  Outcome Evaluation: Pt resting in bed, remains a q 2 turn, pt refused Tyleonl for pain, remains a falls risk with bed alarm on, will continue with current orders care plans and monitoring.

## 2024-07-07 NOTE — NURSING NOTE
Patient had requested pain medication.  I contacted provider for pain medication and received an order.  When I took the medication she refused as she stated that snot what she wanted for her pain.

## 2024-07-07 NOTE — PLAN OF CARE
"Goal Outcome Evaluation:  Plan of Care Reviewed With: patient, daughter           Outcome Evaluation: Patient is an 83 y/o F who was admitted to [facility] on 7/5/24 due to falls (reports 4 falls with in 1 month).  Pt presented to ED with facial pain, headache, fever, chills, and back pain.  All imaging of hips/spine/head all negative for fx or acute findings.  Admitted to hospital for management of potential sepsis.  PMHx includes A-fib, breast CA, CHF, CVA, PE, HTN.  At baseline pt lives in assisted living at [facility].  She reports using a rollator for ambulation, and reports independence with getting around her room.  She reports some assist at times for bathing/dressing.  Per pt's daughter, pt does not always ask for help when she needs it.  During today's evaluation pt required mod A x2 for supine to sit at EOB, min to mod A for sit to stand using FWW.  Pt limited in ambulation due to orthostatic hypotension.  Pt non-symptomatic with supine to sit, but with standing becomes dizzy, and per pt \"fuzzy.\"  Required seated rest break to improve BP prior to SPS performed with mod A x1 via therapist assist as BP dropped to 80's/50's.  Once pt seated, BP monitored and returned to safe range.  Family present, and nursing notified.  At this time, PT is recommending pt d/c to SNF for improvement in her strength, mobility, and balance.  PT will continue to follow pt in acute setting.      Anticipated Discharge Disposition (PT): skilled nursing facility                        "

## 2024-07-08 PROBLEM — I48.19 PERSISTENT ATRIAL FIBRILLATION: Status: ACTIVE | Noted: 2019-09-04

## 2024-07-08 LAB
ALBUMIN SERPL-MCNC: 3.3 G/DL (ref 3.5–5.2)
ALBUMIN/GLOB SERPL: 1.1 G/DL
ALP SERPL-CCNC: 83 U/L (ref 39–117)
ALT SERPL W P-5'-P-CCNC: 11 U/L (ref 1–33)
ANION GAP SERPL CALCULATED.3IONS-SCNC: 10.1 MMOL/L (ref 5–15)
AST SERPL-CCNC: 30 U/L (ref 1–32)
BASOPHILS # BLD AUTO: 0.04 10*3/MM3 (ref 0–0.2)
BASOPHILS NFR BLD AUTO: 0.6 % (ref 0–1.5)
BILIRUB SERPL-MCNC: 0.7 MG/DL (ref 0–1.2)
BUN SERPL-MCNC: 13 MG/DL (ref 8–23)
BUN/CREAT SERPL: 12.7 (ref 7–25)
CALCIUM SPEC-SCNC: 9.6 MG/DL (ref 8.6–10.5)
CHLORIDE SERPL-SCNC: 100 MMOL/L (ref 98–107)
CO2 SERPL-SCNC: 23.9 MMOL/L (ref 22–29)
CREAT SERPL-MCNC: 1.02 MG/DL (ref 0.57–1)
DEPRECATED RDW RBC AUTO: 47.9 FL (ref 37–54)
EGFRCR SERPLBLD CKD-EPI 2021: 54 ML/MIN/1.73
EOSINOPHIL # BLD AUTO: 0.13 10*3/MM3 (ref 0–0.4)
EOSINOPHIL NFR BLD AUTO: 1.8 % (ref 0.3–6.2)
ERYTHROCYTE [DISTWIDTH] IN BLOOD BY AUTOMATED COUNT: 15.2 % (ref 12.3–15.4)
GLOBULIN UR ELPH-MCNC: 2.9 GM/DL
GLUCOSE SERPL-MCNC: 112 MG/DL (ref 65–99)
HCT VFR BLD AUTO: 34.7 % (ref 34–46.6)
HGB BLD-MCNC: 11.3 G/DL (ref 12–15.9)
IMM GRANULOCYTES # BLD AUTO: 0.03 10*3/MM3 (ref 0–0.05)
IMM GRANULOCYTES NFR BLD AUTO: 0.4 % (ref 0–0.5)
LYMPHOCYTES # BLD AUTO: 1.12 10*3/MM3 (ref 0.7–3.1)
LYMPHOCYTES NFR BLD AUTO: 15.6 % (ref 19.6–45.3)
MCH RBC QN AUTO: 29.4 PG (ref 26.6–33)
MCHC RBC AUTO-ENTMCNC: 32.6 G/DL (ref 31.5–35.7)
MCV RBC AUTO: 90.1 FL (ref 79–97)
MONOCYTES # BLD AUTO: 0.96 10*3/MM3 (ref 0.1–0.9)
MONOCYTES NFR BLD AUTO: 13.4 % (ref 5–12)
NEUTROPHILS NFR BLD AUTO: 4.89 10*3/MM3 (ref 1.7–7)
NEUTROPHILS NFR BLD AUTO: 68.2 % (ref 42.7–76)
NRBC BLD AUTO-RTO: 0 /100 WBC (ref 0–0.2)
PLATELET # BLD AUTO: 234 10*3/MM3 (ref 140–450)
PMV BLD AUTO: 9.8 FL (ref 6–12)
POTASSIUM SERPL-SCNC: 4 MMOL/L (ref 3.5–5.2)
PROT SERPL-MCNC: 6.2 G/DL (ref 6–8.5)
RBC # BLD AUTO: 3.85 10*6/MM3 (ref 3.77–5.28)
SODIUM SERPL-SCNC: 134 MMOL/L (ref 136–145)
T4 FREE SERPL-MCNC: 1.8 NG/DL (ref 0.93–1.7)
WBC NRBC COR # BLD AUTO: 7.17 10*3/MM3 (ref 3.4–10.8)

## 2024-07-08 PROCEDURE — 85025 COMPLETE CBC W/AUTO DIFF WBC: CPT | Performed by: NURSE PRACTITIONER

## 2024-07-08 PROCEDURE — 97110 THERAPEUTIC EXERCISES: CPT

## 2024-07-08 PROCEDURE — 97166 OT EVAL MOD COMPLEX 45 MIN: CPT

## 2024-07-08 PROCEDURE — 97530 THERAPEUTIC ACTIVITIES: CPT

## 2024-07-08 PROCEDURE — 99222 1ST HOSP IP/OBS MODERATE 55: CPT | Performed by: INTERNAL MEDICINE

## 2024-07-08 PROCEDURE — 84439 ASSAY OF FREE THYROXINE: CPT

## 2024-07-08 PROCEDURE — 92526 ORAL FUNCTION THERAPY: CPT

## 2024-07-08 PROCEDURE — 99222 1ST HOSP IP/OBS MODERATE 55: CPT

## 2024-07-08 RX ORDER — DEXTROSE MONOHYDRATE AND SODIUM CHLORIDE 5; .45 G/100ML; G/100ML
50 INJECTION, SOLUTION INTRAVENOUS CONTINUOUS
Status: DISCONTINUED | OUTPATIENT
Start: 2024-07-08 | End: 2024-07-11 | Stop reason: HOSPADM

## 2024-07-08 RX ORDER — LEVOTHYROXINE SODIUM 0.07 MG/1
75 TABLET ORAL
Status: DISCONTINUED | OUTPATIENT
Start: 2024-07-12 | End: 2024-07-11 | Stop reason: HOSPADM

## 2024-07-08 RX ADMIN — DEXTROSE AND SODIUM CHLORIDE 50 ML/HR: 5; 450 INJECTION, SOLUTION INTRAVENOUS at 15:16

## 2024-07-08 RX ADMIN — RANOLAZINE 500 MG: 500 TABLET, EXTENDED RELEASE ORAL at 21:44

## 2024-07-08 RX ADMIN — Medication 10 ML: at 08:31

## 2024-07-08 RX ADMIN — Medication 1 TABLET: at 08:31

## 2024-07-08 RX ADMIN — LEVOTHYROXINE SODIUM 88 MCG: 0.09 TABLET ORAL at 05:22

## 2024-07-08 RX ADMIN — ATORVASTATIN CALCIUM 10 MG: 10 TABLET, FILM COATED ORAL at 21:44

## 2024-07-08 RX ADMIN — MIDODRINE HYDROCHLORIDE 5 MG: 5 TABLET ORAL at 08:31

## 2024-07-08 RX ADMIN — RANOLAZINE 500 MG: 500 TABLET, EXTENDED RELEASE ORAL at 08:31

## 2024-07-08 RX ADMIN — APIXABAN 5 MG: 5 TABLET, FILM COATED ORAL at 08:31

## 2024-07-08 RX ADMIN — LACOSAMIDE 50 MG: 100 TABLET, FILM COATED ORAL at 08:31

## 2024-07-08 RX ADMIN — POTASSIUM CHLORIDE 40 MEQ: 1.5 POWDER, FOR SOLUTION ORAL at 08:31

## 2024-07-08 RX ADMIN — EMPAGLIFLOZIN 10 MG: 10 TABLET, FILM COATED ORAL at 08:31

## 2024-07-08 RX ADMIN — APIXABAN 5 MG: 5 TABLET, FILM COATED ORAL at 21:44

## 2024-07-08 RX ADMIN — POTASSIUM CHLORIDE 40 MEQ: 1.5 POWDER, FOR SOLUTION ORAL at 17:33

## 2024-07-08 RX ADMIN — Medication 1000 UNITS: at 08:32

## 2024-07-08 RX ADMIN — CYANOCOBALAMIN TAB 1000 MCG 1000 MCG: 1000 TAB at 08:31

## 2024-07-08 RX ADMIN — OXYCODONE HYDROCHLORIDE AND ACETAMINOPHEN 1000 MG: 500 TABLET ORAL at 08:31

## 2024-07-08 RX ADMIN — MIDODRINE HYDROCHLORIDE 5 MG: 5 TABLET ORAL at 17:32

## 2024-07-08 NOTE — PROGRESS NOTES
"Enter Query Response Below      Query Response:   Sepsis was not supported.            If applicable, please update the problem list.       Patient: Kendy Worrell        : 1940  Account: [id number]           Admit Date:         How to Respond to this query:       a. Click New Note     b. Answer query within the yellow box.                c. Update the Problem List, if applicable.      If you have any questions about this query contact me at: [email or web address]     Dr. Robertson:     Patient presents to emergency room with frequent falls.  On arrival, temperature 97.7, heart rate 65, blood pressure 155/98, wbc 11.87, lactate 2.4. History and physical states \"reason for admission sepsis, lactic acidosis, follow cultures, given 2 liters iv fluids in emergency department, given iv cefepime and rocephin in emergency department.\"  WBC 8.64.  Hospitalist progress note states resolved hospital problems sepsis, orthostatic hypotension, continuous iv fluids.    After study, was sepsis clinically supported during this admission?    Sepsis was supported with additional clinical indicators:____________  Sepsis was not supported.  Other- specify_____________  Unable to determine.    By submitting this query, we are merely seeking further clarification of documentation to accurately reflect all conditions that you are monitoring, evaluating, treating or that extend the hospitalization or utilize additional resources of care. Please utilize your independent clinical judgment when addressing the question(s) above.     This query and your response, once completed, will be entered into the legal medical record.    Sincerely,  Mirella WARREN RN BSN   Clinical Documentation Integrity Program   [email or web address]  "

## 2024-07-08 NOTE — PLAN OF CARE
"Goal Outcome Evaluation:      Assessment: Kenyd Worrell is an 83 y/o female admitted to [facility] on 7/5/24 with hospital dx of sepsis, generalized weakness/ deconditioning, and orthostatic hypotension. Reports she has fallen 4 times in the last month. PMHx significant for CAD, paroxysmal atrial fibrillation on Eliquis, congestive heart failure diastolic, and hypertension. Pt required Mod A for supine to sit, Mod Ax2 with RW for STS and sit to supine. Pt orthostatic and symptomatic (118/71 supine, 120/70 sitting EOB, 71/51 standing, 143/80 supine). Nursing notified. Completed supine LE therex. Will continue to follow and progress as tolerated.     Plan/Recommendations:   If medically appropriate, Moderate Intensity Therapy recommended post-acute care. This is recommended as therapy feels the patient would require 3-4 days per week and wouldn't tolerate \"3 hour daily\" rehab intensity. SNF would be the preferred choice. If the patient does not agree to SNF, arrange HH or OP depending on home bound status. If patient is medically complex, consider LTACH. Pt requires no DME at discharge.     Pt desires Skilled Rehab placement at discharge. Pt cooperative; agreeable to therapeutic recommendations and plan of care.                                         "

## 2024-07-08 NOTE — PLAN OF CARE
Goal Outcome Evaluation:  Plan of Care Reviewed With: patient, daughter    Outcome Evaluation: Pt is a 83 y/o female admitted to [facility] on 7/5/24 with hospital dx of sepsis, generalized weakness/deconditioning, and orthostatic hypotension. Reports she has fallen 4 times in the last month. XR chest: (-). CT head: chronic senescent changes. CT thoracic/lumbar spine: (-). PMHx significant for CAD, paroxysmal atrial fibrillation on Eliquis, congestive heart failure diastolic, and hypertension. At baseline pt resides in [place], typically IND for dressing and bathing, PRN assist as needed for I/ADLs, uses rollator for ambulation. Upon eval, pt is A&O X4 with reports of 9/10 pain in lower back with movement. Pt presents on 2L O2 NC at 97%. Pt completes bed mobility and functional transfers with min A x1 this date. BP supine 170/99, /107, after stand pivot t/f to chair 153/102. RO BUE 90*, MMT grossly 3/5. Pt additionally reports dx of macular degeneration. OT will continue to follow for tx as pt has generalized weakness & dec endurance. Recommend SNF upon discharge.    Anticipated Discharge Disposition (OT): skilled nursing facility

## 2024-07-08 NOTE — CASE MANAGEMENT/SOCIAL WORK
Social Work Assessment  [facility]     Patient Name: Kendy Worrell  MRN: [id number]  Today's Date: 7/8/2024    Admit Date: 7/5/2024         Discharge Plan       Row Name 07/08/24 1518       Plan    Plan Comments LSW attempted to meet with pt at bedside and she was asleep. LSW attempted to call pt, unable to leave VM. LSW called Pt's daughter, Ramandeep. Pt lives at Assisted living. pt has two children but Ramandeep provides most support for Pt. Pt has a good support system with family. Pt does not use tobacco or drink alcohol anymore. Pt has some depression regarding loss of independence and medical conditions. Pt will go to a SNF and then back to retirement when medically stable. no further needs. LACE: 14             Psychosocial       Row Name 07/08/24 1514       Values/Beliefs    Spiritual, Cultural Beliefs, Restorationism Practices, Values that Affect Care no       Mental Health    Little Interest or Pleasure in Doing Things 1-->several days    Feeling Down, Depressed or Hopeless 2-->more than half the days       Intellectual Performance WDL    Level of Consciousness Alert       Stress    Do you feel stress - tense, restless, nervous, or anxious, or unable to sleep at night because your mind is troubled all the time - these days? To some exte       Coping/Stress    Major Change/Loss/Stressor loss of independence;medical condition/diagnosis    Patient Personal Strengths expressive of emotions;flexibility;strong support system    Sources of Support adult child(tatyana)    Techniques to [place] with Loss/Stress/Change diversional activities    Reaction to Health Status adjusting;anticipatory grief    Understanding of Condition and Treatment adequate understanding of medical condition;adequate understanding of treatment       Developmental Stage (Eriksson's)    Developmental Stage Stage 8 (65 years-death/Late Adulthood) Integrity vs. Despair       C-SSRS (Recent)    Q1 Wished to be Dead (Past Month) no    Q2 Suicidal Thoughts (Past Month) no     Q6 Suicide Behavior (Lifetime) no       Violence Risk    Feels Like Hurting Others no    Previous Attempt to Harm Others no                   Abuse/Neglect       Row Name 07/08/24 1517       Personal Safety    Feels Unsafe at Home or Work/School no    Feels Threatened by Someone no    Does Anyone Try to Keep You From Having Contact with Others or Doing Things Outside Your Home? no    Physical Signs of Abuse Present no                   Legal       Row Name 07/08/24 1517       Financial Resource Strain    How hard is it for you to pay for the very basics like food, housing, medical care, and heating? Not very       Financial/Legal    Source of Income social security       Legal    Criminal Activity/Legal Involvement none                   Substance Abuse       Row Name 07/08/24 1517       AUDIT-C (Alcohol Use Disorders ID Test)    Q1: How often do you have a drink containing alcohol? Never    Q2: How many drinks containing alcohol do you have on a typical day when you are drinking? None    Q3: How often do you have six or more drinks on one occasion? Never    Audit-C Score 0       Family Member Substance Use (#4)    Previous Substance Use Treatment none             LUBNA Navarro, MSW    Phone: [phone]  Fax: [phone]  Email: [email or web address]

## 2024-07-08 NOTE — THERAPY TREATMENT NOTE
Acute Care - Speech Language Pathology   Swallow Treatment Note BRENDA Hernandez     Patient Name: Kendy Worrell  : 1940  MRN: [id number]  Today's Date: 2024               Admit Date: 2024    Visit Dx:     ICD-10-CM ICD-9-CM   1. Sepsis, due to unspecified organism, unspecified whether acute organ dysfunction present  A41.9 038.9     995.91   2. Acute non-recurrent maxillary sinusitis  J01.00 461.0   3. Weakness  R53.1 780.79   4. Recurrent syncope  R55 780.2   5. Spondylosis of thoracic region without myelopathy or radiculopathy  M47.814 721.2   6. Lumbar degenerative disc disease  M51.36 722.52   7. Spondylosis of lumbar region without myelopathy or radiculopathy  M47.816 721.3   8. Central stenosis of spinal canal  M48.00 724.00   9. Chronic kidney disease, unspecified CKD stage  N18.9 585.9     Patient Active Problem List   Diagnosis    Essential hypertension    Acquired hypothyroidism    Coronary artery disease involving native coronary artery of native heart without angina pectoris    Hyperlipidemia    Arthritis    Skin lesion of chest wall    NSTEMI (non-ST elevated myocardial infarction)    Supraventricular tachycardia    Normal cardiac stress test    Gastrointestinal bleed    Angina effort    Morbidly obese    Bilateral pulmonary embolism    Persistent atrial fibrillation    UTI (urinary tract infection)    Hypokalemia    Chronic diastolic CHF (congestive heart failure)    Acute ischemic stroke    Chest pain    GI bleed    Closed fibular fracture    Anemia    Hyponatremia    Orthostatic dizziness    Frequent falls    Generalized weakness     Past Medical History:   Diagnosis Date    A-fib     Arthritis     Breast cancer     CHF (congestive heart failure)     CVA (cerebral vascular accident) 2019    History of pulmonary embolus (PE)     Hyperlipidemia     Hypertension     Hyperthyroidism     patient has hypothyroidism    Hypothyroidism      Past Surgical History:   Procedure Laterality Date     BREAST BIOPSY      BREAST SURGERY Right     CHOLECYSTECTOMY      COLON SURGERY      Removed    COLONOSCOPY      COLONOSCOPY N/A 11/3/2022    Procedure: COLONOSCOPY to anastamosis with biopsies and cold snare polypectomy;  Surgeon: Saroj Oakes MD;  Location: [facility] ENDOSCOPY;  Service: Gastroenterology;  Laterality: N/A;  PRe: rectal bleeding  Post: hemorrhoids, diverticulosis, anastamotic ulcer, polyp, hemostasis clip free-floating    HYSTERECTOMY      MAMMO BILATERAL  2015    MASTECTOMY      TONSILLECTOMY         SLP Recommendation and Plan     SLP Diet Recommendation: mechanical ground textures, thin liquids (07/08/24 1600)  Recommended Precautions and Strategies: upright posture during/after eating, small bites of food and sips of liquid, multiple swallows per bite of food, alternate between small bites of food and sips of liquid, check mouth frequently for oral residue/pocketing, general aspiration precautions, fatigue precautions (07/08/24 1600)  SLP Rec. for Method of Medication Administration: meds whole, meds crushed, as tolerated (07/08/24 1600)     Monitor for Signs of Aspiration: yes, notify SLP if any concerns (07/08/24 1600)              Therapy Frequency (Swallow): PRN (07/08/24 1600)  Predicted Duration Therapy Intervention (Days): until discharge (07/08/24 1600)  Oral Care Recommendations: Oral Care BID/PRN, Swab (07/08/24 1600)                                               SWALLOW EVALUATION (Last 72 Hours)       SLP Adult Swallow Evaluation       Row Name 07/08/24 1600       Rehab Evaluation    Document Type therapy note (daily note)  -RM    Subjective Information no complaints  -RM    Patient Observations alert;cooperative;agree to therapy  -RM    Patient/Family/Caregiver Comments/Observations no family at bedside  -RM    Patient Effort good  -RM    Comment Skilled ST targeting dysphagia completed on today's date. ST missed meal tray, so snack was brought to pt to assess current diet  recommendations. Pt currently on [facility] soft/thin liquids. During lunch meal tray, pt only described puree consistency items that she ate, but reported no issues/concerns with her swallow. Pt reports that occasionally with solid foods/food that requires mastication, that she requires a liquid wash to help clear her oral cavity or will have to expectorate a piece into a napkin. During trials of [facility] soft solid, prolonged mastication observed and oral residue. Pt utilized liquid wash to clear oral residue. No expectoration noted on today's date. During trials of thin liquid by straw, no overt s/s of aspiration noted across trials. No chances in vocal quality observed throughout session.     ST RECOMMENDATIONS: ST recommends that pt continue a mechanical soft/thin liquid diet. Pt should remain upright during meals/medication, take small bites/sips, alternate between bites/sips, and monitor for s/s of aspiration or any other difficulty.  -RM       General Information    Patient Profile Reviewed --    Pertinent History Of Current Problem --    Current Method of Nutrition --       Pain    Additional Documentation --       Pain Scale: FACES Pre/Post-Treatment    Pain: FACES Scale, Pretreatment --    Posttreatment Pain Rating --       Oral Motor Structure and Function    Dentition Assessment --    Secretion Management --    Mucosal Quality --    Gag Response --       Oral Musculature and Cranial Nerve Assessment    Oral Motor General Assessment --    Oral Motor, Comment --       General Eating/Swallowing Observations    Respiratory Support Currently in Use --    O2 Liters --    Eating/Swallowing Skills --    Positioning During Eating --    Utensils Used --    Consistencies Trialed --       Clinical Swallow Eval    Clinical Swallow Evaluation Summary --       SLP Evaluation Clinical Impression    SLP Swallowing Diagnosis --    Functional Impact --    Rehab Potential/Prognosis, Swallowing --    Swallow Criteria for Skilled  Therapeutic Interventions Met --       Recommendations    Therapy Frequency (Swallow) PRN  -RM    Predicted Duration Therapy Intervention (Days) until discharge  -RM    SLP Diet Recommendation mechanical ground textures;thin liquids  -RM    Recommended Precautions and Strategies upright posture during/after eating;small bites of food and sips of liquid;multiple swallows per bite of food;alternate between small bites of food and sips of liquid;check mouth frequently for oral residue/pocketing;general aspiration precautions;fatigue precautions  -RM    Oral Care Recommendations Oral Care BID/PRN;Swab  -RM    SLP Rec. for Method of Medication Administration meds whole;meds crushed;as tolerated  -RM    Monitor for Signs of Aspiration yes;notify SLP if any concerns  -RM       Swallow Goals (SLP)    Swallow LTGs Swallow Long Term Goal (free text)  -RM    Swallow STGs diet tolerance goal selection (SLP)  -RM    Diet Tolerance Goal Selection (SLP) Swallow Short Term Goal 1  -RM       (LTG) Swallow    (LTG) Swallow Patient will tolerate safest and least restrictive diet without complications from aspiration.  -RM    Time Frame (Swallow Long Term Goal) by discharge  -RM    Progress/Outcomes (Swallow Long Term Goal) goal ongoing  -RM    Comment (Swallow Long Term Goal) see above note  -RM       (STG) Swallow 1    (STG) Swallow 1 Patient will participate in full meal assessment to assure safety and adequacy of recommended diet and independent use of safe swallow strategies.  -RM    Time Frame (Swallow Short Term Goal 1) 1 week  -RM    Progress/Outcomes (Swallow Short Term Goal 1) goal ongoing  -RM    Comment (Swallow Short Term Goal 1) see above note  -RM              User Key  (r) = Recorded By, (t) = Taken By, (c) = Cosigned By      Initials Name Effective Dates    Bettina Morris, VIJAY 09/21/21 -     RM Jj Mullen SLP 01/26/23 -                     EDUCATION  The patient has been educated in the following areas:    Dysphagia (Swallowing Impairment) Modified Diet Instruction.        SLP GOALS       Row Name 07/08/24 1600       (LTG) Swallow    (LTG) Swallow Patient will tolerate safest and least restrictive diet without complications from aspiration.  -RM    Time Frame (Swallow Long Term Goal) by discharge  -RM    Progress/Outcomes (Swallow Long Term Goal) goal ongoing  -RM    Comment (Swallow Long Term Goal) see above note  -RM       (STG) Swallow 1    (STG) Swallow 1 Patient will participate in full meal assessment to assure safety and adequacy of recommended diet and independent use of safe swallow strategies.  -RM    Time Frame (Swallow Short Term Goal 1) 1 week  -RM    Progress/Outcomes (Swallow Short Term Goal 1) goal ongoing  -RM    Comment (Swallow Short Term Goal 1) see above note  -RM              User Key  (r) = Recorded By, (t) = Taken By, (c) = Cosigned By      Initials Name Provider Type    Bettina Morris, SLP Speech and Language Pathologist    Jj Gillespie, SLP Speech and Language Pathologist                         Time Calculation:         VIJAY Gamez  7/8/2024

## 2024-07-08 NOTE — PLAN OF CARE
Goal Outcome Evaluation:  Plan of Care Reviewed With: patient        Progress: no change  Outcome Evaluation: pt has slept on and off tonight, no new complaints at this time, pt turned throughout the night, K+ currently wdl pt will d/t to SNF when appropriate,

## 2024-07-08 NOTE — CASE MANAGEMENT/SOCIAL WORK
Continued Stay Note   David     Patient Name: Kendy Worrell  MRN: [id number]  Today's Date: 7/8/2024    Admit Date: 7/5/2024    Plan: From [facility]. Referral to [facility] pending. No precert required. PASRR approved.   Discharge Plan       Row Name 07/08/24 1605       Plan    Plan From [place] [facility]. Referral to [facility] pending. No precert required. PASRR approved.    Patient/Family in Agreement with Plan yes    Plan Comments Obtained SNF choices from daughter Ramandeep: 1) [facility], 2) [facility], and 3) [facility]. Referral made to [facility] and [facility] [place] notified.        Megan Naegele, RN     Office Phone: [phone]  Office Cell: [phone]     see md note

## 2024-07-08 NOTE — DISCHARGE PLACEMENT REQUEST
"Anaid Oseas PALAK (84 y.o. Female)       Date of Birth   1940    Social Security Number       Address   [address]  [place] [address]    Home Phone   [phone]    MRN   [id number]       Anglican   Presybeterian    Marital Status   Single                            Admission Date   7/5/24    Admission Type   Emergency    Admitting Provider   Fausto Lantigua MD    Attending Provider   Arcelia Robertson MD    Department, Room/Bed   [facility] MEDICAL INPATIENT, 363/1       Discharge Date       Discharge Disposition       Discharge Destination                                 Attending Provider: Arcelia Robertson MD    Allergies: Aspirin, Nsaids    Isolation: None   Infection: None   Code Status: No CPR    Ht: 154.9 cm (61\")   Wt: 67.1 kg (148 lb)    Admission Cmt: None   Principal Problem: Sepsis, unspecified organism [A41.9]                   Active Insurance as of 7/5/2024       Primary Coverage       Payor Plan Insurance Group Employer/Plan Group    MEDICARE MEDICARE A & B        Payor Plan Address Payor Plan Phone Number Payor Plan Fax Number Effective Dates    [address] [phone]  2/1/2004 - None Entered    [facility] [id number]         Subscriber Name Subscriber Birth Date Member ID       OSEAS ALEJANDRA 1940 [id number]               Secondary Coverage       Payor Plan Insurance Group Employer/Plan Group    [place] [id number]       Payor Plan Address Payor Plan Phone Number Payor Plan Fax Number Effective Dates    [address]   1/9/1993 - None Entered    [facility] [id number]         Subscriber Name Subscriber Birth Date Member ID       ALEJANDRAOSEAS CID 1940 [id number]               Tertiary Coverage       Payor Plan Insurance Group Employer/Plan Group    [facility] [employer] [place] [employer] [facility] [place] [employer] [facility]        Payor Plan Address Payor Plan Phone Number Payor Plan Fax Number Effective Dates    [address]   7/4/2024 - None Entered    [facility] [id number]         Subscriber Name Subscriber Birth Date Member ID       OSEAS ALEJANDRA PALAK 1940 [id number]                     Emergency Contacts        (Rel.) Home Phone Work Phone Mobile Phone    Ramandeep PROCTOR (Daughter) [phone] -- [phone]    JOVANNA PROCTOR (Grandchild) -- -- [phone]    Akiko Alejandra (Other) -- -- [phone]    HUGO ALEJANDRA (Son) -- -- [phone]

## 2024-07-08 NOTE — PROGRESS NOTES
.    [facility] MEDICINE SERVICE  DAILY PROGRESS NOTE    NAME: Kendy Worrell  : 1940  MRN: [id number]      LOS: 2 days     PROVIDER OF SERVICE: Arcelia Robertson MD    Chief Complaint: Sepsis, unspecified organism    Subjective:     Interval History:  History taken from: patient  Patient seen and examined at bedside this morning.  She was sitting in recliner chair with family member at bedside.  She has been tolerating her diet, working with PT and reports feeling a little better.     Review of Systems:   Review of Systems Negative except as mentioned above.    Objective:     Vital Signs  Temp:  [97.7 °F (36.5 °C)-98.2 °F (36.8 °C)] 98.2 °F (36.8 °C)  Heart Rate:  [76-99] 99  Resp:  [20-22] 22  BP: (116-181)/() 116/79  Flow (L/min):  [2] 2   Body mass index is 27.96 kg/m².    Physical Exam  Physical Exam   Appearance: Normal appearance.   HENT:      Head: Normocephalic and atraumatic.      Mouth/Throat:      Mouth: Mucous membranes are moist.  Eyes:      Extraocular Movements: Extraocular movements intact.   Cardiovascular:      Rate and Rhythm: Normal rate and regular rhythm.   Pulmonary:      Effort: Pulmonary effort is normal.      Breath sounds: Normal breath sounds.   Abdominal:      General: Abdomen is flat.      Palpations: Abdomen is soft.      Tenderness: There is no abdominal tenderness.   Musculoskeletal:      Cervical back: Normal range of motion.      Right lower leg: No edema.      Left lower leg: No edema.   Skin:     General: Skin is warm.   Neurological:      General: No focal deficit present.      Mental Status: She is alert.     Scheduled Meds   apixaban, 5 mg, Oral, Q12H  ascorbic acid, 1,000 mg, Oral, Daily  atorvastatin, 10 mg, Oral, Nightly  cholecalciferol, 1,000 Units, Oral, Daily  empagliflozin, 10 mg, Oral, Daily  lacosamide, 50 mg, Oral, Daily  [Held by provider] levothyroxine, 88 mcg, Oral, Q AM  midodrine, 5 mg, Oral, TID AC  multivitamin with minerals, 1 tablet, Oral,  Daily  potassium chloride, 40 mEq, Oral, TID  ranolazine, 500 mg, Oral, BID  sodium chloride, 10 mL, Intravenous, Q12H  vitamin B-12, 1,000 mcg, Oral, Daily       PRN Meds     acetaminophen    albuterol sulfate HFA    aluminum-magnesium hydroxide-simethicone    senna-docusate sodium **AND** polyethylene glycol **AND** bisacodyl **AND** bisacodyl    Calcium Replacement - Follow Nurse / BPA Driven Protocol    Magnesium Standard Dose Replacement - Follow Nurse / BPA Driven Protocol    melatonin    nystatin    Phosphorus Replacement - Follow Nurse / BPA Driven Protocol    Potassium Replacement - Follow Nurse / BPA Driven Protocol    prochlorperazine    sodium chloride    sodium chloride   Infusions         Diagnostic Data    Results from last 7 days   Lab Units 07/08/24  0533 07/07/24  2332   WBC 10*3/mm3 7.17  --    HEMOGLOBIN g/dL 11.3*  --    HEMATOCRIT % 34.7  --    PLATELETS 10*3/mm3 234  --    GLUCOSE mg/dL  --  112*   CREATININE mg/dL  --  1.02*   BUN mg/dL  --  13   SODIUM mmol/L  --  134*   POTASSIUM mmol/L  --  4.0   AST (SGOT) U/L  --  30   ALT (SGPT) U/L  --  11   ALK PHOS U/L  --  83   BILIRUBIN mg/dL  --  0.7   ANION GAP mmol/L  --  10.1       No radiology results for the last day      I reviewed the patient's new clinical results.    Assessment/Plan:     Active and Resolved Problems  Active Hospital Problems    Diagnosis  POA    Frequent falls [R29.6]  Not Applicable    Generalized weakness [R53.1]  Yes    Hypokalemia [E87.6]  Yes    Persistent atrial fibrillation [I48.19]  Yes    Chronic diastolic CHF (congestive heart failure) [I50.32]  Yes    Hyperlipidemia [E78.5]  Yes    Coronary artery disease involving native coronary artery of native heart without angina pectoris [I25.10]  Yes      Resolved Hospital Problems    Diagnosis Date Resolved POA    **Sepsis, unspecified organism [A41.9] 07/06/2024 Yes    Orthostatic hypotension [I95.1] 07/06/2024 Unknown       Frequent falls  Debility/generalized  weakness  -Reviewed CT head, lumbar spine, thoracic spine -no acute findings  -+ ve orthostatic hypotension, continue midodrine.  -Continuous IVFs for now, monitor for signs of fluid overload with her hx of HFpEF, CKD     Hypokalemia  -Receiving potassium replacement p.o.  She cannot tolerate IV potassium due to burning.  -Recheck potassium level  -Hold torsemide  -Continuous cardiac monitoring     Chronic kidney disease  -Stable  -Monitor     Hypertension  -Hold antihypertensives for now     HFpEF  -Appears hypovolemic, diuretics on hold   -BB on hold      Paroxysmal atrial fibrillation  -HR controlled, BB on hold  -Continue Eliquis     Hypothyroidism  -TSH slightly low, Free T4 elevated.  -Holding levothyroxine in setting of hyperthyroid state.     Seizures  -Continue Vimpat     H/o CVA  -No focal neurological deficits noted    VTE Prophylaxis:  Pharmacologic VTE prophylaxis orders are present.         Code status is   Code Status and Medical Interventions:   Ordered at: 07/06/24 0004     Medical Intervention Limits:    No intubation (DNI)     Level Of Support Discussed With:    Patient     Code Status (Patient has no pulse and is not breathing):    No CPR (Do Not Attempt to Resuscitate)     Medical Interventions (Patient has pulse or is breathing):    Limited Support       Plan for disposition: SNFin 1-2 days  Time: 30 minutes    Signature: Electronically signed by Arcelia Robertson MD, 07/08/24, 12:33 EDT.  [facility] Hospitalist Team

## 2024-07-08 NOTE — CASE MANAGEMENT/SOCIAL WORK
Social Work Assessment  [facility]     Patient Name: Kendy Worrell  MRN: [id number]  Today's Date: 7/8/2024    Admit Date: 7/5/2024         Discharge Plan       Row Name 07/08/24 1148       Plan    Plan Comments LSW attempted to meet with Pt at bedside to complete LACE. Pt was asleep and did not awake when name was called. LSW to follow up on LACE screen.          LUBNA Navarro, MSW    Phone: [phone]  Fax: [phone]  Email: [email or web address]

## 2024-07-08 NOTE — CASE MANAGEMENT/SOCIAL WORK
Discharge Planning Assessment   [facility]     Patient Name: Kendy Worrell  MRN: [id number]  Today's Date: 7/8/2024    Admit Date: 7/5/2024    Plan: From [facility] SNF choices pending. No precert required. Will need PASRR.   Discharge Needs Assessment       Row Name 07/08/24 1030       Living Environment    People in Home facility resident  [facility]    Current Living Arrangements assisted living facility    Duration at Residence 5 years    Potentially Unsafe Housing Conditions none    In the past 12 months has the electric, gas, oil, or water company threatened to shut off services in your home? No    Primary Care Provided by self;other (see comments)    Provides Primary Care For no one, unable/limited ability to care for self    Family Caregiver if Needed child(tatyana), adult    Family Caregiver Names Daughter-Ramandeep, Son-Caleb    Quality of Family Relationships helpful;involved;supportive    Able to Return to Prior Arrangements yes       Resource/Environmental Concerns    Resource/Environmental Concerns none    Transportation Concerns none       Transportation Needs    In the past 12 months, has lack of transportation kept you from medical appointments or from getting medications? no    In the past 12 months, has lack of transportation kept you from meetings, work, or from getting things needed for daily living? No       Food Insecurity    Within the past 12 months, you worried that your food would run out before you got the money to buy more. Never true    Within the past 12 months, the food you bought just didn't last and you didn't have money to get more. Never true       Transition Planning    Patient/Family Anticipates Transition to other (see comments)    Patient/Family Anticipated Services at Transition skilled nursing    Transportation Anticipated family or friend will provide;health plan transportation       Discharge Needs Assessment    Readmission Within the Last 30 Days no previous  admission in last 30 days    Current Outpatient/Agency/Support Group assisted living facility    Equipment Currently Used at Home bp cuff;walker, rolling;rollator;wheelchair    Concerns to be Addressed discharge planning;care coordination/care conferences    Anticipated Changes Related to Illness none    Equipment Needed After Discharge none    Outpatient/Agency/Support Group Needs skilled nursing facility    Discharge Facility/Level of Care Needs nursing facility, skilled    Provided Post Acute Provider List? Yes    Post Acute Provider List Nursing Home    Delivered To Support Person    Support Person Ramandeep    Method of Delivery In person                   Discharge Plan       Row Name 07/08/24 1032       Plan    Plan From [facility]. SNF choices pending. No precert required. Will need PASRR.    Patient/Family in Agreement with Plan yes    Plan Comments CM met with patient, her daughter Ramandeep, and friend at bedside. [facility] x[facility] years. No longer drives. PCP and pharmacy confirmed. DME includes BP cuff, rolling walker, rollator, and transport w/c. Pt has [facility] for assistance at facility but daughter reports they had been looking into hospice care- pt is currently not agreeable to hospice (daughter reports talking with [facility]). Discussed therapy's recommendations of SNF placement at discharge and pt and daughter voiced understanding. SNF list provided to daughter and encouraged them to utilize Medicare.gov to review facility ratings. CM contact info provided for daughter to call with 2-3 choices. Daughter does not feel she can provide transportation at time of discharge.                  Expected Discharge Date and Time       Expected Discharge Date Expected Discharge Time    Jul 9, 2024            Demographic Summary       Row Name 07/08/24 1029       General Information    Admission Type inpatient    Arrived From emergency department    Referral Source  admission list    Reason for Consult care coordination/care conference;discharge planning    Preferred Language English       Contact Information    Permission Granted to Share Info With                    Functional Status       Row Name 07/08/24 1030       Functional Status    Usual Activity Tolerance moderate    Current Activity Tolerance fair       Functional Status, IADL    Medications assistive equipment and person    Meal Preparation assistive equipment and person    Housekeeping assistive equipment and person    Laundry assistive equipment and person    Shopping assistive equipment and person             Megan Naegele, RN     Office Phone: [phone]  Office Cell: [phone]      29-Jun-2017

## 2024-07-08 NOTE — CONSULTS
[facility] – [place] [facility]   CONSULT NOTE    Referring Provider: Esther Alonzo   Reason for Consultation: recurrent syncope, orthostatic hypotension    Patient Care Team:  Marisela Monte APRN as PCP - General (Nurse Practitioner)  Tone Tubbs MD as Consulting Physician (Cardiology)  Rios Thomas MD as Consulting Physician (Colon and Rectal Surgery)    Chief complaint multiple falls, weakness     History of present illness:  Kendy Worrell is a 84 y.o. female with past medical history of hypertension, hypothyroidism, CAD, diastolic CHF, CVA, paroxysmal atrial fibrillation anticoagulated with Eliquis who presented to [facility] on 7/5/2024 with reports of frequent falls and weakness.  Patient reports multiple falls within the last few weeks.  Of note patient was admitted in May for dizziness and hypotension.  Patient reports lightheadedness/dizziness at time of episodes.  Her daughter reports during 1 episode they were unable to get a blood pressure and it was still low.  Patient denies chest pain, shortness of breath, palpitations.    Review of Systems   Constitutional: Positive for malaise/fatigue.   Cardiovascular:  Positive for near-syncope and syncope. Negative for chest pain and palpitations.   Gastrointestinal:  Negative for abdominal pain, nausea and vomiting.   Neurological:  Positive for dizziness and weakness.       History  Past Medical History:   Diagnosis Date    A-fib     Arthritis     Breast cancer     CHF (congestive heart failure)     CVA (cerebral vascular accident) 09/2019    History of pulmonary embolus (PE)     Hyperlipidemia     Hypertension     Hyperthyroidism     patient has hypothyroidism    Hypothyroidism        Past Surgical History:   Procedure Laterality Date    BREAST BIOPSY      BREAST SURGERY Right     CHOLECYSTECTOMY      COLON SURGERY      Removed    COLONOSCOPY      COLONOSCOPY N/A 11/3/2022    Procedure: COLONOSCOPY to anastamosis with biopsies and  cold snare polypectomy;  Surgeon: Saroj Oakes MD;  Location: [facility] ENDOSCOPY;  Service: Gastroenterology;  Laterality: N/A;  PRe: rectal bleeding  Post: hemorrhoids, diverticulosis, anastamotic ulcer, polyp, hemostasis clip free-floating    HYSTERECTOMY      MAMMO BILATERAL  2015    MASTECTOMY      TONSILLECTOMY         Family History   Problem Relation Age of Onset    Stroke Mother     Hypertension Mother     Heart disease Father     Cancer Father     Colon polyps Father        Social History     Tobacco Use    Smoking status: Former     Current packs/day: 0.00     Types: Cigarettes     Quit date:      Years since quittin.5     Passive exposure: Past    Smokeless tobacco: Never    Tobacco comments:     quit in    Vaping Use    Vaping status: Never Used   Substance Use Topics    Alcohol use: Yes     Alcohol/week: 3.0 standard drinks of alcohol     Types: 3 Shots of liquor per week     Comment: Last drink 3 weeks ago.    Drug use: Never        Medications Prior to Admission   Medication Sig Dispense Refill Last Dose    acetaminophen (TYLENOL) 325 MG tablet Take 2 tablets by mouth Every 6 (Six) Hours As Needed for Mild Pain .       albuterol sulfate  (90 Base) MCG/ACT inhaler Inhale 2 puffs Every 4 (Four) Hours As Needed for Wheezing.       apixaban (ELIQUIS) 5 MG tablet tablet Take 1 tablet by mouth Every 12 (Twelve) Hours. 60 tablet      ascorbic acid (VITAMIN C) 1000 MG tablet Take 1 tablet by mouth Daily.       atorvastatin (LIPITOR) 10 MG tablet Take 1 tablet by mouth Every Night.       Cholecalciferol 25 MCG (1000 UT) tablet Take 1 tablet by mouth Daily.       cloNIDine (CATAPRES) 0.1 MG tablet Take 1 tablet by mouth Every 6 (Six) Hours As Needed for High Blood Pressure (SBP > 160, DBP > 90).       dapagliflozin Propanediol 10 MG tablet Take 10 mg by mouth Daily.       lacosamide (VIMPAT) 50 MG tablet tablet Take 1 tablet by mouth Daily.       levothyroxine (SYNTHROID, LEVOTHROID)  88 MCG tablet Take 1 tablet by mouth Daily.       magnesium oxide 250 MG tablet Take 1 tablet by mouth 2 (Two) Times a Day.       metOLazone (ZAROXOLYN) 2.5 MG tablet Take 1 tablet by mouth 1 (One) Time Per Week. On Mondays       metoprolol succinate XL (TOPROL-XL) 100 MG 24 hr tablet Take 1 tablet by mouth Every 12 (Twelve) Hours. 60 tablet 0     multivitamin with minerals tablet tablet Take 1 tablet by mouth Daily.       multivitamins-minerals (PRESERVISION AREDS 2) capsule capsule Take 1 capsule by mouth 2 (Two) Times a Day.       nitroglycerin (NITROSTAT) 0.4 MG SL tablet Place 1 tablet under the tongue Every 5 (Five) Minutes As Needed for Chest Pain.       nystatin (MYCOSTATIN) 766541 UNIT/GM powder Apply  topically to the appropriate area as directed 3 (Three) Times a Day As Needed. Apply under breasts       pantoprazole (PROTONIX) 40 MG EC tablet Take 1 tablet by mouth Daily.       potassium chloride (KLOR-CON M20) 20 MEQ CR tablet Take 2 tablets by mouth 3 (Three) Times a Day.       ranolazine (RANEXA) 500 MG 12 hr tablet Take 1 tablet by mouth 2 (Two) Times a Day.       torsemide (DEMADEX) 20 MG tablet Take 1 tablet by mouth Every Other Day. Even days       traMADol (ULTRAM) 50 MG tablet Take 0.5 tablets by mouth 2 (Two) Times a Day.       vitamin B-12 (CYANOCOBALAMIN) 1000 MCG tablet Take 1 tablet by mouth Daily.       zinc oxide 20 % ointment Apply 1 Application topically to the appropriate area as directed 2 (Two) Times a Day.            Aspirin and Nsaids    Scheduled Meds:apixaban, 5 mg, Oral, Q12H  ascorbic acid, 1,000 mg, Oral, Daily  atorvastatin, 10 mg, Oral, Nightly  cholecalciferol, 1,000 Units, Oral, Daily  empagliflozin, 10 mg, Oral, Daily  lacosamide, 50 mg, Oral, Daily  [Held by provider] levothyroxine, 88 mcg, Oral, Q AM  midodrine, 5 mg, Oral, TID AC  multivitamin with minerals, 1 tablet, Oral, Daily  potassium chloride, 40 mEq, Oral, TID  ranolazine, 500 mg, Oral, BID  sodium chloride, 10  "mL, Intravenous, Q12H  vitamin B-12, 1,000 mcg, Oral, Daily      Continuous Infusions:   PRN Meds:.  acetaminophen    albuterol sulfate HFA    aluminum-magnesium hydroxide-simethicone    senna-docusate sodium **AND** polyethylene glycol **AND** bisacodyl **AND** bisacodyl    Calcium Replacement - Follow Nurse / BPA Driven Protocol    Magnesium Standard Dose Replacement - Follow Nurse / BPA Driven Protocol    melatonin    nystatin    Phosphorus Replacement - Follow Nurse / BPA Driven Protocol    Potassium Replacement - Follow Nurse / BPA Driven Protocol    prochlorperazine    sodium chloride    sodium chloride        VITAL SIGNS  Vitals:    07/08/24 0620 07/08/24 0836 07/08/24 0838 07/08/24 0839   BP: 129/78 174/97 152/100 116/79   BP Location:       Patient Position: Standing      Pulse:  82 94 99   Resp:       Temp:       TempSrc:       SpO2:  97% 90% 95%   Weight:       Height:           Flowsheet Rows      Flowsheet Row First Filed Value   Admission Height 154.9 cm (61\") Documented at 07/05/2024 1915   Admission Weight 67.1 kg (148 lb) Documented at 07/05/2024 1937             TELEMETRY: Atrial fibrillation with controlled ventricular response    Physical Exam:  Vitals reviewed.   Constitutional:       Appearance: Not in distress. Frail.   Eyes:      Pupils: Pupils are equal, round, and reactive to light.   Pulmonary:      Effort: Pulmonary effort is normal.      Breath sounds: Normal breath sounds.   Cardiovascular:      Normal rate. Irregularly irregular rhythm.   Pulses:     Intact distal pulses.   Abdominal:      Palpations: Abdomen is soft.   Musculoskeletal: Normal range of motion.      Cervical back: Normal range of motion. Skin:     General: Skin is warm and dry.   Neurological:      General: No focal deficit present.      Mental Status: Alert.         LAB RESULTS (LAST 7 DAYS)    CBC  Results from last 7 days   Lab Units 07/08/24  0533 07/06/24  2359 07/06/24  0448 07/05/24 2014   WBC 10*3/mm3 7.17 8.64 " 9.04 11.87*   RBC 10*6/mm3 3.85 3.59* 3.96 4.59   HEMOGLOBIN g/dL 11.3* 10.7* 11.2* 13.2   HEMATOCRIT % 34.7 32.0* 34.2 39.2   MCV fL 90.1 89.1 86.4 85.4   PLATELETS 10*3/mm3 234 220 219 276       BMP  Results from last 7 days   Lab Units 07/07/24 2332 07/07/24 1200 07/06/24 2359 07/06/24 1506 07/06/24 0448 07/05/24 2014   SODIUM mmol/L 134*  --  135*  --  135* 131*   POTASSIUM mmol/L 4.0 2.6* 3.4* 3.8 2.6* 2.7*   CHLORIDE mmol/L 100  --  94*  --  92* 81*   CO2 mmol/L 23.9  --  31.6*  --  29.0 33.6*   BUN mg/dL 13  --  20  --  26* 29*   CREATININE mg/dL 1.02*  --  1.27*  --  1.49* 1.77*   GLUCOSE mg/dL 112*  --  127*  --  86 109*   MAGNESIUM mg/dL  --   --   --   --   --  2.6*       CMP   Results from last 7 days   Lab Units 07/07/24 2332 07/07/24 1200 07/06/24 2359 07/06/24 1506 07/06/24 0448 07/05/24 2014   SODIUM mmol/L 134*  --  135*  --  135* 131*   POTASSIUM mmol/L 4.0 2.6* 3.4* 3.8 2.6* 2.7*   CHLORIDE mmol/L 100  --  94*  --  92* 81*   CO2 mmol/L 23.9  --  31.6*  --  29.0 33.6*   BUN mg/dL 13  --  20  --  26* 29*   CREATININE mg/dL 1.02*  --  1.27*  --  1.49* 1.77*   GLUCOSE mg/dL 112*  --  127*  --  86 109*   ALBUMIN g/dL 3.3*  --  3.3*  --  3.4* 4.2   BILIRUBIN mg/dL 0.7  --  0.6  --  0.8 1.4*   ALK PHOS U/L 83  --  86  --  84 109   AST (SGOT) U/L 30  --  28  --  29 39*   ALT (SGPT) U/L 11  --  12  --  13 20         BNP        TROPONIN  Results from last 7 days   Lab Units 07/05/24 2048 07/05/24 2014   CK TOTAL U/L  --  78   HSTROP T ng/L 39*  --        CoAg        Creatinine Clearance  Estimated Creatinine Clearance: 35.3 mL/min (A) (by C-G formula based on SCr of 1.02 mg/dL (H)).    ABG        Radiology  No radiology results for the last day        EKG      I personally viewed and interpreted the patient's EKG/Telemetry data:    ECHOCARDIOGRAM:    Results for orders placed during the hospital encounter of 05/23/24    Adult Transthoracic Echo Complete W/ Cont if Necessary Per  Protocol    Interpretation Summary    Left ventricular ejection fraction appears to be 61 - 65%.    Left ventricular wall thickness is consistent with moderate eccentric hypertrophy.    Mild to moderate aortic valve stenosis is present.      STRESS MYOVIEW:  Results for orders placed during the hospital encounter of 11/18/21    Stress Test With Myocardial Perfusion One Day    Interpretation Summary  · Myocardial perfusion imaging indicates a normal myocardial perfusion study with no evidence of ischemia.  · Left ventricular ejection fraction is hyperdynamic (Calculated EF > 70%). .  · Findings consistent with a normal ECG stress test.  · Impressions are consistent with a low risk study.  · There is no prior study available for comparison.       CARDIAC CATHETERIZATION:  No results found for this or any previous visit.       OTHER:         Assessment & Plan       Coronary artery disease involving native coronary artery of native heart without angina pectoris    Hyperlipidemia    Persistent atrial fibrillation    Hypokalemia    Chronic diastolic CHF (congestive heart failure)    Frequent falls    Generalized weakness      Frequent falls  Generalized weakness  Syncope  Orthostatic hypotension  Patient presented following syncopal episode  Multiple falls over the last few weeks   Troponin slightly elevated, CK negative  PT/OT  continue midodrine  Hold home antihypertensives, diuresis  Recent echocardiogram with LVEF 56 to 60%, grade 1 diastolic dysfunction and negative bubble study    Hypokalemia  Hyponatremia   Monitor and replace electrolytes as needed  Diuresis on hold    Atrial fibrilation  Eliquis for anticoagulation  [id number] score is 6  Rate controlled  Hold beta-blocker due to orthostatic hypotension  Consider Watchman due to frequent falls and increased risk for bleeding  Will defer to primary cardiologist as she follows with [place] cardiology     HFpEF  Echocardiogram with LVEF 61 to 65% and mild to  moderate aortic valve stenosis  On SGLT2 inhibitor  Diuresis and beta-blocker on hold    CAD  Continue statin, Ranexa  Denies chest pain  Troponin mildly elevated, appears baseline    Hyperlipidemia  Continue statin therapy    Hospice consulted for goals of care discussion and further information/support     I discussed the patients findings and my recommendations with patient and nurse    MENDOZA Aguero  07/08/24  11:32 EDT

## 2024-07-08 NOTE — PLAN OF CARE
Problem: Adult Inpatient Plan of Care  Goal: Plan of Care Review  Outcome: Ongoing, Progressing  Goal: Patient-Specific Goal (Individualized)  Outcome: Ongoing, Progressing  Goal: Absence of Hospital-Acquired Illness or Injury  Outcome: Ongoing, Progressing  Intervention: Identify and Manage Fall Risk  Recent Flowsheet Documentation  Taken 7/8/2024 0810 by Destiny Alexis RN  Safety Promotion/Fall Prevention: safety round/check completed  Goal: Optimal Comfort and Wellbeing  Outcome: Ongoing, Progressing  Goal: Readiness for Transition of Care  Outcome: Ongoing, Progressing     Problem: Fall Injury Risk  Goal: Absence of Fall and Fall-Related Injury  Outcome: Ongoing, Progressing  Intervention: Promote Injury-Free Environment  Recent Flowsheet Documentation  Taken 7/8/2024 0810 by Destiny Alexis RN  Safety Promotion/Fall Prevention: safety round/check completed     Problem: Impaired Wound Healing  Goal: Optimal Wound Healing  Outcome: Ongoing, Progressing     Problem: Heart Failure Comorbidity  Goal: Maintenance of Heart Failure Symptom Control  Outcome: Ongoing, Progressing     Problem: Hypertension Comorbidity  Goal: Blood Pressure in Desired Range  Outcome: Ongoing, Progressing     Problem: Skin Injury Risk Increased  Goal: Skin Health and Integrity  Outcome: Ongoing, Progressing   Goal Outcome Evaluation:

## 2024-07-08 NOTE — CASE MANAGEMENT/SOCIAL WORK
Social Work Assessment  [facility]     Patient Name: Kendy Worrell  MRN: [id number]  Today's Date: 7/8/2024    Admit Date: 7/5/2024         Discharge Plan       Row Name 07/08/24 1335       Plan    Plan From [place] MILLER. SNF choices pending. No precert required. PASRR approved.              LUBNA Navarro, MSW    Phone: [phone]  Fax: [phone]  Email: [email or web address]

## 2024-07-08 NOTE — CONSULTS
ENDOCRINE INITIAL CONSULT NOTE  DATE OF SERVICE: 24  Patient Care Team:  Marisela Monte APRN as PCP - General (Nurse Practitioner)  Tone Tubbs MD as Consulting Physician (Cardiology)  Rios Thomas MD as Consulting Physician (Colon and Rectal Surgery)        PATIENT NAME: Kendy Worrell  PATIENT : 1940 AGE: 84 y.o.  MRN NUMBER: [id number]  PRIMARY CARE: Marisela Monte APRN    ==========================================================================    REASON FOR CONSULT: Hypothyroidism    HPI / SUBJECTIVE  84 y.o. female with multiple medical problems admitted to the hospital for deconditioning and generalized weakness.  Endocrine team is consulted for hypothyroidism evaluation.  Patient is known to have hypothyroidism since age of 19.  Currently taking levothyroxine 88 mcg p.o. daily.  Patient denied any recent change in levothyroxine dosing.  Denies any neck swelling.    ==========================================================================                                                PAST MEDICAL HISTORY    Past Medical History:   Diagnosis Date    A-fib     Arthritis     Breast cancer     CHF (congestive heart failure)     CVA (cerebral vascular accident) 2019    History of pulmonary embolus (PE)     Hyperlipidemia     Hypertension     Hyperthyroidism     patient has hypothyroidism    Hypothyroidism        ==========================================================================    PAST SURGICAL HISTORY    Past Surgical History:   Procedure Laterality Date    BREAST BIOPSY      BREAST SURGERY Right     CHOLECYSTECTOMY      COLON SURGERY      Removed    COLONOSCOPY      COLONOSCOPY N/A 11/3/2022    Procedure: COLONOSCOPY to anastamosis with biopsies and cold snare polypectomy;  Surgeon: Saroj Oakes MD;  Location: [facility];  Service: Gastroenterology;  Laterality: N/A;  PRe: rectal bleeding  Post: hemorrhoids, diverticulosis, anastamotic ulcer, polyp,  hemostasis clip free-floating    HYSTERECTOMY      MAMMO BILATERAL  2015    MASTECTOMY      TONSILLECTOMY         ==========================================================================    FAMILY HISTORY    Family History   Problem Relation Age of Onset    Stroke Mother     Hypertension Mother     Heart disease Father     Cancer Father     Colon polyps Father        ==========================================================================    SOCIAL HISTORY    Social History     Socioeconomic History    Marital status: Single   Tobacco Use    Smoking status: Former     Current packs/day: 0.00     Types: Cigarettes     Quit date:      Years since quittin.5     Passive exposure: Past    Smokeless tobacco: Never    Tobacco comments:     quit in    Vaping Use    Vaping status: Never Used   Substance and Sexual Activity    Alcohol use: Yes     Alcohol/week: 3.0 standard drinks of alcohol     Types: 3 Shots of liquor per week     Comment: Last drink 3 weeks ago.    Drug use: Never    Sexual activity: Never       ==========================================================================    HOME MEDICATIONS REPORTED ON ADMISSION      Current Facility-Administered Medications:     acetaminophen (TYLENOL) tablet 650 mg, 650 mg, Oral, Q6H PRN, Quentin Castillo MD    albuterol sulfate HFA (PROVENTIL HFA;VENTOLIN HFA;PROAIR HFA) inhaler 2 puff, 2 puff, Inhalation, Q4H PRN, Valentina Dobson PA-C    aluminum-magnesium hydroxide-simethicone (MAALOX MAX) 400-400-40 MG/5ML suspension 15 mL, 15 mL, Oral, Q6H PRN, Esther Alonzo APRN    apixaban (ELIQUIS) tablet 5 mg, 5 mg, Oral, Q12H, Valentina Dobson PA-C, 5 mg at 24 0831    ascorbic acid (VITAMIN C) tablet 1,000 mg, 1,000 mg, Oral, Daily, Valentina Dobson PA-C, 1,000 mg at 24 0831    atorvastatin (LIPITOR) tablet 10 mg, 10 mg, Oral, Nightly, Valentina Dobson PA-C, 10 mg at 248    sennosides-docusate (PERICOLACE) 8.6-50 MG per tablet 2 tablet, 2  tablet, Oral, BID PRN **AND** polyethylene glycol (MIRALAX) packet 17 g, 17 g, Oral, Daily PRN **AND** bisacodyl (DULCOLAX) EC tablet 5 mg, 5 mg, Oral, Daily PRN **AND** bisacodyl (DULCOLAX) suppository 10 mg, 10 mg, Rectal, Daily PRN, Esther Alonzo, APRN    Calcium Replacement - Follow Nurse / BPA Driven Protocol, , Does not apply, PRN, Esther Alonzo APRN    cholecalciferol (VITAMIN D3) tablet 1,000 Units, 1,000 Units, Oral, Daily, Valentina Dobson PA-C, 1,000 Units at 07/08/24 0832    dextrose 5 % and sodium chloride 0.45 % infusion, 50 mL/hr, Intravenous, Continuous, Arcelia Robertson MD, Last Rate: 50 mL/hr at 07/08/24 1516, 50 mL/hr at 07/08/24 1516    empagliflozin (JARDIANCE) tablet 10 mg, 10 mg, Oral, Daily, Valentina Dobson PA-C, 10 mg at 07/08/24 0831    lacosamide (VIMPAT) tablet 50 mg, 50 mg, Oral, Daily, Valentina Dobson PA-C, 50 mg at 07/08/24 0831    [START ON 7/12/2024] levothyroxine (SYNTHROID, LEVOTHROID) tablet 75 mcg, 75 mcg, Oral, Q AM, Brannon Perez MD    Magnesium Standard Dose Replacement - Follow Nurse / BPA Driven Protocol, , Does not apply, PRN, Esther Alonzo, APRN    melatonin tablet 5 mg, 5 mg, Oral, Nightly PRN, Esther Alonzo, APRN    midodrine (PROAMATINE) tablet 5 mg, 5 mg, Oral, TID AC, Arcelia Robertson MD, 5 mg at 07/08/24 1732    multivitamin with minerals 1 tablet, 1 tablet, Oral, Daily, Valentina Dobson PA-C, 1 tablet at 07/08/24 0831    nystatin (MYCOSTATIN) powder, , Topical, TID PRN, Dobson, Valentina J, PA-C    Phosphorus Replacement - Follow Nurse / BPA Driven Protocol, , Does not apply, PRN, Sobeida Alonzole, APRN    potassium chloride (KLOR-CON) packet 40 mEq, 40 mEq, Oral, TID, Arcelia Robertson MD, 40 mEq at 07/08/24 1739    Potassium Replacement - Follow Nurse / BPA Driven Protocol, , Does not apply, PRN, Esther Alonzo, APRN    prochlorperazine (COMPAZINE) injection 5 mg, 5 mg, Intravenous, Q6H PRN, Esther Alonzo, APRN, 5 mg at 07/07/24 3519     ranolazine (RANEXA) 12 hr tablet 500 mg, 500 mg, Oral, BID, Valentina Dobson PA-C, 500 mg at 07/08/24 0831    sodium chloride 0.9 % flush 10 mL, 10 mL, Intravenous, Q12H, Achterberg, Esther, APRN, 10 mL at 07/08/24 0831    sodium chloride 0.9 % flush 10 mL, 10 mL, Intravenous, PRN, Achterberg, Esther, APRN    sodium chloride 0.9 % infusion 40 mL, 40 mL, Intravenous, PRN, Achterberg, Esther, APRN    vitamin B-12 (CYANOCOBALAMIN) tablet 1,000 mcg, 1,000 mcg, Oral, Daily, Valentina Dobson PA-C, 1,000 mcg at 07/08/24 0831    ==========================================================================    ALLERGIES    Allergies   Allergen Reactions    Aspirin Other (See Comments)     Severe bleeding    Nsaids Other (See Comments)     Severe bleeding       ==========================================================================    ACTIVE HOSPITAL MEDICATIONS    Scheduled Medications:  apixaban, 5 mg, Oral, Q12H  ascorbic acid, 1,000 mg, Oral, Daily  atorvastatin, 10 mg, Oral, Nightly  cholecalciferol, 1,000 Units, Oral, Daily  empagliflozin, 10 mg, Oral, Daily  lacosamide, 50 mg, Oral, Daily  [START ON 7/12/2024] levothyroxine, 75 mcg, Oral, Q AM  midodrine, 5 mg, Oral, TID AC  multivitamin with minerals, 1 tablet, Oral, Daily  potassium chloride, 40 mEq, Oral, TID  ranolazine, 500 mg, Oral, BID  sodium chloride, 10 mL, Intravenous, Q12H  vitamin B-12, 1,000 mcg, Oral, Daily         PRN Medications:    acetaminophen    albuterol sulfate HFA    aluminum-magnesium hydroxide-simethicone    senna-docusate sodium **AND** polyethylene glycol **AND** bisacodyl **AND** bisacodyl    Calcium Replacement - Follow Nurse / BPA Driven Protocol    Magnesium Standard Dose Replacement - Follow Nurse / BPA Driven Protocol    melatonin    nystatin    Phosphorus Replacement - Follow Nurse / BPA Driven Protocol    Potassium Replacement - Follow Nurse / BPA Driven Protocol    prochlorperazine    sodium chloride    sodium chloride      ==========================================================================    OBJECTIVE    Vitals:    07/08/24 1900   BP: 131/79   Pulse:    Resp: 24   Temp: 97.7 °F (36.5 °C)   SpO2:       Body mass index is 27.96 kg/m².     General: Alert, cooperative, no acute distress    ==========================================================================    LABORATORY DATA    Lab Results   Component Value Date    GLUCOSE 112 (H) 07/07/2024    BUN 13 07/07/2024    CREATININE 1.02 (H) 07/07/2024    EGFRIFNONA 72 11/19/2021    EGFRIFAFRI 60 (L) 05/01/2019    BCR 12.7 07/07/2024    K 4.0 07/07/2024    CO2 23.9 07/07/2024    CALCIUM 9.6 07/07/2024    PROTENTOTREF 7.3 10/04/2016    ALBUMIN 3.3 (L) 07/07/2024    LABIL2 1.7 10/04/2016    AST 30 07/07/2024    ALT 11 07/07/2024       Lab Results   Component Value Date    HGBA1C 5.53 05/23/2024    HGBA1C 5.10 11/03/2022    HGBA1C 4.80 09/05/2019     Lab Results   Component Value Date    CREATININE 1.02 (H) 07/07/2024          Latest Reference Range & Units 09/26/18 11:52 09/05/19 04:59 11/19/21 06:20 11/03/22 05:57 05/23/24 20:19 07/05/24 20:48 07/08/24 10:49   TSH Baseline 0.270 - 4.200 uIU/mL 2.65 2.090 0.373 2.460 0.426 0.205 (L)    Free T4 0.93 - 1.70 ng/dL     2.29 (H)  1.80 (H)   (L): Data is abnormally low  (H): Data is abnormally high    ==========================================================================    ASSESSMENT AND PLAN    # Hypothyroidism  # Iatrogenic mild hyperthyroidism  - Patient currently taking 88 mcg p.o. daily  - Will hold levothyroxine therapy and restart back again on 75 mcg p.o. daily on 7/12/2024  - Repeat thyroid function back again in 6 weeks time    # Deconditioning  - Care as per primary team    Thank you for courtesy of consultation.  Endocrine team will sign off and please reconsult if needed.  Please plan to repeat thyroid function including TSH and free T4 in 6 weeks time as outpatient.  Will follow with you.  Rest as per primary  care.    Part of this note may be an electronic transcription/translation of spoken language to printed text using the Dragon Dictation System.     The time of this note does not reflect the time I saw the patient but the time that this note was written.    =======================================  Brannon Perez MD  Department of Endocrine, Diabetes and Metabolism  [place], [place]  =======================================

## 2024-07-08 NOTE — THERAPY TREATMENT NOTE
"Subjective: Pt agreeable to therapeutic plan of care.     Objective:     Bed mobility - Mod-A  Transfers - Mod-A, Assist x 2, and with rolling walker  Ambulation - NA d/t orthostatic hypotension     Therapeutic Exercise - 10 Reps Bilaterally  lying supine  AROM ankle pumps and hip abd/add  AAROM heel slides     Vitals: Orthostatic  118/71 supine   120/79 EOB  71/51 standing, felt dizzy and reported vision started getting black   143/80 supine, reported symptoms subsided     Pain: 4 VAS   Location: low back   Intervention for pain: Repositioned, Increased Activity, and Therapeutic Presence    Education: Provided education on the importance of mobility in the acute care setting, Verbal/Tactile Cues, and Transfer Training    Assessment: Kendy Worrell is an 83 y/o female admitted to [facility] on 7/5/24 with hospital dx of sepsis, generalized weakness/ deconditioning, and orthostatic hypotension. Reports she has fallen 4 times in the last month. PMHx significant for CAD, paroxysmal atrial fibrillation on Eliquis, congestive heart failure diastolic, and hypertension. Pt required Mod A for supine to sit, Mod Ax2 with RW for STS and sit to supine. Pt orthostatic and symptomatic (118/71 supine, 120/70 sitting EOB, 71/51 standing, 143/80 supine). Nursing notified. Completed supine LE therex. Will continue to follow and progress as tolerated.     Plan/Recommendations:   If medically appropriate, Moderate Intensity Therapy recommended post-acute care. This is recommended as therapy feels the patient would require 3-4 days per week and wouldn't tolerate \"3 hour daily\" rehab intensity. SNF would be the preferred choice. If the patient does not agree to SNF, arrange HH or OP depending on home bound status. If patient is medically complex, consider LTACH. Pt requires no DME at discharge.     Pt desires Skilled Rehab placement at discharge. Pt cooperative; agreeable to therapeutic recommendations and plan of care.         Post-Tx Position: " Supine with HOB Elevated, Alarms activated, and Call light and personal items within reach  PPE: gloves and gown

## 2024-07-08 NOTE — THERAPY EVALUATION
Patient Name: Kendy Worrell  : 1940    MRN: [id number]                              Today's Date: 2024       Admit Date: 2024    Visit Dx:     ICD-10-CM ICD-9-CM   1. Sepsis, due to unspecified organism, unspecified whether acute organ dysfunction present  A41.9 038.9     995.91   2. Acute non-recurrent maxillary sinusitis  J01.00 461.0   3. Weakness  R53.1 780.79   4. Recurrent syncope  R55 780.2   5. Spondylosis of thoracic region without myelopathy or radiculopathy  M47.814 721.2   6. Lumbar degenerative disc disease  M51.36 722.52   7. Spondylosis of lumbar region without myelopathy or radiculopathy  M47.816 721.3   8. Central stenosis of spinal canal  M48.00 724.00   9. Chronic kidney disease, unspecified CKD stage  N18.9 585.9     Patient Active Problem List   Diagnosis    Essential hypertension    Acquired hypothyroidism    Coronary artery disease involving native coronary artery of native heart without angina pectoris    Hyperlipidemia    Arthritis    Skin lesion of chest wall    NSTEMI (non-ST elevated myocardial infarction)    Supraventricular tachycardia    Normal cardiac stress test    Gastrointestinal bleed    Angina effort    Morbidly obese    Bilateral pulmonary embolism    New onset a-fib    UTI (urinary tract infection)    Hypokalemia    Chronic diastolic CHF (congestive heart failure)    Acute ischemic stroke    Chest pain    GI bleed    Closed fibular fracture    Anemia    Hyponatremia    Orthostatic dizziness    Frequent falls    Generalized weakness     Past Medical History:   Diagnosis Date    A-fib     Arthritis     Breast cancer     CHF (congestive heart failure)     CVA (cerebral vascular accident) 2019    History of pulmonary embolus (PE)     Hyperlipidemia     Hypertension     Hyperthyroidism     patient has hypothyroidism    Hypothyroidism      Past Surgical History:   Procedure Laterality Date    BREAST BIOPSY      BREAST SURGERY Right     CHOLECYSTECTOMY      COLON  SURGERY      Removed    COLONOSCOPY      COLONOSCOPY N/A 11/3/2022    Procedure: COLONOSCOPY to anastamosis with biopsies and cold snare polypectomy;  Surgeon: Saroj Oakes MD;  Location: [facility] ENDOSCOPY;  Service: Gastroenterology;  Laterality: N/A;  PRe: rectal bleeding  Post: hemorrhoids, diverticulosis, anastamotic ulcer, polyp, hemostasis clip free-floating    HYSTERECTOMY      MAMMO BILATERAL  2015    MASTECTOMY      TONSILLECTOMY        General Information       Row Name 07/08/24 0959          OT Time and Intention    Document Type evaluation  -SP     Mode of Treatment occupational therapy  -SP       Row Name 07/08/24 0959          General Information    Patient Profile Reviewed yes  -SP     Prior Level of Function independent:;dressing;bathing  Pt reports she is IND for dressing and bathing, PRN assist as needed. Occasionaly cooks, dining cruz most of the time. Uses rollator for ambulation.  -SP     Existing Precautions/Restrictions fall;oxygen therapy device and L/min  No O2 needs at baseline, on 2L O2NC 97%.  -SP     Barriers to Rehab medically complex  -SP       Row Name 07/08/24 0959          Occupational Profile    Reason for Services/Referral (Occupational Profile) Pt is a 85 y/o female admitted to [facility] on 7/5/24 with hospital dx of sepsis, generalized weakness/deconditioning, and orthostatic hypotension. Reports she has fallen 4 times in the last month. XR chest: (-). CT head: chronic senescent changes. CT thoracic/lumbar spine: (-). PMHx significant for CAD, paroxysmal atrial fibrillation on Eliquis, congestive heart failure diastolic, and hypertension. At baseline pt resides in residential, typically IND for dressing and bathing, PRN assist as needed for I/ADLs, uses rollator for ambulation.  -SP       Row Name 07/08/24 0959          Living Environment    People in Home facility resident;other (see comments)  residential  -SP       Row Name 07/08/24 0959          Home Main Entrance    Number of Stairs,  Main Entrance none  -SP     Stair Railings, Main Entrance none  -SP       Row Name 07/08/24 0959          Stairs Within Home, Primary    Number of Stairs, Within Home, Primary none  -SP       Row Name 07/08/24 0959          Cognition    Orientation Status (Cognition) oriented x 4  -SP       Row Name 07/08/24 0959          Safety Issues, Functional Mobility    Safety Issues Affecting Function (Mobility) awareness of need for assistance;sequencing abilities;insight into deficits/self-awareness  -SP     Impairments Affecting Function (Mobility) balance;endurance/activity tolerance;pain;strength;shortness of breath;range of motion (ROM);visual/perceptual  -SP               User Key  (r) = Recorded By, (t) = Taken By, (c) = Cosigned By      Initials Name Provider Type    SP Luke Goldstein OT Occupational Therapist                     Mobility/ADL's       Row Name 07/08/24 1011          Bed Mobility    Bed Mobility bed mobility (all) activities  -SP     All Activities, Drew (Bed Mobility) minimum assist (75% patient effort);1 person assist  -SP     Bed Mobility, Safety Issues decreased use of arms for pushing/pulling;decreased use of legs for bridging/pushing  -SP     Assistive Device (Bed Mobility) bed rails  -SP       Row Name 07/08/24 1011          Bed-Chair Transfer    Bed-Chair Drew (Transfers) minimum assist (75% patient effort);1 person assist  -SP       Row Name 07/08/24 1011          Sit-Stand Transfer    Sit-Stand Drew (Transfers) minimum assist (75% patient effort);1 person assist  -SP       Row Name 07/08/24 1011          Functional Mobility    Patient was able to Ambulate no, other medical factors prevent ambulation  -SP     Reason Patient was unable to Ambulate Excessive Weakness  -SP               User Key  (r) = Recorded By, (t) = Taken By, (c) = Cosigned By      Initials Name Provider Type    SP Luke Goldstein OT Occupational Therapist                   Obj/Interventions        Row Name 07/08/24 1011          Sensory Assessment (Somatosensory)    Sensory Assessment (Somatosensory) UE sensation intact  -SP       [facility] Name 07/08/24 1011          Vision Assessment/Intervention    Visual Impairment/Limitations visual/perceptual impairments present  -SP     Vision Assessment Comment reports macular degeneration  -SP       [facility] Name 07/08/24 1011          Range of Motion Comprehensive    Comment, General Range of Motion BUE shoulders 90*  -SP       [facility] Name 07/08/24 1011          Strength Comprehensive (MMT)    Comment, General Manual Muscle Testing (MMT) Assessment BUE grossly 3/5  -SP       Row Name 07/08/24 1011          Balance    Balance Assessment sitting static balance;sitting dynamic balance;sit to stand dynamic balance;standing static balance  -SP     Static Sitting Balance supervision  -SP     Dynamic Sitting Balance standby assist  -SP     Position, Sitting Balance unsupported;sitting edge of bed  -SP     Static Standing Balance contact guard  -SP     Dynamic Standing Balance minimal assist  -SP     Position/Device Used, Standing Balance supported  -SP               User Key  (r) = Recorded By, (t) = Taken By, (c) = Cosigned By      Initials Name Provider Type    SP Luke Goldstein, OT Occupational Therapist                   Goals/Plan       [facility] Name 07/08/24 1014          Bathing Goal 1 (OT)    Activity/Device (Bathing Goal 1, OT) bathing skills, all  -SP     [place] Level/Cues Needed (Bathing Goal 1, OT) modified independence  -SP     Time Frame (Bathing Goal 1, OT) 2 weeks  -SP     Strategies/Barriers (Bathing Goal 1, OT) until d/c  -SP       [facility] Name 07/08/24 1014          Dressing Goal 1 (OT)    Activity/Device (Dressing Goal 1, OT) dressing skills, all  -SP     [place]/Cues Needed (Dressing Goal 1, OT) modified independence  -SP     Time Frame (Dressing Goal 1, OT) 2 weeks  -SP     Strategies/Barriers (Dressing Goal 1, OT) until d/c  -SP       [facility] Name 07/08/24 1014           Strength Goal 1 (OT)    Strength Goal 1 (OT) Pt will demo good understanding of BUE HEP  -SP     Time Frame (Strength Goal 1, OT) 2 weeks  -SP     Strategies/Barriers (Strength Goal 1, OT) until d/c  -SP       Row Name 07/08/24 1014          Therapy Assessment/Plan (OT)    Planned Therapy Interventions (OT) activity tolerance training;BADL retraining;occupation/activity based interventions;patient/caregiver education/training;ROM/therapeutic exercise;strengthening exercise;transfer/mobility retraining;IADL retraining  -SP               User Key  (r) = Recorded By, (t) = Taken By, (c) = Cosigned By      Initials Name Provider Type    SP Luke Goldstein, WILMA Occupational Therapist                   Clinical Impression       Row Name 07/08/24 1013          Pain Assessment    Pretreatment Pain Rating 9/10  -SP     Posttreatment Pain Rating 9/10  -SP     Pain Location lower  -SP     Pain Location - back  -SP     Pain Intervention(s) Repositioned;Therapeutic presence  -SP       Row Name 07/08/24 1013          Plan of Care Review    Plan of Care Reviewed With patient;daughter  -SP     Outcome Evaluation Pt is a 85 y/o female admitted to [facility] on 7/5/24 with hospital dx of sepsis, generalized weakness/deconditioning, and orthostatic hypotension. Reports she has fallen 4 times in the last month. XR chest: (-). CT head: chronic senescent changes. CT thoracic/lumbar spine: (-). PMHx significant for CAD, paroxysmal atrial fibrillation on Eliquis, congestive heart failure diastolic, and hypertension. At baseline pt resides in [place], typically IND for dressing and bathing, PRN assist as needed for I/ADLs, uses rollator for ambulation. Upon eval, pt is A&O X4 with reports of 9/10 pain in lower back with movement. Pt presents on 2L O2 NC at 97%. Pt completes bed mobility and functional transfers with min A x1 this date. BP supine 170/99, /107, after stand pivot t/f to chair 153/102. RO BUE 90*, MMT grossly 3/5. Pt  additionally reports dx of macular degeneration. OT will continue to follow for tx as pt has generalized weakness & dec endurance. Recommend SNF upon discharge.  -SP       Row Name 07/08/24 1013          Therapy Assessment/Plan (OT)    Criteria for Skilled Therapeutic Interventions Met (OT) yes;meets criteria;skilled treatment is necessary  -SP     Therapy Frequency (OT) 3 times/wk  -SP     Predicted Duration of Therapy Intervention (OT) until d/c  -SP       Row Name 07/08/24 1013          Therapy Plan Review/Discharge Plan (OT)    Anticipated Discharge Disposition (OT) skilled nursing facility  -SP       Row Name 07/08/24 1013          Vital Signs    Pre Systolic BP Rehab 170  -SP     Pre Treatment Diastolic BP 99  -SP     Intra Systolic BP Rehab 158  -SP     Intra Treatment Diastolic   -SP     Post Systolic BP Rehab 153  -SP     Post Treatment Diastolic   -SP     O2 Delivery Pre Treatment supplemental O2  -SP     O2 Delivery Intra Treatment supplemental O2  -SP     Post SpO2 (%) 97  -SP     O2 Delivery Post Treatment supplemental O2  -SP     Pre Patient Position Supine  -SP     Intra Patient Position Standing  -SP     Post Patient Position Sitting  -SP       Row Name 07/08/24 1013          Positioning and Restraints    Pre-Treatment Position in bed  -SP     Post Treatment Position chair  -SP     In Chair notified nsg;sitting;call light within reach;encouraged to call for assist;exit alarm on;with family/caregiver  -SP               User Key  (r) = Recorded By, (t) = Taken By, (c) = Cosigned By      Initials Name Provider Type    SP Luke Goldstein, OT Occupational Therapist                   Outcome Measures       Row Name 07/08/24 1015          How much help from another is currently needed...    Putting on and taking off regular lower body clothing? 3  -SP     Bathing (including washing, rinsing, and drying) 3  -SP     Toileting (which includes using toilet bed pan or urinal) 3  -SP     Putting on  and taking off regular upper body clothing 3  -SP     Taking care of personal grooming (such as brushing teeth) 4  -SP     Eating meals 4  -SP     AM-PAC 6 Clicks Score (OT) 20  -SP       Row Name 07/08/24 0810          How much help from another person do you currently need...    Turning from your back to your side while in flat bed without using bedrails? 2  -DP     Moving from lying on back to sitting on the side of a flat bed without bedrails? 2  -DP     Moving to and from a bed to a chair (including a wheelchair)? 2  -DP     Standing up from a chair using your arms (e.g., wheelchair, bedside chair)? 2  -DP     Climbing 3-5 steps with a railing? 1  -DP     To walk in hospital room? 2  -DP     AM-PAC 6 Clicks Score (PT) 11  -DP     Highest Level of Mobility Goal 4 --> Transfer to chair/commode  -DP       Row Name 07/08/24 1015          Functional Assessment    Outcome Measure Options AM-PAC 6 Clicks Daily Activity (OT)  -SP               User Key  (r) = Recorded By, (t) = Taken By, (c) = Cosigned By      Initials Name Provider Type    Destiny Salinas, RN Registered Nurse    Luke Min, WILMA Occupational Therapist                    Occupational Therapy Education       Title: PT OT SLP Therapies (In Progress)       Topic: Occupational Therapy (In Progress)       Point: ADL training (Done)       Description:   Instruct learner(s) on proper safety adaptation and remediation techniques during self care or transfers.   Instruct in proper use of assistive devices.                  Learning Progress Summary             Patient Acceptance, E,TB, VU by SP at 7/8/2024 1015                         Point: Home exercise program (Done)       Description:   Instruct learner(s) on appropriate technique for monitoring, assisting and/or progressing therapeutic exercises/activities.                  Learning Progress Summary             Patient Acceptance, E,TB, VU by SP at 7/8/2024 1015                         Point:  Precautions (Done)       Description:   Instruct learner(s) on prescribed precautions during self-care and functional transfers.                  Learning Progress Summary             Patient Acceptance, E,TB, VU by SP at 7/8/2024 1015                         Point: Body mechanics (Not Started)       Description:   Instruct learner(s) on proper positioning and spine alignment during self-care, functional mobility activities and/or exercises.                  Learner Progress:  Not documented in this visit.                              User Key       Initials Effective Dates Name Provider Type Discipline    ROSE MARY 11/15/23 -  Luke Goldstein, WILMA Occupational Therapist OT                  OT Recommendation and Plan  Planned Therapy Interventions (OT): activity tolerance training, BADL retraining, occupation/activity based interventions, patient/caregiver education/training, ROM/therapeutic exercise, strengthening exercise, transfer/mobility retraining, IADL retraining  Therapy Frequency (OT): 3 times/wk  Plan of Care Review  Plan of Care Reviewed With: patient, daughter  Outcome Evaluation: Pt is a 83 y/o female admitted to [facility] on 7/5/24 with hospital dx of sepsis, generalized weakness/deconditioning, and orthostatic hypotension. Reports she has fallen 4 times in the last month. XR chest: (-). CT head: chronic senescent changes. CT thoracic/lumbar spine: (-). PMHx significant for CAD, paroxysmal atrial fibrillation on Eliquis, congestive heart failure diastolic, and hypertension. At baseline pt resides in [place], typically IND for dressing and bathing, PRN assist as needed for I/ADLs, uses rollator for ambulation. Upon eval, pt is A&O X4 with reports of 9/10 pain in lower back with movement. Pt presents on 2L O2 NC at 97%. Pt completes bed mobility and functional transfers with min A x1 this date. BP supine 170/99, /107, after stand pivot t/f to chair 153/102. RO BUE 90*, MMT grossly 3/5. Pt additionally reports dx of  macular degeneration. OT will continue to follow for tx as pt has generalized weakness & dec endurance. Recommend SNF upon discharge.     Time Calculation:         Time Calculation- OT       Row Name 07/08/24 1015             Time Calculation- OT    OT Start Time 0857  -SP      OT Stop Time 0928  -SP      OT Time Calculation (min) 31 min  -SP      OT Received On 07/08/24  -SP      OT - Next Appointment 07/10/24  -SP      OT Goal Re-Cert Due Date 07/22/24  -SP                User Key  (r) = Recorded By, (t) = Taken By, (c) = Cosigned By      Initials Name Provider Type    SP Luke Goldstein OT Occupational Therapist                  Therapy Charges for Today       Code Description Service Date Service Provider Modifiers Qty    [id number] HC OT EVAL MOD COMPLEXITY 4 7/8/2024 Luke Goldstein OT GO 1                 Luke Goldstein OT  7/8/2024

## 2024-07-09 PROBLEM — E87.6 HYPOKALEMIA: Status: RESOLVED | Noted: 2019-09-04 | Resolved: 2024-07-09

## 2024-07-09 PROBLEM — I50.32 CHRONIC HEART FAILURE WITH PRESERVED EJECTION FRACTION (HFPEF): Status: ACTIVE | Noted: 2024-07-09

## 2024-07-09 LAB
ALBUMIN SERPL-MCNC: 3.1 G/DL (ref 3.5–5.2)
ALBUMIN/GLOB SERPL: 1.1 G/DL
ALP SERPL-CCNC: 74 U/L (ref 39–117)
ALT SERPL W P-5'-P-CCNC: 12 U/L (ref 1–33)
ANION GAP SERPL CALCULATED.3IONS-SCNC: 7.8 MMOL/L (ref 5–15)
AST SERPL-CCNC: 36 U/L (ref 1–32)
BASOPHILS # BLD AUTO: 0.04 10*3/MM3 (ref 0–0.2)
BASOPHILS NFR BLD AUTO: 0.6 % (ref 0–1.5)
BILIRUB SERPL-MCNC: 0.7 MG/DL (ref 0–1.2)
BUN SERPL-MCNC: 11 MG/DL (ref 8–23)
BUN/CREAT SERPL: 12.8 (ref 7–25)
CALCIUM SPEC-SCNC: 10 MG/DL (ref 8.6–10.5)
CHLORIDE SERPL-SCNC: 100 MMOL/L (ref 98–107)
CO2 SERPL-SCNC: 29.2 MMOL/L (ref 22–29)
CREAT SERPL-MCNC: 0.86 MG/DL (ref 0.57–1)
DEPRECATED RDW RBC AUTO: 48.4 FL (ref 37–54)
EGFRCR SERPLBLD CKD-EPI 2021: 66.3 ML/MIN/1.73
EOSINOPHIL # BLD AUTO: 0.2 10*3/MM3 (ref 0–0.4)
EOSINOPHIL NFR BLD AUTO: 2.8 % (ref 0.3–6.2)
ERYTHROCYTE [DISTWIDTH] IN BLOOD BY AUTOMATED COUNT: 15.5 % (ref 12.3–15.4)
GLOBULIN UR ELPH-MCNC: 2.9 GM/DL
GLUCOSE SERPL-MCNC: 103 MG/DL (ref 65–99)
HCT VFR BLD AUTO: 31.9 % (ref 34–46.6)
HGB BLD-MCNC: 10.9 G/DL (ref 12–15.9)
IMM GRANULOCYTES # BLD AUTO: 0.04 10*3/MM3 (ref 0–0.05)
IMM GRANULOCYTES NFR BLD AUTO: 0.6 % (ref 0–0.5)
LYMPHOCYTES # BLD AUTO: 1.38 10*3/MM3 (ref 0.7–3.1)
LYMPHOCYTES NFR BLD AUTO: 19.4 % (ref 19.6–45.3)
MCH RBC QN AUTO: 30.4 PG (ref 26.6–33)
MCHC RBC AUTO-ENTMCNC: 34.2 G/DL (ref 31.5–35.7)
MCV RBC AUTO: 89.1 FL (ref 79–97)
MONOCYTES # BLD AUTO: 0.92 10*3/MM3 (ref 0.1–0.9)
MONOCYTES NFR BLD AUTO: 13 % (ref 5–12)
NEUTROPHILS NFR BLD AUTO: 4.52 10*3/MM3 (ref 1.7–7)
NEUTROPHILS NFR BLD AUTO: 63.6 % (ref 42.7–76)
NRBC BLD AUTO-RTO: 0 /100 WBC (ref 0–0.2)
PLATELET # BLD AUTO: 195 10*3/MM3 (ref 140–450)
PMV BLD AUTO: 12.3 FL (ref 6–12)
POTASSIUM SERPL-SCNC: 4.2 MMOL/L (ref 3.5–5.2)
PROT SERPL-MCNC: 6 G/DL (ref 6–8.5)
RBC # BLD AUTO: 3.58 10*6/MM3 (ref 3.77–5.28)
SODIUM SERPL-SCNC: 137 MMOL/L (ref 136–145)
WBC NRBC COR # BLD AUTO: 7.1 10*3/MM3 (ref 3.4–10.8)

## 2024-07-09 PROCEDURE — 85025 COMPLETE CBC W/AUTO DIFF WBC: CPT | Performed by: NURSE PRACTITIONER

## 2024-07-09 PROCEDURE — 80235 DRUG ASSAY LACOSAMIDE: CPT

## 2024-07-09 PROCEDURE — 93005 ELECTROCARDIOGRAM TRACING: CPT

## 2024-07-09 PROCEDURE — 93010 ELECTROCARDIOGRAM REPORT: CPT | Performed by: INTERNAL MEDICINE

## 2024-07-09 PROCEDURE — 80053 COMPREHEN METABOLIC PANEL: CPT | Performed by: NURSE PRACTITIONER

## 2024-07-09 PROCEDURE — 99233 SBSQ HOSP IP/OBS HIGH 50: CPT | Performed by: INTERNAL MEDICINE

## 2024-07-09 PROCEDURE — 36415 COLL VENOUS BLD VENIPUNCTURE: CPT | Performed by: NURSE PRACTITIONER

## 2024-07-09 RX ORDER — MIDODRINE HYDROCHLORIDE 5 MG/1
10 TABLET ORAL
Status: DISCONTINUED | OUTPATIENT
Start: 2024-07-09 | End: 2024-07-10

## 2024-07-09 RX ORDER — LEVOTHYROXINE SODIUM 0.07 MG/1
75 TABLET ORAL
Qty: 30 TABLET | Refills: 0 | Status: SHIPPED | OUTPATIENT
Start: 2024-07-12 | End: 2024-08-11

## 2024-07-09 RX ORDER — MIDODRINE HYDROCHLORIDE 5 MG/1
5 TABLET ORAL
Qty: 90 TABLET | Refills: 0 | Status: SHIPPED | OUTPATIENT
Start: 2024-07-09 | End: 2024-07-11 | Stop reason: HOSPADM

## 2024-07-09 RX ORDER — AMLODIPINE BESYLATE 2.5 MG/1
2.5 TABLET ORAL
Status: DISCONTINUED | OUTPATIENT
Start: 2024-07-09 | End: 2024-07-11 | Stop reason: HOSPADM

## 2024-07-09 RX ADMIN — Medication 1000 UNITS: at 09:36

## 2024-07-09 RX ADMIN — POTASSIUM CHLORIDE 40 MEQ: 1.5 POWDER, FOR SOLUTION ORAL at 20:40

## 2024-07-09 RX ADMIN — Medication 1 TABLET: at 09:36

## 2024-07-09 RX ADMIN — AMLODIPINE BESYLATE 2.5 MG: 2.5 TABLET ORAL at 12:37

## 2024-07-09 RX ADMIN — MIDODRINE HYDROCHLORIDE 10 MG: 5 TABLET ORAL at 17:31

## 2024-07-09 RX ADMIN — MIDODRINE HYDROCHLORIDE 5 MG: 5 TABLET ORAL at 12:37

## 2024-07-09 RX ADMIN — CYANOCOBALAMIN TAB 1000 MCG 1000 MCG: 1000 TAB at 09:36

## 2024-07-09 RX ADMIN — LACOSAMIDE 50 MG: 100 TABLET, FILM COATED ORAL at 09:36

## 2024-07-09 RX ADMIN — APIXABAN 5 MG: 5 TABLET, FILM COATED ORAL at 09:36

## 2024-07-09 RX ADMIN — ATORVASTATIN CALCIUM 10 MG: 10 TABLET, FILM COATED ORAL at 20:40

## 2024-07-09 RX ADMIN — OXYCODONE HYDROCHLORIDE AND ACETAMINOPHEN 1000 MG: 500 TABLET ORAL at 09:36

## 2024-07-09 RX ADMIN — ANTI-FUNGAL POWDER MICONAZOLE NITRATE TALC FREE 1 APPLICATION: 1.42 POWDER TOPICAL at 20:40

## 2024-07-09 RX ADMIN — Medication 10 ML: at 09:37

## 2024-07-09 RX ADMIN — EMPAGLIFLOZIN 10 MG: 10 TABLET, FILM COATED ORAL at 09:36

## 2024-07-09 RX ADMIN — RANOLAZINE 500 MG: 500 TABLET, EXTENDED RELEASE ORAL at 09:36

## 2024-07-09 RX ADMIN — RANOLAZINE 500 MG: 500 TABLET, EXTENDED RELEASE ORAL at 20:40

## 2024-07-09 RX ADMIN — MIDODRINE HYDROCHLORIDE 5 MG: 5 TABLET ORAL at 09:36

## 2024-07-09 RX ADMIN — APIXABAN 5 MG: 5 TABLET, FILM COATED ORAL at 20:40

## 2024-07-09 NOTE — PROGRESS NOTES
.    [facility] MEDICINE SERVICE  DAILY PROGRESS NOTE    NAME: Kendy Worrell  : 1940  MRN: [id number]      LOS: 3 days     PROVIDER OF SERVICE: Arcelia Robertson MD    Chief Complaint: Sepsis, unspecified organism    Subjective:     Interval History:  History taken from: patient  Patient  seen and examined at bedside.  She was resting comfortably on 1.5L O2 via NC, daughter was present at bedside.  Discussed at great length pathophysiology of orthostatic hypotension as well as reviewed all medications with patient and her daughter.  Discontinue discharge to SNF as patient remains orthostatic.     Review of Systems:   Review of Systems Negative except as mentioned above.    Objective:     Vital Signs  Temp:  [97.4 °F (36.3 °C)-98 °F (36.7 °C)] 97.4 °F (36.3 °C)  Heart Rate:  [67-88] 88  Resp:  [23-24] 23  BP: (131-157)/(79-93) 157/93  Flow (L/min):  [1.5-2] 1.5   Body mass index is 27.96 kg/m².    Physical Exam  Physical Exam  Appearance: Normal appearance.   HENT:      Head: Normocephalic and atraumatic.      Mouth/Throat:      Mouth: Mucous membranes are moist.  Eyes:      Extraocular Movements: Extraocular movements intact.   Cardiovascular:      Rate and Rhythm: Normal rate and regular rhythm. +ve orthostatic vitals  Pulmonary:      Effort: Pulmonary effort is normal.      Breath sounds: Normal breath sounds.   Abdominal:      General: Abdomen is flat.      Palpations: Abdomen is soft.      Tenderness: There is no abdominal tenderness.   Musculoskeletal:      Cervical back: Normal range of motion.      Right lower leg: No edema.      Left lower leg: No edema.   Skin:     General: Skin is warm.   Neurological:      General: No focal deficit present.      Mental Status: She is alert.     Scheduled Meds   amLODIPine, 2.5 mg, Oral, Q24H  apixaban, 5 mg, Oral, Q12H  ascorbic acid, 1,000 mg, Oral, Daily  atorvastatin, 10 mg, Oral, Nightly  cholecalciferol, 1,000 Units, Oral, Daily  empagliflozin, 10 mg, Oral,  Daily  lacosamide, 50 mg, Oral, Daily  [START ON 7/12/2024] levothyroxine, 75 mcg, Oral, Q AM  midodrine, 5 mg, Oral, TID AC  multivitamin with minerals, 1 tablet, Oral, Daily  potassium chloride, 40 mEq, Oral, TID  ranolazine, 500 mg, Oral, BID  sodium chloride, 10 mL, Intravenous, Q12H  vitamin B-12, 1,000 mcg, Oral, Daily       PRN Meds     acetaminophen    albuterol sulfate HFA    aluminum-magnesium hydroxide-simethicone    senna-docusate sodium **AND** polyethylene glycol **AND** bisacodyl **AND** bisacodyl    Calcium Replacement - Follow Nurse / BPA Driven Protocol    Magnesium Standard Dose Replacement - Follow Nurse / BPA Driven Protocol    melatonin    nystatin    Phosphorus Replacement - Follow Nurse / BPA Driven Protocol    Potassium Replacement - Follow Nurse / BPA Driven Protocol    prochlorperazine    sodium chloride    sodium chloride   Infusions  dextrose 5 % and sodium chloride 0.45 %, 50 mL/hr, Last Rate: 50 mL/hr (07/08/24 1516)          Diagnostic Data    Results from last 7 days   Lab Units 07/09/24  0609 07/09/24  0216   WBC 10*3/mm3  --  7.10   HEMOGLOBIN g/dL  --  10.9*   HEMATOCRIT %  --  31.9*   PLATELETS 10*3/mm3  --  195   GLUCOSE mg/dL 103*  --    CREATININE mg/dL 0.86  --    BUN mg/dL 11  --    SODIUM mmol/L 137  --    POTASSIUM mmol/L 4.2  --    AST (SGOT) U/L 36*  --    ALT (SGPT) U/L 12  --    ALK PHOS U/L 74  --    BILIRUBIN mg/dL 0.7  --    ANION GAP mmol/L 7.8  --        No radiology results for the last day      I reviewed the patient's new clinical results.    Assessment/Plan:     Active and Resolved Problems  Active Hospital Problems    Diagnosis  POA    Chronic heart failure with preserved ejection fraction (HFpEF) [I50.32]  Yes    Frequent falls [R29.6]  Not Applicable    Generalized weakness [R53.1]  Yes    Persistent atrial fibrillation [I48.19]  Yes    Chronic diastolic CHF (congestive heart failure) [I50.32]  Yes    Hyperlipidemia [E78.5]  Yes    Coronary artery disease  involving native coronary artery of native heart without angina pectoris [I25.10]  Yes      Resolved Hospital Problems    Diagnosis Date Resolved POA    **Sepsis, unspecified organism [A41.9] 07/06/2024 Yes    Orthostatic hypotension [I95.1] 07/06/2024 Unknown    Hypokalemia [E87.6] 07/09/2024 Yes       Frequent falls; in setting of orthostatic hypotension  Debility/generalized weakness  -Reviewed CT head, lumbar spine, thoracic spine -no acute findings  -+ ve orthostatic hypotension, increase midodrine.  -Continuous IVFs for now, monitor for signs of fluid overload with her hx of HFpEF, CKD     Hypokalemia; resolved  -Receiving potassium replacement p.o.  She cannot tolerate IV potassium due to burning.  -Recheck potassium level  -Hold torsemide  -Continuous cardiac monitoring     Chronic kidney disease  -Stable  -Monitor     Hypertension  -Hold antihypertensives for now     HFpEF  -Appears hypovolemic, diuretics on hold   -BB on hold      Paroxysmal atrial fibrillation  -HR controlled, BB on hold  -Continue Eliquis  -Cardiology recommending evaluation to see if patient is watchman candidate given safety profile of continuing Eliquis with frequent falls.     Hypothyroidism  -TSH slightly low, Free T4 elevated.  -Holding levothyroxine in setting of hyperthyroid state will restart at lower dose on 7/12 per endocrinology recommendations.     Seizures  -Continue Vimpat     H/o CVA  -No focal neurological deficits noted       VTE Prophylaxis:  Pharmacologic VTE prophylaxis orders are present.         Code status is   Code Status and Medical Interventions:   Ordered at: 07/06/24 0004     Medical Intervention Limits:    No intubation (DNI)     Level Of Support Discussed With:    Patient     Code Status (Patient has no pulse and is not breathing):    No CPR (Do Not Attempt to Resuscitate)     Medical Interventions (Patient has pulse or is breathing):    Limited Support       Plan for disposition: SNF in 1-2 days    Time:  30 minutes    Signature: Electronically signed by Arcelia Robertson MD, 07/09/24, 13:32 EDT.  [facility]

## 2024-07-09 NOTE — PLAN OF CARE
Problem: Adult Inpatient Plan of Care  Goal: Plan of Care Review  7/9/2024 1633 by Destiny Alexis RN  Outcome: Ongoing, Progressing  7/9/2024 1632 by Destiny Alexis RN  Outcome: Ongoing, Progressing  Goal: Patient-Specific Goal (Individualized)  7/9/2024 1633 by Destiny Alexis RN  Outcome: Ongoing, Progressing  7/9/2024 1632 by Destiny Alexis RN  Outcome: Ongoing, Progressing  Goal: Absence of Hospital-Acquired Illness or Injury  7/9/2024 1633 by Destiny Alexis RN  Outcome: Ongoing, Progressing  7/9/2024 1632 by Destiny Alexis RN  Outcome: Ongoing, Progressing  Intervention: Identify and Manage Fall Risk  Recent Flowsheet Documentation  Taken 7/9/2024 1600 by Destiny Alexis RN  Safety Promotion/Fall Prevention: safety round/check completed  Taken 7/9/2024 1400 by Destiny Alexis RN  Safety Promotion/Fall Prevention: safety round/check completed  Taken 7/9/2024 1200 by Destiny Alexis RN  Safety Promotion/Fall Prevention: safety round/check completed  Taken 7/9/2024 1000 by Destiny Alexis RN  Safety Promotion/Fall Prevention: safety round/check completed  Taken 7/9/2024 0844 by Destiny Alexis RN  Safety Promotion/Fall Prevention: safety round/check completed  Goal: Optimal Comfort and Wellbeing  7/9/2024 1633 by Destiny Alexis RN  Outcome: Ongoing, Progressing  7/9/2024 1632 by Destiny Alexis RN  Outcome: Ongoing, Progressing  Goal: Readiness for Transition of Care  7/9/2024 1633 by Destiny Alexis RN  Outcome: Ongoing, Progressing  7/9/2024 1632 by Destiny Alexis RN  Outcome: Ongoing, Progressing     Problem: Fall Injury Risk  Goal: Absence of Fall and Fall-Related Injury  7/9/2024 1633 by Destiny Alexis RN  Outcome: Ongoing, Progressing  7/9/2024 1632 by Destiny Alexis RN  Outcome: Ongoing, Progressing  Intervention: Promote Injury-Free Environment  Recent Flowsheet Documentation  Taken 7/9/2024 1600 by Destiny Alexis RN  Safety Promotion/Fall Prevention: safety  round/check completed  Taken 7/9/2024 1400 by Destiny Alexis RN  Safety Promotion/Fall Prevention: safety round/check completed  Taken 7/9/2024 1200 by Destiny Alexis RN  Safety Promotion/Fall Prevention: safety round/check completed  Taken 7/9/2024 1000 by Destiny Alexis RN  Safety Promotion/Fall Prevention: safety round/check completed  Taken 7/9/2024 0844 by Destiny Alexis RN  Safety Promotion/Fall Prevention: safety round/check completed     Problem: Impaired Wound Healing  Goal: Optimal Wound Healing  7/9/2024 1633 by Destiny Alexis RN  Outcome: Ongoing, Progressing  7/9/2024 1632 by Destiny Alexis RN  Outcome: Ongoing, Progressing     Problem: Heart Failure Comorbidity  Goal: Maintenance of Heart Failure Symptom Control  7/9/2024 1633 by Destiny Alexis RN  Outcome: Ongoing, Progressing  7/9/2024 1632 by Destiny Alexis RN  Outcome: Ongoing, Progressing     Problem: Hypertension Comorbidity  Goal: Blood Pressure in Desired Range  7/9/2024 1633 by Destiny Alexis RN  Outcome: Ongoing, Progressing  7/9/2024 1632 by Destiny Alexis RN  Outcome: Ongoing, Progressing     Problem: Skin Injury Risk Increased  Goal: Skin Health and Integrity  7/9/2024 1633 by Destiny Alexis RN  Outcome: Ongoing, Progressing  7/9/2024 1632 by Destiny Alexis RN  Outcome: Ongoing, Progressing   Goal Outcome Evaluation:

## 2024-07-09 NOTE — CASE MANAGEMENT/SOCIAL WORK
Continued Stay Note   David     Patient Name: Kendy Worrell  MRN: [id number]  Today's Date: 7/9/2024    Admit Date: 7/5/2024    Plan: [facility] accepted. Bed ready 7/9. No precert required. PASRR approved.   Discharge Plan       Row Name 07/09/24 0851       Plan    Plan [facility] accepted. Bed ready 7/9. No precert required. PASRR approved.    Patient/Family in Agreement with Plan yes    Plan Comments CM received confirmation from [facility] liaison that they can accept pt and would have bed ready 7/9. Updated pharmacy to [facility]. Secure chat sent to hospitalist Dr Robertson and nursing and plan is to discharge today. Facility is going to see if they have w/c  available to pick pt up. IMM letter delivered to pt at bedside; daughter Ramandeep not present at this time.             Megan Naegele, RN     Office Phone: [phone]  Office Cell: [phone]

## 2024-07-09 NOTE — PLAN OF CARE
Goal Outcome Evaluation:  Plan of Care Reviewed With: patient        Progress: no change  Outcome Evaluation: pt has slept on and off throughout the night, no new complaints at this time, pt reminded/turned throughout the night, pt remains on IV fluids-iv replaced this evening, will d/c to snf when appropriate-ref to [facility] pending

## 2024-07-09 NOTE — CONSULTS
Nutrition Services    Patient Name: Kendy Worrell  YOB: 1940  MRN: [id number]  Admission date: 7/5/2024    Comment:  -- Diet per SLP    -- Add Vanilla Boost Glucose Control BID (Provides 380 kcals, 32 g protein if consumed)       CLINICAL NUTRITION ASSESSMENT      Reason for Assessment 7/9: Wound      H&P      Past Medical History:   Diagnosis Date    A-fib     Arthritis     Breast cancer     CHF (congestive heart failure)     CVA (cerebral vascular accident) 09/2019    History of pulmonary embolus (PE)     Hyperlipidemia     Hypertension     Hyperthyroidism     patient has hypothyroidism    Hypothyroidism        Past Surgical History:   Procedure Laterality Date    BREAST BIOPSY      BREAST SURGERY Right     CHOLECYSTECTOMY      COLON SURGERY      Removed    COLONOSCOPY      COLONOSCOPY N/A 11/3/2022    Procedure: COLONOSCOPY to anastamosis with biopsies and cold snare polypectomy;  Surgeon: Saroj Oakes MD;  Location: [facility] ENDOSCOPY;  Service: Gastroenterology;  Laterality: N/A;  PRe: rectal bleeding  Post: hemorrhoids, diverticulosis, anastamotic ulcer, polyp, hemostasis clip free-floating    HYSTERECTOMY      MAMMO BILATERAL  2015    MASTECTOMY      TONSILLECTOMY          Current Problems   Lactic acidosis    Multiple falls  Generalized weakness, deconditioning   - PT/OT following    Hypokalemia  - resolved 7/7     CKD     Hypertensions     CAD  Chronic diastolic heart failure  Paroxysmal atrial fibrillation  - cardiology following    Hypothyroidism  - endocrinology following    H/o CVA     Chronic hyponatremia     History of seizures       Encounter Information        Trending Narrative     7/9: Admitted for weakness, falls.  RD visited patient at bedside.  Patient reports good appetite, no N/V, no chewing/swallowing issues noted, recent weight loss due to fluid loss per patient.  NFPE completed and not consistent with nutrition diagnosis of malnutrition at this time using AND/ASPEN  "criteria.         Anthropometrics        Current Height, Weight Height: 154.9 cm (61\")  Weight: 67.1 kg (148 lb) (07/06/24 1427)       Usual Body Weight (UBW) Unable to obtain from patient        Trending Weight Hx     This admission: 7/9: 148#             PTA: 15.4% weight loss x 6 months - significant, patient reports fluid loss  17.7% weight loss x 1 year     Wt Readings from Last 30 Encounters:   07/06/24 1427 67.1 kg (148 lb)   07/05/24 1937 67.1 kg (148 lb)   05/23/24 2352 69.6 kg (153 lb 7 oz)   05/23/24 1421 70 kg (154 lb 5.2 oz)   05/09/24 1053 69.5 kg (153 lb 3.2 oz)   03/21/24 1417 74.8 kg (165 lb)   02/14/24 1317 76.3 kg (168 lb 3.2 oz)   01/30/24 0600 76.7 kg (169 lb 1.5 oz)   01/29/24 0600 75.6 kg (166 lb 10.7 oz)   01/27/24 0542 74.9 kg (165 lb 2 oz)   01/26/24 0512 77 kg (169 lb 12.1 oz)   01/25/24 0552 72.9 kg (160 lb 11.5 oz)   01/23/24 1045 73.7 kg (162 lb 6.4 oz)   01/23/24 0106 76.5 kg (168 lb 10.4 oz)   01/22/24 1338 79.4 kg (175 lb)   08/03/23 0600 85 kg (187 lb 6.3 oz)   08/02/23 0600 84 kg (185 lb 3 oz)   08/01/23 2300 84 kg (185 lb 3 oz)   08/01/23 1529 81.6 kg (180 lb)   11/06/22 0429 88.3 kg (194 lb 9.6 oz)   11/05/22 0500 86 kg (189 lb 9.5 oz)   11/04/22 0522 86.4 kg (190 lb 7.6 oz)   11/03/22 0500 86.8 kg (191 lb 5.8 oz)   11/02/22 0016 89 kg (196 lb 3.2 oz)   11/01/22 1818 81.6 kg (180 lb)   11/18/21 1825 84.8 kg (186 lb 15.2 oz)   11/18/21 1204 81.6 kg (180 lb)   02/18/20 1201 78.5 kg (173 lb)   09/09/19 0324 79 kg (174 lb 3.2 oz)   09/06/19 0521 81.4 kg (179 lb 6.4 oz)   09/05/19 0500 81.5 kg (179 lb 9.6 oz)   09/04/19 2142 79.5 kg (175 lb 3.2 oz)   09/04/19 1520 76.5 kg (168 lb 11.2 oz)   07/09/19 1146 85.7 kg (189 lb)   04/08/19 1056 87.5 kg (193 lb)   01/10/19 1528 85.3 kg (188 lb)   01/04/19 1326 86.2 kg (190 lb)   12/12/18 1515 83.9 kg (185 lb)   09/26/18 1253 86.2 kg (190 lb)   06/20/18 1051 86.2 kg (190 lb)   03/20/18 1118 84.8 kg (187 lb)   12/19/17 1112 84.4 kg (186 lb) "   09/14/17 1058 83.9 kg (185 lb)   06/09/17 1320 85.7 kg (189 lb)   04/03/17 1545 83 kg (183 lb)   03/07/17 1325 85.3 kg (188 lb)   12/01/16 1500 88.5 kg (195 lb)   11/03/16 1434 88.5 kg (195 lb)   10/04/16 1102 86.2 kg (190 lb)   07/01/16 1442 90.7 kg (200 lb)   03/31/16 1104 91.6 kg (202 lb)      BMI kg/m2 Body mass index is 27.96 kg/m².       Labs        Pertinent Labs    Results from last 7 days   Lab Units 07/09/24  0609 07/07/24  2332 07/07/24  1200 07/06/24  2359   SODIUM mmol/L 137 134*  --  135*   POTASSIUM mmol/L 4.2 4.0 2.6* 3.4*   CHLORIDE mmol/L 100 100  --  94*   CO2 mmol/L 29.2* 23.9  --  31.6*   BUN mg/dL 11 13  --  20   CREATININE mg/dL 0.86 1.02*  --  1.27*   CALCIUM mg/dL 10.0 9.6  --  9.7   BILIRUBIN mg/dL 0.7 0.7  --  0.6   ALK PHOS U/L 74 83  --  86   ALT (SGPT) U/L 12 11  --  12   AST (SGOT) U/L 36* 30  --  28   GLUCOSE mg/dL 103* 112*  --  127*     Results from last 7 days   Lab Units 07/09/24  0216 07/06/24  0448 07/05/24 2014   MAGNESIUM mg/dL  --   --  2.6*   HEMOGLOBIN g/dL 10.9*   < > 13.2   HEMATOCRIT % 31.9*   < > 39.2    < > = values in this interval not displayed.     Lab Results   Component Value Date    HGBA1C 5.53 05/23/2024        Medications    Scheduled Medications apixaban, 5 mg, Oral, Q12H  ascorbic acid, 1,000 mg, Oral, Daily  atorvastatin, 10 mg, Oral, Nightly  cholecalciferol, 1,000 Units, Oral, Daily  empagliflozin, 10 mg, Oral, Daily  lacosamide, 50 mg, Oral, Daily  [START ON 7/12/2024] levothyroxine, 75 mcg, Oral, Q AM  midodrine, 5 mg, Oral, TID AC  multivitamin with minerals, 1 tablet, Oral, Daily  potassium chloride, 40 mEq, Oral, TID  ranolazine, 500 mg, Oral, BID  sodium chloride, 10 mL, Intravenous, Q12H  vitamin B-12, 1,000 mcg, Oral, Daily        Infusions dextrose 5 % and sodium chloride 0.45 %, 50 mL/hr, Last Rate: 50 mL/hr (07/08/24 1516)        PRN Medications   acetaminophen    albuterol sulfate HFA    aluminum-magnesium hydroxide-simethicone     senna-docusate sodium **AND** polyethylene glycol **AND** bisacodyl **AND** bisacodyl    Calcium Replacement - Follow Nurse / BPA Driven Protocol    Magnesium Standard Dose Replacement - Follow Nurse / BPA Driven Protocol    melatonin    nystatin    Phosphorus Replacement - Follow Nurse / BPA Driven Protocol    Potassium Replacement - Follow Nurse / BPA Driven Protocol    prochlorperazine    sodium chloride    sodium chloride     Physical Findings        Trending Physical   Appearance, NFPE 7/9: NFPE completed and not consistent with nutrition diagnosis of malnutrition at this time using AND/ASPEN criteria      --  Edema  2+ legs, knees  3+ ankles feet      Bowel Function Last documented BM 7/7 (x 2 days ago)     Tubes No feeding tube      Chewing/Swallowing SLP following, current diet mechanical ground      Skin Stage 2 to wound - no WOCN note at this time      --  Current Nutrition Orders & Evaluation of Intake       Oral Nutrition     Food Allergies NKFA   Current PO Diet Diet: Diabetic; Consistent Carbohydrate; Texture: Mechanical Ground (NDD 2); Fluid Consistency: Thin (IDDSI 0)   Supplement None ordered    PO Evaluation     Trending % PO Intake 7/9: 50% average PO intakes x 3 documented meals since admission    --  Nutritional Risk Screening        NRS-2002 Score          Nutrition Diagnosis         Nutrition Dx Problem 1 Inadequate energy intake related to intake less than needs as evidenced by PO intakes less than 60% since admission.       Nutrition Dx Problem 2        Intervention Goal         Intervention Goal(s) Diet per SLP  PO intakes at least 75%  Stable weight   Accept ONS     Nutrition Intervention        RD Action Add ONS  Completed NFPE     Nutrition Prescription          Diet Prescription Consistent CHO, Mechanical Ground    Supplement Prescription Boost Glucose Control BID (vanilla)   --  Monitor/Evaluation        Monitor Per protocol, I&O, PO intake, Supplement intake, Pertinent labs, Weight,  Skin status, GI status, Symptoms, POC/GOC       Electronically signed by:  France Lyons RD  07/09/24 08:30 EDT

## 2024-07-09 NOTE — PROGRESS NOTES
CARDIOLOGY PROGRESS NOTE:    Kendy Worrell  84 y.o.  female  1940  [id number]      Referring Provider: Hospitalist    Reason for follow-up: This post fall and syncope     Patient Care Team:  Marisela Monte APRN as PCP - General (Nurse Practitioner)  Tone Tubbs MD as Consulting Physician (Cardiology)  Rios Thomas MD as Consulting Physician (Colon and Rectal Surgery)    Subjective .  Patient still continues to have dizziness when she stands up    Objective lying in bed comfortably     Review of Systems   Constitutional: Negative for malaise/fatigue.   Cardiovascular:  Negative for chest pain, dyspnea on exertion, leg swelling and palpitations.   Respiratory:  Negative for cough and shortness of breath.    Gastrointestinal:  Negative for abdominal pain, nausea and vomiting.   Neurological:  Positive for dizziness. Negative for focal weakness, headaches, light-headedness and numbness.   All other systems reviewed and are negative.      Allergies: Aspirin and Nsaids    Scheduled Meds:amLODIPine, 2.5 mg, Oral, Q24H  apixaban, 5 mg, Oral, Q12H  ascorbic acid, 1,000 mg, Oral, Daily  atorvastatin, 10 mg, Oral, Nightly  cholecalciferol, 1,000 Units, Oral, Daily  empagliflozin, 10 mg, Oral, Daily  lacosamide, 50 mg, Oral, Daily  [START ON 7/12/2024] levothyroxine, 75 mcg, Oral, Q AM  midodrine, 5 mg, Oral, TID AC  multivitamin with minerals, 1 tablet, Oral, Daily  potassium chloride, 40 mEq, Oral, TID  ranolazine, 500 mg, Oral, BID  sodium chloride, 10 mL, Intravenous, Q12H  vitamin B-12, 1,000 mcg, Oral, Daily      Continuous Infusions:dextrose 5 % and sodium chloride 0.45 %, 50 mL/hr, Last Rate: 50 mL/hr (07/08/24 1516)      PRN Meds:.  acetaminophen    albuterol sulfate HFA    aluminum-magnesium hydroxide-simethicone    senna-docusate sodium **AND** polyethylene glycol **AND** bisacodyl **AND** bisacodyl    Calcium Replacement - Follow Nurse / BPA Driven Protocol    Magnesium Standard Dose Replacement -  "Follow Nurse / BPA Driven Protocol    melatonin    nystatin    Phosphorus Replacement - Follow Nurse / BPA Driven Protocol    Potassium Replacement - Follow Nurse / BPA Driven Protocol    prochlorperazine    sodium chloride    sodium chloride        VITAL SIGNS  Vitals:    07/08/24 1900 07/08/24 2144 07/09/24 0337 07/09/24 0559   BP: 131/79 143/89 153/89    BP Location: Left arm  Left arm    Patient Position: Lying  Lying    Pulse: 71 67 76    Resp: 24  23    Temp: 97.7 °F (36.5 °C)  98 °F (36.7 °C)    TempSrc: Oral  Oral    SpO2: 99%  100% 97%   Weight:       Height:           Flowsheet Rows      Flowsheet Row First Filed Value   Admission Height 154.9 cm (61\") Documented at 07/05/2024 1915   Admission Weight 67.1 kg (148 lb) Documented at 07/05/2024 1937             TELEMETRY: Atrial fibrillation    Physical Exam:  Constitutional:       Appearance: Well-developed.   Eyes:      General: No scleral icterus.     Conjunctiva/sclera: Conjunctivae normal.   HENT:      Head: Normocephalic and atraumatic.   Neck:      Vascular: No carotid bruit or JVD.   Pulmonary:      Effort: Pulmonary effort is normal.      Breath sounds: Normal breath sounds. No wheezing. No rales.   Cardiovascular:      Normal rate. Regular rhythm.   Pulses:     Intact distal pulses.   Abdominal:      General: Bowel sounds are normal.      Palpations: Abdomen is soft.   Musculoskeletal:      Cervical back: Normal range of motion and neck supple. Skin:     General: Skin is warm and dry.      Findings: No rash.   Neurological:      Mental Status: Alert.          Results Review:   I reviewed the patient's new clinical results.  Lab Results (last 24 hours)       Procedure Component Value Units Date/Time    Comprehensive Metabolic Panel [[id number]]  (Abnormal) Collected: 07/09/24 0609    Specimen: Blood from Arm, Left Updated: 07/09/24 0650     Glucose 103 mg/dL      BUN 11 mg/dL      Creatinine 0.86 mg/dL      Sodium 137 mmol/L      Potassium 4.2 mmol/L  "     Comment: Specimen hemolyzed.  Result may be falsely elevated.        Chloride 100 mmol/L      CO2 29.2 mmol/L      Calcium 10.0 mg/dL      Total Protein 6.0 g/dL      Albumin 3.1 g/dL      ALT (SGPT) 12 U/L      Comment: Specimen hemolyzed.  Result may  be falsely elevated.        AST (SGOT) 36 U/L      Comment: Specimen hemolyzed.  Result may be falsely elevated.        Alkaline Phosphatase 74 U/L      Total Bilirubin 0.7 mg/dL      Globulin 2.9 gm/dL      A/G Ratio 1.1 g/dL      BUN/Creatinine Ratio 12.8     Anion Gap 7.8 mmol/L      eGFR 66.3 mL/min/1.73     Narrative:      GFR Normal >60  Chronic Kidney Disease <60  Kidney Failure <15    The GFR formula is only valid for adults with stable renal function between ages 18 and 70.    CBC Auto Differential [[id number]]  (Abnormal) Collected: 07/09/24 0216    Specimen: Blood Updated: 07/09/24 0247     WBC 7.10 10*3/mm3      RBC 3.58 10*6/mm3      Hemoglobin 10.9 g/dL      Hematocrit 31.9 %      MCV 89.1 fL      MCH 30.4 pg      MCHC 34.2 g/dL      RDW 15.5 %      RDW-SD 48.4 fl      MPV 12.3 fL      Platelets 195 10*3/mm3      Neutrophil % 63.6 %      Lymphocyte % 19.4 %      Monocyte % 13.0 %      Eosinophil % 2.8 %      Basophil % 0.6 %      Immature Grans % 0.6 %      Neutrophils, Absolute 4.52 10*3/mm3      Lymphocytes, Absolute 1.38 10*3/mm3      Monocytes, Absolute 0.92 10*3/mm3      Eosinophils, Absolute 0.20 10*3/mm3      Basophils, Absolute 0.04 10*3/mm3      Immature Grans, Absolute 0.04 10*3/mm3      nRBC 0.0 /100 WBC     Blood Culture - Blood, Arm, Left [[id number]]  (Normal) Collected: 07/05/24 2041    Specimen: Blood from Arm, Left Updated: 07/08/24 2101     Blood Culture No growth at 3 days    Blood Culture - Blood, Arm, Left [[id number]]  (Normal) Collected: 07/05/24 2014    Specimen: Blood from Arm, Left Updated: 07/08/24 2030     Blood Culture No growth at 3 days    T4, Free [[id number]]  (Abnormal) Collected: 07/08/24 1049    Specimen: Blood  Updated: 07/08/24 1137     Free T4 1.80 ng/dL             Imaging Results (Last 24 Hours)       ** No results found for the last 24 hours. **            EKG      I personally viewed and interpreted the patient's EKG/Telemetry data:    ECHOCARDIOGRAM:  Results for orders placed during the hospital encounter of 05/23/24    Adult Transthoracic Echo Complete W/ Cont if Necessary Per Protocol    Interpretation Summary    Left ventricular ejection fraction appears to be 61 - 65%.    Left ventricular wall thickness is consistent with moderate eccentric hypertrophy.    Mild to moderate aortic valve stenosis is present.       STRESS MYOVIEW:  Results for orders placed during the hospital encounter of 11/18/21    Stress Test With Myocardial Perfusion One Day    Interpretation Summary  · Myocardial perfusion imaging indicates a normal myocardial perfusion study with no evidence of ischemia.  · Left ventricular ejection fraction is hyperdynamic (Calculated EF > 70%). .  · Findings consistent with a normal ECG stress test.  · Impressions are consistent with a low risk study.  · There is no prior study available for comparison.       CARDIAC CATHETERIZATION:  No results found for this or any previous visit.       OTHER:         Assessment & Plan     Frequent falls  Generalized weakness  Syncope  Orthostatic hypotension  Patient presented following syncopal episode  Multiple falls over the last few weeks   Troponin slightly elevated, CK negative  PT/OT  continue midodrine  Hold home antihypertensives, diuresis  Recent echocardiogram with LVEF 56 to 60%, grade 1 diastolic dysfunction and negative bubble study  Will discuss with patient's family about continuing Eliquis because of the high risk of bleeding     Hypokalemia  Hyponatremia   Monitor and replace electrolytes as needed  Diuresis on hold  Patient needs workup for hypokalemia because she is having frequent episodes of hypokalemia     Atrial fibrilation  Eliquis for  anticoagulation  [id number] score is 6  Rate controlled  Hold beta-blocker due to orthostatic hypotension  Consider Watchman due to frequent falls and increased risk for bleeding  Patient is followed by cardiologist at [facility] but will discuss with family about further follow-up and care either with us or with the primary cardiologist and then decide about continuation of Eliquis versus Watchman procedure but also palliative care and hospice have been called.     HFpEF  Echocardiogram with LVEF 61 to 65% and mild to moderate aortic valve stenosis  On SGLT2 inhibitor  Diuresis and beta-blocker on hold     CAD  Continue statin, Ranexa  Denies chest pain  Troponin mildly elevated, appears baseline     Hyperlipidemia  Continue statin therapy    I discussed the patients findings and my recommendations with patient and nurse    Elbert Mathew MD  07/09/24  11:00 EDT

## 2024-07-09 NOTE — DISCHARGE SUMMARY
[facility] Medicine Services  Discharge Summary    Date of Service: 2024  Patient Name: Kendy Worrell  : 1940  MRN: [id number]    Date of Admission: 2024  Discharge Diagnosis: Weakness, hypotension and frequent falls  Date of Discharge: 2024  Primary Care Physician: Marisela Monte APRN      Presenting Problem:   Hypokalemia [E87.6]  Weakness [R53.1]  Lumbar degenerative disc disease [M51.36]  Spondylosis of lumbar region without myelopathy or radiculopathy [M47.816]  Spondylosis of thoracic region without myelopathy or radiculopathy [M47.814]  Acute non-recurrent maxillary sinusitis [J01.00]  Sepsis, unspecified organism [A41.9]  Central stenosis of spinal canal [M48.00]  Chronic kidney disease, unspecified CKD stage [N18.9]  Recurrent syncope [R55]  Sepsis, due to unspecified organism, unspecified whether acute organ dysfunction present [A41.9]  Chronic heart failure with preserved ejection fraction (HFpEF) [I50.32]    Active and Resolved Hospital Problems:  Active Hospital Problems    Diagnosis POA    Chronic heart failure with preserved ejection fraction (HFpEF) [I50.32] Yes    Frequent falls [R29.6] Not Applicable    Generalized weakness [R53.1] Yes    Persistent atrial fibrillation [I48.19] Yes    Chronic diastolic CHF (congestive heart failure) [I50.32] Yes    Hyperlipidemia [E78.5] Yes    Coronary artery disease involving native coronary artery of native heart without angina pectoris [I25.10] Yes      Resolved Hospital Problems    Diagnosis POA    **Sepsis, unspecified organism [A41.9] Yes    Orthostatic hypotension [I95.1] Unknown    Hypokalemia [E87.6] Yes         Hospital Course     HPI:  Kendy Worrell is a 84 y.o. female with PMH of  HTN, hypothyroidism, CAD, chronic diastolic heart failure, CVA, hyponatremia, paroxysmal Afib on eliquis who presented to [facility] ED 2024 with reports of frequent falls, weakness.  She states she has fallen twice today.  The first  time was when she went to the bathroom and her right knee gave out.  She stated she was helped up by staff and laid on her couch for a while. She then got back up and used her rolling walker to go to the bathroom and she fell in the exact same spot.  She did hit her head but denied passing out. She laid on the floor until her daughter found her. She denied any lateralizing weakness. She denied any known illness but has had some intermittent chills. She denied any changes in urination. She lives in assisted living and MAR showed the patient had low BP the last three days and her metoprolol has been held for this reason. Upon medical record review the patient was admitted back in May for orthostatic dizziness.     Hospital Course:  Patient arrived on 7/5/2024 with complaints of weakness, frequent falls and was noted to have orthostatic hypotension. In the ED she had CT head that showed chronic senescent changes.  Improved appearance left maxillary sinus with some residual underlying sinus disease.  Atherosclerotic vascular calcification.  CXR showed no acute cardiopulmonary abnormality.  CT thoracic and lumbar spine showed no acute fracture, thoracic scoliosis and lumbar degenerative anterolisthesis, spinal stenosis. WBC 11.8, lactate 2.4, BUN 29, creatinine 1.77, potassium 2.7, magnesium 2.6 CK total 78, high-sensitivity troponin 39 and urinalysis showed 250 glucose, trace ketones.  Vital signs all within normal limits.  Blood cultures drawn.  Patient improved after IV fluid administration and blood cultures x 2 have been negative so far.  Sepsis has been ruled out and patient's orthostasis is treated with adding midodrine 5 mg 3 times daily.  She has been working with PT and requiring 1-2 L O2 via NC when exerting herself as well as at nighttime.  She is really deconditioned and frail and PT has recommended SNF.  She was also noted to be slightly hyperthyroid and so her levothyroxine was held, endocrinology was  consulted and recommended starting a lower dose on 7/12/2024 as well as repeating thyroid function in 6 weeks.      DISCHARGE Follow Up Recommendations for labs and diagnostics: PCP in 6 weeks for repeat thyroid function tests.      Reasons For Change In Medications and Indications for New Medications: Levothyroxine decreased from 88 mcg to 75 mcg and to be restarted on 7/12/2024, discontinued home trazodone clonidine as well as home metolazone, due to low blood pressure and electrolyte derangements.  Initiated midodrine 5 mg 3 times daily for orthostatic hypotension.      Day of Discharge     Vital Signs:  Temp:  [97.7 °F (36.5 °C)-98 °F (36.7 °C)] 98 °F (36.7 °C)  Heart Rate:  [67-99] 76  Resp:  [23-24] 23  BP: (116-153)/() 153/89  Flow (L/min):  [2] 2    Physical Exam:  Physical Exam   Appearance: Normal appearance.   HENT:      Head: Normocephalic and atraumatic.      Mouth/Throat:      Mouth: Mucous membranes are moist.  Eyes:      Extraocular Movements: Extraocular movements intact.   Cardiovascular:      Rate and Rhythm: Normal rate and regular rhythm.   Pulmonary:      Effort: Pulmonary effort is normal.      Breath sounds: Normal breath sounds.   Abdominal:      General: Abdomen is flat.      Palpations: Abdomen is soft.      Tenderness: There is no abdominal tenderness.   Musculoskeletal:      Cervical back: Normal range of motion.      Right lower leg: No edema.      Left lower leg: No edema.   Skin:     General: Skin is warm.   Neurological:      General: No focal deficit present.      Mental Status: She is alert.       Pertinent  and/or Most Recent Results     LAB RESULTS:      Lab 07/09/24  0216 07/08/24  0533 07/06/24  2359 07/06/24  0448 07/06/24  0017 07/05/24  2048 07/05/24  2018 07/05/24 2014   WBC 7.10 7.17 8.64 9.04  --   --   --  11.87*   HEMOGLOBIN 10.9* 11.3* 10.7* 11.2*  --   --   --  13.2   HEMATOCRIT 31.9* 34.7 32.0* 34.2  --   --   --  39.2   PLATELETS 195 234 220 219  --   --   --   276   NEUTROS ABS 4.52 4.89 6.44 6.97  --   --   --  9.96*   IMMATURE GRANS (ABS) 0.04 0.03 0.06* 0.03  --   --   --  0.06*   LYMPHS ABS 1.38 1.12 0.96 1.01  --   --   --  0.84   MONOS ABS 0.92* 0.96* 1.06* 1.01*  --   --   --  0.97*   EOS ABS 0.20 0.13 0.09 0.00  --   --   --  0.01   MCV 89.1 90.1 89.1 86.4  --   --   --  85.4   PROCALCITONIN  --   --   --   --   --  0.09  --   --    LACTATE  --   --   --  1.4 1.9  --  2.4*  --          Lab 07/09/24  0609 07/07/24  2332 07/07/24  1200 07/06/24  2359 07/06/24  1506 07/06/24  0448 07/05/24 2048 07/05/24 2014   SODIUM 137 134*  --  135*  --  135*  --  131*   POTASSIUM 4.2 4.0 2.6* 3.4* 3.8 2.6*  --  2.7*   CHLORIDE 100 100  --  94*  --  92*  --  81*   CO2 29.2* 23.9  --  31.6*  --  29.0  --  33.6*   ANION GAP 7.8 10.1  --  9.4  --  14.0  --  16.4*   BUN 11 13  --  20  --  26*  --  29*   CREATININE 0.86 1.02*  --  1.27*  --  1.49*  --  1.77*   EGFR 66.3 54.0*  --  41.5*  --  34.3*  --  27.9*   GLUCOSE 103* 112*  --  127*  --  86  --  109*   CALCIUM 10.0 9.6  --  9.7  --  9.4  --  10.5   MAGNESIUM  --   --   --   --   --   --   --  2.6*   TSH  --   --   --   --   --   --  0.205*  --          Lab 07/09/24  0609 07/07/24  2332 07/06/24  2359 07/06/24  0448 07/05/24 2014   TOTAL PROTEIN 6.0 6.2 6.0 6.2 7.2   ALBUMIN 3.1* 3.3* 3.3* 3.4* 4.2   GLOBULIN 2.9 2.9 2.7 2.8 3.0   ALT (SGPT) 12 11 12 13 20   AST (SGOT) 36* 30 28 29 39*   BILIRUBIN 0.7 0.7 0.6 0.8 1.4*   ALK PHOS 74 83 86 84 109         Lab 07/05/24 2048   HSTROP T 39*                 Brief Urine Lab Results  (Last result in the past 365 days)        Color   Clarity   Blood   Leuk Est   Nitrite   Protein   CREAT   Urine HCG        07/05/24 2014 Yellow   Clear   Negative   Negative   Negative   Negative                 Microbiology Results (last 10 days)       Procedure Component Value - Date/Time    CANDIDA AURIS PCR - Swab, Axilla Right, Axilla Left and Groin [[id number]]  (Normal) Collected: 07/06/24 5762     Lab Status: Final result Specimen: Swab from Axilla Right, Axilla Left and Groin Updated: 07/07/24 1022     CANDIDA AURIS PCR Not Detected    MRSA Screen, PCR (Inpatient) - Swab, Nares [[id number]]  (Normal) Collected: 07/06/24 0530    Lab Status: Final result Specimen: Swab from Nares Updated: 07/06/24 0724     MRSA PCR No MRSA Detected    Narrative:      The negative predictive value of this diagnostic test is high and should only be used to consider de-escalating anti-MRSA therapy. A positive result may indicate colonization with MRSA and must be correlated clinically.    Blood Culture - Blood, Arm, Left [[id number]]  (Normal) Collected: 07/05/24 2041    Lab Status: Preliminary result Specimen: Blood from Arm, Left Updated: 07/08/24 2101     Blood Culture No growth at 3 days    Blood Culture - Blood, Arm, Left [[id number]]  (Normal) Collected: 07/05/24 2014    Lab Status: Preliminary result Specimen: Blood from Arm, Left Updated: 07/08/24 2030     Blood Culture No growth at 3 days            CT Lumbar Spine Without Contrast    Result Date: 7/5/2024  Impression: Impression: No acute fracture. Chronic and incidental ancillary findings detailed above. Electronically Signed: Wilfrid Fish MD  7/5/2024 9:50 PM EDT  Workstation ID: [id number]    CT Thoracic Spine Without Contrast    Result Date: 7/5/2024  Impression: Impression: No acute fracture. Chronic and incidental ancillary findings detailed above. Electronically Signed: Wilfrid Fish MD  7/5/2024 9:50 PM EDT  Workstation ID: [id number]    CT Head Without Contrast    Result Date: 7/5/2024  Impression: Impression: 1.Chronic senescent changes. 2.Improved appearance left maxillary sinus with some residual underlying sinus disease. 3.Atherosclerotic vascular calcification Electronically Signed: Seth Guerrero MD  7/5/2024 9:06 PM EDT  Workstation ID: [id number]    XR Chest 1 View    Result Date: 7/5/2024  Impression: Impression: 1. No acute cardiopulmonary abnormality.  Electronically Signed: Lalit Silverman  7/5/2024 8:41 PM EDT  Workstation ID: [id number]     Results for orders placed during the hospital encounter of 05/23/24    Duplex Carotid Ultrasound CAR    Interpretation Summary    Right internal carotid artery demonstrates a less than 50% stenosis.    Left internal carotid artery demonstrates a less than 50% stenosis.      Results for orders placed during the hospital encounter of 05/23/24    Duplex Carotid Ultrasound CAR    Interpretation Summary    Right internal carotid artery demonstrates a less than 50% stenosis.    Left internal carotid artery demonstrates a less than 50% stenosis.      Results for orders placed during the hospital encounter of 05/23/24    Adult Transthoracic Echo Complete W/ Cont if Necessary Per Protocol    Interpretation Summary    Left ventricular ejection fraction appears to be 61 - 65%.    Left ventricular wall thickness is consistent with moderate eccentric hypertrophy.    Mild to moderate aortic valve stenosis is present.      Labs Pending at Discharge:  Pending Labs       Order Current Status    Blood Culture - Blood, Arm, Left Preliminary result    Blood Culture - Blood, Arm, Left Preliminary result            Procedures Performed           Consults:   Consults       Date and Time Order Name Status Description    7/8/2024 12:41 PM Inpatient Endocrinology Consult Completed     7/6/2024  1:26 AM Inpatient Cardiology Consult Completed               Discharge Details        Discharge Medications        New Medications        Instructions Start Date   midodrine 5 MG tablet  Commonly known as: PROAMATINE   5 mg, Oral, 3 Times Daily Before Meals             Changes to Medications        Instructions Start Date   levothyroxine 75 MCG tablet  Commonly known as: SYNTHROID, LEVOTHROID  What changed:   medication strength  how much to take  when to take this  These instructions start on July 12, 2024. If you are unsure what to do until then, ask your  doctor or other care provider.   75 mcg, Oral, Every Early Morning   Start Date: July 12, 2024            Continue These Medications        Instructions Start Date   acetaminophen 325 MG tablet  Commonly known as: TYLENOL   650 mg, Oral, Every 6 Hours PRN      albuterol sulfate  (90 Base) MCG/ACT inhaler  Commonly known as: PROVENTIL HFA;VENTOLIN HFA;PROAIR HFA   2 puffs, Inhalation, Every 4 Hours PRN      apixaban 5 MG tablet tablet  Commonly known as: ELIQUIS   5 mg, Oral, Every 12 Hours Scheduled      ascorbic acid 1000 MG tablet  Commonly known as: VITAMIN C   1,000 mg, Oral, Daily      atorvastatin 10 MG tablet  Commonly known as: LIPITOR   10 mg, Oral, Nightly      cholecalciferol 25 MCG (1000 UT) tablet  Commonly known as: VITAMIN D3   1,000 Units, Oral, Daily      dapagliflozin Propanediol 10 MG tablet   10 mg, Oral, Daily      lacosamide 50 MG tablet tablet  Commonly known as: VIMPAT   50 mg, Oral, Daily      magnesium oxide 250 MG tablet   250 mg, Oral, 2 Times Daily      metoprolol succinate  MG 24 hr tablet  Commonly known as: TOPROL-XL   100 mg, Oral, Every 12 Hours Scheduled      multivitamin with minerals tablet tablet   1 tablet, Oral, Daily      multivitamins-minerals capsule capsule   1 capsule, Oral, 2 Times Daily      nitroglycerin 0.4 MG SL tablet  Commonly known as: NITROSTAT   0.4 mg, Sublingual, Every 5 Minutes PRN      nystatin 842514 UNIT/GM powder  Commonly known as: MYCOSTATIN   Topical, 3 Times Daily PRN, Apply under breasts      pantoprazole 40 MG EC tablet  Commonly known as: PROTONIX   40 mg, Oral, Daily      potassium chloride 20 MEQ CR tablet  Commonly known as: KLOR-CON M20   40 mEq, Oral, 3 Times Daily      ranolazine 500 MG 12 hr tablet  Commonly known as: RANEXA   500 mg, Oral, 2 Times Daily      torsemide 20 MG tablet  Commonly known as: DEMADEX   20 mg, Oral, Every Other Day, Even days      vitamin B-12 1000 MCG tablet  Commonly known as: CYANOCOBALAMIN   1,000  mcg, Oral, Daily      zinc oxide 20 % ointment   1 Application, Topical, 2 Times Daily             Stop These Medications      cloNIDine 0.1 MG tablet  Commonly known as: CATAPRES     metOLazone 2.5 MG tablet  Commonly known as: ZAROXOLYN     traMADol 50 MG tablet  Commonly known as: ULTRAM              Allergies   Allergen Reactions    Aspirin Other (See Comments)     Severe bleeding    Nsaids Other (See Comments)     Severe bleeding         Discharge Disposition: SNF today.  Skilled Nursing Facility (DC - External)    Diet:  Hospital:  Diet Order   Procedures    Diet: Diabetic; Consistent Carbohydrate; Texture: Mechanical Ground (NDD 2); Fluid Consistency: Thin (IDDSI 0)         Discharge Activity:         CODE STATUS:  Code Status and Medical Interventions:   Ordered at: 07/06/24 0004     Medical Intervention Limits:    No intubation (DNI)     Level Of Support Discussed With:    Patient     Code Status (Patient has no pulse and is not breathing):    No CPR (Do Not Attempt to Resuscitate)     Medical Interventions (Patient has pulse or is breathing):    Limited Support         Future Appointments   Date Time Provider Department Center   7/24/2024  2:15 PM Rios Thomas MD [facility]           Time spent on Discharge including face to face service:  >30 minutes    Signature: Electronically signed by Arcelia Robertson MD, 07/09/24, 08:37 EDT.  [facility] Hospitalist Team

## 2024-07-09 NOTE — PLAN OF CARE
Problem: Adult Inpatient Plan of Care  Goal: Plan of Care Review  Outcome: Ongoing, Progressing  Goal: Patient-Specific Goal (Individualized)  Outcome: Ongoing, Progressing  Goal: Absence of Hospital-Acquired Illness or Injury  Outcome: Ongoing, Progressing  Intervention: Identify and Manage Fall Risk  Recent Flowsheet Documentation  Taken 7/9/2024 1600 by Destiny Alexis RN  Safety Promotion/Fall Prevention: safety round/check completed  Taken 7/9/2024 1400 by Destiny Alexis RN  Safety Promotion/Fall Prevention: safety round/check completed  Taken 7/9/2024 1200 by Destiny Alexis RN  Safety Promotion/Fall Prevention: safety round/check completed  Taken 7/9/2024 0844 by Destiny Alexis RN  Safety Promotion/Fall Prevention: safety round/check completed  Goal: Optimal Comfort and Wellbeing  Outcome: Ongoing, Progressing  Goal: Readiness for Transition of Care  Outcome: Ongoing, Progressing     Problem: Fall Injury Risk  Goal: Absence of Fall and Fall-Related Injury  Outcome: Ongoing, Progressing  Intervention: Promote Injury-Free Environment  Recent Flowsheet Documentation  Taken 7/9/2024 1600 by Destiny Alexis RN  Safety Promotion/Fall Prevention: safety round/check completed  Taken 7/9/2024 1400 by Destiny Alexis RN  Safety Promotion/Fall Prevention: safety round/check completed  Taken 7/9/2024 1200 by Destiny Alexis RN  Safety Promotion/Fall Prevention: safety round/check completed  Taken 7/9/2024 0844 by Destiny Alexis RN  Safety Promotion/Fall Prevention: safety round/check completed     Problem: Impaired Wound Healing  Goal: Optimal Wound Healing  Outcome: Ongoing, Progressing     Problem: Heart Failure Comorbidity  Goal: Maintenance of Heart Failure Symptom Control  Outcome: Ongoing, Progressing     Problem: Hypertension Comorbidity  Goal: Blood Pressure in Desired Range  Outcome: Ongoing, Progressing     Problem: Skin Injury Risk Increased  Goal: Skin Health and Integrity  Outcome:  Ongoing, Progressing   Goal Outcome Evaluation:

## 2024-07-09 NOTE — NURSING NOTE
84-year-old female admitted to the hospital with sepsis.  Patient reports having multiple falls at home.  Patient reports sleeping on her couch at home and that she is not able to get up to go the bathroom due to falling.  Patiently currently has a Salom[place] wick device in place.  Patient did assist with turning.  Patient presenting with moisture associated dermatitis.  She does have a red papular rash consistent with yeast.  There is a small area to the right buttock.  It is rather dry and mostly closed but I do believe it was a mid dermal pressure injury to the low sacrum.  No drainage is noted.  I would recommend continuing a zinc oxide barrier cream

## 2024-07-10 LAB
ALBUMIN SERPL-MCNC: 3.1 G/DL (ref 3.5–5.2)
ALBUMIN/GLOB SERPL: 1 G/DL
ALP SERPL-CCNC: 76 U/L (ref 39–117)
ALT SERPL W P-5'-P-CCNC: 9 U/L (ref 1–33)
ANION GAP SERPL CALCULATED.3IONS-SCNC: 8 MMOL/L (ref 5–15)
AST SERPL-CCNC: 26 U/L (ref 1–32)
BACTERIA SPEC AEROBE CULT: NORMAL
BACTERIA SPEC AEROBE CULT: NORMAL
BASOPHILS # BLD AUTO: 0.04 10*3/MM3 (ref 0–0.2)
BASOPHILS NFR BLD AUTO: 0.6 % (ref 0–1.5)
BILIRUB SERPL-MCNC: 0.8 MG/DL (ref 0–1.2)
BUN SERPL-MCNC: 9 MG/DL (ref 8–23)
BUN/CREAT SERPL: 10.8 (ref 7–25)
CALCIUM SPEC-SCNC: 10 MG/DL (ref 8.6–10.5)
CHLORIDE SERPL-SCNC: 100 MMOL/L (ref 98–107)
CO2 SERPL-SCNC: 25 MMOL/L (ref 22–29)
CREAT SERPL-MCNC: 0.83 MG/DL (ref 0.57–1)
DEPRECATED RDW RBC AUTO: 51.2 FL (ref 37–54)
EGFRCR SERPLBLD CKD-EPI 2021: 69.2 ML/MIN/1.73
EOSINOPHIL # BLD AUTO: 0.29 10*3/MM3 (ref 0–0.4)
EOSINOPHIL NFR BLD AUTO: 4.3 % (ref 0.3–6.2)
ERYTHROCYTE [DISTWIDTH] IN BLOOD BY AUTOMATED COUNT: 15.9 % (ref 12.3–15.4)
GLOBULIN UR ELPH-MCNC: 3.2 GM/DL
GLUCOSE SERPL-MCNC: 95 MG/DL (ref 65–99)
HCT VFR BLD AUTO: 34.7 % (ref 34–46.6)
HGB BLD-MCNC: 11.2 G/DL (ref 12–15.9)
IMM GRANULOCYTES # BLD AUTO: 0.03 10*3/MM3 (ref 0–0.05)
IMM GRANULOCYTES NFR BLD AUTO: 0.4 % (ref 0–0.5)
LYMPHOCYTES # BLD AUTO: 1.34 10*3/MM3 (ref 0.7–3.1)
LYMPHOCYTES NFR BLD AUTO: 19.9 % (ref 19.6–45.3)
MCH RBC QN AUTO: 29.3 PG (ref 26.6–33)
MCHC RBC AUTO-ENTMCNC: 32.3 G/DL (ref 31.5–35.7)
MCV RBC AUTO: 90.8 FL (ref 79–97)
MONOCYTES # BLD AUTO: 0.81 10*3/MM3 (ref 0.1–0.9)
MONOCYTES NFR BLD AUTO: 12.1 % (ref 5–12)
NEUTROPHILS NFR BLD AUTO: 4.21 10*3/MM3 (ref 1.7–7)
NEUTROPHILS NFR BLD AUTO: 62.7 % (ref 42.7–76)
NRBC BLD AUTO-RTO: 0 /100 WBC (ref 0–0.2)
PLATELET # BLD AUTO: 236 10*3/MM3 (ref 140–450)
PMV BLD AUTO: 9.8 FL (ref 6–12)
POTASSIUM SERPL-SCNC: 4.4 MMOL/L (ref 3.5–5.2)
PROT SERPL-MCNC: 6.3 G/DL (ref 6–8.5)
RBC # BLD AUTO: 3.82 10*6/MM3 (ref 3.77–5.28)
SODIUM SERPL-SCNC: 133 MMOL/L (ref 136–145)
WBC NRBC COR # BLD AUTO: 6.72 10*3/MM3 (ref 3.4–10.8)

## 2024-07-10 PROCEDURE — 92526 ORAL FUNCTION THERAPY: CPT

## 2024-07-10 PROCEDURE — 97110 THERAPEUTIC EXERCISES: CPT

## 2024-07-10 PROCEDURE — 97530 THERAPEUTIC ACTIVITIES: CPT

## 2024-07-10 PROCEDURE — 97116 GAIT TRAINING THERAPY: CPT

## 2024-07-10 PROCEDURE — 99232 SBSQ HOSP IP/OBS MODERATE 35: CPT | Performed by: INTERNAL MEDICINE

## 2024-07-10 RX ORDER — MIDODRINE HYDROCHLORIDE 5 MG/1
5 TABLET ORAL
Status: DISCONTINUED | OUTPATIENT
Start: 2024-07-10 | End: 2024-07-11 | Stop reason: HOSPADM

## 2024-07-10 RX ORDER — FLUDROCORTISONE ACETATE 0.1 MG/1
100 TABLET ORAL DAILY
Status: DISCONTINUED | OUTPATIENT
Start: 2024-07-10 | End: 2024-07-11 | Stop reason: HOSPADM

## 2024-07-10 RX ADMIN — APIXABAN 5 MG: 5 TABLET, FILM COATED ORAL at 21:43

## 2024-07-10 RX ADMIN — FLUDROCORTISONE ACETATE 100 MCG: 0.1 TABLET ORAL at 18:03

## 2024-07-10 RX ADMIN — EMPAGLIFLOZIN 10 MG: 10 TABLET, FILM COATED ORAL at 08:56

## 2024-07-10 RX ADMIN — DEXTROSE AND SODIUM CHLORIDE 50 ML/HR: 5; 450 INJECTION, SOLUTION INTRAVENOUS at 21:43

## 2024-07-10 RX ADMIN — APIXABAN 5 MG: 5 TABLET, FILM COATED ORAL at 08:56

## 2024-07-10 RX ADMIN — OXYCODONE HYDROCHLORIDE AND ACETAMINOPHEN 1000 MG: 500 TABLET ORAL at 08:55

## 2024-07-10 RX ADMIN — ANTI-FUNGAL POWDER MICONAZOLE NITRATE TALC FREE 1 APPLICATION: 1.42 POWDER TOPICAL at 11:44

## 2024-07-10 RX ADMIN — ATORVASTATIN CALCIUM 10 MG: 10 TABLET, FILM COATED ORAL at 21:43

## 2024-07-10 RX ADMIN — Medication 1 TABLET: at 08:55

## 2024-07-10 RX ADMIN — Medication 1000 UNITS: at 08:55

## 2024-07-10 RX ADMIN — POTASSIUM CHLORIDE 40 MEQ: 1.5 POWDER, FOR SOLUTION ORAL at 21:43

## 2024-07-10 RX ADMIN — MIDODRINE HYDROCHLORIDE 5 MG: 5 TABLET ORAL at 18:03

## 2024-07-10 RX ADMIN — MIDODRINE HYDROCHLORIDE 10 MG: 5 TABLET ORAL at 11:43

## 2024-07-10 RX ADMIN — RANOLAZINE 500 MG: 500 TABLET, EXTENDED RELEASE ORAL at 21:43

## 2024-07-10 RX ADMIN — RANOLAZINE 500 MG: 500 TABLET, EXTENDED RELEASE ORAL at 08:55

## 2024-07-10 RX ADMIN — Medication 10 ML: at 08:55

## 2024-07-10 RX ADMIN — ANTI-FUNGAL POWDER MICONAZOLE NITRATE TALC FREE 1 APPLICATION: 1.42 POWDER TOPICAL at 21:43

## 2024-07-10 RX ADMIN — AMLODIPINE BESYLATE 2.5 MG: 2.5 TABLET ORAL at 08:55

## 2024-07-10 RX ADMIN — CYANOCOBALAMIN TAB 1000 MCG 1000 MCG: 1000 TAB at 08:55

## 2024-07-10 RX ADMIN — MIDODRINE HYDROCHLORIDE 10 MG: 5 TABLET ORAL at 08:55

## 2024-07-10 RX ADMIN — LACOSAMIDE 50 MG: 100 TABLET, FILM COATED ORAL at 08:55

## 2024-07-10 NOTE — PLAN OF CARE
Goal Outcome Evaluation:         Pt rested well overnight with no new complaints.

## 2024-07-10 NOTE — PROGRESS NOTES
CARDIOLOGY PROGRESS NOTE:    Kendy Worrell  84 y.o.  female  1940  [id number]      Referring Provider: Hospitalist    Reason for follow-up: This post fall and syncope     Patient Care Team:  Marisela Monte APRN as PCP - General (Nurse Practitioner)  Tone Tubbs MD as Consulting Physician (Cardiology)  Rios Thomas MD as Consulting Physician (Colon and Rectal Surgery)    Subjective .  Patient is doing well without any symptoms today.    Objective lying in bed comfortably     Review of Systems   Constitutional: Negative for malaise/fatigue.   Cardiovascular:  Negative for chest pain, dyspnea on exertion, leg swelling and palpitations.   Respiratory:  Negative for cough and shortness of breath.    Gastrointestinal:  Negative for abdominal pain, nausea and vomiting.   Neurological:  Negative for dizziness, focal weakness, headaches, light-headedness and numbness.   All other systems reviewed and are negative.      Allergies: Aspirin and Nsaids    Scheduled Meds:amLODIPine, 2.5 mg, Oral, Q24H  apixaban, 5 mg, Oral, Q12H  ascorbic acid, 1,000 mg, Oral, Daily  atorvastatin, 10 mg, Oral, Nightly  cholecalciferol, 1,000 Units, Oral, Daily  empagliflozin, 10 mg, Oral, Daily  lacosamide, 50 mg, Oral, Daily  [START ON 7/12/2024] levothyroxine, 75 mcg, Oral, Q AM  miconazole, 1 Application, Topical, Q12H  midodrine, 10 mg, Oral, TID AC  multivitamin with minerals, 1 tablet, Oral, Daily  potassium chloride, 40 mEq, Oral, TID  ranolazine, 500 mg, Oral, BID  sodium chloride, 10 mL, Intravenous, Q12H  vitamin B-12, 1,000 mcg, Oral, Daily      Continuous Infusions:dextrose 5 % and sodium chloride 0.45 %, 50 mL/hr, Last Rate: 50 mL/hr (07/08/24 1516)      PRN Meds:.  acetaminophen    albuterol sulfate HFA    aluminum-magnesium hydroxide-simethicone    senna-docusate sodium **AND** polyethylene glycol **AND** bisacodyl **AND** bisacodyl    Calcium Replacement - Follow Nurse / BPA Driven Protocol    Magnesium Standard  "Dose Replacement - Follow Nurse / BPA Driven Protocol    melatonin    nystatin    Phosphorus Replacement - Follow Nurse / BPA Driven Protocol    Potassium Replacement - Follow Nurse / BPA Driven Protocol    prochlorperazine    sodium chloride    sodium chloride        VITAL SIGNS  Vitals:    07/10/24 0400 07/10/24 0900 07/10/24 0903 07/10/24 1137   BP: (!) 173/105 (!) 157/103 119/78 114/73   BP Location:       Patient Position: Lying Lying Standing Lying   Pulse:  86 85    Resp: 22      Temp: 97.9 °F (36.6 °C)      TempSrc: Oral      SpO2:  98%  93%   Weight:       Height:           Flowsheet Rows      Flowsheet Row First Filed Value   Admission Height 154.9 cm (61\") Documented at 07/05/2024 1915   Admission Weight 67.1 kg (148 lb) Documented at 07/05/2024 1937             TELEMETRY: Atrial fibrillation    Physical Exam:  Constitutional:       Appearance: Well-developed.   Eyes:      General: No scleral icterus.     Conjunctiva/sclera: Conjunctivae normal.   HENT:      Head: Normocephalic and atraumatic.   Neck:      Vascular: No carotid bruit or JVD.   Pulmonary:      Effort: Pulmonary effort is normal.      Breath sounds: Normal breath sounds. No wheezing. No rales.   Cardiovascular:      Normal rate. Regular rhythm.   Pulses:     Intact distal pulses.   Abdominal:      General: Bowel sounds are normal.      Palpations: Abdomen is soft.   Musculoskeletal:      Cervical back: Normal range of motion and neck supple. Skin:     General: Skin is warm and dry.      Findings: No rash.   Neurological:      Mental Status: Alert.          Results Review:   I reviewed the patient's new clinical results.  Lab Results (last 24 hours)       Procedure Component Value Units Date/Time    Comprehensive Metabolic Panel [[id number]]  (Abnormal) Collected: 07/09/24 1555    Specimen: Blood Updated: 07/10/24 0032     Glucose 95 mg/dL      BUN 9 mg/dL      Creatinine 0.83 mg/dL      Sodium 133 mmol/L      Potassium 4.4 mmol/L      " Chloride 100 mmol/L      CO2 25.0 mmol/L      Calcium 10.0 mg/dL      Total Protein 6.3 g/dL      Albumin 3.1 g/dL      ALT (SGPT) 9 U/L      AST (SGOT) 26 U/L      Alkaline Phosphatase 76 U/L      Total Bilirubin 0.8 mg/dL      Globulin 3.2 gm/dL      A/G Ratio 1.0 g/dL      BUN/Creatinine Ratio 10.8     Anion Gap 8.0 mmol/L      eGFR 69.2 mL/min/1.73     Narrative:      GFR Normal >60  Chronic Kidney Disease <60  Kidney Failure <15    The GFR formula is only valid for adults with stable renal function between ages 18 and 70.    CBC Auto Differential [[id number]]  (Abnormal) Collected: 07/09/24 2355    Specimen: Blood Updated: 07/10/24 0005     WBC 6.72 10*3/mm3      RBC 3.82 10*6/mm3      Hemoglobin 11.2 g/dL      Hematocrit 34.7 %      MCV 90.8 fL      MCH 29.3 pg      MCHC 32.3 g/dL      RDW 15.9 %      RDW-SD 51.2 fl      MPV 9.8 fL      Platelets 236 10*3/mm3      Neutrophil % 62.7 %      Lymphocyte % 19.9 %      Monocyte % 12.1 %      Eosinophil % 4.3 %      Basophil % 0.6 %      Immature Grans % 0.4 %      Neutrophils, Absolute 4.21 10*3/mm3      Lymphocytes, Absolute 1.34 10*3/mm3      Monocytes, Absolute 0.81 10*3/mm3      Eosinophils, Absolute 0.29 10*3/mm3      Basophils, Absolute 0.04 10*3/mm3      Immature Grans, Absolute 0.03 10*3/mm3      nRBC 0.0 /100 WBC     Lacosamide Blood [[id number]] Collected: 07/09/24 2355    Specimen: Blood Updated: 07/10/24 0002    Blood Culture - Blood, Arm, Left [[id number]]  (Normal) Collected: 07/05/24 2041    Specimen: Blood from Arm, Left Updated: 07/09/24 2101     Blood Culture No growth at 4 days    Blood Culture - Blood, Arm, Left [[id number]]  (Normal) Collected: 07/05/24 2014    Specimen: Blood from Arm, Left Updated: 07/09/24 2030     Blood Culture No growth at 4 days            Imaging Results (Last 24 Hours)       ** No results found for the last 24 hours. **            EKG      I personally viewed and interpreted the patient's EKG/Telemetry  data:    ECHOCARDIOGRAM:  Results for orders placed during the hospital encounter of 05/23/24    Adult Transthoracic Echo Complete W/ Cont if Necessary Per Protocol    Interpretation Summary    Left ventricular ejection fraction appears to be 61 - 65%.    Left ventricular wall thickness is consistent with moderate eccentric hypertrophy.    Mild to moderate aortic valve stenosis is present.       STRESS MYOVIEW:  Results for orders placed during the hospital encounter of 11/18/21    Stress Test With Myocardial Perfusion One Day    Interpretation Summary  · Myocardial perfusion imaging indicates a normal myocardial perfusion study with no evidence of ischemia.  · Left ventricular ejection fraction is hyperdynamic (Calculated EF > 70%). .  · Findings consistent with a normal ECG stress test.  · Impressions are consistent with a low risk study.  · There is no prior study available for comparison.       CARDIAC CATHETERIZATION:  No results found for this or any previous visit.       OTHER:         Assessment & Plan     Frequent falls  Generalized weakness  Syncope  Orthostatic hypotension  Patient presented following syncopal episode  Multiple falls over the last few weeks   Troponin slightly elevated, CK negative  PT/OT  continue midodrine  Hold home antihypertensives, diuresis  Recent echocardiogram with LVEF 56 to 60%, grade 1 diastolic dysfunction and negative bubble study  Will discuss with patient's family about continuing Eliquis because of the high risk of bleeding  Patient still has some orthostasis along with very high blood pressure and hence I will decrease the midodrine to 5 mg and started on Florinef 0.1 mg once a day and increase to twice a day if needed.     Hypokalemia  Hyponatremia   Monitor and replace electrolytes as needed  Diuresis on hold  Patient needs workup for hypokalemia because she is having frequent episodes of hypokalemia     Atrial fibrilation  Eliquis for anticoagulation  [id number]  score is 6  Rate controlled  Hold beta-blocker due to orthostatic hypotension  Consider Watchman due to frequent falls and increased risk for bleeding  Patient is followed by cardiologist at [facility] but will discuss with family about further follow-up and care either with us or with the primary cardiologist and then decide about continuation of Eliquis versus Watchman procedure but also palliative care and hospice have been called.  Patient's family was there today and I discussed at length along with the patient and they want to continue Eliquis and understand the risk of falls and the risk of bleeding but will discuss with the primary cardiologist.     HFpEF  Echocardiogram with LVEF 61 to 65% and mild to moderate aortic valve stenosis  On SGLT2 inhibitor  Diuresis and beta-blocker on hold     CAD  Continue statin, Ranexa  Denies chest pain  Troponin mildly elevated, appears baseline     Hyperlipidemia  Continue statin therapy    I discussed the patients findings and my recommendations with patient and nurse    Elbert Mathew MD  07/10/24  11:59 EDT

## 2024-07-10 NOTE — PLAN OF CARE
"Goal Outcome Evaluation:     Bed mobility - N/A or Not attempted.  Transfers - Min-A and with rolling walker sit to stand to sit a few times before progressing to gait training.   Ambulation - 10 feet Min-A and with rolling walker  slow pace, kept chair behind patient.      If medically appropriate, Moderate Intensity Therapy recommended post-acute care. This is recommended as therapy feels the patient would require 3-4 days per week and wouldn't tolerate \"3 hour daily\" rehab intensity. SNF would be the preferred choice. If the patient does not agree to SNF, arrange HH or OP depending on home bound status. If patient is medically complex, consider LTACH. Pt requires no DME at discharge.     Pt desires Skilled Rehab placement at discharge. Pt cooperative; agreeable to therapeutic recommendations and plan of care.                                       "

## 2024-07-10 NOTE — THERAPY TREATMENT NOTE
"Subjective: Pt agreeable to therapeutic plan of care.  Cognition: arousal/alertness: Alert and Attentive    Objective:   Bed Mobility: N/A or Not attempted. Sitting in chair upon arrival  Functional Transfers: Min-A and with rolling walker  Balance: with UE support and standing Min-A  Functional Ambulation: Min-A and with rolling walker    Therapeutic Exercise - 10 Reps B UE AROM supported sitting / chair    Vitals: Hypotensive and Orthostatic    Pain: 2 VAS  Location: Back  Interventions for pain: Repositioned and Therapeutic Presence  Education: Provided education on the importance of mobility in the acute care setting, Verbal/Tactile Cues, ADL training, and Transfer Training    Assessment: Kendy Worrell presents with ADL impairments affecting function including balance, endurance / activity tolerance, grasp, pain, shortness of breath, and strength. Pt alert and attentive, reports of 2/10 pain in lower back. Pt sitting in chair upon arrival. Pt completed x3 STS and ambulated 10 ft with min A and FWW. Pt orthostatic; BP sitting 111/78, sitting after standing 120/73, after ambulation 99/70. Pt symptomatic, reports visual changes and pallor in lips this date. Supported sitting in chair, pt completed BUE exercises, x10 reps, x2 sets. Demonstrated functioning below baseline abilities indicate the need for continued skilled intervention while inpatient. Tolerating session today without incident. Will continue to follow and progress as tolerated.     Plan/Recommendations:   Moderate Intensity Therapy recommended post-acute care. This is recommended as therapy feels the patient would require 3-4 days per week and wouldn't tolerate \"3 hour daily\" rehab intensity. SNF would be the preferred choice. If the patient does not agree to SNF, arrange HH or OP depending on home bound status. If patient is medically complex, consider LTACH.    Pt desires Skilled Rehab placement at discharge. Pt cooperative; agreeable to therapeutic " recommendations and plan of care.     Modified Karissa: N/A = No pre-op stroke/TIA    Post-Tx Position: Up in Chair, Alarms activated, and Call light and personal items within reach, family in room  PPE: gloves

## 2024-07-10 NOTE — PLAN OF CARE
"Assessment: Kendy Worrell presents with ADL impairments affecting function including balance, endurance / activity tolerance, grasp, pain, shortness of breath, and strength. Pt alert and attentive, reports of 2/10 pain in lower back. Pt sitting in chair upon arrival. Pt completed x3 STS and ambulated 10 ft with min A and FWW. Pt orthostatic; BP sitting 111/78, sitting after standing 120/73, after ambulation 99/70. Pt symptomatic, reports visual changes and pallor in lips this date. Supported sitting in chair, pt completed BUE exercises, x10 reps, x2 sets. Demonstrated functioning below baseline abilities indicate the need for continued skilled intervention while inpatient. Tolerating session today without incident. Will continue to follow and progress as tolerated.      Plan/Recommendations:   Moderate Intensity Therapy recommended post-acute care. This is recommended as therapy feels the patient would require 3-4 days per week and wouldn't tolerate \"3 hour daily\" rehab intensity. SNF would be the preferred choice. If the patient does not agree to SNF, arrange HH or OP depending on home bound status. If patient is medically complex, consider LTACH.  "

## 2024-07-10 NOTE — THERAPY TREATMENT NOTE
Acute Care - Speech Language Pathology   Swallow Treatment Note BRENDA Hernandez     Patient Name: Kendy Worrell  : 1940  MRN: [id number]  Today's Date: 7/10/2024               Admit Date: 2024    Visit Dx:     ICD-10-CM ICD-9-CM   1. Sepsis, due to unspecified organism, unspecified whether acute organ dysfunction present  A41.9 038.9     995.91   2. Acute non-recurrent maxillary sinusitis  J01.00 461.0   3. Weakness  R53.1 780.79   4. Recurrent syncope  R55 780.2   5. Spondylosis of thoracic region without myelopathy or radiculopathy  M47.814 721.2   6. Lumbar degenerative disc disease  M51.36 722.52   7. Spondylosis of lumbar region without myelopathy or radiculopathy  M47.816 721.3   8. Central stenosis of spinal canal  M48.00 724.00   9. Chronic kidney disease, unspecified CKD stage  N18.9 585.9     Patient Active Problem List   Diagnosis    Essential hypertension    Acquired hypothyroidism    Coronary artery disease involving native coronary artery of native heart without angina pectoris    Hyperlipidemia    Arthritis    Skin lesion of chest wall    NSTEMI (non-ST elevated myocardial infarction)    Supraventricular tachycardia    Normal cardiac stress test    Gastrointestinal bleed    Angina effort    Morbidly obese    Bilateral pulmonary embolism    Persistent atrial fibrillation    UTI (urinary tract infection)    Chronic diastolic CHF (congestive heart failure)    Acute ischemic stroke    Chest pain    GI bleed    Closed fibular fracture    Anemia    Hyponatremia    Orthostatic dizziness    Frequent falls    Generalized weakness    Chronic heart failure with preserved ejection fraction (HFpEF)     Past Medical History:   Diagnosis Date    A-fib     Arthritis     Breast cancer     CHF (congestive heart failure)     CVA (cerebral vascular accident) 2019    History of pulmonary embolus (PE)     Hyperlipidemia     Hypertension     Hyperthyroidism     patient has hypothyroidism    Hypothyroidism      Past  Surgical History:   Procedure Laterality Date    BREAST BIOPSY      BREAST SURGERY Right     CHOLECYSTECTOMY      COLON SURGERY      Removed    COLONOSCOPY      COLONOSCOPY N/A 11/3/2022    Procedure: COLONOSCOPY to anastamosis with biopsies and cold snare polypectomy;  Surgeon: Saroj Oakes MD;  Location: [facility] ENDOSCOPY;  Service: Gastroenterology;  Laterality: N/A;  PRe: rectal bleeding  Post: hemorrhoids, diverticulosis, anastamotic ulcer, polyp, hemostasis clip free-floating    HYSTERECTOMY      MAMMO BILATERAL  2015    MASTECTOMY      TONSILLECTOMY         SLP Recommendation and Plan                                                                                      SWALLOW EVALUATION (Last 72 Hours)       SLP Adult Swallow Evaluation       Row Name 07/08/24 1600                   Rehab Evaluation    Document Type therapy note (daily note)  -RM        Subjective Information no complaints  -RM        Patient Observations alert;cooperative;agree to therapy  -RM        Patient/Family/Caregiver Comments/Observations no family at bedside  -RM        Patient Effort good  -RM        Comment Skilled ST targeting dysphagia completed on today's date. ST missed meal tray, so snack was brought to pt to assess current diet recommendations. Pt currently on mech soft/thin liquids. During lunch meal tray, pt only described puree consistency items that she ate, but reported no issues/concerns with her swallow. Pt reports that sometimes with solid foods/food that requires mastication, that she requires a liquid wash to help clear her oral cavity or will have to expectorate a piece into a napkin. During trials of mech soft solid, prolonged mastication observed and oral residue. Pt utilized liquid wash and oral residue cleared. No expectoration noted on today's date. During trials of thin liquid by straw, no overt s/s of aspiration noted across trials. No chances in vocal quality observed throughout session. ST  RECOMMENDATIONS: ST recommends that pt continue a mechanical soft/thin liquid diet. Pt should remain upright during meals/medication, take small bites/sips, alternate between bites/sips, and monitor for s/s of aspiration or any other difficulty.  -RM           Recommendations    Therapy Frequency (Swallow) PRN  -RM        Predicted Duration Therapy Intervention (Days) until discharge  -RM        SLP Diet Recommendation mechanical ground textures;thin liquids  -RM        Recommended Precautions and Strategies upright posture during/after eating;small bites of food and sips of liquid;multiple swallows per bite of food;alternate between small bites of food and sips of liquid;check mouth frequently for oral residue/pocketing;general aspiration precautions;fatigue precautions  -RM        Oral Care Recommendations Oral Care BID/PRN;Swab  -RM        SLP Rec. for Method of Medication Administration meds whole;meds crushed;as tolerated  -RM        Monitor for Signs of Aspiration yes;notify SLP if any concerns  -RM           Swallow Goals (SLP)    Swallow LTGs Swallow Long Term Goal (free text)  -RM        Swallow STGs diet tolerance goal selection (SLP)  -RM        Diet Tolerance Goal Selection (SLP) Swallow Short Term Goal 1  -RM           (LTG) Swallow    (LTG) Swallow Patient will tolerate safest and least restrictive diet without complications from aspiration.  -RM        Time Frame (Swallow Long Term Goal) by discharge  -RM        Progress/Outcomes (Swallow Long Term Goal) goal ongoing  -RM        Comment (Swallow Long Term Goal) see above note  -RM           (STG) Swallow 1    (STG) Swallow 1 Patient will participate in full meal assessment to assure safety and adequacy of recommended diet and independent use of safe swallow strategies.  -RM        Time Frame (Swallow Short Term Goal 1) 1 week  -RM        Progress/Outcomes (Swallow Short Term Goal 1) goal ongoing  -RM        Comment (Swallow Short Term Goal 1) see  above note  -                  User Key  (r) = Recorded By, (t) = Taken By, (c) = Cosigned By      Initials Name Effective Dates    RM Jj Mullen, SLP 01/26/23 -                     EDUCATION  The patient has been educated in the following areas:   Dysphagia (Swallowing Impairment) Modified Diet Instruction.        SLP GOALS       Row Name 07/10/24 1500       (LTG) Swallow    (LTG) Swallow Patient will tolerate safest and least restrictive diet without complications from aspiration.  -MB    Time Frame (Swallow Long Term Goal) by discharge  -MB    Progress/Outcomes (Swallow Long Term Goal) goal ongoing  -MB    Comment (Swallow Long Term Goal) Pt seen for skilled ST targeting DT/Meal. Pt asleep and reclined in bed upon arrival. Clinician roused awake by verbalizing pt's name. She endorsed refusing lunch earlier d/t not being hungry. Pleasant and agreeable to tx. Provided snack and thin liquid by straw. Peaches in juice provided to pt, who self-fed x2 trials. Adequate labial seal and no anterior spillage observed. Noted extended mastication of peaches and eventual removal of residual peach piece from tongue IND. Stated difficulty chewing this texture. No s/sx of aspiration with thin liquid by straw. SLP educated pt regarding possibility of downgrading consistency to puree and provided description of texture. Pt verbalized agreement and desire to receive foods with this consistency. SLP reconfirmed this decision with pt, who remained agreeable. Nursing notified of this discussion with pt and placed diet order.     Recommend: Downgrade diet to puree solids/thin liquids. ST will continue to follow to ensure safety and tolerance to recommended diet.  -MB       (STG) Swallow 1    (STG) Swallow 1 Patient will participate in full meal assessment to assure safety and adequacy of recommended diet and independent use of safe swallow strategies.  -MB    Time Frame (Swallow Short Term Goal 1) 1 week  -MB     Progress/Outcomes (Swallow Short Term Goal 1) goal ongoing  -MB    Comment (Swallow Short Term Goal 1) see above note  -MB              User Key  (r) = Recorded By, (t) = Taken By, (c) = Cosigned By      Initials Name Provider Type     Jj Mullen, SLP Speech and Language Pathologist    Werner Mason, SLP Speech and Language Pathologist                         Time Calculation:                VIJAY Avelar  7/10/2024

## 2024-07-10 NOTE — THERAPY TREATMENT NOTE
"Subjective: Pt agreeable to therapeutic plan of care.    Objective:     Bed mobility - N/A or Not attempted.  Transfers - Min-A and with rolling walker  sit to stand to sit a few times before progressing to gait training.   Ambulation - 10 feet Min-A and with rolling walker  slow pace, kept chair behind patient.     Vitals: Hypotensive  pt up in the chair:  bp was 111/78,  pt stood and attempted to get blood pressure but pt reported she was too dizzy and feeling like she was going to pass out.  Returned pt to sitting.  Took b/p after sitting down and blood pressure was 120/73.  After gait trainin/70.  Pt does get symptomatic.     Pain: 0 VAS       Education: Provided education on the importance of mobility in the acute care setting, Verbal/Tactile Cues, Transfer Training, and Gait Training    Assessment: Kendy Worrell presents with functional mobility impairments which indicate the need for skilled intervention. Tolerating session today without incident. Pt very alert and following direction well.  Pt required less assist for transfers and was able to progress to gait training today.  Pt does exhibit a fear of falling due to 'passing out' and falling at [facility]. Pt should do well at rehab at d/c. Will continue to follow and progress as tolerated.     Plan/Recommendations:   If medically appropriate, Moderate Intensity Therapy recommended post-acute care. This is recommended as therapy feels the patient would require 3-4 days per week and wouldn't tolerate \"3 hour daily\" rehab intensity. SNF would be the preferred choice. If the patient does not agree to SNF, arrange HH or OP depending on home bound status. If patient is medically complex, consider LTACH. Pt requires no DME at discharge.     Pt desires Skilled Rehab placement at discharge. Pt cooperative; agreeable to therapeutic recommendations and plan of care.     Basic Mobility 6-click:  Rollin = Total, A lot = 2, A little = 3; 4 = None  Supine>Sit:  "  1 = Total, A lot = 2, A little = 3; 4 = None   Sit>Stand with arms:  1 = Total, A lot = 2, A little = 3; 4 = None  Bed>Chair:   1 = Total, A lot = 2, A little = 3; 4 = None  Ambulate in room:  1 = Total, A lot = 2, A little = 3; 4 = None  3-5 Steps with railin = Total, A lot = 2, A little = 3; 4 = None  Score: 15    Modified [place]: 4 = Moderately severe disability (Unable to attend to own bodily needs without assistance, and unable to walk unassisted)     Post-Tx Position: Up in Chair, Alarms activated, and Call light and personal items within reach  family present  PPE: gloves

## 2024-07-10 NOTE — PLAN OF CARE
Problem: Adult Inpatient Plan of Care  Goal: Plan of Care Review  Outcome: Ongoing, Progressing  Goal: Patient-Specific Goal (Individualized)  Outcome: Ongoing, Progressing  Goal: Absence of Hospital-Acquired Illness or Injury  Outcome: Ongoing, Progressing  Intervention: Identify and Manage Fall Risk  Recent Flowsheet Documentation  Taken 7/10/2024 1800 by Destiny Alexis RN  Safety Promotion/Fall Prevention: safety round/check completed  Taken 7/10/2024 1600 by Destiny Alexis RN  Safety Promotion/Fall Prevention: safety round/check completed  Taken 7/10/2024 1400 by Destiny Alexis RN  Safety Promotion/Fall Prevention: safety round/check completed  Taken 7/10/2024 1200 by Destiny Alexis RN  Safety Promotion/Fall Prevention: safety round/check completed  Taken 7/10/2024 1000 by Destiny Alexis RN  Safety Promotion/Fall Prevention: safety round/check completed  Taken 7/10/2024 0845 by Destiny Alexis RN  Safety Promotion/Fall Prevention: safety round/check completed  Goal: Optimal Comfort and Wellbeing  Outcome: Ongoing, Progressing  Goal: Readiness for Transition of Care  Outcome: Ongoing, Progressing     Problem: Fall Injury Risk  Goal: Absence of Fall and Fall-Related Injury  Outcome: Ongoing, Progressing  Intervention: Promote Injury-Free Environment  Recent Flowsheet Documentation  Taken 7/10/2024 1800 by Destiny Alexis RN  Safety Promotion/Fall Prevention: safety round/check completed  Taken 7/10/2024 1600 by Destiny Alexis RN  Safety Promotion/Fall Prevention: safety round/check completed  Taken 7/10/2024 1400 by Destiny Alexis RN  Safety Promotion/Fall Prevention: safety round/check completed  Taken 7/10/2024 1200 by Destiny Alexis RN  Safety Promotion/Fall Prevention: safety round/check completed  Taken 7/10/2024 1000 by Destiny Alexis RN  Safety Promotion/Fall Prevention: safety round/check completed  Taken 7/10/2024 0845 by Destiny Alexis RN  Safety Promotion/Fall  Prevention: safety round/check completed     Problem: Impaired Wound Healing  Goal: Optimal Wound Healing  Outcome: Ongoing, Progressing     Problem: Heart Failure Comorbidity  Goal: Maintenance of Heart Failure Symptom Control  Outcome: Ongoing, Progressing     Problem: Hypertension Comorbidity  Goal: Blood Pressure in Desired Range  Outcome: Ongoing, Progressing     Problem: Skin Injury Risk Increased  Goal: Skin Health and Integrity  Outcome: Ongoing, Progressing   Goal Outcome Evaluation:

## 2024-07-10 NOTE — PROGRESS NOTES
.    [facility] MEDICINE SERVICE  DAILY PROGRESS NOTE    NAME: Kendy Worrell  : 1940  MRN: [id number]      LOS: 4 days     PROVIDER OF SERVICE: Arcelia Robertson MD    Chief Complaint: Sepsis, unspecified organism    Subjective:     Interval History:  History taken from: patient  Patient seen and examined at bedside this morning.  She was laying comfortably and notes cardiology was by to see her and recommended watchman which patient refused.  Discussed with cardiology to reduce midodrine back to 5 mg 3 times daily as well as adding Florinef to help with orthostatic hypotension.  Counseled patient to start wearing compression stockings after leaving the hospital, she expressed good understanding and says she will.  All questions and concerns addressed.    Review of Systems:   Review of Systems negative except as mentioned above.    Objective:     Vital Signs  Temp:  [97.9 °F (36.6 °C)-98 °F (36.7 °C)] 97.9 °F (36.6 °C)  Heart Rate:  [81-86] 85  Resp:  [22-25] 22  BP: (114-173)/() 114/73  Flow (L/min):  [1.5] 1.5   Body mass index is 27.96 kg/m².    Physical Exam  Physical Exam  Physical Exam  Appearance: Normal appearance.   HENT:      Head: Normocephalic and atraumatic.      Mouth/Throat:      Mouth: Mucous membranes are moist.  Eyes:      Extraocular Movements: Extraocular movements intact.   Cardiovascular:      Rate and Rhythm: Normal rate and regular rhythm. +ve orthostatic vitals  Pulmonary:      Effort: Pulmonary effort is normal.      Breath sounds: Normal breath sounds.   Abdominal:      General: Abdomen is flat.      Palpations: Abdomen is soft.      Tenderness: There is no abdominal tenderness.   Musculoskeletal:      Cervical back: Normal range of motion.      Right lower leg: No edema.      Left lower leg: No edema.   Skin:     General: Skin is warm.   Neurological:      General: No focal deficit present.      Mental Status: She is alert.     Scheduled Meds   amLODIPine, 2.5 mg, Oral,  Q24H  apixaban, 5 mg, Oral, Q12H  ascorbic acid, 1,000 mg, Oral, Daily  atorvastatin, 10 mg, Oral, Nightly  cholecalciferol, 1,000 Units, Oral, Daily  empagliflozin, 10 mg, Oral, Daily  fludrocortisone, 100 mcg, Oral, Daily  lacosamide, 50 mg, Oral, Daily  [START ON 7/12/2024] levothyroxine, 75 mcg, Oral, Q AM  miconazole, 1 Application, Topical, Q12H  midodrine, 5 mg, Oral, TID AC  multivitamin with minerals, 1 tablet, Oral, Daily  potassium chloride, 40 mEq, Oral, TID  ranolazine, 500 mg, Oral, BID  sodium chloride, 10 mL, Intravenous, Q12H  vitamin B-12, 1,000 mcg, Oral, Daily       PRN Meds     acetaminophen    albuterol sulfate HFA    aluminum-magnesium hydroxide-simethicone    senna-docusate sodium **AND** polyethylene glycol **AND** bisacodyl **AND** bisacodyl    Calcium Replacement - Follow Nurse / BPA Driven Protocol    Magnesium Standard Dose Replacement - Follow Nurse / BPA Driven Protocol    melatonin    nystatin    Phosphorus Replacement - Follow Nurse / BPA Driven Protocol    Potassium Replacement - Follow Nurse / BPA Driven Protocol    prochlorperazine    sodium chloride    sodium chloride   Infusions  dextrose 5 % and sodium chloride 0.45 %, 50 mL/hr, Last Rate: 50 mL/hr (07/08/24 1516)          Diagnostic Data    Results from last 7 days   Lab Units 07/09/24  2355   WBC 10*3/mm3 6.72   HEMOGLOBIN g/dL 11.2*   HEMATOCRIT % 34.7   PLATELETS 10*3/mm3 236   GLUCOSE mg/dL 95   CREATININE mg/dL 0.83   BUN mg/dL 9   SODIUM mmol/L 133*   POTASSIUM mmol/L 4.4   AST (SGOT) U/L 26   ALT (SGPT) U/L 9   ALK PHOS U/L 76   BILIRUBIN mg/dL 0.8   ANION GAP mmol/L 8.0       No radiology results for the last day      I reviewed the patient's new clinical results.    Assessment/Plan:     Active and Resolved Problems  Active Hospital Problems    Diagnosis  POA    Chronic heart failure with preserved ejection fraction (HFpEF) [I50.32]  Yes    Frequent falls [R29.6]  Not Applicable    Generalized weakness [R53.1]  Yes     Persistent atrial fibrillation [I48.19]  Yes    Chronic diastolic CHF (congestive heart failure) [I50.32]  Yes    Hyperlipidemia [E78.5]  Yes    Coronary artery disease involving native coronary artery of native heart without angina pectoris [I25.10]  Yes      Resolved Hospital Problems    Diagnosis Date Resolved POA    **Sepsis, unspecified organism [A41.9] 07/06/2024 Yes    Orthostatic hypotension [I95.1] 07/06/2024 Unknown    Hypokalemia [E87.6] 07/09/2024 Yes       Frequent falls; in setting of orthostatic hypotension  Debility/generalized weakness  -Reviewed CT head, lumbar spine, thoracic spine -no acute findings  -+ ve orthostatic hypotension, continue midodrine.  -Initiated Florinef 0.1 mg per cardiology recommendations.  -Continuous IVFs for now, monitor for signs of fluid overload with her hx of HFpEF, CKD     Hypokalemia; resolved  -Receiving potassium replacement p.o.  She cannot tolerate IV potassium due to burning.  -Recheck potassium level  -Hold torsemide  -Continuous cardiac monitoring     Chronic kidney disease  -Stable  -Monitor     Hypertension  -Hold antihypertensives for now     HFpEF  -Appears hypovolemic, diuretics on hold   -BB on hold      Paroxysmal atrial fibrillation  -HR controlled, BB on hold  -Continue Eliquis  -Cardiology recommending evaluation to see if patient is watchman candidate given safety profile of continuing Eliquis with frequent falls but patient refused.     Hypothyroidism  -TSH slightly low, Free T4 elevated.  -Holding levothyroxine in setting of hyperthyroid state will restart at lower dose on 7/12 per endocrinology recommendations.     Seizures  -Continue Vimpat     H/o CVA  -No focal neurological deficits noted    VTE Prophylaxis:  Pharmacologic VTE prophylaxis orders are present.         Code status is   Code Status and Medical Interventions:   Ordered at: 07/06/24 0004     Medical Intervention Limits:    No intubation (DNI)     Level Of Support Discussed With:     Patient     Code Status (Patient has no pulse and is not breathing):    No CPR (Do Not Attempt to Resuscitate)     Medical Interventions (Patient has pulse or is breathing):    Limited Support       Plan for disposition: SNF in 1-2 days    Time: 30 minutes    Signature: Electronically signed by Arcelia Robertson MD, 07/10/24, 14:11 EDT.  [facility] Hospitalist Team

## 2024-07-11 ENCOUNTER — APPOINTMENT (OUTPATIENT)
Dept: GENERAL RADIOLOGY | Facility: HOSPITAL | Age: 84
End: 2024-07-11
Payer: MEDICARE

## 2024-07-11 VITALS
RESPIRATION RATE: 24 BRPM | DIASTOLIC BLOOD PRESSURE: 75 MMHG | BODY MASS INDEX: 27.94 KG/M2 | OXYGEN SATURATION: 98 % | HEART RATE: 75 BPM | WEIGHT: 148 LBS | TEMPERATURE: 97.8 F | SYSTOLIC BLOOD PRESSURE: 114 MMHG | HEIGHT: 61 IN

## 2024-07-11 LAB
ALBUMIN SERPL-MCNC: 3.2 G/DL (ref 3.5–5.2)
ALBUMIN/GLOB SERPL: 1.1 G/DL
ALP SERPL-CCNC: 76 U/L (ref 39–117)
ALT SERPL W P-5'-P-CCNC: 9 U/L (ref 1–33)
ANION GAP SERPL CALCULATED.3IONS-SCNC: 10.2 MMOL/L (ref 5–15)
AST SERPL-CCNC: 25 U/L (ref 1–32)
BASOPHILS # BLD AUTO: 0.08 10*3/MM3 (ref 0–0.2)
BASOPHILS NFR BLD AUTO: 1 % (ref 0–1.5)
BILIRUB SERPL-MCNC: 0.7 MG/DL (ref 0–1.2)
BUN SERPL-MCNC: 9 MG/DL (ref 8–23)
BUN/CREAT SERPL: 10.2 (ref 7–25)
CALCIUM SPEC-SCNC: 10 MG/DL (ref 8.6–10.5)
CHLORIDE SERPL-SCNC: 101 MMOL/L (ref 98–107)
CO2 SERPL-SCNC: 24.8 MMOL/L (ref 22–29)
CREAT SERPL-MCNC: 0.88 MG/DL (ref 0.57–1)
DEPRECATED RDW RBC AUTO: 51.8 FL (ref 37–54)
EGFRCR SERPLBLD CKD-EPI 2021: 64.5 ML/MIN/1.73
EOSINOPHIL # BLD AUTO: 0.28 10*3/MM3 (ref 0–0.4)
EOSINOPHIL NFR BLD AUTO: 3.6 % (ref 0.3–6.2)
ERYTHROCYTE [DISTWIDTH] IN BLOOD BY AUTOMATED COUNT: 16.1 % (ref 12.3–15.4)
GLOBULIN UR ELPH-MCNC: 2.9 GM/DL
GLUCOSE SERPL-MCNC: 96 MG/DL (ref 65–99)
HCT VFR BLD AUTO: 33.8 % (ref 34–46.6)
HGB BLD-MCNC: 10.8 G/DL (ref 12–15.9)
IMM GRANULOCYTES # BLD AUTO: 0.04 10*3/MM3 (ref 0–0.05)
IMM GRANULOCYTES NFR BLD AUTO: 0.5 % (ref 0–0.5)
LYMPHOCYTES # BLD AUTO: 1.42 10*3/MM3 (ref 0.7–3.1)
LYMPHOCYTES NFR BLD AUTO: 18.4 % (ref 19.6–45.3)
MCH RBC QN AUTO: 29.4 PG (ref 26.6–33)
MCHC RBC AUTO-ENTMCNC: 32 G/DL (ref 31.5–35.7)
MCV RBC AUTO: 92.1 FL (ref 79–97)
MONOCYTES # BLD AUTO: 1 10*3/MM3 (ref 0.1–0.9)
MONOCYTES NFR BLD AUTO: 12.9 % (ref 5–12)
NEUTROPHILS NFR BLD AUTO: 4.91 10*3/MM3 (ref 1.7–7)
NEUTROPHILS NFR BLD AUTO: 63.6 % (ref 42.7–76)
NRBC BLD AUTO-RTO: 0 /100 WBC (ref 0–0.2)
PLATELET # BLD AUTO: 243 10*3/MM3 (ref 140–450)
PMV BLD AUTO: 9.6 FL (ref 6–12)
POTASSIUM SERPL-SCNC: 5 MMOL/L (ref 3.5–5.2)
PROT SERPL-MCNC: 6.1 G/DL (ref 6–8.5)
RBC # BLD AUTO: 3.67 10*6/MM3 (ref 3.77–5.28)
SODIUM SERPL-SCNC: 136 MMOL/L (ref 136–145)
WBC NRBC COR # BLD AUTO: 7.73 10*3/MM3 (ref 3.4–10.8)

## 2024-07-11 PROCEDURE — 97116 GAIT TRAINING THERAPY: CPT

## 2024-07-11 PROCEDURE — 97530 THERAPEUTIC ACTIVITIES: CPT

## 2024-07-11 PROCEDURE — 36415 COLL VENOUS BLD VENIPUNCTURE: CPT | Performed by: NURSE PRACTITIONER

## 2024-07-11 PROCEDURE — 80053 COMPREHEN METABOLIC PANEL: CPT | Performed by: NURSE PRACTITIONER

## 2024-07-11 PROCEDURE — 85025 COMPLETE CBC W/AUTO DIFF WBC: CPT | Performed by: NURSE PRACTITIONER

## 2024-07-11 PROCEDURE — 99232 SBSQ HOSP IP/OBS MODERATE 35: CPT

## 2024-07-11 PROCEDURE — 71045 X-RAY EXAM CHEST 1 VIEW: CPT

## 2024-07-11 RX ORDER — MIDODRINE HYDROCHLORIDE 5 MG/1
5 TABLET ORAL
Qty: 90 TABLET | Refills: 0 | Status: SHIPPED | OUTPATIENT
Start: 2024-07-11 | End: 2024-08-10

## 2024-07-11 RX ORDER — AMLODIPINE BESYLATE 2.5 MG/1
2.5 TABLET ORAL
Qty: 30 TABLET | Refills: 0 | Status: SHIPPED | OUTPATIENT
Start: 2024-07-12 | End: 2024-08-11

## 2024-07-11 RX ORDER — FLUDROCORTISONE ACETATE 0.1 MG/1
0.1 TABLET ORAL DAILY
Qty: 30 TABLET | Refills: 0 | Status: SHIPPED | OUTPATIENT
Start: 2024-07-12 | End: 2024-08-11

## 2024-07-11 RX ORDER — LACOSAMIDE 50 MG/1
50 TABLET ORAL DAILY
Qty: 30 TABLET | Refills: 0 | Status: SHIPPED | OUTPATIENT
Start: 2024-07-11 | End: 2024-08-10

## 2024-07-11 RX ADMIN — EMPAGLIFLOZIN 10 MG: 10 TABLET, FILM COATED ORAL at 09:23

## 2024-07-11 RX ADMIN — AMLODIPINE BESYLATE 2.5 MG: 2.5 TABLET ORAL at 09:23

## 2024-07-11 RX ADMIN — APIXABAN 5 MG: 5 TABLET, FILM COATED ORAL at 09:23

## 2024-07-11 RX ADMIN — ANTI-FUNGAL POWDER MICONAZOLE NITRATE TALC FREE 1 APPLICATION: 1.42 POWDER TOPICAL at 09:24

## 2024-07-11 RX ADMIN — MIDODRINE HYDROCHLORIDE 5 MG: 5 TABLET ORAL at 09:23

## 2024-07-11 RX ADMIN — Medication 10 ML: at 09:23

## 2024-07-11 RX ADMIN — FLUDROCORTISONE ACETATE 100 MCG: 0.1 TABLET ORAL at 09:23

## 2024-07-11 RX ADMIN — Medication 1 TABLET: at 09:23

## 2024-07-11 RX ADMIN — Medication 1000 UNITS: at 09:23

## 2024-07-11 RX ADMIN — CYANOCOBALAMIN TAB 1000 MCG 1000 MCG: 1000 TAB at 09:23

## 2024-07-11 RX ADMIN — MIDODRINE HYDROCHLORIDE 5 MG: 5 TABLET ORAL at 13:01

## 2024-07-11 RX ADMIN — OXYCODONE HYDROCHLORIDE AND ACETAMINOPHEN 1000 MG: 500 TABLET ORAL at 09:23

## 2024-07-11 RX ADMIN — RANOLAZINE 500 MG: 500 TABLET, EXTENDED RELEASE ORAL at 09:23

## 2024-07-11 RX ADMIN — LACOSAMIDE 50 MG: 100 TABLET, FILM COATED ORAL at 09:23

## 2024-07-11 NOTE — THERAPY TREATMENT NOTE
"Subjective: Pt agreeable to therapeutic plan of care.    Objective:     Bed mobility -  Min A  Transfers -  Min A with RW  Ambulation - 5' with RW with Min A     Vitals:  BP reading during session:  Supine 136/85.  Sittin/90.  Standing: Unable to obtain BP reading.  Sitting after ambulation: 132/80    Pain: 3 VAS   Location: R ribs   Intervention for pain: Repositioned and Therapeutic Presence    Education: Provided education on the importance of mobility in the acute care setting, Verbal/Tactile Cues, Transfer Training, Gait Training, and Energy conservation strategies    Assessment: Kendy Worrell presents with functional mobility impairments which indicate the need for skilled intervention. Tolerating session today without incident. Pt on 1.5L O2 and telemetry this date.  Pt with c/o R rib pain due to prolonged bouts of coughing.  Pt required Min A for bed mobility, Min A for transfers with RW.  Ambulated 5' with RW with Min A.  No c/o dizziness throughout session.  Pt very fatigued at end of session sitting up in b/s chair.  Will continue to follow and progress as tolerated.     Plan/Recommendations:   If medically appropriate, Moderate Intensity Therapy recommended post-acute care. This is recommended as therapy feels the patient would require 3-4 days per week and wouldn't tolerate \"3 hour daily\" rehab intensity. SNF would be the preferred choice. If the patient does not agree to SNF, arrange HH or OP depending on home bound status. If patient is medically complex, consider LTACH. Pt requires no DME at discharge.     Pt desires Skilled Rehab placement at discharge. Pt cooperative; agreeable to therapeutic recommendations and plan of care.         Post-Tx Position: Up in Chair, Alarms activated, and Call light and personal items within reach  PPE: gloves   "

## 2024-07-11 NOTE — CASE MANAGEMENT/SOCIAL WORK
Case Management Discharge Note      Final Note: daja Noyola.    Selected Continued Care - Discharged on 7/11/2024 Admission date: 7/5/2024 - Discharge disposition: Skilled Nursing Facility (DC - External)      Destination Coordination complete.      Service Provider Selected Services Address Phone Fax Patient Preferred    [facility]  [address] [place] [address] [phone]              Transportation Services  W/C Van: Skilled Nursing Facility Van    Final Discharge Disposition Code: 03 - skilled nursing facility (SNF)

## 2024-07-11 NOTE — CASE MANAGEMENT/SOCIAL WORK
Continued Stay Note  BRENDA Hernandez     Patient Name: Kendy Worrell  MRN: [id number]  Today's Date: 7/11/2024    Admit Date: 7/5/2024    Plan: [facility] accepted, bed ready. No precert required. PASRR approved.   Discharge Plan       Row Name 07/11/24 1402       Plan    Plan [facility] accepted, bed ready. No precert required. PASRR approved.    Patient/Family in Agreement with Plan yes    Plan Comments Discussed plans for discharge today as Dr Mathew has signed off. CM contacted [facility] liaison and notified them. Arranged for their facility  to pick pt up at 3 PM.  will have portable O2 tank with them.             Megan Naegele, RN     Office Phone: [phone]  Office Cell: [phone]

## 2024-07-11 NOTE — DISCHARGE SUMMARY
[facility] Medicine Services  Discharge Summary    Date of Service: 2024  Patient Name: Kendy Worrell  : 1940  MRN: [id number]    Date of Admission: 2024  Discharge Diagnosis: Weakness and frequent falls  Date of Discharge: 2024  Primary Care Physician: Marisela Monte APRN      Presenting Problem:   Hypokalemia [E87.6]  Weakness [R53.1]  Lumbar degenerative disc disease [M51.36]  Spondylosis of lumbar region without myelopathy or radiculopathy [M47.816]  Spondylosis of thoracic region without myelopathy or radiculopathy [M47.814]  Acute non-recurrent maxillary sinusitis [J01.00]  Sepsis, unspecified organism [A41.9]  Central stenosis of spinal canal [M48.00]  Chronic kidney disease, unspecified CKD stage [N18.9]  Recurrent syncope [R55]  Sepsis, due to unspecified organism, unspecified whether acute organ dysfunction present [A41.9]  Chronic heart failure with preserved ejection fraction (HFpEF) [I50.32]    Active and Resolved Hospital Problems:  Active Hospital Problems    Diagnosis POA    Chronic heart failure with preserved ejection fraction (HFpEF) [I50.32] Yes    Frequent falls [R29.6] Not Applicable    Generalized weakness [R53.1] Yes    Persistent atrial fibrillation [I48.19] Yes    Chronic diastolic CHF (congestive heart failure) [I50.32] Yes    Hyperlipidemia [E78.5] Yes    Coronary artery disease involving native coronary artery of native heart without angina pectoris [I25.10] Yes      Resolved Hospital Problems    Diagnosis POA    **Sepsis, unspecified organism [A41.9] Yes    Orthostatic hypotension [I95.1] Unknown    Hypokalemia [E87.6] Yes         Hospital Course     HPI:  Kendy Worrell is a 84 y.o. female with PMH of HTN, hypothyroidism, CAD, chronic diastolic heart failure, CVA, hyponatremia, paroxysmal Afib on eliquis who presented to [facility] ED 2024 with reports of frequent falls, weakness. She states she has fallen twice today. The first time was when she  went to the bathroom and her right knee gave out. She stated she was helped up by staff and laid on her couch for a while. She then got back up and used her rolling walker to go to the bathroom and she fell in the exact same spot. She did hit her head but denied passing out. She laid on the floor until her daughter found her. She denied any lateralizing weakness. She denied any known illness but has had some intermittent chills. She denied any changes in urination. She lives in assisted living and MAR showed the patient had low BP the last three days and her metoprolol has been held for this reason. Upon medical record review the patient was admitted back in May for orthostatic dizziness.     Hospital Course:  Patient arrived on 7/5/2024 with complaints of weakness, frequent falls and was noted to have orthostatic hypotension. In the ED she had CT head that showed chronic senescent changes.  Improved appearance left maxillary sinus with some residual underlying sinus disease.  Atherosclerotic vascular calcification.  CXR showed no acute cardiopulmonary abnormality.  CT thoracic and lumbar spine showed no acute fracture, thoracic scoliosis and lumbar degenerative anterolisthesis, spinal stenosis. WBC 11.8, lactate 2.4, BUN 29, creatinine 1.77, potassium 2.7, magnesium 2.6 CK total 78, high-sensitivity troponin 39 and urinalysis showed 250 glucose, trace ketones.  Vital signs all within normal limits.  Blood cultures drawn.  Patient improved after IV fluid administration and blood cultures x 2 have been negative so far.  Sepsis has been ruled out and patient's orthostasis is treated with adding midodrine 5 mg 3 times daily as well as Florinef 0.1 mg daily.  She has been working with PT and requiring 1-2 L O2 via NC when exerting herself as well as at nighttime.  She is really deconditioned and frail and PT has recommended SNF.  She was also noted to be slightly hyperthyroid and so her levothyroxine was held,  endocrinology was consulted and recommended starting a lower dose on 7/12/2024 as well as repeating thyroid function in 6 weeks.  Detailed discussion carried out with patient and her daughter regarding each problem and pathophysiology of orthostatic hypotension, importance also stressed about use of abdominal binder as patient says she did not tolerate compression stockings in the past as they hurt her legs.        DISCHARGE Follow Up Recommendations for labs and diagnostics: PCP and endocrinology      Reasons For Change In Medications and Indications for New Medications:      Day of Discharge     Vital Signs:  Temp:  [97.6 °F (36.4 °C)-97.9 °F (36.6 °C)] 97.8 °F (36.6 °C)  Heart Rate:  [74-87] 75  Resp:  [20-24] 24  BP: (114-168)/(75-98) 114/75  Flow (L/min):  [1.5-2] 2    Physical Exam:  Physical Exam    Appearance: Normal appearance.   HENT:      Head: Normocephalic and atraumatic.      Mouth/Throat:      Mouth: Mucous membranes are moist.  Eyes:      Extraocular Movements: Extraocular movements intact.   Cardiovascular:      Rate and Rhythm: Normal rate and regular rhythm.   Pulmonary:      Effort: Pulmonary effort is normal.      Breath sounds: Normal breath sounds.   Abdominal:      General: Abdomen is flat.      Palpations: Abdomen is soft.      Tenderness: There is no abdominal tenderness.   Musculoskeletal:      Cervical back: Normal range of motion.      Right lower leg: No edema.      Left lower leg: No edema.   Skin:     General: Skin is warm.   Neurological:      General: No focal deficit present.      Mental Status: She is alert.       Pertinent  and/or Most Recent Results     LAB RESULTS:      Lab 07/11/24  0123 07/09/24  2355 07/09/24  0216 07/08/24  0533 07/06/24  2359 07/06/24  0448 07/06/24  0017 07/05/24 2048 07/05/24 2018   WBC 7.73 6.72 7.10 7.17 8.64 9.04  --   --   --    HEMOGLOBIN 10.8* 11.2* 10.9* 11.3* 10.7* 11.2*  --   --   --    HEMATOCRIT 33.8* 34.7 31.9* 34.7 32.0* 34.2  --   --   --     PLATELETS 243 236 195 234 220 219  --   --   --    NEUTROS ABS 4.91 4.21 4.52 4.89 6.44 6.97  --   --   --    IMMATURE GRANS (ABS) 0.04 0.03 0.04 0.03 0.06* 0.03  --   --   --    LYMPHS ABS 1.42 1.34 1.38 1.12 0.96 1.01  --   --   --    MONOS ABS 1.00* 0.81 0.92* 0.96* 1.06* 1.01*  --   --   --    EOS ABS 0.28 0.29 0.20 0.13 0.09 0.00  --   --   --    MCV 92.1 90.8 89.1 90.1 89.1 86.4  --   --   --    PROCALCITONIN  --   --   --   --   --   --   --  0.09  --    LACTATE  --   --   --   --   --  1.4 1.9  --  2.4*         Lab 07/11/24  0123 07/09/24  2355 07/09/24  0609 07/07/24  2332 07/07/24  1200 07/06/24  2359 07/06/24  0448 07/05/24  2048 07/05/24 2014   SODIUM 136 133* 137 134*  --  135*   < >  --  131*   POTASSIUM 5.0 4.4 4.2 4.0 2.6* 3.4*   < >  --  2.7*   CHLORIDE 101 100 100 100  --  94*   < >  --  81*   CO2 24.8 25.0 29.2* 23.9  --  31.6*   < >  --  33.6*   ANION GAP 10.2 8.0 7.8 10.1  --  9.4   < >  --  16.4*   BUN 9 9 11 13  --  20   < >  --  29*   CREATININE 0.88 0.83 0.86 1.02*  --  1.27*   < >  --  1.77*   EGFR 64.5 69.2 66.3 54.0*  --  41.5*   < >  --  27.9*   GLUCOSE 96 95 103* 112*  --  127*   < >  --  109*   CALCIUM 10.0 10.0 10.0 9.6  --  9.7   < >  --  10.5   MAGNESIUM  --   --   --   --   --   --   --   --  2.6*   TSH  --   --   --   --   --   --   --  0.205*  --     < > = values in this interval not displayed.         Lab 07/11/24  0123 07/09/24  2355 07/09/24  0609 07/07/24  2332 07/06/24  2359   TOTAL PROTEIN 6.1 6.3 6.0 6.2 6.0   ALBUMIN 3.2* 3.1* 3.1* 3.3* 3.3*   GLOBULIN 2.9 3.2 2.9 2.9 2.7   ALT (SGPT) 9 9 12 11 12   AST (SGOT) 25 26 36* 30 28   BILIRUBIN 0.7 0.8 0.7 0.7 0.6   ALK PHOS 76 76 74 83 86         Lab 07/05/24 2048   HSTROP T 39*                 Brief Urine Lab Results  (Last result in the past 365 days)        Color   Clarity   Blood   Leuk Est   Nitrite   Protein   CREAT   Urine HCG        07/05/24 2014 Yellow   Clear   Negative   Negative   Negative   Negative                  Microbiology Results (last 10 days)       Procedure Component Value - Date/Time    CANDIDA AURIS PCR - Swab, Axilla Right, Axilla Left and Groin [[id number]]  (Normal) Collected: 07/06/24 1505    Lab Status: Final result Specimen: Swab from Axilla Right, Axilla Left and Groin Updated: 07/07/24 1022     CANDIDA AURIS PCR Not Detected    MRSA Screen, PCR (Inpatient) - Swab, Nares [[id number]]  (Normal) Collected: 07/06/24 0530    Lab Status: Final result Specimen: Swab from Nares Updated: 07/06/24 0724     MRSA PCR No MRSA Detected    Narrative:      The negative predictive value of this diagnostic test is high and should only be used to consider de-escalating anti-MRSA therapy. A positive result may indicate colonization with MRSA and must be correlated clinically.    Blood Culture - Blood, Arm, Left [[id number]]  (Normal) Collected: 07/05/24 2041    Lab Status: Final result Specimen: Blood from Arm, Left Updated: 07/10/24 2101     Blood Culture No growth at 5 days    Blood Culture - Blood, Arm, Left [[id number]]  (Normal) Collected: 07/05/24 2014    Lab Status: Final result Specimen: Blood from Arm, Left Updated: 07/10/24 2030     Blood Culture No growth at 5 days            CT Lumbar Spine Without Contrast    Result Date: 7/5/2024  Impression: Impression: No acute fracture. Chronic and incidental ancillary findings detailed above. Electronically Signed: Wilfrid Fish MD  7/5/2024 9:50 PM EDT  Workstation ID: [id number]    CT Thoracic Spine Without Contrast    Result Date: 7/5/2024  Impression: Impression: No acute fracture. Chronic and incidental ancillary findings detailed above. Electronically Signed: Wilfrid Fish MD  7/5/2024 9:50 PM EDT  Workstation ID: [id number]    CT Head Without Contrast    Result Date: 7/5/2024  Impression: Impression: 1.Chronic senescent changes. 2.Improved appearance left maxillary sinus with some residual underlying sinus disease. 3.Atherosclerotic vascular calcification  Electronically Signed: Seth Guerrero MD  7/5/2024 9:06 PM EDT  Workstation ID: [id number]    XR Chest 1 View    Result Date: 7/5/2024  Impression: Impression: 1. No acute cardiopulmonary abnormality. Electronically Signed: Lalit Silverman  7/5/2024 8:41 PM EDT  Workstation ID: [id number]     Results for orders placed during the hospital encounter of 05/23/24    Duplex Carotid Ultrasound CAR    Interpretation Summary    Right internal carotid artery demonstrates a less than 50% stenosis.    Left internal carotid artery demonstrates a less than 50% stenosis.      Results for orders placed during the hospital encounter of 05/23/24    Duplex Carotid Ultrasound CAR    Interpretation Summary    Right internal carotid artery demonstrates a less than 50% stenosis.    Left internal carotid artery demonstrates a less than 50% stenosis.      Results for orders placed during the hospital encounter of 05/23/24    Adult Transthoracic Echo Complete W/ Cont if Necessary Per Protocol    Interpretation Summary    Left ventricular ejection fraction appears to be 61 - 65%.    Left ventricular wall thickness is consistent with moderate eccentric hypertrophy.    Mild to moderate aortic valve stenosis is present.      Labs Pending at Discharge:  Pending Labs       Order Current Status    Lacosamide Blood In process            Procedures Performed           Consults:   Consults       Date and Time Order Name Status Description    7/8/2024 12:41 PM Inpatient Endocrinology Consult Completed     7/6/2024  1:26 AM Inpatient Cardiology Consult Completed               Discharge Details        Discharge Medications        New Medications        Instructions Start Date   amLODIPine 2.5 MG tablet  Commonly known as: NORVASC   2.5 mg, Oral, Every 24 Hours Scheduled   Start Date: July 12, 2024     fludrocortisone 0.1 MG tablet   0.1 mg, Oral, Daily   Start Date: July 12, 2024     midodrine 5 MG tablet  Commonly known as: PROAMATINE   5 mg, Oral, 3 Times  Daily Before Meals             Changes to Medications        Instructions Start Date   levothyroxine 75 MCG tablet  Commonly known as: SYNTHROID, LEVOTHROID  What changed:   medication strength  how much to take  when to take this   75 mcg, Oral, Every Early Morning   Start Date: July 12, 2024            Continue These Medications        Instructions Start Date   acetaminophen 325 MG tablet  Commonly known as: TYLENOL   650 mg, Oral, Every 6 Hours PRN      albuterol sulfate  (90 Base) MCG/ACT inhaler  Commonly known as: PROVENTIL HFA;VENTOLIN HFA;PROAIR HFA   2 puffs, Inhalation, Every 4 Hours PRN      apixaban 5 MG tablet tablet  Commonly known as: ELIQUIS   5 mg, Oral, Every 12 Hours Scheduled      ascorbic acid 1000 MG tablet  Commonly known as: VITAMIN C   1,000 mg, Oral, Daily      atorvastatin 10 MG tablet  Commonly known as: LIPITOR   10 mg, Oral, Nightly      cholecalciferol 25 MCG (1000 UT) tablet  Commonly known as: VITAMIN D3   1,000 Units, Oral, Daily      dapagliflozin Propanediol 10 MG tablet   10 mg, Oral, Daily      lacosamide 50 MG tablet tablet  Commonly known as: VIMPAT   50 mg, Oral, Daily      magnesium oxide 250 MG tablet   250 mg, Oral, 2 Times Daily      multivitamin with minerals tablet tablet   1 tablet, Oral, Daily      multivitamins-minerals capsule capsule   1 capsule, Oral, 2 Times Daily      nitroglycerin 0.4 MG SL tablet  Commonly known as: NITROSTAT   0.4 mg, Sublingual, Every 5 Minutes PRN      nystatin [id number] UNIT/GM powder  Commonly known as: MYCOSTATIN   Topical, 3 Times Daily PRN, Apply under breasts      pantoprazole 40 MG EC tablet  Commonly known as: PROTONIX   40 mg, Oral, Daily      potassium chloride 20 MEQ CR tablet  Commonly known as: KLOR-CON M20   40 mEq, Oral, 3 Times Daily      ranolazine 500 MG 12 hr tablet  Commonly known as: RANEXA   500 mg, Oral, 2 Times Daily      vitamin B-12 1000 MCG tablet  Commonly known as: CYANOCOBALAMIN   1,000 mcg, Oral, Daily       zinc oxide 20 % ointment   1 Application, Topical, 2 Times Daily             Stop These Medications      cloNIDine 0.1 MG tablet  Commonly known as: CATAPRES     metOLazone 2.5 MG tablet  Commonly known as: ZAROXOLYN     metoprolol succinate  MG 24 hr tablet  Commonly known as: TOPROL-XL     torsemide 20 MG tablet  Commonly known as: DEMADEX     traMADol 50 MG tablet  Commonly known as: ULTRAM              Allergies   Allergen Reactions    Aspirin Other (See Comments)     Severe bleeding    Nsaids Other (See Comments)     Severe bleeding         Discharge Disposition: DC to SNF today  Skilled Nursing Facility (DC - External)    Diet:  Hospital:  Diet Order   Procedures    Diet: Diabetic; Consistent Carbohydrate; Texture: Mechanical Ground (NDD 2); Fluid Consistency: Thin (IDDSI 0)         Discharge Activity:         CODE STATUS:  Code Status and Medical Interventions:   Ordered at: 07/06/24 0004     Medical Intervention Limits:    No intubation (DNI)     Level Of Support Discussed With:    Patient     Code Status (Patient has no pulse and is not breathing):    No CPR (Do Not Attempt to Resuscitate)     Medical Interventions (Patient has pulse or is breathing):    Limited Support         Future Appointments   Date Time Provider Department Center   7/24/2024  2:15 PM Rios Thomas MD [facility]           Time spent on Discharge including face to face service:  >30 minutes    Signature: Electronically signed by Arcelia Robertson MD, 07/11/24, 12:22 EDT.  [facility] Hospitalist Team

## 2024-07-11 NOTE — PLAN OF CARE
Pt presents with functional mobility impairments which indicate the need for skilled intervention. Tolerating session today without incident. Pt on 1.5L O2 and telemetry this date.  Pt with c/o R rib pain due to prolonged bouts of coughing.  Pt required Min A for bed mobility, Min A for transfers with RW.  Ambulated 5' with RW with Min A.  No c/o dizziness throughout session.  Pt very fatigued at end of session sitting up in b/s chair.  Will continue to follow and progress as tolerated.

## 2024-07-14 LAB
QT INTERVAL: 356 MS
QTC INTERVAL: 422 MS

## 2024-07-16 LAB — LACOSAMIDE SERPL-MCNC: 3.1 UG/ML (ref 5–10)

## 2024-10-16 ENCOUNTER — TELEPHONE (OUTPATIENT)
Age: 84
End: 2024-10-16

## 2024-10-16 NOTE — TELEPHONE ENCOUNTER
Caller:Kendy Worrell     Relationship:  SELF     Best call back number:[phone]     PATIENT CALLED REQUESTING TO CANCEL SAME DAY APPT.    Did the patient call AFTER the start time of their scheduled appointment?   NO    Was the patient's appointment rescheduled?  NO    Any additional information: WILL CALL BACK TO RESCHEDULE

## 2024-10-28 NOTE — CASE MANAGEMENT/SOCIAL WORK
Case Management Discharge Note      Final Note: HOME [facility]    Provided Post Acute Provider List?: N/A  N/A Provider List Comment: Plans to return to [facility]    Selected Continued Care - Discharged on 1/30/2024 Admission date: 1/22/2024 - Discharge disposition: [facility]      Destination Coordination complete.      Service Provider Selected Services Address Phone Fax Patient Preferred    [facility] [address] [place] [address] [phone] --                  Home Medical Care Coordination complete.      Service Provider Selected Services Address Phone Fax Patient Preferred    [facility] [place] [facility] ,  Home Rehabilitation [address] [place], [place] [address] [place] [address] [phone] --                      Transportation Services  Private: Car    Final Discharge Disposition Code: 06 - home with home health care   No

## 2024-12-30 ENCOUNTER — HOSPITAL ENCOUNTER (EMERGENCY)
Facility: HOSPITAL | Age: 84
Discharge: NURSING, REHAB OR LONG-TERM CARE | End: 2024-12-31
Admitting: EMERGENCY MEDICINE
Payer: MEDICARE

## 2024-12-30 ENCOUNTER — APPOINTMENT (OUTPATIENT)
Dept: CT IMAGING | Facility: HOSPITAL | Age: 84
End: 2024-12-30
Payer: MEDICARE

## 2024-12-30 ENCOUNTER — APPOINTMENT (OUTPATIENT)
Dept: GENERAL RADIOLOGY | Facility: HOSPITAL | Age: 84
End: 2024-12-30
Payer: MEDICARE

## 2024-12-30 DIAGNOSIS — R40.4 TRANSIENT ALTERATION OF AWARENESS: ICD-10-CM

## 2024-12-30 DIAGNOSIS — N39.0 URINARY TRACT INFECTION WITHOUT HEMATURIA, SITE UNSPECIFIED: Primary | ICD-10-CM

## 2024-12-30 DIAGNOSIS — I10 HYPERTENSION, UNSPECIFIED TYPE: ICD-10-CM

## 2024-12-30 LAB
ALBUMIN SERPL-MCNC: 3.4 G/DL (ref 3.5–5.2)
ALBUMIN/GLOB SERPL: 1.1 G/DL
ALP SERPL-CCNC: 73 U/L (ref 39–117)
ALT SERPL W P-5'-P-CCNC: 6 U/L (ref 1–33)
ANION GAP SERPL CALCULATED.3IONS-SCNC: 11.1 MMOL/L (ref 5–15)
AST SERPL-CCNC: 26 U/L (ref 1–32)
B PARAPERT DNA SPEC QL NAA+PROBE: NOT DETECTED
B PERT DNA SPEC QL NAA+PROBE: NOT DETECTED
BACTERIA UR QL AUTO: ABNORMAL /HPF
BASOPHILS # BLD AUTO: 0.03 10*3/MM3 (ref 0–0.2)
BASOPHILS NFR BLD AUTO: 0.6 % (ref 0–1.5)
BILIRUB SERPL-MCNC: 0.7 MG/DL (ref 0–1.2)
BILIRUB UR QL STRIP: NEGATIVE
BUN SERPL-MCNC: 13 MG/DL (ref 8–23)
BUN/CREAT SERPL: 11.3 (ref 7–25)
C PNEUM DNA NPH QL NAA+NON-PROBE: NOT DETECTED
CALCIUM SPEC-SCNC: 10 MG/DL (ref 8.6–10.5)
CHLORIDE SERPL-SCNC: 103 MMOL/L (ref 98–107)
CLARITY UR: CLEAR
CO2 SERPL-SCNC: 25.9 MMOL/L (ref 22–29)
COLOR UR: YELLOW
CREAT SERPL-MCNC: 1.15 MG/DL (ref 0.57–1)
DEPRECATED RDW RBC AUTO: 62.2 FL (ref 37–54)
EGFRCR SERPLBLD CKD-EPI 2021: 46.8 ML/MIN/1.73
EOSINOPHIL # BLD AUTO: 0.02 10*3/MM3 (ref 0–0.4)
EOSINOPHIL NFR BLD AUTO: 0.4 % (ref 0.3–6.2)
ERYTHROCYTE [DISTWIDTH] IN BLOOD BY AUTOMATED COUNT: 20.3 % (ref 12.3–15.4)
FLUAV SUBTYP SPEC NAA+PROBE: NOT DETECTED
FLUBV RNA ISLT QL NAA+PROBE: NOT DETECTED
GLOBULIN UR ELPH-MCNC: 3 GM/DL
GLUCOSE BLDC GLUCOMTR-MCNC: 97 MG/DL (ref 70–105)
GLUCOSE SERPL-MCNC: 102 MG/DL (ref 65–99)
GLUCOSE UR STRIP-MCNC: ABNORMAL MG/DL
HADV DNA SPEC NAA+PROBE: NOT DETECTED
HCOV 229E RNA SPEC QL NAA+PROBE: NOT DETECTED
HCOV HKU1 RNA SPEC QL NAA+PROBE: NOT DETECTED
HCOV NL63 RNA SPEC QL NAA+PROBE: NOT DETECTED
HCOV OC43 RNA SPEC QL NAA+PROBE: NOT DETECTED
HCT VFR BLD AUTO: 30 % (ref 34–46.6)
HGB BLD-MCNC: 9.5 G/DL (ref 12–15.9)
HGB UR QL STRIP.AUTO: NEGATIVE
HMPV RNA NPH QL NAA+NON-PROBE: NOT DETECTED
HOLD SPECIMEN: NORMAL
HOLD SPECIMEN: NORMAL
HPIV1 RNA ISLT QL NAA+PROBE: NOT DETECTED
HPIV2 RNA SPEC QL NAA+PROBE: NOT DETECTED
HPIV3 RNA NPH QL NAA+PROBE: NOT DETECTED
HPIV4 P GENE NPH QL NAA+PROBE: NOT DETECTED
HYALINE CASTS UR QL AUTO: ABNORMAL /LPF
IMM GRANULOCYTES # BLD AUTO: 0.02 10*3/MM3 (ref 0–0.05)
IMM GRANULOCYTES NFR BLD AUTO: 0.4 % (ref 0–0.5)
KETONES UR QL STRIP: ABNORMAL
LEUKOCYTE ESTERASE UR QL STRIP.AUTO: ABNORMAL
LYMPHOCYTES # BLD AUTO: 0.94 10*3/MM3 (ref 0.7–3.1)
LYMPHOCYTES NFR BLD AUTO: 19.2 % (ref 19.6–45.3)
M PNEUMO IGG SER IA-ACNC: NOT DETECTED
MCH RBC QN AUTO: 26.6 PG (ref 26.6–33)
MCHC RBC AUTO-ENTMCNC: 31.7 G/DL (ref 31.5–35.7)
MCV RBC AUTO: 84 FL (ref 79–97)
MONOCYTES # BLD AUTO: 0.66 10*3/MM3 (ref 0.1–0.9)
MONOCYTES NFR BLD AUTO: 13.5 % (ref 5–12)
NEUTROPHILS NFR BLD AUTO: 3.23 10*3/MM3 (ref 1.7–7)
NEUTROPHILS NFR BLD AUTO: 65.9 % (ref 42.7–76)
NITRITE UR QL STRIP: NEGATIVE
NRBC BLD AUTO-RTO: 0 /100 WBC (ref 0–0.2)
PH UR STRIP.AUTO: 7.5 [PH] (ref 5–8)
PLATELET # BLD AUTO: 260 10*3/MM3 (ref 140–450)
PMV BLD AUTO: 9.9 FL (ref 6–12)
POTASSIUM SERPL-SCNC: 4.3 MMOL/L (ref 3.5–5.2)
PROT SERPL-MCNC: 6.4 G/DL (ref 6–8.5)
PROT UR QL STRIP: NEGATIVE
RBC # BLD AUTO: 3.57 10*6/MM3 (ref 3.77–5.28)
RBC # UR STRIP: ABNORMAL /HPF
REF LAB TEST METHOD: ABNORMAL
RHINOVIRUS RNA SPEC NAA+PROBE: NOT DETECTED
RSV RNA NPH QL NAA+NON-PROBE: NOT DETECTED
SARS-COV-2 RNA RESP QL NAA+PROBE: NOT DETECTED
SODIUM SERPL-SCNC: 140 MMOL/L (ref 136–145)
SP GR UR STRIP: 1.01 (ref 1–1.03)
SQUAMOUS #/AREA URNS HPF: ABNORMAL /HPF
UROBILINOGEN UR QL STRIP: ABNORMAL
WBC # UR STRIP: ABNORMAL /HPF
WBC NRBC COR # BLD AUTO: 4.9 10*3/MM3 (ref 3.4–10.8)
WHOLE BLOOD HOLD COAG: NORMAL
WHOLE BLOOD HOLD SPECIMEN: NORMAL

## 2024-12-30 PROCEDURE — 93005 ELECTROCARDIOGRAM TRACING: CPT

## 2024-12-30 PROCEDURE — 99284 EMERGENCY DEPT VISIT MOD MDM: CPT

## 2024-12-30 PROCEDURE — 25810000003 SODIUM CHLORIDE 0.9 % SOLUTION

## 2024-12-30 PROCEDURE — 0202U NFCT DS 22 TRGT SARS-COV-2: CPT

## 2024-12-30 PROCEDURE — P9612 CATHETERIZE FOR URINE SPEC: HCPCS

## 2024-12-30 PROCEDURE — 87086 URINE CULTURE/COLONY COUNT: CPT

## 2024-12-30 PROCEDURE — 80053 COMPREHEN METABOLIC PANEL: CPT

## 2024-12-30 PROCEDURE — 81001 URINALYSIS AUTO W/SCOPE: CPT

## 2024-12-30 PROCEDURE — 71045 X-RAY EXAM CHEST 1 VIEW: CPT

## 2024-12-30 PROCEDURE — 85025 COMPLETE CBC W/AUTO DIFF WBC: CPT

## 2024-12-30 PROCEDURE — 70450 CT HEAD/BRAIN W/O DYE: CPT

## 2024-12-30 PROCEDURE — 82948 REAGENT STRIP/BLOOD GLUCOSE: CPT

## 2024-12-30 RX ORDER — CLONIDINE HYDROCHLORIDE 0.1 MG/1
0.1 TABLET ORAL ONCE
Status: COMPLETED | OUTPATIENT
Start: 2024-12-30 | End: 2024-12-30

## 2024-12-30 RX ORDER — SODIUM CHLORIDE 0.9 % (FLUSH) 0.9 %
10 SYRINGE (ML) INJECTION AS NEEDED
Status: DISCONTINUED | OUTPATIENT
Start: 2024-12-30 | End: 2024-12-31 | Stop reason: HOSPADM

## 2024-12-30 RX ORDER — NITROFURANTOIN 25; 75 MG/1; MG/1
100 CAPSULE ORAL 2 TIMES DAILY
Qty: 14 CAPSULE | Refills: 0 | Status: SHIPPED | OUTPATIENT
Start: 2024-12-30 | End: 2025-01-06

## 2024-12-30 RX ORDER — LABETALOL HYDROCHLORIDE 5 MG/ML
10 INJECTION, SOLUTION INTRAVENOUS ONCE
Status: COMPLETED | OUTPATIENT
Start: 2024-12-31 | End: 2024-12-31

## 2024-12-30 RX ADMIN — CLONIDINE HYDROCHLORIDE 0.1 MG: 0.1 TABLET ORAL at 22:47

## 2024-12-30 RX ADMIN — SODIUM CHLORIDE 500 ML: 9 INJECTION, SOLUTION INTRAVENOUS at 21:46

## 2024-12-31 VITALS
TEMPERATURE: 98.3 F | HEART RATE: 81 BPM | WEIGHT: 125 LBS | RESPIRATION RATE: 16 BRPM | DIASTOLIC BLOOD PRESSURE: 84 MMHG | OXYGEN SATURATION: 93 % | HEIGHT: 61 IN | SYSTOLIC BLOOD PRESSURE: 152 MMHG | BODY MASS INDEX: 23.6 KG/M2

## 2024-12-31 LAB
QT INTERVAL: 394 MS
QTC INTERVAL: 467 MS

## 2024-12-31 PROCEDURE — 96374 THER/PROPH/DIAG INJ IV PUSH: CPT

## 2024-12-31 PROCEDURE — 25010000003 LABETALOL 5 MG/ML SOLUTION

## 2024-12-31 RX ADMIN — LABETALOL 20 MG/4 ML (5 MG/ML) INTRAVENOUS SYRINGE 10 MG: at 00:07

## 2024-12-31 NOTE — ED PROVIDER NOTES
Subjective   Chief Complaint   Patient presents with    Altered Mental Status     AMS normal alert and oriented. Walking around independent living ([facility]) naked. Nurse at facility called EMS. EMS states patient is alert and oriented with questions how ever is still confused.        History of Present Illness  Patient is an 84-year-old lady who lives in assisted living.  Today she tried to go to dinner on her scooter with no pants.  She stated that she knew she was not wearing pants but she did not feel like wearing pants so she did not put any on that she did not know it was a role at the facility to not wear pants.  Per EMS she is alert and oriented x 4 which is her baseline however per the facility and per family she is not at her baseline.  Per family, she seems more confused than usual.  Patient's only complaint is a very mild headache and a cough.  Review of Systems   Constitutional: Negative.    HENT: Negative.     Eyes: Negative.    Respiratory:  Positive for cough.    Cardiovascular: Negative.    Gastrointestinal: Negative.    Endocrine: Negative.    Genitourinary: Negative.    Musculoskeletal: Negative.    Skin: Negative.    Allergic/Immunologic: Negative.    Neurological: Negative.    Hematological: Negative.    Psychiatric/Behavioral: Negative.         Past Medical History:   Diagnosis Date    A-fib     Arthritis     Breast cancer     CHF (congestive heart failure)     CVA (cerebral vascular accident) 09/2019    History of pulmonary embolus (PE)     Hyperlipidemia     Hypertension     Hyperthyroidism     patient has hypothyroidism    Hypothyroidism        Allergies   Allergen Reactions    Aspirin Other (See Comments)     Severe bleeding    Nsaids Other (See Comments)     Severe bleeding       Past Surgical History:   Procedure Laterality Date    BREAST BIOPSY      BREAST SURGERY Right     CHOLECYSTECTOMY      COLON SURGERY      Removed    COLONOSCOPY      COLONOSCOPY N/A 11/3/2022    Procedure:  COLONOSCOPY to anastamosis with biopsies and cold snare polypectomy;  Surgeon: Saroj Oakes MD;  Location: [facility] ENDOSCOPY;  Service: Gastroenterology;  Laterality: N/A;  PRe: rectal bleeding  Post: hemorrhoids, diverticulosis, anastamotic ulcer, polyp, hemostasis clip free-floating    HYSTERECTOMY      MAMMO BILATERAL  2015    MASTECTOMY      TONSILLECTOMY         Family History   Problem Relation Age of Onset    Stroke Mother     Hypertension Mother     Heart disease Father     Cancer Father     Colon polyps Father        Social History     Socioeconomic History    Marital status: Single   Tobacco Use    Smoking status: Former     Current packs/day: 0.00     Types: Cigarettes     Quit date:      Years since quittin.0     Passive exposure: Past    Smokeless tobacco: Never    Tobacco comments:     quit in    Vaping Use    Vaping status: Never Used   Substance and Sexual Activity    Alcohol use: Yes     Alcohol/week: 3.0 standard drinks of alcohol     Types: 3 Shots of liquor per week     Comment: Last drink 3 weeks ago.    Drug use: Never    Sexual activity: Never           Objective   Physical Exam  Vitals and nursing note reviewed.   Constitutional:       General: She is not in acute distress.     Appearance: She is well-developed. She is not ill-appearing, toxic-appearing or diaphoretic.   HENT:      Head: Normocephalic and atraumatic.      Mouth/Throat:      Mouth: Mucous membranes are moist.      Pharynx: Oropharynx is clear.   Eyes:      Extraocular Movements: Extraocular movements intact.      Pupils: Pupils are equal, round, and reactive to light.   Cardiovascular:      Rate and Rhythm: Normal rate and regular rhythm.      Heart sounds: Normal heart sounds.   Pulmonary:      Effort: Pulmonary effort is normal.      Breath sounds: Normal breath sounds.   Abdominal:      General: Bowel sounds are normal.      Palpations: Abdomen is soft.   Musculoskeletal:         General: Normal range  "of motion.      Cervical back: Normal range of motion and neck supple.   Skin:     General: Skin is warm and dry.      Capillary Refill: Capillary refill takes 2 to 3 seconds.   Neurological:      Mental Status: She is alert.      GCS: GCS eye subscore is 4. GCS verbal subscore is 5. GCS motor subscore is 6.   Psychiatric:         Mood and Affect: Mood normal.         Speech: Speech normal.         Behavior: Behavior is cooperative.         Procedures           ED Course      BP (!) 156/116   Pulse 82   Temp 97.3 °F (36.3 °C)   Resp 16   Ht 154.9 cm (61\")   Wt 56.7 kg (125 lb)   SpO2 95%   BMI 23.62 kg/m²   Labs Reviewed   COMPREHENSIVE METABOLIC PANEL - Abnormal; Notable for the following components:       Result Value    Glucose 102 (*)     Creatinine 1.15 (*)     Albumin 3.4 (*)     eGFR 46.8 (*)     All other components within normal limits    Narrative:     GFR Categories in Chronic Kidney Disease (CKD)      GFR Category          GFR (mL/min/1.73)    Interpretation  G1                     90 or greater         Normal or high (1)  G2                      60-89                Mild decrease (1)  G3a                   45-59                Mild to moderate decrease  G3b                   30-44                Moderate to severe decrease  G4                    15-29                Severe decrease  G5                    14 or less           Kidney failure          (1)In the absence of evidence of kidney disease, neither GFR category G1 or G2 fulfill the criteria for CKD.    eGFR calculation 2021 CKD-EPI creatinine equation, which does not include race as a factor   URINALYSIS W/ CULTURE IF INDICATED - Abnormal; Notable for the following components:    Glucose,  mg/dL (2+) (*)     Ketones, UA Trace (*)     Leuk Esterase, UA Small (1+) (*)     All other components within normal limits    Narrative:     In absence of clinical symptoms, the presence of pyuria, bacteria, and/or nitrites on the urinalysis result " does not correlate with infection.   CBC WITH AUTO DIFFERENTIAL - Abnormal; Notable for the following components:    RBC 3.57 (*)     Hemoglobin 9.5 (*)     Hematocrit 30.0 (*)     RDW 20.3 (*)     RDW-SD 62.2 (*)     Lymphocyte % 19.2 (*)     Monocyte % 13.5 (*)     All other components within normal limits   URINALYSIS, MICROSCOPIC ONLY - Abnormal; Notable for the following components:    WBC, UA 21-50 (*)     All other components within normal limits   RESPIRATORY PANEL PCR W/ COVID-19 (SARS-COV-2), NP SWAB IN UTM/VTP, 2 HR TAT - Normal    Narrative:     In the setting of a positive respiratory panel with a viral infection PLUS a negative procalcitonin without other underlying concern for bacterial infection, consider observing off antibiotics or discontinuation of antibiotics and continue supportive care. If the respiratory panel is positive for atypical bacterial infection (Bordetella pertussis, Chlamydophila pneumoniae, or Mycoplasma pneumoniae), consider antibiotic de-escalation to target atypical bacterial infection.   POCT GLUCOSE FINGERSTICK - Normal   URINE CULTURE   RAINBOW DRAW    Narrative:     The following orders were created for panel order [facility] Draw.  Procedure                               Abnormality         Status                     ---------                               -----------         ------                     Green Top (Gel)[[id number]]                                  Final result               Lavender Top[[id number]]                                     Final result               Gold Top - SST[[id number]]                                   Final result               Light Blue Top[[id number]]                                   Final result                 Please view results for these tests on the individual orders.   CBC AND DIFFERENTIAL    Narrative:     The following orders were created for panel order CBC & Differential.  Procedure                               Abnormality          Status                     ---------                               -----------         ------                     CBC Auto Differential[[id number]]        Abnormal            Final result                 Please view results for these tests on the individual orders.   GREEN TOP   LAVENDER TOP   GOLD TOP - SST   LIGHT BLUE TOP     Medications   sodium chloride 0.9 % flush 10 mL (has no administration in time range)   sodium chloride 0.9 % bolus 500 mL (500 mL Intravenous New Bag 12/30/24 2146)   cloNIDine (CATAPRES) tablet 0.1 mg (0.1 mg Oral Given 12/30/24 2247)   labetalol (NORMODYNE,TRANDATE) injection 10 mg (10 mg Intravenous Given 12/31/24 0007)     XR Chest 1 View    Result Date: 12/30/2024  Impression: No evidence of acute disease. Electronically Signed: David Cotton MD  12/30/2024 9:23 PM EST  Workstation ID: [id number]    CT Head Without Contrast    Result Date: 12/30/2024  Age-related changes of the brain as above, otherwise without evidence of acute intracranial abnormality. Electronically Signed: David Cotton MD  12/30/2024 9:13 PM EST  Workstation ID: [id number]                                                    Medical Decision Making  Patient presents to the ED for the above complaint, underwent the above exam and workup.    EKG reviewed: Atrial fibrillation with a rate of 89 as interpreted by Dr. Champan.  Patient has a history of atrial fibrillation with most recent EKG July 9, 2020 for atrial fibrillation with a rate of 84.    Reviewed external records from assisted living facility.    Reviewed internal records dated July 5, 2024 for sepsis due to unspecified organism acute nonrecurrent maxillary sinusitis weakness recurrent syncope chronic kidney disease and chronic heart failure.    Differential diagnosis considered: Urinary tract infection, subarachnoid hemorrhage, medication complication    Patient was treated with the following medications while in the ED;   Medications   sodium chloride 0.9 %  flush 10 mL (has no administration in time range)   sodium chloride 0.9 % bolus 500 mL (500 mL Intravenous New Bag 12/30/24 2146)   cloNIDine (CATAPRES) tablet 0.1 mg (0.1 mg Oral Given 12/30/24 2247)   labetalol (NORMODYNE,TRANDATE) injection 10 mg (10 mg Intravenous Given 12/31/24 0007)     Upon evaluation of patient IV was established labs imaging EKG were obtained.  Results all as above.  Patient did not have any bacteria or nitrites in her urine I did send for culture and I am treating prophylactically pending cultures.  Paper prescription for antibiotics given to daughter at bedside.  Patient is alert and oriented x 4 with no complaints.  Patient blood pressure was controlled with clonidine and labetalol.  Encouraged to follow-up with primary care to reevaluate hypertension treatment plan.  Patient and daughter expressed understanding of discharge follow-up and return to ED instruction.    Consideration was given for admission, but the patient was stable for outpatient management as patient was ambulatory, nontoxic, stable, and afebrile.  Exam as above.    Disposition: Discussed need to follow-up diagnostics, including incidental findings.  Discharged with instructions to obtain outpatient follow-up with patient's symptoms and findings, with strict return precautions if patient develops new or worsening symptoms.    This document is intended for medical expert use only. Reading of this document by patients and/or patient's family without participating medical staff guidance may result in misinterpretation and unintended morbidity.  Any interpretation of such data is the responsibility of the patient and/or family member responsible for the patient in concert with their primary or specialist providers, not to be left for sources of online searches such as St[employer]Upon, [employer] or similar queries. Relying on these approaches to knowledge may result in misinterpretation, misguided goals of care and even death should  patients or family members try recommendations outside of the realm of professional medical care in a supervised inpatient environment.       Final diagnoses:   Urinary tract infection without hematuria, site unspecified   Transient alteration of awareness   Hypertension, unspecified type       ED Disposition  ED Disposition       ED Disposition   Discharge    Condition   Stable    Comment   --               Marisela Monte, APRN  [address] [id number]  [phone]               Medication List        New Prescriptions      nitrofurantoin (macrocrystal-monohydrate) 100 MG capsule  Commonly known as: MACROBID  Take 1 capsule by mouth 2 (Two) Times a Day for 7 days.               Where to Get Your Medications        You can get these medications from any pharmacy    Bring a paper prescription for each of these medications  nitrofurantoin (macrocrystal-monohydrate) 100 MG capsule            Usha Lilly, APRN  12/31/24 0049     No

## 2024-12-31 NOTE — DISCHARGE INSTRUCTIONS
Take antibiotics as directed to completion.  Follow-up with primary care, call to make an appointment.  Return to the ER as needed.    Increase fluids as tolerated.

## 2025-01-01 ENCOUNTER — TELEPHONE (OUTPATIENT)
Age: 85
End: 2025-01-01

## 2025-01-01 LAB — BACTERIA SPEC AEROBE CULT: NO GROWTH

## 2025-01-29 ENCOUNTER — HOSPITAL ENCOUNTER (OUTPATIENT)
Facility: HOSPITAL | Age: 85
Setting detail: OBSERVATION
LOS: 1 days | Discharge: NURSING, REHAB OR LONG-TERM CARE | End: 2025-01-31
Attending: INTERNAL MEDICINE
Payer: MEDICARE

## 2025-01-29 ENCOUNTER — APPOINTMENT (OUTPATIENT)
Dept: CT IMAGING | Facility: HOSPITAL | Age: 85
End: 2025-01-29
Payer: MEDICARE

## 2025-01-29 ENCOUNTER — APPOINTMENT (OUTPATIENT)
Dept: GENERAL RADIOLOGY | Facility: HOSPITAL | Age: 85
End: 2025-01-29
Payer: MEDICARE

## 2025-01-29 DIAGNOSIS — R53.1 GENERALIZED WEAKNESS: Primary | ICD-10-CM

## 2025-01-29 DIAGNOSIS — R29.6 FREQUENT FALLS: ICD-10-CM

## 2025-01-29 DIAGNOSIS — G40.909 SEIZURE DISORDER: ICD-10-CM

## 2025-01-29 LAB
ALBUMIN SERPL-MCNC: 4.6 G/DL (ref 3.5–5.2)
ALBUMIN/GLOB SERPL: 1.3 G/DL
ALP SERPL-CCNC: 175 U/L (ref 39–117)
ALT SERPL W P-5'-P-CCNC: 14 U/L (ref 1–33)
ANION GAP SERPL CALCULATED.3IONS-SCNC: 12 MMOL/L (ref 5–15)
ANION GAP SERPL CALCULATED.3IONS-SCNC: 19.3 MMOL/L (ref 5–15)
AST SERPL-CCNC: 35 U/L (ref 1–32)
BACTERIA UR QL AUTO: NORMAL /HPF
BASOPHILS # BLD AUTO: 0.04 10*3/MM3 (ref 0–0.2)
BASOPHILS NFR BLD AUTO: 0.4 % (ref 0–1.5)
BILIRUB SERPL-MCNC: 1 MG/DL (ref 0–1.2)
BILIRUB UR QL STRIP: NEGATIVE
BUN SERPL-MCNC: 12 MG/DL (ref 8–23)
BUN SERPL-MCNC: 13 MG/DL (ref 8–23)
BUN/CREAT SERPL: 10.2 (ref 7–25)
BUN/CREAT SERPL: 10.2 (ref 7–25)
CALCIUM SPEC-SCNC: 11 MG/DL (ref 8.6–10.5)
CALCIUM SPEC-SCNC: 9.2 MG/DL (ref 8.6–10.5)
CHLORIDE SERPL-SCNC: 101 MMOL/L (ref 98–107)
CHLORIDE SERPL-SCNC: 99 MMOL/L (ref 98–107)
CLARITY UR: CLEAR
CO2 SERPL-SCNC: 19.7 MMOL/L (ref 22–29)
CO2 SERPL-SCNC: 25 MMOL/L (ref 22–29)
COLOR UR: YELLOW
CREAT SERPL-MCNC: 1.18 MG/DL (ref 0.57–1)
CREAT SERPL-MCNC: 1.28 MG/DL (ref 0.57–1)
DEPRECATED RDW RBC AUTO: 62.1 FL (ref 37–54)
EGFRCR SERPLBLD CKD-EPI 2021: 41.1 ML/MIN/1.73
EGFRCR SERPLBLD CKD-EPI 2021: 45.4 ML/MIN/1.73
EOSINOPHIL # BLD AUTO: 0.02 10*3/MM3 (ref 0–0.4)
EOSINOPHIL NFR BLD AUTO: 0.2 % (ref 0.3–6.2)
ERYTHROCYTE [DISTWIDTH] IN BLOOD BY AUTOMATED COUNT: 20.8 % (ref 12.3–15.4)
GLOBULIN UR ELPH-MCNC: 3.5 GM/DL
GLUCOSE BLDC GLUCOMTR-MCNC: 121 MG/DL (ref 70–105)
GLUCOSE SERPL-MCNC: 112 MG/DL (ref 65–99)
GLUCOSE SERPL-MCNC: 135 MG/DL (ref 65–99)
GLUCOSE UR STRIP-MCNC: ABNORMAL MG/DL
HCT VFR BLD AUTO: 42.2 % (ref 34–46.6)
HGB BLD-MCNC: 13.1 G/DL (ref 12–15.9)
HGB UR QL STRIP.AUTO: NEGATIVE
HOLD SPECIMEN: NORMAL
HYALINE CASTS UR QL AUTO: NORMAL /LPF
IMM GRANULOCYTES # BLD AUTO: 0.04 10*3/MM3 (ref 0–0.05)
IMM GRANULOCYTES NFR BLD AUTO: 0.4 % (ref 0–0.5)
KETONES UR QL STRIP: ABNORMAL
LEUKOCYTE ESTERASE UR QL STRIP.AUTO: ABNORMAL
LYMPHOCYTES # BLD AUTO: 0.65 10*3/MM3 (ref 0.7–3.1)
LYMPHOCYTES NFR BLD AUTO: 6.5 % (ref 19.6–45.3)
MCH RBC QN AUTO: 26.6 PG (ref 26.6–33)
MCHC RBC AUTO-ENTMCNC: 31 G/DL (ref 31.5–35.7)
MCV RBC AUTO: 85.6 FL (ref 79–97)
MONOCYTES # BLD AUTO: 0.59 10*3/MM3 (ref 0.1–0.9)
MONOCYTES NFR BLD AUTO: 5.9 % (ref 5–12)
NEUTROPHILS NFR BLD AUTO: 8.61 10*3/MM3 (ref 1.7–7)
NEUTROPHILS NFR BLD AUTO: 86.6 % (ref 42.7–76)
NITRITE UR QL STRIP: NEGATIVE
NRBC BLD AUTO-RTO: 0 /100 WBC (ref 0–0.2)
PH UR STRIP.AUTO: 7 [PH] (ref 5–8)
PLATELET # BLD AUTO: 310 10*3/MM3 (ref 140–450)
PMV BLD AUTO: 9.8 FL (ref 6–12)
POTASSIUM SERPL-SCNC: 4 MMOL/L (ref 3.5–5.2)
POTASSIUM SERPL-SCNC: 5.1 MMOL/L (ref 3.5–5.2)
PROT SERPL-MCNC: 8.1 G/DL (ref 6–8.5)
PROT UR QL STRIP: NEGATIVE
RBC # BLD AUTO: 4.93 10*6/MM3 (ref 3.77–5.28)
RBC # UR STRIP: NORMAL /HPF
REF LAB TEST METHOD: NORMAL
SODIUM SERPL-SCNC: 138 MMOL/L (ref 136–145)
SODIUM SERPL-SCNC: 138 MMOL/L (ref 136–145)
SP GR UR STRIP: 1.01 (ref 1–1.03)
SQUAMOUS #/AREA URNS HPF: NORMAL /HPF
UROBILINOGEN UR QL STRIP: ABNORMAL
WBC # UR STRIP: NORMAL /HPF
WBC NRBC COR # BLD AUTO: 9.95 10*3/MM3 (ref 3.4–10.8)

## 2025-01-29 PROCEDURE — 72170 X-RAY EXAM OF PELVIS: CPT

## 2025-01-29 PROCEDURE — 82607 VITAMIN B-12: CPT | Performed by: HOSPITALIST

## 2025-01-29 PROCEDURE — 36415 COLL VENOUS BLD VENIPUNCTURE: CPT

## 2025-01-29 PROCEDURE — 70450 CT HEAD/BRAIN W/O DYE: CPT

## 2025-01-29 PROCEDURE — 80053 COMPREHEN METABOLIC PANEL: CPT

## 2025-01-29 PROCEDURE — 99285 EMERGENCY DEPT VISIT HI MDM: CPT

## 2025-01-29 PROCEDURE — 82948 REAGENT STRIP/BLOOD GLUCOSE: CPT

## 2025-01-29 PROCEDURE — 81001 URINALYSIS AUTO W/SCOPE: CPT

## 2025-01-29 PROCEDURE — 85025 COMPLETE CBC W/AUTO DIFF WBC: CPT

## 2025-01-29 RX ORDER — AMOXICILLIN 250 MG
2 CAPSULE ORAL 2 TIMES DAILY
Status: DISCONTINUED | OUTPATIENT
Start: 2025-01-29 | End: 2025-01-31 | Stop reason: HOSPADM

## 2025-01-29 RX ORDER — CALCIUM CARBONATE 500 MG/1
2 TABLET, CHEWABLE ORAL 2 TIMES DAILY PRN
Status: DISCONTINUED | OUTPATIENT
Start: 2025-01-29 | End: 2025-01-31 | Stop reason: HOSPADM

## 2025-01-29 RX ORDER — ACETAMINOPHEN 650 MG/1
650 SUPPOSITORY RECTAL EVERY 4 HOURS PRN
Status: DISCONTINUED | OUTPATIENT
Start: 2025-01-29 | End: 2025-01-31 | Stop reason: HOSPADM

## 2025-01-29 RX ORDER — ACETAMINOPHEN 160 MG/5ML
650 SOLUTION ORAL EVERY 4 HOURS PRN
Status: DISCONTINUED | OUTPATIENT
Start: 2025-01-29 | End: 2025-01-31 | Stop reason: HOSPADM

## 2025-01-29 RX ORDER — SODIUM CHLORIDE 0.9 % (FLUSH) 0.9 %
10 SYRINGE (ML) INJECTION EVERY 12 HOURS SCHEDULED
Status: DISCONTINUED | OUTPATIENT
Start: 2025-01-29 | End: 2025-01-31 | Stop reason: HOSPADM

## 2025-01-29 RX ORDER — POLYETHYLENE GLYCOL 3350 17 G/17G
17 POWDER, FOR SOLUTION ORAL DAILY PRN
Status: DISCONTINUED | OUTPATIENT
Start: 2025-01-29 | End: 2025-01-31 | Stop reason: HOSPADM

## 2025-01-29 RX ORDER — SODIUM CHLORIDE 0.9 % (FLUSH) 0.9 %
10 SYRINGE (ML) INJECTION AS NEEDED
Status: DISCONTINUED | OUTPATIENT
Start: 2025-01-29 | End: 2025-01-31 | Stop reason: HOSPADM

## 2025-01-29 RX ORDER — BISACODYL 5 MG/1
5 TABLET, DELAYED RELEASE ORAL DAILY PRN
Status: DISCONTINUED | OUTPATIENT
Start: 2025-01-29 | End: 2025-01-31 | Stop reason: HOSPADM

## 2025-01-29 RX ORDER — ONDANSETRON 2 MG/ML
4 INJECTION INTRAMUSCULAR; INTRAVENOUS EVERY 6 HOURS PRN
Status: DISCONTINUED | OUTPATIENT
Start: 2025-01-29 | End: 2025-01-31 | Stop reason: HOSPADM

## 2025-01-29 RX ORDER — BISACODYL 10 MG
10 SUPPOSITORY, RECTAL RECTAL DAILY PRN
Status: DISCONTINUED | OUTPATIENT
Start: 2025-01-29 | End: 2025-01-31 | Stop reason: HOSPADM

## 2025-01-29 RX ORDER — ACETAMINOPHEN 325 MG/1
650 TABLET ORAL EVERY 4 HOURS PRN
Status: DISCONTINUED | OUTPATIENT
Start: 2025-01-29 | End: 2025-01-31 | Stop reason: HOSPADM

## 2025-01-29 RX ORDER — SODIUM CHLORIDE 9 MG/ML
40 INJECTION, SOLUTION INTRAVENOUS AS NEEDED
Status: DISCONTINUED | OUTPATIENT
Start: 2025-01-29 | End: 2025-01-31 | Stop reason: HOSPADM

## 2025-01-29 RX ADMIN — Medication 10 ML: at 22:31

## 2025-01-29 NOTE — Clinical Note
Level of Care: Med/Surg [1]   Diagnosis: Generalized weakness [300449]   Certification: I Certify That Inpatient Hospital Services Are Medically Necessary For Greater Than 2 Midnights

## 2025-01-29 NOTE — ED PROVIDER NOTES
Subjective   History of Present Illness  Chief complaint: Fall, altered mental.      Context: Patient is an 84-year-old female with a history of [place], hypertension, A-fib, breast cancer that reports to the ER via EMS from home for complaints of fall, weakness that patient denies.  Daughter reports that patient was found sitting on the ground having fallen off the toilet, and states that she had unilateral limb weakness and altered mental status.  Daughter also reports that she fell yesterday and has had 7 falls since Thanksgiving.  She reports that she was recently taken off of her blood thinner due to the fact that she has been having frequent falls.  Patient denies any unilateral weakness.  Patient denies any chest pain, shortness of air, fever, abdominal pain, nausea/vomiting/diarrhea.    PCP: Marisela Monte    LMP: Hysterectomy          Review of Systems   Constitutional:  Negative for fever.       Past Medical History:   Diagnosis Date    A-fib     Arthritis     Breast cancer     CHF (congestive heart failure)     CVA (cerebral vascular accident) 09/2019    History of pulmonary embolus (PE)     Hyperlipidemia     Hypertension     Hyperthyroidism     patient has hypothyroidism    Hypothyroidism        Allergies   Allergen Reactions    Aspirin Other (See Comments)     Severe bleeding    Nsaids Other (See Comments)     Severe bleeding       Past Surgical History:   Procedure Laterality Date    BREAST BIOPSY      BREAST SURGERY Right     CHOLECYSTECTOMY      COLON SURGERY      Removed    COLONOSCOPY      COLONOSCOPY N/A 11/3/2022    Procedure: COLONOSCOPY to anastamosis with biopsies and cold snare polypectomy;  Surgeon: Saroj Oakes MD;  Location: [facility] ENDOSCOPY;  Service: Gastroenterology;  Laterality: N/A;  PRe: rectal bleeding  Post: hemorrhoids, diverticulosis, anastamotic ulcer, polyp, hemostasis clip free-floating    HYSTERECTOMY      MAMMO BILATERAL  2015    MASTECTOMY      TONSILLECTOMY          Family History   Problem Relation Age of Onset    Stroke Mother     Hypertension Mother     Heart disease Father     Cancer Father     Colon polyps Father        Social History     Socioeconomic History    Marital status:    Tobacco Use    Smoking status: Former     Current packs/day: 0.00     Types: Cigarettes     Quit date:      Years since quittin.1     Passive exposure: Past    Smokeless tobacco: Never    Tobacco comments:     quit in    Vaping Use    Vaping status: Never Used   Substance and Sexual Activity    Alcohol use: Yes     Alcohol/week: 3.0 standard drinks of alcohol     Types: 3 Shots of liquor per week     Comment: Last drink 3 weeks ago.    Drug use: Never    Sexual activity: Never           Objective   Physical Exam  Constitutional:       Appearance: Normal appearance.   HENT:      Head: Normocephalic.      Right Ear: Tympanic membrane normal.      Left Ear: Tympanic membrane normal.      Nose: Nose normal.      Mouth/Throat:      Mouth: Mucous membranes are moist.   Eyes:      General: No visual field deficit.     Extraocular Movements: Extraocular movements intact.      Pupils: Pupils are equal, round, and reactive to light.   Cardiovascular:      Rate and Rhythm: Normal rate.      Pulses: Normal pulses.      Heart sounds: Normal heart sounds.   Pulmonary:      Effort: Pulmonary effort is normal.      Breath sounds: Normal breath sounds. No wheezing.   Abdominal:      Palpations: Abdomen is soft.      Tenderness: There is no abdominal tenderness. There is no right CVA tenderness, left CVA tenderness, guarding or rebound.   Musculoskeletal:         General: Normal range of motion.      Cervical back: Normal range of motion. No tenderness.   Skin:     General: Skin is warm and dry.      Capillary Refill: Capillary refill takes less than 2 seconds.   Neurological:      General: No focal deficit present.      Mental Status: She is alert and oriented to person, place, and  time.      GCS: GCS eye subscore is 4. GCS verbal subscore is 5. GCS motor subscore is 6.      Cranial Nerves: No facial asymmetry.      Sensory: Sensation is intact.      Motor: No weakness.      Coordination: Finger-Nose-Finger Test and Heel to Shin Test normal.   Psychiatric:         Mood and Affect: Mood normal.         Behavior: Behavior normal.         Procedures           ED Course  ED Course as of 01/30/25 0031   Wed Jan 29, 2025 2142 Spoke with ZAHRA Aleman with hospitalist group who agreed to admit and assume care. [LT]      ED Course User Index  [LT] Abi Banda, MENDOZA            /100   Pulse 84   Temp 98.4 °F (36.9 °C) (Oral)   Resp 18   SpO2 94%   Labs Reviewed   COMPREHENSIVE METABOLIC PANEL - Abnormal; Notable for the following components:       Result Value    Glucose 112 (*)     Creatinine 1.28 (*)     CO2 19.7 (*)     Calcium 11.0 (*)     AST (SGOT) 35 (*)     Alkaline Phosphatase 175 (*)     Anion Gap 19.3 (*)     eGFR 41.1 (*)     All other components within normal limits    Narrative:     GFR Categories in Chronic Kidney Disease (CKD)      GFR Category          GFR (mL/min/1.73)    Interpretation  G1                     90 or greater         Normal or high (1)  G2                      60-89                Mild decrease (1)  G3a                   45-59                Mild to moderate decrease  G3b                   30-44                Moderate to severe decrease  G4                    15-29                Severe decrease  G5                    14 or less           Kidney failure          (1)In the absence of evidence of kidney disease, neither GFR category G1 or G2 fulfill the criteria for CKD.    eGFR calculation 2021 CKD-EPI creatinine equation, which does not include race as a factor   CBC WITH AUTO DIFFERENTIAL - Abnormal; Notable for the following components:    MCHC 31.0 (*)     RDW 20.8 (*)     RDW-SD 62.1 (*)     Neutrophil % 86.6 (*)     Lymphocyte % 6.5 (*)     Eosinophil  % 0.2 (*)     Neutrophils, Absolute 8.61 (*)     Lymphocytes, Absolute 0.65 (*)     All other components within normal limits   URINALYSIS W/ MICROSCOPIC IF INDICATED (NO CULTURE) - Abnormal; Notable for the following components:    Glucose,  mg/dL (2+) (*)     Ketones, UA Trace (*)     Leuk Esterase, UA Trace (*)     All other components within normal limits   BASIC METABOLIC PANEL - Abnormal; Notable for the following components:    Glucose 135 (*)     Creatinine 1.18 (*)     eGFR 45.4 (*)     All other components within normal limits    Narrative:     GFR Categories in Chronic Kidney Disease (CKD)      GFR Category          GFR (mL/min/1.73)    Interpretation  G1                     90 or greater         Normal or high (1)  G2                      60-89                Mild decrease (1)  G3a                   45-59                Mild to moderate decrease  G3b                   30-44                Moderate to severe decrease  G4                    15-29                Severe decrease  G5                    14 or less           Kidney failure          (1)In the absence of evidence of kidney disease, neither GFR category G1 or G2 fulfill the criteria for CKD.    eGFR calculation 2021 CKD-EPI creatinine equation, which does not include race as a factor   POCT GLUCOSE FINGERSTICK - Abnormal; Notable for the following components:    Glucose 121 (*)     All other components within normal limits   URINALYSIS, MICROSCOPIC ONLY   CBC (NO DIFF)   BASIC METABOLIC PANEL   CBC AND DIFFERENTIAL    Narrative:     The following orders were created for panel order CBC & Differential.  Procedure                               Abnormality         Status                     ---------                               -----------         ------                     CBC Auto Differential[[id number]]        Abnormal            Final result                 Please view results for these tests on the individual orders.   EXTRA TUBES     Narrative:     The following orders were created for panel order Extra Tubes.  Procedure                               Abnormality         Status                     ---------                               -----------         ------                     [facility][[id number]]                                   Final result                 Please view results for these tests on the individual orders.   [facility] - [facility]     Medications   sodium chloride 0.9 % flush 10 mL (has no administration in time range)   sodium chloride 0.9 % flush 10 mL (10 mL Intravenous Given 1/29/25 2231)   sodium chloride 0.9 % flush 10 mL (has no administration in time range)   sodium chloride 0.9 % infusion 40 mL (has no administration in time range)   acetaminophen (TYLENOL) tablet 650 mg (has no administration in time range)     Or   acetaminophen (TYLENOL) 160 MG/5ML oral solution 650 mg (has no administration in time range)     Or   acetaminophen (TYLENOL) suppository 650 mg (has no administration in time range)   calcium carbonate (TUMS) chewable tablet 500 mg (200 mg elemental) (has no administration in time range)   sennosides-docusate (PERICOLACE) 8.6-50 MG per tablet 2 tablet (2 tablets Oral Not Given 1/29/25 2231)     And   polyethylene glycol (MIRALAX) packet 17 g (has no administration in time range)     And   bisacodyl (DULCOLAX) EC tablet 5 mg (has no administration in time range)     And   bisacodyl (DULCOLAX) suppository 10 mg (has no administration in time range)   ondansetron (ZOFRAN) injection 4 mg (has no administration in time range)   melatonin tablet 5 mg (has no administration in time range)     CT Head Without Contrast    Result Date: 1/29/2025  1.No evidence for acute intracranial abnormality. 2.Nonspecific white matter changes are noted with associated diffuse volume loss. These findings are likely related to chronic small vessel ischemic changes and/or age-related changes. 3.Changes of sinusitis are noted.  Electronically Signed: Domingo Stahl MD  1/29/2025 7:52 PM EST  Workstation ID: [id number]    XR Pelvis 1 or 2 View    Result Date: 1/29/2025  1.No evidence for displaced fracture or dislocation. 2.Moderate degenerative changes are noted. Electronically Signed: Domingo Stahl MD  1/29/2025 7:47 PM EST  Workstation ID: [id number]                       NIH Stroke Scale: 0                        Medical Decision Making  Radiology interpretation: CT head reviewed and interpreted by [place]: .No evidence for acute intracranial abnormality.  2.Nonspecific white matter changes are noted with associated diffuse volume loss. These findings are likely related to chronic small vessel ischemic changes and/or age-related changes.  3.Changes of sinusitis are noted.  X-ray pelvis reviewed and interpreted by [place]: No evidence for displaced fracture or dislocation.  2.Moderate degenerative changes are noted.  Further interpretation by radiologist as above    Lab interpretation:  Labs all viewed by me and significant for: Glucose 112, BUN 13, creatinine 1.28, ALT 14, AST 35, alk phos 175, GFR 41.1, white count 9.95, hemoglobin 13.15, POC glucose 121.  UA-glucose 500, ketones trace, trace leuks, RBC 0-2, WBC 0-2, bacteria none seen squames 0-2.    EKG was considered but was not emergently warranted at this time.    IV was established and labs and scans were obtained to evaluate for infection, electrolyte abnormalities, ICH, fractures, dislocations, UTI, hyperglycemia or hypoglycemia.  Patient is an 84-year-old female who presents to the ER for above complaint.  On initial examination NIH was 0, patient was A&O x 4.  Physical examination revealed clear lung sounds bilaterally, old bruising to bilateral knees from a previous fall.  Daughter reports she is currently on antibiotics for UTI.  Family reports patient has had 7 falls since Thanksgiving, and has fallen twice in the past 2 days.  Patient had no current complaints and no  medications were given at this time.  Family reports that patient was recently taken off of her Eliquis due to history of recent falls.  Patient does have a history of A-fib.  CT head found no acute abnormalities.  On reexamination patient was resting comfortably in the bed, nontoxic in appearance with no signs and symptoms of distress eating some peanut butter crackers and drinking some water.  Patient has been hemodynamically stable and well-appearing during this ER visit.  Discussed findings and decision to discharge.  Family reports that she is attempting to get her placed in long-term care but is not going to be able to do it overnight.  Family is concerned that is not safe for her to return back to the facility she came from due to the fact that she has had recent falls and nobody checks on her for hours.  Consulted hospitalist.  Spoke with ZAHRA Aleman with hospitalist group who agreed to admit patient for evaluation and possible long-term placement and assume care.  Spoke with patient and family who was agreeable to admission and plan of care.    Appropriate PPE worn during exam.  Discussed care with: Dr. Cortez     Based on the clinical findings at this time I anticipate the patient will require a 2 midnight stay    Problems Addressed:  Frequent falls: complicated acute illness or injury  Generalized weakness: complicated acute illness or injury    Amount and/or Complexity of Data Reviewed  Labs: ordered.  Radiology: ordered.    Risk  Prescription drug management.  Decision regarding hospitalization.        Final diagnoses:   Generalized weakness   Frequent falls       ED Disposition  ED Disposition       ED Disposition   Decision to Admit    Condition   --    Comment   Level of Care: Med/Surg [1]   Diagnosis: Generalized weakness [256775]   Certification: I Certify That Inpatient Hospital Services Are Medically Necessary For Greater Than 2 Midnights                 No follow-up provider specified.        Medication List      No changes were made to your prescriptions during this visit.            Abi Banda, APRN  01/30/25 0031

## 2025-01-29 NOTE — LETTER
EMS Transport Request  For use at [facility], [place], [place], [place], and [place] only   Patient Name: Kendy Worrell : 1940   Weight:59 kg (130 lb) Pick-up Location: [facility] BLS/ALS: BLS/ALS: BLS   Insurance: MEDICARE Auth End Date:    Pre-Cert #: D/C Summary complete:    Destination: Other [facility]  Room 311.   Contact Precautions: None   Equipment (O2, Fluids, etc.): None   Arrive By Date/Time: 25 Stretcher/WC: Wheelchair   CM Requesting: Katie Peralta RN Ext: [phone]   Notes/Medical Necessity: Min assist with transfers     ______________________________________________________________________    *Only 2 patient bags OR 1 carry-on size bag are permitted.  Wheelchairs and walkers CANNOT transported with the patient. Acknowledge: Yes

## 2025-01-29 NOTE — ED NOTES
EMS reports patient was found slumped over on the toilet. Patient's daughter was a little worried for another stroke since she has a history of 2 in the past. EMS reports stroke scale negative. MD was made aware on arrival and did not call code stroke. Also daughter states that she had fallen last night.

## 2025-01-30 LAB
ANION GAP SERPL CALCULATED.3IONS-SCNC: 9.3 MMOL/L (ref 5–15)
BUN SERPL-MCNC: 14 MG/DL (ref 8–23)
BUN/CREAT SERPL: 13.3 (ref 7–25)
CALCIUM SPEC-SCNC: 9.8 MG/DL (ref 8.6–10.5)
CHLORIDE SERPL-SCNC: 103 MMOL/L (ref 98–107)
CO2 SERPL-SCNC: 25.7 MMOL/L (ref 22–29)
CREAT SERPL-MCNC: 1.05 MG/DL (ref 0.57–1)
DEPRECATED RDW RBC AUTO: 57.9 FL (ref 37–54)
EGFRCR SERPLBLD CKD-EPI 2021: 52.2 ML/MIN/1.73
ERYTHROCYTE [DISTWIDTH] IN BLOOD BY AUTOMATED COUNT: 19.2 % (ref 12.3–15.4)
GLUCOSE SERPL-MCNC: 115 MG/DL (ref 65–99)
HCT VFR BLD AUTO: 32.5 % (ref 34–46.6)
HCT VFR BLD AUTO: 35.2 % (ref 34–46.6)
HGB BLD-MCNC: 10.4 G/DL (ref 12–15.9)
HGB BLD-MCNC: 11.2 G/DL (ref 12–15.9)
MCH RBC QN AUTO: 26.5 PG (ref 26.6–33)
MCHC RBC AUTO-ENTMCNC: 32 G/DL (ref 31.5–35.7)
MCV RBC AUTO: 82.9 FL (ref 79–97)
PLATELET # BLD AUTO: 281 10*3/MM3 (ref 140–450)
PMV BLD AUTO: 10.1 FL (ref 6–12)
POTASSIUM SERPL-SCNC: 4.1 MMOL/L (ref 3.5–5.2)
RBC # BLD AUTO: 3.92 10*6/MM3 (ref 3.77–5.28)
SODIUM SERPL-SCNC: 138 MMOL/L (ref 136–145)
WBC NRBC COR # BLD AUTO: 6.83 10*3/MM3 (ref 3.4–10.8)

## 2025-01-30 PROCEDURE — G0378 HOSPITAL OBSERVATION PER HR: HCPCS

## 2025-01-30 PROCEDURE — 85014 HEMATOCRIT: CPT

## 2025-01-30 PROCEDURE — 97166 OT EVAL MOD COMPLEX 45 MIN: CPT

## 2025-01-30 PROCEDURE — 25810000003 LACTATED RINGERS SOLUTION

## 2025-01-30 PROCEDURE — 96374 THER/PROPH/DIAG INJ IV PUSH: CPT

## 2025-01-30 PROCEDURE — 97162 PT EVAL MOD COMPLEX 30 MIN: CPT

## 2025-01-30 PROCEDURE — 25010000002 HYDRALAZINE PER 20 MG

## 2025-01-30 PROCEDURE — 85018 HEMOGLOBIN: CPT

## 2025-01-30 PROCEDURE — 80048 BASIC METABOLIC PNL TOTAL CA: CPT

## 2025-01-30 PROCEDURE — 85027 COMPLETE CBC AUTOMATED: CPT

## 2025-01-30 RX ORDER — FUROSEMIDE 20 MG/1
20 TABLET ORAL DAILY
Status: DISCONTINUED | OUTPATIENT
Start: 2025-01-30 | End: 2025-01-31 | Stop reason: HOSPADM

## 2025-01-30 RX ORDER — LEVOTHYROXINE SODIUM 50 UG/1
50 TABLET ORAL
Status: DISCONTINUED | OUTPATIENT
Start: 2025-01-30 | End: 2025-01-31 | Stop reason: HOSPADM

## 2025-01-30 RX ORDER — HYDRALAZINE HYDROCHLORIDE 20 MG/ML
10 INJECTION INTRAMUSCULAR; INTRAVENOUS EVERY 4 HOURS PRN
Status: DISCONTINUED | OUTPATIENT
Start: 2025-01-30 | End: 2025-01-30

## 2025-01-30 RX ORDER — CLONIDINE HYDROCHLORIDE 0.1 MG/1
0.1 TABLET ORAL EVERY 8 HOURS PRN
COMMUNITY

## 2025-01-30 RX ORDER — FUROSEMIDE 20 MG/1
20 TABLET ORAL DAILY
COMMUNITY

## 2025-01-30 RX ORDER — ASCORBIC ACID 500 MG
1000 TABLET ORAL DAILY
Status: DISCONTINUED | OUTPATIENT
Start: 2025-01-30 | End: 2025-01-31 | Stop reason: HOSPADM

## 2025-01-30 RX ORDER — PANTOPRAZOLE SODIUM 40 MG/1
40 TABLET, DELAYED RELEASE ORAL DAILY
Status: DISCONTINUED | OUTPATIENT
Start: 2025-01-30 | End: 2025-01-31 | Stop reason: HOSPADM

## 2025-01-30 RX ORDER — LEVOTHYROXINE SODIUM 50 UG/1
50 TABLET ORAL
COMMUNITY

## 2025-01-30 RX ORDER — MIDODRINE HYDROCHLORIDE 5 MG/1
5 TABLET ORAL 2 TIMES DAILY
COMMUNITY

## 2025-01-30 RX ORDER — ALBUTEROL SULFATE 0.83 MG/ML
2.5 SOLUTION RESPIRATORY (INHALATION) EVERY 4 HOURS PRN
Status: DISCONTINUED | OUTPATIENT
Start: 2025-01-30 | End: 2025-01-31 | Stop reason: HOSPADM

## 2025-01-30 RX ORDER — LEVOTHYROXINE SODIUM 150 UG/1
75 TABLET ORAL
Status: DISCONTINUED | OUTPATIENT
Start: 2025-01-30 | End: 2025-01-30

## 2025-01-30 RX ORDER — ATORVASTATIN CALCIUM 10 MG/1
10 TABLET, FILM COATED ORAL NIGHTLY
Status: DISCONTINUED | OUTPATIENT
Start: 2025-01-30 | End: 2025-01-31 | Stop reason: HOSPADM

## 2025-01-30 RX ORDER — LACOSAMIDE 50 MG/1
50 TABLET ORAL DAILY
Status: ON HOLD | COMMUNITY
End: 2025-01-31

## 2025-01-30 RX ORDER — LACOSAMIDE 50 MG/1
50 TABLET ORAL DAILY
Status: DISCONTINUED | OUTPATIENT
Start: 2025-01-30 | End: 2025-01-31 | Stop reason: HOSPADM

## 2025-01-30 RX ORDER — RANOLAZINE 500 MG/1
500 TABLET, EXTENDED RELEASE ORAL 2 TIMES DAILY
Status: DISCONTINUED | OUTPATIENT
Start: 2025-01-30 | End: 2025-01-31 | Stop reason: HOSPADM

## 2025-01-30 RX ORDER — MULTIVITAMIN WITH IRON
1000 TABLET ORAL DAILY
Status: DISCONTINUED | OUTPATIENT
Start: 2025-01-30 | End: 2025-01-31 | Stop reason: HOSPADM

## 2025-01-30 RX ORDER — CEPHALEXIN 500 MG/1
500 CAPSULE ORAL 2 TIMES DAILY
COMMUNITY
Start: 2025-01-24 | End: 2025-01-31 | Stop reason: HOSPADM

## 2025-01-30 RX ADMIN — SENNOSIDES AND DOCUSATE SODIUM 2 TABLET: 50; 8.6 TABLET ORAL at 21:16

## 2025-01-30 RX ADMIN — Medication 5 MG: at 21:16

## 2025-01-30 RX ADMIN — Medication 10 ML: at 21:17

## 2025-01-30 RX ADMIN — LACOSAMIDE 50 MG: 50 TABLET, FILM COATED ORAL at 08:36

## 2025-01-30 RX ADMIN — ATORVASTATIN CALCIUM 10 MG: 10 TABLET ORAL at 21:16

## 2025-01-30 RX ADMIN — Medication 10 ML: at 08:38

## 2025-01-30 RX ADMIN — EMPAGLIFLOZIN 25 MG: 25 TABLET, FILM COATED ORAL at 08:35

## 2025-01-30 RX ADMIN — HYDRALAZINE HYDROCHLORIDE 10 MG: 20 INJECTION INTRAMUSCULAR; INTRAVENOUS at 09:19

## 2025-01-30 RX ADMIN — RANOLAZINE 500 MG: 500 TABLET, FILM COATED, EXTENDED RELEASE ORAL at 08:35

## 2025-01-30 RX ADMIN — SODIUM CHLORIDE, SODIUM LACTATE, POTASSIUM CHLORIDE, AND CALCIUM CHLORIDE 500 ML: .6; .31; .03; .02 INJECTION, SOLUTION INTRAVENOUS at 11:24

## 2025-01-30 RX ADMIN — Medication 1000 MCG: at 08:36

## 2025-01-30 RX ADMIN — RANOLAZINE 500 MG: 500 TABLET, FILM COATED, EXTENDED RELEASE ORAL at 21:16

## 2025-01-30 RX ADMIN — LEVOTHYROXINE SODIUM 50 MCG: 50 TABLET ORAL at 06:01

## 2025-01-30 RX ADMIN — PANTOPRAZOLE SODIUM 40 MG: 40 TABLET, DELAYED RELEASE ORAL at 08:36

## 2025-01-30 RX ADMIN — OXYCODONE HYDROCHLORIDE AND ACETAMINOPHEN 1000 MG: 500 TABLET ORAL at 08:36

## 2025-01-30 RX ADMIN — FUROSEMIDE 20 MG: 40 TABLET ORAL at 08:36

## 2025-01-30 NOTE — THERAPY EVALUATION
Patient Name: Kendy Worrell  : 1940    MRN: [id number]                              Today's Date: 2025       Admit Date: 2025    Visit Dx:     ICD-10-CM ICD-9-CM   1. Generalized weakness  R53.1 780.79   2. Frequent falls  R29.6 V15.88     Patient Active Problem List   Diagnosis    Essential hypertension    Acquired hypothyroidism    Coronary artery disease involving native coronary artery of native heart without angina pectoris    Hyperlipidemia    Arthritis    Skin lesion of chest wall    NSTEMI (non-ST elevated myocardial infarction)    Supraventricular tachycardia    Normal cardiac stress test    Gastrointestinal bleed    Angina effort    Morbidly obese    Bilateral pulmonary embolism    Persistent atrial fibrillation    UTI (urinary tract infection)    Chronic diastolic CHF (congestive heart failure)    Acute ischemic stroke    Chest pain    GI bleed    Closed fibular fracture    Anemia    Hyponatremia    Orthostatic dizziness    Frequent falls    Generalized weakness    Chronic heart failure with preserved ejection fraction (HFpEF)     Past Medical History:   Diagnosis Date    A-fib     Arthritis     Breast cancer     CHF (congestive heart failure)     CVA (cerebral vascular accident) 2019    History of pulmonary embolus (PE)     Hyperlipidemia     Hypertension     Hyperthyroidism     patient has hypothyroidism    Hypothyroidism      Past Surgical History:   Procedure Laterality Date    BREAST BIOPSY      BREAST SURGERY Right     CHOLECYSTECTOMY      COLON SURGERY      Removed    COLONOSCOPY      COLONOSCOPY N/A 11/3/2022    Procedure: COLONOSCOPY to anastamosis with biopsies and cold snare polypectomy;  Surgeon: Saroj Oakes MD;  Location: [facility] ENDOSCOPY;  Service: Gastroenterology;  Laterality: N/A;  PRe: rectal bleeding  Post: hemorrhoids, diverticulosis, anastamotic ulcer, polyp, hemostasis clip free-floating    HYSTERECTOMY      MAMMO BILATERAL  2015    MASTECTOMY       TONSILLECTOMY        General Information       Row Name 01/30/25 1142          Physical Therapy Time and Intention    Document Type evaluation  -RR     Mode of Treatment individual therapy  -RR       Row Name 01/30/25 1354          General Information    Patient Profile Reviewed yes  -RR     Prior Level of Function independent:;all household mobility;gait;transfer  [facility] assists with showers; patient uses rollator at baseline, significant falls history multiple recent hospitalizations and rehab stays  -RR     Existing Precautions/Restrictions fall;orthostatic hypotension  -RR     Barriers to Rehab medically complex;previous functional deficit  [place]  -RR       Row Name 01/30/25 1354          Living Environment    People in Home facility resident  [facility]  -RR       Row Name 01/30/25 1354          Home Main Entrance    Number of Stairs, Main Entrance none  -RR       Row Name 01/30/25 1354          Stairs Within Home, Primary    Number of Stairs, Within Home, Primary none  -RR       Row Name 01/30/25 1354          Cognition    Orientation Status (Cognition) oriented to;person;place;time  -RR       Row Name 01/30/25 1354          Safety Issues/Impairments Affecting Functional Mobility    Impairments Affecting Function (Mobility) balance;endurance/activity tolerance  -RR               User Key  (r) = Recorded By, (t) = Taken By, (c) = Cosigned By      Initials Name Provider Type    RR Lucy Quevedo, ERASTO Physical Therapist                   Mobility       Row Name 01/30/25 1356          Bed Mobility    Bed Mobility scooting/bridging  -RR     Scooting/Bridging [place] (Bed Mobility) dependent (less than 25% patient effort)  -RR       Row Name 01/30/25 1356          Bed-Chair Transfer    Bed-Chair [place] (Transfers) not tested  -RR       Row Name 01/30/25 1356          Sit-Stand Transfer    Sit-Stand [place] (Transfers) not tested  -RR       Row Name 01/30/25 1356          Gait/Stairs (Locomotion)     [place] Level (Gait) not tested  -RR     Patient was able to Ambulate no, other medical factors prevent ambulation  -RR     Reason Patient was unable to Ambulate Hypotension  -RR     [place] Level (Stairs) not tested  -RR               User Key  (r) = Recorded By, (t) = Taken By, (c) = Cosigned By      Initials Name Provider Type    Lucy Vail PT Physical Therapist                   Obj/Interventions       Row Name 01/30/25 1352          Range of Motion Comprehensive    Comment, General Range of Motion BLE WFL; limited due to body habitus  -RR       Row Name 01/30/25 1357          Strength Comprehensive (MMT)    Comment, General Manual Muscle Testing (MMT) Assessment grossly 3+/5BLE  -RR       Row Name 01/30/25 1357          Balance    Comment, Balance unable to transition into sitting due to hypotension  -RR       Row Name 01/30/25 1359          Sensory Assessment (Somatosensory)    Sensory Assessment (Somatosensory) --  able to detect light touch  -RR               User Key  (r) = Recorded By, (t) = Taken By, (c) = Cosigned By      Initials Name Provider Type    RR Lucy Quevedo PT Physical Therapist                   Goals/Plan       Row Name 01/30/25 1533          Bed Mobility Goal 1 (PT)    Activity/Assistive Device (Bed Mobility Goal 1, PT) bed mobility activities, all  -RR     [place] Level/Cues Needed (Bed Mobility Goal 1, PT) supervision required  -RR     Time Frame (Bed Mobility Goal 1, PT) long term goal (LTG);2 weeks  -RR       Row Name 01/30/25 1407          Transfer Goal 1 (PT)    Activity/Assistive Device (Transfer Goal 1, PT) transfers, all;walker, rolling  -RR     [place] Level/Cues Needed (Transfer Goal 1, PT) supervision required  -RR     Time Frame (Transfer Goal 1, PT) long term goal (LTG);2 weeks  -RR       Row Name 01/30/25 1404          Gait Training Goal 1 (PT)    Activity/Assistive Device (Gait Training Goal 1, PT) gait (walking locomotion);walker,  rolling  -RR     [place] Level (Gait Training Goal 1, PT) supervision required  -RR     Distance (Gait Training Goal 1, PT) 15ft  -RR     Time Frame (Gait Training Goal 1, PT) long term goal (LTG);2 weeks  -RR       Row Name 01/30/25 8230          Therapy Assessment/Plan (PT)    Planned Therapy Interventions (PT) balance training;bed mobility training;gait training;home exercise program;neuromuscular re-education;patient/family education;ROM (range of motion);strengthening;transfer training  -RR               User Key  (r) = Recorded By, (t) = Taken By, (c) = Cosigned By      Initials Name Provider Type    RR Lucy Quevedo, PT Physical Therapist                   Clinical Impression       Row Name 01/30/25 9973          Pain    Pretreatment Pain Rating 0/10 - no pain  -RR     Posttreatment Pain Rating 0/10 - no pain  -RR       Row Name 01/30/25 7318          Plan of Care Review    Plan of Care Reviewed With patient  -RR     Outcome Evaluation Kendy Worrell is a 84 y.o. female with a previous medical history of CAD, HTN, previous CVA, A-fib, CHF who presented to [facility] on 1/29/2025 with generalized weakness and increased falls over the past few days.  The patient is currently living at an assisted living facility and her daughter found her in the bathroom after she slipped off the toilet.   X-ray of the pelvis showed no acute abnormality.  CT of the head showed no acute abnormality.  Patient is cleared for therapy by nursing, patient is found resting in bed A&Ox3.  She reports she does not feel like getting up and moving around at this time; she is found to be hypotensive and RN is notified.  Activity limited due to hypotension.  Patient demonstrates LE weakness, given recent falls and decline in mobility anticipate the patient will require SNF level of care prior to return to [facility].  Will follow for strengthening, balance, and progressive mobility training as appropriate.  -RR       Row Name  01/30/25 1358          Therapy Assessment/Plan (PT)    Patient/Family Therapy Goals Statement (PT) to improve mobility  -RR     Rehab Potential (PT) fair  -RR     Criteria for Skilled Interventions Met (PT) yes;meets criteria;skilled treatment is necessary  -RR     Therapy Frequency (PT) 5 times/wk  -RR     Predicted Duration of Therapy Intervention (PT) dc  -RR       Row Name 01/30/25 1358          Vital Signs    Pre Systolic BP Rehab 89  -RR     Pre Treatment Diastolic BP 60  supine  -RR     Intra Systolic BP Rehab 85  -RR     Intra Treatment Diastolic BP 59  supine with HOB elevated; no dizziness but reports feeling weakness; RN notified  -RR     Pretreatment Heart Rate (beats/min) 100  -RR     Pre SpO2 (%) 91  -RR     O2 Delivery Pre Treatment room air  -RR     Intra SpO2 (%) 96  -RR     O2 Delivery Intra Treatment room air  -RR       Row Name 01/30/25 1358          Positioning and Restraints    Post Treatment Position bed  -RR     In Bed side rails up x1;call light within reach  -RR               User Key  (r) = Recorded By, (t) = Taken By, (c) = Cosigned By      Initials Name Provider Type    RR Lucy Quevedo, PT Physical Therapist                   Outcome Measures       Row Name 01/30/25 1404 01/30/25 0832       How much help from another person do you currently need...    Turning from your back to your side while in flat bed without using bedrails? 2  -RR 4  -ET    Moving from lying on back to sitting on the side of a flat bed without bedrails? 2  -RR 3  -ET    Moving to and from a bed to a chair (including a wheelchair)? 2  -RR 3  -ET    Standing up from a chair using your arms (e.g., wheelchair, bedside chair)? 2  -RR 3  -ET    Climbing 3-5 steps with a railing? 1  -RR 2  -ET    To walk in hospital room? 1  -RR 2  -ET    AM-PAC 6 Clicks Score (PT) 10  -RR 17  -ET              User Key  (r) = Recorded By, (t) = Taken By, (c) = Cosigned By      Initials Name Provider Type    ET Dalia Marti RN  Registered Nurse    Lucy Vail, PT Physical Therapist                                 Physical Therapy Education       Title: PT OT SLP Therapies (Done)       Topic: Physical Therapy (Done)       Point: Mobility training (Done)       Learning Progress Summary            Patient Acceptance, E,TB, VU by RR at 1/30/2025 1404                      Point: Home exercise program (Done)       Learning Progress Summary            Patient Acceptance, E,TB, VU by RR at 1/30/2025 1404                                      User Key       Initials Effective Dates Name Provider Type Discipline    RR 07/01/24 -  Lucy Quevedo, PT Physical Therapist PT                  PT Recommendation and Plan  Planned Therapy Interventions (PT): balance training, bed mobility training, gait training, home exercise program, neuromuscular re-education, patient/family education, ROM (range of motion), strengthening, transfer training  Outcome Evaluation: Kendy Worrell is a 84 y.o. female with a previous medical history of CAD, HTN, previous CVA, A-fib, CHF who presented to [facility] on 1/29/2025 with generalized weakness and increased falls over the past few days.  The patient is currently living at an assisted living facility and her daughter found her in the bathroom after she slipped off the toilet.   X-ray of the pelvis showed no acute abnormality.  CT of the head showed no acute abnormality.  Patient is cleared for therapy by nursing, patient is found resting in bed A&Ox3.  She reports she does not feel like getting up and moving around at this time; she is found to be hypotensive and RN is notified.  Activity limited due to hypotension.  Patient demonstrates LE weakness, given recent falls and decline in mobility anticipate the patient will require SNF level of care prior to return to [facility].  Will follow for strengthening, balance, and progressive mobility training as appropriate.     Time Calculation:         PT Charges        Row Name 01/30/25 1405             Time Calculation    Start Time 1100  -RR      Stop Time 1113  -RR      Time Calculation (min) 13 min  -RR      PT Received On 01/30/25  -RR      PT - Next Appointment 01/31/25  -RR      PT Goal Re-Cert Due Date 02/13/25  -RR                User Key  (r) = Recorded By, (t) = Taken By, (c) = Cosigned By      Initials Name Provider Type    RR Lucy Quevedo, PT Physical Therapist                  Therapy Charges for Today       Code Description Service Date Service Provider Modifiers Qty    57359950318 HC PT EVAL MOD COMPLEXITY 3 1/30/2025 Lucy Quevedo, PT GP 1            PT G-Codes  AM-PAC 6 Clicks Score (PT): 10  PT Discharge Summary  Anticipated Discharge Disposition (PT): skilled nursing facility    Lucy Quevedo, PT  1/30/2025

## 2025-01-30 NOTE — CASE MANAGEMENT/SOCIAL WORK
Continued Stay Note  [facility]     Patient Name: Kendy Worrell  MRN: [id number]  Today's Date: 1/30/2025    Admit Date: 1/29/2025    Plan: D/c Plan: From [facility] Pending PT/OT jem. Current with HH (possible [facility]). Family transport.   Discharge Plan       Row Name 01/30/25 1248       Plan    Plan Comments CM reviewed the Change in Status (Code 44) Document with the patient at bedside and she signed it. The patient expressed understanding. NAVARR[place] has been reviewd by registration.               Expected Discharge Date and Time       Expected Discharge Date Expected Discharge Time    Feb 2, 2025         Radha Bolton RN      [address] [address]  Phone: [phone]  Fax: [phone]

## 2025-01-30 NOTE — PLAN OF CARE
Problem: Adult Inpatient Plan of Care  Goal: Plan of Care Review  Outcome: Not Progressing  Flowsheets (Taken 1/30/2025 0515)  Progress: no change  Plan of Care Reviewed With: patient  Goal: Patient-Specific Goal (Individualized)  Outcome: Not Progressing  Goal: Absence of Hospital-Acquired Illness or Injury  Outcome: Not Progressing  Intervention: Identify and Manage Fall Risk  Recent Flowsheet Documentation  Taken 1/30/2025 0206 by Rosalind Giron RN  Safety Promotion/Fall Prevention:   assistive device/personal items within reach   clutter free environment maintained   nonskid shoes/slippers when out of bed   room organization consistent   safety round/check completed  Intervention: Prevent Skin Injury  Recent Flowsheet Documentation  Taken 1/30/2025 0206 by Rosalind Giron RN  Body Position:   position changed independently   supine  Intervention: Prevent and Manage VTE (Venous Thromboembolism) Risk  Recent Flowsheet Documentation  Taken 1/30/2025 0206 by Rosalind Giron RN  VTE Prevention/Management:   SCDs (sequential compression devices) off   patient refused intervention  Goal: Optimal Comfort and Wellbeing  Outcome: Not Progressing  Intervention: Monitor Pain and Promote Comfort  Recent Flowsheet Documentation  Taken 1/30/2025 0206 by Rosalind Giron RN  Pain Management Interventions: diversional activity provided  Intervention: Provide Person-Centered Care  Recent Flowsheet Documentation  Taken 1/30/2025 0206 by Rosalind Giron RN  Trust Relationship/Rapport:   care explained   choices provided  Goal: Readiness for Transition of Care  Outcome: Not Progressing  Intervention: Mutually Develop Transition Plan  Recent Flowsheet Documentation  Taken 1/30/2025 0206 by Rosalind Giron RN  Equipment Currently Used at Home:   cane, straight   wheelchair   wheelchair, motorized  Transportation Anticipated: family or friend will provide  Patient/Family Anticipated Services at Transition: case  manager  Patient/Family Anticipates Transition to: long-term care facility   Goal Outcome Evaluation:  Plan of Care Reviewed With: patient        Progress: no change     Alert and oriented x 4. Hard of hearing. C/O abdominal pain noted, did not request pain medication at this time. Independent for bed mobility. Does not require O2 therapy at this time. No c/o SOB noted. Admitted d/t multiple falls at [facility], placement pending. Currently in bed, eyes closed. Rise and fall of chest observed. Call bell in reach. Hx of R breast cancer, right arm restriction.

## 2025-01-30 NOTE — CASE MANAGEMENT/SOCIAL WORK
Discharge Planning Assessment   [facility]     Patient Name: Kendy Worrell  MRN: [id number]  Today's Date: 1/30/2025    Admit Date: 1/29/2025    Plan: D/c Plan: From [facility] Pending PT/OT jem. Current with HH (possible [facility]). Family transport.   Discharge Needs Assessment       Row Name 01/30/25 0920       Living Environment    People in Home facility resident    Name(s) of People in Home [facility]    Current Living Arrangements assisted living facility    Potentially Unsafe Housing Conditions none    In the past 12 months has the electric, gas, oil, or water company threatened to shut off services in your home? No    Primary Care Provided by self    Provides Primary Care For no one    Family Caregiver if Needed child(tatyana), adult    Family Caregiver Names Ramandeep-daughter, Caleb-son.    Quality of Family Relationships helpful;involved;supportive    Able to Return to Prior Arrangements yes       Resource/Environmental Concerns    Resource/Environmental Concerns none    Transportation Concerns none       Transportation Needs    In the past 12 months, has lack of transportation kept you from medical appointments or from getting medications? no    In the past 12 months, has lack of transportation kept you from meetings, work, or from getting things needed for daily living? No       Food Insecurity    Within the past 12 months, you worried that your food would run out before you got the money to buy more. Never true    Within the past 12 months, the food you bought just didn't last and you didn't have money to get more. Never true       Transition Planning    Patient/Family Anticipates Transition to long-term care facility;inpatient rehabilitation facility    Patient/Family Anticipated Services at Transition skilled nursing    Transportation Anticipated family or friend will provide       Discharge Needs Assessment    Readmission Within the Last 30 Days no previous admission in last 30 days     Equipment Currently Used at Home rollator    Concerns to be Addressed discharge planning;home safety  Kelly reported the patient falling seven times since 11/2024.    Do you want help finding or keeping work or a job? I do not need or want help    Do you want help with school or training? For example, starting or completing job training or getting a high school diploma, GED or equivalent No    Anticipated Changes Related to Illness none    Equipment Needed After Discharge none    Outpatient/Agency/Support Group Needs long term acute care facility;skilled nursing facility    Discharge Facility/Level of Care Needs nursing facility, skilled    Provided Post Acute Provider List? N/A    Provided Post Acute Provider Quality & Resource List? N/A    Current Discharge Risk dependent with mobility/activities of daily living                   Discharge Plan       Row Name 01/30/25 0924       Plan    Plan D/c Plan: From [facility] Pending PT/OT eval. Current with HH (possible [facility]). Family transport.    Provided Post Acute Provider List? N/A    Provided Post Acute Provider Quality & Resource List? N/A    Plan Comments CM spoke with patient's daughter, Ramandeep, on the phone to discuss admission assessment and discharge planning. Ramandeep confirms PCP and pharmacy. Patient already enrolled in meds to bed program. Ramandeep denies any difficulty affording medications at this time. Ramandeep thinks the patient needs LTC and is now looking at facilities. Waiting on PT/OT to see the patient to see what they recommend. Patient was recently at  [facility] for rehab and is current with HH (possibly [facility] but radha was not sure). D/C barriers: pending PT/OT eval.             Expected Discharge Date and Time       Expected Discharge Date Expected Discharge Time    Feb 2, 2025            Demographic Summary       Row Name 01/30/25 0918       General Information    Admission Type inpatient    Arrived From other (see comments)  [place]  [facility]    Referral Source admission list    Reason for Consult discharge planning    Preferred Language English       Contact Information    Permission Granted to Share Info With                    Functional Status       Row Name 01/30/25 0919       Functional Status    Usual Activity Tolerance moderate    Current Activity Tolerance moderate       Functional Status, IADL    Medications assistive equipment and person    Meal Preparation assistive equipment and person    Housekeeping assistive equipment and person    Laundry assistive equipment and person    Shopping assistive equipment and person    If for any reason you need help with day-to-day activities such as bathing, preparing meals, shopping, managing finances, etc., do you get the help you need? I get all the help I need       Mental Status    General Appearance WDL WDL       Mental Status Summary    Recent Changes in Mental Status/Cognitive Functioning no changes       Employment/    Employment Status retired;, previous service           Current or Previous  Service none           Radha Bolton RN     [address] [address]  Phone: [phone]  Fax: [phone]

## 2025-01-30 NOTE — H&P
[facility] Medicine Services  History & Physical    Patient Name: Kendy Worrell  : 1940  MRN: [id number]  Primary Care Physician:  Marisela Monte APRN  Date of admission: 2025  Date and Time of Service: 2025 at 2300      Assessment & Plan      Chief Complaint: right ankle pain    Plan:  Generalized Weakness  Fall  -X-ray of the pelvis showed no acute abnormality  -PT/OT consult  -Fall Precautions  -Case management consult for discharge planning    Hypothyroidism  -Continue Levothyroxine    Chronic CHF  CAD  HTN  HLD   Atrial Fibrillation  -Chronic, stable  -monitor BP  -Continue home Lasix, clonidine, Ranexa,  Lipitor        Home medications for chronic conditions will be restarted as appropriate when verified by pharmacy      History of Present Illness     History of Present Illness: Kendy Worrell is a 84 y.o. female with a previous medical history of CAD, HTN, previous CVA, A-fib, CHF who presented to [facility] on 2025 with generalized weakness and increased falls over the past few days.  The patient is currently living at an assisted living facility and her daughter found her in the bathroom after she slipped off the toilet.  The daughter reported to the ED staff that when she found her mom she was unresponsive and unable to speak with unilateral weakness.  The patient herself is alert and oriented x 4 and denies that she was unable to speak or weak on 1 side but does acknowledge that she has had increased general weakness and falls recently.      In the ED, creatinine is 1.05 (baseline).  Otherwise, labs are unremarkable.  X-ray of the pelvis showed no acute abnormality.  CT of the head showed no acute abnormality.  She is afebrile, vital signs are stable.  Hospitalist was consulted for further management of patient's worsening weakness with PT and OT consultation and case management consultation for possible rehab placement.    12 point ROS reviewed and negative  except as mentioned above    Objective      Vitals:   Temp:  [98.4 °F (36.9 °C)] 98.4 °F (36.9 °C)  Heart Rate:  [84-94] 94  Resp:  [16-18] 16  BP: (137-145)/() 145/90  Body mass index is 28.13 kg/m².    Physical Exam  Vitals and nursing note reviewed.   Constitutional:       Appearance: Normal appearance.   HENT:      Right Ear: Decreased hearing noted.      Left Ear: Decreased hearing noted.      Mouth/Throat:      Mouth: Mucous membranes are moist.   Cardiovascular:      Rate and Rhythm: Normal rate and regular rhythm.   Pulmonary:      Effort: Pulmonary effort is normal.      Breath sounds: Normal breath sounds.   Abdominal:      General: Bowel sounds are normal.      Palpations: Abdomen is soft.   Musculoskeletal:         General: Normal range of motion.   Skin:     General: Skin is warm and dry.   Neurological:      General: No focal deficit present.      Mental Status: She is alert and oriented to person, place, and time. Mental status is at baseline.   Psychiatric:         Mood and Affect: Mood normal.         Behavior: Behavior normal.            Personal History     This is a 84 y.o. female with:    Past Medical History:   Diagnosis Date    A-fib     Arthritis     Breast cancer     CHF (congestive heart failure)     CVA (cerebral vascular accident) 09/2019    History of pulmonary embolus (PE)     Hyperlipidemia     Hypertension     Hyperthyroidism     patient has hypothyroidism    Hypothyroidism        Past Surgical History:   Procedure Laterality Date    BREAST BIOPSY      BREAST SURGERY Right     CHOLECYSTECTOMY      COLON SURGERY      Removed    COLONOSCOPY      COLONOSCOPY N/A 11/3/2022    Procedure: COLONOSCOPY to anastamosis with biopsies and cold snare polypectomy;  Surgeon: Saroj Oakes MD;  Location: [facility] ENDOSCOPY;  Service: Gastroenterology;  Laterality: N/A;  PRe: rectal bleeding  Post: hemorrhoids, diverticulosis, anastamotic ulcer, polyp, hemostasis clip free-floating     HYSTERECTOMY      MAMMO BILATERAL  2015    MASTECTOMY      TONSILLECTOMY         Active and Resolved Problems  Active Hospital Problems    Diagnosis  POA    **Generalized weakness [R53.1]  Yes      Resolved Hospital Problems   No resolved problems to display.       Family History: family history includes Cancer in her father; Colon polyps in her father; Heart disease in her father; Hypertension in her mother; Stroke in her mother. Otherwise pertinent FHx was reviewed and not pertinent to current issue.    Social History:  reports that she quit smoking about 37 years ago. Her smoking use included cigarettes. She has been exposed to tobacco smoke. She has never used smokeless tobacco. She reports current alcohol use of about 3.0 standard drinks of alcohol per week. She reports that she does not use drugs.    Home Medications:  Prior to Admission Medications       Prescriptions Last Dose Informant Patient Reported? Taking?    acetaminophen (TYLENOL) 325 MG tablet Past Week Nursing Home No Yes    Take 2 tablets by mouth Every 6 (Six) Hours As Needed for Mild Pain .    albuterol sulfate  (90 Base) MCG/ACT inhaler Past Week Nursing Home Yes Yes    Inhale 2 puffs Every 4 (Four) Hours As Needed for Wheezing.    Cholecalciferol 25 MCG (1000 UT) tablet 1/29/2025  Yes Yes    Take 1 tablet by mouth Daily.    cloNIDine (CATAPRES) 0.1 MG tablet Past Week Pharmacy Yes Yes    Take 1 tablet by mouth Every 8 (Eight) Hours As Needed for High Blood Pressure.    dapagliflozin Propanediol 10 MG tablet 1/29/2025  Yes Yes    Take 10 mg by mouth Daily.    furosemide (LASIX) 20 MG tablet 1/29/2025 Pharmacy Yes Yes    Take 1 tablet by mouth Daily.    lacosamide (VIMPAT) 50 MG tablet tablet 1/29/2025 Pharmacy Yes Yes    Take 1 tablet by mouth Daily.    levothyroxine (SYNTHROID, LEVOTHROID) 50 MCG tablet 1/29/2025 Pharmacy Yes Yes    Take 1 tablet by mouth Every Morning.    magnesium oxide 250 MG tablet 1/29/2025  Yes Yes    Take 1  tablet by mouth 2 (Two) Times a Day.    midodrine (PROAMATINE) 5 MG tablet Patient Taking Differently  No Yes    Take 1 tablet by mouth 3 (Three) Times a Day Before Meals for 30 days.    midodrine (PROAMATINE) 5 MG tablet 1/29/2025 Pharmacy Yes Yes    Take 1 tablet by mouth 2 (Two) Times a Day.    multivitamin with minerals tablet tablet 1/29/2025  Yes Yes    Take 1 tablet by mouth Daily.    nitroglycerin (NITROSTAT) 0.4 MG SL tablet Past Month  Yes Yes    Place 1 tablet under the tongue Every 5 (Five) Minutes As Needed for Chest Pain.    nystatin (MYCOSTATIN) 959391 UNIT/GM powder Past Week  Yes Yes    Apply  topically to the appropriate area as directed 3 (Three) Times a Day As Needed. Apply under breasts    pantoprazole (PROTONIX) 40 MG EC tablet 1/29/2025  Yes Yes    Take 1 tablet by mouth Daily.    potassium chloride (KLOR-CON M20) 20 MEQ CR tablet 1/29/2025 Pharmacy Yes Yes    Take 2 tablets by mouth 3 (Three) Times a Day.    Patient taking differently:  Take 2 tablets by mouth Daily.    ranolazine (RANEXA) 500 MG 12 hr tablet 1/29/2025  Yes Yes    Take 1 tablet by mouth 2 (Two) Times a Day.    vitamin B-12 (CYANOCOBALAMIN) 1000 MCG tablet 1/29/2025  Yes Yes    Take 1 tablet by mouth Daily.    apixaban (ELIQUIS) 5 MG tablet tablet  Nursing Home No No    Take 1 tablet by mouth Every 12 (Twelve) Hours.    ascorbic acid (VITAMIN C) 1000 MG tablet   Yes No    Take 1 tablet by mouth Daily.    atorvastatin (LIPITOR) 10 MG tablet   Yes No    Take 1 tablet by mouth Every Night.    cephalexin (KEFLEX) 500 MG capsule  Pharmacy Yes No    Take 1 capsule by mouth 2 (Two) Times a Day. UTI    fludrocortisone 0.1 MG tablet   No No    Take 1 tablet by mouth Daily for 30 days.    lacosamide (VIMPAT) 50 MG tablet tablet   No No    Take 1 tablet by mouth Daily for 30 days.    levothyroxine (SYNTHROID, LEVOTHROID) 75 MCG tablet   No No    Take 1 tablet by mouth Every Morning for 30 days.    multivitamins-minerals (PRESERVISION  AREDS 2) capsule capsule   Yes No    Take 1 capsule by mouth 2 (Two) Times a Day.    zinc oxide 20 % ointment   Yes No    Apply 1 Application topically to the appropriate area as directed 2 (Two) Times a Day.              Allergies:  Allergies   Allergen Reactions    Aspirin Other (See Comments)     Severe bleeding    Nsaids Other (See Comments)     Severe bleeding           VTE Prophylaxis:  Mechanical VTE prophylaxis orders are present.        CODE STATUS:    Code Status (Patient has no pulse and is not breathing): CPR (Attempt to Resuscitate)  Medical Interventions (Patient has pulse or is breathing): Full Support        Admission Status:  I believe this patient meets inpatient status.    I discussed the patient's findings and my recommendations with patient and family.    Signature:     This document has been electronically signed by Love Juarez, BEBA, APRN, AGACNP-BC on January 30, 2025 04:00 [facility]

## 2025-01-30 NOTE — PLAN OF CARE
Goal Outcome Evaluation:  Plan of Care Reviewed With: patient           Outcome Evaluation: Kendy Worrell is a 84 y.o. female with a previous medical history of CAD, HTN, previous CVA, A-fib, CHF who presented to [facility] on 1/29/2025 with generalized weakness and increased falls over the past few days.  The patient is currently living at an assisted living facility and her daughter found her in the bathroom after she slipped off the toilet.   X-ray of the pelvis showed no acute abnormality.  CT of the head showed no acute abnormality.  Patient is cleared for therapy by nursing, patient is found resting in bed A&Ox3.  She reports she does not feel like getting up and moving around at this time; she is found to be hypotensive and RN is notified.  Activity limited due to hypotension.  Patient demonstrates LE weakness, given recent falls and decline in mobility anticipate the patient will require SNF level of care prior to return to [facility].  Will follow for strengthening, balance, and progressive mobility training as appropriate.    Anticipated Discharge Disposition (PT): skilled nursing facility

## 2025-01-30 NOTE — THERAPY EVALUATION
Patient Name: Kendy Worrell  : 1940    MRN: [id number]                              Today's Date: 2025       Admit Date: 2025    Visit Dx:     ICD-10-CM ICD-9-CM   1. Generalized weakness  R53.1 780.79   2. Frequent falls  R29.6 V15.88     Patient Active Problem List   Diagnosis    Essential hypertension    Acquired hypothyroidism    Coronary artery disease involving native coronary artery of native heart without angina pectoris    Hyperlipidemia    Arthritis    Skin lesion of chest wall    NSTEMI (non-ST elevated myocardial infarction)    Supraventricular tachycardia    Normal cardiac stress test    Gastrointestinal bleed    Angina effort    Morbidly obese    Bilateral pulmonary embolism    Persistent atrial fibrillation    UTI (urinary tract infection)    Chronic diastolic CHF (congestive heart failure)    Acute ischemic stroke    Chest pain    GI bleed    Closed fibular fracture    Anemia    Hyponatremia    Orthostatic dizziness    Frequent falls    Generalized weakness    Chronic heart failure with preserved ejection fraction (HFpEF)     Past Medical History:   Diagnosis Date    A-fib     Arthritis     Breast cancer     CHF (congestive heart failure)     CVA (cerebral vascular accident) 2019    History of pulmonary embolus (PE)     Hyperlipidemia     Hypertension     Hyperthyroidism     patient has hypothyroidism    Hypothyroidism      Past Surgical History:   Procedure Laterality Date    BREAST BIOPSY      BREAST SURGERY Right     CHOLECYSTECTOMY      COLON SURGERY      Removed    COLONOSCOPY      COLONOSCOPY N/A 11/3/2022    Procedure: COLONOSCOPY to anastamosis with biopsies and cold snare polypectomy;  Surgeon: Saroj Oakes MD;  Location: [facility] ENDOSCOPY;  Service: Gastroenterology;  Laterality: N/A;  PRe: rectal bleeding  Post: hemorrhoids, diverticulosis, anastamotic ulcer, polyp, hemostasis clip free-floating    HYSTERECTOMY      MAMMO BILATERAL  2015    MASTECTOMY       TONSILLECTOMY        General Information       Row Name 01/30/25 1641          OT Time and Intention    Document Type evaluation  -SR     Mode of Treatment occupational therapy  -SR       Row Name 01/30/25 1641          General Information    Existing Precautions/Restrictions fall;orthostatic hypotension  -SR       Row Name 01/30/25 1641          Occupational Profile    Reason for Services/Referral (Occupational Profile) 84 y.o. female with a previous medical history of CAD, HTN, previous CVA, A-fib, CHF who presented to [facility] on 1/29/2025 with generalized weakness and increased falls over the past few days.  The patient is currently living at an assisted living facility and her daughter found her in the bathroom after she slipped off the toilet.  The daughter reported to the ED staff that when she found her mom she was unresponsive and unable to speak with unilateral weakness.  Reports she is typically independent with basic ADLs though gets assist with bathing and IADLs.  -SR       Row Name 01/30/25 1641          Living Environment    People in Home --  [facility]  -SR       Row Name 01/30/25 1641          Cognition    Orientation Status (Cognition) oriented to;person;place;time  -SR               User Key  (r) = Recorded By, (t) = Taken By, (c) = Cosigned By      Initials Name Provider Type    SR Anais Montes De Oca OT Occupational Therapist                     Mobility/ADL's       Row Name 01/30/25 1641          Bed Mobility    Bed Mobility scooting/bridging  -SR     Scooting/Bridging [place] (Bed Mobility) dependent (less than 25% patient effort)  -SR               User Key  (r) = Recorded By, (t) = Taken By, (c) = Cosigned By      Initials Name Provider Type    SR Anais Montes De Oca OT Occupational Therapist                   Obj/Interventions       Row Name 01/30/25 1642          Range of Motion Comprehensive    General Range of Motion no range of motion deficits identified  -SR        Row Name 01/30/25 1642          Strength Comprehensive (MMT)    Comment, General Manual Muscle Testing (MMT) Assessment BUE 3+/5  -SR       Row Name 01/30/25 1642          Balance    Comment, Balance Did not attempt sitting due to hypotension  -SR               User Key  (r) = Recorded By, (t) = Taken By, (c) = Cosigned By      Initials Name Provider Type    SR Anais Montes De Oca, OT Occupational Therapist                   Goals/Plan       Row Name 01/30/25 1645          Toileting Goal 1 (OT)    Activity/Device (Toileting Goal 1, OT) toileting skills, all  -SR     [place] Level/Cues Needed (Toileting Goal 1, OT) moderate assist (50-74% patient effort)  -SR     Time Frame (Toileting Goal 1, OT) 2 weeks  -SR       Row Name 01/30/25 1645          Grooming Goal 1 (OT)    Activity/Device (Grooming Goal 1, OT) grooming skills, all  -SR     [place] (Grooming Goal 1, OT) contact guard required  -SR     Time Frame (Grooming Goal 1, OT) 2 weeks  -SR       Row Name 01/30/25 1645          Therapy Assessment/Plan (OT)    Planned Therapy Interventions (OT) activity tolerance training;BADL retraining;IADL retraining;functional balance retraining;neuromuscular control/coordination retraining;ROM/therapeutic exercise;transfer/mobility retraining;strengthening exercise;occupation/activity based interventions  -SR               User Key  (r) = Recorded By, (t) = Taken By, (c) = Cosigned By      Initials Name Provider Type    SR Anais Montes De Oca, OT Occupational Therapist                   Clinical Impression       Row Name 01/30/25 1643          Pain Assessment    Pretreatment Pain Rating 0/10 - no pain  -SR     Posttreatment Pain Rating 0/10 - no pain  -SR       Row Name 01/30/25 1643          Plan of Care Review    Outcome Evaluation 84 y.o. female with a previous medical history of CAD, HTN, previous CVA, A-fib, CHF who presented to [facility] on 1/29/2025 with generalized weakness and increased falls  over the past few days.  The patient is currently living at an assisted living facility and her daughter found her in the bathroom after she slipped off the toilet.  The daughter reported to the ED staff that when she found her mom she was unresponsive and unable to speak with unilateral weakness.  Reports she is typically independent with basic ADLs though gets assist with bathing and IADLs.  This date treatment was limited due to fatigue and hypotension.  BP 89/60 prior to evaluation, though after pt was pulled up in bed and sat more upright BP decreased to 85/59.  Pt deemed unsafe to attempt sitting EOB or OOB activity.  She will require SNF at discharge.  -SR       Row Name 01/30/25 1643          Therapy Assessment/Plan (OT)    Rehab Potential (OT) good  -SR     Criteria for Skilled Therapeutic Interventions Met (OT) yes  -SR     Therapy Frequency (OT) 5 times/wk  -SR     Predicted Duration of Therapy Intervention (OT) Until discharge  -SR       Row Name 01/30/25 1643          Therapy Plan Review/Discharge Plan (OT)    Anticipated Discharge Disposition (OT) skilled nursing facility  -SR       Row Name 01/30/25 1643          Positioning and Restraints    Pre-Treatment Position in bed  -SR     Post Treatment Position bed  -SR     In Bed call light within reach;encouraged to call for assist;exit alarm on  -SR               User Key  (r) = Recorded By, (t) = Taken By, (c) = Cosigned By      Initials Name Provider Type    SR Anais Montes De Oca, OT Occupational Therapist                   Outcome Measures       Row Name 01/30/25 1404 01/30/25 0832       How much help from another person do you currently need...    Turning from your back to your side while in flat bed without using bedrails? 2  -RR 4  -ET    Moving from lying on back to sitting on the side of a flat bed without bedrails? 2  -RR 3  -ET    Moving to and from a bed to a chair (including a wheelchair)? 2  -RR 3  -ET    Standing up from a chair using  your arms (e.g., wheelchair, bedside chair)? 2  -RR 3  -ET    Climbing 3-5 steps with a railing? 1  -RR 2  -ET    To walk in hospital room? 1  -RR 2  -ET    AM-PAC 6 Clicks Score (PT) 10  -RR 17  -ET              User Key  (r) = Recorded By, (t) = Taken By, (c) = Cosigned By      Initials Name Provider Type    ET Dalia Marti, RN Registered Nurse    Lucy Vail, PT Physical Therapist                    Occupational Therapy Education       Title: PT OT SLP Therapies (In Progress)       Topic: Occupational Therapy (In Progress)       Point: ADL training (In Progress)       Description:   Instruct learner(s) on proper safety adaptation and remediation techniques during self care or transfers.   Instruct in proper use of assistive devices.                  Learning Progress Summary            Patient Acceptance, E,TB, NR by SR at 1/30/2025 1645                      Point: Home exercise program (Not Started)       Description:   Instruct learner(s) on appropriate technique for monitoring, assisting and/or progressing therapeutic exercises/activities.                  Learner Progress:  Not documented in this visit.              Point: Precautions (In Progress)       Description:   Instruct learner(s) on prescribed precautions during self-care and functional transfers.                  Learning Progress Summary            Patient Acceptance, E,TB, NR by SR at 1/30/2025 1645                      Point: Body mechanics (Not Started)       Description:   Instruct learner(s) on proper positioning and spine alignment during self-care, functional mobility activities and/or exercises.                  Learner Progress:  Not documented in this visit.                              User Key       Initials Effective Dates Name Provider Type Discipline    SR 06/16/21 -  Anais Montes De Oca OT Occupational Therapist OT                  OT Recommendation and Plan  Planned Therapy Interventions (OT): activity tolerance  training, BADL retraining, IADL retraining, functional balance retraining, neuromuscular control/coordination retraining, ROM/therapeutic exercise, transfer/mobility retraining, strengthening exercise, occupation/activity based interventions  Therapy Frequency (OT): 5 times/wk  Plan of Care Review  Outcome Evaluation: 84 y.o. female with a previous medical history of CAD, HTN, previous CVA, A-fib, CHF who presented to [facility] on 1/29/2025 with generalized weakness and increased falls over the past few days.  The patient is currently living at an assisted living facility and her daughter found her in the bathroom after she slipped off the toilet.  The daughter reported to the ED staff that when she found her mom she was unresponsive and unable to speak with unilateral weakness.  Reports she is typically independent with basic ADLs though gets assist with bathing and IADLs.  This date treatment was limited due to fatigue and hypotension.  BP 89/60 prior to evaluation, though after pt was pulled up in bed and sat more upright BP decreased to 85/59.  Pt deemed unsafe to attempt sitting EOB or OOB activity.  She will require SNF at discharge.     Time Calculation:         Time Calculation- OT       Row Name 01/30/25 1646             Time Calculation- OT    OT Start Time 1100  -SR      OT Stop Time 1113  -SR      OT Time Calculation (min) 13 min  -SR      Total Timed Code Minutes- OT 0 minute(s)  -SR      OT Received On 01/30/25  -SR      OT - Next Appointment 01/31/25  -SR      OT Goal Re-Cert Due Date 02/13/25  -SR                User Key  (r) = Recorded By, (t) = Taken By, (c) = Cosigned By      Initials Name Provider Type    SR Anais Montes De Oca OT Occupational Therapist                  Therapy Charges for Today       Code Description Service Date Service Provider Modifiers Qty    [id number] HC OT EVAL MOD COMPLEXITY 4 1/30/2025 Anais Montes De Oca OT GO 1                 Anais JARRETT  Tavon, OT  1/30/2025

## 2025-01-30 NOTE — PLAN OF CARE
Goal Outcome Evaluation:              Outcome Evaluation: 84 y.o. female with a previous medical history of CAD, HTN, previous CVA, A-fib, CHF who presented to [facility] on 1/29/2025 with generalized weakness and increased falls over the past few days.  The patient is currently living at an assisted living facility and her daughter found her in the bathroom after she slipped off the toilet.  The daughter reported to the ED staff that when she found her mom she was unresponsive and unable to speak with unilateral weakness.  Reports she is typically independent with basic ADLs though gets assist with bathing and IADLs.  This date treatment was limited due to fatigue and hypotension.  BP 89/60 prior to evaluation, though after pt was pulled up in bed and sat more upright BP decreased to 85/59.  Pt deemed unsafe to attempt sitting EOB or OOB activity.  She will require SNF at discharge.    Anticipated Discharge Disposition (OT): skilled nursing facility

## 2025-01-30 NOTE — PROGRESS NOTES
Nonbillable note  Patient seen and examined at bedside this morning.  States that she has been having trouble with falls for a while now, states feels that her legs are really weak.  She does have some element of orthostatic hypotension as well.  She had received hydralazine because her supine blood pressure was pretty elevated however after receiving hydralazine and when she was propped up her blood pressure dropped in the systolic 80s which does make me think she has possible underlying orthostatic hypotension.  Will give her 500 mL LR bolus and reassess blood pressure and once improved check for orthostatic vital signs.  Might need to be started on midodrine.

## 2025-01-31 VITALS
OXYGEN SATURATION: 96 % | BODY MASS INDEX: 28.05 KG/M2 | HEART RATE: 76 BPM | RESPIRATION RATE: 15 BRPM | DIASTOLIC BLOOD PRESSURE: 61 MMHG | WEIGHT: 130 LBS | TEMPERATURE: 97.7 F | HEIGHT: 57 IN | SYSTOLIC BLOOD PRESSURE: 89 MMHG

## 2025-01-31 LAB
ANION GAP SERPL CALCULATED.3IONS-SCNC: 7.9 MMOL/L (ref 5–15)
BUN SERPL-MCNC: 10 MG/DL (ref 8–23)
BUN/CREAT SERPL: 9.5 (ref 7–25)
CALCIUM SPEC-SCNC: 9.8 MG/DL (ref 8.6–10.5)
CHLORIDE SERPL-SCNC: 102 MMOL/L (ref 98–107)
CO2 SERPL-SCNC: 26.1 MMOL/L (ref 22–29)
CREAT SERPL-MCNC: 1.05 MG/DL (ref 0.57–1)
DEPRECATED RDW RBC AUTO: 57.2 FL (ref 37–54)
EGFRCR SERPLBLD CKD-EPI 2021: 52.2 ML/MIN/1.73
ERYTHROCYTE [DISTWIDTH] IN BLOOD BY AUTOMATED COUNT: 19.2 % (ref 12.3–15.4)
FERRITIN SERPL-MCNC: 41.5 NG/ML (ref 13–150)
GLUCOSE SERPL-MCNC: 97 MG/DL (ref 65–99)
HCT VFR BLD AUTO: 28.9 % (ref 34–46.6)
HGB BLD-MCNC: 9.4 G/DL (ref 12–15.9)
IRON 24H UR-MRATE: 21 MCG/DL (ref 37–145)
IRON SATN MFR SERPL: 6 % (ref 20–50)
MCH RBC QN AUTO: 26.4 PG (ref 26.6–33)
MCHC RBC AUTO-ENTMCNC: 32.5 G/DL (ref 31.5–35.7)
MCV RBC AUTO: 81.2 FL (ref 79–97)
PLATELET # BLD AUTO: 232 10*3/MM3 (ref 140–450)
PMV BLD AUTO: 10.3 FL (ref 6–12)
POTASSIUM SERPL-SCNC: 3.3 MMOL/L (ref 3.5–5.2)
RBC # BLD AUTO: 3.56 10*6/MM3 (ref 3.77–5.28)
SODIUM SERPL-SCNC: 136 MMOL/L (ref 136–145)
TIBC SERPL-MCNC: 352 MCG/DL (ref 298–536)
TRANSFERRIN SERPL-MCNC: 236 MG/DL (ref 200–360)
VIT B12 BLD-MCNC: 1557 PG/ML (ref 211–946)
WBC NRBC COR # BLD AUTO: 7.72 10*3/MM3 (ref 3.4–10.8)

## 2025-01-31 PROCEDURE — G0378 HOSPITAL OBSERVATION PER HR: HCPCS

## 2025-01-31 PROCEDURE — 82728 ASSAY OF FERRITIN: CPT | Performed by: HOSPITALIST

## 2025-01-31 PROCEDURE — 97116 GAIT TRAINING THERAPY: CPT

## 2025-01-31 PROCEDURE — 84466 ASSAY OF TRANSFERRIN: CPT | Performed by: HOSPITALIST

## 2025-01-31 PROCEDURE — 97530 THERAPEUTIC ACTIVITIES: CPT

## 2025-01-31 PROCEDURE — 85027 COMPLETE CBC AUTOMATED: CPT

## 2025-01-31 PROCEDURE — 83540 ASSAY OF IRON: CPT | Performed by: HOSPITALIST

## 2025-01-31 PROCEDURE — 80048 BASIC METABOLIC PNL TOTAL CA: CPT

## 2025-01-31 RX ORDER — LACOSAMIDE 50 MG/1
50 TABLET ORAL DAILY
Qty: 3 TABLET | Refills: 0 | Status: SHIPPED | OUTPATIENT
Start: 2025-01-31 | End: 2025-02-03

## 2025-01-31 RX ORDER — MIDODRINE HYDROCHLORIDE 5 MG/1
5 TABLET ORAL
Status: DISCONTINUED | OUTPATIENT
Start: 2025-01-31 | End: 2025-01-31 | Stop reason: HOSPADM

## 2025-01-31 RX ORDER — FERROUS SULFATE 325(65) MG
325 TABLET ORAL
Status: DISCONTINUED | OUTPATIENT
Start: 2025-01-31 | End: 2025-01-31 | Stop reason: HOSPADM

## 2025-01-31 RX ORDER — ATORVASTATIN CALCIUM 10 MG/1
10 TABLET, FILM COATED ORAL NIGHTLY
Start: 2025-01-31

## 2025-01-31 RX ORDER — FERROUS SULFATE 325(65) MG
325 TABLET ORAL
Start: 2025-02-01

## 2025-01-31 RX ADMIN — Medication 10 ML: at 08:32

## 2025-01-31 RX ADMIN — LEVOTHYROXINE SODIUM 50 MCG: 50 TABLET ORAL at 05:41

## 2025-01-31 RX ADMIN — RANOLAZINE 500 MG: 500 TABLET, FILM COATED, EXTENDED RELEASE ORAL at 08:27

## 2025-01-31 RX ADMIN — SENNOSIDES AND DOCUSATE SODIUM 2 TABLET: 50; 8.6 TABLET ORAL at 08:28

## 2025-01-31 RX ADMIN — PANTOPRAZOLE SODIUM 40 MG: 40 TABLET, DELAYED RELEASE ORAL at 08:28

## 2025-01-31 RX ADMIN — EMPAGLIFLOZIN 25 MG: 25 TABLET, FILM COATED ORAL at 08:28

## 2025-01-31 RX ADMIN — MIDODRINE HYDROCHLORIDE 5 MG: 5 TABLET ORAL at 17:01

## 2025-01-31 RX ADMIN — Medication 1000 MCG: at 08:27

## 2025-01-31 RX ADMIN — LACOSAMIDE 50 MG: 50 TABLET, FILM COATED ORAL at 08:28

## 2025-01-31 RX ADMIN — OXYCODONE HYDROCHLORIDE AND ACETAMINOPHEN 1000 MG: 500 TABLET ORAL at 08:27

## 2025-01-31 RX ADMIN — FERROUS SULFATE TAB 325 MG (65 MG ELEMENTAL FE) 325 MG: 325 (65 FE) TAB at 11:56

## 2025-01-31 RX ADMIN — MIDODRINE HYDROCHLORIDE 5 MG: 5 TABLET ORAL at 11:56

## 2025-01-31 RX ADMIN — FUROSEMIDE 20 MG: 40 TABLET ORAL at 08:27

## 2025-01-31 NOTE — DISCHARGE SUMMARY
[facility] Medicine Services  Discharge Summary    Date of Service: 2025  Patient Name: Kendy Worrell  : 1940  MRN: [id number]    Date of Admission: 2025  Discharge Diagnosis: #Generalized weakness  #Fall OSH  Date of Discharge: 2025  Primary Care Physician: Marisela Monte APRN      Presenting Problem:   Generalized weakness [R53.1]  Frequent falls [R29.6]    Active and Resolved Hospital Problems:  Active Hospital Problems    Diagnosis POA    **Generalized weakness [R53.1] Yes      Resolved Hospital Problems   No resolved problems to display.         Hospital Course     HPI:  Admitting team HPI   84 y.o. female with a previous medical history of CAD, HTN, previous CVA, A-fib, CHF who presented to [facility] on 2025 with generalized weakness and increased falls over the past few days.  The patient is currently living at an assisted living facility and her daughter found her in the bathroom after she slipped off the toilet.     Hospital Course:  #Generalized weakness  #Fall OSH  CT head and x-ray pelvis no acute findings.  Chart reviewed.  Patient has history of orthostatic hypotension.  Blood pressure labile previously patient on midodrine and Florinef follows with [place] cardiology discussed with patient daughter and son.  Patient was recently taken off Eliquis given high risk of falls.  Also midodrine and Florinef were held and plan was to monitor blood pressure.  Blood pressure trend reviewed recommend restarting midodrine.  I suspect etiology of her fall is hypotension.  Seen by PT/OT recommending subacute rehab.  To follow-up with [place] cardiology as outpatient.  May use clonidine as needed if SBP more than 170.  Goal is to keep SBP between 140-160    #Chronic anemia  Baseline hemoglobin appears to be around 9 on admission 13 and gradually trended down to 9 currently no evidence of GIB.  Patient not on anticoagulation.  Iron studies suggest MIKALA start  p.o. ferrous sulfate.  Suspect hemoglobin of 13 on admission secondary to hemoconcentration        DISCHARGE Follow Up Recommendations for labs and diagnostics:   Follow-up with primary cardiology team at [place]  Monitor blood pressure goal SBP around 140-160        Day of Discharge     Vital Signs:  Temp:  [96.7 °F (35.9 °C)-98.3 °F (36.8 °C)] 97.3 °F (36.3 °C)  Heart Rate:  [] 76  Resp:  [11-20] 20  BP: ()/() 121/74    Physical Exam:  Physical Exam   Frail elderly female NAD  RRR S1-S2 audible  Lungs with fair air entry  Abdomen soft nontender        Pertinent  and/or Most Recent Results     LAB RESULTS:      Lab 01/31/25  0538 01/30/25  0951 01/30/25  0353 01/29/25  1854   WBC 7.72  --  6.83 9.95   HEMOGLOBIN 9.4* 11.2* 10.4* 13.1   HEMATOCRIT 28.9* 35.2 32.5* 42.2   PLATELETS 232  --  281 310   NEUTROS ABS  --   --   --  8.61*   IMMATURE GRANS (ABS)  --   --   --  0.04   LYMPHS ABS  --   --   --  0.65*   MONOS ABS  --   --   --  0.59   EOS ABS  --   --   --  0.02   MCV 81.2  --  82.9 85.6         Lab 01/31/25  0538 01/30/25  0353 01/29/25  2323 01/29/25  1854   SODIUM 136 138 138 138   POTASSIUM 3.3* 4.1 4.0 5.1   CHLORIDE 102 103 101 99   CO2 26.1 25.7 25.0 19.7*   ANION GAP 7.9 9.3 12.0 19.3*   BUN 10 14 12 13   CREATININE 1.05* 1.05* 1.18* 1.28*   EGFR 52.2* 52.2* 45.4* 41.1*   GLUCOSE 97 115* 135* 112*   CALCIUM 9.8 9.8 9.2 11.0*         Lab 01/29/25  1854   TOTAL PROTEIN 8.1   ALBUMIN 4.6   GLOBULIN 3.5   ALT (SGPT) 14   AST (SGOT) 35*   BILIRUBIN 1.0   ALK PHOS 175*                 Lab 01/31/25  0538   IRON 21*   IRON SATURATION (TSAT) 6*   TIBC 352   TRANSFERRIN 236   FERRITIN 41.50         Brief Urine Lab Results  (Last result in the past 365 days)        Color   Clarity   Blood   Leuk Est   Nitrite   Protein   CREAT   Urine HCG        01/29/25 2008 Yellow   Clear   Negative   Trace   Negative   Negative                 Microbiology Results (last 10 days)       ** No results found for  the last 240 hours. **            CT Head Without Contrast    Result Date: 1/29/2025  Impression: 1.No evidence for acute intracranial abnormality. 2.Nonspecific white matter changes are noted with associated diffuse volume loss. These findings are likely related to chronic small vessel ischemic changes and/or age-related changes. 3.Changes of sinusitis are noted. Electronically Signed: Domingo Stahl MD  1/29/2025 7:52 PM EST  Workstation ID: [id number]    XR Pelvis 1 or 2 View    Result Date: 1/29/2025  Impression: 1.No evidence for displaced fracture or dislocation. 2.Moderate degenerative changes are noted. Electronically Signed: Domingo Stahl MD  1/29/2025 7:47 PM EST  Workstation ID: [id number]     Results for orders placed during the hospital encounter of 05/23/24    Duplex Carotid Ultrasound CAR    Interpretation Summary    Right internal carotid artery demonstrates a less than 50% stenosis.    Left internal carotid artery demonstrates a less than 50% stenosis.      Results for orders placed during the hospital encounter of 05/23/24    Duplex Carotid Ultrasound CAR    Interpretation Summary    Right internal carotid artery demonstrates a less than 50% stenosis.    Left internal carotid artery demonstrates a less than 50% stenosis.      Results for orders placed during the hospital encounter of 05/23/24    Adult Transthoracic Echo Complete W/ Cont if Necessary Per Protocol    Interpretation Summary    Left ventricular ejection fraction appears to be 61 - 65%.    Left ventricular wall thickness is consistent with moderate eccentric hypertrophy.    Mild to moderate aortic valve stenosis is present.      Labs Pending at Discharge:  Pending Results       None            Procedures Performed           Consults:   Consults       Date and Time Order Name Status Description    1/29/2025  9:34 PM Hospitalist (on-call MD unless specified)                Discharge Details        Discharge Medications        New  Medications        Instructions Start Date   ferrous sulfate 325 (65 FE) MG tablet   325 mg, Oral, Daily With Breakfast   Start Date: February 1, 2025            Continue These Medications        Instructions Start Date   acetaminophen 325 MG tablet  Commonly known as: TYLENOL   650 mg, Oral, Every 6 Hours PRN      albuterol sulfate  (90 Base) MCG/ACT inhaler  Commonly known as: PROVENTIL HFA;VENTOLIN HFA;PROAIR HFA   2 puffs, Every 4 Hours PRN      atorvastatin 10 MG tablet  Commonly known as: LIPITOR   10 mg, Oral, Nightly      cholecalciferol 25 MCG (1000 UT) tablet  Commonly known as: VITAMIN D3   1,000 Units, Daily      cloNIDine 0.1 MG tablet  Commonly known as: CATAPRES   0.1 mg, Every 8 Hours PRN      dapagliflozin Propanediol 10 MG tablet   10 mg, Daily      furosemide 20 MG tablet  Commonly known as: LASIX   20 mg, Daily      lacosamide 50 MG tablet tablet  Commonly known as: VIMPAT   50 mg, Daily      levothyroxine 50 MCG tablet  Commonly known as: SYNTHROID, LEVOTHROID   50 mcg, Every Early Morning      magnesium oxide 250 MG tablet   250 mg, 2 Times Daily      midodrine 5 MG tablet  Commonly known as: PROAMATINE   5 mg, 2 Times Daily      multivitamin with minerals tablet tablet   1 tablet, Daily      nitroglycerin 0.4 MG SL tablet  Commonly known as: NITROSTAT   0.4 mg, Every 5 Minutes PRN      pantoprazole 40 MG EC tablet  Commonly known as: PROTONIX   40 mg, Daily      potassium chloride 20 MEQ CR tablet  Commonly known as: KLOR-CON M20   40 mEq, Daily      ranolazine 500 MG 12 hr tablet  Commonly known as: RANEXA   500 mg, 2 Times Daily      vitamin B-12 1000 MCG tablet  Commonly known as: CYANOCOBALAMIN   1,000 mcg, Daily             Stop These Medications      cephalexin 500 MG capsule  Commonly known as: KEFLEX              Allergies   Allergen Reactions    Aspirin Other (See Comments)     Severe bleeding    Nsaids Other (See Comments)     Severe bleeding         Discharge Disposition:    Skilled Nursing Facility (DC - External)    Diet:  Hospital:  Diet Order   Procedures    Diet: Cardiac; Healthy Heart (2-3 Na+); Fluid Consistency: Thin (IDDSI 0)         Discharge Activity:         CODE STATUS:  Code Status and Medical Interventions: CPR (Attempt to Resuscitate); Full Support   Ordered at: 01/29/25 2216     Code Status (Patient has no pulse and is not breathing):    CPR (Attempt to Resuscitate)     Medical Interventions (Patient has pulse or is breathing):    Full Support         No future appointments.        Time spent on Discharge including face to face service:  50 minutes    Signature: Electronically signed by Marcelo Carrillo MD, 01/31/25, 12:40 EST.  [facility] Hospitalist Team

## 2025-01-31 NOTE — CASE MANAGEMENT/SOCIAL WORK
Continued Stay Note  BRENDA Hernandez     Patient Name: Kendy Worrell  MRN: [id number]  Today's Date: 1/31/2025    Admit Date: 1/29/2025    Plan: D/C Plan: [facility]; Transport by W/C van   Discharge Plan       Row Name 01/31/25 1542       Plan    Plan D/C Plan: [facility]; Transport by W/C van                   Discharge Codes    No documentation.                 Expected Discharge Date and Time       Expected Discharge Date Expected Discharge Time    Jan 31, 2025               Katie Peralta RN  .d

## 2025-01-31 NOTE — PLAN OF CARE
Problem: Adult Inpatient Plan of Care  Goal: Plan of Care Review  Outcome: Progressing  Goal: Patient-Specific Goal (Individualized)  Outcome: Progressing  Goal: Absence of Hospital-Acquired Illness or Injury  Outcome: Progressing  Intervention: Identify and Manage Fall Risk  Recent Flowsheet Documentation  Taken 1/31/2025 1400 by Micheline Perry RN  Safety Promotion/Fall Prevention:   safety round/check completed   room organization consistent   nonskid shoes/slippers when out of bed   fall prevention program maintained   clutter free environment maintained   assistive device/personal items within reach   activity supervised  Taken 1/31/2025 1155 by Micheline Perry RN  Safety Promotion/Fall Prevention:   safety round/check completed   room organization consistent   nonskid shoes/slippers when out of bed   fall prevention program maintained   clutter free environment maintained   assistive device/personal items within reach   activity supervised  Taken 1/31/2025 1000 by Micheline Perry RN  Safety Promotion/Fall Prevention:   safety round/check completed   room organization consistent   nonskid shoes/slippers when out of bed   fall prevention program maintained   clutter free environment maintained   assistive device/personal items within reach   activity supervised  Taken 1/31/2025 0810 by Micheline Perry RN  Safety Promotion/Fall Prevention:   safety round/check completed   room organization consistent   nonskid shoes/slippers when out of bed   fall prevention program maintained   clutter free environment maintained   assistive device/personal items within reach   activity supervised  Intervention: Prevent Skin Injury  Recent Flowsheet Documentation  Taken 1/31/2025 1155 by Micheline Perry RN  Body Position: weight shifting  Skin Protection: transparent dressing maintained  Taken 1/31/2025 0810 by Micheline Perry RN  Body Position: weight shifting  Intervention: Prevent and Manage VTE (Venous  Thromboembolism) Risk  Recent Flowsheet Documentation  Taken 1/31/2025 0810 by Micheline Perry RN  VTE Prevention/Management:   bilateral   SCDs (sequential compression devices) off   patient refused intervention  Intervention: Prevent Infection  Recent Flowsheet Documentation  Taken 1/31/2025 1400 by Micheline Perry RN  Infection Prevention:   single patient room provided   rest/sleep promoted   personal protective equipment utilized   hand hygiene promoted  Taken 1/31/2025 1155 by Micheline Perry RN  Infection Prevention:   single patient room provided   rest/sleep promoted   personal protective equipment utilized   hand hygiene promoted  Taken 1/31/2025 1000 by Micheline Perry RN  Infection Prevention:   single patient room provided   rest/sleep promoted   personal protective equipment utilized   hand hygiene promoted  Taken 1/31/2025 0810 by Micheline Perry RN  Infection Prevention:   single patient room provided   rest/sleep promoted   personal protective equipment utilized   hand hygiene promoted  Goal: Optimal Comfort and Wellbeing  Outcome: Progressing  Intervention: Provide Person-Centered Care  Recent Flowsheet Documentation  Taken 1/31/2025 1155 by Micheline Perry RN  Trust Relationship/Rapport:   care explained   choices provided   questions answered  Taken 1/31/2025 0810 by Micheline Perry RN  Trust Relationship/Rapport:   care explained   choices provided   questions answered  Goal: Readiness for Transition of Care  Outcome: Progressing   Goal Outcome Evaluation:   D/C to [place] awaiting transport

## 2025-01-31 NOTE — PLAN OF CARE
Problem: Adult Inpatient Plan of Care  Goal: Plan of Care Review  Outcome: Progressing  Flowsheets (Taken 1/31/2025 0447)  Progress: no change  Goal: Patient-Specific Goal (Individualized)  Outcome: Progressing  Goal: Absence of Hospital-Acquired Illness or Injury  Outcome: Progressing  Intervention: Identify and Manage Fall Risk  Recent Flowsheet Documentation  Taken 1/31/2025 0400 by Yissel Thornton LPN  Safety Promotion/Fall Prevention:   activity supervised   assistive device/personal items within reach   clutter free environment maintained   fall prevention program maintained   lighting adjusted   nonskid shoes/slippers when out of bed   room organization consistent   safety round/check completed  Taken 1/31/2025 0200 by Yissel Thornton LPN  Safety Promotion/Fall Prevention:   activity supervised   assistive device/personal items within reach   clutter free environment maintained   fall prevention program maintained   lighting adjusted   nonskid shoes/slippers when out of bed   room organization consistent   safety round/check completed  Taken 1/31/2025 0000 by Yissel Thornton LPN  Safety Promotion/Fall Prevention:   activity supervised   assistive device/personal items within reach   clutter free environment maintained   fall prevention program maintained   lighting adjusted   nonskid shoes/slippers when out of bed   room organization consistent   safety round/check completed  Taken 1/30/2025 2200 by Yissel Thornton LPN  Safety Promotion/Fall Prevention:   activity supervised   assistive device/personal items within reach   clutter free environment maintained   fall prevention program maintained   lighting adjusted   nonskid shoes/slippers when out of bed   room organization consistent   safety round/check completed  Taken 1/30/2025 2000 by Yissel Thornton LPN  Safety Promotion/Fall Prevention:   activity supervised   assistive device/personal items within reach   clutter free environment maintained    fall prevention program maintained   lighting adjusted   nonskid shoes/slippers when out of bed   room organization consistent   safety round/check completed  Intervention: Prevent and Manage VTE (Venous Thromboembolism) Risk  Recent Flowsheet Documentation  Taken 1/30/2025 2000 by Yissel Thornton LPN  VTE Prevention/Management:   bilateral   SCDs (sequential compression devices) on  Intervention: Prevent Infection  Recent Flowsheet Documentation  Taken 1/31/2025 0400 by Yissel Thornton LPN  Infection Prevention:   environmental surveillance performed   equipment surfaces disinfected   hand hygiene promoted   personal protective equipment utilized   rest/sleep promoted   single patient room provided  Taken 1/31/2025 0200 by Yissel Thornton LPN  Infection Prevention:   environmental surveillance performed   equipment surfaces disinfected   hand hygiene promoted   personal protective equipment utilized   rest/sleep promoted   single patient room provided  Taken 1/31/2025 0000 by Yissel Thornton LPN  Infection Prevention:   environmental surveillance performed   equipment surfaces disinfected   hand hygiene promoted   personal protective equipment utilized   rest/sleep promoted   single patient room provided  Taken 1/30/2025 2200 by Yissel Thornton LPN  Infection Prevention:   environmental surveillance performed   equipment surfaces disinfected   hand hygiene promoted   personal protective equipment utilized   rest/sleep promoted   single patient room provided  Taken 1/30/2025 2000 by Yissel Thornton LPN  Infection Prevention:   environmental surveillance performed   equipment surfaces disinfected   hand hygiene promoted   personal protective equipment utilized   rest/sleep promoted   single patient room provided  Goal: Optimal Comfort and Wellbeing  Outcome: Progressing  Intervention: Monitor Pain and Promote Comfort  Recent Flowsheet Documentation  Taken 1/30/2025 2000 by Yissel Thornton LPN  Pain Management  Interventions:   unnecessary movement minimized   quiet environment facilitated   position adjusted   pillow support provided   care clustered  Intervention: Provide Person-Centered Care  Recent Flowsheet Documentation  Taken 1/30/2025 2000 by Yissel Thornton LPN  Trust Relationship/Rapport:   care explained   choices provided   emotional support provided  Goal: Readiness for Transition of Care  Outcome: Progressing     Problem: Fall Injury Risk  Goal: Absence of Fall and Fall-Related Injury  Outcome: Progressing  Intervention: Identify and Manage Contributors  Recent Flowsheet Documentation  Taken 1/31/2025 0400 by Yissel Thornton LPN  Medication Review/Management: medications reviewed  Taken 1/31/2025 0200 by Yissel Thornton LPN  Medication Review/Management: medications reviewed  Taken 1/31/2025 0000 by Yissel Thornton LPN  Medication Review/Management: medications reviewed  Taken 1/30/2025 2000 by Yissel Thornton LPN  Medication Review/Management: medications reviewed  Intervention: Promote Injury-Free Environment  Recent Flowsheet Documentation  Taken 1/31/2025 0400 by Yissel Thornton LPN  Safety Promotion/Fall Prevention:   activity supervised   assistive device/personal items within reach   clutter free environment maintained   fall prevention program maintained   lighting adjusted   nonskid shoes/slippers when out of bed   room organization consistent   safety round/check completed  Taken 1/31/2025 0200 by Yissel Thornton LPN  Safety Promotion/Fall Prevention:   activity supervised   assistive device/personal items within reach   clutter free environment maintained   fall prevention program maintained   lighting adjusted   nonskid shoes/slippers when out of bed   room organization consistent   safety round/check completed  Taken 1/31/2025 0000 by Yissel Thornton LPN  Safety Promotion/Fall Prevention:   activity supervised   assistive device/personal items within reach   clutter free environment  maintained   fall prevention program maintained   lighting adjusted   nonskid shoes/slippers when out of bed   room organization consistent   safety round/check completed  Taken 1/30/2025 2200 by Yissel Thornton LPN  Safety Promotion/Fall Prevention:   activity supervised   assistive device/personal items within reach   clutter free environment maintained   fall prevention program maintained   lighting adjusted   nonskid shoes/slippers when out of bed   room organization consistent   safety round/check completed  Taken 1/30/2025 2000 by Yissel Thornton LPN  Safety Promotion/Fall Prevention:   activity supervised   assistive device/personal items within reach   clutter free environment maintained   fall prevention program maintained   lighting adjusted   nonskid shoes/slippers when out of bed   room organization consistent   safety round/check completed     Problem: Pain Acute  Goal: Optimal Pain Control and Function  Outcome: Progressing  Intervention: Optimize Psychosocial Wellbeing  Recent Flowsheet Documentation  Taken 1/30/2025 2000 by Yissel Thornton LPN  Diversional Activities:   television   smartphone  Intervention: Develop Pain Management Plan  Recent Flowsheet Documentation  Taken 1/30/2025 2000 by Yissel Thornton LPN  Pain Management Interventions:   unnecessary movement minimized   quiet environment facilitated   position adjusted   pillow support provided   care clustered  Intervention: Prevent or Manage Pain  Recent Flowsheet Documentation  Taken 1/31/2025 0400 by Yissel Thornton LPN  Medication Review/Management: medications reviewed  Taken 1/31/2025 0200 by Yissel Thornton LPN  Medication Review/Management: medications reviewed  Taken 1/31/2025 0000 by Yissel Thornton LPN  Medication Review/Management: medications reviewed  Taken 1/30/2025 2000 by Yissel Thornton LPN  Medication Review/Management: medications reviewed     Problem: Syncope  Goal: Absence of Syncopal Symptoms  Outcome:  Progressing  Intervention: Manage Effect of Syncopal Symptoms  Recent Flowsheet Documentation  Taken 1/31/2025 0400 by Yissel Thornton LPN  Safety Promotion/Fall Prevention:   activity supervised   assistive device/personal items within reach   clutter free environment maintained   fall prevention program maintained   lighting adjusted   nonskid shoes/slippers when out of bed   room organization consistent   safety round/check completed  Taken 1/31/2025 0200 by Yissel Thornton LPN  Safety Promotion/Fall Prevention:   activity supervised   assistive device/personal items within reach   clutter free environment maintained   fall prevention program maintained   lighting adjusted   nonskid shoes/slippers when out of bed   room organization consistent   safety round/check completed  Taken 1/31/2025 0000 by Yissel Thornton LPN  Safety Promotion/Fall Prevention:   activity supervised   assistive device/personal items within reach   clutter free environment maintained   fall prevention program maintained   lighting adjusted   nonskid shoes/slippers when out of bed   room organization consistent   safety round/check completed  Taken 1/30/2025 2200 by Yissel Thornton LPN  Safety Promotion/Fall Prevention:   activity supervised   assistive device/personal items within reach   clutter free environment maintained   fall prevention program maintained   lighting adjusted   nonskid shoes/slippers when out of bed   room organization consistent   safety round/check completed  Taken 1/30/2025 2000 by Yissel Thornton LPN  Safety Promotion/Fall Prevention:   activity supervised   assistive device/personal items within reach   clutter free environment maintained   fall prevention program maintained   lighting adjusted   nonskid shoes/slippers when out of bed   room organization consistent   safety round/check completed     Problem: Skin Injury Risk Increased  Goal: Skin Health and Integrity  Outcome: Progressing  Intervention:  Optimize Skin Protection  Recent Flowsheet Documentation  Taken 1/30/2025 2000 by Yissel Thornton LPN  Pressure Reduction Techniques:   frequent weight shift encouraged   weight shift assistance provided  Pressure Reduction Devices: pressure-redistributing mattress utilized   Goal Outcome Evaluation:           Progress: no change

## 2025-01-31 NOTE — CASE MANAGEMENT/SOCIAL WORK
Continued Stay Note  [facility]     Patient Name: Kendy Worrell  MRN: [id number]  Today's Date: 1/31/2025    Admit Date: 1/29/2025    Plan: D/C Plan: [facility] pending acceptance (No primary choice); PASRR completed.  Had 3 midnites at [facility] rehab 1/19-1/16/25.  No precert required.  Transport TBD   Discharge Plan       Row Name 01/31/25 0840       Plan    Plan D/C Plan: [facility] pending acceptance (No primary choice); PASRR completed.  Had 3 midnites at [facility] rehab 1/19-1/16/25.  No precert required.  Transport TBD                  Expected Discharge Date Expected Discharge Time    Feb 2, 2025               Katie Peralta RN  RN/.  Office Ph. [phone]  Cell Ph.  [phone]

## 2025-01-31 NOTE — THERAPY TREATMENT NOTE
"Subjective: Pt agreeable to therapeutic plan of care.    Objective:     Precautions - orthostatic hypotension; Fall risk    Bed mobility - Min-A  Transfers - Min-A performed x 3 during session  Ambulation - 5 feet HHA Min-A; when near chair, pt sits, requiring min-mod A to reposition for safety.    Pt found to be incontinent of urine but denied need for new adult depends.  Pt total assist for cee care and garment change.    Pt tolerates sitting EOB x 5 minutes with supervision while eating meal    Vitals: Orthostatic. Supine 137/96, sitting 99/96, standing 99/71   Pt with no c/o orthostatic symptoms    Pain: 0 VAS   Location: n/a  Intervention for pain: N/A    Education: Provided education on the importance of mobility in the acute care setting, Transfer Training, and Gait Training    Assessment: Kendy Worrell presents with functional mobility impairments which indicate the need for skilled intervention. Tolerating session today without incident. Pt very focused on eating throughout session.  Pt exhibits drop in BP with transition from supine to sit with no c/o symptoms.  Pt exhibits decreased safety awareness, requiring min-mod A to reposition safely.   Will continue to follow and progress as tolerated. She would benefit from SNF for continued PT at discharge.     Plan/Recommendations:   If medically appropriate, Moderate Intensity Therapy recommended post-acute care. This is recommended as therapy feels the patient would require 3-4 days per week and wouldn't tolerate \"3 hour daily\" rehab intensity. SNF would be the preferred choice. If the patient does not agree to SNF, arrange HH or OP depending on home bound status. If patient is medically complex, consider LTACH. Pt requires no DME at discharge.     Pt desires Skilled Rehab placement at discharge. Pt cooperative; agreeable to therapeutic recommendations and plan of care.         Basic Mobility 6-click:  Rollin = Total, A lot = 2, A little = 3; 4 = " None  Supine>Sit:   1 = Total, A lot = 2, A little = 3; 4 = None   Sit>Stand with arms:  1 = Total, A lot = 2, A little = 3; 4 = None  Bed>Chair:   1 = Total, A lot = 2, A little = 3; 4 = None  Ambulate in room:  1 = Total, A lot = 2, A little = 3; 4 = None  3-5 Steps with railin = Total, A lot = 2, A little = 3; 4 = None  Score: 17    Modified Karissa: N/A = No pre-op stroke/TIA    Post-Tx Position: Up in Chair, Staff Present, Alarms activated, and Call light and personal items within reach  PPE: gloves    Therapy Charges for Today       Code Description Service Date Service Provider Modifiers Qty    17560808731 HC PT THERAPEUTIC ACT EA 15 MIN 2025 Ana Miller, PT GP 1    92821118011 HC GAIT TRAINING EA 15 MIN 2025 Ana Miller, PT GP 1           PT Charges       Row Name 25 1135             Time Calculation    Start Time 1114  -CL      Stop Time 1132  -CL      Time Calculation (min) 18 min  -CL      PT Received On 25  -CL      PT - Next Appointment 25  -CL      PT Goal Re-Cert Due Date 25  -CL         Time Calculation- PT    Total Timed Code Minutes- PT 18 minute(s)  -CL                User Key  (r) = Recorded By, (t) = Taken By, (c) = Cosigned By      Initials Name Provider Type    Ana Moore PT Physical Therapist

## 2025-01-31 NOTE — PLAN OF CARE
Goal Outcome Evaluation:          Kendy Worrell presents with functional mobility impairments which indicate the need for skilled intervention. Tolerating session today without incident. Pt very focused on eating throughout session.  Pt exhibits drop in BP with transition from supine to sit with no c/o symptoms.  Pt exhibits decreased safety awareness, requiring min-mod A to reposition safely.   Will continue to follow and progress as tolerated. She would benefit from SNF for continued PT at discharge.

## 2025-02-03 NOTE — CASE MANAGEMENT/SOCIAL WORK
Case Management Discharge Note      Final Note: [facility] skilled rehab    Provided Post Acute Provider List?: N/A  Provided Post Acute Provider Quality & Resource List?: N/A            Transportation Services  W/C Jerman: Colt Stoll    Final Discharge Disposition Code: 03 - skilled nursing facility (SNF)

## 2025-02-21 ENCOUNTER — APPOINTMENT (OUTPATIENT)
Dept: CT IMAGING | Facility: HOSPITAL | Age: 85
DRG: 195 | End: 2025-02-21
Payer: MEDICARE

## 2025-02-21 ENCOUNTER — APPOINTMENT (OUTPATIENT)
Dept: GENERAL RADIOLOGY | Facility: HOSPITAL | Age: 85
DRG: 195 | End: 2025-02-21
Payer: MEDICARE

## 2025-02-21 ENCOUNTER — HOSPITAL ENCOUNTER (INPATIENT)
Facility: HOSPITAL | Age: 85
LOS: 2 days | Discharge: NURSING, REHAB OR LONG-TERM CARE | DRG: 195 | End: 2025-02-24
Attending: STUDENT IN AN ORGANIZED HEALTH CARE EDUCATION/TRAINING PROGRAM | Admitting: STUDENT IN AN ORGANIZED HEALTH CARE EDUCATION/TRAINING PROGRAM
Payer: MEDICARE

## 2025-02-21 DIAGNOSIS — R09.02 HYPOXIA: ICD-10-CM

## 2025-02-21 DIAGNOSIS — J10.1 INFLUENZA A: ICD-10-CM

## 2025-02-21 DIAGNOSIS — R41.82 ALTERED MENTAL STATUS, UNSPECIFIED ALTERED MENTAL STATUS TYPE: Primary | ICD-10-CM

## 2025-02-21 LAB
ALBUMIN SERPL-MCNC: 3.5 G/DL (ref 3.5–5.2)
ALBUMIN/GLOB SERPL: 1.2 G/DL
ALP SERPL-CCNC: 106 U/L (ref 39–117)
ALT SERPL W P-5'-P-CCNC: 10 U/L (ref 1–33)
AMMONIA BLD-SCNC: 20 UMOL/L (ref 11–51)
ANION GAP SERPL CALCULATED.3IONS-SCNC: 10.3 MMOL/L (ref 5–15)
ANION GAP SERPL CALCULATED.3IONS-SCNC: 8.6 MMOL/L (ref 5–15)
AST SERPL-CCNC: 25 U/L (ref 1–32)
B PARAPERT DNA SPEC QL NAA+PROBE: NOT DETECTED
B PERT DNA SPEC QL NAA+PROBE: NOT DETECTED
BACTERIA UR QL AUTO: ABNORMAL /HPF
BASOPHILS # BLD AUTO: 0.02 10*3/MM3 (ref 0–0.2)
BASOPHILS # BLD AUTO: 0.02 10*3/MM3 (ref 0–0.2)
BASOPHILS NFR BLD AUTO: 0.3 % (ref 0–1.5)
BASOPHILS NFR BLD AUTO: 0.4 % (ref 0–1.5)
BILIRUB SERPL-MCNC: 0.7 MG/DL (ref 0–1.2)
BILIRUB UR QL STRIP: NEGATIVE
BUN SERPL-MCNC: 11 MG/DL (ref 8–23)
BUN SERPL-MCNC: 12 MG/DL (ref 8–23)
BUN/CREAT SERPL: 12.5 (ref 7–25)
BUN/CREAT SERPL: 12.6 (ref 7–25)
C PNEUM DNA NPH QL NAA+NON-PROBE: NOT DETECTED
CALCIUM SPEC-SCNC: 9.5 MG/DL (ref 8.6–10.5)
CALCIUM SPEC-SCNC: 9.7 MG/DL (ref 8.6–10.5)
CHLORIDE SERPL-SCNC: 100 MMOL/L (ref 98–107)
CHLORIDE SERPL-SCNC: 102 MMOL/L (ref 98–107)
CLARITY UR: CLEAR
CO2 SERPL-SCNC: 25.7 MMOL/L (ref 22–29)
CO2 SERPL-SCNC: 26.4 MMOL/L (ref 22–29)
COLOR UR: YELLOW
CREAT SERPL-MCNC: 0.88 MG/DL (ref 0.57–1)
CREAT SERPL-MCNC: 0.95 MG/DL (ref 0.57–1)
DEPRECATED RDW RBC AUTO: 58.9 FL (ref 37–54)
DEPRECATED RDW RBC AUTO: 61.1 FL (ref 37–54)
EGFRCR SERPLBLD CKD-EPI 2021: 58.5 ML/MIN/1.73
EGFRCR SERPLBLD CKD-EPI 2021: 64.1 ML/MIN/1.73
EOSINOPHIL # BLD AUTO: 0 10*3/MM3 (ref 0–0.4)
EOSINOPHIL # BLD AUTO: 0.01 10*3/MM3 (ref 0–0.4)
EOSINOPHIL NFR BLD AUTO: 0 % (ref 0.3–6.2)
EOSINOPHIL NFR BLD AUTO: 0.1 % (ref 0.3–6.2)
ERYTHROCYTE [DISTWIDTH] IN BLOOD BY AUTOMATED COUNT: 19 % (ref 12.3–15.4)
ERYTHROCYTE [DISTWIDTH] IN BLOOD BY AUTOMATED COUNT: 19.4 % (ref 12.3–15.4)
FLUAV H1 2009 PAND RNA NPH QL NAA+PROBE: DETECTED
FLUAV RNA RESP QL NAA+PROBE: DETECTED
FLUBV RNA ISLT QL NAA+PROBE: NOT DETECTED
FLUBV RNA RESP QL NAA+PROBE: NOT DETECTED
GLOBULIN UR ELPH-MCNC: 3 GM/DL
GLUCOSE SERPL-MCNC: 107 MG/DL (ref 65–99)
GLUCOSE SERPL-MCNC: 97 MG/DL (ref 65–99)
GLUCOSE UR STRIP-MCNC: ABNORMAL MG/DL
HADV DNA SPEC NAA+PROBE: NOT DETECTED
HCOV 229E RNA SPEC QL NAA+PROBE: NOT DETECTED
HCOV HKU1 RNA SPEC QL NAA+PROBE: NOT DETECTED
HCOV NL63 RNA SPEC QL NAA+PROBE: NOT DETECTED
HCOV OC43 RNA SPEC QL NAA+PROBE: NOT DETECTED
HCT VFR BLD AUTO: 27.7 % (ref 34–46.6)
HCT VFR BLD AUTO: 32.6 % (ref 34–46.6)
HGB BLD-MCNC: 10.2 G/DL (ref 12–15.9)
HGB BLD-MCNC: 8.6 G/DL (ref 12–15.9)
HGB UR QL STRIP.AUTO: NEGATIVE
HMPV RNA NPH QL NAA+NON-PROBE: NOT DETECTED
HOLD SPECIMEN: NORMAL
HOLD SPECIMEN: NORMAL
HPIV1 RNA ISLT QL NAA+PROBE: NOT DETECTED
HPIV2 RNA SPEC QL NAA+PROBE: NOT DETECTED
HPIV3 RNA NPH QL NAA+PROBE: NOT DETECTED
HPIV4 P GENE NPH QL NAA+PROBE: NOT DETECTED
HYALINE CASTS UR QL AUTO: ABNORMAL /LPF
IMM GRANULOCYTES # BLD AUTO: 0.02 10*3/MM3 (ref 0–0.05)
IMM GRANULOCYTES # BLD AUTO: 0.02 10*3/MM3 (ref 0–0.05)
IMM GRANULOCYTES NFR BLD AUTO: 0.3 % (ref 0–0.5)
IMM GRANULOCYTES NFR BLD AUTO: 0.4 % (ref 0–0.5)
KETONES UR QL STRIP: ABNORMAL
LEUKOCYTE ESTERASE UR QL STRIP.AUTO: NEGATIVE
LYMPHOCYTES # BLD AUTO: 0.63 10*3/MM3 (ref 0.7–3.1)
LYMPHOCYTES # BLD AUTO: 0.67 10*3/MM3 (ref 0.7–3.1)
LYMPHOCYTES NFR BLD AUTO: 13.3 % (ref 19.6–45.3)
LYMPHOCYTES NFR BLD AUTO: 9.5 % (ref 19.6–45.3)
M PNEUMO IGG SER IA-ACNC: NOT DETECTED
MCH RBC QN AUTO: 26.6 PG (ref 26.6–33)
MCH RBC QN AUTO: 27 PG (ref 26.6–33)
MCHC RBC AUTO-ENTMCNC: 31 G/DL (ref 31.5–35.7)
MCHC RBC AUTO-ENTMCNC: 31.3 G/DL (ref 31.5–35.7)
MCV RBC AUTO: 85.1 FL (ref 79–97)
MCV RBC AUTO: 87.1 FL (ref 79–97)
MONOCYTES # BLD AUTO: 0.82 10*3/MM3 (ref 0.1–0.9)
MONOCYTES # BLD AUTO: 0.96 10*3/MM3 (ref 0.1–0.9)
MONOCYTES NFR BLD AUTO: 13.6 % (ref 5–12)
MONOCYTES NFR BLD AUTO: 17.3 % (ref 5–12)
NEUTROPHILS NFR BLD AUTO: 3.25 10*3/MM3 (ref 1.7–7)
NEUTROPHILS NFR BLD AUTO: 5.37 10*3/MM3 (ref 1.7–7)
NEUTROPHILS NFR BLD AUTO: 68.6 % (ref 42.7–76)
NEUTROPHILS NFR BLD AUTO: 76.2 % (ref 42.7–76)
NITRITE UR QL STRIP: NEGATIVE
NRBC BLD AUTO-RTO: 0 /100 WBC (ref 0–0.2)
NRBC BLD AUTO-RTO: 0 /100 WBC (ref 0–0.2)
PH UR STRIP.AUTO: 6.5 [PH] (ref 5–8)
PLATELET # BLD AUTO: 257 10*3/MM3 (ref 140–450)
PLATELET # BLD AUTO: 260 10*3/MM3 (ref 140–450)
PMV BLD AUTO: 10.8 FL (ref 6–12)
PMV BLD AUTO: 9.8 FL (ref 6–12)
POTASSIUM SERPL-SCNC: 4 MMOL/L (ref 3.5–5.2)
POTASSIUM SERPL-SCNC: 4.1 MMOL/L (ref 3.5–5.2)
PROT SERPL-MCNC: 6.5 G/DL (ref 6–8.5)
PROT UR QL STRIP: ABNORMAL
QT INTERVAL: 405 MS
QTC INTERVAL: 478 MS
RBC # BLD AUTO: 3.18 10*6/MM3 (ref 3.77–5.28)
RBC # BLD AUTO: 3.83 10*6/MM3 (ref 3.77–5.28)
RBC # UR STRIP: ABNORMAL /HPF
REF LAB TEST METHOD: ABNORMAL
RHINOVIRUS RNA SPEC NAA+PROBE: NOT DETECTED
RSV RNA NPH QL NAA+NON-PROBE: NOT DETECTED
RSV RNA RESP QL NAA+PROBE: NOT DETECTED
SARS-COV-2 RNA RESP QL NAA+PROBE: NOT DETECTED
SARS-COV-2 RNA RESP QL NAA+PROBE: NOT DETECTED
SODIUM SERPL-SCNC: 135 MMOL/L (ref 136–145)
SODIUM SERPL-SCNC: 138 MMOL/L (ref 136–145)
SP GR UR STRIP: 1.02 (ref 1–1.03)
SQUAMOUS #/AREA URNS HPF: ABNORMAL /HPF
UROBILINOGEN UR QL STRIP: ABNORMAL
WBC # UR STRIP: ABNORMAL /HPF
WBC NRBC COR # BLD AUTO: 4.74 10*3/MM3 (ref 3.4–10.8)
WBC NRBC COR # BLD AUTO: 7.05 10*3/MM3 (ref 3.4–10.8)
WHOLE BLOOD HOLD COAG: NORMAL
WHOLE BLOOD HOLD SPECIMEN: NORMAL

## 2025-02-21 PROCEDURE — 85025 COMPLETE CBC W/AUTO DIFF WBC: CPT

## 2025-02-21 PROCEDURE — 70450 CT HEAD/BRAIN W/O DYE: CPT

## 2025-02-21 PROCEDURE — 93005 ELECTROCARDIOGRAM TRACING: CPT

## 2025-02-21 PROCEDURE — 87186 SC STD MICRODIL/AGAR DIL: CPT

## 2025-02-21 PROCEDURE — 80053 COMPREHEN METABOLIC PANEL: CPT

## 2025-02-21 PROCEDURE — P9612 CATHETERIZE FOR URINE SPEC: HCPCS

## 2025-02-21 PROCEDURE — 25810000003 SODIUM CHLORIDE 0.9 % SOLUTION

## 2025-02-21 PROCEDURE — 0202U NFCT DS 22 TRGT SARS-COV-2: CPT | Performed by: INTERNAL MEDICINE

## 2025-02-21 PROCEDURE — 25010000002 HYDRALAZINE PER 20 MG: Performed by: INTERNAL MEDICINE

## 2025-02-21 PROCEDURE — 81001 URINALYSIS AUTO W/SCOPE: CPT

## 2025-02-21 PROCEDURE — 93005 ELECTROCARDIOGRAM TRACING: CPT | Performed by: STUDENT IN AN ORGANIZED HEALTH CARE EDUCATION/TRAINING PROGRAM

## 2025-02-21 PROCEDURE — 87086 URINE CULTURE/COLONY COUNT: CPT

## 2025-02-21 PROCEDURE — 87077 CULTURE AEROBIC IDENTIFY: CPT

## 2025-02-21 PROCEDURE — 82140 ASSAY OF AMMONIA: CPT

## 2025-02-21 PROCEDURE — 36415 COLL VENOUS BLD VENIPUNCTURE: CPT

## 2025-02-21 PROCEDURE — 99285 EMERGENCY DEPT VISIT HI MDM: CPT

## 2025-02-21 PROCEDURE — G0378 HOSPITAL OBSERVATION PER HR: HCPCS

## 2025-02-21 PROCEDURE — 71045 X-RAY EXAM CHEST 1 VIEW: CPT

## 2025-02-21 RX ORDER — ONDANSETRON 4 MG/1
4 TABLET, FILM COATED ORAL EVERY 6 HOURS PRN
COMMUNITY

## 2025-02-21 RX ORDER — LACOSAMIDE 50 MG/1
50 TABLET ORAL DAILY
COMMUNITY

## 2025-02-21 RX ORDER — ACETAMINOPHEN 325 MG/1
650 TABLET ORAL EVERY 4 HOURS PRN
Status: DISCONTINUED | OUTPATIENT
Start: 2025-02-21 | End: 2025-02-24 | Stop reason: HOSPADM

## 2025-02-21 RX ORDER — LIDOCAINE 4 G/G
1 PATCH TOPICAL DAILY
COMMUNITY

## 2025-02-21 RX ORDER — SODIUM CHLORIDE 0.9 % (FLUSH) 0.9 %
10 SYRINGE (ML) INJECTION AS NEEDED
Status: DISCONTINUED | OUTPATIENT
Start: 2025-02-21 | End: 2025-02-24 | Stop reason: HOSPADM

## 2025-02-21 RX ORDER — ALBUTEROL SULFATE 0.83 MG/ML
2.5 SOLUTION RESPIRATORY (INHALATION) EVERY 6 HOURS PRN
COMMUNITY
End: 2025-02-24 | Stop reason: HOSPADM

## 2025-02-21 RX ORDER — AMOXICILLIN 250 MG
2 CAPSULE ORAL 2 TIMES DAILY PRN
Status: DISCONTINUED | OUTPATIENT
Start: 2025-02-21 | End: 2025-02-24 | Stop reason: HOSPADM

## 2025-02-21 RX ORDER — LABETALOL HYDROCHLORIDE 5 MG/ML
10 INJECTION, SOLUTION INTRAVENOUS ONCE
Status: COMPLETED | OUTPATIENT
Start: 2025-02-21 | End: 2025-02-21

## 2025-02-21 RX ORDER — ACETAMINOPHEN 650 MG/1
650 SUPPOSITORY RECTAL EVERY 4 HOURS PRN
Status: DISCONTINUED | OUTPATIENT
Start: 2025-02-21 | End: 2025-02-22

## 2025-02-21 RX ORDER — HYDRALAZINE HYDROCHLORIDE 20 MG/ML
10 INJECTION INTRAMUSCULAR; INTRAVENOUS EVERY 6 HOURS PRN
Status: DISCONTINUED | OUTPATIENT
Start: 2025-02-21 | End: 2025-02-22

## 2025-02-21 RX ORDER — SODIUM CHLORIDE 9 MG/ML
40 INJECTION, SOLUTION INTRAVENOUS AS NEEDED
Status: DISCONTINUED | OUTPATIENT
Start: 2025-02-21 | End: 2025-02-24 | Stop reason: HOSPADM

## 2025-02-21 RX ORDER — BISACODYL 10 MG
10 SUPPOSITORY, RECTAL RECTAL DAILY PRN
Status: DISCONTINUED | OUTPATIENT
Start: 2025-02-21 | End: 2025-02-24 | Stop reason: HOSPADM

## 2025-02-21 RX ORDER — ACETAMINOPHEN 160 MG/5ML
650 SOLUTION ORAL EVERY 4 HOURS PRN
Status: DISCONTINUED | OUTPATIENT
Start: 2025-02-21 | End: 2025-02-22

## 2025-02-21 RX ORDER — ONDANSETRON 2 MG/ML
4 INJECTION INTRAMUSCULAR; INTRAVENOUS EVERY 6 HOURS PRN
Status: DISCONTINUED | OUTPATIENT
Start: 2025-02-21 | End: 2025-02-24 | Stop reason: HOSPADM

## 2025-02-21 RX ORDER — BISACODYL 5 MG/1
5 TABLET, DELAYED RELEASE ORAL DAILY PRN
Status: DISCONTINUED | OUTPATIENT
Start: 2025-02-21 | End: 2025-02-24 | Stop reason: HOSPADM

## 2025-02-21 RX ORDER — SODIUM CHLORIDE 0.9 % (FLUSH) 0.9 %
10 SYRINGE (ML) INJECTION EVERY 12 HOURS SCHEDULED
Status: DISCONTINUED | OUTPATIENT
Start: 2025-02-21 | End: 2025-02-24 | Stop reason: HOSPADM

## 2025-02-21 RX ORDER — DOCUSATE SODIUM 100 MG/1
100 CAPSULE, LIQUID FILLED ORAL DAILY PRN
COMMUNITY

## 2025-02-21 RX ORDER — POLYETHYLENE GLYCOL 3350 17 G/17G
17 POWDER, FOR SOLUTION ORAL DAILY PRN
Status: DISCONTINUED | OUTPATIENT
Start: 2025-02-21 | End: 2025-02-24 | Stop reason: HOSPADM

## 2025-02-21 RX ADMIN — Medication 10 ML: at 20:29

## 2025-02-21 RX ADMIN — HYDRALAZINE HYDROCHLORIDE 10 MG: 20 INJECTION INTRAMUSCULAR; INTRAVENOUS at 16:51

## 2025-02-21 RX ADMIN — SODIUM CHLORIDE 500 ML: 9 INJECTION, SOLUTION INTRAVENOUS at 06:11

## 2025-02-21 RX ADMIN — Medication 10 MG: at 01:44

## 2025-02-21 NOTE — LETTER
EMS Transport Request  For use at [facility], [place], [place], [place], and [place] only   Patient Name: Kendy Worrell : 1940   Weight:59 kg (130 lb) Pick-up Location: [facility] BLS/ALS: BLS/ALS: BLS   Insurance: MEDICARE Auth End Date: 2025   Pre-Cert #: D/C Summary complete:    Destination: Other [facility]   Contact Precautions: Droplet/Spore Flu   Equipment (O2, Fluids, etc.): None   Arrive By Date/Time: 2025 1230 Stretcher/WC: Wheelchair   CM Requesting: Anel Chirinos RN Ext: [phone]   Notes/Medical Necessity: 13lbs, RA, [place], A&O, stand-by assist transfer     ______________________________________________________________________    *Only 2 patient bags OR 1 carry-on size bag are permitted.  Wheelchairs and walkers CANNOT transported with the patient. Acknowledge: Yes

## 2025-02-21 NOTE — PLAN OF CARE
Goal Outcome Evaluation:         Pt. Moving rooms, unavailable for evaluation this PM.

## 2025-02-21 NOTE — ED PROVIDER NOTES
"Subjective   Chief Complaint   Patient presents with    Altered Mental Status       History of Present Illness  Patient is an 85-year-old lady who arrives today with reports from the facility that she has been altered since 2/19/2025 and is having some hypertension that is not responding to her normal medications.  Patient is able to respond to her name and tell me her first name however she is unable to tell me place time location any other identifying information.  Upon her daughter's arrival her daughter states that she is ANO 4 walkie-talkie at baseline and that she was \"normal \"yesterday.  Medical history significant for A-fib, congestive heart failure, stroke, PE, hyperlipidemia, hypertension, hypothyroid  Review of Systems   Unable to perform ROS: Mental status change       Past Medical History:   Diagnosis Date    A-fib     Arthritis     Breast cancer     CHF (congestive heart failure)     CVA (cerebral vascular accident) 09/2019    History of pulmonary embolus (PE)     Hyperlipidemia     Hypertension     Hyperthyroidism     patient has hypothyroidism    Hypothyroidism        Allergies   Allergen Reactions    Aspirin Other (See Comments)     Severe bleeding    Nsaids Other (See Comments)     Severe bleeding       Past Surgical History:   Procedure Laterality Date    BREAST BIOPSY      BREAST SURGERY Right     CHOLECYSTECTOMY      COLON SURGERY      Removed    COLONOSCOPY      COLONOSCOPY N/A 11/3/2022    Procedure: COLONOSCOPY to anastamosis with biopsies and cold snare polypectomy;  Surgeon: Saroj Oakes MD;  Location: [facility] ENDOSCOPY;  Service: Gastroenterology;  Laterality: N/A;  PRe: rectal bleeding  Post: hemorrhoids, diverticulosis, anastamotic ulcer, polyp, hemostasis clip free-floating    HYSTERECTOMY      MAMMO BILATERAL  2015    MASTECTOMY      TONSILLECTOMY         Family History   Problem Relation Age of Onset    Stroke Mother     Hypertension Mother     Heart disease Father     Cancer " Father     Colon polyps Father        Social History     Socioeconomic History    Marital status:    Tobacco Use    Smoking status: Former     Current packs/day: 0.00     Types: Cigarettes     Quit date:      Years since quittin.1     Passive exposure: Past    Smokeless tobacco: Never    Tobacco comments:     quit in    Vaping Use    Vaping status: Never Used   Substance and Sexual Activity    Alcohol use: Yes     Alcohol/week: 3.0 standard drinks of alcohol     Types: 3 Shots of liquor per week     Comment: Last drink 3 weeks ago.    Drug use: Never    Sexual activity: Never           Objective   Physical Exam  Vitals and nursing note reviewed.   Constitutional:       General: She is not in acute distress.     Appearance: She is normal weight. She is ill-appearing (Chronically). She is not toxic-appearing or diaphoretic.   HENT:      Head: Normocephalic and atraumatic.      Mouth/Throat:      Mouth: Mucous membranes are dry.      Dentition: Abnormal dentition.   Eyes:      Extraocular Movements: Extraocular movements intact.      Pupils: Pupils are equal, round, and reactive to light.   Cardiovascular:      Rate and Rhythm: Normal rate and regular rhythm.      Heart sounds: Normal heart sounds.   Pulmonary:      Effort: Pulmonary effort is normal.      Breath sounds: Wheezing present.   Abdominal:      General: Bowel sounds are normal.      Palpations: Abdomen is soft.   Musculoskeletal:         General: Normal range of motion.      Cervical back: Normal range of motion and neck supple.   Skin:     General: Skin is warm and dry.      Capillary Refill: Capillary refill takes 2 to 3 seconds.   Neurological:      Mental Status: She is disoriented.      GCS: GCS eye subscore is 3. GCS verbal subscore is 3. GCS motor subscore is 5.   Psychiatric:         Cognition and Memory: Cognition is impaired. Memory is impaired.         Procedures           ED Course  ED Course as of 25 0447   b  "21, 2025 0311 Waiting for head CT [DT]      ED Course User Index  [DT] Usha Lilly, APRN                                           /82   Pulse 76   Temp 97.9 °F (36.6 °C) (Oral)   Resp 16   Ht 144.8 cm (57\")   SpO2 95%   BMI 28.13 kg/m²   Labs Reviewed   COVID-19/FLUA&B/RSV, NP SWAB IN TRANSPORT MEDIA 1 HR TAT - Abnormal; Notable for the following components:       Result Value    Influenza A PCR Detected (*)     All other components within normal limits    Narrative:     Fact sheet for providers: https://www.fda.gov/media/591982/download    Fact sheet for patients: https://www.fda.gov/media/103584/download    Test performed by PCR.   COMPREHENSIVE METABOLIC PANEL - Abnormal; Notable for the following components:    Glucose 107 (*)     Sodium 135 (*)     All other components within normal limits    Narrative:     GFR Categories in Chronic Kidney Disease (CKD)      GFR Category          GFR (mL/min/1.73)    Interpretation  G1                     90 or greater         Normal or high (1)  G2                      60-89                Mild decrease (1)  G3a                   45-59                Mild to moderate decrease  G3b                   30-44                Moderate to severe decrease  G4                    15-29                Severe decrease  G5                    14 or less           Kidney failure          (1)In the absence of evidence of kidney disease, neither GFR category G1 or G2 fulfill the criteria for CKD.    eGFR calculation 2021 CKD-EPI creatinine equation, which does not include race as a factor   URINALYSIS W/ CULTURE IF INDICATED - Abnormal; Notable for the following components:    Glucose, UA >=1000 mg/dL (3+) (*)     Ketones, UA 15 mg/dL (1+) (*)     Protein, UA 30 mg/dL (1+) (*)     All other components within normal limits    Narrative:     In absence of clinical symptoms, the presence of pyuria, bacteria, and/or nitrites on the urinalysis result does not correlate with " infection.   CBC WITH AUTO DIFFERENTIAL - Abnormal; Notable for the following components:    Hemoglobin 10.2 (*)     Hematocrit 32.6 (*)     MCHC 31.3 (*)     RDW 19.0 (*)     RDW-SD 58.9 (*)     Neutrophil % 76.2 (*)     Lymphocyte % 9.5 (*)     Monocyte % 13.6 (*)     Eosinophil % 0.1 (*)     Lymphocytes, Absolute 0.67 (*)     Monocytes, Absolute 0.96 (*)     All other components within normal limits   URINALYSIS, MICROSCOPIC ONLY - Abnormal; Notable for the following components:    WBC, UA 6-10 (*)     All other components within normal limits   AMMONIA - Normal   URINE CULTURE   RAINBOW DRAW    Narrative:     The following orders were created for panel order [place] Draw.  Procedure                               Abnormality         Status                     ---------                               -----------         ------                     Green Top (Gel)[[id number]]                                  Final result               Lavender Top[[id number]]                                     Final result               Gold Top - SST[[id number]]                                   Final result               Light Blue Top[[id number]]                                   Final result                 Please view results for these tests on the individual orders.   BASIC METABOLIC PANEL   CBC WITH AUTO DIFFERENTIAL   POCT GLUCOSE FINGERSTICK   CBC AND DIFFERENTIAL    Narrative:     The following orders were created for panel order CBC & Differential.  Procedure                               Abnormality         Status                     ---------                               -----------         ------                     CBC Auto Differential[[id number]]        Abnormal            Final result                 Please view results for these tests on the individual orders.   GREEN TOP   LAVENDER TOP   GOLD TOP - SST   LIGHT BLUE TOP   CBC AND DIFFERENTIAL    Narrative:     The following orders were created for panel order CBC &  Differential.  Procedure                               Abnormality         Status                     ---------                               -----------         ------                     CBC Auto Differential[[id number]]                                                         Please view results for these tests on the individual orders.     Medications   sodium chloride 0.9 % flush 10 mL (has no administration in time range)   sodium chloride 0.9 % bolus 500 mL (has no administration in time range)   sodium chloride 0.9 % flush 10 mL (has no administration in time range)   sodium chloride 0.9 % flush 10 mL (has no administration in time range)   sodium chloride 0.9 % infusion 40 mL (has no administration in time range)   acetaminophen (TYLENOL) tablet 650 mg (has no administration in time range)     Or   acetaminophen (TYLENOL) 160 MG/5ML oral solution 650 mg (has no administration in time range)     Or   acetaminophen (TYLENOL) suppository 650 mg (has no administration in time range)   sennosides-docusate (PERICOLACE) 8.6-50 MG per tablet 2 tablet (has no administration in time range)     And   polyethylene glycol (MIRALAX) packet 17 g (has no administration in time range)     And   bisacodyl (DULCOLAX) EC tablet 5 mg (has no administration in time range)     And   bisacodyl (DULCOLAX) suppository 10 mg (has no administration in time range)   ondansetron (ZOFRAN) injection 4 mg (has no administration in time range)   melatonin tablet 5 mg (has no administration in time range)   labetalol (NORMODYNE,TRANDATE) injection 10 mg (10 mg Intravenous Given 2/21/25 0144)     CT Head Without Contrast    Result Date: 2/21/2025  Impression: Atrophy and chronic microvascular ischemic change. No acute intracranial process. Electronically Signed: Kan Catherine MD  2/21/2025 3:13 AM EST  Workstation ID: [id number]    XR Chest 1 View    Result Date: 2/21/2025  Impression: Cardiomegaly. No active disease. Electronically Signed: Kan  MD Dorene  2/21/2025 1:46 AM EST  Workstation ID: [id number]               Medical Decision Making  Problems Addressed:  Altered mental status, unspecified altered mental status type: complicated acute illness or injury  Hypoxia: complicated acute illness or injury  Influenza A: complicated acute illness or injury    Amount and/or Complexity of Data Reviewed  Labs: ordered.  Radiology: ordered.    Risk  Prescription drug management.  Decision regarding hospitalization.    Patient presents to the ED for the above complaint, underwent the above exam and workup.    EKG reviewed: EKG is reviewed and interpreted by Dr. Aguilera and myself shows atrial fibrillation with a rate of 84.  Previous EKG also shows atrial fibrillation from 12/30/2024 with a rate of 89.  Patient is chronically in A-fib.    Imaging reviewed: As interpreted by Dr. Aguilera, ED attending, and myself.  Results as above.    Reviewed external records from [facility] and was sent with patient..    Reviewed records from hospitalization in December 2024 for a fall after a syncopal or vagal event on the commode.    Overall presentation is consistent with a an altered mental status.      Patient was treated with the following while in the ED:  Medications   sodium chloride 0.9 % flush 10 mL (has no administration in time range)   sodium chloride 0.9 % bolus 500 mL (has no administration in time range)   sodium chloride 0.9 % flush 10 mL (has no administration in time range)   sodium chloride 0.9 % flush 10 mL (has no administration in time range)   sodium chloride 0.9 % infusion 40 mL (has no administration in time range)   acetaminophen (TYLENOL) tablet 650 mg (has no administration in time range)     Or   acetaminophen (TYLENOL) 160 MG/5ML oral solution 650 mg (has no administration in time range)     Or   acetaminophen (TYLENOL) suppository 650 mg (has no administration in time range)   sennosides-docusate (PERICOLACE) 8.6-50 MG per tablet 2  tablet (has no administration in time range)     And   polyethylene glycol (MIRALAX) packet 17 g (has no administration in time range)     And   bisacodyl (DULCOLAX) EC tablet 5 mg (has no administration in time range)     And   bisacodyl (DULCOLAX) suppository 10 mg (has no administration in time range)   ondansetron (ZOFRAN) injection 4 mg (has no administration in time range)   melatonin tablet 5 mg (has no administration in time range)   labetalol (NORMODYNE,TRANDATE) injection 10 mg (10 mg Intravenous Given 2/21/25 0144)     Upon evaluation of patient IV was established labs imaging EKG obtained.  Results all as above.  Patient significant for influenza A.  The rest of the workup is essentially unremarkable for this patient.  She was started on some oxygen as when she was asleep she was dropping below baseline.  CT head negative x-ray chest negative.  Urinalysis negative for signs of infection.  Ketones and protein in the urine so a fluid bolus of 500 was ordered.  No signs of infection or inflammation on CBC.  Hemoglobin stable.  Ammonia negative.    Discussed case with MENDOZA Aleman, who agreed to admit patient.      Final diagnoses:   Altered mental status, unspecified altered mental status type   Influenza A   Hypoxia       ED Disposition  ED Disposition       ED Disposition   Decision to Admit    Condition   --    Comment   Level of Care: Med/Surg [1]   Diagnosis: Influenza A [[id number]]                 No follow-up provider specified.       Medication List      No changes were made to your prescriptions during this visit.            Usha Lilly APRN  02/21/25 0447

## 2025-02-21 NOTE — Clinical Note
Level of Care: Telemetry [5]   Admitting Physician: LLANES ALVAREZ, CARLOS [[id number]]   Attending Physician: LLANES ALVAREZ, CARLOS [[id number]]

## 2025-02-21 NOTE — H&P
[facility] Medicine Services  History & Physical    Patient Name: Kendy Worrell  : 1940  MRN: [id number]  Primary Care Physician:  Marisela Monte APRN  Date of admission: 2025  Date and Time of Service: 2025 at 0330      Assessment & Plan      Chief Complaint: hypoxia    Plan:    Acute Respiratory Failure with Hypoxia  Reported Confusion from nursing staff at facility  -In the setting of Influenza A  -supplemental O2 to keep SPO2 greater than 90%, wean as tolerated  -Supportive care  -CT head with no acute abnormalities    HX of Orthostatic Hypotension  -SBP goal per recent DC Summary 140-160  -Continue Midodrine, Klonopin as needed for SBP greater than 170    Chronic Anemia  -Hemoglobin 10.2/hematocrit 32.6  -Continue ferrous sulfate    Hypothyroidism  -Continue Levothyroxine     Chronic CHF  CAD  HTN  HLD   Atrial Fibrillation  -Chronic, stable  -monitor BP  -Continue home Lasix, clonidine, Ranexa,  Lipitor      Home medications for chronic conditions will be restarted as appropriate when verified by pharmacy      History of Present Illness     History of Present Illness: Kendy Worrell is a 85 y.o. female with a previous medical history of orthostatic BP, atrial fibrillation, CHF, CAD, anemia who presented to [facility] on 2025 after staff at her nursing facility stated that she was confused.  On arrival, the patient's SpO2 was in the 80% range so she was placed on O2.      In the ED, respiratory panel was positive for influenza A. Labwork was unremarkable.  CT of the head showed no acute abnormalities.  She was unable to be weaned of O2 so Hospitalist was consulted for further management.     12 point ROS reviewed and negative except as mentioned above    Objective      Vitals:   Temp:  [97.9 °F (36.6 °C)] 97.9 °F (36.6 °C)  Heart Rate:  [80-84] 80  Resp:  [16] 16  BP: (145-171)/() 145/77  Body mass index is 28.13 kg/m².    Physical Exam  Vitals and nursing note  reviewed.   Constitutional:       Appearance: Normal appearance.   HENT:      Right Ear: Decreased hearing noted.      Left Ear: Decreased hearing noted.      Mouth/Throat:      Mouth: Mucous membranes are moist.   Cardiovascular:      Rate and Rhythm: Normal rate and regular rhythm.   Pulmonary:      Effort: Pulmonary effort is normal.      Breath sounds: Normal breath sounds.   Abdominal:      General: Bowel sounds are normal.      Palpations: Abdomen is soft.   Musculoskeletal:         General: Normal range of motion.   Skin:     General: Skin is warm and dry.   Neurological:      General: No focal deficit present.      Mental Status: She is alert. Mental status is at baseline.   Psychiatric:         Mood and Affect: Mood normal.         Behavior: Behavior normal.           Personal History     This is a 85 y.o. female with:    Past Medical History:   Diagnosis Date    A-fib     Arthritis     Breast cancer     CHF (congestive heart failure)     CVA (cerebral vascular accident) 09/2019    History of pulmonary embolus (PE)     Hyperlipidemia     Hypertension     Hyperthyroidism     patient has hypothyroidism    Hypothyroidism        Past Surgical History:   Procedure Laterality Date    BREAST BIOPSY      BREAST SURGERY Right     CHOLECYSTECTOMY      COLON SURGERY      Removed    COLONOSCOPY      COLONOSCOPY N/A 11/3/2022    Procedure: COLONOSCOPY to anastamosis with biopsies and cold snare polypectomy;  Surgeon: Saroj Oakes MD;  Location: [facility] ENDOSCOPY;  Service: Gastroenterology;  Laterality: N/A;  PRe: rectal bleeding  Post: hemorrhoids, diverticulosis, anastamotic ulcer, polyp, hemostasis clip free-floating    HYSTERECTOMY      MAMMO BILATERAL  2015    MASTECTOMY      TONSILLECTOMY         Active and Resolved Problems  Active Hospital Problems    Diagnosis  POA    **Influenza A [J10.1]  Yes      Resolved Hospital Problems   No resolved problems to display.       Family History: family history  includes Cancer in her father; Colon polyps in her father; Heart disease in her father; Hypertension in her mother; Stroke in her mother. Otherwise pertinent FHx was reviewed and not pertinent to current issue.    Social History:  reports that she quit smoking about 37 years ago. Her smoking use included cigarettes. She has been exposed to tobacco smoke. She has never used smokeless tobacco. She reports current alcohol use of about 3.0 standard drinks of alcohol per week. She reports that she does not use drugs.    Home Medications:  Prior to Admission Medications       Prescriptions Last Dose Informant Patient Reported? Taking?    acetaminophen (TYLENOL) 325 MG tablet  Nursing Home No No    Take 2 tablets by mouth Every 6 (Six) Hours As Needed for Mild Pain .    albuterol sulfate  (90 Base) MCG/ACT inhaler  Nursing Home Yes No    Inhale 2 puffs Every 4 (Four) Hours As Needed for Wheezing.    atorvastatin (LIPITOR) 10 MG tablet   No No    Take 1 tablet by mouth Every Night.    Cholecalciferol 25 MCG (1000 UT) tablet   Yes No    Take 1 tablet by mouth Daily.    cloNIDine (CATAPRES) 0.1 MG tablet  Pharmacy Yes No    Take 1 tablet by mouth Every 8 (Eight) Hours As Needed for High Blood Pressure.    dapagliflozin Propanediol 10 MG tablet   Yes No    Take 10 mg by mouth Daily.    ferrous sulfate 325 (65 FE) MG tablet   No No    Take 1 tablet by mouth Daily With Breakfast.    furosemide (LASIX) 20 MG tablet  Pharmacy Yes No    Take 1 tablet by mouth Daily.    lacosamide (VIMPAT) 50 MG tablet tablet   No No    Take 1 tablet by mouth Daily for 3 days.    levothyroxine (SYNTHROID, LEVOTHROID) 50 MCG tablet  Pharmacy Yes No    Take 1 tablet by mouth Every Morning.    magnesium oxide 250 MG tablet   Yes No    Take 1 tablet by mouth 2 (Two) Times a Day.    midodrine (PROAMATINE) 5 MG tablet  Pharmacy Yes No    Take 1 tablet by mouth 2 (Two) Times a Day.    multivitamin with minerals tablet tablet   Yes No    Take 1  tablet by mouth Daily.    nitroglycerin (NITROSTAT) 0.4 MG SL tablet   Yes No    Place 1 tablet under the tongue Every 5 (Five) Minutes As Needed for Chest Pain.    pantoprazole (PROTONIX) 40 MG EC tablet   Yes No    Take 1 tablet by mouth Daily.    potassium chloride (KLOR-CON M20) 20 MEQ CR tablet  Pharmacy Yes No    Take 2 tablets by mouth Daily.    ranolazine (RANEXA) 500 MG 12 hr tablet   Yes No    Take 1 tablet by mouth 2 (Two) Times a Day.    vitamin B-12 (CYANOCOBALAMIN) 1000 MCG tablet   Yes No    Take 1 tablet by mouth Daily.              Allergies:  Allergies   Allergen Reactions    Aspirin Other (See Comments)     Severe bleeding    Nsaids Other (See Comments)     Severe bleeding           VTE Prophylaxis:  Mechanical VTE prophylaxis orders are present.        CODE STATUS:    Code Status (Patient has no pulse and is not breathing): CPR (Attempt to Resuscitate)  Medical Interventions (Patient has pulse or is breathing): Full Support        Admission Status:  I believe this patient meets observation status.    I discussed the patient's findings and my recommendations with patient.    Signature:     This document has been electronically signed by Love Juarez, BEBA, APRN, AGACNP-BC on February 21, 2025 03:33 [facility] Hospitalist Team

## 2025-02-21 NOTE — CASE MANAGEMENT/SOCIAL WORK
Discharge Planning Assessment   David     Patient Name: Kendy Worrell  MRN: [id number]  Today's Date: 2/21/2025    Admit Date: 2/21/2025    Plan: D/C Plan: Home is [facility] Came to hospital from [facility]. Family wants to return to [place] if SNF needed. Family looking into LTC. No precert needed.   Discharge Needs Assessment       Row Name 02/21/25 1134       Living Environment    People in Home other (see comments)    Unique Family Situation [facility]    Current Living Arrangements assisted living facility;other (see comments)  Lives at [facility] but came to hospital from [facility].    Potentially Unsafe Housing Conditions none    In the past 12 months has the electric, gas, oil, or water company threatened to shut off services in your home? No    Primary Care Provided by other (see comments)  [facility] staff    Provides Primary Care For no one    Family Caregiver if Needed child(tatyana), adult    Family Caregiver Names Ramandeep and Caleb    Quality of Family Relationships helpful;involved;supportive    Able to Return to Prior Arrangements yes       Resource/Environmental Concerns    Resource/Environmental Concerns none    Transportation Concerns none       Transportation Needs    In the past 12 months, has lack of transportation kept you from medical appointments or from getting medications? no    In the past 12 months, has lack of transportation kept you from meetings, work, or from getting things needed for daily living? No       Food Insecurity    Within the past 12 months, you worried that your food would run out before you got the money to buy more. Never true    Within the past 12 months, the food you bought just didn't last and you didn't have money to get more. Never true       Transition Planning    Patient/Family Anticipates Transition to long-term care facility    Patient/Family Anticipated Services at Transition skilled nursing    Transportation Anticipated health  plan transportation;family or friend will provide       Discharge Needs Assessment    Equipment Currently Used at Home rollator;walker, standard;wheelchair    Concerns to be Addressed discharge planning    Do you want help finding or keeping work or a job? I do not need or want help    Do you want help with school or training? For example, starting or completing job training or getting a high school diploma, GED or equivalent No    Anticipated Changes Related to Illness none    Equipment Needed After Discharge none    Discharge Facility/Level of Care Needs nursing facility, skilled    Provided Post Acute Provider List? N/A    Provided Post Acute Provider Quality & Resource List? N/A    Current Discharge Risk dependent with mobility/activities of daily living                   Discharge Plan       Row Name 02/21/25 1136       Plan    Plan D/C Plan: Home is [facility].. Came to hospital from [facility]. Family wants to return to [place] if SNF needed. Family looking into LTC. No precert needed.    Provided Post Acute Provider List? N/A    Provided Post Acute Provider Quality & Resource List? N/A    Plan Comments CM spoke with patient's daughter, Ramandeep, on the phone to discuss admission assessment and discharge planning. Patient is currently disoriented. Ramandeep confirms PCP and pharmacy. Ramandeep has declined meds to bed program at this time. Ramandeep denies any difficulty affording medications at this time. Ramandeep denies any additional needs for services or DME at this time. Ramandeep would like the patient to return to [place] for rehab after d/c. CM waiting on PT/OT evals. Ramandeep is looking into LTC and does not think the patient will be able to return to [facility] CM reviewed the NAVARRO and Ramandeep gave a verbal consent and denied a copy. D/C barriers: continuing to moniotor BP, pending PT/OT Evals.             Expected Discharge Date and Time       Expected Discharge Date Expected Discharge Time     Feb 22, 2025            Demographic Summary       Row Name 02/21/25 1133       General Information    Admission Type observation    Arrived From other (see comments)  [facility]    Required Notices Provided Observation Status Notice    Referral Source admission list    Reason for Consult discharge planning    Preferred Language English       Contact Information    Permission Granted to Share Info With                    Functional Status       Row Name 02/21/25 1133       Functional Status    Usual Activity Tolerance moderate    Current Activity Tolerance moderate       Functional Status, IADL    Medications assistive equipment and person    Meal Preparation assistive equipment and person    Housekeeping assistive equipment and person    Laundry assistive equipment and person    Shopping assistive equipment and person    If for any reason you need help with day-to-day activities such as bathing, preparing meals, shopping, managing finances, etc., do you get the help you need? I get all the help I need       Mental Status    General Appearance WDL WDL       Mental Status Summary    Recent Changes in Mental Status/Cognitive Functioning no changes       Employment/    Employment Status retired;, previous service           Current or Previous  Service none              Patient Forms       Row Name 02/21/25 1128       Patient Forms    Important Message from Medicare (IMM) Delivered  NAVARRO delivered by CM    Delivered to Support person    Method of delivery Telephone             Radha Bolton RN     [address] [address]  Phone: [phone]  Fax: [phone]

## 2025-02-22 LAB
ANION GAP SERPL CALCULATED.3IONS-SCNC: 10.1 MMOL/L (ref 5–15)
BASOPHILS # BLD AUTO: 0.02 10*3/MM3 (ref 0–0.2)
BASOPHILS NFR BLD AUTO: 0.5 % (ref 0–1.5)
BUN SERPL-MCNC: 12 MG/DL (ref 8–23)
BUN/CREAT SERPL: 16 (ref 7–25)
CALCIUM SPEC-SCNC: 8.9 MG/DL (ref 8.6–10.5)
CHLORIDE SERPL-SCNC: 103 MMOL/L (ref 98–107)
CO2 SERPL-SCNC: 23.9 MMOL/L (ref 22–29)
CREAT SERPL-MCNC: 0.75 MG/DL (ref 0.57–1)
DEPRECATED RDW RBC AUTO: 60.8 FL (ref 37–54)
EGFRCR SERPLBLD CKD-EPI 2021: 77.6 ML/MIN/1.73
EOSINOPHIL # BLD AUTO: 0 10*3/MM3 (ref 0–0.4)
EOSINOPHIL NFR BLD AUTO: 0 % (ref 0.3–6.2)
ERYTHROCYTE [DISTWIDTH] IN BLOOD BY AUTOMATED COUNT: 19.5 % (ref 12.3–15.4)
GLUCOSE SERPL-MCNC: 95 MG/DL (ref 65–99)
HCT VFR BLD AUTO: 30.4 % (ref 34–46.6)
HGB BLD-MCNC: 9.6 G/DL (ref 12–15.9)
IMM GRANULOCYTES # BLD AUTO: 0.01 10*3/MM3 (ref 0–0.05)
IMM GRANULOCYTES NFR BLD AUTO: 0.2 % (ref 0–0.5)
LYMPHOCYTES # BLD AUTO: 0.83 10*3/MM3 (ref 0.7–3.1)
LYMPHOCYTES NFR BLD AUTO: 20.2 % (ref 19.6–45.3)
MAGNESIUM SERPL-MCNC: 2 MG/DL (ref 1.6–2.4)
MCH RBC QN AUTO: 27.6 PG (ref 26.6–33)
MCHC RBC AUTO-ENTMCNC: 31.6 G/DL (ref 31.5–35.7)
MCV RBC AUTO: 87.4 FL (ref 79–97)
MONOCYTES # BLD AUTO: 1 10*3/MM3 (ref 0.1–0.9)
MONOCYTES NFR BLD AUTO: 24.4 % (ref 5–12)
NEUTROPHILS NFR BLD AUTO: 2.24 10*3/MM3 (ref 1.7–7)
NEUTROPHILS NFR BLD AUTO: 54.7 % (ref 42.7–76)
NRBC BLD AUTO-RTO: 0 /100 WBC (ref 0–0.2)
PLATELET # BLD AUTO: 269 10*3/MM3 (ref 140–450)
PMV BLD AUTO: 10.4 FL (ref 6–12)
POTASSIUM SERPL-SCNC: 3 MMOL/L (ref 3.5–5.2)
RBC # BLD AUTO: 3.48 10*6/MM3 (ref 3.77–5.28)
SODIUM SERPL-SCNC: 137 MMOL/L (ref 136–145)
WBC NRBC COR # BLD AUTO: 4.1 10*3/MM3 (ref 3.4–10.8)

## 2025-02-22 PROCEDURE — 83735 ASSAY OF MAGNESIUM: CPT | Performed by: STUDENT IN AN ORGANIZED HEALTH CARE EDUCATION/TRAINING PROGRAM

## 2025-02-22 PROCEDURE — 25010000002 HYDRALAZINE PER 20 MG: Performed by: INTERNAL MEDICINE

## 2025-02-22 PROCEDURE — 85025 COMPLETE CBC W/AUTO DIFF WBC: CPT

## 2025-02-22 PROCEDURE — 80048 BASIC METABOLIC PNL TOTAL CA: CPT

## 2025-02-22 PROCEDURE — 92610 EVALUATE SWALLOWING FUNCTION: CPT

## 2025-02-22 PROCEDURE — 97162 PT EVAL MOD COMPLEX 30 MIN: CPT

## 2025-02-22 RX ORDER — OSELTAMIVIR PHOSPHATE 6 MG/ML
75 FOR SUSPENSION ORAL ONCE
Status: COMPLETED | OUTPATIENT
Start: 2025-02-22 | End: 2025-02-22

## 2025-02-22 RX ORDER — OSELTAMIVIR PHOSPHATE 6 MG/ML
30 FOR SUSPENSION ORAL EVERY 12 HOURS SCHEDULED
Status: DISCONTINUED | OUTPATIENT
Start: 2025-02-22 | End: 2025-02-24 | Stop reason: HOSPADM

## 2025-02-22 RX ORDER — HYDRALAZINE HYDROCHLORIDE 25 MG/1
25 TABLET, FILM COATED ORAL EVERY 8 HOURS SCHEDULED
Status: DISCONTINUED | OUTPATIENT
Start: 2025-02-22 | End: 2025-02-24 | Stop reason: HOSPADM

## 2025-02-22 RX ORDER — LACOSAMIDE 50 MG/1
50 TABLET ORAL DAILY
Status: DISCONTINUED | OUTPATIENT
Start: 2025-02-22 | End: 2025-02-24 | Stop reason: HOSPADM

## 2025-02-22 RX ORDER — RANOLAZINE 500 MG/1
500 TABLET, EXTENDED RELEASE ORAL 2 TIMES DAILY
Status: DISCONTINUED | OUTPATIENT
Start: 2025-02-22 | End: 2025-02-24 | Stop reason: HOSPADM

## 2025-02-22 RX ORDER — FUROSEMIDE 20 MG/1
20 TABLET ORAL DAILY
Status: DISCONTINUED | OUTPATIENT
Start: 2025-02-22 | End: 2025-02-24 | Stop reason: HOSPADM

## 2025-02-22 RX ORDER — PANTOPRAZOLE SODIUM 40 MG/1
40 TABLET, DELAYED RELEASE ORAL
Status: DISCONTINUED | OUTPATIENT
Start: 2025-02-22 | End: 2025-02-24 | Stop reason: HOSPADM

## 2025-02-22 RX ORDER — POTASSIUM CHLORIDE 1500 MG/1
40 TABLET, EXTENDED RELEASE ORAL EVERY 4 HOURS
Status: DISPENSED | OUTPATIENT
Start: 2025-02-22 | End: 2025-02-22

## 2025-02-22 RX ORDER — LEVOTHYROXINE SODIUM 50 UG/1
50 TABLET ORAL
Status: DISCONTINUED | OUTPATIENT
Start: 2025-02-22 | End: 2025-02-24 | Stop reason: HOSPADM

## 2025-02-22 RX ORDER — CLONIDINE HYDROCHLORIDE 0.1 MG/1
0.1 TABLET ORAL EVERY 8 HOURS PRN
Status: DISCONTINUED | OUTPATIENT
Start: 2025-02-22 | End: 2025-02-24 | Stop reason: HOSPADM

## 2025-02-22 RX ADMIN — CLONIDINE HYDROCHLORIDE 0.1 MG: 0.1 TABLET ORAL at 12:46

## 2025-02-22 RX ADMIN — APIXABAN 2.5 MG: 2.5 TABLET, FILM COATED ORAL at 21:41

## 2025-02-22 RX ADMIN — OSELTAMIVIR PHOSPHATE 75 MG: 6 POWDER, FOR SUSPENSION ORAL at 12:42

## 2025-02-22 RX ADMIN — HYDRALAZINE HYDROCHLORIDE 10 MG: 20 INJECTION INTRAMUSCULAR; INTRAVENOUS at 02:17

## 2025-02-22 RX ADMIN — RANOLAZINE 500 MG: 500 TABLET, FILM COATED, EXTENDED RELEASE ORAL at 08:05

## 2025-02-22 RX ADMIN — LEVOTHYROXINE SODIUM 50 MCG: 50 TABLET ORAL at 07:07

## 2025-02-22 RX ADMIN — OSELTAMIVIR PHOSPHATE 30 MG: 6 FOR SUSPENSION ORAL at 21:41

## 2025-02-22 RX ADMIN — Medication 10 ML: at 08:04

## 2025-02-22 RX ADMIN — Medication 10 ML: at 21:43

## 2025-02-22 RX ADMIN — HYDRALAZINE HYDROCHLORIDE 25 MG: 25 TABLET ORAL at 21:41

## 2025-02-22 RX ADMIN — LACOSAMIDE 50 MG: 50 TABLET, FILM COATED ORAL at 08:05

## 2025-02-22 RX ADMIN — POTASSIUM CHLORIDE 40 MEQ: 1500 TABLET, EXTENDED RELEASE ORAL at 12:49

## 2025-02-22 RX ADMIN — APIXABAN 2.5 MG: 2.5 TABLET, FILM COATED ORAL at 12:41

## 2025-02-22 RX ADMIN — FUROSEMIDE 20 MG: 20 TABLET ORAL at 08:06

## 2025-02-22 RX ADMIN — POTASSIUM CHLORIDE 40 MEQ: 1500 TABLET, EXTENDED RELEASE ORAL at 07:08

## 2025-02-22 RX ADMIN — RANOLAZINE 500 MG: 500 TABLET, FILM COATED, EXTENDED RELEASE ORAL at 21:41

## 2025-02-22 RX ADMIN — HYDRALAZINE HYDROCHLORIDE 25 MG: 25 TABLET ORAL at 16:28

## 2025-02-22 RX ADMIN — PANTOPRAZOLE SODIUM 40 MG: 40 TABLET, DELAYED RELEASE ORAL at 08:05

## 2025-02-22 NOTE — PLAN OF CARE
Goal Outcome Evaluation:  Plan of Care Reviewed With: patient           Outcome Evaluation: Pt is an 86 YO F admitted with acute resp failiure, (+) Flu A. Pt minimally conversational this date, but is from [facility], where she states she is ambulatory with RWx. Pt this date, AO to first name only, and answering approx 25% of questions, but following commands with assistance for initiation. Pt requiring MAX A to come to sitting EOB and MIN A to stand and transfer to bedside chair with RWx. Pt requiring MIN cueing for positioning to sit safely. Pt does not appear safe to return to [facility] at this time and recommendation is SNF.    Anticipated Discharge Disposition (PT): skilled nursing facility

## 2025-02-22 NOTE — PROGRESS NOTES
[facility] MEDICINE SERVICE  DAILY PROGRESS NOTE    NAME: Kendy Worrell  : 1940  MRN: [id number]      LOS: 0 days     PROVIDER OF SERVICE: Neda Trevizo MD    Chief Complaint: Influenza A    Subjective:     Interval History:    Patient seen and evaluated at bedside. No new complaints this morning. Awake and oriented. States she feels well overall but does have a cough.    Treatment plan discussed with patient. All questions addressed.     Review of Systems:   Denies fevers, chills  Denies chest pain, edema  Denies shortness of breath, +cough  Denies nausea, vomiting, diarrhea  Denies dysuria, hematuria    Objective:     Vital Signs  Temp:  [97.8 °F (36.6 °C)-98.6 °F (37 °C)] 97.8 °F (36.6 °C)  Heart Rate:  [] 100  Resp:  [23-28] 23  BP: (147-202)/() 147/93  Flow (L/min) (Oxygen Therapy):  [3] 3   Body mass index is 28.13 kg/m².    Physical Exam   General: No acute distress, alert and oriented, [place]  CV: RRR, no peripheral edema  Pulm: CTAB, no increased work of breathing, on RA  Abd: Soft, nontender, nondistended  Skin: No rashes or lesions on exposed skin  Psych: Appropriate mood and affect    Scheduled Meds   apixaban, 2.5 mg, Oral, BID  lacosamide, 50 mg, Oral, Daily  levothyroxine, 50 mcg, Oral, Q AM  pantoprazole, 40 mg, Oral, QAM AC  potassium chloride ER, 40 mEq, Oral, Q4H  ranolazine, 500 mg, Oral, BID  sodium chloride, 10 mL, Intravenous, Q12H       PRN Meds     acetaminophen **OR** acetaminophen **OR** acetaminophen    senna-docusate sodium **AND** polyethylene glycol **AND** bisacodyl **AND** bisacodyl    Calcium Replacement - Follow Nurse / BPA Driven Protocol    hydrALAZINE    Magnesium Standard Dose Replacement - Follow Nurse / BPA Driven Protocol    melatonin    ondansetron    Phosphorus Replacement - Follow Nurse / BPA Driven Protocol    Potassium Replacement - Follow Nurse / BPA Driven Protocol    sodium chloride    sodium chloride    sodium chloride   Infusions          Diagnostic Data    Results from last 7 days   Lab Units 02/22/25  0142 02/21/25  0614 02/21/25  0213   WBC 10*3/mm3 4.10   < >  --    HEMOGLOBIN g/dL 9.6*   < >  --    HEMATOCRIT % 30.4*   < >  --    PLATELETS 10*3/mm3 269   < >  --    GLUCOSE mg/dL 95   < > 107*   CREATININE mg/dL 0.75   < > 0.88   BUN mg/dL 12   < > 11   SODIUM mmol/L 137   < > 135*   POTASSIUM mmol/L 3.0*   < > 4.0   AST (SGOT) U/L  --   --  25   ALT (SGPT) U/L  --   --  10   ALK PHOS U/L  --   --  106   BILIRUBIN mg/dL  --   --  0.7   ANION GAP mmol/L 10.1   < > 8.6    < > = values in this interval not displayed.       CT Head Without Contrast    Result Date: 2/21/2025  Impression: Atrophy and chronic microvascular ischemic change. No acute intracranial process. Electronically Signed: Kan Catherine MD  2/21/2025 3:13 AM EST  Workstation ID: [id number]    XR Chest 1 View    Result Date: 2/21/2025  Impression: Cardiomegaly. No active disease. Electronically Signed: Kan Catherine MD  2/21/2025 1:46 AM EST  Workstation ID: [id number]     Interval results reviewed.    Assessment/Plan:     Acute hypoxic respiratory failure  Acute influenza a infection  - Weaned to room air, satting well  - Tamiflu due to high risk patient  - Continue supportive care    Reported altered mental status  - Likely in setting of above infectious process, now resolved  - Delirium precautions in at risk patient    Hypokalemia  - Replete per protocol  - Repeat BMP in AM    History of iron deficiency anemia  - Continue iron supplementation  - Repeat CBC in AM    Hypothyroidism  - Continue levothyroxine    Chronic heart failure  Coronary artery disease  Hypertension  Orthostatic hypotension  Hyperlipidemia  Atrial fibrillation  - Continue eliquis, lasix, ranexa  - Continue home prn clonidine for hypertension in setting of history of orthostasis    Treatment plan discussed with nursing staff.    VTE Prophylaxis:  Pharmacologic & mechanical VTE prophylaxis orders are  present.    Code status is   Code Status and Medical Interventions: No CPR (Do Not Attempt to Resuscitate); Limited Support; No intubation (DNI)   Ordered at: 02/21/25 1634     Medical Intervention Limits:    No intubation (DNI)     Code Status (Patient has no pulse and is not breathing):    No CPR (Do Not Attempt to Resuscitate)     Medical Interventions (Patient has pulse or is breathing):    Limited Support       Plan for disposition: Return to facility pending PT/OT recs    Barriers to discharge: PT/OT recs    Time: 35+ minutes     Signature: Electronically signed by Neda Trevizo MD, 02/22/25, 07:36 EST.  [facility] Hospitalist Team

## 2025-02-22 NOTE — PLAN OF CARE
Goal Outcome Evaluation:  Plan of Care Reviewed With: patient      Aox1. Remains on Droplet Precaution/Flu-A. Received Potassium replacement this morning d/t Potassium 3.0. Hydralazine 10 IV PRN administered at 0217 with effectiveness. External cath in place.

## 2025-02-22 NOTE — THERAPY EVALUATION
Patient Name: Kendy Worrell  : 1940    MRN: [id number]                              Today's Date: 2025       Admit Date: 2025    Visit Dx:     ICD-10-CM ICD-9-CM   1. Altered mental status, unspecified altered mental status type  R41.82 780.97   2. Influenza A  J10.1 487.1   3. Hypoxia  R09.02 799.02     Patient Active Problem List   Diagnosis    Essential hypertension    Acquired hypothyroidism    Coronary artery disease involving native coronary artery of native heart without angina pectoris    Hyperlipidemia    Arthritis    Skin lesion of chest wall    NSTEMI (non-ST elevated myocardial infarction)    Supraventricular tachycardia    Normal cardiac stress test    Gastrointestinal bleed    Angina effort    Morbidly obese    Bilateral pulmonary embolism    Persistent atrial fibrillation    UTI (urinary tract infection)    Chronic diastolic CHF (congestive heart failure)    Acute ischemic stroke    Chest pain    GI bleed    Closed fibular fracture    Anemia    Hyponatremia    Orthostatic dizziness    Frequent falls    Generalized weakness    Chronic heart failure with preserved ejection fraction (HFpEF)    Influenza A     Past Medical History:   Diagnosis Date    A-fib     Arthritis     Breast cancer     CHF (congestive heart failure)     CVA (cerebral vascular accident) 2019    History of pulmonary embolus (PE)     Hyperlipidemia     Hypertension     Hyperthyroidism     patient has hypothyroidism    Hypothyroidism      Past Surgical History:   Procedure Laterality Date    BREAST BIOPSY      BREAST SURGERY Right     CHOLECYSTECTOMY      COLON SURGERY      Removed    COLONOSCOPY      COLONOSCOPY N/A 11/3/2022    Procedure: COLONOSCOPY to anastamosis with biopsies and cold snare polypectomy;  Surgeon: Saroj Oakes MD;  Location: [facility] ENDOSCOPY;  Service: Gastroenterology;  Laterality: N/A;  PRe: rectal bleeding  Post: hemorrhoids, diverticulosis, anastamotic ulcer, polyp, hemostasis clip  free-floating    HYSTERECTOMY      MAMMO BILATERAL  2015    MASTECTOMY      TONSILLECTOMY        General Information       Row Name 02/22/25 1821          Physical Therapy Time and Intention    Document Type evaluation  -     Mode of Treatment individual therapy;physical therapy  -       Row Name 02/22/25 1821          General Information    Prior Level of Function independent:;all household mobility  From [facility], but unable to provide detials of PLOF  -       Row Name 02/22/25 1821          Living Environment    People in Home facility resident;other (see comments)  Assisted living resident  -       Row Name 02/22/25 1821          Cognition    Orientation Status (Cognition) oriented to;person;other (see comments)  First name only  -       Row Name 02/22/25 1821          Safety Issues/Impairments Affecting Functional Mobility    Impairments Affecting Function (Mobility) balance;cognition;strength;endurance/activity tolerance  -     Cognitive Impairments, Mobility Safety/Performance awareness, need for assistance;insight into deficits/self-awareness;judgment;problem-solving/reasoning  -               User Key  (r) = Recorded By, (t) = Taken By, (c) = Cosigned By      Initials Name Provider Type    EL Gagan Benz, PT Physical Therapist                   Mobility       Row Name 02/22/25 1824          Bed Mobility    Bed Mobility supine-sit  -EL     Supine-Sit Greig (Bed Mobility) maximum assist (25% patient effort)  -       Row Name 02/22/25 1824          Bed-Chair Transfer    Bed-Chair Greig (Transfers) minimum assist (75% patient effort)  -EL     Assistive Device (Bed-Chair Transfers) walker, front-wheeled  -[facility] Name 02/22/25 1824          Sit-Stand Transfer    Sit-Stand Greig (Transfers) minimum assist (75% patient effort)  -EL     Assistive Device (Sit-Stand Transfers) walker, front-wheeled  -EL       Row Name 02/22/25 1824          Gait/Stairs (Locomotion)    Patient was  able to Ambulate no, other medical factors prevent ambulation  -EL               User Key  (r) = Recorded By, (t) = Taken By, (c) = Cosigned By      Initials Name Provider Type    Gagan Yoon PT Physical Therapist                   Obj/Interventions       Row Name 02/22/25 1825          Range of Motion Comprehensive    General Range of Motion bilateral lower extremity ROM WFL  -EL       Row Name 02/22/25 1825          Strength Comprehensive (MMT)    General Manual Muscle Testing (MMT) Assessment lower extremity strength deficits identified  -EL     Comment, General Manual Muscle Testing (MMT) Assessment BLE 3+/5 gross  -EL       Row Name 02/22/25 1825          Sensory Assessment (Somatosensory)    Sensory Assessment (Somatosensory) sensation intact  -EL               User Key  (r) = Recorded By, (t) = Taken By, (c) = Cosigned By      Initials Name Provider Type    Gagan Yoon PT Physical Therapist                   Goals/Plan       Row Name 02/22/25 1829          Bed Mobility Goal 1 (PT)    Activity/Assistive Device (Bed Mobility Goal 1, PT) bed mobility activities, all  -EL     Kerr Level/Cues Needed (Bed Mobility Goal 1, PT) modified independence  -EL     Time Frame (Bed Mobility Goal 1, PT) long term goal (LTG);2 weeks  -       Row Name 02/22/25 1829          Transfer Goal 1 (PT)    Activity/Assistive Device (Transfer Goal 1, PT) transfers, all;walker, rolling  -EL     [place] Level/Cues Needed (Transfer Goal 1, PT) modified independence  -EL     Time Frame (Transfer Goal 1, PT) long term goal (LTG);2 weeks  -[facility] Name 02/22/25 1829          Gait Training Goal 1 (PT)    Activity/Assistive Device (Gait Training Goal 1, PT) gait (walking locomotion);walker, rolling  -EL     Kerr Level (Gait Training Goal 1, PT) modified independence  -EL     Distance (Gait Training Goal 1, PT) 50  -EL     Time Frame (Gait Training Goal 1, PT) long term goal (LTG);2 weeks  -[facility] Name  02/22/25 1829          Therapy Assessment/Plan (PT)    Planned Therapy Interventions (PT) neuromuscular re-education;balance training;bed mobility training;transfer training;gait training;patient/family education;strengthening  -EL               User Key  (r) = Recorded By, (t) = Taken By, (c) = Cosigned By      Initials Name Provider Type    Gagan Yoon, PT Physical Therapist                   Clinical Impression       Row Name 02/22/25 1826          Pain    Pretreatment Pain Rating 0/10 - no pain  -EL     Posttreatment Pain Rating 0/10 - no pain  -EL       Row Name 02/22/25 1826          Plan of Care Review    Plan of Care Reviewed With patient  -EL     Outcome Evaluation Pt is an 84 YO F admitted with acute resp failiure, (+) Flu A. Pt minimally conversational this date, but is from [facility], where she states she is ambulatory with RWx. Pt this date, AO to first name only, and answering approx 25% of questions, but following commands with assistance for initiation. Pt requiring MAX A to come to sitting EOB and MIN A to stand and transfer to bedside chair with RWx. Pt requiring MIN cueing for positioning to sit safely. Pt does not appear safe to return to [facility] at this time and recommendation is SNF.  -EL       Row Name 02/22/25 1826          Therapy Assessment/Plan (PT)    Rehab Potential (PT) good  -EL     Criteria for Skilled Interventions Met (PT) yes  -EL     Therapy Frequency (PT) 5 times/wk  -EL       Row Name 02/22/25 1826          Vital Signs    O2 Delivery Pre Treatment room air  -EL     O2 Delivery Intra Treatment room air  -EL     O2 Delivery Post Treatment room air  -EL     Pre Patient Position Supine  -EL     Intra Patient Position Standing  -EL     Post Patient Position Sitting  -EL       Row Name 02/22/25 1826          Positioning and Restraints    Pre-Treatment Position in bed  -EL     Post Treatment Position chair  -EL     In Chair notified nsg;reclined;call light within reach;encouraged to call for  assist;exit alarm on  -EL               User Key  (r) = Recorded By, (t) = Taken By, (c) = Cosigned By      Initials Name Provider Type    Gagan Yoon PT Physical Therapist                   Outcome Measures       Row Name 02/22/25 1829 02/22/25 0800       How much help from another person do you currently need...    Turning from your back to your side while in flat bed without using bedrails? 3  -EL 3  -KM    Moving from lying on back to sitting on the side of a flat bed without bedrails? 2  -EL 3  -KM    Moving to and from a bed to a chair (including a wheelchair)? 2  -EL 2  -KM    Standing up from a chair using your arms (e.g., wheelchair, bedside chair)? 2  -EL 2  -KM    Climbing 3-5 steps with a railing? 1  -EL 1  -KM    To walk in hospital room? 1  -EL 1  -KM    AM-PAC 6 Clicks Score (PT) 11  -EL 12  -KM    Highest Level of Mobility Goal 4 --> Transfer to chair/commode  -EL 4 --> Transfer to chair/commode  -KM      Row Name 02/22/25 1829          Functional Assessment    Outcome Measure Options AM-PAC 6 Clicks Basic Mobility (PT)  -EL               User Key  (r) = Recorded By, (t) = Taken By, (c) = Cosigned By      Initials Name Provider Type    Gagan Yoon, ERASTO Physical Therapist    Jackie Méndez, RN Registered Nurse                                 Physical Therapy Education       Title: PT OT SLP Therapies (In Progress)       Topic: Physical Therapy (Not Started)       Point: Mobility training (Done)       Learning Progress Summary            Patient Acceptance, E,TB, VU by SARA at 2/22/2025 1830                      Point: Home exercise program (Not Started)       Learner Progress:  Not documented in this visit.              Point: Precautions (Done)       Learning Progress Summary            Patient Acceptance, E,TB, VU by SARA at 2/22/2025 1830                                      User Key       Initials Effective Dates Name Provider Type [facility] 06/23/20 -  Gagan Benz PT Physical  Therapist PT                  PT Recommendation and Plan  Planned Therapy Interventions (PT): neuromuscular re-education, balance training, bed mobility training, transfer training, gait training, patient/family education, strengthening  Outcome Evaluation: Pt is an 84 YO F admitted with acute resp failiure, (+) Flu A. Pt minimally conversational this date, but is from [facility], where she states she is ambulatory with RWx. Pt this date, AO to first name only, and answering approx 25% of questions, but following commands with assistance for initiation. Pt requiring MAX A to come to sitting EOB and MIN A to stand and transfer to bedside chair with RWx. Pt requiring MIN cueing for positioning to sit safely. Pt does not appear safe to return to [facility] at this time and recommendation is SNF.     Time Calculation:   PT Evaluation Complexity  History, PT Evaluation Complexity: 1-2 personal factors and/or comorbidities  Examination of Body Systems (PT Eval Complexity): total of 3 or more elements  Clinical Presentation (PT Evaluation Complexity): evolving  Clinical Decision Making (PT Evaluation Complexity): moderate complexity  Overall Complexity (PT Evaluation Complexity): moderate complexity     PT Charges       Row Name 02/22/25 1830             Time Calculation    Start Time 1514  -EL      Stop Time 1530  -EL      Time Calculation (min) 16 min  -EL      PT Received On 02/22/25  -EL      PT - Next Appointment 02/24/25  -EL      PT Goal Re-Cert Due Date 03/08/25  -EL                User Key  (r) = Recorded By, (t) = Taken By, (c) = Cosigned By      Initials Name Provider Type    EL Gagan Benz, PT Physical Therapist                  Therapy Charges for Today       Code Description Service Date Service Provider Modifiers Qty    [id number]  PT EVAL MOD COMPLEXITY 3 2/22/2025 Gagan Benz, PT GP 1            PT G-Codes  Outcome Measure Options: AM-PAC 6 Clicks Basic Mobility (PT)  AM-PAC 6 Clicks Score (PT): 11  PT Discharge  Summary  Anticipated Discharge Disposition (PT): skilled nursing facility    Gagan Benz, PT  2/22/2025

## 2025-02-22 NOTE — THERAPY EVALUATION
Acute Care - Speech Language Pathology   Swallow Initial Evaluation  David     Patient Name: Kedny Worrell  : 1940  MRN: [id number]  Today's Date: 2025               Admit Date: 2025    Visit Dx:     ICD-10-CM ICD-9-CM   1. Altered mental status, unspecified altered mental status type  R41.82 780.97   2. Influenza A  J10.1 487.1   3. Hypoxia  R09.02 799.02     Patient Active Problem List   Diagnosis    Essential hypertension    Acquired hypothyroidism    Coronary artery disease involving native coronary artery of native heart without angina pectoris    Hyperlipidemia    Arthritis    Skin lesion of chest wall    NSTEMI (non-ST elevated myocardial infarction)    Supraventricular tachycardia    Normal cardiac stress test    Gastrointestinal bleed    Angina effort    Morbidly obese    Bilateral pulmonary embolism    Persistent atrial fibrillation    UTI (urinary tract infection)    Chronic diastolic CHF (congestive heart failure)    Acute ischemic stroke    Chest pain    GI bleed    Closed fibular fracture    Anemia    Hyponatremia    Orthostatic dizziness    Frequent falls    Generalized weakness    Chronic heart failure with preserved ejection fraction (HFpEF)    Influenza A     Past Medical History:   Diagnosis Date    A-fib     Arthritis     Breast cancer     CHF (congestive heart failure)     CVA (cerebral vascular accident) 2019    History of pulmonary embolus (PE)     Hyperlipidemia     Hypertension     Hyperthyroidism     patient has hypothyroidism    Hypothyroidism      Past Surgical History:   Procedure Laterality Date    BREAST BIOPSY      BREAST SURGERY Right     CHOLECYSTECTOMY      COLON SURGERY      Removed    COLONOSCOPY      COLONOSCOPY N/A 11/3/2022    Procedure: COLONOSCOPY to anastamosis with biopsies and cold snare polypectomy;  Surgeon: Saroj Oakes MD;  Location: [facility] ENDOSCOPY;  Service: Gastroenterology;  Laterality: N/A;  PRe: rectal bleeding  Post:  hemorrhoids, diverticulosis, anastamotic ulcer, polyp, hemostasis clip free-floating    HYSTERECTOMY      MAMMO BILATERAL  2015    MASTECTOMY      TONSILLECTOMY         SLP Recommendation and Plan  SLP Swallowing Diagnosis: oral dysphagia, mild-moderate (02/22/25 1200)  SLP Diet Recommendation: thin liquids, puree (02/22/25 1200)  Recommended Precautions and Strategies: general aspiration precautions, upright posture during/after eating (02/22/25 1200)  SLP Rec. for Method of Medication Administration: with puree, meds crushed (02/22/25 1200)           Swallow Criteria for Skilled Therapeutic Interventions Met: demonstrates skilled criteria (02/22/25 1200)     Rehab Potential/Prognosis, Swallowing: good, to achieve stated therapy goals (02/22/25 1200)  Therapy Frequency (Swallow): PRN (02/22/25 1200)  Predicted Duration Therapy Intervention (Days): until discharge (02/22/25 1200)  Oral Care Recommendations: Oral Care before breakfast, after meals and PRN (02/22/25 1200)                              Plan for Continued Treatment (SLP): continue treatment per plan of care (02/22/25 1200)                SWALLOW EVALUATION (Last 72 Hours)       SLP Adult Swallow Evaluation       Row Name 02/22/25 1200       Rehab Evaluation    Document Type evaluation  -MS    Subjective Information no complaints  -MS    Patient Observations alert;cooperative;agree to therapy  -MS    Patient/Family/Caregiver Comments/Observations n/a  -MS    Patient Effort adequate  -MS    Symptoms Noted During/After Treatment none  -MS       General Information    Patient Profile Reviewed yes  -MS    Pertinent History Of Current Problem Kendy Worrell is a 85 y.o. female with a previous medical history of orthostatic BP, atrial fibrillation, CHF, CAD, anemia who presented to [facility] on 2/21/2025 after staff at her nursing facility stated that she was confused.  On arrival, the patient's SpO2 was in the 80% range so she was placed on O2.        In the ED, respiratory panel was positive for influenza A. Labwork was unremarkable.  CT of the head showed no acute abnormalities.  She was unable to be weaned of O2 so Hospitalist was consulted for further management. ST consulted for dysphagia evaluation.                    -MS    Current Method of Nutrition regular textures;thin liquids  -MS    Precautions/Limitations, Vision WFL  -MS    Precautions/Limitations, Hearing WFL  -MS    Prior Level of Function-Communication WFL  -MS    Prior Level of Function-Swallowing puree;mechanical ground textures;thin liquids  -MS    Plans/Goals Discussed with patient  -MS       Oral Motor Structure and Function    Dentition Assessment edentulous, does not have dentures  -MS    Secretion Management WNL/WFL  -MS    Mucosal Quality dry  -MS    Volitional Swallow WFL  -MS    Volitional Cough non-productive  -MS       Oral Musculature and Cranial Nerve Assessment    Oral Motor General Assessment generalized oral motor weakness  -MS       General Eating/Swallowing Observations    Respiratory Support Currently in Use room air  -MS    Eating/Swallowing Skills fed by SLP;self-fed  -MS    Positioning During Eating upright 90 degree;upright in bed  -MS    Utensils Used spoon;cup;straw  -MS    Consistencies Trialed mechanical ground textures;ice chips;pureed;thin liquids  -MS       Respiratory    Respiratory Status WFL  -MS       Clinical Swallow Eval    Oral Prep Phase impaired  -MS    Oral Transit WFL  -MS    Oral Residue WFL  -MS    Pharyngeal Phase no overt signs/symptoms of pharyngeal impairment  -MS    Esophageal Phase unremarkable  -MS    Clinical Swallow Evaluation Summary Clinical swallow evaluation completed. Pt was awake and alert, able to follow commands, moderately confused. Upon room entry, pt lying in bed awake. Room air. Pt was on a regular diet and thin liquids at the time of evaluation. Pt is edentulous and reported she wears dentures at her facility. Pt reported she  "is on a \"softer diet and thin liquids at her facility and denied swallowing difficulty. Oral motor exam revealed generalized oral motor weakness.Trials assessed: ice chips x2, thin by cup x2, thin by straw x2, puree x2, & mixed/mech soft x1. Pt had adequate bolus control of ice chip and and able to self-feed. No anterior loss during trials. Extended mastication for mixed/mech. There was no pocketing or residue. Pt demonstrated weak cough x1, not during P.O intake. SLP suspecting from influenza. No other s/s of penetration/aspiration noted during evaluation. Pt agreeable to puree diet and this time. Per above, pt presents w/ mild-moderate oral dysphagia and not suspecting pharyngeal phase dysphagia. It is recommended pt initiate a puree diet and thin liquids. Only feed when pt is fully awake and alert. Meds crushed in puree.     SLP Plan & Recommendation:      - Puree diet and thin liquids  - Only feed when pt is fully awake and alert  - Meds: crushed in puree  - Safe swallow strategies: HOB at a 90 degree angle, slow rate of intake, small bites/sips  - ST will continue to complete meal assessment(s) to ensure tolerance and safety of rec'd diet, provide further recs as indicated.        -MS       Oral Prep Concerns    Oral Prep Concerns prolonged mastication  -MS    Prolonged Mastication mechanical soft  -MS       SLP Evaluation Clinical Impression    SLP Swallowing Diagnosis oral dysphagia;mild-moderate  -MS    Functional Impact risk of aspiration/pneumonia;risk of malnutrition;risk of dehydration  -MS    Rehab Potential/Prognosis, Swallowing good, to achieve stated therapy goals  -MS    Swallow Criteria for Skilled Therapeutic Interventions Met demonstrates skilled criteria  -MS       SLP Treatment Clinical Impressions    Plan for Continued Treatment (SLP) continue treatment per plan of care  -MS    Care Plan Review evaluation/treatment results reviewed  -MS       Recommendations    Therapy Frequency (Swallow) PRN  " -MS    Predicted Duration Therapy Intervention (Days) until discharge  -MS    SLP Diet Recommendation thin liquids;puree  -MS    Recommended Precautions and Strategies general aspiration precautions;upright posture during/after eating  -MS    Oral Care Recommendations Oral Care before breakfast, after meals and PRN  -MS    SLP Rec. for Method of Medication Administration with puree;meds crushed  -MS       Swallow Goals (SLP)    Swallow LTGs Patient will demonstrate functional swallow for  -MS    Swallow STGs diet tolerance goal selection (SLP)  -MS    Diet Tolerance Goal Selection (SLP) Patient will tolerate trials of  -MS       (LTG) Patient will demonstrate functional swallow for    Diet Texture (Demonstrate functional swallow) mechanical ground textures  -MS    Liquid viscosity (Demonstrate functional swallow) thin liquids  -MS    [place] (Demonstrate functional swallow) independently (over 90% accuracy)  -MS    Time Frame (Demonstrate functional swallow) by discharge  -MS    Progress/Outcomes (Demonstrate functional swallow) new goal  -MS       (STG) Patient will tolerate trials of    Consistencies Trialed (Tolerate trials) pureed textures;mechanical ground textures  -MS    Desired Outcome (Tolerate trials) without signs/symptoms of aspiration;without signs of distress;with adequate oral prep/transit/clearance  -MS    [place] (Tolerate trials) independently (over 90% accuracy)  -MS    Time Frame (Tolerate trials) by discharge  -MS    Progress/Outcomes (Tolerate trials) new goal  -MS              User Key  (r) = Recorded By, (t) = Taken By, (c) = Cosigned By      Initials Name Effective Dates    MS Jose Luis Perry, SLP 04/22/24 -                     EDUCATION  The patient has been educated in the following areas:   Dysphagia (Swallowing Impairment).        SLP GOALS       Row Name 02/22/25 1200       (LTG) Patient will demonstrate functional swallow for    Diet Texture (Demonstrate functional swallow)  mechanical ground textures  -MS    Liquid viscosity (Demonstrate functional swallow) thin liquids  -MS    [place] (Demonstrate functional swallow) independently (over 90% accuracy)  -MS    Time Frame (Demonstrate functional swallow) by discharge  -MS    Progress/Outcomes (Demonstrate functional swallow) new goal  -MS       (STG) Patient will tolerate trials of    Consistencies Trialed (Tolerate trials) pureed textures;mechanical ground textures  -MS    Desired Outcome (Tolerate trials) without signs/symptoms of aspiration;without signs of distress;with adequate oral prep/transit/clearance  -MS    [place] (Tolerate trials) independently (over 90% accuracy)  -MS    Time Frame (Tolerate trials) by discharge  -MS    Progress/Outcomes (Tolerate trials) new goal  -MS              User Key  (r) = Recorded By, (t) = Taken By, (c) = Cosigned By      Initials Name Provider Type    Jose Luis Clarke, SLP Speech and Language Pathologist                  VIJAY Tsai  2/22/2025

## 2025-02-23 LAB
ANION GAP SERPL CALCULATED.3IONS-SCNC: 11.7 MMOL/L (ref 5–15)
BASOPHILS # BLD AUTO: 0.02 10*3/MM3 (ref 0–0.2)
BASOPHILS NFR BLD AUTO: 0.5 % (ref 0–1.5)
BUN SERPL-MCNC: 13 MG/DL (ref 8–23)
BUN/CREAT SERPL: 14 (ref 7–25)
CALCIUM SPEC-SCNC: 9.7 MG/DL (ref 8.6–10.5)
CHLORIDE SERPL-SCNC: 103 MMOL/L (ref 98–107)
CO2 SERPL-SCNC: 25.3 MMOL/L (ref 22–29)
CREAT SERPL-MCNC: 0.93 MG/DL (ref 0.57–1)
DEPRECATED RDW RBC AUTO: 62.1 FL (ref 37–54)
EGFRCR SERPLBLD CKD-EPI 2021: 60 ML/MIN/1.73
EOSINOPHIL # BLD AUTO: 0.02 10*3/MM3 (ref 0–0.4)
EOSINOPHIL NFR BLD AUTO: 0.5 % (ref 0.3–6.2)
ERYTHROCYTE [DISTWIDTH] IN BLOOD BY AUTOMATED COUNT: 19.6 % (ref 12.3–15.4)
GLUCOSE BLDC GLUCOMTR-MCNC: 81 MG/DL (ref 70–105)
GLUCOSE SERPL-MCNC: 81 MG/DL (ref 65–99)
HCT VFR BLD AUTO: 32.1 % (ref 34–46.6)
HGB BLD-MCNC: 10 G/DL (ref 12–15.9)
IMM GRANULOCYTES # BLD AUTO: 0.01 10*3/MM3 (ref 0–0.05)
IMM GRANULOCYTES NFR BLD AUTO: 0.3 % (ref 0–0.5)
LYMPHOCYTES # BLD AUTO: 0.85 10*3/MM3 (ref 0.7–3.1)
LYMPHOCYTES NFR BLD AUTO: 22 % (ref 19.6–45.3)
MCH RBC QN AUTO: 27.2 PG (ref 26.6–33)
MCHC RBC AUTO-ENTMCNC: 31.2 G/DL (ref 31.5–35.7)
MCV RBC AUTO: 87.5 FL (ref 79–97)
MONOCYTES # BLD AUTO: 0.92 10*3/MM3 (ref 0.1–0.9)
MONOCYTES NFR BLD AUTO: 23.8 % (ref 5–12)
NEUTROPHILS NFR BLD AUTO: 2.04 10*3/MM3 (ref 1.7–7)
NEUTROPHILS NFR BLD AUTO: 52.9 % (ref 42.7–76)
NRBC BLD AUTO-RTO: 0 /100 WBC (ref 0–0.2)
PLATELET # BLD AUTO: 275 10*3/MM3 (ref 140–450)
PMV BLD AUTO: 10.4 FL (ref 6–12)
POTASSIUM SERPL-SCNC: 3.7 MMOL/L (ref 3.5–5.2)
RBC # BLD AUTO: 3.67 10*6/MM3 (ref 3.77–5.28)
SODIUM SERPL-SCNC: 140 MMOL/L (ref 136–145)
WBC NRBC COR # BLD AUTO: 3.86 10*3/MM3 (ref 3.4–10.8)

## 2025-02-23 PROCEDURE — 97166 OT EVAL MOD COMPLEX 45 MIN: CPT

## 2025-02-23 PROCEDURE — 82948 REAGENT STRIP/BLOOD GLUCOSE: CPT

## 2025-02-23 RX ADMIN — LACOSAMIDE 50 MG: 50 TABLET, FILM COATED ORAL at 08:00

## 2025-02-23 RX ADMIN — APIXABAN 2.5 MG: 2.5 TABLET, FILM COATED ORAL at 08:00

## 2025-02-23 RX ADMIN — RANOLAZINE 500 MG: 500 TABLET, FILM COATED, EXTENDED RELEASE ORAL at 21:00

## 2025-02-23 RX ADMIN — Medication 10 ML: at 08:01

## 2025-02-23 RX ADMIN — FUROSEMIDE 20 MG: 20 TABLET ORAL at 08:00

## 2025-02-23 RX ADMIN — OSELTAMIVIR PHOSPHATE 30 MG: 6 FOR SUSPENSION ORAL at 21:00

## 2025-02-23 RX ADMIN — CLONIDINE HYDROCHLORIDE 0.1 MG: 0.1 TABLET ORAL at 16:30

## 2025-02-23 RX ADMIN — OSELTAMIVIR PHOSPHATE 30 MG: 6 FOR SUSPENSION ORAL at 08:02

## 2025-02-23 RX ADMIN — HYDRALAZINE HYDROCHLORIDE 25 MG: 25 TABLET ORAL at 15:03

## 2025-02-23 RX ADMIN — PANTOPRAZOLE SODIUM 40 MG: 40 TABLET, DELAYED RELEASE ORAL at 08:00

## 2025-02-23 RX ADMIN — LEVOTHYROXINE SODIUM 50 MCG: 50 TABLET ORAL at 06:22

## 2025-02-23 RX ADMIN — HYDRALAZINE HYDROCHLORIDE 25 MG: 25 TABLET ORAL at 21:01

## 2025-02-23 RX ADMIN — HYDRALAZINE HYDROCHLORIDE 25 MG: 25 TABLET ORAL at 06:22

## 2025-02-23 RX ADMIN — RANOLAZINE 500 MG: 500 TABLET, FILM COATED, EXTENDED RELEASE ORAL at 08:00

## 2025-02-23 RX ADMIN — Medication 10 ML: at 21:01

## 2025-02-23 RX ADMIN — APIXABAN 2.5 MG: 2.5 TABLET, FILM COATED ORAL at 21:01

## 2025-02-23 NOTE — PLAN OF CARE
Goal Outcome Evaluation:  Plan of Care Reviewed With: patient   Remains on Droplet Precaution/Flu-A. Continues Tamiflu with no adverse side effects noted. Denies SOA. HOB elevated. Son present at bedside this evening.  Had loose b.m.x1. Potassium level was 3.7 this shift. Fluids encouraged.

## 2025-02-23 NOTE — PROGRESS NOTES
[facility] MEDICINE SERVICE  DAILY PROGRESS NOTE    NAME: Kendy Worrell  : 1940  MRN: [id number]      LOS: 1 day     PROVIDER OF SERVICE: Neda Trevizo MD    Chief Complaint: Influenza A    Subjective:     Interval History:    Patient seen and evaluated at bedside. Patient hard of hearing. No new complaints this morning. Planning for SNF at discharge. Discussed with CM.     Treatment plan discussed with patient. All questions addressed.     Review of Systems:   Denies fevers, chills  Denies chest pain, edema  Denies shortness of breath, +cough  Denies nausea, vomiting, diarrhea  Denies dysuria, hematuria    Objective:     Vital Signs  Temp:  [97.5 °F (36.4 °C)-98 °F (36.7 °C)] 97.9 °F (36.6 °C)  Heart Rate:  [77-96] 81  Resp:  [20-26] 20  BP: (138-171)/() 138/87   Body mass index is 28.13 kg/m².    Physical Exam   General: No acute distress, alert and oriented, [place]  CV: RRR, no peripheral edema  Pulm: CTAB, no increased work of breathing, on RA  Abd: Soft, nontender, nondistended  Skin: No rashes or lesions on exposed skin  Psych: Appropriate mood and affect    Scheduled Meds   apixaban, 2.5 mg, Oral, BID  furosemide, 20 mg, Oral, Daily  hydrALAZINE, 25 mg, Oral, Q8H  lacosamide, 50 mg, Oral, Daily  levothyroxine, 50 mcg, Oral, Q AM  oseltamivir, 30 mg, Oral, Q12H  pantoprazole, 40 mg, Oral, QAM AC  ranolazine, 500 mg, Oral, BID  sodium chloride, 10 mL, Intravenous, Q12H       PRN Meds     acetaminophen **OR** [DISCONTINUED] acetaminophen **OR** [DISCONTINUED] acetaminophen    senna-docusate sodium **AND** polyethylene glycol **AND** bisacodyl **AND** bisacodyl    Calcium Replacement - Follow Nurse / BPA Driven Protocol    cloNIDine    Magnesium Standard Dose Replacement - Follow Nurse / BPA Driven Protocol    melatonin    ondansetron    Pharmacy to Dose Oseltamivir (TAMIFLU)    Phosphorus Replacement - Follow Nurse / BPA Driven Protocol    Potassium Replacement - Follow Nurse / BPA Driven  Protocol    sodium chloride    sodium chloride    sodium chloride   Infusions  Pharmacy to Dose Oseltamivir (TAMIFLU),           Diagnostic Data    Results from last 7 days   Lab Units 02/22/25  2300 02/21/25  0614 02/21/25  0213   WBC 10*3/mm3 3.86   < >  --    HEMOGLOBIN g/dL 10.0*   < >  --    HEMATOCRIT % 32.1*   < >  --    PLATELETS 10*3/mm3 275   < >  --    GLUCOSE mg/dL 81   < > 107*   CREATININE mg/dL 0.93   < > 0.88   BUN mg/dL 13   < > 11   SODIUM mmol/L 140   < > 135*   POTASSIUM mmol/L 3.7   < > 4.0   AST (SGOT) U/L  --   --  25   ALT (SGPT) U/L  --   --  10   ALK PHOS U/L  --   --  106   BILIRUBIN mg/dL  --   --  0.7   ANION GAP mmol/L 11.7   < > 8.6    < > = values in this interval not displayed.       No radiology results for the last day    Interval results reviewed.    Assessment/Plan:     Acute hypoxic respiratory failure  Acute influenza a infection  - Weaned to room air, satting well  - Tamiflu due to high risk patient  - Continue supportive care    Reported altered mental status  - Likely in setting of above infectious process, now resolved  - Delirium precautions in at risk patient    Hypokalemia  - Replete per protocol  - Repeat BMP in AM    History of iron deficiency anemia  - Continue iron supplementation  - Repeat CBC in AM    Hypothyroidism  - Continue levothyroxine    Chronic heart failure  Coronary artery disease  Hypertension  Orthostatic hypotension  Hyperlipidemia  Atrial fibrillation  - Continue eliquis, lasix, ranexa  - Continue home prn clonidine for hypertension in setting of history of orthostasis  - Started on hydralazine with improvement in hypertension    Treatment plan discussed with nursing staff.    VTE Prophylaxis:  Pharmacologic & mechanical VTE prophylaxis orders are present.    Code status is   Code Status and Medical Interventions: No CPR (Do Not Attempt to Resuscitate); Limited Support; No intubation (DNI)   Ordered at: 02/21/25 1634     Medical Intervention Limits:     No intubation (DNI)     Code Status (Patient has no pulse and is not breathing):    No CPR (Do Not Attempt to Resuscitate)     Medical Interventions (Patient has pulse or is breathing):    Limited Support       Plan for disposition: SNF 2/24/25    Barriers to discharge: Placement, supportive care    Time: 35+ minutes     Signature: Electronically signed by Neda Trevizo MD, 02/23/25, 07:50 EST.  [facility], [place], [facility]

## 2025-02-23 NOTE — CASE MANAGEMENT/SOCIAL WORK
Continued Stay Note   David     Patient Name: Kendy Worrell  MRN: [id number]  Today's Date: 2/23/2025    Admit Date: 2/21/2025    Plan: [facility], pending acceptance. No precert required. Will need PASRR.   Discharge Plan       Row Name 02/23/25 0829       Plan    Plan [facility], pending acceptance. No precert required. Will need PASRR.    Plan Comments CM received message from Dr. Trevizo inquiring about pt's d/c to [facility]. CM noted prior CM note but no referral in place. CM placed referral in epic basket and notified facility liaisonRhona.             Expected Discharge Date and Time       Expected Discharge Date Expected Discharge Time    Feb 22, 2025           Jen Diaz RN     Office Phone: [phone]  Office Cell: [phone]

## 2025-02-23 NOTE — THERAPY EVALUATION
Patient Name: Kendy Worrell  : 1940    MRN: [id number]                              Today's Date: 2025       Admit Date: 2025    Visit Dx:     ICD-10-CM ICD-9-CM   1. Altered mental status, unspecified altered mental status type  R41.82 780.97   2. Influenza A  J10.1 487.1   3. Hypoxia  R09.02 799.02     Patient Active Problem List   Diagnosis    Essential hypertension    Acquired hypothyroidism    Coronary artery disease involving native coronary artery of native heart without angina pectoris    Hyperlipidemia    Arthritis    Skin lesion of chest wall    NSTEMI (non-ST elevated myocardial infarction)    Supraventricular tachycardia    Normal cardiac stress test    Gastrointestinal bleed    Angina effort    Morbidly obese    Bilateral pulmonary embolism    Persistent atrial fibrillation    UTI (urinary tract infection)    Chronic diastolic CHF (congestive heart failure)    Acute ischemic stroke    Chest pain    GI bleed    Closed fibular fracture    Anemia    Hyponatremia    Orthostatic dizziness    Frequent falls    Generalized weakness    Chronic heart failure with preserved ejection fraction (HFpEF)    Influenza A     Past Medical History:   Diagnosis Date    A-fib     Arthritis     Breast cancer     CHF (congestive heart failure)     CVA (cerebral vascular accident) 2019    History of pulmonary embolus (PE)     Hyperlipidemia     Hypertension     Hyperthyroidism     patient has hypothyroidism    Hypothyroidism      Past Surgical History:   Procedure Laterality Date    BREAST BIOPSY      BREAST SURGERY Right     CHOLECYSTECTOMY      COLON SURGERY      Removed    COLONOSCOPY      COLONOSCOPY N/A 11/3/2022    Procedure: COLONOSCOPY to anastamosis with biopsies and cold snare polypectomy;  Surgeon: Saroj Oakes MD;  Location: [facility] ENDOSCOPY;  Service: Gastroenterology;  Laterality: N/A;  PRe: rectal bleeding  Post: hemorrhoids, diverticulosis, anastamotic ulcer, polyp, hemostasis clip  free-floating    HYSTERECTOMY      MAMMO BILATERAL  2015    MASTECTOMY      TONSILLECTOMY        General Information       Row Name 02/23/25 1133          OT Time and Intention    Document Type evaluation  -MM     Mode of Treatment occupational therapy  -MM       Row Name 02/23/25 1133          General Information    Prior Level of Function independent:;ADL's;all household mobility  -MM     Existing Precautions/Restrictions fall  -MM     Barriers to Rehab none identified  -MM       Row Name 02/23/25 1133          Living Environment    People in Home facility resident;other (see comments)  GARY  -MM       Row Name 02/23/25 1133          Cognition    Orientation Status (Cognition) oriented x 4  -MM       Row Name 02/23/25 1133          Safety Issues/Impairments Affecting Functional Mobility    Impairments Affecting Function (Mobility) balance;strength;endurance/activity tolerance  -MM               User Key  (r) = Recorded By, (t) = Taken By, (c) = Cosigned By      Initials Name Provider Type    MM Jose Luis Navarrete OT Occupational Therapist                     Mobility/ADL's       Row Name 02/23/25 1139          Bed Mobility    Bed Mobility bed mobility (all) activities  -MM     All Activities, [place] (Bed Mobility) moderate assist (50% patient effort)  -MM       Row Name 02/23/25 1139          Transfers    Transfers sit-stand transfer  -MM       Row Name 02/23/25 1139          Sit-Stand Transfer    Sit-Stand [place] (Transfers) minimum assist (75% patient effort)  -MM     Assistive Device (Sit-Stand Transfers) walker, front-wheeled  -MM               User Key  (r) = Recorded By, (t) = Taken By, (c) = Cosigned By      Initials Name Provider Type    Jose Luis Pleitez OT Occupational Therapist                   Obj/Interventions       Row Name 02/23/25 1139          Range of Motion Comprehensive    General Range of Motion bilateral upper extremity ROM WFL  -MM       Row Name 02/23/25 1139          Strength  Comprehensive (MMT)    General Manual Muscle Testing (MMT) Assessment upper extremity strength deficits identified  -MM     Comment, General Manual Muscle Testing (MMT) Assessment BUE Strength grossly 3+/5  -MM               User Key  (r) = Recorded By, (t) = Taken By, (c) = Cosigned By      Initials Name Provider Type    Jose Luis Pleitez OT Occupational Therapist                   Goals/Plan       Row Name 02/23/25 1142          Bed Mobility Goal 1 (OT)    Activity/Assistive Device (Bed Mobility Goal 1, OT) bed mobility activities, all  -MM     [place] Level/Cues Needed (Bed Mobility Goal 1, OT) supervision required  -MM     Time Frame (Bed Mobility Goal 1, OT) 2 weeks  -MM       Row Name 02/23/25 1142          Transfer Goal 1 (OT)    Activity/Assistive Device (Transfer Goal 1, OT) transfers, all  -MM     [place] Level/Cues Needed (Transfer Goal 1, OT) supervision required  -MM     Time Frame (Transfer Goal 1, OT) 2 weeks  -MM       Row Name 02/23/25 1142          Toileting Goal 1 (OT)    Activity/Device (Toileting Goal 1, OT) toileting skills, all  -MM     [place] Level/Cues Needed (Toileting Goal 1, OT) standby assist  -MM     Time Frame (Toileting Goal 1, OT) 2 weeks  -MM       Row Name 02/23/25 1142          Therapy Assessment/Plan (OT)    Planned Therapy Interventions (OT) activity tolerance training;functional balance retraining;BADL retraining;patient/caregiver education/training;neuromuscular control/coordination retraining;transfer/mobility retraining;strengthening exercise;ROM/therapeutic exercise  -MM               User Key  (r) = Recorded By, (t) = Taken By, (c) = Cosigned By      Initials Name Provider Type    Jose Luis Pleitez OT Occupational Therapist                   Clinical Impression       Row Name 02/23/25 1140          Pain Assessment    Pretreatment Pain Rating 0/10 - no pain  -MM     Posttreatment Pain Rating 0/10 - no pain  -MM       Row Name 02/23/25 1140          Plan  of Care Review    Plan of Care Reviewed With patient  -MM     Outcome Evaluation Pt is an 84 y/o F admitted for acute resp failiure and (+) Flu A.  Pt lives at [facility] and reports she was independent with ADLs, uses a w/c outside of apt and rollator inside.  Pt reports several falls.  Pt completed bed mobility with MOD A.  Pt completed sit to stand transfer with MIN A.  Pt presents with deficits in strength, self care, balance, endurance, and increased falls risk indicating need for skilled OT services.  OT recommending SNF.  -MM       Row Name 02/23/25 1140          Therapy Assessment/Plan (OT)    Criteria for Skilled Therapeutic Interventions Met (OT) yes;meets criteria  -MM     Therapy Frequency (OT) 3 times/wk  -MM     Predicted Duration of Therapy Intervention (OT) until DC  -MM       Row Name 02/23/25 1140          Therapy Plan Review/Discharge Plan (OT)    Anticipated Discharge Disposition (OT) skilled nursing facility  -MM       Row Name 02/23/25 1140          Positioning and Restraints    Pre-Treatment Position in bed  -MM     Post Treatment Position bed  -MM     In Bed notified nsg;call light within reach;encouraged to call for assist;exit alarm on  -MM               User Key  (r) = Recorded By, (t) = Taken By, (c) = Cosigned By      Initials Name Provider Type    MM Jose Luis Navarrete, OT Occupational Therapist                   Outcome Measures       Row Name 02/23/25 1142          How much help from another is currently needed...    Putting on and taking off regular lower body clothing? 2  -MM     Bathing (including washing, rinsing, and drying) 2  -MM     Toileting (which includes using toilet bed pan or urinal) 2  -MM     Putting on and taking off regular upper body clothing 3  -MM     Taking care of personal grooming (such as brushing teeth) 3  -MM     Eating meals 4  -MM     AM-PAC 6 Clicks Score (OT) 16  -MM       Row Name 02/23/25 0700          How much help from another person do you  currently need...    Turning from your back to your side while in flat bed without using bedrails? 3  -KM     Moving from lying on back to sitting on the side of a flat bed without bedrails? 2  -KM     Moving to and from a bed to a chair (including a wheelchair)? 2  -KM     Standing up from a chair using your arms (e.g., wheelchair, bedside chair)? 2  -KM     Climbing 3-5 steps with a railing? 1  -KM     To walk in hospital room? 2  -KM     AM-PAC 6 Clicks Score (PT) 12  -KM     Highest Level of Mobility Goal 4 --> Transfer to chair/commode  -KM       Row Name 02/23/25 1142          Functional Assessment    Outcome Measure Options AM-PAC 6 Clicks Daily Activity (OT)  -MM               User Key  (r) = Recorded By, (t) = Taken By, (c) = Cosigned By      Initials Name Provider Type    Jose Luis Pleitez OT Occupational Therapist    Jackie Méndez, RN Registered Nurse                    Occupational Therapy Education       Title: PT OT SLP Therapies (In Progress)       Topic: Occupational Therapy (In Progress)       Point: ADL training (Done)       Description:   Instruct learner(s) on proper safety adaptation and remediation techniques during self care or transfers.   Instruct in proper use of assistive devices.                  Learning Progress Summary            Patient Acceptance, E, VU by KATHERYN at 2/23/2025 1143                      Point: Home exercise program (Not Started)       Description:   Instruct learner(s) on appropriate technique for monitoring, assisting and/or progressing therapeutic exercises/activities.                  Learner Progress:  Not documented in this visit.              Point: Precautions (Not Started)       Description:   Instruct learner(s) on prescribed precautions during self-care and functional transfers.                  Learner Progress:  Not documented in this visit.              Point: Body mechanics (Not Started)       Description:   Instruct learner(s) on proper positioning  and spine alignment during self-care, functional mobility activities and/or exercises.                  Learner Progress:  Not documented in this visit.                              User Key       Initials Effective Dates Name Provider Type Discipline     12/21/23 -  Jose Luis Navarrete OT Occupational Therapist OT                  OT Recommendation and Plan  Planned Therapy Interventions (OT): activity tolerance training, functional balance retraining, BADL retraining, patient/caregiver education/training, neuromuscular control/coordination retraining, transfer/mobility retraining, strengthening exercise, ROM/therapeutic exercise  Therapy Frequency (OT): 3 times/wk  Plan of Care Review  Plan of Care Reviewed With: patient  Outcome Evaluation: Pt is an 84 y/o F admitted for acute resp failiure and (+) Flu A.  Pt lives at [facility] and reports she was independent with ADLs, uses a w/c outside of apt and rollator inside.  Pt reports several falls.  Pt completed bed mobility with MOD A.  Pt completed sit to stand transfer with MIN A.  Pt presents with deficits in strength, self care, balance, endurance, and increased falls risk indicating need for skilled OT services.  OT recommending SNF.     Time Calculation:         Time Calculation- OT       Row Name 02/23/25 1144             Time Calculation- OT    OT Start Time 0958  -MM      OT Stop Time 1020  -MM      OT Time Calculation (min) 22 min  -MM      Total Timed Code Minutes- OT 0 minute(s)  -MM      OT Received On 02/23/25  -MM      OT - Next Appointment 02/24/25  -MM      OT Goal Re-Cert Due Date 03/09/25  -MM                User Key  (r) = Recorded By, (t) = Taken By, (c) = Cosigned By      Initials Name Provider Type     Jose Luis Navarrete OT Occupational Therapist                  Therapy Charges for Today       Code Description Service Date Service Provider Modifiers Qty    [id number]  OT EVAL MOD COMPLEXITY 4 2/23/2025 Jose Luis Navarrete OT GO 1                  Jose Luis Navarrete, OT  2/23/2025

## 2025-02-23 NOTE — PLAN OF CARE
Goal Outcome Evaluation:  Plan of Care Reviewed With: patient           Outcome Evaluation: Pt is an 84 y/o F admitted for acute resp failiure and (+) Flu A.  Pt lives at [facility] and reports she was independent with ADLs, uses a w/c outside of apt and rollator inside.  Pt reports several falls.  Pt completed bed mobility with MOD A.  Pt completed sit to stand transfer with MIN A.  Pt presents with deficits in strength, self care, balance, endurance, and increased falls risk indicating need for skilled OT services.  OT recommending SNF.    Anticipated Discharge Disposition (OT): skilled nursing facility

## 2025-02-23 NOTE — DISCHARGE PLACEMENT REQUEST
"Oseas Worrell (85 y.o. Female)       Date of Birth   1940    Social Security Number       Address   [address]  [place] [address]    Home Phone   [phone]    MRN   [id number]       Sikhism   Restorationism    Marital Status                               Admission Date   2/21/25    Admission Type   Emergency    Admitting Provider   Neda Trevizo MD    Attending Provider   Neda Trevizo MD    Department, Room/Bed   [facility] INPATIENT, 309/1       Discharge Date       Discharge Disposition       Discharge Destination                                 Attending Provider: Neda Trevizo MD    Allergies: Aspirin, Nsaids    Isolation: Droplet   Infection: Influenza (02/21/25)   Code Status: No CPR    Ht: 144.8 cm (57\")   Wt: 59 kg (130 lb)    Admission Cmt: None   Principal Problem: Influenza A [J10.1]                   Active Insurance as of 2/21/2025       Primary Coverage       Payor Plan Insurance Group Employer/Plan Group    MEDICARE MEDICARE A & B        Payor Plan Address Payor Plan Phone Number Payor Plan Fax Number Effective Dates    [address] [phone]  2/1/2004 - None Entered    [facility] [id number]         Subscriber Name Subscriber Birth Date Member ID       OSEAS WORRELL 1940 [id number]               Secondary Coverage       Payor Plan Insurance Group Employer/Plan Group    [facility] COMMUNITY PLAN OF [facility] COMMUNITY PLAN OF [facility]        Payor Plan Address Payor Plan Phone Number Payor Plan Fax Number Effective Dates    [address]   7/4/2024 - None Entered    [facility] [id number]         Subscriber Name Subscriber Birth Date Member ID       OSEAS WORRELL 1940 [id number]                     Emergency Contacts        (Rel.) Home Phone Work Phone Mobile Phone    Ramandeep PROCTOR (Daughter) [phone] -- [phone]    JUSTYNHUGO (Son) -- -- [phone]    " Akiko Worrell (Other) -- -- [phone]    JOVANNA PROCTOR (Grandchild) -- -- [phone]

## 2025-02-24 VITALS
OXYGEN SATURATION: 96 % | SYSTOLIC BLOOD PRESSURE: 135 MMHG | HEIGHT: 57 IN | WEIGHT: 130 LBS | HEART RATE: 77 BPM | RESPIRATION RATE: 28 BRPM | DIASTOLIC BLOOD PRESSURE: 91 MMHG | BODY MASS INDEX: 28.05 KG/M2 | TEMPERATURE: 98.1 F

## 2025-02-24 LAB
ANION GAP SERPL CALCULATED.3IONS-SCNC: 8.6 MMOL/L (ref 5–15)
BUN SERPL-MCNC: 12 MG/DL (ref 8–23)
BUN/CREAT SERPL: 14.1 (ref 7–25)
CALCIUM SPEC-SCNC: 9.5 MG/DL (ref 8.6–10.5)
CHLORIDE SERPL-SCNC: 102 MMOL/L (ref 98–107)
CO2 SERPL-SCNC: 25.4 MMOL/L (ref 22–29)
CREAT SERPL-MCNC: 0.85 MG/DL (ref 0.57–1)
DEPRECATED RDW RBC AUTO: 58.9 FL (ref 37–54)
EGFRCR SERPLBLD CKD-EPI 2021: 66.8 ML/MIN/1.73
ERYTHROCYTE [DISTWIDTH] IN BLOOD BY AUTOMATED COUNT: 19.1 % (ref 12.3–15.4)
GLUCOSE BLDC GLUCOMTR-MCNC: 141 MG/DL (ref 70–105)
GLUCOSE SERPL-MCNC: 83 MG/DL (ref 65–99)
HCT VFR BLD AUTO: 29.6 % (ref 34–46.6)
HGB BLD-MCNC: 9.3 G/DL (ref 12–15.9)
MCH RBC QN AUTO: 26.7 PG (ref 26.6–33)
MCHC RBC AUTO-ENTMCNC: 31.4 G/DL (ref 31.5–35.7)
MCV RBC AUTO: 85.1 FL (ref 79–97)
PLATELET # BLD AUTO: 274 10*3/MM3 (ref 140–450)
PMV BLD AUTO: 10.2 FL (ref 6–12)
POTASSIUM SERPL-SCNC: 3.3 MMOL/L (ref 3.5–5.2)
RBC # BLD AUTO: 3.48 10*6/MM3 (ref 3.77–5.28)
SODIUM SERPL-SCNC: 136 MMOL/L (ref 136–145)
WBC NRBC COR # BLD AUTO: 3.33 10*3/MM3 (ref 3.4–10.8)

## 2025-02-24 PROCEDURE — 85027 COMPLETE CBC AUTOMATED: CPT | Performed by: STUDENT IN AN ORGANIZED HEALTH CARE EDUCATION/TRAINING PROGRAM

## 2025-02-24 PROCEDURE — 92526 ORAL FUNCTION THERAPY: CPT

## 2025-02-24 PROCEDURE — 82948 REAGENT STRIP/BLOOD GLUCOSE: CPT

## 2025-02-24 PROCEDURE — 80048 BASIC METABOLIC PNL TOTAL CA: CPT | Performed by: STUDENT IN AN ORGANIZED HEALTH CARE EDUCATION/TRAINING PROGRAM

## 2025-02-24 RX ORDER — OSELTAMIVIR PHOSPHATE 6 MG/ML
30 FOR SUSPENSION ORAL EVERY 12 HOURS SCHEDULED
Qty: 25 ML | Refills: 0 | Status: SHIPPED | OUTPATIENT
Start: 2025-02-24 | End: 2025-02-27

## 2025-02-24 RX ORDER — HYDRALAZINE HYDROCHLORIDE 25 MG/1
25 TABLET, FILM COATED ORAL EVERY 8 HOURS SCHEDULED
Qty: 90 TABLET | Refills: 0 | Status: SHIPPED | OUTPATIENT
Start: 2025-02-24 | End: 2025-03-26

## 2025-02-24 RX ORDER — POTASSIUM CHLORIDE 1.5 G/1.58G
40 POWDER, FOR SOLUTION ORAL EVERY 4 HOURS
Status: DISCONTINUED | OUTPATIENT
Start: 2025-02-24 | End: 2025-02-24

## 2025-02-24 RX ORDER — POTASSIUM CHLORIDE 1500 MG/1
40 TABLET, EXTENDED RELEASE ORAL EVERY 4 HOURS
Status: COMPLETED | OUTPATIENT
Start: 2025-02-24 | End: 2025-02-24

## 2025-02-24 RX ADMIN — RANOLAZINE 500 MG: 500 TABLET, FILM COATED, EXTENDED RELEASE ORAL at 09:08

## 2025-02-24 RX ADMIN — POTASSIUM CHLORIDE 40 MEQ: 1500 TABLET, EXTENDED RELEASE ORAL at 09:08

## 2025-02-24 RX ADMIN — FUROSEMIDE 20 MG: 20 TABLET ORAL at 09:07

## 2025-02-24 RX ADMIN — POTASSIUM CHLORIDE 40 MEQ: 1500 TABLET, EXTENDED RELEASE ORAL at 13:20

## 2025-02-24 RX ADMIN — LACOSAMIDE 50 MG: 50 TABLET, FILM COATED ORAL at 09:07

## 2025-02-24 RX ADMIN — HYDRALAZINE HYDROCHLORIDE 25 MG: 25 TABLET ORAL at 05:31

## 2025-02-24 RX ADMIN — HYDRALAZINE HYDROCHLORIDE 25 MG: 25 TABLET ORAL at 13:20

## 2025-02-24 RX ADMIN — LEVOTHYROXINE SODIUM 50 MCG: 50 TABLET ORAL at 05:30

## 2025-02-24 RX ADMIN — Medication 10 ML: at 09:08

## 2025-02-24 RX ADMIN — OSELTAMIVIR PHOSPHATE 30 MG: 6 FOR SUSPENSION ORAL at 09:09

## 2025-02-24 RX ADMIN — PANTOPRAZOLE SODIUM 40 MG: 40 TABLET, DELAYED RELEASE ORAL at 09:07

## 2025-02-24 NOTE — PLAN OF CARE
Problem: Skin Injury Risk Increased  Goal: Skin Health and Integrity  Outcome: Progressing  Intervention: Optimize Skin Protection  Recent Flowsheet Documentation  Taken 2/24/2025 1000 by Eva Carl, RN  Activity Management: activity encouraged  Head of Bed (HOB) Positioning: HOB at 20-30 degrees  Taken 2/24/2025 0800 by Eva Carl, RN  Activity Management: activity encouraged  Head of Bed (HOB) Positioning: HOB at 30 degrees   Goal Outcome Evaluation:

## 2025-02-24 NOTE — PLAN OF CARE
Goal Outcome Evaluation: Patient is hard of hearing but is alert and oriented times four. Patient able to voice her needs. Patient has a croupy cough that is productive at times. Patient has had several bowel movements during Sunday. Patient was switched over to a pureed diet by Speech. Call light with in reach. Plan of care on going.

## 2025-02-24 NOTE — CONSULTS
Nutrition Services    Patient Name: Kendy Worrell  YOB: 1940  MRN: [id number]  Admission date: 2/21/2025    Comment:    Continue current po diet per SLP    CLINICAL NUTRITION ASSESSMENT      Reason for Assessment 2/23: Malnutrition screening tool score of 4, Nursing admission screening consult     H&P      Past Medical History:   Diagnosis Date    A-fib     Arthritis     Breast cancer     CHF (congestive heart failure)     CVA (cerebral vascular accident) 09/2019    History of pulmonary embolus (PE)     Hyperlipidemia     Hypertension     Hyperthyroidism     patient has hypothyroidism    Hypothyroidism        Past Surgical History:   Procedure Laterality Date    BREAST BIOPSY      BREAST SURGERY Right     CHOLECYSTECTOMY      COLON SURGERY      Removed    COLONOSCOPY      COLONOSCOPY N/A 11/3/2022    Procedure: COLONOSCOPY to anastamosis with biopsies and cold snare polypectomy;  Surgeon: Saroj Oakes MD;  Location: [facility] ENDOSCOPY;  Service: Gastroenterology;  Laterality: N/A;  PRe: rectal bleeding  Post: hemorrhoids, diverticulosis, anastamotic ulcer, polyp, hemostasis clip free-floating    HYSTERECTOMY      MAMMO BILATERAL  2015    MASTECTOMY      TONSILLECTOMY          Current Problems     Acute Respiratory Failure with Hypoxia  Reported Confusion from nursing staff at facility  -In the setting of Influenza A  -Supportive care     HX of Orthostatic Hypotension     Chronic Anemia  -Hemoglobin 10.2/hematocrit 32.6     Hypothyroidism     Chronic CHF  CAD  HTN  HLD   Atrial Fibrillation       Encounter Information        Trending Narrative     2/23: Pt presented to ED on 2/21 from nursing facility with reports of staff that pt was confused and found to have decreased oxygen saturation upon arrival. Pt tested positive for influenza A. SLP evaluated pt yesterday (2/22) and recommended that pt receive a Pureed diet with thin liquids. Pt is tolerating po diet without noted issues at this time.  "RD unable to visit pt in person at this time. Will attempt to perform physical exam at a later time.      Anthropometrics        Current Height, Weight Height: 144.8 cm (57\")  Weight: 59 kg (130 lb) (02/21/25 0658)       Usual Body Weight (UBW) Unable to obtain from patient        Trending Weight Hx     This admission: 2/23: (last weight 2/21) 130#             PTA: 2/23: 12% weight loss documented in the last 6 months    Wt Readings from Last 30 Encounters:   02/21/25 0658 59 kg (130 lb)   01/30/25 0224 59 kg (130 lb)   12/30/24 1938 56.7 kg (125 lb)   07/06/24 1427 67.1 kg (148 lb)   07/05/24 1937 67.1 kg (148 lb)   05/23/24 2352 69.6 kg (153 lb 7 oz)   05/23/24 1421 70 kg (154 lb 5.2 oz)   05/09/24 1053 69.5 kg (153 lb 3.2 oz)   03/21/24 1417 74.8 kg (165 lb)   02/14/24 1317 76.3 kg (168 lb 3.2 oz)   01/30/24 0600 76.7 kg (169 lb 1.5 oz)   01/29/24 0600 75.6 kg (166 lb 10.7 oz)   01/27/24 0542 74.9 kg (165 lb 2 oz)   01/26/24 0512 77 kg (169 lb 12.1 oz)   01/25/24 0552 72.9 kg (160 lb 11.5 oz)   01/23/24 1045 73.7 kg (162 lb 6.4 oz)   01/23/24 0106 76.5 kg (168 lb 10.4 oz)   01/22/24 1338 79.4 kg (175 lb)   08/03/23 0600 85 kg (187 lb 6.3 oz)   08/02/23 0600 84 kg (185 lb 3 oz)   08/01/23 2300 84 kg (185 lb 3 oz)   08/01/23 1529 81.6 kg (180 lb)   11/06/22 0429 88.3 kg (194 lb 9.6 oz)   11/05/22 0500 86 kg (189 lb 9.5 oz)   11/04/22 0522 86.4 kg (190 lb 7.6 oz)   11/03/22 0500 86.8 kg (191 lb 5.8 oz)   11/02/22 0016 89 kg (196 lb 3.2 oz)   11/01/22 1818 81.6 kg (180 lb)   11/18/21 1825 84.8 kg (186 lb 15.2 oz)   11/18/21 1204 81.6 kg (180 lb)   02/18/20 1201 78.5 kg (173 lb)   09/09/19 0324 79 kg (174 lb 3.2 oz)   09/06/19 0521 81.4 kg (179 lb 6.4 oz)   09/05/19 0500 81.5 kg (179 lb 9.6 oz)   09/04/19 2142 79.5 kg (175 lb 3.2 oz)   09/04/19 1520 76.5 kg (168 lb 11.2 oz)   07/09/19 1146 85.7 kg (189 lb)   04/08/19 1056 87.5 kg (193 lb)   01/10/19 1528 85.3 kg (188 lb)   01/04/19 1326 86.2 kg (190 lb)   12/12/18 " 1515 83.9 kg (185 lb)   09/26/18 1253 86.2 kg (190 lb)   06/20/18 1051 86.2 kg (190 lb)   03/20/18 1118 84.8 kg (187 lb)   12/19/17 1112 84.4 kg (186 lb)   09/14/17 1058 83.9 kg (185 lb)   06/09/17 1320 85.7 kg (189 lb)   04/03/17 1545 83 kg (183 lb)   03/07/17 1325 85.3 kg (188 lb)   12/01/16 1500 88.5 kg (195 lb)   11/03/16 1434 88.5 kg (195 lb)   10/04/16 1102 86.2 kg (190 lb)      BMI kg/m2 Body mass index is 28.13 kg/m².       Labs        Pertinent Labs Reviewed. Management per attending.   K+ replaced yesterday.    Results from last 7 days   Lab Units 02/22/25 2300 02/22/25  0142 02/21/25  0614 02/21/25  0213   SODIUM mmol/L 140 137 138 135*   POTASSIUM mmol/L 3.7 3.0* 4.1 4.0   CHLORIDE mmol/L 103 103 102 100   CO2 mmol/L 25.3 23.9 25.7 26.4   BUN mg/dL 13 12 12 11   CREATININE mg/dL 0.93 0.75 0.95 0.88   CALCIUM mg/dL 9.7 8.9 9.5 9.7   BILIRUBIN mg/dL  --   --   --  0.7   ALK PHOS U/L  --   --   --  106   ALT (SGPT) U/L  --   --   --  10   AST (SGOT) U/L  --   --   --  25   GLUCOSE mg/dL 81 95 97 107*     Results from last 7 days   Lab Units 02/22/25 2300 02/22/25  0142   MAGNESIUM mg/dL  --  2.0   HEMOGLOBIN g/dL 10.0* 9.6*   HEMATOCRIT % 32.1* 30.4*     Lab Results   Component Value Date    HGBA1C 5.53 05/23/2024        Medications    Scheduled Medications apixaban, 2.5 mg, Oral, BID  furosemide, 20 mg, Oral, Daily  hydrALAZINE, 25 mg, Oral, Q8H  lacosamide, 50 mg, Oral, Daily  levothyroxine, 50 mcg, Oral, Q AM  oseltamivir, 30 mg, Oral, Q12H  pantoprazole, 40 mg, Oral, QAM AC  ranolazine, 500 mg, Oral, BID  sodium chloride, 10 mL, Intravenous, Q12H        Infusions Pharmacy to Dose Oseltamivir (TAMIFLU),         PRN Medications   acetaminophen **OR** [DISCONTINUED] acetaminophen **OR** [DISCONTINUED] acetaminophen    senna-docusate sodium **AND** polyethylene glycol **AND** bisacodyl **AND** bisacodyl    Calcium Replacement - Follow Nurse / BPA Driven Protocol    cloNIDine    Magnesium Standard Dose  Replacement - Follow Nurse / BPA Driven Protocol    melatonin    ondansetron    Pharmacy to Dose Oseltamivir (TAMIFLU)    Phosphorus Replacement - Follow Nurse / BPA Driven Protocol    Potassium Replacement - Follow Nurse / BPA Driven Protocol    sodium chloride    sodium chloride    sodium chloride     Physical Findings        Trending Physical   Appearance, NFPE 2/23:  ENRIQUE at this time    --  Edema  2+ L/R legs/ankles  1+ L/R knees/feet     Bowel Function Last documented BM 2/23     Tubes No feeding tube in place      Chewing/Swallowing Pureed diet in place - SLP following      Skin No PI documented      --  Current Nutrition Orders & Evaluation of Intake       Oral Nutrition     Food Allergies NKFA   Current PO Diet Diet: Regular/House; Texture: Pureed (NDD 1); Fluid Consistency: Thin (IDDSI 0)   Supplement None    PO Evaluation     Trending % PO Intake 2/23: % intake   --  Nutritional Risk Screening        NRS-2002 Score          Nutrition Diagnosis         Nutrition Dx Problem 1 Difficulty chewing/swallowing r/t dysphagia as evidenced by pureed diet appropriate for safe po intake per SLP.       Nutrition Dx Problem 2        Intervention Goal         Intervention Goal(s) Tolerate po diet  PO intake > 75%     Nutrition Intervention        RD Action Continue current po diet per SLP     Nutrition Prescription          Diet Prescription Pureed diet    Supplement Prescription None    --  Monitor/Evaluation        Monitor Per protocol, I&O, PO intake, Pertinent labs, Weight, Skin status, GI status, Symptoms, POC/GOC, Swallow function, Hemodynamic stability         Electronically signed by:  Betty Harris RD  02/23/25 22:19 EST

## 2025-02-24 NOTE — CASE MANAGEMENT/SOCIAL WORK
Case Management Readmission Assessment Note    Case Management Readmission Assessment (all recorded)       Readmission Interview       [facility] Name 02/24/25 1105             Readmission Indications    Is the patient and/or family able to complete the readmission assessment questions? Yes  Daughter Ramandeep via phone      Is this hospitalization related to the prior hospital diagnosis? No  admit 1) weakness, admit 2) Flu A        [facility] Name 02/24/25 1105             Recommendation for rehospitalization    Did you speak with your physician prior to coming to the hospital Yes      If yes, what physician did you speak with? facility PCP        Row Name 02/24/25 1105             Follow-up Appointments    Do you have a PCP? Yes      Did you have an appointment with PCP after your hospitalization? Yes      When was your appointment scheduled? 01/31/25      Did you go to appointment? Yes  facility PCP evaluated pt.      Did you have an appointment with a Specialist? No      Are you current with the Pulmonary Clinic? No      Are you current with the CHF Clinic? No        [facility] Name 02/24/25 1105             Medications    Did you have newly prescribed medications at discharge? No      Did you understand the reasons for your medications at discharge and how to take them? Yes      Did you understand the side effects of your medications? Yes      Are you taking all of you prescribed medications? Yes      What pharmacy was used to fill prescription(s)? [facility]      Were medications picked up? Yes  delivered to [facility] Name 02/24/25 1105             Discharge Instructions    Did you understand your discharge instructions? Yes      Did your family/caregiver hear your instructions? Yes  kenji Sabillon      Were you told to eat a special diet? No  Pt eats pureed diet      Did you adhere to the diet? Yes      Were you given a number of someone to call if you had questions or concerns? Yes  on discharge paperwork        Row Name  "02/24/25 1105             Index discharge location/services    Where did you go upon discharge? Skilled Nursing Facility      Do you have supportive family or friends in the home? Yes      Was the provider seen at the facility? Yes      What actions were taken to avoid a readmit? none- pt admitted due to AMS      Which Skilled Nursing Facility were your admitted from? [facility]        Row Name 02/24/25 1105             Discharge Readiness    On a scale of 1-5 (5 being well prepared), how ready were you for discharge 5      Recommendation based on interview Goals of care discussion/advanced care planning;Education on diagnosis/self management        Row Name 02/24/25 1105             Palliative Care/Hospice    Are you current with Palliative Care? No      Are you current with Hospice Care? No        Row Name 02/24/25 1105 02/21/25 1547          Advance Directives (For Healthcare)    Pre-existing AND/MOST/POLST Order Yes, notify physician for order Yes, notify physician for order     Advance Directive Status Patient has advance directive, copy requested Patient has advance directive, copy requested     Type of Advance Directive Health care directive/Living will Durable power of  for health care     Have you reviewed your Advance Directive and is it valid for this stay? Yes No     Literature Provided on Advance Directives No --     Patient Requests Assistance on Advance Directives Patient Declined Patient Declined       Row Name 02/24/25 1603             Readmission Assessment Final Comments    Final Comments 1/29 admit: general weakness/ fall. CT head and xray pelvix negative for fractures. PT/ OT recommending SNF and pt discharged to [facility]. 2-21 pt admitted for AMS, hypoxia and Flu A. O2 in ER in \"the 80's.\" Pt placed on supplimental O2 but able to wean to RA at discharge. Pt discharged back to SNF.                    "

## 2025-02-24 NOTE — DISCHARGE SUMMARY
"             [facility] Medicine Services  Discharge Summary    Date of Service: 25  Patient Name: Kendy Worrell  : 1940  MRN: [id number]    Date of Admission: 2025  Discharge Diagnosis: Acute influenza a infection  Date of Discharge:  25  Primary Care Physician: Marisela Monte APRN    Presenting Problem:   Influenza A [J10.1]  Hypoxia [R09.02]  Altered mental status, unspecified altered mental status type [R41.82]    Active and Resolved Hospital Problems:  Active Hospital Problems    Diagnosis POA    **Influenza A [J10.1] Yes      Resolved Hospital Problems   No resolved problems to display.         Hospital Course     HPI:  Per the H&P written by Love Juarez, dated 25:  \"Kendy Worrell is a 85 y.o. female with a previous medical history of orthostatic BP, atrial fibrillation, CHF, CAD, anemia who presented to [facility] on 2025 after staff at her nursing facility stated that she was confused.  On arrival, the patient's SpO2 was in the 80% range so she was placed on O2.       In the ED, respiratory panel was positive for influenza A. Labwork was unremarkable.  CT of the head showed no acute abnormalities.  She was unable to be weaned of O2 so Hospitalist was consulted for further management.    \"    Hospital Course:  Patient admitted as above. Quickly weaned to room air without issue. Patient hard of hearing which I suspect confounded attempts at assessing mental status. Patient alert and oriented. Did have complaint of cough but otherwise well and remained stable on room air. Started on tamiflu. Evaluated by PT/OT with recommendation for SNF at discharge. Appropriate for discharge with outpatient follow up at this time.     DISCHARGE Follow Up Recommendations for labs and diagnostics:   - Follow up with PCP within 3 days of discharge    Day of Discharge     Vital Signs:  Temp:  [97.6 °F (36.4 °C)-98.1 °F (36.7 °C)] 97.6 °F (36.4 °C)  Heart Rate:  [76-85] " 77  Resp:  [15-26] 26  BP: (131-171)/() 136/80    Physical Exam:   General: No acute distress, alert and oriented, [place]  CV: RRR, no peripheral edema  Pulm: CTAB, no increased work of breathing, on RA  Abd: Soft, nontender, nondistended  Skin: No rashes or lesions on exposed skin  Psych: Appropriate mood and affect    Pertinent  and/or Most Recent Results     LAB RESULTS:      Lab 02/24/25  0346 02/22/25 2300 02/22/25  0142 02/21/25  0614 02/21/25  0114   WBC 3.33* 3.86 4.10 4.74 7.05   HEMOGLOBIN 9.3* 10.0* 9.6* 8.6* 10.2*   HEMATOCRIT 29.6* 32.1* 30.4* 27.7* 32.6*   PLATELETS 274 275 269 260 257   NEUTROS ABS  --  2.04 2.24 3.25 5.37   IMMATURE GRANS (ABS)  --  0.01 0.01 0.02 0.02   LYMPHS ABS  --  0.85 0.83 0.63* 0.67*   MONOS ABS  --  0.92* 1.00* 0.82 0.96*   EOS ABS  --  0.02 0.00 0.00 0.01   MCV 85.1 87.5 87.4 87.1 85.1         Lab 02/24/25  0346 02/22/25 2300 02/22/25 0142 02/21/25  0614 02/21/25  0213   SODIUM 136 140 137 138 135*   POTASSIUM 3.3* 3.7 3.0* 4.1 4.0   CHLORIDE 102 103 103 102 100   CO2 25.4 25.3 23.9 25.7 26.4   ANION GAP 8.6 11.7 10.1 10.3 8.6   BUN 12 13 12 12 11   CREATININE 0.85 0.93 0.75 0.95 0.88   EGFR 66.8 60.0* 77.6 58.5* 64.1   GLUCOSE 83 81 95 97 107*   CALCIUM 9.5 9.7 8.9 9.5 9.7   MAGNESIUM  --   --  2.0  --   --          Lab 02/21/25  0213   TOTAL PROTEIN 6.5   ALBUMIN 3.5   GLOBULIN 3.0   ALT (SGPT) 10   AST (SGOT) 25   BILIRUBIN 0.7   ALK PHOS 106                     Brief Urine Lab Results  (Last result in the past 365 days)        Color   Clarity   Blood   Leuk Est   Nitrite   Protein   CREAT   Urine HCG        02/21/25 0211 Yellow   Clear   Negative   Negative   Negative   30 mg/dL (1+)                 Microbiology Results (last 10 days)       Procedure Component Value - Date/Time    COVID-19, FLU A/B, RSV PCR 1 HR TAT - Swab, Nasopharynx [[id number]]  (Abnormal) Collected: 02/21/25 0212    Lab Status: Final result Specimen: Swab from Nasopharynx Updated: 02/21/25  0256     COVID19 Not Detected     Influenza A PCR Detected     Influenza B PCR Not Detected     RSV, PCR Not Detected    Narrative:      Fact sheet for providers: https://www.fda.gov/media/918399/download    Fact sheet for patients: https://www.fda.gov/media/937286/download    Test performed by PCR.    Respiratory Panel PCR w/COVID-19(SARS-CoV-2) VINAYAK/CRISTOBAL/CHRIST/PAD/COR/BINTA In-House, NP Swab in UTM/VTM, 2 HR TAT - Swab, Nasopharynx [[id number]]  (Abnormal) Collected: 02/21/25 0212    Lab Status: Final result Specimen: Swab from Nasopharynx Updated: 02/21/25 0810     ADENOVIRUS, PCR Not Detected     Coronavirus 229E Not Detected     Coronavirus HKU1 Not Detected     Coronavirus NL63 Not Detected     Coronavirus OC43 Not Detected     COVID19 Not Detected     Human Metapneumovirus Not Detected     Human Rhinovirus/Enterovirus Not Detected     Influenza A H1 2009 PCR Detected     Influenza B PCR Not Detected     Parainfluenza Virus 1 Not Detected     Parainfluenza Virus 2 Not Detected     Parainfluenza Virus 3 Not Detected     Parainfluenza Virus 4 Not Detected     RSV, PCR Not Detected     Bordetella pertussis pcr Not Detected     Bordetella parapertussis PCR Not Detected     Chlamydophila pneumoniae PCR Not Detected     Mycoplasma pneumo by PCR Not Detected    Narrative:      In the setting of a positive respiratory panel with a viral infection PLUS a negative procalcitonin without other underlying concern for bacterial infection, consider observing off antibiotics or discontinuation of antibiotics and continue supportive care. If the respiratory panel is positive for atypical bacterial infection (Bordetella pertussis, Chlamydophila pneumoniae, or Mycoplasma pneumoniae), consider antibiotic de-escalation to target atypical bacterial infection.    Urine Culture - Urine, Straight Cath [[id number]]  (Abnormal) Collected: 02/21/25 0211    Lab Status: Preliminary result Specimen: Urine from Straight Cath Updated: 02/23/25 1205      Urine Culture >100,000 CFU/mL Gram Negative Bacilli    Narrative:      Colonization of the urinary tract without infection is common. Treatment is discouraged unless the patient is symptomatic, pregnant, or undergoing an invasive urologic procedure.          CT Head Without Contrast    Result Date: 2/21/2025  Impression: Impression: Atrophy and chronic microvascular ischemic change. No acute intracranial process. Electronically Signed: Kan Catherine MD  2/21/2025 3:13 AM EST  Workstation ID: [id number]    XR Chest 1 View    Result Date: 2/21/2025  Impression: Impression: Cardiomegaly. No active disease. Electronically Signed: Kan Catherine MD  2/21/2025 1:46 AM EST  Workstation ID: [id number]    CT Head Without Contrast    Result Date: 1/29/2025  Impression: 1.No evidence for acute intracranial abnormality. 2.Nonspecific white matter changes are noted with associated diffuse volume loss. These findings are likely related to chronic small vessel ischemic changes and/or age-related changes. 3.Changes of sinusitis are noted. Electronically Signed: Domingo Stahl MD  1/29/2025 7:52 PM EST  Workstation ID: [id number]    XR Pelvis 1 or 2 View    Result Date: 1/29/2025  Impression: 1.No evidence for displaced fracture or dislocation. 2.Moderate degenerative changes are noted. Electronically Signed: Domingo Stahl MD  1/29/2025 7:47 PM EST  Workstation ID: [id number]   Results for orders placed during the hospital encounter of 05/23/24    Duplex Carotid Ultrasound CAR    Interpretation Summary    Right internal carotid artery demonstrates a less than 50% stenosis.    Left internal carotid artery demonstrates a less than 50% stenosis.    Results for orders placed during the hospital encounter of 05/23/24    Duplex Carotid Ultrasound CAR    Interpretation Summary    Right internal carotid artery demonstrates a less than 50% stenosis.    Left internal carotid artery demonstrates a less than 50% stenosis.    Results for orders  placed during the hospital encounter of 05/23/24    Adult Transthoracic Echo Complete W/ Cont if Necessary Per Protocol    Interpretation Summary    Left ventricular ejection fraction appears to be 61 - 65%.    Left ventricular wall thickness is consistent with moderate eccentric hypertrophy.    Mild to moderate aortic valve stenosis is present.      Labs Pending at Discharge:   Pending Results       Procedure [Order ID] Specimen - Date/Time    Potassium [[id number]]     Specimen: Blood             Procedures Performed   None       Consults:   Consults       Date and Time Order Name Status Description    2/21/2025  3:22 AM Hospitalist (on-call MD unless specified)              Discharge Details        Discharge Medications        New Medications        Instructions Start Date   hydrALAZINE 25 MG tablet  Commonly known as: APRESOLINE   25 mg, Oral, Every 8 Hours Scheduled      oseltamivir 6 MG/ML suspension  Commonly known as: TAMIFLU   30 mg, Oral, Every 12 Hours Scheduled             Changes to Medications        Instructions Start Date   albuterol sulfate  (90 Base) MCG/ACT inhaler  Commonly known as: PROVENTIL HFA;VENTOLIN HFA;PROAIR HFA  What changed: Another medication with the same name was removed. Continue taking this medication, and follow the directions you see here.   2 puffs, Every 4 Hours PRN             Continue These Medications        Instructions Start Date   acetaminophen 325 MG tablet  Commonly known as: TYLENOL   650 mg, Oral, Every 6 Hours PRN      cholecalciferol 25 MCG (1000 UT) tablet  Commonly known as: VITAMIN D3   1,000 Units, Daily      cloNIDine 0.1 MG tablet  Commonly known as: CATAPRES   0.1 mg, Every 8 Hours PRN      dapagliflozin Propanediol 10 MG tablet   10 mg, Daily      docusate sodium 100 MG capsule  Commonly known as: COLACE   100 mg, Daily PRN      furosemide 20 MG tablet  Commonly known as: LASIX   20 mg, Daily      lacosamide 50 MG tablet tablet  Commonly known as:  VIMPAT   50 mg, Daily      levothyroxine 50 MCG tablet  Commonly known as: SYNTHROID, LEVOTHROID   50 mcg, Every Early Morning      Lidocaine 4 %   1 patch, Daily      magnesium oxide 250 MG tablet   250 mg, 2 Times Daily      nitroglycerin 0.4 MG SL tablet  Commonly known as: NITROSTAT   0.4 mg, Every 5 Minutes PRN      ondansetron 4 MG tablet  Commonly known as: ZOFRAN   4 mg, Every 6 Hours PRN      pantoprazole 40 MG EC tablet  Commonly known as: PROTONIX   40 mg, Daily      potassium chloride 20 MEQ CR tablet  Commonly known as: KLOR-CON M20   40 mEq, Daily      ranolazine 500 MG 12 hr tablet  Commonly known as: RANEXA   500 mg, 2 Times Daily      vitamin B-12 1000 MCG tablet  Commonly known as: CYANOCOBALAMIN   1,000 mcg, Daily             Stop These Medications      apixaban 2.5 MG tablet tablet  Commonly known as: ELIQUIS     midodrine 5 MG tablet  Commonly known as: PROAMATINE              Allergies   Allergen Reactions    Aspirin Other (See Comments)     Severe bleeding    Nsaids Other (See Comments)     Severe bleeding       Discharge Disposition:   SNF    Diet:  Pureed, thin liquids    Discharge Activity:   As tolerated    CODE STATUS:  Code Status and Medical Interventions: No CPR (Do Not Attempt to Resuscitate); Limited Support; No intubation (DNI)   Ordered at: 02/21/25 1634     Medical Intervention Limits:    No intubation (DNI)     Code Status (Patient has no pulse and is not breathing):    No CPR (Do Not Attempt to Resuscitate)     Medical Interventions (Patient has pulse or is breathing):    Limited Support       No future appointments.    Additional Instructions for the Follow-ups that You Need to Schedule       Discharge Follow-up with PCP   As directed       Currently Documented PCP:    Marisela Monte APRN    PCP Phone Number:    [phone]     Follow Up Details: Follow up with PCP within 3 days of discharge                Time spent on Discharge including face to face service:  >30  minutes    Signature: Electronically signed by Neda Trevizo MD, 02/24/25, 11:26 EST.  [facility] Hospitalist Team

## 2025-02-24 NOTE — CASE MANAGEMENT/SOCIAL WORK
Case Management Discharge Note      Final Note: [facility]    Provided Post Acute Provider List?: N/A  Provided Post Acute Provider Quality & Resource List?: N/A    Selected Continued Care - Discharged on 2/24/2025 Admission date: 2/21/2025 - Discharge disposition: Skilled Nursing Facility (DC - External)      Destination Coordination complete.      Service Provider Services Address Phone Fax Patient Preferred    [facility] [place], [place] [facility] [address] [place] [address] [phone] --                        Transportation Services  W/C Van: [facility]oll    Final Discharge Disposition Code: 03 - skilled nursing facility (SNF)

## 2025-02-24 NOTE — PLAN OF CARE
Goal Outcome Evaluation: Patient discharging to [facility] today.  This RN called report to Katia (nurse).  All questions answered.  Peripheral IV removed with catheter tip intact. Daughter notified of updates.

## 2025-02-24 NOTE — THERAPY TREATMENT NOTE
Acute Care - Speech Language Pathology   Swallow Treatment Note  David     Patient Name: Kendy Worrell  : 1940  MRN: [id number]  Today's Date: 2025               Admit Date: 2025    Visit Dx:     ICD-10-CM ICD-9-CM   1. Altered mental status, unspecified altered mental status type  R41.82 780.97   2. Influenza A  J10.1 487.1   3. Hypoxia  R09.02 799.02     Patient Active Problem List   Diagnosis    Essential hypertension    Acquired hypothyroidism    Coronary artery disease involving native coronary artery of native heart without angina pectoris    Hyperlipidemia    Arthritis    Skin lesion of chest wall    NSTEMI (non-ST elevated myocardial infarction)    Supraventricular tachycardia    Normal cardiac stress test    Gastrointestinal bleed    Angina effort    Morbidly obese    Bilateral pulmonary embolism    Persistent atrial fibrillation    UTI (urinary tract infection)    Chronic diastolic CHF (congestive heart failure)    Acute ischemic stroke    Chest pain    GI bleed    Closed fibular fracture    Anemia    Hyponatremia    Orthostatic dizziness    Frequent falls    Generalized weakness    Chronic heart failure with preserved ejection fraction (HFpEF)    Influenza A     Past Medical History:   Diagnosis Date    A-fib     Arthritis     Breast cancer     CHF (congestive heart failure)     CVA (cerebral vascular accident) 2019    History of pulmonary embolus (PE)     Hyperlipidemia     Hypertension     Hyperthyroidism     patient has hypothyroidism    Hypothyroidism      Past Surgical History:   Procedure Laterality Date    BREAST BIOPSY      BREAST SURGERY Right     CHOLECYSTECTOMY      COLON SURGERY      Removed    COLONOSCOPY      COLONOSCOPY N/A 11/3/2022    Procedure: COLONOSCOPY to anastamosis with biopsies and cold snare polypectomy;  Surgeon: Saroj Oakes MD;  Location: [facility] ENDOSCOPY;  Service: Gastroenterology;  Laterality: N/A;  PRe: rectal bleeding  Post: hemorrhoids,  "diverticulosis, anastamotic ulcer, polyp, hemostasis clip free-floating    HYSTERECTOMY      MAMMO BILATERAL  2015    MASTECTOMY      TONSILLECTOMY         SLP Recommendation and Plan  Pt requested to continue puree/thins d/t oral dysphagia and not having dentures available. Continue puree/thins and safe swallow compensations during all PO. No further skilled ST intervention indicated at this time as pt endorses diet tolerance. She will be D/C'd from caseload.  Please re-consult if indicated. Thank you.    EDUCATION  The patient has been educated in the following areas:   Dysphagia (Swallowing Impairment) Oral Care/Hydration Modified Diet Instruction.        SLP GOALS       Row Name 02/24/25 0810       (LTG) Patient will demonstrate functional swallow for    Diet Texture (Demonstrate functional swallow) mechanical ground textures  -PF    Liquid viscosity (Demonstrate functional swallow) thin liquids  -PF    [place] (Demonstrate functional swallow) independently (over 90% accuracy)  -PF    Time Frame (Demonstrate functional swallow) by discharge  -PF    Progress/Outcomes (Demonstrate functional swallow) new goal  -PF    Comment (Demonstrate functional swallow) Skilled dysphagia interventions conducted this date. Pt is currently receiving puree and thin liquids. She was alert and responsive throughout. Pt self-fed all trials. No difficulty using cup, straw, or spoon.  Bolus manipulation and oral transit unremarkable on puree and thin. After the swallow, pt had clear vocal quality, w/ no cough reaction, throat clearing, or any other overt s/s of aspiration observed on any consistency and trial.  Pt endorses diet tolerance. She was offered soft solids, however pt stated \"I have trouble chewing solids\" as pt does not have dentures. Pt requested to continue puree/thins d/t oral dysphagia and not having dentures available. Continue puree/thins and safe swallow compensations during all PO. No further skilled ST " intervention indicated at this time as pt endorses diet tolerance. She will be D/C'd from caseload.  Please re-consult if indicated. Thank you. -PF              User Key  (r) = Recorded By, (t) = Taken By, (c) = Cosigned By      Initials Name Provider Type    Manuel Spaulding, SLP Speech and Language Pathologist    Jose Luis Clarke, SLP Speech and Language Pathologist                VIJAY Ceballos  2/24/2025

## 2025-02-25 LAB — BACTERIA SPEC AEROBE CULT: ABNORMAL

## 2025-05-22 NOTE — TELEPHONE ENCOUNTER
"    “Please be informed that patient has passed. Patient has been marked  in the system. The date of death is: 2025\".    Caller: [facility]    Relationship: REHAB/NURSING     Best call back number: [phone]     Did the patient have surgery within 30 days of their passing (Y/N): N  "

## (undated) DEVICE — LN SMPL CO2 SHTRM SD STREAM W/M LUER

## (undated) DEVICE — KT ORCA ORCAPOD DISP STRL

## (undated) DEVICE — TUBING, SUCTION, 1/4" X 10', STRAIGHT: Brand: MEDLINE

## (undated) DEVICE — SENSR O2 OXIMAX FNGR A/ 18IN NONSTR

## (undated) DEVICE — CANN O2 ETCO2 FITS ALL CONN CO2 SMPL A/ 7IN DISP LF

## (undated) DEVICE — DEV CLIP ENDO RESOLUTION360 CONTRL ROT 235CM
Type: IMPLANTABLE DEVICE | Site: COLON | Status: NON-FUNCTIONAL
Removed: 2022-11-03

## (undated) DEVICE — SNAR POLYP CAPTIVATOR RND STFF 2.4 240CM 10MM 1P/U

## (undated) DEVICE — ADAPT CLN BIOGUARD AIR/H2O DISP

## (undated) DEVICE — SINGLE-USE BIOPSY FORCEPS: Brand: RADIAL JAW 4

## (undated) DEVICE — THE SINGLE USE ETRAP – POLYP TRAP IS USED FOR SUCTION RETRIEVAL OF ENDOSCOPICALLY REMOVED POLYPS.: Brand: ETRAP